# Patient Record
Sex: MALE | Race: WHITE
[De-identification: names, ages, dates, MRNs, and addresses within clinical notes are randomized per-mention and may not be internally consistent; named-entity substitution may affect disease eponyms.]

---

## 2018-04-17 ENCOUNTER — HOSPITAL ENCOUNTER (OUTPATIENT)
Age: 53
End: 2018-04-17
Payer: COMMERCIAL

## 2018-04-17 DIAGNOSIS — E55.9: ICD-10-CM

## 2018-04-17 DIAGNOSIS — I10: ICD-10-CM

## 2018-04-17 DIAGNOSIS — R73.02: ICD-10-CM

## 2018-04-17 DIAGNOSIS — E78.00: ICD-10-CM

## 2018-04-17 DIAGNOSIS — Z12.5: Primary | ICD-10-CM

## 2018-04-17 LAB
ALANINE AMINOTRANSFER ALT/SGPT: 31 U/L (ref 16–61)
ALBUMIN SERPL-MCNC: 3.9 G/DL (ref 3.2–5)
ALKALINE PHOSPHATASE: 51 U/L (ref 45–117)
ANION GAP: 8 (ref 5–15)
AST(SGOT): 18 U/L (ref 15–37)
BUN SERPL-MCNC: 9 MG/DL (ref 7–18)
BUN/CREAT RATIO: 10.5 RATIO (ref 10–20)
CALCIUM SERPL-MCNC: 8.7 MG/DL (ref 8.5–10.1)
CARBON DIOXIDE: 24 MMOL/L (ref 21–32)
CHLORIDE: 109 MMOL/L (ref 98–107)
CHOLEST SERPL-MCNC: 151 MG/DL
CREAT UR-MCNC: 177 MG/DL
DEPRECATED RDW RBC: 40 FL (ref 35.1–43.9)
DIFFERENTIAL INDICATED: (no result)
ERYTHROCYTE [DISTWIDTH] IN BLOOD: 12.1 % (ref 11.6–14.6)
EST GLOM FILT RATE - AFR AMER: 119 ML/MIN (ref 60–?)
GLOBULIN: 3.1 G/DL (ref 2.2–4.2)
GLUCOSE: 125 MG/DL (ref 74–106)
HCT VFR BLD AUTO: 47.2 % (ref 40–54)
HEMOGLOBIN: 16.8 G/DL (ref 13–16.5)
HGB BLD-MCNC: 16.8 G/DL (ref 13–16.5)
IMMATURE GRANULOCYTES COUNT: 0.02 X10^3/UL (ref 0–0)
MCV RBC: 91.7 FL (ref 80–94)
MEAN CORP HGB CONC: 35.6 G/GL (ref 32–36)
MEAN PLATELET VOL.: 10.1 FL (ref 6.2–12)
MICROALBUMIN UR-MCNC: 8.8 MG/L
MUCOUS THREADS URNS QL MICRO: (no result) /HPF
PLATELET # BLD: 205 K/MM3 (ref 150–450)
PLATELET COUNT: 205 K/MM3 (ref 150–450)
POSITIVE COUNT: NO
POSITIVE DIFFERENTIAL: NO
POSITIVE MORPHOLOGY: NO
POTASSIUM: 4.2 MMOL/L (ref 3.5–5.1)
PSA,TOTAL - ANNUAL SCREEN: 0.68 NG/ML (ref 0–4)
RBC # BLD AUTO: 5.15 M/MM3 (ref 4.6–6.2)
RBC DISTRIBUTION WIDTH CV: 12.1 % (ref 11.6–14.6)
RBC DISTRIBUTION WIDTH SD: 40 FL (ref 35.1–43.9)
RBC UR QL: (no result) /HPF (ref 0–5)
SP GR UR: 1.02 (ref 1–1.03)
SQUAMOUS URNS QL MICRO: (no result) /HPF (ref 0–5)
TRIGLYCERIDES: 142 MG/DL
URINE PRESERVATIVE: (no result)
VLDLC SERPL-MCNC: 28 MG/DL (ref 5–40)
WBC # BLD AUTO: 6.7 K/MM3 (ref 4.4–11)
WHITE BLOOD COUNT: 6.7 K/MM3 (ref 4.4–11)

## 2018-04-17 PROCEDURE — 80061 LIPID PANEL: CPT

## 2018-04-17 PROCEDURE — 81001 URINALYSIS AUTO W/SCOPE: CPT

## 2018-04-17 PROCEDURE — 85025 COMPLETE CBC W/AUTO DIFF WBC: CPT

## 2018-04-17 PROCEDURE — 82570 ASSAY OF URINE CREATININE: CPT

## 2018-04-17 PROCEDURE — 84443 ASSAY THYROID STIM HORMONE: CPT

## 2018-04-17 PROCEDURE — G0103 PSA SCREENING: HCPCS

## 2018-04-17 PROCEDURE — 84153 ASSAY OF PSA TOTAL: CPT

## 2018-04-17 PROCEDURE — 82043 UR ALBUMIN QUANTITATIVE: CPT

## 2018-04-17 PROCEDURE — 36415 COLL VENOUS BLD VENIPUNCTURE: CPT

## 2018-04-17 PROCEDURE — 80053 COMPREHEN METABOLIC PANEL: CPT

## 2018-04-17 PROCEDURE — 82306 VITAMIN D 25 HYDROXY: CPT

## 2018-04-18 LAB — VITAMIN D,25 HYDROXY: 79.9 NG/ML (ref 29.95–100.01)

## 2019-07-16 ENCOUNTER — HOSPITAL ENCOUNTER (EMERGENCY)
Age: 54
Discharge: HOME | End: 2019-07-16

## 2019-07-16 VITALS
WEIGHT: 133.38 LBS | HEART RATE: 82 BPM | OXYGEN SATURATION: 98 % | RESPIRATION RATE: 18 BRPM | TEMPERATURE: 98.6 F | SYSTOLIC BLOOD PRESSURE: 118 MMHG | DIASTOLIC BLOOD PRESSURE: 73 MMHG

## 2019-07-16 DIAGNOSIS — S61.011A LACERATION OF RIGHT THUMB WITHOUT FOREIGN BODY WITHOUT DAMAGE TO NAIL, INITIAL ENCOUNTER: Primary | ICD-10-CM

## 2019-07-16 PROCEDURE — 12042 INTMD RPR N-HF/GENIT2.6-7.5: CPT | Performed by: PHYSICIAN ASSISTANT

## 2019-07-16 PROCEDURE — 99282 EMERGENCY DEPT VISIT SF MDM: CPT | Performed by: PHYSICIAN ASSISTANT

## 2019-07-16 PROCEDURE — 10002525 HB LACERATION REPAIR-INTERMEDIATE

## 2019-07-16 PROCEDURE — 90471 IMMUNIZATION ADMIN: CPT | Performed by: PHYSICIAN ASSISTANT

## 2019-07-16 PROCEDURE — 99282 EMERGENCY DEPT VISIT SF MDM: CPT

## 2019-07-16 PROCEDURE — 10002800 HB RX 250 W HCPCS: Performed by: PHYSICIAN ASSISTANT

## 2019-07-16 PROCEDURE — 90715 TDAP VACCINE 7 YRS/> IM: CPT | Performed by: PHYSICIAN ASSISTANT

## 2019-07-16 RX ADMIN — TETANUS TOXOID, REDUCED DIPHTHERIA TOXOID AND ACELLULAR PERTUSSIS VACCINE, ADSORBED 0.5 ML: 5; 2.5; 8; 8; 2.5 SUSPENSION INTRAMUSCULAR at 18:59

## 2019-07-16 ASSESSMENT — ENCOUNTER SYMPTOMS
WEAKNESS: 0
WOUND: 1
NUMBNESS: 1

## 2019-07-16 ASSESSMENT — MOVEMENT AND STRENGTH ASSESSMENTS: FACE_JAW: NO FACIAL DROOP

## 2019-07-16 ASSESSMENT — PAIN SCALES - GENERAL: PAINLEVEL_OUTOF10: 4

## 2021-02-03 ENCOUNTER — HOSPITAL ENCOUNTER (OUTPATIENT)
Age: 56
End: 2021-02-03
Payer: COMMERCIAL

## 2021-02-03 DIAGNOSIS — I10: Primary | ICD-10-CM

## 2021-02-03 PROCEDURE — 93975 VASCULAR STUDY: CPT

## 2021-03-08 ENCOUNTER — HOSPITAL ENCOUNTER (OUTPATIENT)
Age: 56
End: 2021-03-08
Payer: COMMERCIAL

## 2021-03-08 DIAGNOSIS — R94.7: Primary | ICD-10-CM

## 2021-03-08 LAB — CREATININE FINGERSTICK: 0.9 MG/DL (ref 0.7–1.3)

## 2021-03-08 PROCEDURE — 74178 CT ABD&PLV WO CNTR FLWD CNTR: CPT

## 2021-09-22 ENCOUNTER — HOSPITAL ENCOUNTER (OUTPATIENT)
Age: 56
End: 2021-09-22
Payer: COMMERCIAL

## 2021-09-22 DIAGNOSIS — Z87.891: ICD-10-CM

## 2021-09-22 DIAGNOSIS — Z12.2: Primary | ICD-10-CM

## 2021-09-22 PROCEDURE — 71271 CT THORAX LUNG CANCER SCR C-: CPT

## 2022-11-02 ENCOUNTER — HOSPITAL ENCOUNTER (OUTPATIENT)
Dept: HOSPITAL 100 - CT | Age: 57
Discharge: HOME | End: 2022-11-02
Payer: COMMERCIAL

## 2022-11-02 DIAGNOSIS — Z12.2: Primary | ICD-10-CM

## 2022-11-02 DIAGNOSIS — Z87.891: ICD-10-CM

## 2022-11-02 PROCEDURE — 71271 CT THORAX LUNG CANCER SCR C-: CPT

## 2022-11-11 ENCOUNTER — HOSPITAL ENCOUNTER (OUTPATIENT)
Dept: HOSPITAL 100 - PSN | Age: 57
Discharge: HOME | End: 2022-11-11
Payer: COMMERCIAL

## 2022-11-11 DIAGNOSIS — R93.89: Primary | ICD-10-CM

## 2022-11-11 PROCEDURE — 94729 DIFFUSING CAPACITY: CPT

## 2022-11-11 PROCEDURE — 94060 EVALUATION OF WHEEZING: CPT

## 2022-11-11 PROCEDURE — 94726 PLETHYSMOGRAPHY LUNG VOLUMES: CPT

## 2023-05-10 ENCOUNTER — HOSPITAL ENCOUNTER (OUTPATIENT)
Dept: HOSPITAL 100 - CT | Age: 58
Discharge: HOME | End: 2023-05-10
Payer: COMMERCIAL

## 2023-05-10 DIAGNOSIS — R93.89: Primary | ICD-10-CM

## 2023-05-10 PROCEDURE — 71250 CT THORAX DX C-: CPT

## 2024-08-08 ENCOUNTER — HOSPITAL ENCOUNTER (OUTPATIENT)
Dept: HOSPITAL 100 - CT | Age: 59
Discharge: HOME | End: 2024-08-08
Payer: COMMERCIAL

## 2024-08-08 DIAGNOSIS — I25.10: ICD-10-CM

## 2024-08-08 DIAGNOSIS — F17.210: Primary | ICD-10-CM

## 2024-08-08 PROCEDURE — 71271 CT THORAX LUNG CANCER SCR C-: CPT

## 2024-08-08 NOTE — CT_ITS
REPORT-ID:[id number] 
 
EXAM:  CT CHEST WITHOUT INTRAVENOUS CONTRAST 
 
CLINICAL INDICATION:  smoker 
 
TECHNIQUE:  Helically acquired images were obtained of the chest without 
intravenous contrast.  This CT exam was performed using one or more of the 
following dose reduction techniques:  automated exposure control, 
adjustment of the mA and/or kV according to patient size, and/or use of 
iterative reconstruction technique. 
 
RADIATION DOSE:  CTDIvol = 6.02 mGy, DLP = 151.21 mGy-cm 
 
COMPARISON:  Chest CT 11/2/22 
 
FINDINGS: 
 
LUNGS AND PLEURAL SPACES:  Unremarkable.  No mass.  No consolidation or 
edema.  No pleural effusion or thickening.  No pneumothorax. 
 
HEART:  Coronary artery calcifications.  Heart size is normal.  No 
pericardial effusion. 
 
MEDIASTINUM:  Unremarkable.  No mediastinal or hilar adenopathy.  Esophagus 
is unremarkable.  No hiatal hernia. 
 
THYROID:  Unremarkable.  No thyroid lesions. 
 
BONES/JOINTS:  Unremarkable.  No suspicious lytic or blastic abnormality. 
 
VASCULATURE: No dilation of the thoracic aorta. 
 
ORDER #: [id number] CT/Low Dose CT Lung Screening  
IMPRESSION:  
 
  
No pulmonary nodules.  ACR Lung CT Screening Reporting And Data System  
(Lung-RADS) score: 1S - Additional clinically significant or potentially  
clinically significant findings are described.  Recommend continued annual  
screening with a low-dose CT (LDCT) in 12 months.  
 
  
Electronically Signed:  
aNthen Brennan MD  
2024/08/10 at 11:54 EDT  
Reading Location ID and State: [address] / [place]  
Tel [phone], Service support  [phone], Fax [phone]

## 2024-08-08 NOTE — XMS RPT_ITS
Comprehensive CCD (C-CDA v2.1)  
  
                           Created on: 2024  
  
  
Bethanie Galan  
External Reference #: CDR,PersonID:[id number]  
: [id number]  
Sex: Male  
  
Demographics  
  
  
                                        Address             [address] [place]  [address]  
   
                                        Home Phone          [phone]  
   
                                        Mobile Phone        [phone]  
   
                                        Preferred Language  en  
   
                                        Marital Status        
   
                                        Zoroastrian Affiliation Unknown  
   
                                        Race                White  
   
                                        Ethnic Group        Not  or Lati  
no  
  
  
Author  
  
  
                                        Organization        [facility]  
  
  
Care Team Providers  
  
  
                                Care Team Member Name Role            Phone  
   
                                Mere Chavez Unavailable     [phone]  
   
                                Nikko Hanson Unavailable     [phone]  
   
                                Verena Tuttle   Unavailable     Unavailable  
   
                                Unavailable     Unavailable     [phone]  
   
                                Mere Chavez Attending       Unavailable  
   
                                Mere Chavez Referring       Unavailable  
   
                                Mere Chavez Consulting      Unavailable  
   
                                Mere Chavez Unavailable     [phone]  
   
                                Nikko Hanson Unavailable     [phone]  
   
                                Verena Tuttle   Unavailable     Unavailable  
   
                                Unavailable     Unavailable     [phone]  
   
                                Slarb, Lashell   Unavailable     Unavailable  
   
                                Gravius, Gaby Unavailable     Unavailable  
   
                                Anjelica Gonzalez   Unavailable     Unavailable  
   
                                Neal, Catina Unavailable     Unavailable  
   
                                Marcos Spencer   Unavailable     Unavailable  
   
                                Tanphaichitr - Wilkins, Donald Unavailable     [phone]  
[phone]  
   
                                Cory COSTELLO Mere Unavailable     [phone]  
[phone]  
   
                                Nikko Hanson MD Unavailable     [phone]  
[phone]  
   
                                Tanphaichitr - Wilkins, Donald Unavailable     [phone]  
[phone]  
   
                                Anjelica Gonzalez LPN Unavailable     Unavailable  
   
                                Gravius CMA, Gaby Unavailable     Unavailable  
   
                                Unavailable     Unavailable     [phone]  
   
                                Sierra Low LPN Unavailable     Unavailable  
   
                                Cory COSTELLO Mere Unavailable     [phone]  
[phone]  
   
                                Nikko Hanson MD Unavailable     [phone]  
[phone]  
   
                                Tanphaichitr - Wilkins, Donald Unavailable     [phone]  
[phone]  
   
                                Marcos Spencer LPN Unavailable     Unavailable  
   
                                Manchak CAROLINE, Rowena Unavailable     Unavailable  
   
                                Yolie Orona MA Unavailable     Unavailable  
   
                                Gravius CMA, Gaby Unavailable     Unavailable  
   
                                Unavailable     Unavailable     [phone]  
   
                                aMrco Antonio VELAZQUEZ, Kayela Unavailable     Unavailable  
   
                                Cory DO, Mere Unavailable     [phone]  
[phone]  
   
                                Sarkis Rodriguez   Unavailable     [phone]  
   
                                Slarb LPN, Lashell Unavailable     Unavailable  
   
                                Chato LPN, Ned Unavailable     Unavailable  
  
  
  
Allergies  
  
  
                                                    Allergy   
Classification                          Reported   
Allergen(s)               Allergy Type              Date of   
Onset                     Reaction(s)               Facility  
   
                                                    Penicillins   
(antibiotic)  
(3 sources)                             Penicillins;   
Translations:   
[Penicillins]   Drug Allergy                                    [facility]   
[facility];   
[facility]  
Work Phone:   
[phone]  
   
                                                      
(20 sources)                            Penicillins;   
Translations:   
[Penicillins]                           allergy to   
substance                                                   [facility]  
Work Phone:   
[phone]  
  
  
  
Medications  
Current Medications  
  
  
  
                      Medication Drug Class(es) Dates      Sig (Normalized) Sig   
(Original)  
   
                                                    niacin 500 mg oral   
tablet  
(20 sources)              Nicotinic Acid            Start:   
10-                                            
  
  
  
                                Start: 10-                   
   
                                Start: 2021                   
   
                                        Start: 10-   take 1 tablet by kayla  
th once   
daily                                   Niacin 500 MG Oral Tablet 1 (one)   
Tablet qd for 90 days Quantity: 90   
{Tablet} Refills: 3 Ordered:   
2-Oct-2020 Anjelica Gonzalez LPN Start   
: 2-Oct-2020 Active  
   
                                        Start: 2019   take 1 tablet by kayla  
th once   
daily                                   Niacin 500 MG Oral Tablet 1 (one)   
Tablet qd for 90 days Quantity: 90   
{Tablet} Refills: 3 Ordered:   
2019 Mere Chavez DO, DO, Kathleen Start :   
2019 Active  
   
                                        Start: 05-   take 1 tablet by kayla  
th once   
daily                                   Niacin 500 MG Oral Tablet 1 (one)   
Tablet qd for 90 days Quantity: 90   
{Tablet} Refills: 3 Ordered:   
10-May-2019 Catina De Jesus LPN   
Start : 10-May-2019 Active  
   
                                        Start: 2018   take 1 tablet by kayla  
th once   
daily                                   Niacin 500 MG Oral Tablet 1 (one)   
Tablet qd for 90 days Quantity: 90   
{Tablet} Refills: 3 Ordered:   
13-Aug-2018 Mere Chavez DO, DO, Kathleen Start :   
13-Aug-2018 Active  
   
                                                    Start: 2011  
End: 10-                                       
  
  
  
Completed/Discontinued Medications  
  
  
  
                      Medication Drug Class(es) Dates      Sig (Normalized) Sig   
(Original)  
   
                                                    amLODIPine 5 mg   
oral tablet  
(20 sources)                            Dihydropyridine Calcium   
Channel Blocker                         Start:   
02-                                            
  
  
  
                                Start: 2022                   
   
                                Start: 2021                   
   
                                        Start: 2020   take 1 tablet by kayla  
th once   
daily                                   Norvasc 5 MG Oral Tablet 1 (one)   
Tablet qd for 90 days Quantity: 90   
{Tablet} Refills: 3 Ordered:   
16-Dec-2020 Cory DO, Mere Griffithon DO Mere Start :   
16-Dec-2020 Active  
   
                                        Start: 2020   take 1 tablet by kayla  
th twice   
daily                                   Norvasc 5 MG Oral Tablet 1 (one)   
Tablet bid for 90 days Quantity:   
180 {Tablet} Refills: 3 Ordered:   
2020 Cory DO, Mere Griffithon DO Mere Start :   
2020 Active  
   
                                        Start: 06-   take 1 tablet by kayla  
th twice   
daily                                   Norvasc 2.5 MG Oral Tablet 1 Tablet   
bid for 90 days Quantity: 180   
{Tablet} Refills: 3 Ordered:   
15-Aaron-2020 Cory DO, Mere Griffithon DO Mere Start :   
15-Aaron-2020 Active  
   
                                        Start: 05-   take 1 tablet by kayla  
th twice   
daily                                   Norvasc 2.5 MG Oral Tablet 1 Tablet   
bid for 90 days Quantity: 180   
{Tablet} Refills: 3 Ordered:   
10-May-2019 Catina De Jesus LPN   
Start : 10-May-2019 Active  
   
                                        Start: 2019   take 1 tablet by kayla  
th twice   
daily                                   Norvasc 2.5 MG Oral Tablet 1 Tablet   
bid for 90 days Quantity: 180   
{Tablet} Refills: 3 Ordered:   
2019 Cory DO, Mere Chavez DO Mere Start :   
2019 Active  
   
                                        Start: 2018   take 1 tablet by kayla  
th twice   
daily                                   Norvasc 2.5 MG Oral Tablet 1 Tablet   
bid for 90 days Quantity: 180   
{Tablet} Refills: 3 Ordered:   
2018 Cory DO, Mere Chavez DO Mere Start :   
2018 Active  
  
  
  
                                                    aspirin 325 mg oral tablet  
(20 sources)                            Platelet Aggregation Inhibitor,   
Nonsteroidal Anti-inflammatory   
Drug                Start: 2012                         
   
                                                    cetirizine hydrochloride 10   
mg   
disintegrating oral tablet  
(20 sources)              Histamine-1 Receptor Antagonist Start: 2011  
End: 10-                                       
   
                                                    cholecalciferol 0.05 mg oral  
   
capsule  
(20 sources)    Vitamin D       Start: 2017                   
  
  
  
                                        Start: 2017   take 3 capsules by carissa  
out once   
daily                                   Vitamin D3 2000 UNIT Oral Capsule 3   
(three) Capsule Capsule qd for 0   
days Quantity: 90 {Capsule}   
Refills: 8 Ordered: 2017   
Mere Chavez DO, DO, Kathleen Start : 2017 Active  
  
  
  
                                                    hydroCHLOROthiazide 25 mg or  
al tablet  
(20 sources)    Thiazide Diuretic Start: 2022                   
  
  
  
                                Start: 2021                   
   
                                        Start: 2021   take 1 tablet by kayla  
th once   
daily in the morning                    hydroCHLOROthiazide 25 MG Oral Tablet 1   
(one) Tablet qam for 90 days Quantity:   
90 {Tablet} Refills: 3 Ordered:   
2021 Anjelica Gonzalez LPN Start :   
2021 Active  
  
  
  
                                                    LORazepam 0.5 mg oral tablet  
(20 sources)    Benzodiazepine  Start: 2022                   
  
  
  
                                                    Start: 2014  
End: 2020                                       
  
  
  
                                        Comment on above:   twenty   
   
                                                    losartan potassium 100 mg or  
al   
tablet  
(20 sources)    Angiotensin 2 Receptor Blocker Start: 2023                  
   
  
  
  
                                Start: 2023                   
   
                                Start: 2023                   
   
                                Start: 2023                   
   
                                Start: 2023                   
   
                                Start: 2022                   
   
                                Start: 2022                   
   
                                Start: 2021                   
   
                                        Start: 2021   take 1 tablet by kayla  
th once   
daily                                   Losartan Potassium 100 MG Oral   
Tablet 1 (one) Tablet qd for 0 days   
Quantity: 30 {Tablet} Refills: 3   
Ordered: 2021 Mere Chavez DO, DO, Kathleen Start   
: 2021 Active  
   
                                        Start: 2020   take 1 tablet by kayla  
th once   
daily                                   Losartan Potassium 100 MG Oral   
Tablet 1 (one) Tablet qd for 0 days   
Quantity: 30 {Tablet} Refills: 3   
Ordered: 16-Dec-2020 Anjelica Gonzalez LPN Start : 16-Dec-2020 Active  
  
  
  
                                                    lovastatin 20 mg oral tablet  
(20 sources)    HMG-CoA Reductase Inhibitor Start: 06-                   
  
  
  
                                Start: 2022                   
   
                                Start: 2021                   
   
                                        Start: 2020   take 1 tablet by kayla  
th once   
daily                                   Lovastatin 20 MG Oral Tablet 1   
(one) Tablet qd for 90 days   
Quantity: 90 {Tablet} Refills: 3   
Ordered: 2020 Mere Chavez DO, DO, Kathleen Start   
: 2020 Active  
   
                                        Start: 05-   take 1 tablet by kayla  
th once   
daily                                   Lovastatin 20 MG Oral Tablet 1   
(one) Tablet qd for 90 days   
Quantity: 90 {Tablet} Refills: 3   
Ordered: 10-May-2019 Catina De Jesus LPMANPREET Start : 10-May-2019   
Active  
   
                                        Start: 2018   take 1 tablet by kayla  
th once   
daily                                   Lovastatin 20 MG Oral Tablet 1   
(one) Tablet qd for 90 days   
Quantity: 90 {Tablet} Refills: 3   
Ordered: 13-Aug-2018 Mere Chavez DO, DO, Kathleen Start   
: 13-Aug-2018 Active  
  
  
  
                                                    24 hr lovastatin 20 mg / angel  
albert   
500 mg extended release oral   
tablet  
(20 sources)                            HMG-CoA Reductase Inhibitor,   
Nicotinic Acid                          Start: 2015  
End: 2018                                       
  
  
  
                                                    Start: 2015  
End: 2018                         take 1 tablet by mouth once   
daily                                   ADVICOR, 500-20MG (Oral Tablet   
Extended Release 24 Hour) 1 Tablet   
ER 24HR qd for 90 days Quantity: 90   
{Tablet} Refills: 3 Ordered:   
16-Dec-2015 Carline Spencer Start :   
16-Dec-2015 End : 2018   
Discontinued Comments: This order   
discontinued per [facility].  
   
                                                    Start: 2015  
End: 2018                         take 500-20 mg by mouth every   
twenty-four hours                       ADVICOR, 500-20MG (Oral Tablet   
Extended Release 24 Hour) 1 Tablet   
ER 24HR qd for 90 days Quantity: 90   
{Tablet} Refills: 3 Ordered:   
16-Dec-2015 Carline Spencer Start :   
16-Dec-2015 End : 2018   
Discontinued Comments: This order   
discontinued per [facility].  
  
  
  
                                        Comment on above:   This order discontin  
ued per [facility].   
   
                                                    24 hr metFORMIN hydrochlorid  
e   
500 mg extended release oral   
tablet  
(20 sources)    Biguanide       Start: 2023                   
  
  
  
                                Start: 2022                   
   
                                Start: 07-                   
   
                                Start: 05-                 metFORMIN HCl   
 MG Oral Tablet Extended Release 24 Hour   
1   
Tablet ER 24HR bid for 0 days Quantity: 180 {Tablet} Refills: 3   
Ordered: 15-Jul-2020 Mere Chavez DO, DO Mere   
Start : 15-Jul-2020 Active  
   
                                Start: 2018                 MetFORMIN HCl   
 MG Oral Tablet Extended Release 24 Hour   
1   
Tablet ER 24HR bid for 0 days Quantity: 180 {Tablet} Refills: 3   
Ordered: 13-Aug-2018 Cory COSTELLO MereMere Vergara DO   
Start : 13-Aug-2018 Active  
  
  
  
                                                    traMADol hydrochloride 50 mg  
 oral tablet  
(20 sources)    Opioid Agonist  Start: 2023                   
  
  
  
                                Start: 2022                   
   
                                Start: 2022                   
   
                                                    Start: 2017  
End: 2020                         take 1 tablet by mouth every   
eight hours as needed                   Ultram 50 MG Oral Tablet 1 (one)   
Tablet q 8 hr prn for 0 days   
Quantity: 30 {Tablet} Refills: 0   
Ordered: 2020 Gaby Chavez CMA Start : 8-Dec-2017 End :   
2020 Inactive Comments:   
thirtycalled in  oarrs reviwed   
[phone] oars kf  
  
  
  
                                        Comment on above:   thirtycalled in [facility]  
arrs reviwed [phone]   
oars kf   
   
                                                    vitamin k 0.1 mg oral tablet  
(20 sources)                                                      
   
                                                    NEGATED: Highlighted row has  
 not   
occurred!drug or medication  
(20 sources)                                                      
  
  
  
                                                                No Known Histori  
nelida Medications  
  
  
  
                                                    NEGATED: Highlighted row has  
 not occurred!No   
Known Historical Medications  
(5 sources)                                                     No Known Histori  
nelida Medications  
  
  
  
Problems  
Active Problems  
  
  
                                                    Problem   
Classification  Problem         Date            Documented Date Episodic/Chronic  
   
                                                    Adjustment disorders  
(20 sources)                            Acute situational   
disturbance;   
Translations: [Grief   
finding]                                  
Resolved:   
  
3                         2018                Chronic  
   
                                                    Administrative/socia  
l admission  
(20 sources)                            Administrative reason   
for encounter;   
Translations: [Other   
general medical   
examination for   
administrative purposes]                     2018          Episodic  
   
                                                    Anxiety disorders  
(20 sources)                            Anxiety; Translations:   
[Situational anxiety]                     2019          Chronic  
   
                                                    Diabetes mellitus   
without complication  
(20 sources)                            Type 2 diabetes   
mellitus; Translations:   
[Type II diabetes   
mellitus, well   
controlled]                             2023          Chronic  
   
                                                    Diabetes mellitus   
without complication  
(20 sources)                            Abnormal glucose   
tolerance test;   
Translations:   
[Hyperglycemia]                         2018          Episodic  
   
                                                    Disorders of lipid   
metabolism  
(20 sources)                            Hypercholesterolemia;   
Translations:   
[Hypercholesteremia]                     2018          Chronic  
   
                                        Comment on above:   will work on diet mo  
re and add exercise   
   
                                                    Essential   
hypertension  
(20 sources)                            Benign essential   
hypertension;   
Translations:   
[Hypertension,   
essential, benign]                      2018          Chronic  
   
                                                    Immunizations and   
screening for   
infectious disease  
(20 sources)                            Need for prophylactic   
vaccination and   
inoculation against   
influenza; Translations:   
[Needs influenza   
immunization]                           2021          Episodic  
   
                                        Comment on above:   NOT GIVENNOT GIVEN   
   
                                                    Nonspecific chest   
pain  
(20 sources)                            Chest pain;   
Translations: [Chest   
pain]                                     
Resolved:   
  
1                         2016                Episodic  
   
                                                    Nutritional   
deficiencies  
(20 sources)                            Vitamin D deficiency;   
Translations: [Vitamin D   
deficiency]                             2018          Chronic  
   
                                                    Osteoarthritis  
(20 sources)                            Degenerative joint   
disease involving   
multiple joints;   
Translations:   
[Generalized   
osteoarthritis]                         2018          Chronic  
   
                                                    Other endocrine   
disorders  
(20 sources)                            Increased norepinephrine   
level; Translations:   
[Elevated norepinephrine   
level]                                  2021          Episodic  
   
                                                    Other hematologic   
conditions  
(20 sources)                            Erythrocytosis;   
Translations:   
[Polycythemia]                          2018          Episodic  
   
                                                    Other nutritional;   
endocrine; and   
metabolic disorders  
(20 sources)                            Body mass index 25-29 -   
overweight;   
Translations: [BMI   
28.0-28.9,adult]                          
Resolved:   
  
0                         2018                Chronic  
   
                                                    Other nutritional;   
endocrine; and   
metabolic disorders  
(20 sources)                            Hypercalcemia;   
Translations:   
[Hypercalcemia]                         2018          Chronic  
   
                                                    Other nutritional;   
endocrine; and   
metabolic disorders  
(20 sources)                            Body mass index 25-29 -   
overweight;   
Translations: [BMI   
25.0-25.9,adult]                          
Resolved:   
  
0                         2021                Episodic  
   
                                                    Other nutritional;   
endocrine; and   
metabolic disorders  
(20 sources)                            Overweight in adulthood   
with body mass index of   
25 or more but less than   
30; Translations: [BMI   
25.0-25.9,adult]                          
Resolved:   
  
0                         2022                Episodic  
   
                                                    Other screening for   
suspected conditions   
(not mental   
disorders or   
infectious disease)  
(20 sources)                            CT of chest abnormal;   
Translations: [Abnormal   
chest CT]                               2022          Chronic  
   
                                                    Other screening for   
suspected conditions   
(not mental   
disorders or   
infectious disease)  
(20 sources)                            Abnormal result of   
cardiovascular function   
study, unspecified;   
Translations: [Blood   
chemistry abnormal]                       
Resolved:   
  
6                         2018                Episodic  
   
                                        Comment on above:   i spoke to dr Niko chi nd he is going to see the cardiologist   
today   
at 3:30 -- he remembered seeing this echo-- i cxn his stress test   
and i want to see if cardio agrees that he needs a cath   
   
                                                            ordered cologardlast  
 scope    
   
                                                    Residual codes;   
unclassified  
(20 sources)                            Needs influenza   
immunization;   
Translations: [Need for   
prophylactic vaccination   
and inoculation against   
influenza (Renamed from   
Need for immunization   
against influenza)]                     2019          Episodic  
   
                                        Comment on above:   NOT GIVENNOT GIVEN   
   
                                                    Residual codes;   
unclassified  
(20 sources)                            Body mass index 20-24 -   
normal; Translations:   
[BMI 24.0-24.9, adult]                    
Resolved:   
  
0                         2021                Episodic  
   
                                                    Residual codes;   
unclassified  
(20 sources)                            Influenza vaccination   
declined; Translations:   
[Influenza vaccination   
declined (Renamed from   
Refused influenza   
vaccine)]                               2021          Episodic  
   
                                                    Spondylosis;   
intervertebral disc   
disorders; other   
back problems  
(20 sources)                            Degeneration of lumbar   
intervertebral disc;   
Translations: [DDD   
(degenerative disc   
disease), lumbar]                       2018          Chronic  
   
                                        Comment on above:   flares more during g  
olf season and takes one pain pill prn   
   
                                                    Spondylosis;   
intervertebral disc   
disorders; other   
back problems  
(20 sources)                            Low back pain;   
Translations: [Low back   
pain]                                   2020          Episodic  
   
                                                    Substance-related   
disorders  
(20 sources)                            Smoker; Translations:   
[Smoker]                                2018          Chronic  
   
                                        Comment on above:   weaning -- currently  
 5-7 cig daily   
   
                                                            weaning -- currently  
 5-10 cig daily---- back up to almost pack a   
day   
   
                                                    Unclassified  
(20 sources)                                                      
   
                                                    Unclassified  
(20 sources)                            Screening status;   
Translations: [Encounter   
for screening for   
malignant neoplasm of   
colon (Renamed from   
Special screening for   
malignant neoplasms,   
colon)]                                 2018            
   
                                        Comment on above:   ordered cologardlast  
 scope 2016   
   
                                                    Unclassified  
(20 sources)                            Hypercholesteremia   
(272.0)                                                       
   
                                                    Unclassified  
(20 sources)                            Influenza vaccination   
declined; Translations:   
[Influenza vaccination   
declined (Renamed from   
Refused influenza   
vaccine)]                               2018            
   
                                                    Unclassified  
(20 sources)                            SCREENING FOR CANCER OF   
THE PROSTATE (V76.44)                                           
   
                                                    Unclassified  
(20 sources)                            HYPERTENSION, BENIGN   
ESSENTIAL (401.1)                                             
   
                                                    Unclassified  
(20 sources)    BMI 28.0-28.9,adult                                   
   
                                                    Unclassified  
(20 sources)    Hypercholesteremia                                   
   
                                                    Unclassified  
(20 sources)                            Hypertension, essential,   
benign                                                        
   
                                                    Unclassified  
(20 sources)                            DDD (degenerative disc   
disease), lumbar                                              
   
                                                    Unclassified  
(20 sources)    Polycythemia                                      
   
                                                    Unclassified  
(20 sources)                            Screening for prostate   
cancer                                                        
   
                                                    Unclassified  
(20 sources)    BMI 29.0-29.9,adult                                   
   
                                                    Unclassified  
(20 sources)    BMI 27.0-27.9,adult                                   
   
                                                    Unclassified  
(20 sources)    Situational anxiety                                   
   
                                                    Unclassified  
(20 sources)    BMI 26.0-26.9,adult                                   
   
                                                    Unclassified  
(20 sources)                            Body mass index 20-24 -   
normal; Translations:   
[BMI 24.0-24.9, adult]                    
Resolved:   
  
0                         2020                  
   
                                                    Unclassified  
(20 sources)    BMI 25.0-25.9,adult                                   
   
                                                    Unclassified  
(4 sources)                             Abnormal glucose   
tolerance test (Renamed   
from Abnormal glucose   
tolerance test (GTT))                                           
   
                                                    Unclassified  
(2 sources)                             Encounter for hepatitis   
C virus screening test   
for high risk patient                                           
  
  
Past or Other Problems  
  
  
                                                    Problem   
Classification  Problem         Date            Documented Date Episodic/Chronic  
   
                                                    Residual codes;   
unclassified  
(8 sources)                             Increased body mass   
index; Translations:   
[BMI 29.0-29.9,adult]                     
Resolved:   
2018                Episodic  
   
                                                    Unclassified  
(20 sources)    Abnormal EKG(794.31)                                   
   
                                                    Unclassified  
(20 sources)                            Other general medical   
examination for   
administrative   
purposes (V70.3)                                              
   
                                                    Unclassified  
(20 sources)                            Abnormal Cardiac Test   
(794.30)                                                      
   
                                                    Unclassified  
(20 sources)    Unspecified Diagnosis                 2018        
   
                                                    Unclassified  
(15 sources)                            Elevated   
norepinephrine level                                           
   
                                                    Unclassified  
(2 sources)     Stress reaction                                   
   
                                                    Unclassified  
(2 sources)     Grieving                                          
   
                                                    Unclassified  
(2 sources)                             Screening PSA   
(prostate specific   
antigen)                                                      
  
  
  
Results  
  
  
                          Test Name    Value        Interpretation Reference   
Range                                   Facility  
   
                                                    CALCIFEDIOL (76535)Ordered B  
y:  on 2023   
   
                                                    25-hydroxyvitamin D   
[Mass/Vol]      66.7 ng/mL      Normal          30.0-100.0      [facility];   
[facility]  
Work Phone:   
[phone]  
   
                                                    CBC with auto diff (90217)Or  
dered By:  on 2023   
   
                                                    Basophils (Bld)   
[#/Vol]         0.1 10*3/uL     Normal          0.0-0.2         [facility];   
[facility]  
Work Phone:   
[phone]  
   
                                                    Basophils/100 WBC   
(Bld)           1 %             Normal                          [facility];   
[facility]  
Work Phone:   
[phone]  
   
                                                    Eosinophils (Bld)   
[#/Vol]         0.1 10*3/uL     Normal          0.0-0.4         [facility];   
[facility]  
Work Phone:   
[phone]  
   
                                                    Eosinophils/100 WBC   
(Bld)           2 %             Normal                          [facility];   
[facility]  
Work Phone:   
[phone]  
   
                                                    Erythrocyte   
distribution width   
(RBC) [Ratio]   11.9 %          Normal          11.6-15.4       [facility];   
[facility]  
Work Phone:   
[phone]  
   
                                                    Hematocrit (Bld)   
[Volume fraction] 48.0 %          Normal          37.5-51.0       [facility];   
[facility]  
Work Phone:   
[phone]  
   
                                                    Hemoglobin (Bld)   
[Mass/Vol]      16.3 g/dL       Normal          13.0-17.7       [facility];   
[facility]  
Work Phone:   
[phone]  
   
                                                    Immature granulocytes   
(Bld) [#/Vol]   0.0 10*3/uL     Normal          0.0-0.1         [facility];   
[facility]  
Work Phone:   
[phone]  
   
                                                    Immature   
granulocytes/100 WBC   
(Bld)           0 %             Normal                          [facility];   
[facility]  
Work Phone:   
[phone]  
   
                                                    Lymphocytes (Bld)   
[#/Vol]         2.4 10*3/uL     Normal          0.7-3.1         [facility];   
[facility]  
Work Phone:   
[phone]  
   
                                                    Lymphocytes/100 WBC   
(Bld)           34 %            Normal                          [facility];   
[facility]  
Work Phone:   
[phone]  
   
                                                    MCH (RBC) [Entitic   
mass]           31.6 pg         Normal          26.6-33.0       [facility];   
[facility]  
Work Phone:   
[phone]  
   
                      MCHC (RBC) [Mass/Vol] 34.0 g/dL  Normal     31.5-35.7  [facility];   
[facility]  
Work Phone:   
[phone]  
   
                                                    MCV (RBC) [Entitic   
vol]            93 fL           Normal          79-97           [facility];   
[facility]  
Work Phone:   
[phone]  
   
                                                    Monocytes (Bld)   
[#/Vol]         0.6 10*3/uL     Normal          0.1-0.9         [facility];   
[facility]  
Work Phone:   
[phone]  
   
                                                    Monocytes/100 WBC   
(Bld)           9 %             Normal                          [facility];   
[facility]  
Work Phone:   
[phone]  
   
                                                    Neutrophils (Bld)   
[#/Vol]         3.7 10*3/uL     Normal          1.4-7.0         [facility];   
[facility]  
Work Phone:   
[phone]  
   
                                                    Neutrophils/100 WBC   
(Bld)           54 %            Normal                          [facility];   
[facility]  
Work Phone:   
[phone]  
   
                                                    Platelets (Bld)   
[#/Vol]         263 10*3/uL     Normal          150-450         [facility];   
[facility]  
Work Phone:   
[phone]  
   
                      RBC (Bld) [#/Vol] 5.16 10*6/uL Normal     4.14-5.80  [facility]   
Internal   
Medicine;   
[facility]  
Work Phone:   
[phone]  
   
                      WBC (Bld) [#/Vol] 6.9 10*3/uL Normal     3.4-10.8   [facility];   
[facility]  
Work Phone:   
[phone]  
   
                                                    HGB A1C (53556)Ordered By: S  
ystem Manager on 2023   
   
                                                    HbA1c (Bld) [Mass   
fraction]       6.0 %           Abnormal        4.8-5.6         [facility];   
[facility]  
Work Phone:   
[phone]  
   
                                                    LIPID PANEL (81714)Ordered B  
y:  on 2023   
   
                                                    Cholesterol   
[Mass/Vol]      138 mg/dL       Normal          100-199         [facility];   
[facility]  
Work Phone:   
[phone]  
   
                                                    Cholesterol in HDL   
[Mass/Vol]      59 mg/dL        Normal                          [facility];   
[facility]  
Work Phone:   
[phone]  
   
                                                    Triglyceride   
[Mass/Vol]      90 mg/dL        Normal          0-149           [facility];   
[facility]  
Work Phone:   
[phone]  
   
                      LIPID PANEL (67129) 17 mg/dL   Normal     5-40       [facility]   
[facility]   
[facility];   
[facility]  
Work Phone:   
[phone]  
   
                      LIPID PANEL (44525) 62 mg/dL   Normal     0-99       [facility]   
Internal   
Medicine;   
[facility]  
Work Phone:   
[phone]  
   
                      LIPID PANEL (05138) 1.1 {ratio} Normal     0.0-3.6    [facility];   
[facility]  
Work Phone:   
[phone]  
   
                                                    METABOLIC PANEL, COMPREHENSI  
VE (62219)Ordered By:  on 2023   
   
                      Albumin [Mass/Vol] 4.6 g/dL   Normal     3.8-4.9    [facility]   
Internal   
Medicine;   
[facility]  
Work Phone:   
[phone]  
   
                                                    Albumin/Globulin   
[Mass ratio]    2.3 {ratio}     Abnormal        1.2-2.2         [facility];   
[facility]  
Work Phone:   
[phone]  
   
                                                    ALP [Catalytic   
activity/Vol]   49 U/L          Normal                    [facility];   
[facility]  
Work Phone:   
[phone]  
   
                                                    ALT [Catalytic   
activity/Vol]   35 U/L          Normal          0-44            [facility];   
[facility]  
Work Phone:   
[phone]  
   
                                                    AST [Catalytic   
activity/Vol]   26 U/L          Normal          0-40            [facility];   
[facility]  
Work Phone:   
[phone]  
   
                      Bilirubin [Mass/Vol] 0.5 mg/dL  Normal     0.0-1.2    [facility];   
[facility]  
Work Phone:   
[phone]  
   
                      Calcium [Mass/Vol] 9.9 mg/dL  Normal     8.7-10.2   [facility]   
Internal   
Medicine;   
[facility]  
Work Phone:   
[phone]  
   
                      Chloride [Moles/Vol] 101 mmol/L Normal          [facility];   
[facility]  
Work Phone:   
[phone]  
   
                      CO2 [Moles/Vol] 24 mmol/L  Normal     20-29      [facility]   
Internal   
Medicine;   
[facility]  
Work Phone:   
[phone]  
   
                      Creatinine [Mass/Vol] 0.98 mg/dL Normal     0.76-1.27  [facility];   
[facility]  
Work Phone:   
[phone]  
   
                                                    Globulin (S)   
[Mass/Vol]      2.0 g/dL        Normal          1.5-4.5         [facility];   
[facility]  
Work Phone:   
[phone]  
   
                      Glucose [Mass/Vol] 127 mg/dL  Abnormal   70-99      [facility]   
Internal   
Medicine;   
[facility]  
Work Phone:   
[phone]  
   
                      Potassium [Moles/Vol] 4.8 mmol/L Normal     3.5-5.2    [facility];   
[facility]  
Work Phone:   
[phone]  
   
                      Protein [Mass/Vol] 6.6 g/dL   Normal     6.0-8.5    [facility]   
Internal   
Medicine;   
[facility]  
Work Phone:   
[phone]  
   
                      Sodium [Moles/Vol] 140 mmol/L Normal     134-144    [facility]   
Internal   
Medicine;   
[facility]  
Work Phone:   
[phone]  
   
                                                    Urea nitrogen   
[Mass/Vol]      10 mg/dL        Normal          6-24            [facility];   
[facility]  
Work Phone:   
[phone]  
   
                                                    Urea   
nitrogen/Creatinine   
[Mass ratio]    10 mg/mg        Normal          9-20            [facility];   
[facility]  
Work Phone:   
[phone]  
   
                                                    METABOLIC PANEL,   
COMPREHENSIVE (40582) 89 mL/min/1.73  Normal                          [facility]  
[facility]   
Internal   
Medicine;   
[facility]  
Work Phone:   
[phone]  
   
                                                    MICROALBUMINOrdered By: Syst  
em Manager on 2023   
   
                                                    Albumin DL <= 20 mg/L   
(U) [Mass/Vol]  7.6 ug/mL       Normal                          [facility];   
[facility]  
Work Phone:   
[phone]  
   
                                                    Albumin/Creatinine   
(U) [Mass ratio] 6 {mg/g_creat}  Normal          0-29            [facility];   
[facility]  
Work Phone:   
[phone]  
   
                                                    Creatinine (U)   
[Mass/Vol]      129.2 mg/dL     Normal                          [facility];   
[facility]  
Work Phone:   
[phone]  
   
                                                    TSH (THYROID STIMULATING HOR  
ALICIA) (91446)Ordered By:  on   
2023   
   
                          TSH Qn       1.630 {uIU/mL} Normal       0.450-4.50  
0                                       [facility];   
[facility]  
Work Phone:   
[phone]  
   
                                                    HGB A1C (65217)Ordered By: S  
ystem Manager on 2023   
   
                                                    HbA1c (Bld) [Mass   
fraction]       6.0 %           Abnormal        4.8-5.6         [facility];   
[facility]  
Work Phone:   
[phone]  
   
                                                    PSA (PROSTATE SPECIFIC ANTIG  
EN) (V76.44)Ordered By:  on 2023  
  
   
                                                    Prostate specific Ag   
[Mass/Vol]      0.9 ng/mL       Normal          0.0-4.0         [facility];   
[facility]  
Work Phone:   
[phone]  
   
                                                    CALCIFIDIOL (50750) VIT D 25  
Ordered By:  on 2022   
   
                                                    25-hydroxyvitamin D   
[Mass/Vol]      110.0 ng/mL     Abnormal        30.0-100.0      [facility];   
[facility]  
Work Phone:   
[phone]  
   
                                                    CBC with auto diff (43554)Or  
dered By:  on 2022   
   
                                                    Basophils (Bld)   
[#/Vol]         0.1 10*3/uL     Normal          0.0-0.2         [facility];   
[facility]  
Work Phone:   
[phone]  
   
                                                    Basophils/100 WBC   
(Bld)           1 %             Normal                          [facility];   
[facility]  
Work Phone:   
[phone]  
   
                                                    Eosinophils (Bld)   
[#/Vol]         0.1 10*3/uL     Normal          0.0-0.4         [facility];   
[facility]  
Work Phone:   
[phone]  
   
                                                    Eosinophils/100 WBC   
(Bld)           1 %             Normal                          [facility];   
[facility]  
Work Phone:   
[phone]  
   
                                                    Erythrocyte   
distribution width   
(RBC) [Ratio]   12.0 %          Normal          11.6-15.4       [facility];   
[facility]  
Work Phone:   
[phone]  
   
                                                    Hematocrit (Bld)   
[Volume fraction] 47.9 %          Normal          37.5-51.0       [facility];   
[facility]  
Work Phone:   
[phone]  
   
                                                    Hemoglobin (Bld)   
[Mass/Vol]      16.5 g/dL       Normal          13.0-17.7       [facility]   
[facility];   
[facility]   
[facility]  
Work Phone:   
[phone]  
   
                                                    Immature granulocytes   
(Bld) [#/Vol]   0.0 10*3/uL     Normal          0.0-0.1         [facility];   
[facility]   
[facility]  
Work Phone:   
[phone]  
   
                                                    Immature   
granulocytes/100 WBC   
(Bld)           0 %             Normal                          [facility]   
[facility];   
[facility]   
[facility]  
Work Phone:   
[phone]  
   
                                                    Lymphocytes (Bld)   
[#/Vol]         2.3 10*3/uL     Normal          0.7-3.1         [facility];   
[facility]   
[facility]  
Work Phone:   
[phone]  
   
                                                    Lymphocytes/100 WBC   
(Bld)           29 %            Normal                          [facility]   
[facility];   
[facility]   
[facility]  
Work Phone:   
[phone]  
   
                                                    MCH (RBC) [Entitic   
mass]           33.8 pg         Abnormal        26.6-33.0       [facility];   
[facility]   
[facility]  
Work Phone:   
[phone]  
   
                      MCHC (RBC) [Mass/Vol] 34.4 g/dL  Normal     31.5-35.7  Com  
[facility]   
[facility]   
Medicine;   
[facility]   
[facility]  
Work Phone:   
[phone]  
   
                                                    MCV (RBC) [Entitic   
vol]            98 fL           Abnormal        79-97           [facility];   
[facility]   
[facility]  
Work Phone:   
[phone]  
   
                                                    Monocytes (Bld)   
[#/Vol]         0.8 10*3/uL     Normal          0.1-0.9         [facility];   
[facility]   
[facility]  
Work Phone:   
[phone]  
   
                                                    Monocytes/100 WBC   
(Bld)           10 %            Normal                          [facility]   
[facility];   
[facility]   
[facility]  
Work Phone:   
[phone]  
   
                                                    Neutrophils (Bld)   
[#/Vol]         4.7 10*3/uL     Normal          1.4-7.0         [facility];   
[facility]   
[facility]  
Work Phone:   
[phone]  
   
                                                    Neutrophils/100 WBC   
(Bld)           59 %            Normal                          [facility];   
[facility]   
[facility]  
Work Phone:   
[phone]  
   
                                                    Platelets (Bld)   
[#/Vol]         218 10*3/uL     Normal          150-450         [facility];   
[facility]   
[facility]  
Work Phone:   
[phone]  
   
                      RBC (Bld) [#/Vol] 4.88 10*6/uL Normal     4.14-5.80  [facility]  
ehensive   
Internal   
Medicine;   
[facility]   
Medicine  
Work Phone:   
[phone]  
   
                      WBC (Bld) [#/Vol] 8.0 10*3/uL Normal     3.4-10.8   [facility]   
[facility]   
[facility];   
[facility]   
[facility]  
Work Phone:   
[phone]  
   
                                                    HGB A1C (93179)Ordered By: S  
ystem Manager on 2022   
   
                                                    HbA1c (Bld) [Mass   
fraction]       6.0 %           Abnormal        4.8-5.6         [facility];   
[facility]   
Med[facility]  
Work Phone:   
[phone]  
   
                                                    LIPID PANEL (49769)Ordered B  
y:  on 2022   
   
                                                    Cholesterol   
[Mass/Vol]      159 mg/dL       Normal          100-199         [facility]   
Internal   
Medicine;   
[facility]   
[facility]   
Medicine  
Work Phone:   
[phone]  
   
                                                    Cholesterol in HDL   
[Mass/Vol]      48 mg/dL        Normal                          [facility]   
[facility];   
[facility]   
Medicine  
Work Phone:   
[phone]  
   
                                                    Triglyceride   
[Mass/Vol]      97 mg/dL        Normal          0-149           [facility]   
Internal   
Medicine;   
[facility]   
[facility]   
Medicine  
Work Phone:   
[phone]  
   
                      LIPID PANEL (50605) 18 mg/dL   Normal     5-40       [facility]   
Internal   
Medicine;   
[facility]   
[facility]   
Medicine  
Work Phone:   
[phone]  
   
                      LIPID PANEL (02784) 93 mg/dL   Normal     0-99       [facility]   
Internal   
Medicine;   
[facility]   
[facility]   
Medicine  
Work Phone:   
[phone]  
   
                      LIPID PANEL (89597) 1.9 {ratio} Normal     0.0-3.6    Comp  
rehensive   
Internal   
Medicine;   
[facility]   
[facility]   
Medicine  
Work Phone:   
[phone]  
   
                                                    METABOLIC PANEL, COMPREHENSI  
VE (56288)Ordered By:  on 2022   
   
                      Albumin [Mass/Vol] 4.7 g/dL   Normal     3.8-4.9    [facility]   
Internal   
Medicine;   
[facility]   
[facility]   
Medicine  
Work Phone:   
[phone]  
   
                                                    Albumin/Globulin   
[Mass ratio]    2.1 {ratio}     Normal          1.2-2.2         [facility]   
[facility];   
[facility]   
Medicine  
Work Phone:   
[phone]  
   
                                                    ALP [Catalytic   
activity/Vol]   51 U/L          Normal                    [facility]   
[facility];   
[facility]   
Medicine  
Work Phone:   
[phone]  
   
                                                    ALT [Catalytic   
activity/Vol]   24 U/L          Normal          0-44            [facility]   
[facility];   
[facility]   
Medicine  
Work Phone:   
[phone]  
   
                                                    AST [Catalytic   
activity/Vol]   22 U/L          Normal          0-40            [facility]   
[facility];   
[facility]   
Medicine  
Work Phone:   
[phone]  
   
                      Bilirubin [Mass/Vol] 0.4 mg/dL  Normal     0.0-1.2    Comp  
[facility]   
Internal   
Medicine;   
[facility]   
[facility]   
Medicine  
Work Phone:   
[phone]  
   
                      Calcium [Mass/Vol] 10.1 mg/dL Normal     8.7-10.2   [facility]   
Internal   
Medicine;   
[facility]   
[facility]   
Medicine  
Work Phone:   
[phone]  
   
                      Chloride [Moles/Vol] 100 mmol/L Normal          Comp  
rehensive   
Internal   
Medicine;   
[facility]   
[facility]   
Medicine  
Work Phone:   
[phone]  
   
                      CO2 [Moles/Vol] 24 mmol/L  Normal     20-29      [facility]   
[facility]   
[facility];   
[facility]   
[facility]   
Medicine  
Work Phone:   
[phone]  
   
                      Creatinine [Mass/Vol] 0.96 mg/dL Normal     0.76-1.27  Com  
prehensive   
Internal   
Medicine;   
[facility]   
[facility]   
Medicine  
Work Phone:   
[phone]  
   
                                                    Globulin (S)   
[Mass/Vol]      2.2 g/dL        Normal          1.5-4.5         [facility]   
[facility]   
Med[facility];   
[facility]   
[facility]   
Medicine  
Work Phone:   
[phone]  
   
                      Glucose [Mass/Vol] 121 mg/dL  Abnormal   70-99      [facility]   
Internal   
Medicine;   
[facility]   
[facility]   
Medicine  
Work Phone:   
[phone]  
   
                      Potassium [Moles/Vol] 4.4 mmol/L Normal     3.5-5.2    Com  
prehensive   
Internal   
Medicine;   
[facility]   
[facility]   
Medicine  
Work Phone:   
[phone]  
   
                      Protein [Mass/Vol] 6.9 g/dL   Normal     6.0-8.5    [facility]   
Internal   
Medicine;   
[facility]   
[facility]   
Medicine  
Work Phone:   
[phone]  
   
                      Sodium [Moles/Vol] 139 mmol/L Normal     134-144    [facility]   
Internal   
Medicine;   
[facility]   
[facility]   
Medicine  
Work Phone:   
[phone]  
   
                                                    Urea nitrogen   
[Mass/Vol]      11 mg/dL        Normal          6-24            [facility]   
[facility];   
[facility]   
[facility]   
Medicine  
Work Phone:   
[phone]  
   
                                                    Urea   
nitrogen/Creatinine   
[Mass ratio]    11 mg/mg        Normal          9-20            [facility]   
Med[facility];   
[facility]   
[facility]   
Medicine  
Work Phone:   
[phone]  
   
                                                    METABOLIC PANEL,   
COMPREHENSIVE (83391) 91 mL/min/1.73  Normal                          [facility]   
Internal   
Medicine;   
[facility]   
Internal   
Medicine  
Work Phone:   
[phone]  
   
                                                    MICROALBUMINOrdered By: Syst  
em Manager on 2022   
   
                                                    Albumin DL <= 20 mg/L   
(U) [Mass/Vol]  mg/dL           Normal                          [facility]   
[facility]   
Med[facility];   
[facility]   
[facility]   
Medicine  
Work Phone:   
[phone]  
   
                                                    Albumin/Creatinine   
(U) [Mass ratio] <11             Normal          0-29            [facility]   
[facility]   
Med[facility];   
[facility]   
[facility]   
Medicine  
Work Phone:   
[phone]  
   
                                                    Creatinine (U)   
[Mass/Vol]      26.5 mg/dL      Normal                          [facility]   
[facility]   
Med[facility];   
[facility]   
[facility]   
Medicine  
Work Phone:   
[phone]  
   
                                                    TSH (THYROID STIMULATING HOR  
ALICIA) (04845)Ordered By:  on   
2022   
   
                          TSH Qn       1.670 {uIU/mL} Normal       0.450-4.50  
0                                       [facility]   
[facility]   
Med[facility];   
[facility]   
Internal   
Medicine  
Work Phone:   
[phone]  
   
                                                    CBC, PLATELETS & MANUAL DIFF  
 (70631)Ordered By:  on 2022   
   
                                                    Basophils (Bld)   
[#/Vol]         0.1 10*3/uL     Normal          0.0-0.2         [facility]   
[facility]   
[facility];   
[facility]   
Internal   
Medicine  
Work Phone:   
[phone]  
   
                                                    Basophils/100 WBC   
(Bld)           1 %             Normal                          [facility]   
[facility]   
Medicine;   
[facility]   
Internal   
Medicine  
Work Phone:   
[phone]  
   
                                                    Eosinophils (Bld)   
[#/Vol]         0.2 10*3/uL     Normal          0.0-0.4         [facility]   
[facility]   
Med[facility];   
[facility]   
Internal   
Medicine  
Work Phone:   
[phone]  
   
                                                    Eosinophils/100 WBC   
(Bld)           2 %             Normal                          [facility]   
[facility]   
Medicine;   
[facility]   
[facility]   
Medicine  
Work Phone:   
[phone]  
   
                                                    Erythrocyte   
distribution width   
(RBC) [Ratio]   11.9 %          Normal          11.6-15.4       [facility]   
[facility];   
[facility]   
[facility]   
Medicine  
Work Phone:   
[phone]  
   
                                                    Hematocrit (Bld)   
[Volume fraction] 48.3 %          Normal          37.5-51.0       [facility]   
[facility]   
Med[facility];   
[facility]   
[facility]   
Medicine  
Work Phone:   
[phone]  
   
                                                    Hemoglobin (Bld)   
[Mass/Vol]      16.7 g/dL       Normal          13.0-17.7       [facility]   
[facility]   
Med[facility];   
[facility]   
[facility]   
Medicine  
Work Phone:   
[phone]  
   
                                                    Immature granulocytes   
(Bld) [#/Vol]   0.0 10*3/uL     Normal          0.0-0.1         [facility]   
[facility]   
Med[facility];   
[facility]   
[facility]   
Medicine  
Work Phone:   
[phone]  
   
                                                    Immature   
granulocytes/100 WBC   
(Bld)           0 %             Normal                          [facility]   
[facility]   
Medicine;   
[facility]   
[facility]   
Medicine  
Work Phone:   
[phone]  
   
                                                    Lymphocytes (Bld)   
[#/Vol]         3.4 10*3/uL     Abnormal        0.7-3.1         [facility]   
[facility]   
Med[facility];   
[facility]   
Internal   
Medicine  
Work Phone:   
[phone]  
   
                                                    Lymphocytes/100 WBC   
(Bld)           35 %            Normal                          [facility]   
[facility]   
Medicine;   
[facility]   
Internal   
Medicine  
Work Phone:   
[phone]  
   
                                                    MCH (RBC) [Entitic   
mass]           33.9 pg         Abnormal        26.6-33.0       [facility]   
[facility]   
Medicine;   
[facility]   
Internal   
Medicine  
Work Phone:   
[phone]  
   
                      MCHC (RBC) [Mass/Vol] 34.6 g/dL  Normal     31.5-35.7  Com  
prehensive   
Internal   
Medicine;   
[facility]   
Internal   
Medicine  
Work Phone:   
[phone]  
   
                                                    MCV (RBC) [Entitic   
vol]            98 fL           Abnormal        79-97           [facility]   
[facility]   
Med[facility];   
[facility]   
[facility]   
Medicine  
Work Phone:   
[phone]  
   
                                                    Monocytes (Bld)   
[#/Vol]         0.8 10*3/uL     Normal          0.1-0.9         [facility]   
[facility]   
Med[facility];   
[facility]   
[facility]   
Medicine  
Work Phone:   
[phone]  
   
                                                    Monocytes/100 WBC   
(Bld)           9 %             Normal                          [facility]   
[facility]   
Med[facility];   
[facility]   
Internal   
Medicine  
Work Phone:   
[phone]  
   
                                                    Neutrophils (Bld)   
[#/Vol]         5.2 10*3/uL     Normal          1.4-7.0         [facility]   
[facility]   
Medicine;   
[facility]   
Internal   
Medicine  
Work Phone:   
[phone]  
   
                                                    Neutrophils/100 WBC   
(Bld)           53 %            Normal                          [facility]   
Internal   
Medicine;   
[facility]   
Internal   
Medicine  
Work Phone:   
[phone]  
   
                                                    Platelets (Bld)   
[#/Vol]         216 10*3/uL     Normal          150-450         [facility]   
[facility]   
Medicine;   
[facility]   
Internal   
Medicine  
Work Phone:   
[phone]  
   
                      RBC (Bld) [#/Vol] 4.93 10*6/uL Normal     4.14-5.80  [facility]  
ehensive   
Internal   
Medicine;   
[facility]   
Internal   
Medicine  
Work Phone:   
[phone]  
   
                      WBC (Bld) [#/Vol] 9.6 10*3/uL Normal     3.4-10.8   [facility]   
[facility]   
[facility];   
[facility]   
Internal   
Medicine  
Work Phone:   
[phone]  
   
                                                    CALCIFEDIOL (82965)Ordered B  
y:  on 2022   
   
                                                    25-hydroxyvitamin D   
[Mass/Vol]      95.6 ng/mL      Normal          30.0-100.0      [facility]   
[facility]   
Medicine;   
[facility]   
Internal   
Medicine  
Work Phone:   
[phone]  
   
                                                    CBC with auto diff (14216)Or  
dered By:  on 2022   
   
                                                    Basophils (Bld)   
[#/Vol]         0.1 10*3/uL     Normal          0.0-0.2         [facility]   
[facility]   
Medicine;   
[facility]   
Internal   
Medicine  
Work Phone:   
[phone]  
   
                                                    Basophils/100 WBC   
(Bld)           0 %             Normal                          [facility]   
Internal   
Medicine;   
[facility]   
Internal   
Medicine  
Work Phone:   
[phone]  
   
                                                    Eosinophils (Bld)   
[#/Vol]         0.1 10*3/uL     Normal          0.0-0.4         [facility]   
[facility]   
Medicine;   
[facility]   
Internal   
Medicine  
Work Phone:   
[phone]  
   
                                                    Eosinophils/100 WBC   
(Bld)           0 %             Normal                          [facility]   
Internal   
Medicine;   
[facility]   
Internal   
Medicine  
Work Phone:   
[phone]  
   
                                                    Erythrocyte   
distribution width   
(RBC) [Ratio]   11.8 %          Normal          11.6-15.4       [facility]   
[facility]   
Medicine;   
[facility]   
Internal   
Medicine  
Work Phone:   
[phone]  
   
                                                    Hematocrit (Bld)   
[Volume fraction] 49.9 %          Normal          37.5-51.0       [facility]   
[facility]   
Medicine;   
[facility]   
Internal   
Medicine  
Work Phone:   
[phone]  
   
                                                    Hemoglobin (Bld)   
[Mass/Vol]      17.2 g/dL       Normal          13.0-17.7       [facility]   
[facility]   
Medicine;   
[facility]   
Internal   
Medicine  
Work Phone:   
[phone]  
   
                                                    Immature granulocytes   
(Bld) [#/Vol]   0.1 10*3/uL     Normal          0.0-0.1         [facility]   
[facility]   
Medicine;   
[facility]   
Internal   
Medicine  
Work Phone:   
[phone]  
   
                                                    Immature   
granulocytes/100 WBC   
(Bld)           1 %             Normal                          [facility]   
Internal   
Medicine;   
[facility]   
Internal   
Medicine  
Work Phone:   
[phone]  
   
                                                    Lymphocytes (Bld)   
[#/Vol]         3.8 10*3/uL     Abnormal        0.7-3.1         [facility]   
[facility]   
Medicine;   
[facility]   
Internal   
Medicine  
Work Phone:   
[phone]  
   
                                                    Lymphocytes/100 WBC   
(Bld)           31 %            Normal                          [facility]   
Internal   
Medicine;   
[facility]   
Internal   
Medicine  
Work Phone:   
[phone]  
   
                                                    MCH (RBC) [Entitic   
mass]           32.9 pg         Normal          26.6-33.0       [facility]   
[facility]   
Medicine;   
[facility]   
Internal   
Medicine  
Work Phone:   
[phone]  
   
                      MCHC (RBC) [Mass/Vol] 34.5 g/dL  Normal     31.5-35.7  Com  
prehensive   
Internal   
Medicine;   
[facility]   
Internal   
Medicine  
Work Phone:   
[phone]  
   
                                                    MCV (RBC) [Entitic   
vol]            95 fL           Normal          79-97           [facility]   
[facility]   
Medicine;   
[facility]   
Internal   
Medicine  
Work Phone:   
[phone]  
   
                                                    Monocytes (Bld)   
[#/Vol]         0.8 10*3/uL     Normal          0.1-0.9         [facility]   
[facility]   
Medicine;   
[facility]   
Internal   
Medicine  
Work Phone:   
[phone]  
   
                                                    Monocytes/100 WBC   
(Bld)           7 %             Normal                          [facility]   
Internal   
Medicine;   
[facility]   
Internal   
Medicine  
Work Phone:   
[phone]  
   
                                                    Neutrophils (Bld)   
[#/Vol]         7.2 10*3/uL     Abnormal        1.4-7.0         [facility]   
Internal   
Medicine;   
[facility]   
Internal   
Medicine  
Work Phone:   
[phone]  
   
                                                    Neutrophils/100 WBC   
(Bld)           61 %            Normal                          [facility]   
Internal   
Medicine;   
[facility]   
Internal   
Medicine  
Work Phone:   
[phone]  
   
                                                    Platelets (Bld)   
[#/Vol]         265 10*3/uL     Normal          150-450         [facility]   
Internal   
Medicine;   
[facility]   
Internal   
Medicine  
Work Phone:   
[phone]  
   
                      RBC (Bld) [#/Vol] 5.23 10*6/uL Normal     4.14-5.80  [facility]   
Internal   
Medicine;   
[facility]   
Internal   
Medicine  
Work Phone:   
[phone]  
   
                      WBC (Bld) [#/Vol] 12.0 10*3/uL Abnormal   3.4-10.8   [facility]   
Internal   
Medicine;   
[facility]   
Internal   
Medicine  
Work Phone:   
[phone]  
   
                                                    HGB A1C (58787)Ordered By: S  
ystem Manager on 2022   
   
                                                    HbA1c (Bld) [Mass   
fraction]       5.8 %           Abnormal        4.8-5.6         [facility]   
Internal   
Medicine;   
[facility]   
Internal   
Medicine  
Work Phone:   
[phone]  
   
                                                    LIPID PANEL (72738)Ordered B  
y:  on 2022   
   
                                                    Cholesterol   
[Mass/Vol]      158 mg/dL       Normal          100-199         [facility]   
Internal   
Medicine;   
[facility]   
Internal   
Medicine  
Work Phone:   
[phone]  
   
                                                    Cholesterol in HDL   
[Mass/Vol]      60 mg/dL        Normal                          [facility]   
Internal   
Medicine;   
[facility]   
Internal   
Medicine  
Work Phone:   
[phone]  
   
                                                    Triglyceride   
[Mass/Vol]      85 mg/dL        Normal          0-149           [facility]   
Internal   
Medicine;   
[facility]   
Internal   
Medicine  
Work Phone:   
[phone]  
   
                      LIPID PANEL (34718) 16 mg/dL   Normal     5-40       [facility]   
Internal   
Medicine;   
[facility]   
Internal   
Medicine  
Work Phone:   
[phone]  
   
                      LIPID PANEL (87516) 82 mg/dL   Normal     0-99       [facility]   
Internal   
Medicine;   
[facility]   
Internal   
Medicine  
Work Phone:   
[phone]  
   
                      LIPID PANEL (10343) 1.4 {ratio} Normal     0.0-3.6    Comp  
rehensive   
Internal   
Medicine;   
[facility]   
Internal   
Medicine  
Work Phone:   
[phone]  
   
                                                    METABOLIC PANEL, COMPREHENSI  
VE (54478)Ordered By:  on 2022   
   
                      Albumin [Mass/Vol] 4.4 g/dL   Normal     3.8-4.9    [facility]   
Internal   
Medicine;   
[facility]   
Internal   
Medicine  
Work Phone:   
[phone]  
   
                                                    Albumin/Globulin   
[Mass ratio]    1.7 {ratio}     Normal          1.2-2.2         [facility]   
Internal   
Medicine;   
[facility]   
Internal   
Medicine  
Work Phone:   
[phone]  
   
                                                    ALP [Catalytic   
activity/Vol]   51 U/L          Normal                    [facility]   
Internal   
Medicine;   
[facility]   
Internal   
Medicine  
Work Phone:   
[phone]  
   
                                                    ALT [Catalytic   
activity/Vol]   33 U/L          Normal          0-44            [facility]   
Internal   
Medicine;   
[facility]   
Internal   
Medicine  
Work Phone:   
[phone]  
   
                                                    AST [Catalytic   
activity/Vol]   24 U/L          Normal          0-40            [facility]   
Internal   
Medicine;   
[facility]   
Internal   
Medicine  
Work Phone:   
[phone]  
   
                      Bilirubin [Mass/Vol] 0.4 mg/dL  Normal     0.0-1.2    Comp  
rehensive   
Internal   
Medicine;   
[facility]   
Internal   
Medicine  
Work Phone:   
[phone]  
   
                      Calcium [Mass/Vol] 10.3 mg/dL Abnormal   8.7-10.2   [facility]   
Internal   
Medicine;   
[facility]   
Internal   
Medicine  
Work Phone:   
[phone]  
   
                      Chloride [Moles/Vol] 100 mmol/L Normal          Comp  
rehensive   
Internal   
Medicine;   
[facility]   
Internal   
Medicine  
Work Phone:   
[phone]  
   
                      CO2 [Moles/Vol] 24 mmol/L  Normal     20-29      [facility]   
Internal   
Medicine;   
[facility]   
Internal   
Medicine  
Work Phone:   
[phone]  
   
                      Creatinine [Mass/Vol] 0.93 mg/dL Normal     0.76-1.27  Com  
prehensive   
Internal   
Medicine;   
[facility]   
Internal   
Medicine  
Work Phone:   
[phone]  
   
                                                    Globulin (S)   
[Mass/Vol]      2.6 g/dL        Normal          1.5-4.5         [facility]   
Internal   
Medicine;   
[facility]   
Internal   
Medicine  
Work Phone:   
[phone]  
   
                      Glucose [Mass/Vol] 100 mg/dL  Abnormal   65-99      [facility]   
Internal   
Medicine;   
[facility]   
Internal   
Medicine  
Work Phone:   
[phone]  
   
                      Potassium [Moles/Vol] 4.1 mmol/L Normal     3.5-5.2    Com  
prehensive   
Internal   
Medicine;   
[facility]   
Internal   
Medicine  
Work Phone:   
[phone]  
   
                      Protein [Mass/Vol] 7.0 g/dL   Normal     6.0-8.5    [facility]   
Internal   
Medicine;   
[facility]   
Internal   
Medicine  
Work Phone:   
[phone]  
   
                      Sodium [Moles/Vol] 142 mmol/L Normal     134-144    [facility]   
Internal   
Medicine;   
[facility]   
Internal   
Medicine  
Work Phone:   
[phone]  
   
                                                    Urea nitrogen   
[Mass/Vol]      8 mg/dL         Normal          6-24            [facility]   
Internal   
Medicine;   
[facility]   
Internal   
Medicine  
Work Phone:   
[phone]  
   
                                                    Urea   
nitrogen/Creatinine   
[Mass ratio]    9 mg/mg         Normal          9-20            [facility]   
Internal   
Medicine;   
[facility]   
Internal   
Medicine  
Work Phone:   
[phone]  
   
                                                    METABOLIC PANEL,   
COMPREHENSIVE (44554) 95 mL/min/1.73  Normal                          Comprehens  
[facility]   
Internal   
Medicine;   
Comprehensive   
Internal   
Medicine  
Work Phone:   
[phone]  
   
                                                    MICROALBUMINOrdered By: Syst  
em Manager on 2022   
   
                                                    Albumin DL <= 20 mg/L   
(U) [Mass/Vol]  mg/dL           Normal                          [facility]   
Internal   
Medicine;   
[facility]   
Internal   
Medicine  
Work Phone:   
[phone]  
   
                                                    Albumin/Creatinine   
(U) [Mass ratio] <10             Normal          0-29            [facility]   
Internal   
Medicine;   
[facility]   
Internal   
Medicine  
Work Phone:   
[phone]  
   
                                                    Creatinine (U)   
[Mass/Vol]      31.4 mg/dL      Normal                          [facility]   
Internal   
Medicine;   
[facility]   
Internal   
Medicine  
Work Phone:   
[phone]  
   
                                                    TSH (THYROID STIMULATING HOR  
ALICIA) (13035)Ordered By:  on   
2022   
   
                          TSH Qn       1.480 {uIU/mL} Normal       0.450-4.50  
0                                       [facility]   
Internal   
Medicine;   
[facility]   
Internal   
Medicine  
Work Phone:   
[phone]  
   
                                                    HEPATITIS C ANTIBODY (68313)  
Ordered By:  on 2021   
   
                                                    HCV Ab Signal/Cutoff   
IA [Rel units/Vol] {ratio}         Normal          0.0-0.9         [facility]  
   
Internal   
Medicine;   
[facility]   
Internal   
Medicine  
Work Phone:   
[phone]  
   
                                        Comment on above:   Negative: < 0.8 Inde  
terminate: 0.8 - 0.9 Positive: > 0.9 . The  
  
CDC recommends that a positive HCV antibody result be followed   
up with a HCV Nucleic Acid Amplification test (735836).   
   
                                                            PATIENT NOT FASTINGP  
ERFORMED BY: [employer] [place] [id number]   
   
                                                    CALCIFEDIOL (60406)Ordered B  
y:  on 2021   
   
                                                    25-hydroxyvitamin D   
[Mass/Vol]      93.7 ng/mL      Normal          30.0-100.0      [facility]   
Internal   
Medicine;   
[facility]   
Internal   
Medicine  
Work Phone:   
[phone]  
   
                                        Comment on above:   Vitamin D deficiency  
 has been defined by the [place]   
ofMedicine and an Endocrine Society practice guideline as alevel   
of serum 25-OH vitamin D less than 20 ng/mL (1,2).The Endocrine   
Society went on to further define vitamin Dinsufficiency as a   
level between 21 and 29 ng/mL (2).1. IOM ([place] of   
Medicine). 2010. Dietary reference intakes for calcium and D.   
[place]: The National Academies Press.2. Kodi MF,   
Phillip NC, Archana SAMS, et al. Evaluation, treatment,   
and prevention of vitamin D deficiency: an Endocrine Society   
clinical practice guideline. JCEM. 2011; 96(7):1911-30.   
   
                                                            PATIENT WAS FASTINGP  
ERFORMED BY: [employer] [facility] PharmaceuticalsSaint [facility] [id number]   
   
                                                    CBC, PLATELETS & MANUAL DIFF  
 (57895)Ordered By:  on 2021   
   
                                                    Basophils (Bld)   
[#/Vol]         0.0 10*3/uL     Normal          0.0-0.2         [facility]   
Internal   
Medicine;   
[facility]   
Internal   
Medicine  
Work Phone:   
[phone]  
   
                                        Comment on above:   PATIENT WAS FASTINGP  
ERFORMED BY: [employer] [facility] [id number]   
   
                                                    Basophils/100 WBC   
(Bld)           0 %             Normal                          Com[facility]   
Internal   
Medicine;   
Comprehensive   
Internal   
Medicine  
Work Phone:   
[phone]  
   
                                        Comment on above:   PATIENT WAS FASTINGP  
ERFORMED BY:  [facility] [address]   
RoadDublin [place] [id number]   
   
                                                    Eosinophils (Bld)   
[#/Vol]         0.1 10*3/uL     Normal          0.0-0.4         [facility]   
Internal   
Medicine;   
Comprehensive   
Internal   
Medicine  
Work Phone:   
[phone]  
   
                                        Comment on above:   PATIENT WAS FASTINGP  
ERFORMED BY:  [facility] [address]   
RoadDublin [place] [id number]   
   
                                                    Eosinophils/100 WBC   
(Bld)           1 %             Normal                          Comprehensive   
Internal   
Medicine;   
Comprehensive   
Internal   
Medicine  
Work Phone:   
[phone]  
   
                                        Comment on above:   PATIENT WAS FASTINGP  
ERFORMED BY:  [facility] [address]   
RoadDublin [place] [id number]   
   
                                                    Erythrocyte   
distribution width   
(RBC) [Ratio]   12.1 %          Normal          11.6-15.4       [facility]   
Internal   
Medicine;   
[facility]   
Internal   
Medicine  
Work Phone:   
[phone]  
   
                                        Comment on above:   PATIENT WAS FASTINGP  
ERFORMED BY:  [facility] [address]   
RoadDuke Healthin [place] [id number]   
   
                                                    Hematocrit (Bld)   
[Volume fraction] 49.2 %          Normal          37.5-51.0       [facility]   
Internal   
Medicine;   
Comprehensive   
Internal   
Medicine  
Work Phone:   
[phone]  
   
                                        Comment on above:   PATIENT WAS FASTINGP  
ERFORMED BY:  [facility] [address]   
Roadblin [place] [id number]   
   
                                                    Hemoglobin (Bld)   
[Mass/Vol]      17.1 g/dL       Normal          13.0-17.7       [facility]   
Internal   
Medicine;   
Comprehensive   
Internal   
Medicine  
Work Phone:   
[phone]  
   
                                        Comment on above:   PATIENT WAS FASTINGP  
ERFORMED BY:  [facility] [address]   
RoadDublin [place] [id number]   
   
                                                    Immature granulocytes   
(Bld) [#/Vol]   0.0 10*3/uL     Normal          0.0-0.1         [facility]   
Internal   
Medicine;   
Comprehensive   
Internal   
Medicine  
Work Phone:   
[phone]  
   
                                        Comment on above:   PATIENT WAS FASTINGP  
ERFORMED BY:  [facility] [address]   
RoadDublin [place] [id number]   
   
                                                    Immature   
granulocytes/100 WBC   
(Bld)           0 %             Normal                          Comprehensive   
Internal   
Medicine;   
Comprehensive   
Internal   
Medicine  
Work Phone:   
[phone]  
   
                                        Comment on above:   PATIENT WAS FASTINGP  
ERFORMED BY:  [facility] [address]   
RoadDublin [place] [id number]   
   
                                                    Lymphocytes (Bld)   
[#/Vol]         3.4 10*3/uL     Abnormal        0.7-3.1         [facility]   
Internal   
Medicine;   
Comprehensive   
Internal   
Medicine  
Work Phone:   
[phone]  
   
                                        Comment on above:   PATIENT WAS FASTINGP  
ERFORMED BY:  [facility] [address]   
[facility] [place] [id number]   
   
                                                    Lymphocytes/100 WBC   
(Bld)           33 %            Normal                          Comprehensive   
Internal   
Medicine;   
Comprehensive   
Internal   
Medicine  
Work Phone:   
[phone]  
   
                                        Comment on above:   PATIENT WAS FASTINGP  
ERFORMED BY:  [facility] [address]   
[facility] [place] [id number]   
   
                                                    MCH (RBC) [Entitic   
mass]           33.7 pg         Abnormal        26.6-33.0       [facility]   
Internal   
Medicine;   
Comprehensive   
Internal   
Medicine  
Work Phone:   
[phone]  
   
                                        Comment on above:   PATIENT WAS FASTINGP  
ERFORMED BY:  [facility] [address]   
[facility] [place] [id number]   
   
                      MCHC (RBC) [Mass/Vol] 34.8 g/dL  Normal     31.5-35.7  Com  
prehensive   
Internal   
Medicine;   
Comprehensive   
Internal   
Medicine  
Work Phone:   
[phone]  
   
                                        Comment on above:   PATIENT WAS FASTINGP  
ERFORMED BY: [facility] [address]   
[facility] [place] [id number]   
   
                                                    MCV (RBC) [Entitic   
vol]            97 fL           Normal          79-97           [facility]   
Internal   
Medicine;   
[facility]   
Internal   
Medicine  
Work Phone:   
[phone]  
   
                                        Comment on above:   PATIENT WAS FASTINGP  
ERFORMED BY:  [facility] [address]   
RoadDublin [place] [id number]   
   
                                                    Monocytes (Bld)   
[#/Vol]         1.0 10*3/uL     Abnormal        0.1-0.9         [facility]   
Internal   
Medicine;   
Comprehensive   
Internal   
Medicine  
Work Phone:   
[phone]  
   
                                        Comment on above:   PATIENT WAS FASTINGP  
ERFORMED BY:  [facility] [address]   
RoadDublin [place] [id number]   
   
                                                    Monocytes/100 WBC   
(Bld)           9 %             Normal                          Comprehensive   
Internal   
Medicine;   
Comprehensive   
Internal   
Medicine  
Work Phone:   
[phone]  
   
                                        Comment on above:   PATIENT WAS FASTINGP  
ERFORMED BY: [facility] [address]   
RoadDublin [place] [id number]   
   
                                                    Neutrophils (Bld)   
[#/Vol]         5.9 10*3/uL     Normal          1.4-7.0         [facility]   
Internal   
Medicine;   
Comprehensive   
Internal   
Medicine  
Work Phone:   
[phone]  
   
                                        Comment on above:   PATIENT WAS FASTINGP  
ERFORMED BY: [facility] [address]   
RoadDublin [place] [id number]   
   
                                                    Neutrophils/100 WBC   
(Bld)           57 %            Normal                          Com[facility]   
Internal   
Medicine;   
Comprehensive   
Internal   
Medicine  
Work Phone:   
[phone]  
   
                                        Comment on above:   PATIENT WAS FASTINGP  
ERFORMED BY: [facility] [address]   
RoadD[place] [id number]   
   
                                                    Platelets (Bld)   
[#/Vol]         251 10*3/uL     Normal          150-450         [facility]   
Internal   
Medicine;   
Comprehensive   
Internal   
Medicine  
Work Phone:   
[phone]  
   
                                        Comment on above:   PATIENT WAS FASTINGP  
ERFORMED BY: [facility] [address]   
RoadD[place] [id number]   
   
                      RBC (Bld) [#/Vol] 5.08 10*6/uL Normal     4.14-5.80  Compr  
ensive   
Internal   
Medicine;   
Comprehensive   
Internal   
Medicine  
Work Phone:   
[phone]  
   
                                        Comment on above:   PATIENT WAS FASTINGP  
ERFORMED BY: [facility] [address]   
RoadD[place] [id number]   
   
                      WBC (Bld) [#/Vol] 10.4 10*3/uL Normal     3.4-10.8   Compr  
ehensive   
Internal   
Medicine;   
Comprehensive   
Internal   
Medicine  
Work Phone:   
[phone]  
   
                                        Comment on above:   PATIENT WAS FASTINGP  
ERFORMED BY: [facility] [address]   
RoadD[place] [id number]   
   
                                                    LIPID PANEL (67552)Ordered B  
y:  on 2021   
   
                                                    Cholesterol   
[Mass/Vol]      160 mg/dL       Normal          100-199         [facility]   
Internal   
Medicine;   
Comprehensive   
Internal   
Medicine  
Work Phone:   
[phone]  
   
                                        Comment on above:   PATIENT WAS FASTINGP  
ERFORMED BY: [facility] [address]   
RoadD[place] [id number]   
   
                                                    Cholesterol in HDL   
[Mass/Vol]      65 mg/dL        Normal                          [facility]   
Internal   
Medicine;   
Comprehensive   
Internal   
Medicine  
Work Phone:   
[phone]  
   
                                        Comment on above:   PATIENT WAS FASTINGP  
ERFORMED BY: AMOR [facility] [address]   
RoadD[place] [id number]   
   
                                                    Triglyceride   
[Mass/Vol]      95 mg/dL        Normal          0-149           [facility]   
Internal   
Medicine;   
Comprehensive   
Internal   
Medicine  
Work Phone:   
[phone]  
   
                                        Comment on above:   PATIENT WAS FASTINGP  
ERFORMED BY: [facility] [address]   
[facility] [place] [id number]   
   
                      LIPID PANEL (96536) 17 mg/dL   Normal     5-40       [facility]   
[facility]   
[facility];   
Comprehensive   
Internal   
Medicine  
Work Phone:   
[phone]  
   
                                        Comment on above:   PATIENT WAS FASTINGP  
ERFORMED BY: AMOR [facility] [address]   
[facility] [place] [id number]   
   
                      LIPID PANEL (82345) 78 mg/dL   Normal     0-99       [facility]   
Internal   
Medicine;   
Comprehensive   
Internal   
Medicine  
Work Phone:   
[phone]  
   
                                        Comment on above:   PATIENT WAS FASTINGP  
ERFORMED BY: [facility] [address]   
[facility] [place] [id number]   
   
                      LIPID PANEL (67235) 1.2 {ratio} Normal     0.0-3.6    Comp  
rehensive   
Internal   
Medicine;   
Comprehensive   
Internal   
Medicine  
Work Phone:   
[phone]  
   
                                        Comment on above:   LDL/HDL Ratio Men Wo  
men 1/2 Avg.Risk 1.0 1.5 Avg.Risk 3.6 3.2   
2X   
Avg.Risk 6.2 5.0 3X Avg.Risk 8.0 6.1   
   
                                                            PATIENT WAS FASTINGP  
ERFORMED BY: [facility] [address]   
[facility] HospitalD[facility] [place] [id number]   
   
                                                    METABOLIC PANEL, COMPREHENSI  
VE (46891)Ordered By:  on 2021   
   
                      Albumin [Mass/Vol] 4.6 g/dL   Normal     3.8-4.9    [facility]   
Internal   
Medicine;   
Comprehensive   
Internal   
Medicine  
Work Phone:   
[phone]  
   
                                        Comment on above:   PATIENT WAS FASTINGP  
ERFORMED BY: [facility] [address]   
RoadD[place] [id number]   
   
                                                    Albumin/Globulin   
[Mass ratio]    2.0 {ratio}     Normal          1.2-2.2         [facility]   
Internal   
Medicine;   
Comprehensive   
Internal   
Medicine  
Work Phone:   
[phone]  
   
                                        Comment on above:   PATIENT WAS FASTINGP  
ERFORMED BY:  [facility] [address]   
RoadD[address] [place] [id number]   
   
                                                    ALP [Catalytic   
activity/Vol]   54 U/L          Normal                    [facility]   
Internal   
Medicine;   
[facility]prehensive   
Internal   
Medicine  
Work Phone:   
[phone]  
   
                                        Comment on above:   PATIENT WAS FASTINGP  
ERFORMED BY:  [facility]   
RoadD[address] [place] [id number]   
   
                                                    ALT [Catalytic   
activity/Vol]   26 U/L          Normal          0-44            [facility]   
Internal   
Medicine;   
[facility]prehensive   
Internal   
Medicine  
Work Phone:   
[phone]  
   
                                        Comment on above:   PATIENT WAS FASTINGP  
ERFORMED BY: [facility]   
Road[address] [id number]   
   
                                                    AST [Catalytic   
activity/Vol]   19 U/L          Normal          0-40            [facility]   
Internal   
Medicine;   
[facility]prehensive   
Internal   
Medicine  
Work Phone:   
[phone]  
   
                                        Comment on above:   PATIENT WAS FASTINGP  
ERFORMED BY: [facility]   
Road[address] [place] [id number]   
   
                      Bilirubin [Mass/Vol] 0.7 mg/dL  Normal     0.0-1.2    Comp  
rehensive   
Internal   
Medicine;   
Comprehensive   
Internal   
Medicine  
Work Phone:   
[phone]  
   
                                        Comment on above:   PATIENT WAS FASTINGP  
ERFORMED BY:  [facility] HospitalKwqrxt[address]   
RoadD[place] [id number]   
   
                      Calcium [Mass/Vol] 10.3 mg/dL Abnormal   8.7-10.2   [facility]   
Internal   
Medicine;   
Comprehensive   
Internal   
Medicine  
Work Phone:   
[phone]  
   
                                        Comment on above:   PATIENT WAS FASTINGP  
ERFORMED BY:  [facility] [address]   
Road[address] [id number]   
   
                      Chloride [Moles/Vol] 99 mmol/L  Normal          Comp  
rehensive   
Internal   
Medicine;   
Comprehensive   
Internal   
Medicine  
Work Phone:   
[phone]  
   
                                        Comment on above:   PATIENT WAS FASTINGP  
ERFORMED BY:  [facility] [address]   
RoadD[place] [id number]   
   
                      CO2 [Moles/Vol] 24 mmol/L  Normal     20-29      [facility]  
[facility]   
[facility]   
[facility];   
Comprehensive   
Internal   
Medicine  
Work Phone:   
[phone]  
   
                                        Comment on above:   PATIENT WAS FASTINGP  
ERFORMED BY: [facility] [facility] [id number]   
   
                      Creatinine [Mass/Vol] 0.99 mg/dL Normal     0.76-1.27  Com  
prehensive   
Internal   
Medicine;   
Comprehensive   
Internal   
Medicine  
Work Phone:   
[phone]  
   
                                        Comment on above:   PATIENT WAS FASTINGP  
ERFORMED BY:  [facility] Gdvvkp[address]602 [facility] [id number]   
   
                                                    GFR/1.73 sq   
M.predicted among   
blacks CKD-EPI   
(S/P/Bld) [Vol   
rate/Area]      97 mL/min/1.73  Normal                          Comprehensive   
Internal   
Medicine;   
Comprehensive   
Internal   
Medicine  
Work Phone:   
[phone]  
   
                                        Comment on above:   **[facility] currently   
reports eGFR in compliance with the   
current** recommendations of the National Kidney Foundation.   
[facility] will update reporting as new guidelines are published   
from the NKF-ASN Task force.   
   
                                                            PATIENT WAS FASTINGP  
ERFORMED BY:  [facility]Har[facility] Beach [facility] [id number]   
   
                                                    GFR/1.73 sq   
M.predicted among   
non-blacks CKD-EPI   
(S/P/Bld) [Vol   
rate/Area]      84 mL/min/1.73  Normal                          [facility]   
Internal   
Medicine;   
Com[facility]   
Internal   
Medicine  
Work Phone:   
[phone]  
   
                                        Comment on above:   PATIENT WAS FASTINGP  
ERFORMED BY:  [facility] [id number]   
   
                                                    Globulin (S)   
[Mass/Vol]      2.3 g/dL        Normal          1.5-4.5         [facility]   
Internal   
Medicine;   
Comprehensive   
Internal   
Medicine  
Work Phone:   
[phone]  
   
                                        Comment on above:   PATIENT WAS FASTINGP  
ERFORMED BY:  [facility] Hvguuc[address] [facility] [id number]   
   
                      Glucose [Mass/Vol] 117 mg/dL  Abnormal   65-99      [facility]  
[facility]   
Internal   
Medicine;   
Comprehensive   
Internal   
Medicine  
Work Phone:   
[phone]  
   
                                        Comment on above:   PATIENT WAS FASTINGP  
ERFORMED BY:  [facility] [id number]   
RoadDublin [place] [id number]   
   
                      Potassium [Moles/Vol] 4.9 mmol/L Normal     3.5-5.2    Com  
prehensive   
Internal   
Medicine;   
Comprehensive   
Internal   
Medicine  
Work Phone:   
[phone]  
   
                                        Comment on above:   PATIENT WAS FASTINGP  
ERFORMED BY: [facility] [address]orm[address]   
RoadDublin [place] [id number]   
   
                      Protein [Mass/Vol] 6.9 g/dL   Normal     6.0-8.5    [facility]   
Internal   
Medicine;   
Comprehensive   
Internal   
Medicine  
Work Phone:   
[phone]  
   
                                        Comment on above:   PATIENT WAS FASTINGP  
ERFORMED BY: [facility] [address]   
RoadDublin [place] [id number]   
   
                      Sodium [Moles/Vol] 137 mmol/L Normal     134-144    [facility]   
Internal   
Medicine;   
Comprehensive   
Internal   
Medicine  
Work Phone:   
[phone]  
   
                                        Comment on above:   PATIENT WAS FASTINGP  
ERFORMED BY: [facility] [id number]   
RoadD[place] [id number]   
   
                                                    Urea nitrogen   
[Mass/Vol]      10 mg/dL        Normal          6-24            Comprehensive   
Internal   
Medicine;   
Comprehensive   
Internal   
Medicine  
Work Phone:   
[phone]  
   
                                        Comment on above:   PATIENT WAS FASTINGP  
ERFORMED BY: [facility] [id number]   
RoadDublin [place] [id number]   
   
                                                    Urea   
nitrogen/Creatinine   
[Mass ratio]    10 mg/mg        Normal          9-20            Comprehensive   
Internal   
Medicine;   
Comprehensive   
Internal   
Medicine  
Work Phone:   
[phone]  
   
                                        Comment on above:   PATIENT WAS FASTINGP  
ERFORMED BY: [facility] [address]ns[address]   
RoadD[place] [id number]   
   
                                                    MICROALBUMINOrdered By: Syst  
em Manager on 2021   
   
                                                    Albumin DL <= 20 mg/L   
(U) [Mass/Vol]  6.8 ug/mL       Normal                          Comprehensive   
Internal   
Medicine;   
Comprehensive   
Internal   
Medicine  
Work Phone:   
[phone]  
   
                                        Comment on above:   PATIENT WAS FASTINGP  
ERFORMED BY: [facility] [address]   
RoadD[place] [id number]   
   
                                                    Albumin/Creatinine   
(U) [Mass ratio] 6 {mg/g_creat}  Normal          0-29            Comprehensive   
Internal   
Medicine;   
Comprehensive   
Internal   
Medicine  
Work Phone:   
[phone]  
   
                                        Comment on above:   Normal: 0 - 29 Moder  
ately increased: 30 - 300 Severely   
increased: >300   
   
                                                            PATIENT WAS FASTINGP  
ERFORMED BY: [facility]   
[facility]aptNov[facility] [id number]   
   
                                                    Creatinine (U)   
[Mass/Vol]      114.5 mg/dL     Normal                          Comprehensive   
Internal   
Medicine;   
Comprehensive   
Internal   
Medicine  
Work Phone:   
[phone]  
   
                                        Comment on above:   PATIENT WAS FASTINGP  
ERFORMED BY: [employer]6[address] [facility] [id number]   
   
                                                    PSA (PROSTATE SPECIFIC ANTIG  
EN) (88421)Ordered By:  on 2021   
   
                                                    Prostate specific Ag   
[Mass/Vol]      0.9 ng/mL       Normal          0.0-4.0         Comprehensive   
Internal   
Medicine;   
Comprehensive   
Internal   
Medicine  
Work Phone:   
[phone]  
   
                                        Comment on above:   Roche ECLIA methodol  
ogy. .According to the American Urological  
   
Association, Serum PSA shoulddecrease and remain at undetectable   
levels after radicalprostatectomy. The AUA defines biochemical   
recurrence as an initialPSA value 0.2 ng/mL or greater followed   
by a subsequent confirmatoryPSA value 0.2 ng/mL or   
greater.Values obtained with different assay methods or kits   
cannot be usedinterchangeably. Results cannot be interpreted as   
absolute evidenceof the presence or absence of malignant   
disease.   
   
                                                            PATIENT WAS FASTINGP  
ERFORMED BY: [employer]6[address] [facility] [id number]   
   
                                                    TSH (THYROID STIMULATING HOR  
ALICIA) (35613)Ordered By:  on   
2021   
   
                          TSH Qn       2.150 {uIU/mL} Normal       0.450-4.50  
0                                       Comprehensive   
Internal   
Medicine;   
Comprehensive   
Internal   
Medicine  
Work Phone:   
[phone]  
   
                                        Comment on above:   PATIENT WAS FASTINGP  
ERFORMED BY: [employer]6[address] [facility]Nov[facility] [id number]   
   
                                                    URINALYSIS (34767)Ordered By  
:  on 2021   
   
                      Appearance (U) Clear      Normal                Comprehens  
bebe   
Internal   
Medicine;   
Comprehensive   
Internal   
Medicine  
Work Phone:   
[phone]  
   
                                        Comment on above:   PATIENT WAS FASTINGP  
ERFORMED BY: [facility]   
Road[address] [place] [id number]   
   
                      Bilirubin Ql (U) Negative   Normal                Comprehe  
nsive   
Internal   
Medicine;   
Comprehensive   
Internal   
Medicine  
Work Phone:   
[phone]  
   
                                        Comment on above:   PATIENT WAS FASTINGP  
ERFORMED BY:  [facility]   
Road[address] [id number]   
   
                      Color (U)  Yellow     Normal                Comprehensive   
Internal   
Medicine;   
Comprehensive   
Internal   
Medicine  
Work Phone:   
[phone]  
   
                                        Comment on above:   PATIENT WAS FASTINGP  
ERFORMED BY: [facility]   
Road[address] [id number]   
   
                      Glucose Ql (U) Negative   Normal                Comprehens  
bebe   
Internal   
Medicine;   
Comprehensive   
Internal   
Medicine  
Work Phone:   
[phone]  
   
                                        Comment on above:   PATIENT WAS FASTINGP  
ERFORMED BY: [facility]   
Road[address] [id number]   
   
                      Hemoglobin Ql (U) Negative   Normal                Compreh  
ensive   
Internal   
Medicine;   
Comprehensive   
Internal   
Medicine  
Work Phone:   
[phone]  
   
                                        Comment on above:   PATIENT WAS FASTINGP  
ERFORMED BY: [facility]   
Road[address] [id number]   
   
                      Ketones Ql (U) Trace      Abnormal              Comprehens  
bebe   
Internal   
Medicine;   
Comprehensive   
Internal   
Medicine  
Work Phone:   
[phone]  
   
                                        Comment on above:   PATIENT WAS FASTINGP  
ERFORMED BY: [facility]   
Road[address] [id number]   
   
                                                    Leukocyte esterase   
Test strip Ql (U) Negative        Normal                          Comprehensive   
Internal   
Medicine;   
Comprehensive   
Internal   
Medicine  
Work Phone:   
[phone]  
   
                                        Comment on above:   PATIENT WAS FASTINGP  
ERFORMED BY: [facility]   
Road[address] [id number]   
   
                                                    Microscopic   
observation LM Nom   
(Urine sed)     MICNIP          Normal                          Comprehensive   
Internal   
Medicine;   
Comprehensive   
Internal   
Medicine  
Work Phone:   
[phone]  
   
                                        Comment on above:   Microscopic not benny  
cated and not performed.   
   
                                                            PATIENT WAS FASTINGP  
ERFORMED BY:  [facility]   
Road[address] [id number]   
   
                      Nitrite Ql (U) Negative   Normal                Comprehens  
bebe   
Internal   
Medicine;   
Comprehensive   
Internal   
Medicine  
Work Phone:   
[phone]  
   
                                        Comment on above:   PATIENT WAS FASTINGP  
ERFORMED BY: [facility] [facility] [id number]   
   
                      pH (U)     7.0 [pH]   Normal     5.0-7.5    Comprehensive   
Internal   
Medicine;   
Comprehensive   
Internal   
Medicine  
Work Phone:   
[phone]  
   
                                        Comment on above:   PATIENT WAS FASTINGP  
ERFORMED BY: [facility] [id number]   
   
                      Protein Ql (U) Negative   Normal                Comprehens  
bebe   
Internal   
Medicine;   
Comprehensive   
Internal   
Medicine  
Work Phone:   
[phone]  
   
                                        Comment on above:   PATIENT WAS FASTINGP  
ERFORMED BY: [facility] [id number]   
   
                                                    Specific gravity (U)   
[Rel density]       1.018 1             Normal              1.005-1.03  
0                                       Comprehensive   
Internal   
Medicine;   
Comprehensive   
Internal   
Medicine  
Work Phone:   
[phone]  
   
                                        Comment on above:   PATIENT WAS FASTINGP  
ERFORMED BY: [facility] [facility] [id number]   
   
                                                    Urobilinogen (U)   
[Mass/Vol]      1.0 mg/dL       Normal          0.2-1.0         Comprehensive   
Internal   
Medicine;   
Comprehensive   
Internal   
Medicine  
Work Phone:   
[phone]  
   
                                        Comment on above:   PATIENT WAS FASTINGP  
ERFORMED BY: [facility] [id number]   
   
                                                    Catecholamines,24-Hour Urine  
 (13434)Ordered By:  on 2021   
   
                                                    Dopamine (24H U)   
[Mass/Time]     673 {ug/24_hr}  Abnormal        0-510           Comprehensive   
Internal   
Medicine;   
Comprehensive   
Internal   
Medicine  
Work Phone:   
[phone]  
   
                                        Comment on above:   Test(s) 298987-Fqwwh  
phrine, Urine; 201-Norepinephrine, Ur;   
-Dopamine, Urine; 311724-Trkdnsczdnkvxcl, Ur;   
022152-Tjwmwqxmciug, Urwas developed and its performance   
characteristics determinedby [facility]. It has not been cleared or   
approved by the Foodand Drug Administration.PATIENT NOT   
FASTINGPERFORMED BY: [address] [id number] Information:   
START 21@7AM   
   
                                                    Dopamine (U)   
[Mass/Vol]      177 ug/L        Normal                          Comprehensive   
Internal   
Medicine;   
Comprehensive   
Internal   
Medicine  
Work Phone:   
[phone]  
   
                                        Comment on above:   Test(s) 826416-Hsrqz  
phrine, Urine; 004201-Norepinephrine, Ur;   
004203-Dopamine, Urine; 566758-Pvrrhanrdxbrjkr, Ur;   
703531-Tslvhgwndjhz, Urwas developed and its performance   
characteristics determinedby [facility]. It has not been cleared or   
approved by the Foodand Drug Administration.PATIENT NOT   
FASTINGPERFORMED BY: [facility] [address] [id number] Information:   
START 21@7AM   
   
                                                    Epinephrine (24H U)   
[Mass/Time]     <4              Normal          0-20            Comprehensive   
Internal   
Medicine;   
Comprehensive   
Internal   
Medicine  
Work Phone:   
[phone]  
   
                                        Comment on above:   Test(s) 139741-Qzkwc  
phrine, Urine; 004201-Norepinephrine, Ur;   
004203-Dopamine, Urine; 093367-Hteewptffryvptv, Ur;   
879690-Gqzkejdmriti, Urwas developed and its performance   
characteristics determinedby [facility]. It has not been cleared or   
approved by the Foodand Drug Administration.PATIENT NOT   
FASTINGPERFORMED BY: [employer] [id number] Information:   
START 21@7AM   
   
                                                    Epinephrine (U)   
[Mass/Vol]      <1              Normal                          Comprehensive   
Internal   
Medicine;   
Comprehensive   
Internal   
Medicine  
Work Phone:   
[phone]  
   
                                        Comment on above:   Test(s) 044862-Yxdam  
phrine, Urine; 004201-Norepinephrine, Ur;   
004203-Dopamine, Urine; 133930-Zetgqvhsgfirdbm, Ur;   
410991-Gchddkrjqtni, Urwas developed and its performance   
characteristics determinedby [facility]. It has not been cleared or   
approved by the Foodand Drug Administration.PATIENT NOT   
FASTINGPERFORMED BY: [employer] [id number] Information:   
START 21@7AM   
   
                                                    Norepinephrine (24H   
U) [Mass/Time]  87 {ug/24_hr}   Normal          0-135           Comprehensive   
Internal   
Medicine;   
Comprehensive   
Internal   
Medicine  
Work Phone:   
[phone]  
   
                                        Comment on above:   Test(s) 233440-Nggtt  
phrine, Urine; 004201-Norepinephrine, Ur;   
004203-Dopamine, Urine; 059761-Inemikdvmejzlwl, Ur;   
821965-Iysevbdbjovt, Urwas developed and its performance   
characteristics determinedby [facility]. It has not been cleared or   
approved by the Foodand Drug Administration.PATIENT NOT   
FASTINGPERFORMED BY:  [employer] [id number] Information:   
START 21@7AM   
   
                                                    Norepinephrine (U)   
[Mass/Vol]      23 ug/L         Normal                          Comprehensive   
Internal   
Medicine;   
Comprehensive   
Internal   
Medicine  
Work Phone:   
[phone]  
   
                                        Comment on above:   Test(s) 016363-Aeoqf  
phrine, Urine; 004201-Norepinephrine, Ur;   
004203-Dopamine, Urine; 883362-Xuxdcyqmdlmavsy, Ur;   
924805-Mnytbkxtwvqi, Urwas developed and its performance   
characteristics determinedby [facility]. It has not been cleared or   
approved by the Foodand Drug Administration.PATIENT NOT   
FASTINGPERFORMED BY: [employer] [id number] Information:   
START 21@7AM   
   
                                                    METANEPHRINES (14259)Ordered  
 By:  on 2021   
   
                                                    Metanephrine (24H U)   
[Mass/Time]     205 {ug/24_hr}  Normal                    Comprehensive   
Internal   
Medicine;   
Comprehensive   
Internal   
Medicine  
Work Phone:   
[phone]  
   
                                        Comment on above:   Test(s) 573014-Oofcs  
phrine, Urine; 004201-Norepinephrine, Ur;   
004203-Dopamine, Urine; 175298-Uhcmpdtwjrombxa, Ur;   
585648-Vwuezqrvrrti, Urwas developed and its performance   
characteristics determinedby [facility]. It has not been cleared or   
approved by the Foodand Drug Administration.PATIENT NOT   
FASTINGPERFORMED BY: [employer] [id number]   
   
                                                    Metanephrines (24H U)   
[Mass/Vol]      54 ug/L         Normal                          Comprehensive   
Internal   
Medicine;   
Comprehensive   
Internal   
Medicine  
Work Phone:   
[phone]  
   
                                        Comment on above:   Test(s) 335233-Rjxzf  
phrine, Urine; 004201-Norepinephrine, Ur;   
004203-Dopamine, Urine; 904048-Hqymgdazbpidaki, Ur;   
150742-Jefohdkjsddj, Urwas developed and its performance   
characteristics determinedby [facility]. It has not been cleared or   
approved by the Foodand Drug Administration.PATIENT NOT   
FASTINGPERFORMED BY: [employer] [address] [id number]   
   
                                                    Normetanephrine (24H   
U) [Mass/Time]  1041 {ug/24_hr} Abnormal        156-729         Comprehensive   
Internal   
Medicine;   
Comprehensive   
Internal   
Medicine  
Work Phone:   
[phone]  
   
                                        Comment on above:   Test(s) 185752-Tyaqc  
phrine, Urine; 004201-Norepinephrine, Ur;   
004203-Dopamine, Urine; 220940-Oamxhtlocdfiziw, Ur;   
767339-Uaxnrzwwdhdj, Urwas developed and its performance   
characteristics determinedby [facility]. It has not been cleared or   
approved by the Foodand Drug Administration.PATIENT NOT   
FASTINGPERFORMED BY: [employer] [address] [id number]   
   
                                                    Normetanephrine (24H   
U) [Mass/Vol]   274 ug/L        Normal                          Comprehensive   
Internal   
Medicine;   
Comprehensive   
Internal   
Medicine  
Work Phone:   
[phone]  
   
                                        Comment on above:   Test(s) 466652-Hnazm  
phrine, Urine; 004201-Norepinephrine, Ur;   
004203-Dopamine, Urine; 253741-Mkdudgaedoxosea, Ur;   
471809-Lckfwkhmfprp, Urwas developed and its performance   
characteristics determinedby [facility]. It has not been cleared or   
approved by the Foodand Drug Administration.PATIENT NOT   
FASTINGPERFORMED BY: [employer] [facility] [id number]   
   
                                                    URINE VMA (58022)Ordered By:  
  on 2021   
   
                                                    Vanillylmandelate   
(24H U) [Mass/Time] 4.6 {mg/24_hr}  Normal          0.0-7.5         [facility]   
Internal   
Medicine;   
Comprehensive   
Internal   
Medicine  
Work Phone:   
[phone]  
   
                                        Comment on above:   Test(s) 012414-Unwkb  
phrine, Urine; 004201-Norepinephrine, Ur;   
004203-Dopamine, Urine; 728908-Zbhfhgjubwyjqnz, Ur;   
867602-Scmsryjcjlia, Urwas developed and its performance   
characteristics determinedby [employer]. It has not been cleared or   
approved by the Foodand Drug Administration.PATIENT NOT   
FASTINGPERFORMED BY:  [place] [address] [id number]   
   
                                                    Vanillylmandelate (U)   
[Mass/Vol]      1.2 mg/L        Normal                          Comprehensive   
Internal   
Medicine;   
Comprehensive   
Internal   
Medicine  
Work Phone:   
[phone]  
   
                                        Comment on above:   This test was develo  
ped and its performance   
characteristicsdetermined by [employer]. It has not been cleared or   
approvedby the Food and Drug Administration.   
   
                                                            Test(s) 766359-Rfhza  
phrine, Urine; -Norepinephrine, Ur;   
-Dopamine, Urine; 082474-Ujrxcxvlvxtsayt, Ur;   
852785-Agisekaqgnqm, Urwas developed and its performance   
characteristics determinedby [employer]. It has not been cleared or   
approved by the Foodand Drug Administration.PATIENT NOT   
FASTINGPERFORMED BY:  [place] [address] [id number]   
   
                                                    ALDOSTERONE (82449)Ordered B  
y:  on 2021   
   
                                                    Aldosterone   
[Mass/Vol]      8.8 ng/dL       Normal          0.0-30.0        Comprehensive   
Internal   
Medicine;   
Comprehensive   
Internal   
Medicine  
Work Phone:   
[phone]  
   
                                        Comment on above:   This test was develo  
ped and its performance   
characteristicsdetermined by [employer]. It has not been cleared or   
approvedby the Food and Drug Administration.   
   
                                                            PATIENT WAS FASTINGP  
ERFORMED BY:  [place] [address] [id number]   
   
                                                    CALCIFEDIOL (30288)Ordered B  
y:  on 2021   
   
                                                    25-Hydroxyvitamin   
D2+25-Hydroxyvitamin   
D3 [Mass/Vol]   85.3 ng/mL      Normal          30.0-100.0      Comprehensive   
Internal   
Medicine;   
Comprehensive   
Internal   
Medicine  
Work Phone:   
[phone]  
   
                                        Comment on above:   Vitamin D deficiency  
 has been defined by the [place]   
ofMedicine and an Endocrine Society practice guideline as alevel   
of serum 25-OH vitamin D less than 20 ng/mL (1,2).The Endocrine   
Society went on to further define vitamin Dinsufficiency as a   
level between 21 and 29 ng/mL (2).1. IOM ([place] of   
Medicine). 2010. Dietary reference intakes for calcium and D.   
[place]: The National Academies Press.2. Kodi MF,   
Phillip WALKER, Archana SAMS, et al. Evaluation, treatment,   
and prevention of vitamin D deficiency: an Endocrine Society   
clinical practice guideline. JCEM. 2011; 96(7):1911-30.   
   
                                                            PATIENT WAS FASTINGP  
ERFORMED BY:  [facility] [employer]   
[place] [id number]   
   
                                                    CBC W/AUTO DIFF WBC (36887)O  
rdered By:  on 2021   
   
                                                    Basophils (Bld)   
[#/Vol]         0.1 {x10E3/uL}  Normal          0.0-0.2         Comprehensive   
Internal   
Medicine;   
Comprehensive   
Internal   
Medicine  
Work Phone:   
[phone]  
   
                                        Comment on above:   PATIENT WAS FASTINGP  
ERFORMED BY:  [employer] [id number]   
RedaptDuke Healthin [place] [id number]   
   
                                                    Basophils (Bld)   
[#/Vol]         0.1 10*3/uL     Normal          0.0-0.2         Comprehensive   
Internal   
Medicine;   
[facility]   
Internal   
Medicine  
Work Phone:   
[phone]  
   
                                        Comment on above:   PATIENT WAS FASTINGP  
ERFORMED BY:  [employer] [address] [place] [id number]   
   
                                                    Basophils/100 WBC   
(Bld)           1 %             Normal                          Comprehensive   
Internal   
Medicine;   
Comprehensive   
Internal   
Medicine  
Work Phone:   
[phone]  
   
                                        Comment on above:   PATIENT WAS FASTINGP  
ERFORMED BY:  [employer] [address]   
[facility]in [place] [id number]   
   
                                                    Eosinophils (Bld)   
[#/Vol]         0.2 {x10E3/uL}  Normal          0.0-0.4         Com[facility]   
Internal   
Medicine;   
Com[facility]   
Internal   
Medicine  
Work Phone:   
[phone]  
   
                                        Comment on above:   PATIENT WAS FASTINGP  
ERFORMED BY:  [facility] [address]   
[place]aptDublin [place] [id number]   
   
                                                    Eosinophils (Bld)   
[#/Vol]         0.2 10*3/uL     Normal          0.0-0.4         Com[facility]   
Internal   
Medicine;   
Comprehensive   
Internal   
Medicine  
Work Phone:   
[phone]  
   
                                        Comment on above:   PATIENT WAS FASTINGP  
ERFORMED BY:  [facility] [address]   
RoadDublin [place] [id number]   
   
                                                    Eosinophils/100 WBC   
(Bld)           2 %             Normal                          Com[facility]   
Internal   
Medicine;   
Comprehensive   
Internal   
Medicine  
Work Phone:   
[phone]  
   
                                        Comment on above:   PATIENT WAS FASTINGP  
ERFORMED BY:  [facility] [address]   
RoadDublin [place] [id number]   
   
                                                    Erythrocyte   
distribution width   
(RBC) [Ratio]   12.1 %          Normal          11.6-15.4       Com[facility]   
Internal   
Medicine;   
Comprehensive   
Internal   
Medicine  
Work Phone:   
[phone]  
   
                                        Comment on above:   PATIENT WAS FASTINGP  
ERFORMED BY:  [facility] [address]   
RoadDublin [place] [id number]   
   
                                                    Hematocrit (Bld)   
[Volume fraction] 49.2 %          Normal          37.5-51.0       Com[facility]   
Internal   
Medicine;   
Comprehensive   
Internal   
Medicine  
Work Phone:   
[phone]  
   
                                        Comment on above:   PATIENT WAS FASTINGP  
ERFORMED BY:  [facility] Pbdeet[address]   
RoadDuke Healthin [place] [id number]   
   
                                                    Hemoglobin (Bld)   
[Mass/Vol]      17.0 g/dL       Normal          13.0-17.7       Com[facility]   
Internal   
Medicine;   
Comprehensive   
Internal   
Medicine  
Work Phone:   
[phone]  
   
                                        Comment on above:   PATIENT WAS FASTINGP  
ERFORMED BY:  [facility] [address]   
RoadDublin [place] [id number]   
   
                                                    Immature granulocytes   
(Bld) [#/Vol]   0.0 {x10E3/uL}  Normal          0.0-0.1         Com[facility]   
Internal   
Medicine;   
Comprehensive   
Internal   
Medicine  
Work Phone:   
[phone]  
   
                                        Comment on above:   PATIENT WAS FASTINGP  
ERFORMED BY:  [facility] [address]volio[address]   
RoadDublin [place] [id number]   
   
                                                    Immature granulocytes   
(Bld) [#/Vol]   0.0 10*3/uL     Normal          0.0-0.1         Com[facility]   
Internal   
Medicine;   
Comprehensive   
Internal   
Medicine  
Work Phone:   
[phone]  
   
                                        Comment on above:   PATIENT WAS FASTINGP  
ERFORMED BY:  [facility] [address]   
RoadDublin [place] [id number]   
   
                                                    Immature   
granulocytes/100 WBC   
(Bld)           1 %             Normal                          Comprehensive   
Internal   
Medicine;   
Comprehensive   
Internal   
Medicine  
Work Phone:   
[phone]  
   
                                        Comment on above:   PATIENT WAS FASTINGP  
ERFORMED BY:  [facility] [address]   
RoadDublin [place] [id number]   
   
                                                    Lymphocytes (Bld)   
[#/Vol]         2.6 {x10E3/uL}  Normal          0.7-3.1         Com[facility]   
Internal   
Medicine;   
Comprehensive   
Internal   
Medicine  
Work Phone:   
[phone]  
   
                                        Comment on above:   PATIENT WAS FASTINGP  
ERFORMED BY:  [facility] [address]   
[facility] [place] [id number]   
   
                                                    Lymphocytes (Bld)   
[#/Vol]         2.6 10*3/uL     Normal          0.7-3.1         Com[facility]   
Internal   
Medicine;   
Comprehensive   
Internal   
Medicine  
Work Phone:   
[phone]  
   
                                        Comment on above:   PATIENT WAS FASTINGP  
ERFORMED BY:  [facility]   
[facility] [place] [id number]   
   
                                                    Lymphocytes/100 WBC   
(Bld)           33 %            Normal                          Comprehensive   
Internal   
Medicine;   
Comprehensive   
Internal   
Medicine  
Work Phone:   
[phone]  
   
                                        Comment on above:   PATIENT WAS FASTINGP  
ERFORMED BY:  [facility] Ancahl[address]   
[facility] [place] [id number]   
   
                                                    MCH (RBC) [Entitic   
mass]           33.5 pg         Abnormal        26.6-33.0       Comprehensive   
Internal   
Medicine;   
Comprehensive   
Internal   
Medicine  
Work Phone:   
[phone]  
   
                                        Comment on above:   PATIENT WAS FASTINGP  
ERFORMED BY:  [facility] [address]   
[facility] [place] [id number]   
   
                      MCHC (RBC) [Mass/Vol] 34.6 g/dL  Normal     31.5-35.7  Com  
prehensive   
Internal   
Medicine;   
Comprehensive   
Internal   
Medicine  
Work Phone:   
[phone]  
   
                                        Comment on above:   PATIENT WAS FASTINGP  
ERFORMED BY:  [facility] [address]   
[facility] HospitalD[facility] [place] [id number]   
   
                                                    MCV (RBC) [Entitic   
vol]            97 fL           Normal          79-97           Comprehensive   
Internal   
Medicine;   
Comprehensive   
Internal   
Medicine  
Work Phone:   
[phone]  
   
                                        Comment on above:   PATIENT WAS FASTINGP  
ERFORMED BY:  [facility] [address]gfz[address]   
RoadDublin [place] [id number]   
   
                                                    Monocytes (Bld)   
[#/Vol]         0.6 {x10E3/uL}  Normal          0.1-0.9         Comprehensive   
Internal   
Medicine;   
Comprehensive   
Internal   
Medicine  
Work Phone:   
[phone]  
   
                                        Comment on above:   PATIENT WAS FASTINGP  
ERFORMED BY: AMOR LabAb [address]erNjsheh[address]   
RoadDublin [place] [id number]   
   
                                                    Monocytes (Bld)   
[#/Vol]         0.6 10*3/uL     Normal          0.1-0.9         Comprehensive   
Internal   
Medicine;   
Comprehensive   
Internal   
Medicine  
Work Phone:   
[phone]  
   
                                        Comment on above:   PATIENT WAS FASTINGP  
ERFORMED BY: AMOR Lab[facility] [address]erBpmexa[address]   
RoadDublin [place] [id number]   
   
                                                    Monocytes/100 WBC   
(Bld)           8 %             Normal                          Comprehensive   
Internal   
Medicine;   
Comprehensive   
Internal   
Medicine  
Work Phone:   
[phone]  
   
                                        Comment on above:   PATIENT WAS FASTINGP  
ERFORMED BY: AM[facility] Mullerlin[address]   
RoadD[place] [id number]   
   
                                                    Neutrophils (Bld)   
[#/Vol]         4.2 {x10E3/uL}  Normal          1.4-7.0         Comprehensive   
Internal   
Medicine;   
Comprehensive   
Internal   
Medicine  
Work Phone:   
[phone]  
   
                                        Comment on above:   PATIENT WAS FASTINGP  
ERFORMED BY: [facility] Jason[address]   
RoadD[place] [id number]   
   
                                                    Neutrophils (Bld)   
[#/Vol]         4.2 10*3/uL     Normal          1.4-7.0         Comprehensive   
Internal   
Medicine;   
Comprehensive   
Internal   
Medicine  
Work Phone:   
[phone]  
   
                                        Comment on above:   PATIENT WAS FASTINGP  
ERFORMED BY: AMOR Lab[facility] [address]Bcdbkw[address]   
RoadD[place] [id number]   
   
                                                    Neutrophils/100 WBC   
(Bld)           55 %            Normal                          Comprehensive   
Internal   
Medicine;   
Comprehensive   
Internal   
Medicine  
Work Phone:   
[phone]  
   
                                        Comment on above:   PATIENT WAS FASTINGP  
ERFORMED BY: AMOR LabCosaurabh MullerEohima[address]   
RoadD[place] [id number]   
   
                                                    Platelets (Bld)   
[#/Vol]         213 {x10E3/uL}  Normal          150-450         Comprehensive   
Internal   
Medicine;   
Comprehensive   
Internal   
Medicine  
Work Phone:   
[phone]  
   
                                        Comment on above:   PATIENT WAS FASTINGP  
ERFORMED BY: AMOR [facility] [address]ul[address]   
RoadD[place] [id number]   
   
                                                    Platelets (Bld)   
[#/Vol]         213 10*3/uL     Normal          150-450         Comprehensive   
Internal   
Medicine;   
Comprehensive   
Internal   
Medicine  
Work Phone:   
[phone]  
   
                                        Comment on above:   PATIENT WAS FASTINGP  
ERFORMED BY: AMOR [facility] [address]akp[address]   
RoadD[place] [id number]   
   
                      RBC (Bld) [#/Vol] 5.07 {x10E6/uL} Normal     4.14-5.80  [facility]   
Internal   
Med[facility];   
Comprehensive   
Internal   
Medicine  
Work Phone:   
[phone]  
   
                                        Comment on above:   PATIENT WAS FASTINGP  
ERFORMED BY: CB [facility] [address]ouhm[address]   
RoadD[place] [id number]   
   
                      RBC (Bld) [#/Vol] 5.07 10*6/uL Normal     4.14-5.80  [facility]   
Internal   
Medicine;   
Comprehensive   
Internal   
Medicine  
Work Phone:   
[phone]  
   
                                        Comment on above:   PATIENT WAS FASTINGP  
ERFORMED BY: CB [facility] [address]cthc[address]   
RoadD[place] [id number]   
   
                      WBC (Bld) [#/Vol] 7.7 {x10E3/uL} Normal     3.4-10.8   Com  
prehensive   
Internal   
Medicine;   
Comprehensive   
Internal   
Medicine  
Work Phone:   
[phone]  
   
                                        Comment on above:   PATIENT WAS FASTINGP  
ERFORMED BY: CB [facility] Cqpily[address]   
RoadD[place] [id number]   
   
                      WBC (Bld) [#/Vol] 7.7 10*3/uL Normal     3.4-10.8   [facility]   
Internal   
Medicine;   
Comprehensive   
Internal   
Medicine  
Work Phone:   
[phone]  
   
                                        Comment on above:   PATIENT WAS FASTINGP  
ERFORMED BY: CB [facility] [address]hq[address]   
RoadDuin [place] [id number]   
   
                                                    HGB A1C (25203)Ordered By: S  
ystem Manager on 2021   
   
                                                    HbA1c (Bld) [Mass   
fraction]       5.5 %           Normal          4.8-5.6         Comprehensive   
Internal   
Medicine;   
Comprehensive   
Internal   
Medicine  
Work Phone:   
[phone]  
   
                                        Comment on above:   . Prediabetes: 5.7 -  
 6.4 Diabetes: >6.4 Glycemic control for   
adults with diabetes: <7.0   
   
                                                            PATIENT WAS FASTINGP  
ERFORMED BY: AMOR Jason6370 [facility] [id number]   
   
                                                    LIPID PANEL (97231)Ordered B  
y:  on 2021   
   
                                                    Cholesterol   
[Mass/Vol]      170 mg/dL       Normal          100-199         Comprehensive   
Internal   
Medicine;   
Comprehensive   
Internal   
Medicine  
Work Phone:   
[phone]  
   
                                        Comment on above:   PATIENT WAS FASTINGP  
ERFORMED BY: AMOR Jason6370 [facility] [id number]   
   
                                                    Cholesterol in HDL   
[Mass/Vol]      57 mg/dL        Normal                          Comprehensive   
Internal   
Medicine;   
Comprehensive   
Internal   
Medicine  
Work Phone:   
[phone]  
   
                                        Comment on above:   PATIENT WAS FASTINGP  
ERFORMED BY: AMOR Ilene Jason6370 [facility] [id number]   
   
                                                    Cholesterol in   
LDL/Cholesterol in   
HDL [Mass ratio] 1.6 {ratio}     Normal          0.0-3.6         Comprehensive   
Internal   
Medicine;   
Comprehensive   
Internal   
Medicine  
Work Phone:   
[phone]  
   
                                        Comment on above:   LDL/HDL Ratio Men Wo  
men 1/2 Avg.Risk 1.0 1.5 Avg.Risk 3.6 3.2   
2X   
Avg.Risk 6.2 5.0 3X Avg.Risk 8.0 6.1   
   
                                                            PATIENT WAS FASTINGP  
ERFORMED BY: AMOR Mullerlin6370 [facility] [id number]   
   
                                                    Triglyceride   
[Mass/Vol]      122 mg/dL       Normal          0-149           Comprehensive   
Internal   
Medicine;   
Comprehensive   
Internal   
Medicine  
Work Phone:   
[phone]  
   
                                        Comment on above:   PATIENT WAS FASTINGP  
ERFORMED BY: AMOR [place] Ccclif4349 [facility] [id number]   
   
                      LIPID PANEL (92501) 22 mg/dL   Normal     5-40       Compr  
ehensive   
Internal   
Medicine;   
Comprehensive   
Internal   
Medicine  
Work Phone:   
[phone]  
   
                                        Comment on above:   PATIENT WAS FASTINGP  
ERFORMED BY: AMOR Mullerlin6370 [facility] [id number]   
   
                      LIPID PANEL (65741) 91 mg/dL   Normal     0-99       Compr  
ehensive   
Internal   
Medicine;   
Comprehensive   
Internal   
Medicine  
Work Phone:   
[phone]  
   
                                        Comment on above:   PATIENT WAS FASTINGP  
ERFORMED BY: CB [facility] [address]wgz[address]   
RoadD[place] [id number]   
   
                      LIPID PANEL (61761) 1.6 {ratio} Normal     0.0-3.6    Comp  
rehensive   
Internal   
Medicine;   
Comprehensive   
Internal   
Medicine  
Work Phone:   
[phone]  
   
                                        Comment on above:   LDL/HDL Ratio Men Wo  
men 1/2 Avg.Risk 1.0 1.5 Avg.Risk 3.6 3.2   
2X   
Avg.Risk 6.2 5.0 3X Avg.Risk 8.0 6.1   
   
                                                            PATIENT WAS FASTINGP  
ERFORMED BY: CB [facility] Ofafef[address]   
RoadD[place] [id number]   
   
                                                    METABOLIC PANEL, COMPREHENSI  
VE (53017)Ordered By:  on 2021   
   
                      Albumin [Mass/Vol] 4.6 g/dL   Normal     3.8-4.9    [facility]   
Internal   
Medicine;   
Comprehensive   
Internal   
Medicine  
Work Phone:   
[phone]  
   
                                        Comment on above:   PATIENT WAS FASTINGP  
ERFORMED BY: CB [facility] [address]fcjii[address]   
RoadD[place] [id number]   
   
                                                    Albumin/Globulin   
[Mass ratio]    1.9 {ratio}     Normal          1.2-2.2         Comprehensive   
Internal   
Medicine;   
Comprehensive   
Internal   
Medicine  
Work Phone:   
[phone]  
   
                                        Comment on above:   PATIENT WAS FASTINGP  
ERFORMED BY: CB [facility] Ygrgex[address]   
RoadD[place] [id number]   
   
                                                    ALP [Catalytic   
activity/Vol]   48 [iU]/L       Normal                    Comprehensive   
Internal   
Medicine;   
Comprehensive   
Internal   
Medicine  
Work Phone:   
[phone]  
   
                                        Comment on above:   PATIENT WAS FASTINGP  
ERFORMED BY: CB [facility] [address]ou[address]   
RoadD[place] [id number]   
   
                                                    ALP [Catalytic   
activity/Vol]   48 U/L          Normal                    Comprehensive   
Internal   
Medicine;   
Comprehensive   
Internal   
Medicine  
Work Phone:   
[phone]  
   
                                        Comment on above:   PATIENT WAS FASTINGP  
ERFORMED BY: CB [facility] Ikfabj[address]   
RoadD[place] [id number]   
   
                                                    ALT [Catalytic   
activity/Vol]   34 [iU]/L       Normal          0-44            Comprehensive   
Internal   
Medicine;   
Comprehensive   
Internal   
Medicine  
Work Phone:   
[phone]  
   
                                        Comment on above:   PATIENT WAS FASTINGP  
ERFORMED BY: CB [facility] [address]xllj[address]   
RoadD[place] [id number]   
   
                                                    ALT [Catalytic   
activity/Vol]   34 U/L          Normal          0-44            Comprehensive   
Internal   
Medicine;   
Comprehensive   
Internal   
Medicine  
Work Phone:   
[phone]  
   
                                        Comment on above:   PATIENT WAS FASTINGP  
ERFORMED BY: CB [facility] [address]wmcce[address]   
RoadD[place] [id number]   
   
                                                    AST [Catalytic   
activity/Vol]   25 [iU]/L       Normal          0-40            Comprehensive   
Internal   
Medicine;   
Comprehensive   
Internal   
Medicine  
Work Phone:   
[phone]  
   
                                        Comment on above:   PATIENT WAS FASTINGP  
ERFORMED BY: CB [facility] [address]   
RoadD[place] [id number]   
   
                                                    AST [Catalytic   
activity/Vol]   25 U/L          Normal          0-40            Comprehensive   
Internal   
Medicine;   
Comprehensive   
Internal   
Medicine  
Work Phone:   
[phone]  
   
                                        Comment on above:   PATIENT WAS FASTINGP  
ERFORMED BY:  [facility] [address]wt[address]   
RoadD[place] [id number]   
   
                      Bilirubin [Mass/Vol] 0.5 mg/dL  Normal     0.0-1.2    Comp  
rehensive   
Internal   
Medicine;   
Comprehensive   
Internal   
Medicine  
Work Phone:   
[phone]  
   
                                        Comment on above:   PATIENT WAS FASTINGP  
ERFORMED BY:  [facility] [address]   
RoadD[place] [id number]   
   
                      Calcium [Mass/Vol] 10.1 mg/dL Normal     8.7-10.2   [facility]  
[facility]   
Internal   
Medicine;   
Comprehensive   
Internal   
Medicine  
Work Phone:   
[phone]  
   
                                        Comment on above:   PATIENT WAS FASTINGP  
ERFORMED BY: CB [facility] Ernthr[address]   
RoadD[place] [id number]   
   
                      Chloride [Moles/Vol] 101 mmol/L Normal          Comp  
rehensive   
Internal   
Medicine;   
Comprehensive   
Internal   
Medicine  
Work Phone:   
[phone]  
   
                                        Comment on above:   PATIENT WAS FASTINGP  
ERFORMED BY: CB [facility] [address]lvgsi[address]   
RoadD[place] [id number]   
   
                      CO2 [Moles/Vol] 25 mmol/L  Normal     20-29      [facility]  
[facility]   
Internal   
Med[facility];   
Comprehensive   
Internal   
Medicine  
Work Phone:   
[phone]  
   
                                        Comment on above:   PATIENT WAS FASTINGP  
ERFORMED BY: CB [facility] Ychgnh[address]   
RoadD[place] [id number]   
   
                      Creatinine [Mass/Vol] 0.92 mg/dL Normal     0.76-1.27  Com  
prehensive   
Internal   
Medicine;   
Comprehensive   
Internal   
Medicine  
Work Phone:   
[phone]  
   
                                        Comment on above:   PATIENT WAS FASTINGP  
ERFORMED BY: CB [facility] [address]iby[address]   
RoadD[place] [id number]   
   
                                                    GFR/1.73 sq M   
predicted among   
blacks CKD-EPI   
(S/P/Bld) [Vol   
rate/Area]      106 mL/min/1.73 Normal                          Comprehensive   
Internal   
Medicine;   
Comprehensive   
Internal   
Medicine  
Work Phone:   
[phone]  
   
                                        Comment on above:   PATIENT WAS FASTINGP  
ERFORMED BY: CB [facility] Utgpbn[address]   
Road[place] [id number]   
   
                                                    GFR/1.73 sq M   
predicted among   
non-blacks CKD-EPI   
(S/P/Bld) [Vol   
rate/Area]      92 mL/min/1.73  Normal                          Comprehensive   
Internal   
Medicine;   
Comprehensive   
Internal   
Medicine  
Work Phone:   
[phone]  
   
                                        Comment on above:   PATIENT WAS FASTINGP  
ERFORMED BY: CB [facility] [address]pj[address]   
RoadD[place] [id number]   
   
                                                    Globulin (S)   
[Mass/Vol]      2.4 g/dL        Normal          1.5-4.5         Comprehensive   
Internal   
Medicine;   
Comprehensive   
Internal   
Medicine  
Work Phone:   
[phone]  
   
                                        Comment on above:   PATIENT WAS FASTINGP  
ERFORMED BY: CB [facility] [address]qss[address]   
RoadD[place] [id number]   
   
                      Glucose [Mass/Vol] 126 mg/dL  Abnormal   65-99      [facility]   
Internal   
Medicine;   
Comprehensive   
Internal   
Medicine  
Work Phone:   
[phone]  
   
                                        Comment on above:   PATIENT WAS FASTINGP  
ERFORMED BY: CB [facility] Etvvtk[address]   
RoadD[place] [id number]   
   
                      Potassium [Moles/Vol] 4.5 mmol/L Normal     3.5-5.2    Com  
prehensive   
Internal   
Medicine;   
Comprehensive   
Internal   
Medicine  
Work Phone:   
[phone]  
   
                                        Comment on above:   PATIENT WAS FASTINGP  
ERFORMED BY: CB [facility] [address]nwq[address]   
RoadD[place] [id number]   
   
                      Protein [Mass/Vol] 7.0 g/dL   Normal     6.0-8.5    [facility]   
Internal   
Medicine;   
Comprehensive   
Internal   
Medicine  
Work Phone:   
[phone]  
   
                                        Comment on above:   PATIENT WAS FASTINGP  
ERFORMED BY: AMOR [facility] [address]mp[address]   
RoadDublin [place] [id number]   
   
                      Sodium [Moles/Vol] 141 mmol/L Normal     134-144    [facility]   
Internal   
Medicine;   
Comprehensive   
Internal   
Medicine  
Work Phone:   
[phone]  
   
                                        Comment on above:   PATIENT WAS FASTINGP  
ERFORMED BY: CB [facility] [address]   
Road[address]ublin [place] [id number]   
   
                                                    Urea nitrogen   
[Mass/Vol]      14 mg/dL        Normal          6-24            Comprehensive   
Internal   
Medicine;   
Comprehensive   
Internal   
Medicine  
Work Phone:   
[phone]  
   
                                        Comment on above:   PATIENT WAS FASTINGP  
ERFORMED BY: AMOR [facility] [address]   
Road[address] [id number]   
   
                                                    Urea   
nitrogen/Creatinine   
[Mass ratio]    15 mg/mg        Normal          9-20            Comprehensive   
Internal   
Medicine;   
Comprehensive   
Internal   
Medicine  
Work Phone:   
[phone]  
   
                                        Comment on above:   PATIENT WAS FASTINGP  
ERFORMED BY: AMOR [facility] [address]   
Road[address] [id number]   
   
                                                    MICROALBUMINOrdered By: Syst  
em Manager on 2021   
   
                                                    Albumin DL <= 20 mg/L   
(U) [Mass/Vol]  mg/dL           Normal                          Comprehensive   
Internal   
Medicine;   
Comprehensive   
Internal   
Medicine  
Work Phone:   
[phone]  
   
                                        Comment on above:   **Verified by repeat  
 analysis**   
   
                                                            PATIENT WAS FASTINGP  
ERFORMED BY: CB [facility] [address]   
Road[place] [id number]   
   
                                                    Albumin DL <= 20 mg/L   
(U) [Mass/Vol]  mg/dL           Normal                          Comprehensive   
Internal   
Medicine;   
Comprehensive   
Internal   
Medicine  
Work Phone:   
[phone]  
   
                                        Comment on above:   **Verified by repeat  
 analysis**   
   
                                                            PATIENT WAS FASTINGP  
ERFORMED BY: CB [facility] [address]   
Road[address] [id number]   
   
                                                    Albumin/Creatinine   
(U) [Mass ratio] <4              Normal          0-29            Comprehensive   
Internal   
Medicine;   
Comprehensive   
Internal   
Medicine  
Work Phone:   
[phone]  
   
                                        Comment on above:   Normal: 0 - 29 Moder  
ately increased: 30 - 300 Severely   
increased: >300   
   
                                                            PATIENT WAS FASTINGP  
ERFORMED BY:  [facility] [id number]   
   
                                                    Creatinine (U)   
[Mass/Vol]      82.2 mg/dL      Normal                          Comprehensive   
Internal   
Medicine;   
Comprehensive   
Internal   
Medicine  
Work Phone:   
[phone]  
   
                                        Comment on above:   PATIENT WAS FASTINGP  
ERFORMED BY: [facility] [facility] [id number]   
   
                                                    RENIN (09760)Ordered By: s  
tem Manager on 2021   
   
                                                    Renin (P) [Catalytic   
activity/Vol]       0.723 {ng/mL/hr}    Normal              0.167-5.38  
0                                       Comprehensive   
Internal   
Medicine;   
Comprehensive   
Internal   
Medicine  
Work Phone:   
[phone]  
   
                                        Comment on above:   This test was develo  
ped and its performance   
characteristicsdetermined by [facility]. It has not been cleared or   
approvedby the Food and Drug Administration.   
   
                                                            PATIENT WAS FASTINGP  
ERFORMED BY:  [facility]â„¢ NetworkMarlton Rehabilitation HospitalZsdspbyfcm4996 [facility] [id number]   
   
                                                    TSH (23266)Ordered By: Warp 9e  
m Manager on 2021   
   
                          TSH Qn       1.760 {uIU/mL} Normal       0.450-4.50  
0                                       Comprehensive   
Internal   
Medicine;   
Comprehensive   
Internal   
Medicine  
Work Phone:   
[phone]  
   
                                        Comment on above:   PATIENT WAS FASTINGP  
ERFORMED BY:  [facility]â„¢ NetworkCarrier ClinicBboffl8[facility] [id number]   
   
                                                    URINALYSIS, W/ MICRO (35415)  
Ordered By:  on 2021   
   
                      Appearance (U) Clear      Normal                Comprehens  
bebe   
Internal   
Medicine;   
Comprehensive   
Internal   
Medicine  
Work Phone:   
[phone]  
   
                                        Comment on above:   PATIENT WAS FASTINGP  
ERFORMED BY:  [facility]â„¢ NetworkCarrier ClinicNyoluw8506 [facility] [id number]   
   
                      Bilirubin Ql (U) Negative   Normal                Comprehe  
nsive   
Internal   
Medicine;   
Comprehensive   
Internal   
Medicine  
Work Phone:   
[phone]  
   
                                        Comment on above:   PATIENT WAS FASTINGP  
ERFORMED BY:  [facility]â„¢ NetworkCarrier ClinicZgvimn8869 [facility] [id number]   
   
                      Bilirubin Ql (U) Negative   Normal                Comprehe  
nsive   
Internal   
Medicine;   
Comprehensive   
Internal   
Medicine  
Work Phone:   
[phone]  
   
                                        Comment on above:   PATIENT WAS FASTINGP  
ERFORMED BY: AMOR [address]   
[facility] [id number]   
   
                      Color (U)  Yellow     Normal                Comprehensive   
Internal   
Medicine;   
Comprehensive   
Internal   
Medicine  
Work Phone:   
[phone]  
   
                                        Comment on above:   PATIENT WAS FASTINGP  
ERFORMED BY: AM[facility] [address]   
[facility] [id number]   
   
                      Glucose Ql (U) Negative   Normal                Comprehens  
bebe   
Internal   
Medicine;   
Comprehensive   
Internal   
Medicine  
Work Phone:   
[phone]  
   
                                        Comment on above:   PATIENT WAS FASTINGP  
ERFORMED BY: AM[facility] Jason[address] [facility] [id number]   
   
                      Glucose Ql (U) Negative   Normal                Comprehens  
bebe   
Internal   
Medicine;   
Comprehensive   
Internal   
Medicine  
Work Phone:   
[phone]  
   
                                        Comment on above:   PATIENT WAS FASTINGP  
ERFORMED BY: AM[facility] Jason[address] [facility] [id number]   
   
                      Hemoglobin Ql (U) Negative   Normal                Compreh  
ensive   
Internal   
Medicine;   
Comprehensive   
Internal   
Medicine  
Work Phone:   
[phone]  
   
                                        Comment on above:   PATIENT WAS FASTINGP  
ERFORMED BY: AMOR Jason[address] [facility] [id number]   
   
                      Hemoglobin Ql (U) Negative   Normal                Compreh  
ensive   
Internal   
Medicine;   
Comprehensive   
Internal   
Medicine  
Work Phone:   
[phone]  
   
                                        Comment on above:   PATIENT WAS FASTINGP  
ERFORMED BY: AM[facility] Jason[address] [facility] [id number]   
   
                      Ketones Ql (U) Negative   Normal                Comprehens  
bebe   
Internal   
Medicine;   
Comprehensive   
Internal   
Medicine  
Work Phone:   
[phone]  
   
                                        Comment on above:   PATIENT WAS FASTINGP  
ERFORMED BY: AMOR [address]   
[facility] [id number]   
   
                      Ketones Ql (U) Negative   Normal                Comprehens  
bebe   
Internal   
Medicine;   
Comprehensive   
Internal   
Medicine  
Work Phone:   
[phone]  
   
                                        Comment on above:   PATIENT WAS FASTINGP  
ERFORMED BY: AMOR Mullerlin6[address] [facility] [id number]   
   
                                                    Leukocyte esterase   
Test strip Ql (U) Negative        Normal                          Comprehensive   
Internal   
Medicine;   
Comprehensive   
Internal   
Medicine  
Work Phone:   
[phone]  
   
                                        Comment on above:   PATIENT WAS FASTINGP  
ERFORMED BY: AMOR LabCosaurabh MullerMjrapy[address]   
RoadD[place] [id number]   
   
                                                    Leukocyte esterase   
Test strip Ql (U) Negative        Normal                          Comprehensive   
Internal   
Medicine;   
Comprehensive   
Internal   
Medicine  
Work Phone:   
[phone]  
   
                                        Comment on above:   PATIENT WAS FASTINGP  
ERFORMED BY: AMOR [facility] [address]   
Road[address] [id number]   
   
                                                    Microscopic   
observation LM Nom   
(Urine sed)     See below:      Normal                          Comprehensive   
Internal   
Medicine;   
Comprehensive   
Internal   
Medicine  
Work Phone:   
[phone]  
   
                                        Comment on above:   Microscopic was benny  
cated and was performed.   
   
                                                            PATIENT WAS FASTINGP  
ERFORMED BY: AMOR Lab[facility] [address]Rchxas[address]   
RoadD[place] [id number]   
   
                                                    Microscopic   
observation LM Nom   
(Urine sed)     MICRON          Normal                          Comprehensive   
Internal   
Medicine;   
Comprehensive   
Internal   
Medicine  
Work Phone:   
[phone]  
   
                                        Comment on above:   Microscopic follows   
if indicated.   
   
                                                            PATIENT WAS FASTINGP  
ERFORMED BY: AMOR LabCosaurabh MullerYlvexs[address]   
RoadD[place] [id number]   
   
                      Nitrite Ql (U) Negative   Normal                Comprehens  
bebe   
Internal   
Medicine;   
Comprehensive   
Internal   
Medicine  
Work Phone:   
[phone]  
   
                                        Comment on above:   PATIENT WAS FASTINGP  
ERFORMED BY: AMOR LabCosaurabh MullerPmevsr[address]   
RoadD[place] [id number]   
   
                      Nitrite Ql (U) Negative   Normal                Comprehens  
bebe   
Internal   
Medicine;   
Comprehensive   
Internal   
Medicine  
Work Phone:   
[phone]  
   
                                        Comment on above:   PATIENT WAS FASTINGP  
ERFORMED BY: AMOR [facility] [address]   
RoadD[place] [id number]   
   
                      pH (U)     7.0 [pH]   Normal     5.0-7.5    Comprehensive   
Internal   
Medicine;   
Comprehensive   
Internal   
Medicine  
Work Phone:   
[phone]  
   
                                        Comment on above:   PATIENT WAS FASTINGP  
ERFORMED BY: AMOR [facility] [address]   
RoadD[place] [id number]   
   
                      Protein Ql (U) Negative   Normal                Comprehens  
bebe   
Internal   
Medicine;   
Comprehensive   
Internal   
Medicine  
Work Phone:   
[phone]  
   
                                        Comment on above:   PATIENT WAS FASTINGP  
ERFORMED BY: AMOR [facility] [address]   
RoadD[place] [id number]   
   
                      Protein Ql (U) Negative   Normal                Comprehens  
bebe   
Internal   
Medicine;   
Comprehensive   
Internal   
Medicine  
Work Phone:   
[phone]  
   
                                        Comment on above:   PATIENT WAS FASTINGP  
ERFORMED BY: AMOR [facility] [address]   
RoadD[place] [id number]   
   
                                                    Specific gravity (U)   
[Rel density]       1.018 1             Normal              1.005-1.03  
0                                       Comprehensive   
Internal   
Medicine;   
Comprehensive   
Internal   
Medicine  
Work Phone:   
[phone]  
   
                                        Comment on above:   PATIENT WAS FASTINGP  
ERFORMED BY: CB [facility] [address]   
RoadD[place] [id number]   
   
                                                    Urobilinogen (U)   
[Mass/Vol]      1.0 mg/dL       Normal          0.2-1.0         Comprehensive   
Internal   
Medicine;   
Comprehensive   
Internal   
Medicine  
Work Phone:   
[phone]  
   
                                        Comment on above:   PATIENT WAS FASTINGP  
ERFORMED BY:  [facility] [address]   
RoadD[place] [id number]   
   
                                                    Urobilinogen Test   
strip (U) [Mass/Vol] 1.0 mg/dL       Normal          0.2-1.0         [facility]  
   
Internal   
Medicine;   
Comprehensive   
Internal   
Medicine  
Work Phone:   
[phone]  
   
                                        Comment on above:   PATIENT WAS FASTINGP  
ERFORMED BY: AMOR [facility] [address]   
RoadD[place] [id number]   
   
                                                    HGB A1C (93843)Ordered By: S  
ystem Manager on 2020   
   
                                                    HbA1c (Bld) [Mass   
fraction]       5.5 %           Normal          4.8-5.6         Comprehensive   
Internal   
Medicine  
Work Phone:   
[phone]  
   
                                        Comment on above:   . Prediabetes: 5.7 -  
 6.4 Diabetes: >6.4 Glycemic control for   
adults with diabetes: <7.0   
   
                                                            PATIENT NOT FASTINGP  
ERFORMED BY: CB [facility] [address]   
Road[address] [id number]; fu 11-30 kf   
   
                                                    HGB A1C (53506)Ordered By: S  
ystem Manager on 2020   
   
                                                    HbA1c (Bld) [Mass   
fraction]       5.2 %           Normal          4.8-5.6         Comprehensive   
Internal   
Medicine  
Work Phone:   
[phone]  
   
                                        Comment on above:   . Prediabetes: 5.7 -  
 6.4 Diabetes: >6.4 Glycemic control for   
adults with diabetes: <7.0   
   
                                                            Test(s) 172204-LEF-B  
; 002830-KLD-H; 303567-VJA-S;   
003128-Tgrpbsuuikrsm; 123477-Cholesterol, Total; 017965-MHH-E   
(Total);009100-Ffcmw LDL-P; 488154-SVN Size; 803928-FA-UB   
Scorewas developed and its performance characteristics   
determinedby [place]. It has not been cleared or approved by the   
Foodand Drug Administration.PATIENT WAS FASTINGPERFORMED BY: [employer]   
[id number] BY: MINDBODY70 [facility]priseNov[facility] [id number]   
   
                                                    NMR Profile (02648)Ordered B  
y:  on 2020   
   
                                                    Cholesterol   
[Mass/Vol]      163 mg/dL       Normal          100-199         Comprehensive   
Internal   
Medicine  
Work Phone:   
[phone]  
   
                                        Comment on above:   Test(s) 251968-TWE-J  
; 560029-PIF-T; 148613-JWR-Z;   
372315-Grcfroyobbeoh; 123477-Cholesterol, Total; 875553-KUA-S   
(Total);847921-Qfrwt LDL-P; 082233-RRL Size; 667193-AX-VB   
Scorewas developed and its performance characteristics   
determinedby [place]. It has not been cleared or approved by the   
Foodand Drug Administration.PATIENT WAS FASTINGPERFORMED BY: MEETiiN [address]   
[id number] BY: MINDBODY70 [facility]priseNov[facility] [id number]   
   
                                                    Lipoprotein.alpha   
[Moles/Vol]     39.5 umol/L     Normal                          Comprehensive   
Internal   
Medicine  
Work Phone:   
[phone]  
   
                                        Comment on above:   Test(s) 225007-CLL-H  
; 755884-IAT-X; 556605-MFL-F;   
185808-Wcdngxjmicqlu; 123477-Cholesterol, Total; 408056-NOX-L   
(Total);776286-Jjunp LDL-P; 343718-KMY Size; 696691-XM-VF   
Scorewas developed and its performance characteristics   
determinedby [facility]. It has not been cleared or approved by the   
Foodand Drug Administration.PATIENT WAS FASTINGPERFORMED BY: BN   
[facility] Jmxpwasexj1980 [facility]   
[id number] BY: CB [facility] Cspumu22[address] [facility] [id number]   
   
                                                    Lipoprotein.beta.subp  
article [Entitic   
length]         20.8 nm         Normal                          Comprehensive   
Internal   
Medicine  
Work Phone:   
[phone]  
   
                                        Comment on above:   --------------------  
-------------------------------------- **   
INTERPRETATIVE INFORMATION** PARTICLE CONCENTRATION AND SIZE   
<--Lower CVD Risk Higher CVD Risk--> LDL AND HDL PARTICLES   
Percentile in Reference Population HDL-P (total) High 75th 50th   
25th Low >34.9 34.9 30.5 26.7 <26.7 . Small LDL-P Low 25th 50th   
75th High <117 117 527 839 >839 . LDL Size <-Large (Pattern A)->   
&lt;-Small (Pattern B)-> 23.0 20.6 20.5 19.0   
----------------------------------------------------------Small   
LDL-P and LDL Size are associated with CVD risk, but not   
afterLDL-P is taken into account.   
   
                                                            Test(s) 907909-SEL-C  
; 438726-GED-N; 895054-BTX-D;   
994306-Xpctosjlxrimf; 123477-Cholesterol, Total; 767376-LRZ-I   
(Total);732118-Sfkyf LDL-P; 619994-GFC Size; 943473-CF-MC   
Scorewas developed and its performance characteristics   
determinedby [facility]. It has not been cleared or approved by the   
Foodand Drug Administration.PATIENT WAS FASTINGPERFORMED BY: MEETiiN [address]   
[id number] BY:  [facility]â„¢ NetworkMountain View Regional Medical CenterNjsuzi4197 [facility] [id number]   
   
                                                    Lipoprotein.beta.subp  
article [Moles/Vol] 975 nmol/L      Normal                          Comprehensiv  
e   
Internal   
Medicine  
Work Phone:   
[phone]  
   
                                        Comment on above:   Low < 1000 Moderate   
1000 - 1299 Borderline-High 1300 - 1599   
High   
1600 - 2000 Very High > 2000   
   
                                                            Test(s) 616698-YMI-X  
; 606830-DEW-E; 785264-KSW-Q;   
776125-Yvxbrzckdbapy; 123477-Cholesterol, Total; 069344-NZT-A   
(Total);068097-Lulvj LDL-P; 170371-PDN Size; 401114-GX-WP   
Scorewas developed and its performance characteristics   
determinedby [place]. It has not been cleared or approved by the   
Foodand Drug Administration.PATIENT WAS FASTINGPERFORMED BY: MEETiiN [address]   
[id number] BY: [facility] Wazbsv0591 [facility]razasChildren[facility] [id number]   
   
                                                    Lipoprotein.beta.subp  
article.small   
[Moles/Vol]     391 nmol/L      Normal                          Comprehensive   
Internal   
Medicine  
Work Phone:   
[phone]  
   
                                        Comment on above:   Test(s) 707689-COE-A  
; 213656-AUP-Y; 804704-XNN-D;   
673361-Rjcqoqvbcjdwi; 123477-Cholesterol, Total; 382191-ELC-T   
(Total);195310-Vjmry LDL-P; 733391-CTS Size; 517696-AZ-OF   
Scorewas developed and its performance characteristics   
determinedby [place]. It has not been cleared or approved by the   
Foodand Drug Administration.PATIENT WAS FASTINGPERFORMED BY: MEETiiN [address]   
[id number] BY: EnGeneICCarrier ClinicFuqeuw2022 [facility] [id number]   
   
                                                    Triglyceride   
[Mass/Vol]      82 mg/dL        Normal          0-149           Comprehensive   
Internal   
Medicine  
Work Phone:   
[phone]  
   
                                        Comment on above:   Test(s) 718473-RGS-Y  
; 291078-BDB-Q; 574572-BGU-P;   
840099-Luwxtboujdyxb; 123477-Cholesterol, Total; 076834-HOH-G   
(Total);126435-Ktbuu LDL-P; 056693-WHL Size; 496996-LN-ZO   
Scorewas developed and its performance characteristics   
determinedby [place]. It has not been cleared or approved by the   
Foodand Drug Administration.PATIENT WAS FASTINGPERFORMED BY: MEETiiN [address]   
[id number] BY: MINDBODY70 [facility] [id number]   
   
                      NMR Profile (13045) 62 mg/dL   Normal                [facility]   
Internal   
[facility]  
Work Phone:   
[phone]  
   
                                        Comment on above:   Test(s) 232881-YRH-M  
; 474917-UQK-Q; 556012-QNE-J;   
223574-Zyhhawsufsddp; 123477-Cholesterol, Total; 931325-NPI-N   
(Total);939736-Vssrd LDL-P; 647608-RBA Size; 284862-CA-DS   
Scorewas developed and its performance characteristics   
determinedby [place]. It has not been cleared or approved by the   
Foodand Drug Administration.PATIENT WAS FASTINGPERFORMED BY: MEETiiN [address]   
[id number] BY: MINDBODY70 [facility] [id number]   
   
                      NMR Profile (55677) 85 mg/dL   Normal     0-99       [facility]   
Internal   
Medicine  
Work Phone:   
[phone]  
   
                                        Comment on above:   . Optimal < 100 Abov  
e optimal 100 - 129 Borderline 130 - 159   
High 160 - 189 Very high > 189 .LDL-C is inaccurate if patient   
is non-fasting.   
   
                                                            Test(s) 664251-EMV-S  
; 692028-FOM-X; 118391-URG-H;   
101012-Gcxsvtulttnal; 123477-Cholesterol, Total; 656598-RHC-L   
(Total);532251-Usifs LDL-P; 249195-MQN Size; 687948-OC-YW   
Scorewas developed and its performance characteristics   
determinedby [place]. It has not been cleared or approved by the   
Foodand Drug Administration.PATIENT WAS FASTINGPERFORMED BY:    
[employer] Network[address]   
[id number] BY:  [facility]â„¢ NetworkCarrier ClinicLtejum3113 [facility] [id number]   
   
                      NMR Profile (57908) 82 mg/dL   Normal     0-149      Compr  
ehensive   
Internal   
Medicine;   
Comprehensive   
Internal   
Medicine  
Work Phone:   
[phone]  
   
                      NMR Profile (27888) 163 mg/dL  Normal     100-199    Compr  
ehensive   
Internal   
Medicine;   
Comprehensive   
Internal   
Medicine  
Work Phone:   
[phone]  
   
                                                    CALCIFEDIOL (30659)Ordered B  
y:  on 2019   
   
                                                    25-Hydroxyvitamin   
D2+25-Hydroxyvitamin   
D3 [Mass/Vol]   82.0 ng/mL      Normal          30.0-100.0      Comprehensive   
Internal   
Medicine  
Work Phone:   
[phone]  
   
                                        Comment on above:   Vitamin D deficiency  
 has been defined by the [place]   
ofMedicine and an Endocrine Society practice guideline as alevel   
of serum 25-OH vitamin D less than 20 ng/mL (1,2).The Endocrine   
Society went on to further define vitamin Dinsufficiency as a   
level between 21 and 29 ng/mL (2).1. IOM ([place] of   
Medicine). 2010. Dietary reference intakes for calcium and D.   
[place]: The National Academies Press.2. Kodi MF,   
Phillip NC, Archana SAMS, et al. Evaluation, treatment,   
and prevention of vitamin D deficiency: an Endocrine Society   
clinical practice guideline. JCEM. 2011; 96(7):1911-30.   
   
                                                            PATIENT WAS FASTINGP  
ERFORMED BY:  [facility]â„¢ NetworkMarlton Rehabilitation HospitalAhlujssgmt1724 [facility] [id number] BY:  [facility]â„¢ NetworkCarrier Clinic6370 [facility] [id number]   
   
                                                    CBC with auto diff (81150)Or  
dered By:  on 2019   
   
                                                    Basophils (Bld)   
[#/Vol]         0.0 {x10E3/uL}  Normal          0.0-0.2         Comprehensive   
Internal   
Medicine  
Work Phone:   
[phone]  
   
                                        Comment on above:   PATIENT WAS FASTINGP  
ERFORMED BY:  [address] [id number] BY:  [facility]   
Iopckc7924 [address] RoadDublin [place] [id number]   
   
                                                    Basophils (Bld)   
[#/Vol]         0.0 10*3/uL     Normal          0.0-0.2         Comprehensive   
Internal   
Medicine;   
Comprehensive   
Internal   
Medicine  
Work Phone:   
[phone]  
   
                                        Comment on above:   PATIENT WAS FASTINGP  
ERFORMED BY:  [address] [id number] BY:  [facility]   
Fddnln1795 [address] RoadDuin [place] [id number]   
   
                                                    Basophils/100 WBC   
(Bld)           1 %             Normal                          Comprehensive   
Internal   
Medicine  
Work Phone:   
[phone]  
   
                                        Comment on above:   PATIENT WAS FASTINGP  
ERFORMED BY: [address] [id number] BY:  [facility]   
Qlnwas[address] [facility] [id number]   
   
                                                    Eosinophils (Bld)   
[#/Vol]         0.2 {x10E3/uL}  Normal          0.0-0.4         Comprehensive   
Internal   
Medicine  
Work Phone:   
[phone]  
   
                                        Comment on above:   PATIENT WAS FASTINGP  
ERFORMED BY: [address] [id number] BY:  [facility]   
Ihrrdv[address] [facility] [id number]   
   
                                                    Eosinophils (Bld)   
[#/Vol]         0.2 10*3/uL     Normal          0.0-0.4         Comprehensive   
Internal   
Medicine;   
Comprehensive   
Internal   
Medicine  
Work Phone:   
[phone]  
   
                                        Comment on above:   PATIENT WAS FASTINGP  
ERFORMED BY: [address] [id number] BY:  [facility]   
Wnihba[address] [facility]in [place] [id number]   
   
                                                    Eosinophils/100 WBC   
(Bld)           2 %             Normal                          Comprehensive   
Internal   
Medicine  
Work Phone:   
[phone]  
   
                                        Comment on above:   PATIENT WAS FASTINGP  
ERFORMED BY: [address] [id number] BY: [facility] [facility] [id number]   
   
                                                    Erythrocyte   
distribution width   
(RBC) [Ratio]   13.4 %          Normal          12.3-15.4       Comprehensive   
Internal   
Medicine  
Work Phone:   
[phone]  
   
                                        Comment on above:   PATIENT WAS FASTINGP  
ERFORMED BY: [address] [id number] BY: [place] [address] [facility] [id number]   
   
                                                    Hematocrit (Bld)   
[Volume fraction] 50.4 %          Normal          37.5-51.0       Comprehensive   
Internal   
Medicine  
Work Phone:   
[phone]  
   
                                        Comment on above:   PATIENT WAS FASTINGP  
ERFORMED BY: [address] [id number] BY: [place] [address] [facility] [id number]   
   
                                                    Hemoglobin (Bld)   
[Mass/Vol]      17.3 g/dL       Normal          13.0-17.7       Comprehensive   
Internal   
Medicine  
Work Phone:   
[phone]  
   
                                        Comment on above:   PATIENT WAS FASTINGP  
ERFORMED BY: [address] [id number] BY: [place] [address] [facility] [id number]   
   
                                                    Immature granulocytes   
(Bld) [#/Vol]   0.0 {x10E3/uL}  Normal          0.0-0.1         Comprehensive   
Internal   
Medicine  
Work Phone:   
[phone]  
   
                                        Comment on above:   PATIENT WAS FASTINGP  
ERFORMED BY: [address] [id number] BY: [place] [address] [facility] [id number]   
   
                                                    Immature granulocytes   
(Bld) [#/Vol]   0.0 10*3/uL     Normal          0.0-0.1         Comprehensive   
Internal   
Medicine;   
Comprehensive   
Internal   
Medicine  
Work Phone:   
[phone]  
   
                                        Comment on above:   PATIENT WAS FASTINGP  
ERFORMED BY: [address] [id number] BY:  [facility]   
Bbmzun2224 [facility] Hospitalin [place] [id number]   
   
                                                    Immature   
granulocytes/100 WBC   
(Bld)           0 %             Normal                          Comprehensive   
Internal   
Medicine  
Work Phone:   
[phone]  
   
                                        Comment on above:   PATIENT WAS FASTINGP  
ERFORMED BY: [address] [id number] BY:  [facility]   
Gcijmj[address] [facility] [id number]   
   
                                                    Lymphocytes (Bld)   
[#/Vol]         3.2 {x10E3/uL}  Abnormal        0.7-3.1         Comprehensive   
Internal   
Medicine  
Work Phone:   
[phone]  
   
                                        Comment on above:   PATIENT WAS FASTINGP  
ERFORMED BY: [address] [id number] BY:  [facility] [id number]   
   
                                                    Lymphocytes (Bld)   
[#/Vol]         3.2 10*3/uL     Abnormal        0.7-3.1         Comprehensive   
Internal   
Medicine;   
Comprehensive   
Internal   
Medicine  
Work Phone:   
[phone]  
   
                                        Comment on above:   PATIENT WAS FASTINGP  
ERFORMED BY: [address] [id number] BY:  [facility] [id number]   
   
                                                    Lymphocytes/100 WBC   
(Bld)           40 %            Normal                          Comprehensive   
Internal   
Medicine  
Work Phone:   
[phone]  
   
                                        Comment on above:   PATIENT WAS FASTINGP  
ERFORMED BY: [address] [id number] BY:  [facility] [id number]   
   
                                                    MCH (RBC) [Entitic   
mass]           32.1 pg         Normal          26.6-33.0       Comprehensive   
Internal   
Medicine  
Work Phone:   
[phone]  
   
                                        Comment on above:   PATIENT WAS FASTINGP  
ERFORMED BY: [address] [id number] BY: AMOR [facility]   
Yqecux7351 [address] RoadDublin [place] [id number]   
   
                      MCHC (RBC) [Mass/Vol] 34.3 g/dL  Normal     31.5-35.7  Com  
prehensive   
Internal   
Medicine  
Work Phone:   
[phone]  
   
                                        Comment on above:   PATIENT WAS FASTINGP  
ERFORMED BY:  [address] [id number] BY:  [facility]   
Mzrjzy4864 [address] RoadDublin [place] [id number]   
   
                                                    MCV (RBC) [Entitic   
vol]            94 fL           Normal          79-97           Comprehensive   
Internal   
Medicine  
Work Phone:   
[phone]  
   
                                        Comment on above:   PATIENT WAS FASTINGP  
ERFORMED BY:  [address] [id number] BY: AMOR [facility]   
Kqgzne1108 [address] RoadDuUNC Health [id number]   
   
                                                    Monocytes (Bld)   
[#/Vol]         0.8 {x10E3/uL}  Normal          0.1-0.9         Comprehensive   
Internal   
Medicine  
Work Phone:   
[phone]  
   
                                        Comment on above:   PATIENT WAS FASTINGP  
ERFORMED BY:  [address] [id number] BY:  [facility]   
Rzpuyx[address] [facility] [id number]   
   
                                                    Monocytes (Bld)   
[#/Vol]         0.8 10*3/uL     Normal          0.1-0.9         Comprehensive   
Internal   
Medicine;   
Comprehensive   
Internal   
Medicine  
Work Phone:   
[phone]  
   
                                        Comment on above:   PATIENT WAS FASTINGP  
ERFORMED BY:  LabCo[address] [id number] BY:  [facility]   
Tcgsny9896 [address] RoadDublin [place] [id number]   
   
                                                    Monocytes/100 WBC   
(Bld)           10 %            Normal                          Comprehensive   
Internal   
Medicine  
Work Phone:   
[phone]  
   
                                        Comment on above:   PATIENT WAS FASTINGP  
ERFORMED BY:  [address] [id number] BY:  [facility]   
Yfjgdn8234 [address] RoadDuin [place] [id number]   
   
                                                    Neutrophils (Bld)   
[#/Vol]         4.0 {x10E3/uL}  Normal          1.4-7.0         Comprehensive   
Internal   
Medicine  
Work Phone:   
[phone]  
   
                                        Comment on above:   PATIENT WAS FASTINGP  
ERFORMED BY:  [facility] [id number] BY: AMOR [facility]   
Ngucfr8291 [address] RoadDublin [place] [id number]   
   
                                                    Neutrophils (Bld)   
[#/Vol]         4.0 10*3/uL     Normal          1.4-7.0         Comprehensive   
Internal   
Medicine;   
Comprehensive   
Internal   
Medicine  
Work Phone:   
[phone]  
   
                                        Comment on above:   PATIENT WAS FASTINGP  
ERFORMED BY:  [address] [id number] BY: AMOR [facility]   
Ujkmes0251 [address] RoadDublin [place] [id number]   
   
                                                    Neutrophils/100 WBC   
(Bld)           47 %            Normal                          Comprehensive   
Internal   
Medicine  
Work Phone:   
[phone]  
   
                                        Comment on above:   PATIENT WAS FASTINGP  
ERFORMED BY:  [address] [id number] BY: AMOR [facility]   
Kocjlg3421 [address] RoadDublin [place] [id number]   
   
                                                    Platelets (Bld)   
[#/Vol]         211 {x10E3/uL}  Normal          150-450         Comprehensive   
Internal   
Medicine  
Work Phone:   
[phone]  
   
                                        Comment on above:   PATIENT WAS FASTINGP  
ERFORMED BY:  [facility] [id number] BY: [facility]   
Uibcyg2953 [address] RoadDublin [place] [id number]   
   
                                                    Platelets (Bld)   
[#/Vol]         211 10*3/uL     Normal          150-450         Comprehensive   
Internal   
Medicine;   
Comprehensive   
Internal   
Medicine  
Work Phone:   
[phone]  
   
                                        Comment on above:   PATIENT WAS FASTINGP  
ERFORMED BY:  [facility] [id number] BY: AM[facility]   
Tfnutr6867 [address] RoadDublin [place] [id number]   
   
                      RBC (Bld) [#/Vol] 5.39 {x10E6/uL} Normal     4.14-5.80  [facility]   
Internal   
Medicine  
Work Phone:   
[phone]  
   
                                        Comment on above:   PATIENT WAS FASTINGP  
ERFORMED BY:  [facility] [address] [id number] BY:  [facility]   
Nbylgu[address] [facility] [id number]   
   
                      RBC (Bld) [#/Vol] 5.39 10*6/uL Normal     4.14-5.80  [facility]   
Internal   
Medicine;   
Comprehensive   
Internal   
Medicine  
Work Phone:   
[phone]  
   
                                        Comment on above:   PATIENT WAS FASTINGP  
ERFORMED BY:  [facility] [address] [id number] BY:  [facility]   
Hkbcth0964 [facility] [id number]   
   
                      WBC (Bld) [#/Vol] 8.2 {x10E3/uL} Normal     3.4-10.8   Com  
prehensive   
Internal   
Medicine  
Work Phone:   
[phone]  
   
                                        Comment on above:   PATIENT WAS FASTINGP  
ERFORMED BY:  [facility] [id number] BY:  [facility]CoCarrier Clinic6370 [facility] [id number]   
   
                      WBC (Bld) [#/Vol] 8.2 10*3/uL Normal     3.4-10.8   [facility]   
Internal   
Medicine;   
Comprehensive   
Internal   
Medicine  
Work Phone:   
[phone]  
   
                                        Comment on above:   PATIENT WAS FASTINGP  
ERFORMED BY:  [facility] [id number] BY:  [facility]CoCarrier Clinic6370 [facility] [id number]   
   
                                                    HGB A1C (90016)Ordered By: S  
ystem Manager on 2019   
   
                                                    HbA1c (Bld) [Mass   
fraction]       5.6 %           Normal          4.8-5.6         Comprehensive   
Internal   
Medicine  
Work Phone:   
[phone]  
   
                                        Comment on above:   . Prediabetes: 5.7 -  
 6.4 Diabetes: >6.4 Glycemic control for   
adults with diabetes: <7.0   
   
                                                            PATIENT WAS FASTINGP  
ERFORMED BY:  [address] [id number] BY: AMOR [facility]   
Etaleg3349 [address] RoadDublin [place] [id number]   
   
                                                    METABOLIC PANEL, ALEXAI  
CHRIS (07376)Ordered By:  on 2019   
   
                      Albumin [Mass/Vol] 4.6 g/dL   Normal     3.5-5.5    [facility]   
Internal   
Medicine  
Work Phone:   
[phone]  
   
                                        Comment on above:   PATIENT WAS FASTINGP  
ERFORMED BY:  [address] [id number] BY: AMOR LabCoMary Ville 6505570 [address] RoadDublin [place] [id number]   
   
                                                    Albumin/Globulin   
[Mass ratio]    2.1 {ratio}     Normal          1.2-2.2         Comprehensive   
Internal   
Medicine  
Work Phone:   
[phone]  
   
                                        Comment on above:   PATIENT WAS FASTINGP  
ERFORMED BY:  Wisr[address] [id number] BY: AMOR [facility]   
Fjshqq2550 [address] RoadDuke Healthin [place] [id number]   
   
                                                    ALP [Catalytic   
activity/Vol]   55 [iU]/L       Normal                    Comprehensive   
Internal   
Medicine  
Work Phone:   
[phone]  
   
                                        Comment on above:   PATIENT WAS FASTINGP  
ERFORMED BY:  [address] [id number] BY:  [facility]   
Dgmrlb8137 [address] RoadDublin [place] [id number]   
   
                                                    ALP [Catalytic   
activity/Vol]   55 U/L          Normal                    Comprehensive   
Internal   
Medicine;   
Comprehensive   
Internal   
Medicine  
Work Phone:   
[phone]  
   
                                        Comment on above:   PATIENT WAS FASTINGP  
ERFORMED BY:  [facility] [id number] BY:  [facility]   
Zrejrg4296 [address] RoadDublin [place] [id number]   
   
                                                    ALT [Catalytic   
activity/Vol]   18 [iU]/L       Normal          0-44            Comprehensive   
Internal   
Medicine  
Work Phone:   
[phone]  
   
                                        Comment on above:   PATIENT WAS FASTINGP  
ERFORMED BY:  [address] [id number] BY: [facility]   
Dnspyo1531 [address] RoadDublin [place] [id number]   
   
                                                    ALT [Catalytic   
activity/Vol]   18 U/L          Normal          0-44            Comprehensive   
Internal   
Medicine;   
Comprehensive   
Internal   
Medicine  
Work Phone:   
[phone]  
   
                                        Comment on above:   PATIENT WAS FASTINGP  
ERFORMED BY:  [facility] [address] [id number] BY: AMOR [facility]   
Ekcnqt0413 [address] RoadDublin [place] [id number]   
   
                                                    AST [Catalytic   
activity/Vol]   19 [iU]/L       Normal          0-40            Comprehensive   
Internal   
Medicine  
Work Phone:   
[phone]  
   
                                        Comment on above:   PATIENT WAS FASTINGP  
ERFORMED BY:  [address] [id number] BY: [facility]   
Qgewsy4941 [address] RoadDublin [place] [id number]   
   
                                                    AST [Catalytic   
activity/Vol]   19 U/L          Normal          0-40            Comprehensive   
Internal   
Medicine;   
Comprehensive   
Internal   
Medicine  
Work Phone:   
[phone]  
   
                                        Comment on above:   PATIENT WAS FASTINGP  
ERFORMED BY:  [address] [id number] BY: AMOR [facility]   
Plsqqi3890 [address] RoadDublin [place] [id number]   
   
                      Bilirubin [Mass/Vol] 0.7 mg/dL  Normal     0.0-1.2    Comp  
rehensive   
Internal   
Medicine  
Work Phone:   
[phone]  
   
                                        Comment on above:   PATIENT WAS FASTINGP  
ERFORMED BY: [address] [id number] BY:  [facility]   
Rtuewb4585 [address] RoadDublin [place] [id number]   
   
                      Calcium [Mass/Vol] 10.1 mg/dL Normal     8.7-10.2   [facility]   
Internal   
Medicine  
Work Phone:   
[phone]  
   
                                        Comment on above:   PATIENT WAS FASTINGP  
ERFORMED BY:  LabCoHannah Ville 148297   
[facility] [id number] BY: CB [facility]   
Mvvjne6710 [address] RoadDublin [place] [id number]   
   
                      Chloride [Moles/Vol] 102 mmol/L Normal          Comp  
rehensive   
Internal   
Medicine  
Work Phone:   
[phone]  
   
                                        Comment on above:   PATIENT WAS FASTINGP  
ERFORMED BY:  [facility] [address] [id number] BY: CB [facility]   
Dodehr6371 [address] RoadDublin [place] [id number]   
   
                      CO2 [Moles/Vol] 24 mmol/L  Normal     20-29      [facility]   
Internal   
Medicine  
Work Phone:   
[phone]  
   
                                        Comment on above:   PATIENT WAS FASTINGP  
ERFORMED BY:  [facility] [id number] BY:  [facility]   
Hiwboy9563 [address] RoadDuin [place] [id number]   
   
                      Creatinine [Mass/Vol] 0.97 mg/dL Normal     0.76-1.27  Com  
prehensive   
Internal   
Medicine  
Work Phone:   
[phone]  
   
                                        Comment on above:   PATIENT WAS FASTINGP  
ERFORMED BY:  [facility] [id number] BY:  [facility]   
Ubhpii0859 [address] RoadDuke Healthin [place] [id number]   
   
                                                    GFR/1.73 sq M   
predicted among   
blacks CKD-EPI   
(S/P/Bld) [Vol   
rate/Area]      100 mL/min/1.73 Normal                          Comprehensive   
Internal   
Medicine  
Work Phone:   
[phone]  
   
                                        Comment on above:   PATIENT WAS FASTINGP  
ERFORMED BY:  [facility] [id number] BY:  [facility]   
Mzpypg4096 [address] RoadDuin [place] [id number]   
   
                                                    GFR/1.73 sq M   
predicted among   
non-blacks CKD-EPI   
(S/P/Bld) [Vol   
rate/Area]      87 mL/min/1.73  Normal                          Comprehensive   
Internal   
Medicine  
Work Phone:   
[phone]  
   
                                        Comment on above:   PATIENT WAS FASTINGP  
ERFORMED BY:  [facility] [id number] BY: AMOR [facility]   
Zxcqjq5081 [address] RoadDublin [place] [id number]   
   
                                                    Globulin (S)   
[Mass/Vol]      2.2 g/dL        Normal          1.5-4.5         Comprehensive   
Internal   
Medicine  
Work Phone:   
[phone]  
   
                                        Comment on above:   PATIENT WAS FASTINGP  
ERFORMED BY:  [facility] [id number] BY: AMOR [facility]   
Mztqdq0101 [address] RoadDublin [place] [id number]   
   
                      Glucose [Mass/Vol] 111 mg/dL  Abnormal   65-99      [facility]   
Internal   
Medicine  
Work Phone:   
[phone]  
   
                                        Comment on above:   PATIENT WAS FASTINGP  
ERFORMED BY:  Lab[address] [id number] BY: AMOR LabCoCarrier Clinic6370 [address]Duin [place] [id number]   
   
                      Potassium [Moles/Vol] 4.8 mmol/L Normal     3.5-5.2    Com  
prehensive   
Internal   
Medicine  
Work Phone:   
[phone]  
   
                                        Comment on above:   PATIENT WAS FASTINGP  
ERFORMED BY:  Lab[address] [id number] BY: AMOR LabCoMountain View Regional Medical CenterYraipj2797 [address] RoadDublin [place] [id number]   
   
                      Protein [Mass/Vol] 6.8 g/dL   Normal     6.0-8.5    [facility]   
Internal   
Medicine  
Work Phone:   
[phone]  
   
                                        Comment on above:   PATIENT WAS FASTINGP  
ERFORMED BY:  Lab[address] [id number] BY:  [facility]   
Hrbfbn0281 [address] RoadDublin [place] [id number]   
   
                      Sodium [Moles/Vol] 142 mmol/L Normal     134-144    [facility]   
Internal   
Medicine  
Work Phone:   
[phone]  
   
                                        Comment on above:   PATIENT WAS FASTINGP  
ERFORMED BY:  Lab[address] [id number] BY: AMOR [facility]   
Tcgeqi8221 [address] RoadDuin [place] [id number]   
   
                                                    Urea nitrogen   
[Mass/Vol]      10 mg/dL        Normal          6-24            Comprehensive   
Internal   
Medicine  
Work Phone:   
[phone]  
   
                                        Comment on above:   PATIENT WAS FASTINGP  
ERFORMED BY:  [employer] Network[address] [id number] BY:  [facility]   
Edojee0074 [address] Roadblin [place] [id number]   
   
                                                    Urea   
nitrogen/Creatinine   
[Mass ratio]    10 mg/mg        Normal          9-20            Comprehensive   
Internal   
Medicine  
Work Phone:   
[phone]  
   
                                        Comment on above:   PATIENT WAS FASTINGP  
ERFORMED BY:  [employer] Network[address] [id number] BY: AMOR [facility]   
Exdyts[address] [facility] [id number]   
   
                                                    MICROALBUMINOrdered By: Syst  
em Manager on 2019   
   
                                                    Albumin DL <= 20 mg/L   
(U) [Mass/Vol]  3.6 ug/mL       Normal                          Comprehensive   
Internal   
Medicine  
Work Phone:   
[phone]  
   
                                        Comment on above:   PATIENT WAS FASTINGP  
ERFORMED BY:  [employer] [id number] BY:  [facility]â„¢ NetworkCarrier Clinic[address] [facility] [id number]   
   
                                                    Albumin/Creatinine   
(U) [Mass ratio] 3.0 {mg/g_creat} Normal          0.0-30.0        Comprehensive   
Internal   
Medicine  
Work Phone:   
[phone]  
   
                                        Comment on above:   Normal: 0.0 - 30.0 A  
lbuminuria: 31.0 - 300.0 Clinical   
albuminuria: >300.0   
   
                                                            PATIENT WAS FASTINGP  
ERFORMED BY:  [employer] [id number] BY:  [facility]„¢ NetworkCarrier Clinic6[facility] [id number]   
   
                                                    Creatinine (U)   
[Mass/Vol]      121.1 mg/dL     Normal                          Comprehensive   
Internal   
Medicine  
Work Phone:   
[phone]  
   
                                        Comment on above:   PATIENT WAS FASTINGP  
ERFORMED BY: American Giantton1447   
[facility] [id number] BY:  [facility]â„¢ NetworkCarrier Clinic6370 [facility] [id number]   
   
                                                    NMR Profile (25253)Ordered B  
y:  on 2019   
   
                                                    Cholesterol   
[Mass/Vol]      172 mg/dL       Normal          100-199         Comprehensive   
Internal   
Medicine  
Work Phone:   
[phone]  
   
                                        Comment on above:   PATIENT WAS FASTINGP  
ERFORMED BY: MEETiiN [address] [id number] BY:  [facility] [facility]â[place]¢ NetworkMary [place] [address]5570 [facility] [id number]   
Information: [id number], [id number]   
   
                                                    Lipoprotein.alpha   
[Moles/Vol]     32.3 umol/L     Normal                          Comprehensive   
Internal   
Medicine  
Work Phone:   
[phone]  
   
                                        Comment on above:   PATIENT WAS FASTINGP  
ERFORMED BY: [employer] Giant[address] [id number] BY:  [employer]   
Xdvhvc3600 [facility] [id number]   
Information: E85752, 731955   
   
                                                    Lipoprotein.beta.subp  
article [Entitic   
length]         20.3 nm         Abnormal                        Comprehensive   
Internal   
Medicine  
Work Phone:   
[phone]  
   
                                        Comment on above:   --------------------  
-------------------------------------- **   
INTERPRETATIVE INFORMATION** PARTICLE CONCENTRATION AND SIZE   
<--Lower CVD Risk Higher CVD Risk--> LDL AND HDL PARTICLES   
Percentile in Reference Population HDL-P (total) High 75th 50th   
25th Low >34.9 34.9 30.5 26.7 <26.7 . Small LDL-P Low 25th 50th   
75th High <[phone] >839 . LDL Size <-Large (Pattern A)->   
&lt;-Small (Pattern B)-> 23.0 20.6 20.5 19.0   
----------------------------------------------------------Small   
LDL-P and LDL Size are associated with CVD risk, but not   
afterLDL-P is taken into account. .These assays were developed   
and their performance characteristicsdetermined by [employer].   
These assays have not been cleared by [place] Food and Drug   
Administration. The clinical utility of theselaboratory values   
have not been fully established.   
   
                                                            PATIENT WAS FASTINGP  
ERFORMED BY:  [facility] [id number] BY:  [facility]   
Htuqxx7306 [facility] [id number]   
Information: [id number], 043194   
   
                                                    Lipoprotein.beta.subp  
article [Moles/Vol] 1215 nmol/L     Abnormal                        Comprehensiv  
e   
Internal   
Medicine  
Work Phone:   
[phone]  
   
                                        Comment on above:   Low < 1000 Moderate   
1000 - 1299 Borderline-High 1300 - 1599   
High   
1600 - 2000 Very High > 2000   
   
                                                            PATIENT WAS FASTINGP  
ERFORMED BY:  [employer] [id number] BY:  [facility]   
Sczmsl7451 [facility] [id number]   
Information: [id number], 079456   
   
                                                    Lipoprotein.beta.subp  
article.small   
[Moles/Vol]     671 nmol/L      Abnormal                        Comprehensive   
Internal   
Medicine  
Work Phone:   
[phone]  
   
                                        Comment on above:   PATIENT WAS FASTINGP  
ERFORMED BY:  [employer] [id number] BY:  [facility]   
Poajsa9262 [facility] [id number]   
Information: I26982, 575400   
   
                                                    Triglyceride   
[Mass/Vol]      192 mg/dL       Abnormal        0-149           Comprehensive   
Internal   
Medicine  
Work Phone:   
[phone]  
   
                                        Comment on above:   PATIENT WAS FASTINGP  
ERFORMED BY:  [employer] [id number] BY:  [facility]   
Qoeolw6741 [facility] [id number]   
Information: J68430, 088862   
   
                      NMR Profile (98237) 43 mg/dL   Normal                [facility]   
Internal   
Medicine  
Work Phone:   
[phone]  
   
                                        Comment on above:   PATIENT WAS FASTINGP  
ERFORMED BY:  [facility]â„¢ NetworkHannah [place] 148297   
[facility] [id number] BY: [facility] [id number]   
Information: E77352, 197422   
   
                      NMR Profile (10387) 91 mg/dL   Normal     0-99       Compr  
[facility]   
Internal   
Medicine  
Work Phone:   
[phone]  
   
                                        Comment on above:   . Optimal < 100 Abov  
e optimal 100 - 129 Borderline 130 - 159   
High 160 - 189 Very high > 189 .LDL-C is inaccurate if patient   
is non-fasting.   
   
                                                            PATIENT WAS FASTINGP  
ERFORMED BY:  [employer] [id number] BY: [facility]6370 [facility] [id number]   
Information: Y00118, 924044   
   
                      NMR Profile (90200) 192 mg/dL  Abnormal   0-149      Compr  
ensive   
Internal   
Medicine;   
Comprehensive   
Internal   
Medicine  
Work Phone:   
[phone]  
   
                      NMR Profile (86131) 172 mg/dL  Normal     100-199    [facility]   
Internal   
Medicine;   
Comprehensive   
Internal   
Medicine  
Work Phone:   
[phone]  
   
                                                    PSA (PROSTATE SPECIFIC ANTIG  
EN) (V76.44)Ordered By:  on 2019  
   
   
                                                    Prostate specific Ag   
[Mass/Vol]      0.9 ng/mL       Normal          0.0-4.0         Comprehensive   
Internal   
Medicine  
Work Phone:   
[phone]  
   
                                        Comment on above:   Roche ECLIA methodol  
ogy. .According to the American Urological  
   
Association, Serum PSA shoulddecrease and remain at undetectable   
levels after radicalprostatectomy. The AUA defines biochemical   
recurrence as an initialPSA value 0.2 ng/mL or greater followed   
by a subsequent confirmatoryPSA value 0.2 ng/mL or   
greater.Values obtained with different assay methods or kits   
cannot be usedinterchangeably. Results cannot be interpreted as   
absolute evidenceof the presence or absence of malignant   
disease.   
   
                                                            PATIENT WAS FASTINGP  
ERFORMED BY:  [facility]â„¢ NetworkHannah [place] 148297 [facility] [id number] BY: [employer]   
Nntzdf[address] [facility] [place] [id number]   
   
                                                    TSH (THYROID STIMULATING HOR  
ALICIA) (17997)Ordered By:  on   
2019   
   
                          TSH Qn       2.550 {uIU/mL} Normal       0.450-4.50  
0                                       Comprehensive   
Internal   
Medicine  
Work Phone:   
[phone]  
   
                                        Comment on above:   PATIENT WAS FASTINGP  
ERFORMED BY:  [employer]â„¢ NetworkH[place] 148297   
[facility] [id number] BY: MINDBODY[address] [facility] [id number]   
   
                                                    CALCIUM SERUM (74135)Ordered  
 By:  on 2019   
   
                      Calcium [Mass/Vol] 10.2 mg/dL Normal     8.7-10.2   [facility]   
Internal   
Medicine  
Work Phone:   
[phone]  
   
                                        Comment on above:   PATIENT NOT FASTINGP  
ERFORMED BY: EnGeneICCarrier ClinicKqskff3509 [facility] [id number]   
   
                                                    HGB A1C (17699)Ordered By: S  
ystem Manager on 2019   
   
                                                    HbA1c (Bld) [Mass   
fraction]       5.9 %           Abnormal        4.8-5.6         Comprehensive   
Internal   
Medicine  
Work Phone:   
[phone]  
   
                                        Comment on above:   . Prediabetes: 5.7 -  
 6.4 Diabetes: >6.4 Glycemic control for   
adults with diabetes: <7.0   
   
                                                            PATIENT NOT FASTINGP  
ERFORMED BY: [employer] [id number] [facility] [id number]   
   
                                                    CALCIFIDIOL (25087) VIT D 25  
Ordered By:  on 2018   
   
                                                    25-Hydroxyvitamin   
D2+25-Hydroxyvitamin   
D3 mass conc    91.5 ng/mL      Normal          30.0-100.0      Comprehensive   
Internal   
Medicine  
Work Phone:   
[phone]  
   
                                        Comment on above:   Vitamin D deficiency  
 has been defined by the [place]   
ofMedicine and an Endocrine Society practice guideline as alevel   
of serum 25-OH vitamin D less than 20 ng/mL (1,2).The Endocrine   
Society went on to further define vitamin Dinsufficiency as a   
level between 21 and 29 ng/mL (2).1. IOM ([place] of   
Medicine). 2010. Dietary reference intakes for calcium and D.   
[place]: The National Academies Press.2. Kodi MF,   
Phillip WALKER, Archana SAMS, et al. Evaluation, treatment,   
and prevention of vitamin D deficiency: an Endocrine Society   
clinical practice guideline. JCEM. 2011; 96(7):1911-30.   
   
                                                            PATIENT WAS FASTINGP  
ERFORMED BY: [employer] [id number] BY:  [facility]   
Fqlebt[address] BiiCodeUNC Health [id number]   
   
                                                    CBC with auto diff (74453)Or  
dered By:  on 2018   
   
                                                    Basophils (Bld)   
[#/Vol]         0.1 {x10E3/uL}  Normal          0.0-0.2         Comprehensive   
Internal   
Medicine  
Work Phone:   
[phone]  
   
                                        Comment on above:   PATIENT WAS FASTINGP  
ERFORMED BY: [employer] [id number] BY:  [employer] [facility]aptNov[facility] [id number]   
   
                                                    Basophils (Bld)   
[#/Vol]         0.1 10*3/uL     Normal          0.0-0.2         Comprehensive   
Internal   
Medicine;   
Comprehensive   
Internal   
Medicine  
Work Phone:   
[phone]  
   
                                        Comment on above:   PATIENT WAS FASTINGP  
ERFORMED BY: [employer] [id number] BY:  [employer]   
Jxvarr[address] BiiCodeUNC Health [id number]   
   
                                                    Basophils Auto #/vol   
(Bld)           0.1 {x10E3/uL}  Normal          0.0-0.2         Comprehensive   
Internal   
Medicine  
Work Phone:   
[phone]  
   
                                                    Basophils/100 WBC   
(Bld)           1 %             Normal                          Comprehensive   
Internal   
Medicine  
Work Phone:   
[phone]  
   
                                        Comment on above:   PATIENT WAS FASTINGP  
ERFORMED BY:  Onfan[address] [id number] BY:  [employer]â„¢ NetworkMary [place] 6505570 [facility] [id number]   
   
                                                    Basophils/100 WBC   
Auto (Bld)      1 %             Normal                          Comprehensive   
Internal   
Medicine  
Work Phone:   
[phone]  
   
                                                    Eosinophils (Bld)   
[#/Vol]         0.1 {x10E3/uL}  Normal          0.0-0.4         Comprehensive   
Internal   
Medicine  
Work Phone:   
[phone]  
   
                                        Comment on above:   PATIENT WAS FASTINGP  
ERFORMED BY: [address] [id number] BY: [place] [address]60270 [facility] [id number]   
   
                                                    Eosinophils (Bld)   
[#/Vol]         0.1 10*3/uL     Normal          0.0-0.4         Comprehensive   
Internal   
Medicine;   
Comprehensive   
Internal   
Medicine  
Work Phone:   
[phone]  
   
                                        Comment on above:   PATIENT WAS FASTINGP  
ERFORMED BY: [address] [id number] BY: [place] 7360270 [facility] [id number]   
   
                                                    Eosinophils Auto   
#/vol (Bld)     0.1 {x10E3/uL}  Normal          0.0-0.4         Comprehensive   
Internal   
Medicine  
Work Phone:   
[phone]  
   
                                                    Eosinophils/100 WBC   
(Bld)           1 %             Normal                          Comprehensive   
Internal   
Medicine  
Work Phone:   
[phone]  
   
                                        Comment on above:   PATIENT WAS FASTINGP  
ERFORMED BY: [address] [id number] BY: [place] [address]60270 [facility] [id number]   
   
                                                    Eosinophils/100 WBC   
Auto (Bld)      1 %             Normal                          Comprehensive   
Internal   
Medicine  
Work Phone:   
[phone]  
   
                                                    Erythrocyte   
distribution width   
(RBC) [Ratio]   12.7 %          Normal          12.3-15.4       Comprehensive   
Internal   
Medicine  
Work Phone:   
[phone]  
   
                                        Comment on above:   PATIENT WAS FASTINGP  
ERFORMED BY: [address] [id number] BY: [place] 7360270 [facility] [id number]   
   
                                                    Erythrocyte   
distribution width   
Auto Ratio (RBC) 12.7 %          Normal          12.3-15.4       Comprehensive   
Internal   
Medicine  
Work Phone:   
[phone]  
   
                                                    Hematocrit (Bld)   
[Volume fraction] 49.8 %          Normal          37.5-51.0       Comprehensive   
Internal   
Medicine  
Work Phone:   
[phone]  
   
                                        Comment on above:   PATIENT WAS FASTINGP  
ERFORMED BY:  [employer] [id number] BY:  [employer]â„¢ NetworkMary [place] 6505570 [facility] [id number]   
   
                                                    Hematocrit Auto   
Volume Fraction (Bld) 49.8 %          Normal          37.5-51.0       [facility]   
Internal   
Medicine  
Work Phone:   
[phone]  
   
                                                    Hemoglobin mass conc   
(Bld)           17.5 g/dL       Normal          13.0-17.7       Comprehensive   
Internal   
Medicine  
Work Phone:   
[phone]  
   
                                        Comment on above:   PATIENT WAS FASTINGP  
ERFORMED BY:  [employer] [id number] BY:  [employer]â„¢ NetworkMary [place] 6505570 [facility] [id number]   
   
                                                    Immature granulocytes   
#/vol (Bld)     0.0 {x10E3/uL}  Normal          0.0-0.1         Comprehensive   
Internal   
Medicine  
Work Phone:   
[phone]  
   
                                        Comment on above:   PATIENT WAS FASTINGP  
ERFORMED BY:  [employer] [id number] BY:  [facility]â„¢ NetworkCarrier Clinic6370 [facility] [id number]   
   
                                                    Immature granulocytes   
(Bld) [#/Vol]   0.0 10*3/uL     Normal          0.0-0.1         Comprehensive   
Internal   
Medicine;   
Comprehensive   
Internal   
Medicine  
Work Phone:   
[phone]  
   
                                        Comment on above:   PATIENT WAS FASTINGP  
ERFORMED BY:  [employer] [id number] BY:  [employer]â„¢ NetworkMary [place] 6505570 [facility] [id number]   
   
                                                    Immature   
granulocytes/100 WBC   
(Bld)           0 %             Normal                          Comprehensive   
Internal   
Medicine  
Work Phone:   
[phone]  
   
                                        Comment on above:   PATIENT WAS FASTINGP  
ERFORMED BY:  Lab[address] [id number] BY:  LabCoCarrier Clinic[address] [facility] [id number]   
   
                                                    Lymphocytes (Bld)   
[#/Vol]         2.5 {x10E3/uL}  Normal          0.7-3.1         Comprehensive   
Internal   
Medicine  
Work Phone:   
[phone]  
   
                                        Comment on above:   PATIENT WAS FASTINGP  
ERFORMED BY:  Lab[address] [id number] BY:  LabCoMary [place] 6505570 [address] [facility] [id number]   
   
                                                    Lymphocytes (Bld)   
[#/Vol]         2.5 10*3/uL     Normal          0.7-3.1         Comprehensive   
Internal   
Medicine;   
Comprehensive   
Internal   
Medicine  
Work Phone:   
[phone]  
   
                                        Comment on above:   PATIENT WAS FASTINGP  
ERFORMED BY: [address] [id number] BY: [place] 7360270 [facility] [id number]   
   
                                                    Lymphocytes Auto   
#/vol (Bld)     2.5 {x10E3/uL}  Normal          0.7-3.1         Comprehensive   
Internal   
Medicine  
Work Phone:   
[phone]  
   
                                                    Lymphocytes/100 WBC   
(Bld)           27 %            Normal                          Comprehensive   
Internal   
Medicine  
Work Phone:   
[phone]  
   
                                        Comment on above:   PATIENT WAS FASTINGP  
ERFORMED BY: [address] [id number] BY:  LabHar[facility]6370 [facility] [id number]   
   
                                                    Lymphocytes/100 WBC   
Auto (Bld)      27 %            Normal                          Comprehensive   
Internal   
Medicine  
Work Phone:   
[phone]  
   
                                                    MCH (RBC) [Entitic   
mass]           32.6 pg         Normal          26.6-33.0       Comprehensive   
Internal   
Medicine  
Work Phone:   
[phone]  
   
                                        Comment on above:   PATIENT WAS FASTINGP  
ERFORMED BY: [address] [id number] BY:  LabTroy V[place] 3220070 [facility] [id number]   
   
                                                    MCH Auto Entitic mass   
(RBC)           32.6 pg         Normal          26.6-33.0       Comprehensive   
Internal   
Medicine  
Work Phone:   
[phone]  
   
                      MCHC (RBC) [Mass/Vol] 35.1 g/dL  Normal     31.5-35.7  Com  
prehensive   
Internal   
Medicine  
Work Phone:   
[phone]  
   
                                        Comment on above:   PATIENT WAS FASTINGP  
ERFORMED BY:  [employer] [id number] BY:  [facility]â„¢ NetworkCarrier Clinic6370 [facility] [id number]   
   
                                                    MCHC Auto mass conc   
(RBC)           35.1 g/dL       Normal          31.5-35.7       Comprehensive   
Internal   
Medicine  
Work Phone:   
[phone]  
   
                                                    MCV (RBC) [Entitic   
vol]            93 fL           Normal          79-97           Comprehensive   
Internal   
Medicine  
Work Phone:   
[phone]  
   
                                        Comment on above:   PATIENT WAS FASTINGP  
ERFORMED BY: [employer] [id number] BY:  [facility]â„¢ NetworkCarrier Clinic6370 [facility] [id number]   
   
                                                    MCV Auto Entitic   
volume (RBC)    93 fL           Normal          79-97           Comprehensive   
Internal   
Medicine  
Work Phone:   
[phone]  
   
                                                    Monocytes (Bld)   
[#/Vol]         0.5 {x10E3/uL}  Normal          0.1-0.9         Comprehensive   
Internal   
Medicine  
Work Phone:   
[phone]  
   
                                        Comment on above:   PATIENT WAS FASTINGP  
ERFORMED BY:  [employer] [id number] BY:  [employer]„¢ NetworkCarrier Clinic6370 [facility] [id number]   
   
                                                    Monocytes (Bld)   
[#/Vol]         0.5 10*3/uL     Normal          0.1-0.9         Comprehensive   
Internal   
Medicine;   
Comprehensive   
Internal   
Medicine  
Work Phone:   
[phone]  
   
                                        Comment on above:   PATIENT WAS FASTINGP  
ERFORMED BY:  [employer] [id number] BY:  [employer]â„¢ NetworkMary [place] 6505570 [facility] [id number]   
   
                                                    Monocytes Auto #/vol   
(Bld)           0.5 {x10E3/uL}  Normal          0.1-0.9         Comprehensive   
Internal   
Medicine  
Work Phone:   
[phone]  
   
                                                    Monocytes/100 WBC   
(Bld)           5 %             Normal                          Comprehensive   
Internal   
Medicine  
Work Phone:   
[phone]  
   
                                        Comment on above:   PATIENT WAS FASTINGP  
ERFORMED BY:  [employer] [id number] BY: [facility] HospitalCoMary [place] 6505570 [address] [facility] [id number]   
   
                                                    Monocytes/100 WBC   
Auto (Bld)      5 %             Normal                          Comprehensive   
Internal   
Medicine  
Work Phone:   
[phone]  
   
                                                    Neutrophils (Bld)   
[#/Vol]         6.1 {x10E3/uL}  Normal          1.4-7.0         Comprehensive   
Internal   
Medicine  
Work Phone:   
[phone]  
   
                                        Comment on above:   PATIENT WAS FASTINGP  
ERFORMED BY:  [employer] Network[address] [id number] BY:  DIN For[employer]â„¢ NetworkMary Ville [id number] [facility] [id number]   
   
                                                    Neutrophils (Bld)   
[#/Vol]         6.1 10*3/uL     Normal          1.4-7.0         Comprehensive   
Internal   
Medicine;   
Comprehensive   
Internal   
Medicine  
Work Phone:   
[phone]  
   
                                        Comment on above:   PATIENT WAS FASTINGP  
ERFORMED BY:  [employer] [id number] BY:  DIN For[employer]â„¢ NetworkCarrier Clinic6370 [facility] [id number]   
   
                                                    Neutrophils Auto   
#/vol (Bld)     6.1 {x10E3/uL}  Normal          1.4-7.0         Comprehensive   
Internal   
Medicine  
Work Phone:   
[phone]  
   
                                                    Neutrophils/100 WBC   
(Bld)           66 %            Normal                          Comprehensive   
Internal   
Medicine  
Work Phone:   
[phone]  
   
                                        Comment on above:   PATIENT WAS FASTINGP  
ERFORMED BY:  [employer] Network[address] [id number] BY: [facility]   
Dbexmc[address] [facility] [id number]   
   
                                                    Neutrophils/100 WBC   
Auto (Bld)      66 %            Normal                          Comprehensive   
Internal   
Medicine  
Work Phone:   
[phone]  
   
                                                    Platelets (Bld)   
[#/Vol]         244 {x10E3/uL}  Normal          150-379         Comprehensive   
Internal   
Medicine  
Work Phone:   
[phone]  
   
                                        Comment on above:   PATIENT WAS FASTINGP  
ERFORMED BY:  [facility] [id number] BY: AMOR [facility]   
Xkvodj0072 [address] RoadDublin [place] [id number]   
   
                                                    Platelets (Bld)   
[#/Vol]         244 10*3/uL     Normal          150-379         Comprehensive   
Internal   
Medicine;   
Comprehensive   
Internal   
Medicine  
Work Phone:   
[phone]  
   
                                        Comment on above:   PATIENT WAS FASTINGP  
ERFORMED BY:  [facility] [id number] BY: AMOR [facility]   
Yfiocn7870 [address] RoadDuin [place] [id number]   
   
                                                    Platelets Auto #/vol   
(Bld)           244 {x10E3/uL}  Normal          150-379         Comprehensive   
Internal   
Medicine  
Work Phone:   
[phone]  
   
                      RBC (Bld) [#/Vol] 5.37 {x10E6/uL} Normal     4.14-5.80  [facility]   
Internal   
Medicine  
Work Phone:   
[phone]  
   
                                        Comment on above:   PATIENT WAS FASTINGP  
ERFORMED BY:  [facility] [id number] BY: AMOR [facility]   
Eymitz[address] [facility] [place] [id number]   
   
                      RBC (Bld) [#/Vol] 5.37 10*6/uL Normal     4.14-5.80  [facility]   
Internal   
Medicine;   
Comprehensive   
Internal   
Medicine  
Work Phone:   
[phone]  
   
                                        Comment on above:   PATIENT WAS FASTINGP  
ERFORMED BY:  Lab[address] [id number] BY: AMOR [facility]   
Lhbzgm8148 [address] RoadDublin [place] [id number]   
   
                      RBC Auto #/vol (Bld) 5.37 {x10E6/uL} Normal     4.14-5.80   
 Comprehensive   
Internal   
Medicine  
Work Phone:   
[phone]  
   
                      WBC (Bld) [#/Vol] 9.2 {x10E3/uL} Normal     3.4-10.8   Com  
prehensive   
Internal   
Medicine  
Work Phone:   
[phone]  
   
                                        Comment on above:   PATIENT WAS FASTINGP  
ERFORMED BY: [employer] [id number] BY:  [facility]   
Rycnzk5938 [facility] [id number]   
   
                      WBC (Bld) [#/Vol] 9.2 10*3/uL Normal     3.4-10.8   [facility]   
Internal   
Medicine;   
Comprehensive   
Internal   
Medicine  
Work Phone:   
[phone]  
   
                                        Comment on above:   PATIENT WAS FASTINGP  
ERFORMED BY: [employer] [id number] BY: [employer]   
Eomofz8308 [facility] [id number]   
   
                      WBC Auto #/vol (Bld) 9.2 {x10E3/uL} Normal     3.4-10.8     
Comprehensive   
Internal   
Medicine  
Work Phone:   
[phone]  
   
                                                    HGB A1C (68949)Ordered By: S  
ystem Manager on 2018   
   
                                                    Hemoglobin   
A1c/Hemoglobin.total   
mass fraction (Bld) 5.7 %           Abnormal        4.8-5.6         Comprehensiv  
e   
Internal   
Medicine  
Work Phone:   
[phone]  
   
                                        Comment on above:   . Prediabetes: 5.7 -  
 6.4 Diabetes: >6.4 Glycemic control for   
adults with diabetes: <7.0   
   
                                                            do in 4mo; PATIENT W  
AS FASTINGPERFORMED BY: [employer]   
[id number] BY: [employer] Njobvw2703 [facility] [id number]   
   
                                                    LIPOPROTEIN, BLD, BY NMR (83  
704)Ordered By:  on 2018   
   
                                                    Cholesterol in HDL   
mass conc       42 mg/dL        Normal                          Comprehensive   
Internal   
Medicine  
Work Phone:   
[phone]  
   
                                        Comment on above:   PATIENT WAS FASTINGP  
ERFORMED BY: [employer] [id number] BY: [employer]6370 [facility] [id number]   
   
                                                    Cholesterol in LDL   
mass conc       86 mg/dL        Normal          0-99            Comprehensive   
Internal   
Medicine  
Work Phone:   
[phone]  
   
                                        Comment on above:   . Optimal < 100 Abov  
e optimal 100 - 129 Borderline 130 - 159   
High 160 - 189 Very high > 189 .LDL-C is inaccurate if patient   
is non-fasting.   
   
                                                            PATIENT WAS FASTINGP  
ERFORMED BY: MEETiiN [address] [id number] BY: [employer]6370 [facility] [id number]   
   
                      Cholesterol mass conc 155 mg/dL  Normal     100-199    Com  
prehensive   
Internal   
Medicine  
Work Phone:   
[phone]  
   
                                        Comment on above:   PATIENT WAS FASTINGP  
ERFORMED BY: MEETiiN [address] [id number] BY: [id number] [facility] [id number]   
   
                                                    Lipoprotein.alpha   
molar conc      30.6 umol/L     Normal                          Comprehensive   
Internal   
Medicine  
Work Phone:   
[phone]  
   
                                        Comment on above:   PATIENT WAS FASTINGP  
ERFORMED BY: MEETiiN [address] [id number] BY: [employer]6370 [facility] [id number]   
   
                                                    Lipoprotein.beta.subp  
article Entitic   
length          20.5 nm         Abnormal                        Comprehensive   
Internal   
Medicine  
Work Phone:   
[phone]  
   
                                        Comment on above:   --------------------  
-------------------------------------- **   
INTERPRETATIVE INFORMATION** PARTICLE CONCENTRATION AND SIZE   
<--Lower CVD Risk Higher CVD Risk--> LDL AND HDL PARTICLES   
Percentile in Reference Population HDL-P (total) High 75th 50th   
25th Low >34.9 34.9 30.5 26.7 <26.7 . Small LDL-P Low 25th 50th   
75th High <[phone] >839 . LDL Size <-Large (Pattern A)->   
&lt;-Small (Pattern B)-> 23.0 20.6 20.5 19.0   
----------------------------------------------------------Small   
LDL-P and LDL Size are associated with CVD risk, but not   
afterLDL-P is taken into account. .These assays were developed   
and their performance characteristicsdetermined by [employer].   
These assays have not been cleared by [place] Food and Drug   
Administration. The clinical utility of theselaboratory values   
have not been fully established.   
   
                                                            PATIENT WAS FASTINGP  
ERFORMED BY: American Giantton1447 [facility] [id number] BY: Tyba   
Gowpsy2621 InMyShowblin [place] [id number]   
   
                                                    Lipoprotein.beta.subp  
article molar conc 1046 nmol/L     Abnormal                        Comprehensive  
   
Internal   
Medicine  
Work Phone:   
[phone]  
   
                                        Comment on above:   Low < 1000 Moderate   
1000 - 1299 Borderline-High 1300 - 1599   
High   
1600 - 2000 Very High > 2000   
   
                                                            PATIENT WAS FASTINGP  
ERFORMED BY: [employer] [id number] BY: [employer]6370 InMyShowblin [place] [id number]   
   
                                                    Lipoprotein.beta.subp  
article.small molar   
conc            414 nmol/L      Normal                          Comprehensive   
Internal   
Medicine  
Work Phone:   
[phone]  
   
                                        Comment on above:   PATIENT WAS FASTINGP  
ERFORMED BY: [employer] [id number] BY: Tyba   
Jommso[address] BiiCodeblin [place] [id number]   
   
                                                    Triglyceride mass   
conc            134 mg/dL       Normal          0-149           Comprehensive   
Internal   
Medicine  
Work Phone:   
[phone]  
   
                                        Comment on above:   PATIENT WAS FASTINGP  
ERFORMED BY: American Giantton1447   
[facility] [id number] BY: [employer]6[address] RoadDublin [place] [id number]   
   
                                                    METABOLIC PANEL, COMPREHENSI  
VE (94982)Ordered By:  on 2018   
   
                      Albumin mass conc 4.8 g/dL   Normal     3.5-5.5    Compreh  
ensive   
Internal   
Medicine  
Work Phone:   
[phone]  
   
                                        Comment on above:   PATIENT WAS FASTINGP  
ERFORMED BY:  [facility] [address] [id number] BY: CB [facility]   
Mgnycj5996 [address] RoadDublin [place] [id number]   
   
                                                    Albumin/Globulin mass   
ratio           2.4 {ratio}     Abnormal        1.2-2.2         Comprehensive   
Internal   
Medicine  
Work Phone:   
[phone]  
   
                                        Comment on above:   PATIENT WAS FASTINGP  
ERFORMED BY:  [facility] [id number] BY: CB [facility]   
Ssigxo2160 [address] RoadDublin [place] [id number]   
   
                                                    ALP [Catalytic   
activity/Vol]   57 U/L          Normal                    Comprehensive   
Internal   
Medicine;   
Comprehensive   
Internal   
Medicine  
Work Phone:   
[phone]  
   
                                        Comment on above:   PATIENT WAS FASTINGP  
ERFORMED BY:  Lab[address] [id number] BY:  [facility]   
Wkldpk2235 [address] RoadDublin [place] [id number]   
   
                      ALP enzyme act/vol 57 [iU]/L  Normal          Compre  
[facility]   
Internal   
Medicine  
Work Phone:   
[phone]  
   
                                        Comment on above:   PATIENT WAS FASTINGP  
ERFORMED BY:  [facility] [id number] BY:  [facility]   
Ttghij4777 [address] RoadDublin [place] [id number]   
   
                                                    ALT [Catalytic   
activity/Vol]   26 U/L          Normal          0-44            Comprehensive   
Internal   
Medicine;   
Comprehensive   
Internal   
Medicine  
Work Phone:   
[phone]  
   
                                        Comment on above:   PATIENT WAS FASTINGP  
ERFORMED BY:  [facility] [id number] BY: CB [facility]   
Boknpx5584 [address] RoadDublin [place] [id number]   
   
                      ALT enzyme act/vol 26 [iU]/L  Normal     0-44       [facility]   
Internal   
Medicine  
Work Phone:   
[phone]  
   
                                        Comment on above:   PATIENT WAS FASTINGP  
ERFORMED BY:  [facility] [id number] BY:  [facility]   
Qynidx9388 [address] RoadDublin [place] [id number]   
   
                                                    AST [Catalytic   
activity/Vol]   24 U/L          Normal          0-40            Comprehensive   
Internal   
Medicine;   
Comprehensive   
Internal   
Medicine  
Work Phone:   
[phone]  
   
                                        Comment on above:   PATIENT WAS FASTINGP  
ERFORMED BY:  [facility] [id number] BY:  [facility]   
Lcqkdf0034 [address] RoadDublin [place] [id number]   
   
                      AST enzyme act/vol 24 [iU]/L  Normal     0-40       [facility]   
Internal   
Medicine  
Work Phone:   
[phone]  
   
                                        Comment on above:   PATIENT WAS FASTINGP  
ERFORMED BY:  Lab[address] [id number] BY:  [facility]   
Neuudb1606 [address] RoadDublin [place] [id number]   
   
                      Bilirubin mass conc 0.6 mg/dL  Normal     0.0-1.2    [facility]   
Internal   
Medicine  
Work Phone:   
[phone]  
   
                                        Comment on above:   PATIENT WAS FASTINGP  
ERFORMED BY:  [employer] [id number] BY:  [facility]   
Kkieoj5400 [address] RoadDublin [place] [id number]   
   
                      Calcium mass conc 10.5 mg/dL Abnormal   8.7-10.2   [facility]   
Internal   
Medicine  
Work Phone:   
[phone]  
   
                                        Comment on above:   PATIENT WAS FASTINGP  
ERFORMED BY:  Wisr[address] [id number] BY:  [facility]   
Bhqafi8467 [address] RoadDublin [place] [id number]   
   
                      Chloride molar conc 102 mmol/L Normal          Compr  
[facility]   
Internal   
Medicine  
Work Phone:   
[phone]  
   
                                        Comment on above:   PATIENT WAS FASTINGP  
ERFORMED BY:  [facility] [address] [id number] BY: CB [facility]   
Qrreir9508 [address] RoadDublin [place] [id number]   
   
                      CO2 molar conc 21 mmol/L  Normal     20-29      Comprehens  
bebe   
Internal   
Medicine  
Work Phone:   
[phone]  
   
                                        Comment on above:   PATIENT WAS FASTINGP  
ERFORMED BY:  [facility] [address] [id number] BY: CB [facility]   
Jehbxv[address] [facility] [id number]   
   
                      Creatinine mass conc 1.08 mg/dL Normal     0.76-1.27  Comp  
rehensive   
Internal   
Medicine  
Work Phone:   
[phone]  
   
                                        Comment on above:   PATIENT WAS FASTINGP  
ERFORMED BY:  [facility] [address] [id number] BY: AMOR [facility]   
Nyhqco2270 [address] RoadDuke Healthin [place] [id number]   
   
                                                    GFR/1.73 sq M   
predicted among   
blacks CKD-EPI vol   
rate/area (S/P/Bld) 89 mL/min/1.73  Normal                          Comprehensiv  
e   
Internal   
Medicine  
Work Phone:   
[phone]  
   
                                        Comment on above:   PATIENT WAS FASTINGP  
ERFORMED BY:  [facility] [address] [id number] BY:  [facility]   
Wgymry1588 [address] RoadDuke Healthin [place] [id number]   
   
                                                    GFR/1.73 sq M   
predicted among   
non-blacks CKD-EPI   
vol rate/area   
(S/P/Bld)       77 mL/min/1.73  Normal                          Comprehensive   
Internal   
Medicine  
Work Phone:   
[phone]  
   
                                        Comment on above:   PATIENT WAS FASTINGP  
ERFORMED BY:  [facility] [address] [id number] BY: CB [facility]   
Okyhns[address] [facility]in [place] [id number]   
   
                                                    Globulin (S)   
[Mass/Vol]      2.0 g/dL        Normal          1.5-4.5         Comprehensive   
Internal   
Medicine  
Work Phone:   
[phone]  
   
                                        Comment on above:   PATIENT WAS FASTINGP  
ERFORMED BY: KATHE LabCoHannah Ville [id number]   
[facility] [id number] BY: AMOR [facility]   
Wdmfsh[address] [facility]in [place] [id number]   
   
                                                    Globulin Calculated   
mass conc (S)   2.0 g/dL        Normal          1.5-4.5         Comprehensive   
Internal   
Medicine  
Work Phone:   
[phone]  
   
                      Glucose mass conc 112 mg/dL  Abnormal   65-99      Compreh  
ensive   
Internal   
Medicine  
Work Phone:   
[phone]  
   
                                        Comment on above:   PATIENT WAS FASTINGP  
ERFORMED BY:  Lab[address] [id number] BY: AMOR [facility]   
Lcafmz6436 [facility] [id number]   
   
                      Potassium molar conc 4.8 mmol/L Normal     3.5-5.2    Comp  
rehensive   
Internal   
Medicine  
Work Phone:   
[phone]  
   
                                        Comment on above:   PATIENT WAS FASTINGP  
ERFORMED BY:  Lab[address] [id number] BY: AMOR LabCoCarrier Clinic6370 [facility] [id number]   
   
                      Protein mass conc 6.8 g/dL   Normal     6.0-8.5    Compreh  
ensive   
Internal   
Medicine  
Work Phone:   
[phone]  
   
                                        Comment on above:   PATIENT WAS FASTINGP  
ERFORMED BY:  [employer] [id number] BY: AMOR [facility]CoCarrier Clinic[address] [facility] [id number]   
   
                      Sodium molar conc 142 mmol/L Normal     134-144    Compreh  
ensive   
Internal   
Medicine  
Work Phone:   
[phone]  
   
                                        Comment on above:   PATIENT WAS FASTINGP  
ERFORMED BY:  [employer] [id number] BY: AMOR LabCoCarrier Clinic6370 [facility] [id number]   
   
                                                    Urea nitrogen mass   
conc            10 mg/dL        Normal          6-24            Comprehensive   
Internal   
Medicine  
Work Phone:   
[phone]  
   
                                        Comment on above:   PATIENT WAS FASTINGP  
ERFORMED BY:  [facility] [id number] BY: [facility] [id number]   
   
                                                    Urea   
nitrogen/Creatinine   
mass ratio      9 mg/mg         Normal          9-20            Comprehensive   
Internal   
Medicine  
Work Phone:   
[phone]  
   
                                        Comment on above:   PATIENT WAS FASTINGP  
ERFORMED BY:  Wisr[address] [id number] BY:  WisrTroy Ville 3220070 [facility] [id number]   
   
                                                    MICROALBUMINOrdered By: Syst  
em Manager on 2018   
   
                                                    Albumin DL <= 20 mg/L   
mass conc (U)   4.5 ug/mL       Normal                          Comprehensive   
Internal   
Medicine  
Work Phone:   
[phone]  
   
                                        Comment on above:   PATIENT WAS FASTINGP  
ERFORMED BY:  [employer] [id number] BY:  WisrTroy Ville 3220070 [facility] [id number]   
   
                                                    Albumin/Creatinine   
mass ratio (U)  5.8 {mg/g_creat} Normal          0.0-30.0        Comprehensive   
Internal   
Medicine  
Work Phone:   
[phone]  
   
                                        Comment on above:   Normal: 0.0 - 30.0 A  
lbuminuria: 31.0 - 300.0 Clinical   
albuminuria: >300.0   
   
                                                            PATIENT WAS FASTINGP  
ERFORMED BY:  [employer] [id number] BY: [facility] [id number]   
   
                                                    Creatinine mass conc   
(U)             78.0 mg/dL      Normal                          Comprehensive   
Internal   
Medicine  
Work Phone:   
[phone]  
   
                                        Comment on above:   PATIENT WAS FASTINGP  
ERFORMED BY:  [employer] [id number] BY: [facility]6370 [facility] [id number]   
   
                                                    TSH (THYROID STIMULATING HOR  
ALICIA) (02888)Ordered By:  on   
2018   
   
                          Thyrotropin Qn 2.530 {uIU/mL} Normal       0.450-4.50  
0                                       Comprehensive   
Internal   
Medicine  
Work Phone:   
[phone]  
   
                                        Comment on above:   PATIENT WAS FASTINGP  
ERFORMED BY: KATHE DIN For[employer]â„¢ NetworkMarlton Rehabilitation HospitalKjqsyktvmi6397   
[facility] [id number] BY: AMOR DIN [employer]â„¢ NetworkCarrier Clinic6370 [facility] [id number]   
   
                                                    HGB A1C (49938)Ordered By: S  
ystem Manager on 2018   
   
                                                    Hemoglobin   
A1c/Hemoglobin.total   
mass fraction (Bld) 5.7 %           Abnormal        4.8-5.6         Comprehensiv  
e   
Internal   
Medicine  
Work Phone:   
[phone]  
   
                                        Comment on above:   . Prediabetes: 5.7 -  
 6.4 Diabetes: >6.4 Glycemic control for   
adults with diabetes: <7.0   
   
                                                            standing order every  
 4months; PATIENT NOT FASTINGPERFORMED BY:   
AMOR [place] Cdrqbh0467 [facility]   
[id number] Information: [id number]   
   
                                                    CBC W/Diff, AutomatedOrdered  
 By:  on 2018   
   
                                                    Basophils/100 WBC   
Auto (Bld)      0.9 %           Normal          0-1             Comprehensive   
Internal   
Medicine  
Work Phone:   
[phone]  
   
                                                    Eosinophils/100 WBC   
Auto (Bld)      2.7 %           Normal          0-5             Comprehensive   
Internal   
Medicine  
Work Phone:   
[phone]  
   
                                                    Erythrocyte   
distribution width   
Auto Ratio (RBC) 12.1 %          Normal          11.6-14.6       [facility]   
[facility]   
Medicine  
Work Phone:   
[phone]  
   
                                                    Hematocrit Auto   
Volume Fraction (Bld) 47.2 %          Normal          40-54           Comprehens  
be[facility]   
Internal   
Medicine  
Work Phone:   
[phone]  
   
                                                    Hemoglobin mass conc   
(Bld)           16.8 g/dL       Abnormal        13.0-16.5       [facility]   
[facility]   
Medicine  
Work Phone:   
[phone]  
   
                                                    Lymphocytes/100 WBC   
Auto (Bld)      41.2 %          Abnormal        19-41           [facility]   
[facility]   
Medicine  
Work Phone:   
[phone]  
   
                                                    MCH Auto Entitic mass   
(RBC)           32.6 pg         Abnormal        27.0-32.0       [facility]   
[facility]   
Medicine  
Work Phone:   
[phone]  
   
                                                    MCHC Auto mass conc   
(RBC)           35.6 {g/gl}     Normal          32-36           [facility]   
[facility]   
Medicine  
Work Phone:   
[phone]  
   
                                                    MCV Auto Entitic   
volume (RBC)    91.7 fL         Normal          80-94           [facility]   
[facility]   
Medicine  
Work Phone:   
[phone]  
   
                                                    Monocytes/100 WBC   
Auto (Bld)      9.8 %           Normal          0-10            [facility]   
[facility]   
Medicine  
Work Phone:   
[phone]  
   
                                                    Neutrophils/100 WBC   
Auto (Bld)      45.1 %          Abnormal        47-70           [facility]   
[facility]   
Medicine  
Work Phone:   
[phone]  
   
                                                    Platelet mean volume   
Auto Entitic volume   
(Bld)           10.1 fL         Normal          6.2-12.0        [facility]   
[facility]   
Medicine  
Work Phone:   
[phone]  
   
                                                    Platelets Auto #/vol   
(Bld)           205 10*3/uL     Normal          150-450         [facility]   
[facility]   
Medicine  
Work Phone:   
[phone]  
   
                      RBC Auto #/vol (Bld) 5.15 {M/mm3} Normal     4.6-6.2    Co  
[facility]   
Internal   
Medicine  
Work Phone:   
[phone]  
   
                      WBC Auto #/vol (Bld) 6.7 10*3/uL Normal     4.4-11.0   Com  
prehensive   
Internal   
Medicine  
Work Phone:   
[phone]  
   
                      CBC W/Diff, Automated 40.0 fL    Normal     35.1-43.9  Com  
prehensive   
Internal   
Medicine  
Work Phone:   
[phone]  
   
                      CBC W/Diff, Automated 0.300 %    Normal     0.0-0.9    Com  
prehensive   
Internal   
Medicine  
Work Phone:   
[phone]  
   
                                        Comment on above:   IG% - Immature Granu  
locytes (promyelocytes, myelocytes   
andmetamyelocytes) > 1% indicates that a LEFT SHIFT is Present.   
   
                      CBC W/Diff, Automated 2.77 {X10_3/ul} Normal     0.83-4.51  
  [facility]   
[facility]   
Medicine  
Work Phone:   
[phone]  
   
                      CBC W/Diff, Automated 3.0 {X10_3/uL} Normal     2.0-7.7     
 Comprehensive   
Internal   
Medicine  
Work Phone:   
[phone]  
   
                                                    Comprehensive Metabolic Prof  
ilOrdered By:  on 2018   
   
                                                    Comprehensive   
metabolic 2000 panel 3.9 g/dL        Normal          3.2-5.0         Comprehensi  
   
Internal   
Medicine  
Work Phone:   
[phone]  
   
                                                    Comprehensive   
metabolic 2000 panel 3.1 g/dL        Normal          2.2-4.2         Comprehensi  
ve   
Internal   
Medicine  
Work Phone:   
[phone]  
   
                                                    Comprehensive   
metabolic 2000 panel 1.3 {RATIO}     Normal          0.9-2.4         Comprehensi  
ve   
Internal   
Medicine  
Work Phone:   
[phone]  
   
                                                    Comprehensive   
metabolic 2000 panel 8.7 mg/dL       Normal          8.5-10.1        Comprehensi  
ve   
Internal   
Medicine  
Work Phone:   
[phone]  
   
                                                    Comprehensive   
metabolic 2000 panel 18 U/L          Normal          15-37           Comprehensi  
ve   
Internal   
Medicine  
Work Phone:   
[phone]  
   
                                                    Comprehensive   
metabolic 2000 panel 51 U/L          Normal                    Comprehensi  
ve   
Internal   
Medicine  
Work Phone:   
[phone]  
   
                                                    Comprehensive   
metabolic 2000 panel 0.86 mg/dL      Normal          0.70-1.30       Comprehensi  
ve   
Internal   
Medicine  
Work Phone:   
[phone]  
   
                                        Comment on above:   The validity of the   
calculated GFR AND GFRAA in patients   
over70   
years has not been determined. Clinical correlation isessential.   
   
                                                    Comprehensive   
metabolic 2000 panel 125 mg/dL       Abnormal                  Comprehensi  
ve   
Internal   
Medicine  
Work Phone:   
[phone]  
   
                                        Comment on above:   Fasting Glucose resu  
lt from 100 to 125 mg/dLsuggests IMPAIRED   
HOMEOSTASIS per A.D.A. criteria.Please note revised GLUCOSE   
reference range [id number].   
   
                                                    Comprehensive   
metabolic 2000 panel 9 mg/dL         Normal          7-18            Comprehensi  
ve   
Internal   
Medicine  
Work Phone:   
[phone]  
   
                                                    Comprehensive   
metabolic 2000 panel 31 U/L          Normal          16-61           Comprehensi  
ve   
Internal   
Medicine  
Work Phone:   
[phone]  
   
                                                    Comprehensive   
metabolic 2000 panel 10.5 {RATIO}    Normal          10-20           Comprehensi  
ve   
Internal   
Medicine  
Work Phone:   
[phone]  
   
                                                    Comprehensive   
metabolic 2000 panel 109 mmol/L      Abnormal                  Comprehensi  
ve   
Internal   
Medicine  
Work Phone:   
[phone]  
   
                                                    Comprehensive   
metabolic 2000 panel 24.0 mmol/L     Normal          21.0-32.0       Comprehensi  
ve   
Internal   
Medicine  
Work Phone:   
[phone]  
   
                                                    Comprehensive   
metabolic 2000 panel 8 1             Normal          5-15            Comprehensi  
ve   
Internal   
Medicine  
Work Phone:   
[phone]  
   
                                                    Comprehensive   
metabolic 2000 panel 0.50 mg/dL      Normal          0.20-1.00       Comprehensi  
ve   
Internal   
Medicine  
Work Phone:   
[phone]  
   
                                                    Comprehensive   
metabolic 2000 panel 98 mL/min       Normal                          Comprehensi  
ve   
Internal   
Medicine  
Work Phone:   
[phone]  
   
                                        Comment on above:   Non-  
 GFR Calc   
   
                                                    Comprehensive   
metabolic  panel 119 mL/min      Normal                          Comprehensi  
ve   
Internal   
Medicine  
Work Phone:   
[phone]  
   
                                        Comment on above:    GFR  
 Calc   
   
                                                    Comprehensive   
metabolic  panel 7.0 g/dL        Normal          6.4-8.2         Comprehensi  
ve   
Internal   
Medicine  
Work Phone:   
[phone]  
   
                                                    Comprehensive   
metabolic  panel 141 mmol/L      Normal          136-145         Comprehensi  
ve   
Internal   
Medicine  
Work Phone:   
[phone]  
   
                                                    Comprehensive   
metabolic 2000 panel 4.2 mmol/L      Normal          3.5-5.1         Comprehensi  
ve   
Internal   
Medicine  
Work Phone:   
[phone]  
   
                                                    Lipid ProfileOrdered By: Shannon  
tem Manager on 2018   
   
                                                    Cholesterol in HDL   
mass conc       47 mg/dL        Normal                          Comprehensive   
Internal   
Medicine  
Work Phone:   
[phone]  
   
                                        Comment on above:   The drugs N-Acetylcy  
steine and Metamizole may falselydepress   
this assay. Reference Range HDL <40 mg/dL Low HDL Cholesterol   
HDL >or= 60 mg/dL High HDL Cholesterol   
   
                                                    Cholesterol in LDL   
mass conc       76 mg/dL        Normal          0-130           Com[facility]   
Internal   
Medicine  
Work Phone:   
[phone]  
   
                      Cholesterol mass conc 151 mg/dL  Normal                Com  
prehensive   
Internal   
Medicine  
Work Phone:   
[phone]  
   
                                        Comment on above:   <200 mg/dL Desirable  
 200-240 mg/dL Borderline >240 mg/dL High   
Risk   
   
                                                    Triglyceride mass   
conc            142 mg/dL       Normal                          Comprehensive   
Internal   
Medicine  
Work Phone:   
[phone]  
   
                                        Comment on above:   The drugs N-Acetylcy  
steine and Metamizole may falselydepress   
this assay.Serum Triglycerides Reference Interval Normal <150   
mg/dL Borderline high 150 - 199 mg/dL High 200 - 499 mg/dL Very   
High > or = 500 mg/dL   
   
                      Lipid Profile 28 mg/dL   Normal     5-40       Comprehensi  
ve   
Internal   
Medicine  
Work Phone:   
[phone]  
   
                                                    MicroalbOrdered By: System M  
anager on 2018   
   
                      Creatinine mass conc 5.0 {mg/g_CRE} Normal                  
Comprehensive   
Internal   
Medicine  
Work Phone:   
[phone]  
   
                      Microalb   177.00 mg/dL Normal                Comprehensiv  
e   
Internal   
Medicine  
Work Phone:   
[phone]  
   
                      Microalb   8.8 mg/L   Normal                Comprehensive   
Internal   
Medicine  
Work Phone:   
[phone]  
   
                                                    PSA,Total - Annual ScreenOrd  
ered By:  on 2018   
   
                                                    Prostate specific Ag   
mass conc       0.68 ng/mL      Normal          0.00-4.00       Comprehensive   
Internal   
Medicine  
Work Phone:   
[phone]  
   
                                        Comment on above:   This test was perfor  
med using the TPSA assay method for   
[employer] chemistry system. Values obtained with   
differentassay methods cannot be used interchangably.When   
changing PSA assays in the course of monitoring apatient,   
additional sequential testing should be carriedout to confirm   
baseline values.   
   
                                                    Thyroid Stim Hormone (TSH)Or  
dered By:  on 2018   
   
                      Thyrotropin Qn 2.71 {uIU/mL} Normal     0.358-3.74 Compreh  
ensive   
Internal   
Medicine  
Work Phone:   
[phone]  
   
                                                    Urinalysis, CompleteOrdered   
By:  on 2018   
   
                                                    RBC Test strip #/vol   
(U)             0-5 SEEN        Normal          0-5             Com[facility]   
Internal   
Medicine  
Work Phone:   
[phone]  
   
                                                    Urinalysis complete   
panel - Urine   Negative        Normal                          Comprehensive   
Internal   
Medicine  
Work Phone:   
[phone]  
   
                                                    Urinalysis complete   
panel - Urine       1.025 1             Normal              1.002-1.03  
0                                       Comprehensive   
Internal   
Medicine  
Work Phone:   
[phone]  
   
                                                    Urinalysis complete   
panel - Urine   6.0 1           Normal          5.0 - 8.0       Comprehensive   
Internal   
Medicine  
Work Phone:   
[phone]  
   
                                                    Urinalysis complete   
panel - Urine   Normal          Normal                          Comprehensive   
Internal   
Medicine  
Work Phone:   
[phone]  
   
                                                    Urinalysis complete   
panel - Urine   0 SEEN          Normal          0-5             Comprehensive   
Internal   
Medicine  
Work Phone:   
[phone]  
   
                                                    Urinalysis complete   
panel - Urine   Yellow          Normal                          Comprehensive   
Internal   
Medicine  
Work Phone:   
[phone]  
   
                                                    Urinalysis complete   
panel - Urine   0-5 SEEN        Normal          0-5             Comprehensive   
Internal   
Medicine  
Work Phone:   
[phone]  
   
                                                    Urinalysis complete   
panel - Urine   RARE            Normal                          Comprehensive   
Internal   
Medicine  
Work Phone:   
[phone]  
   
                                                    Urinalysis complete   
panel - Urine   Clear           Normal                          Comprehensive   
Internal   
Medicine  
Work Phone:   
[phone]  
   
                                                    Urinalysis complete   
panel - Urine   1+              Normal                          Comprehensive   
Internal   
Medicine  
Work Phone:   
[phone]  
   
                                                    Vitamin D,25 HydroxyOrdered   
By:  on 2018   
   
                          Vitamin D,25 Hydroxy 79.9 ng/mL   Normal       29.95-1  
00.  
01                                      Comprehensive   
Internal   
Medicine  
Work Phone:   
[phone]  
   
                                        Comment on above:   Vitamin D 25(OH) Sta  
tus Range Deficiency <20 ng/mL (50nmol/L)   
Insuffciency 20 - 30 ng/mL (50 - 75 nmol/L) Sufficiency 30 - 100   
ng/mL (75 - 250 nmol/L) Toxicity >100 ng/mL (>250 nmol/L)   
   
                                                    HGB A1C (96952)Ordered By: S  
ALTO CINCOtem Manager on 2017   
   
                                                    Hemoglobin   
A1c/Hemoglobin.total   
mass fraction (Bld) 5.7 %           Abnormal        4.8-5.6         Comprehensiv  
e   
Internal   
Medicine  
Work Phone:   
[phone]  
   
                                        Comment on above:   . Pre-diabetes: 5.7   
- 6.4 Diabetes: >6.4 Glycemic control for   
adults with diabetes: <7.0   
   
                                                            PATIENT NOT FASTINGP  
ERFORMED BY: [id number] [facility] [id number]   
   
                                                    HGB A1C (60994)Ordered By: S  
ALTO CINCOtem Manager on 2017   
   
                                                    Hemoglobin   
A1c/Hemoglobin.total   
mass fraction (Bld) 5.9 %           Abnormal        4.8-5.6         Comprehensiv  
e   
Internal   
Medicine  
Work Phone:   
[phone]  
   
                                        Comment on above:   . Pre-diabetes: 5.7   
- 6.4 Diabetes: >6.4 Glycemic control for   
adults with diabetes: <7.0   
   
                                                            PATIENT NOT FASTINGP  
ERFORMED BY: [id number] [facility] [id number]   
   
                                                    CBC W/Diff, AutomatedOrdered  
 By:  on 2017   
   
                                                    Basophils/100 WBC   
Auto (Bld)      0.4 %           Normal          0-1             Comprehensive   
Internal   
Medicine  
Work Phone:   
[phone]  
   
                                                    Eosinophils/100 WBC   
Auto (Bld)      2.3 %           Normal          0-5             Comprehensive   
Internal   
Medicine  
Work Phone:   
[phone]  
   
                                                    Erythrocyte   
distribution width   
Auto Ratio (RBC) 12.2 %          Normal          11.6-14.6       Comprehensive   
Internal   
Medicine  
Work Phone:   
[phone]  
   
                                                    Hematocrit Auto   
Volume Fraction (Bld) 47.3 %          Normal          40-54           Comprehens  
[facility]   
Internal   
Medicine  
Work Phone:   
[phone]  
   
                                                    Hemoglobin mass conc   
(Bld)           16.5 g/dL       Normal          13.0-16.5       Comprehensive   
Internal   
Medicine  
Work Phone:   
[phone]  
   
                                                    Lymphocytes/100 WBC   
Auto (Bld)      37.6 %          Normal          19-41           Comprehensive   
Internal   
Medicine  
Work Phone:   
[phone]  
   
                                                    MCH Auto Entitic mass   
(RBC)           32.5 pg         Abnormal        27.0-32.0       Comprehensive   
Internal   
Medicine  
Work Phone:   
[phone]  
   
                                                    MCHC Auto mass conc   
(RBC)           34.9 {g/gl}     Normal          32-36           Comprehensive   
Internal   
Medicine  
Work Phone:   
[phone]  
   
                                                    MCV Auto Entitic   
volume (RBC)    93.3 fL         Normal          80-94           Comprehensive   
Internal   
Medicine  
Work Phone:   
[phone]  
   
                                                    Monocytes/100 WBC   
Auto (Bld)      8.9 %           Normal          0-10            Comprehensive   
Internal   
Medicine  
Work Phone:   
[phone]  
   
                                                    Neutrophils/100 WBC   
Auto (Bld)      50.4 %          Normal          47-70           Comprehensive   
Internal   
Medicine  
Work Phone:   
[phone]  
   
                                                    Platelet mean volume   
Auto Entitic volume   
(Bld)           10.1 fL         Normal          6.2-12.0        Comprehensive   
Internal   
Medicine  
Work Phone:   
[phone]  
   
                                                    Platelets Auto #/vol   
(Bld)           172 10*3/uL     Normal          150-450         Comprehensive   
Internal   
Medicine  
Work Phone:   
[phone]  
   
                      RBC Auto #/vol (Bld) 5.07 {M/mm3} Normal     4.6-6.2    [facility]   
Internal   
[facility]icine  
Work Phone:   
[phone]  
   
                      WBC Auto #/vol (Bld) 7.9 10*3/uL Normal     4.4-11.0   Com  
prehensive   
Internal   
Medicine  
Work Phone:   
[phone]  
   
                      CBC W/Diff, Automated 40.8 fL    Normal     35.1-43.9  Com  
prehensive   
Internal   
Medicine  
Work Phone:   
[phone]  
   
                      CBC W/Diff, Automated 0.400 %    Normal     0.0-0.9    Com  
prehensive   
Internal   
Medicine  
Work Phone:   
[phone]  
   
                                        Comment on above:   IG% - Immature Granu  
locytes (promyelocytes, myelocytes   
andmetamyelocytes) > 1% indicates that a LEFT SHIFT is Present.   
   
                      CBC W/Diff, Automated 4.0 {X10_3/uL} Normal     2.0-7.7     
 Comprehensive   
Internal   
Medicine  
Work Phone:   
[phone]  
   
                      CBC W/Diff, Automated 2.96 {X10_3/ul} Normal     0.83-4.51  
  Comprehensive   
Internal   
Medicine  
Work Phone:   
[phone]  
   
                                                    Comprehensive Metabolic Prof  
ilOrdered By:  on 2017   
   
                                                    Comprehensive   
metabolic 2000 panel 42 U/L          Abnormal        15-37           Comprehensi  
ve   
Internal   
Medicine  
Work Phone:   
[phone]  
   
                                        Comment on above:   Slight Hemolysis, Re  
sult may be falsely increased.   
   
                                                    Comprehensive   
metabolic 2000 panel 3.4 g/dL        Normal          2.3-3.5         Comprehensi  
ve   
Internal   
Medicine  
Work Phone:   
[phone]  
   
                                                    Comprehensive   
metabolic 2000 panel 7.4 g/dL        Normal          6.4-8.2         Comprehensi  
ve   
Internal   
Medicine  
Work Phone:   
[phone]  
   
                                                    Comprehensive   
metabolic 2000 panel 109 mL/min      Normal                          Comprehensi  
ve   
Internal   
Medicine  
Work Phone:   
[phone]  
   
                                        Comment on above:    GFR  
 Calc   
   
                                                    Comprehensive   
metabolic 2000 panel 91 mL/min       Normal                          Comprehensi  
ve   
Internal   
Medicine  
Work Phone:   
[phone]  
   
                                        Comment on above:   Non-  
 GFR Calc   
   
                                                    Comprehensive   
metabolic 2000 panel 0.93 mg/dL      Normal          0.70-1.30       Comprehensi  
ve   
Internal   
Medicine  
Work Phone:   
[phone]  
   
                                        Comment on above:   The validity of the   
calculated GFR AND GFRAA in patients   
over70   
years has not been determined. Clinical correlation isessential.   
   
                                                    Comprehensive   
metabolic 2000 panel 1.2 {RATIO}     Normal          0.9-2.4         Comprehensi  
ve   
Internal   
Medicine  
Work Phone:   
[phone]  
   
                                                    Comprehensive   
metabolic 2000 panel 10 mg/dL        Normal          7-18            Comprehensi  
ve   
Internal   
Medicine  
Work Phone:   
[phone]  
   
                                                    Comprehensive   
metabolic 2000 panel 9.0 mg/dL       Normal          8.5-10.1        Comprehensi  
ve   
Internal   
Medicine  
Work Phone:   
[phone]  
   
                                                    Comprehensive   
metabolic 2000 panel 136 mg/dL       Abnormal                  Comprehensi  
ve   
Internal   
Medicine  
Work Phone:   
[phone]  
   
                                        Comment on above:   Fasting Glucose resu  
lt greater than or equal to 126   
mg/dLsuggests DIABETES MELLITUS per A.D.A. criteria.   
   
                                                    Comprehensive   
metabolic  panel 4.0 g/dL        Normal          3.4-5.0         Comprehensi  
ve   
Internal   
Medicine  
Work Phone:   
[phone]  
   
                                                    Comprehensive   
metabolic  panel 56 U/L          Normal                    Comprehensi  
ve   
Internal   
Medicine  
Work Phone:   
[phone]  
   
                                                    Comprehensive   
metabolic 2000 panel 65 U/L          Normal          12-78           Comprehensi  
ve   
Internal   
Medicine  
Work Phone:   
[phone]  
   
                                                    Comprehensive   
metabolic 2000 panel 0.30 mg/dL      Normal          0.20-1.00       Comprehensi  
ve   
Internal   
Medicine  
Work Phone:   
[phone]  
   
                                                    Comprehensive   
metabolic 2000 panel 140 mmol/L      Normal          136-145         Comprehensi  
ve   
Internal   
Medicine  
Work Phone:   
[phone]  
   
                                                    Comprehensive   
metabolic 2000 panel 4.2 mmol/L      Normal          3.5-5.1         Comprehensi  
ve   
Internal   
Medicine  
Work Phone:   
[phone]  
   
                                        Comment on above:   Slight Hemolysis, Re  
sult may be falsely increased.   
   
                                                    Comprehensive   
metabolic  panel 107 mmol/L      Normal                    Comprehensi  
ve   
Internal   
Medicine  
Work Phone:   
[phone]  
   
                                                    Comprehensive   
metabolic 2000 panel 8 1             Normal          5-15            Comprehensi  
ve   
Internal   
Medicine  
Work Phone:   
[phone]  
   
                                                    Comprehensive   
metabolic 2000 panel 10.8 {RATIO}    Normal          10-20           Comprehensi  
ve   
Internal   
Medicine  
Work Phone:   
[phone]  
   
                                                    Comprehensive   
metabolic 2000 panel 25.0 mmol/L     Normal          21.0-32.0       [facility]  
[facility]   
Internal   
Medicine  
Work Phone:   
[phone]  
   
                                                    Lipid ProfileOrdered By: Shannon  
tem Manager on 2017   
   
                                                    Cholesterol in HDL   
mass conc       45 mg/dL        Normal                          Comprehensive   
Internal   
Medicine  
Work Phone:   
[phone]  
   
                                        Comment on above:   The drugs N-Acetylcy  
steine and Metamizole may falsely   
deressthis   
assay. Reference Range HDL <40 mg/dL Low HDL Cholesterol HDL   
>or= 60 mg/dL High HDL Cholesterol   
   
                                                    Cholesterol in LDL   
mass conc       91 mg/dL        Normal          0-130           Comprehensive   
Internal   
Medicine  
Work Phone:   
[phone]  
   
                      Cholesterol mass conc 175 mg/dL  Normal                Com  
prehensive   
Internal   
Medicine  
Work Phone:   
[phone]  
   
                                        Comment on above:   <200 mg/dL Desirable  
 200-240 mg/dL Borderline >240 mg/dL High   
Risk   
   
                                                    Triglyceride mass   
conc            195 mg/dL       Normal                          Comprehensive   
Internal   
Medicine  
Work Phone:   
[phone]  
   
                                        Comment on above:   The drugs N-Acetylcy  
steine and Metamizole may falsely   
deressthis   
assay.Serum Triglycerides Reference Interval Normal <150 mg/dL   
Borderline high 150 - 199 mg/dL High 200 - 499 mg/dL Very High >   
or = 500 mg/dL   
   
                      Lipid Profile 39 mg/dL   Normal     5-40       Comprehensi  
ve   
Internal   
Medicine  
Work Phone:   
[phone]  
   
                                                    MicroalbOrdered By: System M  
anager on 2017   
   
                      Creatinine mass conc 4.0 {mg/g_CRE} Normal                  
Comprehensive   
Internal   
Medicine  
Work Phone:   
[phone]  
   
                      Microalb   6.8 mg/L   Normal                Comprehensive   
Internal   
Medicine  
Work Phone:   
[phone]  
   
                      Microalb   169.00 mg/dL Normal                Comprehensiv  
e   
Internal   
Medicine  
Work Phone:   
[phone]  
   
                                                    Thyroid Stim Hormone (TSH)Or  
dered By:  on 2017   
   
                      Thyrotropin Qn 1.97 {uIU/mL} Normal     0.358-3.74 Compreh  
ensive   
Internal   
Medicine  
Work Phone:   
[phone]  
   
                                                    Urinalysis, CompleteOrdered   
By:  on 2017   
   
                                                    RBC Test strip #/vol   
(U)             0 SEEN          Normal          0-5             Comprehensive   
Internal   
Medicine  
Work Phone:   
[phone]  
   
                                                    Urinalysis complete   
panel - Urine   Negative        Normal                          Comprehensive   
Internal   
Medicine  
Work Phone:   
[phone]  
   
                                                    Urinalysis complete   
panel - Urine   0 SEEN          Normal          0-5             Comprehensive   
Internal   
Medicine  
Work Phone:   
[phone]  
   
                                                    Urinalysis complete   
panel - Urine   Normal          Normal                          Comprehensive   
Internal   
Medicine  
Work Phone:   
[phone]  
   
                                                    Urinalysis complete   
panel - Urine   5.0 1           Normal          5.0 - 8.0       Comprehensive   
Internal   
Medicine  
Work Phone:   
[phone]  
   
                                                    Urinalysis complete   
panel - Urine   Yellow          Normal                          Comprehensive   
Internal   
Medicine  
Work Phone:   
[phone]  
   
                                                    Urinalysis complete   
panel - Urine   Clear           Normal                          Comprehensive   
Internal   
Medicine  
Work Phone:   
[phone]  
   
                                                    Urinalysis complete   
panel - Urine   5 mg/dL         Abnormal                        Comprehensive   
Internal   
Medicine  
Work Phone:   
[phone]  
   
                                                    Urinalysis complete   
panel - Urine       1.020 1             Normal              1.002-1.03  
0                                       Comprehensive   
Internal   
Medicine  
Work Phone:   
[phone]  
   
                                                    Vitamin D,25 HydroxyOrdered   
By:  on 2017   
   
                      Vitamin D,25 Hydroxy 39.1 ng/mL Normal                Comp  
rehensive   
Internal   
Medicine  
Work Phone:   
[phone]  
   
                                        Comment on above:   Vitamin D 25(OH) Sta  
tus Range Deficiency <20 ng/mL (50nmol/L)   
Insuffciency 20 - 30 ng/mL (50 - 75 nmol/L) Sufficiency 30 - 100   
ng/mL (75 - 250 nmol/L) Toxicity >100 ng/mL (>250 nmol/L)   
   
                                                    HGB A1C (57336)Ordered By: S  
ystem Manager on 2017   
   
                                                    Hemoglobin   
A1c/Hemoglobin.total   
mass fraction (Bld) 5.9 %           Abnormal        4.8-5.6         Comprehensiv  
e   
Internal   
Medicine  
Work Phone:   
[phone]  
   
                                        Comment on above:   . Pre-diabetes: 5.7   
- 6.4 Diabetes: >6.4 Glycemic control for   
adults with diabetes: <7.0   
   
                                                            PATIENT NOT FASTINGP  
ERFORMED BY: [id number] [id number]   
   
                                                    CBC W/Diff, AutomatedOrdered  
 By:  on 10-   
   
                                                    Basophils/100 WBC   
Auto (Bld)      0.8 %           Normal          0-1             Comprehensive   
Internal   
Medicine  
Work Phone:   
[phone]  
   
                                                    Eosinophils/100 WBC   
Auto (Bld)      1.2 %           Normal          0-5             Comprehensive   
Internal   
Medicine  
Work Phone:   
[phone]  
   
                                                    Erythrocyte   
distribution width   
Auto Ratio (RBC) 12.0 %          Normal          11.6-14.6       Comprehensive   
Internal   
Medicine  
Work Phone:   
[phone]  
   
                                                    Hematocrit Auto   
Volume Fraction (Bld) 47.0 %          Normal          40-54           Comprehens  
bebe   
Internal   
Medicine  
Work Phone:   
[phone]  
   
                                                    Hemoglobin mass conc   
(Bld)           16.7 g/dL       Abnormal        13.0-16.5       Comprehensive   
Internal   
Medicine  
Work Phone:   
[phone]  
   
                                                    Lymphocytes/100 WBC   
Auto (Bld)      40.4 %          Normal          19-41           Comprehensive   
Internal   
Medicine  
Work Phone:   
[phone]  
   
                                                    MCH Auto Entitic mass   
(RBC)           32.6 pg         Abnormal        27.0-32.0       Comprehensive   
Internal   
Medicine  
Work Phone:   
[phone]  
   
                                                    MCHC Auto mass conc   
(RBC)           35.5 {g/gl}     Normal          32-36           Comprehensive   
Internal   
Medicine  
Work Phone:   
[phone]  
   
                                                    MCV Auto Entitic   
volume (RBC)    91.8 fL         Normal          80-94           Comprehensive   
Internal   
Medicine  
Work Phone:   
[phone]  
   
                                                    Monocytes/100 WBC   
Auto (Bld)      12.4 %          Abnormal        0-10            Comprehensive   
Internal   
Medicine  
Work Phone:   
[phone]  
   
                                                    Neutrophils/100 WBC   
Auto (Bld)      44.9 %          Abnormal        47-70           Comprehensive   
Internal   
Medicine  
Work Phone:   
[phone]  
   
                                                    Platelet mean volume   
Auto Entitic volume   
(Bld)           10.1 fL         Normal          6.2-12.0        Comprehensive   
Internal   
Medicine  
Work Phone:   
[phone]  
   
                                                    Platelets Auto #/vol   
(Bld)           179 10*3/uL     Normal          150-450         Comprehensive   
Internal   
Medicine  
Work Phone:   
[phone]  
   
                      RBC Auto #/vol (Bld) 5.12 {M/mm3} Normal     4.6-6.2    [facility]   
Internal   
Medicine  
Work Phone:   
[phone]  
   
                      WBC Auto #/vol (Bld) 6.4 10*3/uL Normal     4.4-11.0   Com  
prehensive   
Internal   
Medicine  
Work Phone:   
[phone]  
   
                      CBC W/Diff, Automated 2.60 {X10_3/ul} Normal     0.83-4.51  
  Comprehensive   
Internal   
Medicine  
Work Phone:   
[phone]  
   
                      CBC W/Diff, Automated 0.300 %    Normal     0.0-0.9    Com  
prehensive   
Internal   
Medicine  
Work Phone:   
[phone]  
   
                                        Comment on above:   IG% - Immature Granu  
locytes (promyelocytes, myelocytes   
andmetamyelocytes) > 1% indicates that a LEFT SHIFT is Present.   
   
                      CBC W/Diff, Automated 39.8 fL    Normal     35.1-43.9  Com  
prehensive   
Internal   
Medicine  
Work Phone:   
[phone]  
   
                      CBC W/Diff, Automated 2.9 {X10_3/uL} Normal     2.0-7.7     
 Comprehensive   
Internal   
Medicine  
Work Phone:   
[phone]  
   
                                                    Comprehensive Metabolic Prof  
ilOrdered By:  on 10-   
   
                                                    Comprehensive   
metabolic  panel 42 U/L          Normal          12-78           Comprehensi  
ve   
Internal   
Medicine  
Work Phone:   
[phone]  
   
                                                    Comprehensive   
metabolic  panel 8 1             Normal          5-15            Comprehensi  
ve   
Internal   
Medicine  
Work Phone:   
[phone]  
   
                                                    Comprehensive   
metabolic  panel 26.0 mmol/L     Normal          21.0-32.0       Comprehensi  
ve   
Internal   
Medicine  
Work Phone:   
[phone]  
   
                                                    Comprehensive   
metabolic  panel 89 mg/dL        Normal                    Comprehensi  
ve   
Internal   
Medicine  
Work Phone:   
[phone]  
   
                                                    Comprehensive   
metabolic  panel 107 mmol/L      Normal                    Comprehensi  
ve   
Internal   
Medicine  
Work Phone:   
[phone]  
   
                                                    Comprehensive   
metabolic  panel 4.1 mmol/L      Normal          3.5-5.1         Comprehensi  
ve   
Internal   
Medicine  
Work Phone:   
[phone]  
   
                                                    Comprehensive   
metabolic  panel 141 mmol/L      Normal          136-145         Comprehensi  
ve   
Internal   
Medicine  
Work Phone:   
[phone]  
   
                                                    Comprehensive   
metabolic  panel 0.50 mg/dL      Normal          0.20-1.00       Comprehensi  
ve   
Internal   
Medicine  
Work Phone:   
[phone]  
   
                                                    Comprehensive   
metabolic  panel 7 mg/dL         Normal          7-18            Comprehensi  
ve   
Internal   
Medicine  
Work Phone:   
[phone]  
   
                                                    Comprehensive   
metabolic  panel 55 U/L          Normal                    Comprehensi  
ve   
Internal   
Medicine  
Work Phone:   
[phone]  
   
                                                    Comprehensive   
metabolic  panel 22 U/L          Normal          15-37           Comprehensi  
ve   
Internal   
Medicine  
Work Phone:   
[phone]  
   
                                                    Comprehensive   
metabolic  panel 8.9 mg/dL       Normal          8.5-10.1        Comprehensi  
ve   
Internal   
Medicine  
Work Phone:   
[phone]  
   
                                                    Comprehensive   
metabolic  panel 1.3 {RATIO}     Normal          0.9-2.4         Comprehensi  
ve   
Internal   
Medicine  
Work Phone:   
[phone]  
   
                                                    Comprehensive   
metabolic  panel 3.3 g/dL        Normal          2.3-3.5         Comprehensi  
ve   
Internal   
Medicine  
Work Phone:   
[phone]  
   
                                                    Comprehensive   
metabolic 2000 panel 4.2 g/dL        Normal          3.4-5.0         Comprehensi  
ve   
Internal   
Medicine  
Work Phone:   
[phone]  
   
                                                    Comprehensive   
metabolic  panel 7.5 g/dL        Normal          6.4-8.2         Comprehensi  
ve   
Internal   
Medicine  
Work Phone:   
[phone]  
   
                                                    Comprehensive   
metabolic 2000 panel 7.9 {RATIO}     Abnormal        10-20           Comprehensi  
ve   
Internal   
Medicine  
Work Phone:   
[phone]  
   
                                                    Comprehensive   
metabolic 2000 panel 115 mL/min      Normal                          Comprehensi  
ve   
Internal   
Medicine  
Work Phone:   
[phone]  
   
                                        Comment on above:    GFR  
 Calc   
   
                                                    Comprehensive   
metabolic 2000 panel 95 mL/min       Normal                          Comprehensi  
ve   
Internal   
Medicine  
Work Phone:   
[phone]  
   
                                        Comment on above:   Non-  
 GFR Calc   
   
                                                    Comprehensive   
metabolic 2000 panel 0.89 mg/dL      Normal          0.70-1.30       Comprehensi  
ve   
Internal   
Medicine  
Work Phone:   
[phone]  
   
                                        Comment on above:   The validity of the   
calculated GFR AND GFRAA in patients   
over70   
years has not been determined. Clinical correlation isessential.   
   
                                                    Hemoglobin C2oJfayelk By: FRUCT  
stem Manager on 10-   
   
                                                    Hemoglobin   
A1c/Hemoglobin.total   
mass fraction (Bld) 5.5 %           Normal          4.2-6.3         Comprehensiv  
e   
Internal   
Medicine  
Work Phone:   
[phone]  
   
                                                    Lipid ProfileOrdered By: Shannon  
tem Manager on 10-   
   
                                                    Cholesterol in HDL   
mass conc       47 mg/dL        Normal                          Comprehensive   
Internal   
Medicine  
Work Phone:   
[phone]  
   
                                        Comment on above:   The drugs N-Acetylcy  
steine and Metamizole may falsely   
deressthis   
assay. Reference Range HDL <40 mg/dL Low HDL Cholesterol HDL   
>or= 60 mg/dL High HDL Cholesterol   
   
                                                    Cholesterol in LDL   
mass conc       85 mg/dL        Normal          0-130           Comprehensive   
Internal   
Medicine  
Work Phone:   
[phone]  
   
                      Cholesterol mass conc 153 mg/dL  Normal                Com  
prehensive   
Internal   
Medicine  
Work Phone:   
[phone]  
   
                                        Comment on above:   <200 mg/dL Desirable  
 200-240 mg/dL Borderline >240 mg/dL High   
Risk   
   
                                                    Triglyceride mass   
conc            104 mg/dL       Normal                          Comprehensive   
Internal   
Medicine  
Work Phone:   
[phone]  
   
                                        Comment on above:   The drugs N-Acetylcy  
steine and Metamizole may falsely   
deressthis   
assay.Serum Triglycerides Reference Interval Normal <150 mg/dL   
Borderline high 150 - 199 mg/dL High 200 - 499 mg/dL Very High >   
or = 500 mg/dL   
   
                      Lipid Profile 21 mg/dL   Normal     5-40       Comprehensi  
ve   
Internal   
Medicine  
Work Phone:   
[phone]  
   
                                                    MicroalbOrdered By: System M  
anager on 10-   
   
                      Creatinine mass conc 9.3 {mg/g_CRE} Normal                  
Comprehensive   
Internal   
Medicine  
Work Phone:   
[phone]  
   
                      Microalb   6.6 mg/L   Normal                Comprehensive   
Internal   
Medicine  
Work Phone:   
[phone]  
   
                      Microalb   71.60 mg/dL Normal                Comprehensive  
   
Internal   
Medicine  
Work Phone:   
[phone]  
   
                                                    Thyroid Stim Hormone (TSH)Or  
dered By:  on 10-   
   
                      Thyrotropin Qn 1.62 {uIU/mL} Normal     0.358-3.74 Compreh  
ensive   
Internal   
Medicine  
Work Phone:   
[phone]  
   
                                                    Urinalysis, CompleteOrdered   
By:  on 10-   
   
                                                    RBC Test strip #/vol   
(U)             0-5 SEEN        Normal          0-5             Comprehensive   
Internal   
Medicine  
Work Phone:   
[phone]  
   
                                                    Urinalysis complete   
panel - Urine   0 SEEN          Normal          0-5             Comprehensive   
Internal   
Medicine  
Work Phone:   
[phone]  
   
                                                    Urinalysis complete   
panel - Urine       1.010 1             Normal              1.002-1.03  
0                                       Comprehensive   
Internal   
Medicine  
Work Phone:   
[phone]  
   
                                                    Urinalysis complete   
panel - Urine   Yellow          Normal                          Comprehensive   
Internal   
Medicine  
Work Phone:   
[phone]  
   
                                                    Urinalysis complete   
panel - Urine   Clear           Normal                          Comprehensive   
Internal   
Medicine  
Work Phone:   
[phone]  
   
                                                    Urinalysis complete   
panel - Urine   Normal          Normal                          Comprehensive   
Internal   
Medicine  
Work Phone:   
[phone]  
   
                                                    Urinalysis complete   
panel - Urine   Negative        Normal                          Comprehensive   
Internal   
Medicine  
Work Phone:   
[phone]  
   
                                                    Urinalysis complete   
panel - Urine   6.5 1           Normal          5.0 - 8.0       Comprehensive   
Internal   
Medicine  
Work Phone:   
[phone]  
   
                                                    Vitamin D,25 HydroxyOrdered   
By:  on 10-   
   
                      Vitamin D,25 Hydroxy 45.0 ng/mL Normal                Comp  
rehensive   
Internal   
Medicine  
Work Phone:   
[phone]  
   
                                        Comment on above:   Vitamin D 25(OH) Sta  
tus Range Deficiency <20 ng/mL (50nmol/L)   
Insuffciency 20 - 30 ng/mL (50 - 75 nmol/L) Sufficiency 30 - 100   
ng/mL (75 - 250 nmol/L) Toxicity >100 ng/mL (>250 nmol/L)   
   
                                                    CALCIFIDIOL (44592) VIT D 25  
Ordered By:  on 2016   
   
                                                    25-Hydroxyvitamin   
D2+25-Hydroxyvitamin   
D3 mass conc    48.9 ng/mL      Normal          30.0-100.0      Comprehensive   
Internal   
Medicine  
Work Phone:   
[phone]  
   
                                        Comment on above:   Vitamin D deficiency  
 has been defined by the [place]   
[facility] and an Endocrine Society practice guideline as alevel   
of serum 25-OH vitamin D less than 20 ng/mL (1,2).The Endocrine   
Society went on to further define vitamin Dinsufficiency as a   
level between 21 and 29 ng/mL (2).1. IOM ([place] [employer]   
Medicine). 2010. Dietary reference intakes for calcium and D.   
[place]: The National Academies Press.2. Kodi MF,   
Phillip WALKER, Archana SAMS, et al. Evaluation, treatment,   
and prevention of vitamin D deficiency: an Endocrine Society   
clinical practice guideline. JCEM. 2011; 96(7):1911-30.   
   
                                                            PATIENT WAS FASTINGP  
ERFORMED BY: [employer]   
[facility] [id number]   
   
                                                    CBC W/AUTO DIFF WBC (63794)O  
rdered By:  on 2016   
   
                                                    Basophils (Bld)   
[#/Vol]         0.0 {x10E3/uL}  Normal          0.0-0.2         Comprehensive   
Internal   
Medicine  
Work Phone:   
[phone]  
   
                                        Comment on above:   PATIENT WAS FASTINGP  
ERFORMED BY: [id number]   
[facility] [id number] Information:   
[id number]   
   
                                                    Basophils (Bld)   
[#/Vol]         0.0 10*3/uL     Normal          0.0-0.2         Comprehensive   
Internal   
Medicine;   
Comprehensive   
Internal   
Medicine  
Work Phone:   
[phone]  
   
                                        Comment on above:   PATIENT WAS FASTINGP  
ERFORMED BY: [id number] [employer] Being Watched   
[facility] [id number] Information:   
[id number]   
   
                                                    Basophils Auto #/vol   
(Bld)           0.0 {x10E3/uL}  Normal          0.0-0.2         Comprehensive   
Internal   
Medicine  
Work Phone:   
[phone]  
   
                                                    Basophils/100 WBC   
(Bld)           1 %             Normal                          Comprehensive   
Internal   
Medicine  
Work Phone:   
[phone]  
   
                                        Comment on above:   PATIENT WAS FASTINGP  
ERFORMED BY: [address] [id number] Information:   
[id number]   
   
                                                    Basophils/100 WBC   
Auto (Bld)      1 %             Normal                          Comprehensive   
Internal   
Medicine  
Work Phone:   
[phone]  
   
                                                    Eosinophils (Bld)   
[#/Vol]         0.1 {x10E3/uL}  Normal          0.0-0.4         [facility]   
Internal   
Medicine  
Work Phone:   
[phone]  
   
                                        Comment on above:   PATIENT WAS FASTINGP  
ERFORMED BY: [address] [id number] Information:   
[id number]   
   
                                                    Eosinophils (Bld)   
[#/Vol]         0.1 10*3/uL     Normal          0.0-0.4         Com[facility]   
Internal   
Medicine;   
Comprehensive   
Internal   
Medicine  
Work Phone:   
[phone]  
   
                                        Comment on above:   PATIENT WAS FASTINGP  
ERFORMED BY: [address] [id number] Information:   
[id number]   
   
                                                    Eosinophils Auto   
#/vol (Bld)     0.1 {x10E3/uL}  Normal          0.0-0.4         Comprehensive   
Internal   
Medicine  
Work Phone:   
[phone]  
   
                                                    Eosinophils/100 WBC   
(Bld)           1 %             Normal                          Com[facility]   
Internal   
Medicine  
Work Phone:   
[phone]  
   
                                        Comment on above:   PATIENT WAS FASTINGP  
ERFORMED BY: [address] [id number] Information:   
[id number]   
   
                                                    Eosinophils/100 WBC   
Auto (Bld)      1 %             Normal                          Comprehensive   
Internal   
Medicine  
Work Phone:   
[phone]  
   
                                                    Erythrocyte   
distribution width   
(RBC) [Ratio]   13.4 %          Normal          12.3-15.4       Comprehensive   
Internal   
Medicine  
Work Phone:   
[phone]  
   
                                        Comment on above:   PATIENT WAS FASTINGP  
ERFORMED BY: [address] [id number] Information:   
[id number]   
   
                                                    Erythrocyte   
distribution width   
Auto Ratio (RBC) 13.4 %          Normal          12.3-15.4       Comprehensive   
Internal   
Medicine  
Work Phone:   
[phone]  
   
                                                    Hematocrit (Bld)   
[Volume fraction] 49.5 %          Normal          37.5-51.0       Comprehensive   
Internal   
Medicine  
Work Phone:   
[phone]  
   
                                        Comment on above:   PATIENT WAS FASTINGP  
ERFORMED BY: AMOR [place] [id number] [facility] [id number] Information:   
[id number]   
   
                                                    Hematocrit Auto   
Volume Fraction (Bld) 49.5 %          Normal          37.5-51.0       [facility]   
Internal   
Medicine  
Work Phone:   
[phone]  
   
                                                    Hemoglobin mass conc   
(Bld)           16.8 g/dL       Normal          12.6-17.7       Comprehensive   
Internal   
Medicine  
Work Phone:   
[phone]  
   
                                        Comment on above:   PATIENT WAS FASTINGP  
ERFORMED BY: AMOR [place] [id number] [facility] [id number] Information:   
[id number]   
   
                                                    Immature granulocytes   
#/vol (Bld)     0.0 {x10E3/uL}  Normal          0.0-0.1         Comprehensive   
Internal   
Medicine  
Work Phone:   
[phone]  
   
                                        Comment on above:   PATIENT WAS FASTINGP  
ERFORMED BY: AMOR [place] [id number] [facility] [id number] Information:   
[id number]   
   
                                                    Immature granulocytes   
(Bld) [#/Vol]   0.0 10*3/uL     Normal          0.0-0.1         Comprehensive   
Internal   
Medicine;   
Comprehensive   
Internal   
Medicine  
Work Phone:   
[phone]  
   
                                        Comment on above:   PATIENT WAS FASTINGP  
ERFORMED BY:  [facility] ClinicEbbnnu[facility]00 [facility] [id number] Information:   
[id number]   
   
                                                    Immature   
granulocytes/100 WBC   
(Bld)           0 %             Normal                          Comprehensive   
Internal   
Medicine  
Work Phone:   
[phone]  
   
                                        Comment on above:   PATIENT WAS FASTINGP  
ERFORMED BY:  [facility] Rzpomt4987 [facility] [id number] Information:   
[id number]   
   
                                                    Lymphocytes (Bld)   
[#/Vol]         2.5 {x10E3/uL}  Normal          0.7-3.1         Comprehensive   
Internal   
Medicine  
Work Phone:   
[phone]  
   
                                        Comment on above:   PATIENT WAS FASTINGP  
ERFORMED BY: [address] [id number] Information:   
[id number]   
   
                                                    Lymphocytes (Bld)   
[#/Vol]         2.5 10*3/uL     Normal          0.7-3.1         Comprehensive   
Internal   
Medicine;   
Comprehensive   
Internal   
Medicine  
Work Phone:   
[phone]  
   
                                        Comment on above:   PATIENT WAS FASTINGP  
ERFORMED BY: [address] [id number] Information:   
[id number]   
   
                                                    Lymphocytes Auto   
#/vol (Bld)     2.5 {x10E3/uL}  Normal          0.7-3.1         Comprehensive   
Internal   
Medicine  
Work Phone:   
[phone]  
   
                                                    Lymphocytes/100 WBC   
(Bld)           30 %            Normal                          Comprehensive   
Internal   
Medicine  
Work Phone:   
[phone]  
   
                                        Comment on above:   PATIENT WAS FASTINGP  
ERFORMED BY: [address] [id number] Information:   
[id number]   
   
                                                    Lymphocytes/100 WBC   
Auto (Bld)      30 %            Normal                          Comprehensive   
Internal   
Medicine  
Work Phone:   
[phone]  
   
                                                    MCH (RBC) [Entitic   
mass]           31.9 pg         Normal          26.6-33.0       Comprehensive   
Internal   
Medicine  
Work Phone:   
[phone]  
   
                                        Comment on above:   PATIENT WAS FASTINGP  
ERFORMED BY: [address] [id number] Information:   
[id number]   
   
                                                    MCH Auto Entitic mass   
(RBC)           31.9 pg         Normal          26.6-33.0       Comprehensive   
Internal   
Medicine  
Work Phone:   
[phone]  
   
                      MCHC (RBC) [Mass/Vol] 33.9 g/dL  Normal     31.5-35.7  Com  
prehensive   
Internal   
Medicine  
Work Phone:   
[phone]  
   
                                        Comment on above:   PATIENT WAS FASTINGP  
ERFORMED BY: [place] [id number] [facility] [id number] Information:   
[id number]   
   
                                                    MCHC Auto mass conc   
(RBC)           33.9 g/dL       Normal          31.5-35.7       Comprehensive   
Internal   
Medicine  
Work Phone:   
[phone]  
   
                                                    MCV (RBC) [Entitic   
vol]            94 fL           Normal          79-97           Comprehensive   
Internal   
Medicine  
Work Phone:   
[phone]  
   
                                        Comment on above:   PATIENT WAS FASTINGP  
ERFORMED BY: [facility] [id number] Information:   
[id number]   
   
                                                    MCV Auto Entitic   
volume (RBC)    94 fL           Normal          79-97           Comprehensive   
Internal   
Medicine  
Work Phone:   
[phone]  
   
                                                    Monocytes (Bld)   
[#/Vol]         0.6 {x10E3/uL}  Normal          0.1-0.9         Comprehensive   
Internal   
Medicine  
Work Phone:   
[phone]  
   
                                        Comment on above:   PATIENT WAS FASTINGP  
ERFORMED BY: [address] [id number] Information:   
[id number]   
   
                                                    Monocytes (Bld)   
[#/Vol]         0.6 10*3/uL     Normal          0.1-0.9         Comprehensive   
Internal   
Medicine;   
Comprehensive   
Internal   
Medicine  
Work Phone:   
[phone]  
   
                                        Comment on above:   PATIENT WAS FASTINGP  
ERFORMED BY: [facility] [id number] Information:   
[id number]   
   
                                                    Monocytes Auto #/vol   
(Bld)           0.6 {x10E3/uL}  Normal          0.1-0.9         Comprehensive   
Internal   
Medicine  
Work Phone:   
[phone]  
   
                                                    Monocytes/100 WBC   
(Bld)           7 %             Normal                          Comprehensive   
Internal   
Medicine  
Work Phone:   
[phone]  
   
                                        Comment on above:   PATIENT WAS FASTINGP  
ERFORMED BY: [facility] [id number] Information:   
[id number]   
   
                                                    Monocytes/100 WBC   
Auto (Bld)      7 %             Normal                          Comprehensive   
Internal   
Medicine  
Work Phone:   
[phone]  
   
                                                    Neutrophils (Bld)   
[#/Vol]         5.0 {x10E3/uL}  Normal          1.4-7.0         Comprehensive   
Internal   
Medicine  
Work Phone:   
[phone]  
   
                                        Comment on above:   PATIENT WAS FASTINGP  
ERFORMED BY: AMOR [facility] [id number] Information:   
[id number]   
   
                                                    Neutrophils (Bld)   
[#/Vol]         5.0 10*3/uL     Normal          1.4-7.0         Comprehensive   
Internal   
Medicine;   
Comprehensive   
Internal   
Medicine  
Work Phone:   
[phone]  
   
                                        Comment on above:   PATIENT WAS FASTINGP  
ERFORMED BY: AMOR [facility] Ylftri6922 [facility] [id number] Information:   
[id number]   
   
                                                    Neutrophils Auto   
#/vol (Bld)     5.0 {x10E3/uL}  Normal          1.4-7.0         Comprehensive   
Internal   
Medicine  
Work Phone:   
[phone]  
   
                                                    Neutrophils/100 WBC   
(Bld)           61 %            Normal                          Comprehensive   
Internal   
Medicine  
Work Phone:   
[phone]  
   
                                        Comment on above:   PATIENT WAS FASTINGP  
ERFORMED BY: AMOR [place] [id number] [facility] [id number] Information:   
[id number]   
   
                                                    Neutrophils/100 WBC   
Auto (Bld)      61 %            Normal                          Comprehensive   
Internal   
Medicine  
Work Phone:   
[phone]  
   
                                                    Platelets (Bld)   
[#/Vol]         188 {x10E3/uL}  Normal          150-379         Comprehensive   
Internal   
Medicine  
Work Phone:   
[phone]  
   
                                        Comment on above:   PATIENT WAS FASTINGP  
ERFORMED BY: AMOR [place] [id number] [facility] [id number] Information:   
[id number]   
   
                                                    Platelets (Bld)   
[#/Vol]         188 10*3/uL     Normal          150-379         Comprehensive   
Internal   
Medicine;   
Comprehensive   
Internal   
Medicine  
Work Phone:   
[phone]  
   
                                        Comment on above:   PATIENT WAS FASTINGP  
ERFORMED BY: AMOR [facility] [id number] Information:   
[id number]   
   
                                                    Platelets Auto #/vol   
(Bld)           188 {x10E3/uL}  Normal          150-379         Comprehensive   
Internal   
Medicine  
Work Phone:   
[phone]  
   
                      RBC (Bld) [#/Vol] 5.26 {x10E6/uL} Normal     4.14-5.80  [facility]   
Internal   
Medicine  
Work Phone:   
[phone]  
   
                                        Comment on above:   PATIENT WAS FASTINGP  
ERFORMED BY: [facility] [id number] Information:   
[id number]   
   
                      RBC (Bld) [#/Vol] 5.26 10*6/uL Normal     4.14-5.80  [facility]   
Internal   
Medicine;   
Comprehensive   
Internal   
Medicine  
Work Phone:   
[phone]  
   
                                        Comment on above:   PATIENT WAS FASTINGP  
ERFORMED BY: [facility] [id number] Information:   
[id number]   
   
                      RBC Auto #/vol (Bld) 5.26 {x10E6/uL} Normal     4.14-5.80   
 Comprehensive   
Internal   
Medicine  
Work Phone:   
[phone]  
   
                      WBC (Bld) [#/Vol] 8.2 {x10E3/uL} Normal     3.4-10.8   Com  
prehensive   
Internal   
Medicine  
Work Phone:   
[phone]  
   
                                        Comment on above:   PATIENT WAS FASTINGP  
ERFORMED BY: [facility] [id number] Information:   
[id number]   
   
                      WBC (Bld) [#/Vol] 8.2 10*3/uL Normal     3.4-10.8   [facility]   
Internal   
Medicine;   
Comprehensive   
Internal   
Medicine  
Work Phone:   
[phone]  
   
                                        Comment on above:   PATIENT WAS FASTINGP  
ERFORMED BY: [facility] [id number] Information:   
[id number]   
   
                      WBC Auto #/vol (Bld) 8.2 {x10E3/uL} Normal     3.4-10.8     
Comprehensive   
Internal   
Medicine  
Work Phone:   
[phone]  
   
                                                    HGB A1C (83718)Ordered By: PATITO kelleytem Manager on 2016   
   
                                                    Hemoglobin   
A1c/Hemoglobin.total   
mass fraction (Bld) 5.8 %           Abnormal        4.8-5.6         Comprehensiv  
e   
Internal   
Medicine  
Work Phone:   
[phone]  
   
                                        Comment on above:   . Pre-diabetes: 5.7   
- 6.4 Diabetes: >6.4 Glycemic control for   
adults with diabetes: <7.0   
   
                                                            PATIENT WAS FASTINGP  
ERFORMED BY: AMOR LabAb MullerRncyxr[address]   
[facility] [id number]   
   
                                                    LIPID PANEL (74085)Ordered B  
y:  on 2016   
   
                                                    Cholesterol in HDL   
mass conc       44 mg/dL        Normal                          Comprehensive   
Internal   
Medicine  
Work Phone:   
[phone]  
   
                                        Comment on above:   According to ATP-III  
 Guidelines, HDL-C >59 mg/dL is considered  
   
anegative risk factor for CHD.   
   
                                                            PATIENT WAS FASTINGP  
ERFORMED BY: AMOR LabAb MullerYuxuqe[address]   
[facility] [id number]   
   
                                                    Cholesterol in LDL   
mass conc       98 mg/dL        Normal          0-99            Comprehensive   
Internal   
Medicine  
Work Phone:   
[phone]  
   
                                        Comment on above:   PATIENT WAS FASTINGP  
ERFORMED BY: AMOR Muller[address]   
[facility] [id number]   
   
                                                    Cholesterol in   
LDL/Cholesterol in   
HDL mass ratio  2.2 {ratio_units} Normal          0.0-3.6         Comprehensive   
Internal   
Medicine  
Work Phone:   
[phone]  
   
                                        Comment on above:   LDL/HDL Ratio Men Wo  
men 1/2 Avg.Risk 1.0 1.5 Avg.Risk 3.6 3.2   
2X   
Avg.Risk 6.2 5.0 3X Avg.Risk 8.0 6.1   
   
                                                            PATIENT WAS FASTINGP  
ERFORMED BY: AMOR Mullerlin[address]   
[facility] [id number]   
   
                                                    Cholesterol in VLDL   
mass conc       31 mg/dL        Normal          5-40            Comprehensive   
Internal   
Medicine  
Work Phone:   
[phone]  
   
                                        Comment on above:   PATIENT WAS FASTINGP  
ERFORMED BY: AMOR LabAb MullerSqyjcs[address]   
[facility] [id number]   
   
                      Cholesterol mass conc 173 mg/dL  Normal     100-199    Com  
prehensive   
Internal   
Medicine  
Work Phone:   
[phone]  
   
                                        Comment on above:   PATIENT WAS FASTINGP  
ERFORMED BY: AMOR LabAb MullerLnwhwq[address]   
[facility] [id number]   
   
                                                    Triglyceride mass   
conc            153 mg/dL       Abnormal        0-149           Comprehensive   
Internal   
Medicine  
Work Phone:   
[phone]  
   
                                        Comment on above:   PATIENT WAS FASTINGP  
ERFORMED BY: [facility] [address]   
RoadDublin [place] [id number]   
   
                                                    METABOLIC PANEL, COMPREHENSI  
VE (99094)Ordered By:  on 2016   
   
                      Albumin mass conc 4.5 g/dL   Normal     3.5-5.5    [facility]pre[facility]  
[facility]   
Internal   
Medicine  
Work Phone:   
[phone]  
   
                                        Comment on above:   PATIENT WAS FASTINGP  
ERFORMED BY: CB [facility] [address]ks[address]   
RoadDublin [place] [id number]   
   
                                                    Albumin/Globulin mass   
ratio           1.8 {ratio}     Normal          1.1-2.5         Comprehensive   
Internal   
Medicine  
Work Phone:   
[phone]  
   
                                        Comment on above:   PATIENT WAS FASTINGP  
ERFORMED BY: CB [facility] [address]   
Road[place] [id number]   
   
                                                    ALP [Catalytic   
activity/Vol]   53 U/L          Normal                    Comprehensive   
Internal   
Medicine;   
Comprehensive   
Internal   
Medicine  
Work Phone:   
[phone]  
   
                                        Comment on above:   PATIENT WAS FASTINGP  
ERFORMED BY: CB [facility] [address]   
Road[place] [id number]   
   
                      ALP enzyme act/vol 53 [iU]/L  Normal          [facility]   
Internal   
Medicine  
Work Phone:   
[phone]  
   
                                        Comment on above:   PATIENT WAS FASTINGP  
ERFORMED BY: CB [facility] [address]   
Road[place] [id number]   
   
                                                    ALT [Catalytic   
activity/Vol]   30 U/L          Normal          0-44            Comprehensive   
Internal   
Medicine;   
Comprehensive   
Internal   
Medicine  
Work Phone:   
[phone]  
   
                                        Comment on above:   PATIENT WAS FASTINGP  
ERFORMED BY: CB [facility] [address]   
Road[place] [id number]   
   
                      ALT enzyme act/vol 30 [iU]/L  Normal     0-44       [facility]   
Internal   
Medicine  
Work Phone:   
[phone]  
   
                                        Comment on above:   PATIENT WAS FASTINGP  
ERFORMED BY: CB [facility] [address]   
Road[place] [id number]   
   
                                                    AST [Catalytic   
activity/Vol]   25 U/L          Normal          0-40            Comprehensive   
Internal   
Medicine;   
Comprehensive   
Internal   
Medicine  
Work Phone:   
[phone]  
   
                                        Comment on above:   PATIENT WAS FASTINGP  
ERFORMED BY: AMOR LabAb [address]Iirais[address]   
Road[place] [id number]   
   
                      AST enzyme act/vol 25 [iU]/L  Normal     0-40       Compre  
hensive   
Internal   
Medicine  
Work Phone:   
[phone]  
   
                                        Comment on above:   PATIENT WAS FASTINGP  
ERFORMED BY: AMOR [address]   
Road[place] [id number]   
   
                      Bilirubin mass conc 0.5 mg/dL  Normal     0.0-1.2    Compr  
ensive   
Internal   
Medicine  
Work Phone:   
[phone]  
   
                                        Comment on above:   PATIENT WAS FASTINGP  
ERFORMED BY: AMOR [address]   
Road[place] [id number]   
   
                      Calcium mass conc 9.6 mg/dL  Normal     8.7-10.2   Compreh  
ensive   
Internal   
Medicine  
Work Phone:   
[phone]  
   
                                        Comment on above:   PATIENT WAS FASTINGP  
ERFORMED BY: AMOR [address]   
RoadDuke Healthin [place] [id number]   
   
                      Chloride molar conc 98 mmol/L  Normal          Compr  
ensive   
Internal   
Medicine  
Work Phone:   
[phone]  
   
                                        Comment on above:   PATIENT WAS FASTINGP  
ERFORMED BY: AMOR [address]   
[facility] [place] [id number]   
   
                      CO2 molar conc 23 mmol/L  Normal     18-29      Comprehens  
bebe   
Internal   
Medicine  
Work Phone:   
[phone]  
   
                                        Comment on above:   PATIENT WAS FASTINGP  
ERFORMED BY: AMOR [address]   
[facility] [id number]   
   
                      Creatinine mass conc 0.95 mg/dL Normal     0.76-1.27  Comp  
[facility]   
Internal   
Medicine  
Work Phone:   
[phone]  
   
                                        Comment on above:   PATIENT WAS FASTINGP  
ERFORMED BY: AMOR [address]   
[facility] [place] [id number]   
   
                                                    GFR/1.73 sq M   
predicted among   
blacks CKD-EPI vol   
rate/area (S/P/Bld) 106 mL/min/1.73 Normal                          Comprehensiv  
e   
Internal   
Medicine  
Work Phone:   
[phone]  
   
                                        Comment on above:   PATIENT WAS FASTINGP  
ERFORMED BY: AMOR [address]   
[address] [id number]   
   
                                                    GFR/1.73 sq M   
predicted among   
non-blacks CKD-EPI   
vol rate/area   
(S/P/Bld)       92 mL/min/1.73  Normal                          Comprehensive   
Internal   
Medicine  
Work Phone:   
[phone]  
   
                                        Comment on above:   PATIENT WAS FASTINGP  
ERFORMED BY: [facility] [address]   
[place] [id number]   
   
                                                    Globulin (S)   
[Mass/Vol]      2.5 g/dL        Normal          1.5-4.5         Comprehensive   
Internal   
Medicine  
Work Phone:   
[phone]  
   
                                        Comment on above:   PATIENT WAS FASTINGP  
ERFORMED BY: [facility] [address]   
[place] [id number]   
   
                                                    Globulin Calculated   
mass conc (S)   2.5 g/dL        Normal          1.5-4.5         Comprehensive   
Internal   
Medicine  
Work Phone:   
[phone]  
   
                      Glucose mass conc 122 mg/dL  Abnormal   65-99      Compreh  
ensive   
Internal   
Medicine  
Work Phone:   
[phone]  
   
                                        Comment on above:   PATIENT WAS FASTINGP  
ERFORMED BY: [facility] [id number]   
[facility] [place] [id number]   
   
                      Potassium molar conc 4.6 mmol/L Normal     3.5-5.2    Comp  
rehensive   
Internal   
Medicine  
Work Phone:   
[phone]  
   
                                        Comment on above:   PATIENT WAS FASTINGP  
ERFORMED BY: [facility] [address]   
[facility] [place] [id number]   
   
                      Protein mass conc 7.0 g/dL   Normal     6.0-8.5    Compreh  
ensive   
Internal   
Medicine  
Work Phone:   
[phone]  
   
                                        Comment on above:   PATIENT WAS FASTINGP  
ERFORMED BY: [facility] [address]   
[facility] [place] [id number]   
   
                      Sodium molar conc 139 mmol/L Normal     134-144    Compreh  
ensive   
Internal   
Medicine  
Work Phone:   
[phone]  
   
                                        Comment on above:   PATIENT WAS FASTINGP  
ERFORMED BY: [facility] [address]   
[place] [id number]   
   
                                                    Urea nitrogen mass   
conc            12 mg/dL        Normal          6-24            Comprehensive   
Internal   
Medicine  
Work Phone:   
[phone]  
   
                                        Comment on above:   PATIENT WAS FASTINGP  
ERFORMED BY: [facility] [address]uc[address] [facility] [id number]   
   
                                                    Urea   
nitrogen/Creatinine   
mass ratio      13 mg/mg        Normal          9-20            Comprehensive   
Internal   
Medicine  
Work Phone:   
[phone]  
   
                                        Comment on above:   PATIENT WAS FASTINGP  
ERFORMED BY: [facility] [address] [facility] [id number]   
   
                                                    MICROALBUMINOrdered By: Syst  
em Manager on 2016   
   
                                                    Albumin DL <= 20 mg/L   
mass conc (U)   5.9 ug/mL       Normal                          Comprehensive   
Internal   
Medicine  
Work Phone:   
[phone]  
   
                                        Comment on above:   PATIENT WAS FASTINGP  
ERFORMED BY: [facility] [address] [facility] [id number]   
   
                                                    Albumin/Creatinine   
mass ratio (U)  3.5 {mg/g_creat} Normal          0.0-30.0        Comprehensive   
Internal   
Medicine  
Work Phone:   
[phone]  
   
                                        Comment on above:   PATIENT WAS FASTINGP  
ERFORMED BY: [facility] [id number] [facility] [id number]   
   
                                                    Creatinine mass conc   
(U)             170.7 mg/dL     Normal                          Comprehensive   
Internal   
Medicine  
Work Phone:   
[phone]  
   
                                        Comment on above:   PATIENT WAS FASTINGP  
ERFORMED BY: [facility] [address] [facility] [id number]   
   
                                                    Microscopic ExaminationOrder  
ed By:  on 2016   
   
                                                    Bacteria LM.HPF   
#/area (Urine sed) Few             Normal                          Comprehensive  
   
Internal   
Medicine  
Work Phone:   
[phone]  
   
                                                    Epithelial cells   
LM.HPF #/area (Urine   
sed)            None seen       Normal          0 - 10          Comprehensive   
Internal   
Medicine  
Work Phone:   
[phone]  
   
                                                    Mucus LM Ql (Urine   
sed)            Present         Normal                          Comprehensive   
Internal   
Medicine  
Work Phone:   
[phone]  
   
                                                    RBC LM.HPF #/area   
(Urine sed)     0-2             Normal          0 - 2           Comprehensive   
Internal   
Medicine  
Work Phone:   
[phone]  
   
                                                    WBC LM.HPF #/area   
(Urine sed)     0-5             Normal          0 - 5           Comprehensive   
Internal   
Medicine  
Work Phone:   
[phone]  
   
                                                    TSH (25533)Ordered By: Warp 9e  
m Manager on 2016   
   
                          Thyrotropin Qn 2.550 {uIU/mL} Normal       0.450-4.50  
0                                       Comprehensive   
Internal   
Medicine  
Work Phone:   
[phone]  
   
                                        Comment on above:   PATIENT WAS FASTINGP  
ERFORMED BY: [facility] [address]   
[place] [id number]   
   
                                                    URINALYSIS, W/ MICRO (95442)  
Ordered By:  on 2016   
   
                      Appearance Nom (U) Clear      Normal                Compre  
hensive   
Internal   
Medicine  
Work Phone:   
[phone]  
   
                                        Comment on above:   PATIENT WAS FASTINGP  
ERFORMED BY: [facility] [address]   
[place] [id number]   
   
                      Bilirubin Ql (U) Negative   Normal                Comprehe  
nsive   
Internal   
Medicine  
Work Phone:   
[phone]  
   
                                        Comment on above:   PATIENT WAS FASTINGP  
ERFORMED BY: [facility] [address]   
[place] [id number]   
   
                      Bilirubin Ql (U) Negative   Normal                Comprehe  
nsive   
Internal   
Medicine;   
Comprehensive   
Internal   
Medicine  
Work Phone:   
[phone]  
   
                                        Comment on above:   PATIENT WAS FASTINGP  
ERFORMED BY: [facility] [address]   
[place] [id number]   
   
                      Color Nom (U) Yellow     Normal                Comprehensi  
ve   
Internal   
Medicine  
Work Phone:   
[phone]  
   
                                        Comment on above:   PATIENT WAS FASTINGP  
ERFORMED BY: [facility] [address]   
[place] [id number]   
   
                      Glucose Ql (U) Negative   Normal                Comprehens  
bebe   
Internal   
Medicine  
Work Phone:   
[phone]  
   
                                        Comment on above:   PATIENT WAS FASTINGP  
ERFORMED BY: [facility] [address]   
[place] [id number]   
   
                      Glucose Ql (U) Negative   Normal                Comprehens  
bebe   
Internal   
Medicine;   
Comprehensive   
Internal   
Medicine  
Work Phone:   
[phone]  
   
                                        Comment on above:   PATIENT WAS FASTINGP  
ERFORMED BY: [facility] [address]   
[place] [id number]   
   
                      Hemoglobin Ql (U) Negative   Normal                Compreh  
ensive   
Internal   
Medicine  
Work Phone:   
[phone]  
   
                                        Comment on above:   PATIENT WAS FASTINGP  
ERFORMED BY: [facility] [address]   
[place] [id number]   
   
                      Hemoglobin Ql (U) Negative   Normal                Compreh  
ensive   
Internal   
Medicine;   
Comprehensive   
Internal   
Medicine  
Work Phone:   
[phone]  
   
                                        Comment on above:   PATIENT WAS FASTINGP  
ERFORMED BY: [facility] [address]   
[place] [id number]   
   
                                                    Hemoglobin Test strip   
Ql (U)          Negative        Normal                          Comprehensive   
Internal   
Medicine  
Work Phone:   
[phone]  
   
                      Ketones Ql (U) Trace      Abnormal              Comprehens  
bebe   
Internal   
Medicine  
Work Phone:   
[phone]  
   
                                        Comment on above:   PATIENT WAS FASTINGP  
ERFORMED BY: [facility] [address]   
[place] [id number]   
   
                                                    Leukocyte esterase   
Test strip Ql (U) Negative        Normal                          Comprehensive   
Internal   
Medicine  
Work Phone:   
[phone]  
   
                                        Comment on above:   PATIENT WAS FASTINGP  
ERFORMED BY: [facility] [address]   
[place] [id number]   
   
                                                    Leukocyte esterase   
Test strip Ql (U) Negative        Normal                          Comprehensive   
Internal   
Medicine;   
Comprehensive   
Internal   
Medicine  
Work Phone:   
[phone]  
   
                                        Comment on above:   PATIENT WAS FASTINGP  
ERFORMED BY: [facility] [address]   
[place] [id number]   
   
                                                    Microscopic   
observation LM Nom   
(Urine sed)     MICRON          Normal                          Comprehensive   
Internal   
Medicine  
Work Phone:   
[phone]  
   
                                        Comment on above:   Microscopic follows   
if indicated.   
   
                                                            PATIENT WAS FASTINGP  
ERFORMED BY: [facility] [address]   
[place] [id number]   
   
                                                    Microscopic   
observation LM Nom   
(Urine sed)     See below:      Normal                          Comprehensive   
Internal   
Medicine  
Work Phone:   
[phone]  
   
                                        Comment on above:   Microscopic was benny  
cated and was performed.   
   
                                                            PATIENT WAS FASTINGP  
ERFORMED BY: [facility] [address]   
[place] [id number]   
   
                      Nitrite Ql (U) Negative   Normal                Comprehens  
bebe   
Internal   
Medicine  
Work Phone:   
[phone]  
   
                                        Comment on above:   PATIENT WAS FASTINGP  
ERFORMED BY: [facility] [address]   
[place] [id number]   
   
                      Nitrite Ql (U) Negative   Normal                Comprehens  
bebe   
Internal   
Medicine;   
Comprehensive   
Internal   
Medicine  
Work Phone:   
[phone]  
   
                                        Comment on above:   PATIENT WAS FASTINGP  
ERFORMED BY: [facility] [address]   
[place] [id number]   
   
                                                    Nitrite Test strip Ql   
(U)             Negative        Normal                          Comprehensive   
Internal   
Medicine  
Work Phone:   
[phone]  
   
                      pH (U)     6.0 [pH]   Normal     5.0-7.5    Comprehensive   
Internal   
Medicine  
Work Phone:   
[phone]  
   
                                        Comment on above:   PATIENT WAS FASTINGP  
ERFORMED BY: [facility] [address]   
[place] [id number]   
   
                      pH Test strip (U) 6.0 [pH]   Normal     5.0-7.5    Compreh  
ensive   
Internal   
Medicine  
Work Phone:   
[phone]  
   
                      Protein Ql (U) Negative   Normal                Comprehens  
bebe   
Internal   
Medicine  
Work Phone:   
[phone]  
   
                                        Comment on above:   PATIENT WAS FASTINGP  
ERFORMED BY: [facility] Lab[facility]aur[facility] [address]   
[place] [id number]   
   
                      Protein Ql (U) Negative   Normal                Comprehens  
bebe   
Internal   
Medicine;   
Comprehensive   
Internal   
Medicine  
Work Phone:   
[phone]  
   
                                        Comment on above:   PATIENT WAS FASTINGP  
ERFORMED BY: [facility] [address]   
[place] [id number]   
   
                                                    Protein Test strip Ql   
(U)             Negative        Normal                          Comprehensive   
Internal   
Medicine  
Work Phone:   
[phone]  
   
                                                    Specific gravity   
Relative Density (U) 1.021 1             Normal              1.005-1.03  
0                                       Comprehensive   
Internal   
Medicine  
Work Phone:   
[phone]  
   
                                        Comment on above:   PATIENT WAS FASTINGP  
ERFORMED BY: [facility] [address]   
[place] [id number]   
   
                                                    Urobilinogen (U)   
[Mass/Vol]      1.0 mg/dL       Normal          0.2-1.0         Comprehensive   
Internal   
Medicine;   
Comprehensive   
Internal   
Medicine  
Work Phone:   
[phone]  
   
                                        Comment on above:   PATIENT WAS FASTINGP  
ERFORMED BY: [facility] [address]   
[place] [id number]   
   
                                                    Urobilinogen Test   
strip mass conc (U) 1.0 mg/dL       Normal          0.2-1.0         Comprehensiv  
e   
Internal   
Medicine  
Work Phone:   
[phone]  
   
                                        Comment on above:   PATIENT WAS FASTINGP  
ERFORMED BY: [facility] [facility] [id number]   
   
                                                    CBC W/AUTO DIFF WBC (28045)O  
rdered By:  on 2016   
   
                                                    Basophils (Bld)   
[#/Vol]         0.1 {x10E3/uL}  Normal          0.0-0.2         Comprehensive   
Internal   
Medicine  
Work Phone:   
[phone]  
   
                                        Comment on above:   PATIENT WAS FASTINGP  
ERFORMED BY: [address] [id number] Information:   
[id number]   
   
                                                    Basophils (Bld)   
[#/Vol]         0.1 10*3/uL     Normal          0.0-0.2         Comprehensive   
Internal   
Medicine;   
Comprehensive   
Internal   
Medicine  
Work Phone:   
[phone]  
   
                                        Comment on above:   PATIENT WAS FASTINGP  
ERFORMED BY: [address] [id number] Information:   
[id number]   
   
                                                    Basophils Auto #/vol   
(Bld)           0.1 {x10E3/uL}  Normal          0.0-0.2         Comprehensive   
Internal   
Medicine  
Work Phone:   
[phone]  
   
                                                    Basophils/100 WBC   
(Bld)           1 %             Normal                          Comprehensive   
Internal   
Medicine  
Work Phone:   
[phone]  
   
                                        Comment on above:   PATIENT WAS FASTINGP  
ERFORMED BY: [facility] [facility] [id number] Information:   
[id number]   
   
                                                    Basophils/100 WBC   
Auto (Bld)      1 %             Normal                          Comprehensive   
Internal   
Medicine  
Work Phone:   
[phone]  
   
                                                    Eosinophils (Bld)   
[#/Vol]         0.2 {x10E3/uL}  Normal          0.0-0.4         Comprehensive   
Internal   
Medicine  
Work Phone:   
[phone]  
   
                                        Comment on above:   PATIENT WAS FASTINGP  
ERFORMED BY: [place] [id number] [facility] [id number] Information:   
[id number]   
   
                                                    Eosinophils (Bld)   
[#/Vol]         0.2 10*3/uL     Normal          0.0-0.4         Comprehensive   
Internal   
Medicine;   
Comprehensive   
Internal   
Medicine  
Work Phone:   
[phone]  
   
                                        Comment on above:   PATIENT WAS FASTINGP  
ERFORMED BY: [facility] [id number] Information:   
[id number]   
   
                                                    Eosinophils Auto   
#/vol (Bld)     0.2 {x10E3/uL}  Normal          0.0-0.4         Comprehensive   
Internal   
Medicine  
Work Phone:   
[phone]  
   
                                                    Eosinophils/100 WBC   
(Bld)           2 %             Normal                          Comprehensive   
Internal   
Medicine  
Work Phone:   
[phone]  
   
                                        Comment on above:   PATIENT WAS FASTINGP  
ERFORMED BY: [place] [id number] [facility] [id number] Information:   
[id number]   
   
                                                    Eosinophils/100 WBC   
Auto (Bld)      2 %             Normal                          Comprehensive   
Internal   
Medicine  
Work Phone:   
[phone]  
   
                                                    Erythrocyte   
distribution width   
(RBC) [Ratio]   12.9 %          Normal          12.3-15.4       Comprehensive   
Internal   
Medicine  
Work Phone:   
[phone]  
   
                                        Comment on above:   PATIENT WAS FASTINGP  
ERFORMED BY: [place] [id number] [facility] [id number] Information:   
[id number]   
   
                                                    Erythrocyte   
distribution width   
Auto Ratio (RBC) 12.9 %          Normal          12.3-15.4       Comprehensive   
Internal   
Medicine  
Work Phone:   
[phone]  
   
                                                    Hematocrit (Bld)   
[Volume fraction] 47.6 %          Normal          37.5-51.0       Comprehensive   
Internal   
Medicine  
Work Phone:   
[phone]  
   
                                        Comment on above:   PATIENT WAS FASTINGP  
ERFORMED BY: [place] [address] [facility] [id number] Information:   
[id number]   
   
                                                    Hematocrit Auto   
Volume Fraction (Bld) 47.6 %          Normal          37.5-51.0       [facility]   
Internal   
Medicine  
Work Phone:   
[phone]  
   
                                                    Hemoglobin mass conc   
(Bld)           16.4 g/dL       Normal          12.6-17.7       Comprehensive   
Internal   
Medicine  
Work Phone:   
[phone]  
   
                                        Comment on above:   PATIENT WAS FASTINGP  
ERFORMED BY: [place] [address] [facility] [id number] Information:   
[id number]   
   
                                                    Immature granulocytes   
#/vol (Bld)     0.0 {x10E3/uL}  Normal          0.0-0.1         Comprehensive   
Internal   
Medicine  
Work Phone:   
[phone]  
   
                                        Comment on above:   PATIENT WAS FASTINGP  
ERFORMED BY: [address] [id number] Information:   
[id number]   
   
                                                    Immature granulocytes   
(Bld) [#/Vol]   0.0 10*3/uL     Normal          0.0-0.1         Comprehensive   
Internal   
Medicine;   
Comprehensive   
Internal   
Medicine  
Work Phone:   
[phone]  
   
                                        Comment on above:   PATIENT WAS FASTINGP  
ERFORMED BY: [address] [id number] Information:   
[id number]   
   
                                                    Immature   
granulocytes/100 WBC   
(Bld)           0 %             Normal                          Comprehensive   
Internal   
Medicine  
Work Phone:   
[phone]  
   
                                        Comment on above:   PATIENT WAS FASTINGP  
ERFORMED BY: [address] [id number] Information:   
[id number]   
   
                                                    Lymphocytes (Bld)   
[#/Vol]         3.3 {x10E3/uL}  Abnormal        0.7-3.1         Comprehensive   
Internal   
Medicine  
Work Phone:   
[phone]  
   
                                        Comment on above:   PATIENT WAS FASTINGP  
ERFORMED BY: [address] [id number] Information:   
[id number]   
   
                                                    Lymphocytes (Bld)   
[#/Vol]         3.3 10*3/uL     Abnormal        0.7-3.1         Comprehensive   
Internal   
Medicine;   
Comprehensive   
Internal   
Medicine  
Work Phone:   
[phone]  
   
                                        Comment on above:   PATIENT WAS FASTINGP  
ERFORMED BY: [address] [id number] Information:   
[id number]   
   
                                                    Lymphocytes Auto   
#/vol (Bld)     3.3 {x10E3/uL}  Abnormal        0.7-3.1         Comprehensive   
Internal   
Medicine  
Work Phone:   
[phone]  
   
                                                    Lymphocytes/100 WBC   
(Bld)           43 %            Normal                          Comprehensive   
Internal   
Medicine  
Work Phone:   
[phone]  
   
                                        Comment on above:   PATIENT WAS FASTINGP  
ERFORMED BY: [place] [address] [facility] [id number] Information:   
[id number]   
   
                                                    Lymphocytes/100 WBC   
Auto (Bld)      43 %            Normal                          Comprehensive   
Internal   
Medicine  
Work Phone:   
[phone]  
   
                                                    MCH (RBC) [Entitic   
mass]           32.0 pg         Normal          26.6-33.0       Comprehensive   
Internal   
Medicine  
Work Phone:   
[phone]  
   
                                        Comment on above:   PATIENT WAS FASTINGP  
ERFORMED BY: [place] [id number] [facility] [id number] Information:   
[id number]   
   
                                                    MCH Auto Entitic mass   
(RBC)           32.0 pg         Normal          26.6-33.0       Comprehensive   
Internal   
Medicine  
Work Phone:   
[phone]  
   
                      MCHC (RBC) [Mass/Vol] 34.5 g/dL  Normal     31.5-35.7  Com  
prehensive   
Internal   
Medicine  
Work Phone:   
[phone]  
   
                                        Comment on above:   PATIENT WAS FASTINGP  
ERFORMED BY: [address] [id number] Information:   
[id number]   
   
                                                    MCHC Auto mass conc   
(RBC)           34.5 g/dL       Normal          31.5-35.7       Comprehensive   
Internal   
Medicine  
Work Phone:   
[phone]  
   
                                                    MCV (RBC) [Entitic   
vol]            93 fL           Normal          79-97           Comprehensive   
Internal   
Medicine  
Work Phone:   
[phone]  
   
                                        Comment on above:   PATIENT WAS FASTINGP  
ERFORMED BY: [place] [id number] [facility] [id number] Information:   
[id number]   
   
                                                    MCV Auto Entitic   
volume (RBC)    93 fL           Normal          79-97           Comprehensive   
Internal   
Medicine  
Work Phone:   
[phone]  
   
                                                    Monocytes (Bld)   
[#/Vol]         0.7 {x10E3/uL}  Normal          0.1-0.9         Comprehensive   
Internal   
Medicine  
Work Phone:   
[phone]  
   
                                        Comment on above:   PATIENT WAS FASTINGP  
ERFORMED BY: [address] [id number] Information:   
[id number]   
   
                                                    Monocytes (Bld)   
[#/Vol]         0.7 10*3/uL     Normal          0.1-0.9         Comprehensive   
Internal   
Medicine;   
Comprehensive   
Internal   
Medicine  
Work Phone:   
[phone]  
   
                                        Comment on above:   PATIENT WAS FASTINGP  
ERFORMED BY: AMOR [facility] Hpptja9811 [facility] [id number] Information:   
[id number]   
   
                                                    Monocytes Auto #/vol   
(Bld)           0.7 {x10E3/uL}  Normal          0.1-0.9         Comprehensive   
Internal   
Medicine  
Work Phone:   
[phone]  
   
                                                    Monocytes/100 WBC   
(Bld)           9 %             Normal                          Comprehensive   
Internal   
Medicine  
Work Phone:   
[phone]  
   
                                        Comment on above:   PATIENT WAS FASTINGP  
ERFORMED BY: AMOR [address] [id number] Information:   
[id number]   
   
                                                    Monocytes/100 WBC   
Auto (Bld)      9 %             Normal                          Comprehensive   
Internal   
Medicine  
Work Phone:   
[phone]  
   
                                                    Neutrophils (Bld)   
[#/Vol]         3.4 {x10E3/uL}  Normal          1.4-7.0         Comprehensive   
Internal   
Medicine  
Work Phone:   
[phone]  
   
                                        Comment on above:   PATIENT WAS FASTINGP  
ERFORMED BY: AMOR Mullerlin6370 [facility] [id number] Information:   
[id number]   
   
                                                    Neutrophils (Bld)   
[#/Vol]         3.4 10*3/uL     Normal          1.4-7.0         Comprehensive   
Internal   
Medicine;   
Comprehensive   
Internal   
Medicine  
Work Phone:   
[phone]  
   
                                        Comment on above:   PATIENT WAS FASTINGP  
ERFORMED BY: [place] [id number] [facility] [id number] Information:   
[id number]   
   
                                                    Neutrophils Auto   
#/vol (Bld)     3.4 {x10E3/uL}  Normal          1.4-7.0         Comprehensive   
Internal   
Medicine  
Work Phone:   
[phone]  
   
                                                    Neutrophils/100 WBC   
(Bld)           45 %            Normal                          Comprehensive   
Internal   
Medicine  
Work Phone:   
[phone]  
   
                                        Comment on above:   PATIENT WAS FASTINGP  
ERFORMED BY: [facility]6[address] [facility] [id number] Information:   
[id number]   
   
                                                    Neutrophils/100 WBC   
Auto (Bld)      45 %            Normal                          Comprehensive   
Internal   
Medicine  
Work Phone:   
[phone]  
   
                                                    Platelets (Bld)   
[#/Vol]         203 {x10E3/uL}  Normal          150-379         Comprehensive   
Internal   
Medicine  
Work Phone:   
[phone]  
   
                                        Comment on above:   PATIENT WAS FASTINGP  
ERFORMED BY: [address] [id number] Information:   
[id number]   
   
                                                    Platelets (Bld)   
[#/Vol]         203 10*3/uL     Normal          150-379         Comprehensive   
Internal   
Medicine;   
Comprehensive   
Internal   
Medicine  
Work Phone:   
[phone]  
   
                                        Comment on above:   PATIENT WAS FASTINGP  
ERFORMED BY: [address] [id number] Information:   
[id number]   
   
                                                    Platelets Auto #/vol   
(Bld)           203 {x10E3/uL}  Normal          150-379         Comprehensive   
Internal   
Medicine  
Work Phone:   
[phone]  
   
                      RBC (Bld) [#/Vol] 5.12 {x10E6/uL} Normal     4.14-5.80  [facility]   
Internal   
Medicine  
Work Phone:   
[phone]  
   
                                        Comment on above:   PATIENT WAS FASTINGP  
ERFORMED BY: [place] [id number] [facility] [id number] Information:   
[id number]   
   
                      RBC (Bld) [#/Vol] 5.12 10*6/uL Normal     4.14-5.80  [facility]   
Internal   
Medicine;   
Comprehensive   
Internal   
Medicine  
Work Phone:   
[phone]  
   
                                        Comment on above:   PATIENT WAS FASTINGP  
ERFORMED BY: [address] [id number] Information:   
[id number]   
   
                      RBC Auto #/vol (Bld) 5.12 {x10E6/uL} Normal     4.14-5.80   
 Comprehensive   
Internal   
Medicine  
Work Phone:   
[phone]  
   
                      WBC (Bld) [#/Vol] 7.6 {x10E3/uL} Normal     3.4-10.8   Com  
prehensive   
Internal   
Medicine  
Work Phone:   
[phone]  
   
                                        Comment on above:   PATIENT WAS FASTINGP  
ERFORMED BY: AMOR LabAb MullerDylzvq3895 [facility] [id number] Information:   
[id number]   
   
                      WBC (Bld) [#/Vol] 7.6 10*3/uL Normal     3.4-10.8   [facility]  
[facility]   
Internal   
Medicine;   
Comprehensive   
Internal   
Medicine  
Work Phone:   
[phone]  
   
                                        Comment on above:   PATIENT WAS FASTINGP  
ERFORMED BY: [facility] Vfldqo9962 [facility] [id number] Information:   
[id number]   
   
                      WBC Auto #/vol (Bld) 7.6 {x10E3/uL} Normal     3.4-10.8     
Comprehensive   
Internal   
Medicine  
Work Phone:   
[phone]  
   
                                                    HGB A1C (50340)Ordered By: S  
ystem Manager on 2016   
   
                                                    Hemoglobin   
A1c/Hemoglobin.total   
mass fraction (Bld) 5.8 %           Abnormal        4.8-5.6         Comprehensiv  
e   
Internal   
Medicine  
Work Phone:   
[phone]  
   
                                        Comment on above:   . Pre-diabetes: 5.7   
- 6.4 Diabetes: >6.4 Glycemic control for   
adults with diabetes: <7.0   
   
                                                            PATIENT WAS FASTINGP  
ERFORMED BY: [facility] [id number]   
   
                                                    LIPID PANEL (87103)Ordered B  
y:  on 2016   
   
                                                    Cholesterol in HDL   
mass conc       55 mg/dL        Normal                          Comprehensive   
Internal   
Medicine  
Work Phone:   
[phone]  
   
                                        Comment on above:   According to ATP-III  
 Guidelines, HDL-C >59 mg/dL is considered  
   
anegative risk factor for CHD.   
   
                                                            PATIENT WAS FASTINGP  
ERFORMED BY: AMOR [facility] Gjegzr8275 [facility] [id number]   
   
                                                    Cholesterol in LDL   
mass conc       90 mg/dL        Normal          0-99            Comprehensive   
Internal   
Medicine  
Work Phone:   
[phone]  
   
                                        Comment on above:   PATIENT WAS FASTINGP  
ERFORMED BY: [facility]6370 [facility] [id number]   
   
                                                    Cholesterol in   
LDL/Cholesterol in   
HDL mass ratio  1.6 {ratio_units} Normal          0.0-3.6         Comprehensive   
Internal   
Medicine  
Work Phone:   
[phone]  
   
                                        Comment on above:   LDL/HDL Ratio Men Wo  
men 1/2 Avg.Risk 1.0 1.5 Avg.Risk 3.6 3.2   
2X   
Avg.Risk 6.2 5.0 3X Avg.Risk 8.0 6.1   
   
                                                            PATIENT WAS FASTINGP  
ERFORMED BY: AMOR Jason6370 [facility] [id number]   
   
                                                    Cholesterol in VLDL   
mass conc       18 mg/dL        Normal          5-40            Comprehensive   
Internal   
Medicine  
Work Phone:   
[phone]  
   
                                        Comment on above:   PATIENT WAS FASTINGP  
ERFORMED BY: AMOR Jason6370 [facility] [id number]   
   
                      Cholesterol mass conc 163 mg/dL  Normal     100-199    Com  
prehensive   
Internal   
Medicine  
Work Phone:   
[phone]  
   
                                        Comment on above:   PATIENT WAS FASTINGP  
ERFORMED BY: AMOR Jason6370 [facility] [id number]   
   
                                                    Triglyceride mass   
conc            92 mg/dL        Normal          0-149           Comprehensive   
Internal   
Medicine  
Work Phone:   
[phone]  
   
                                        Comment on above:   PATIENT WAS FASTINGP  
ERFORMED BY: AMOR Jason6370 [facility] [id number]   
   
                                                    METABOLIC PANEL, COMPREHENSI  
VE (78995)Ordered By:  on 2016   
   
                      Albumin mass conc 4.6 g/dL   Normal     3.5-5.5    Compreh  
ensive   
Internal   
Medicine  
Work Phone:   
[phone]  
   
                                        Comment on above:   PATIENT WAS FASTINGP  
ERFORMED BY: AMOR Jason6370 [facility] [id number]   
   
                                                    Albumin/Globulin mass   
ratio           2.1 {ratio}     Normal          1.1-2.5         Comprehensive   
Internal   
Medicine  
Work Phone:   
[phone]  
   
                                        Comment on above:   PATIENT WAS FASTINGP  
ERFORMED BY: AMOR Jason6370 [facility] [id number]   
   
                                                    ALP [Catalytic   
activity/Vol]   51 U/L          Normal                    Comprehensive   
Internal   
Medicine;   
Comprehensive   
Internal   
Medicine  
Work Phone:   
[phone]  
   
                                        Comment on above:   PATIENT WAS FASTINGP  
ERFORMED BY: AMOR [facility] [address]   
Road[address] [place] [id number]   
   
                      ALP enzyme act/vol 51 [iU]/L  Normal          [facility]   
Internal   
Medicine  
Work Phone:   
[phone]  
   
                                        Comment on above:   PATIENT WAS FASTINGP  
ERFORMED BY: AMOR [facility] [address]zgvc[address]   
RoadD[place] [id number]   
   
                                                    ALT [Catalytic   
activity/Vol]   20 U/L          Normal          0-44            Comprehensive   
Internal   
Medicine;   
Comprehensive   
Internal   
Medicine  
Work Phone:   
[phone]  
   
                                        Comment on above:   PATIENT WAS FASTINGP  
ERFORMED BY: AMOR [facility] Abqinf[address]   
Road[address] [id number]   
   
                      ALT enzyme act/vol 20 [iU]/L  Normal     0-44       [facility]   
Internal   
Medicine  
Work Phone:   
[phone]  
   
                                        Comment on above:   PATIENT WAS FASTINGP  
ERFORMED BY: AMOR LabCosaurabh MullerTjtiqa[address]   
Road[address] [id number]   
   
                                                    AST [Catalytic   
activity/Vol]   22 U/L          Normal          0-40            Comprehensive   
Internal   
Medicine;   
Comprehensive   
Internal   
Medicine  
Work Phone:   
[phone]  
   
                                        Comment on above:   PATIENT WAS FASTINGP  
ERFORMED BY: AMOR LabCosaurabh MullerOqdbff[address]   
Road[address] [id number]   
   
                      AST enzyme act/vol 22 [iU]/L  Normal     0-40       [facility]   
Internal   
Medicine  
Work Phone:   
[phone]  
   
                                        Comment on above:   PATIENT WAS FASTINGP  
ERFORMED BY: AMOR [facility] T[address]ldb[address]   
Road[address] [id number]   
   
                      Bilirubin mass conc 0.5 mg/dL  Normal     0.0-1.2    [facility]   
Internal   
Medicine  
Work Phone:   
[phone]  
   
                                        Comment on above:   PATIENT WAS FASTINGP  
ERFORMED BY: AMOR [facility] Cvidhk[address]   
Road[address] [id number]   
   
                      Calcium mass conc 9.5 mg/dL  Normal     8.7-10.2   [facility]   
Internal   
Medicine  
Work Phone:   
[phone]  
   
                                        Comment on above:   PATIENT WAS FASTINGP  
ERFORMED BY: AMOR [facility] Mqyydf4001 [facility] [id number]   
   
                      Chloride molar conc 100 mmol/L Normal          Compr  
ehensive   
Internal   
Medicine  
Work Phone:   
[phone]  
   
                                        Comment on above:   PATIENT WAS FASTINGP  
ERFORMED BY: AMOR Ilene Mullerlin6370 [facility] [id number]   
   
                      CO2 molar conc 23 mmol/L  Normal     18-29      Comprehens  
bebe   
Internal   
Medicine  
Work Phone:   
[phone]  
   
                                        Comment on above:   PATIENT WAS FASTINGP  
ERFORMED BY: AMOR [facility] Rxbley0136 [facility] [id number]   
   
                      Creatinine mass conc 0.87 mg/dL Normal     0.76-1.27  Comp  
[facility]   
Internal   
Medicine  
Work Phone:   
[phone]  
   
                                        Comment on above:   PATIENT WAS FASTINGP  
ERFORMED BY: AMOR [facility] Omnlmo0304 [facility] [id number]   
   
                                                    GFR/1.73 sq M   
predicted among   
blacks CKD-EPI vol   
rate/area (S/P/Bld) 115 mL/min/1.73 Normal                          Comprehensiv  
e   
Internal   
Medicine  
Work Phone:   
[phone]  
   
                                        Comment on above:   PATIENT WAS FASTINGP  
ERFORMED BY: AMOR [facility] Qzjsas2088 [facility] [id number]   
   
                                                    GFR/1.73 sq M   
predicted among   
non-blacks CKD-EPI   
vol rate/area   
(S/P/Bld)       99 mL/min/1.73  Normal                          Comprehensive   
Internal   
Medicine  
Work Phone:   
[phone]  
   
                                        Comment on above:   PATIENT WAS FASTINGP  
ERFORMED BY: AMOR [facility] Kgxoch2339 [facility] [id number]   
   
                                                    Globulin (S)   
[Mass/Vol]      2.2 g/dL        Normal          1.5-4.5         Comprehensive   
Internal   
Medicine  
Work Phone:   
[phone]  
   
                                        Comment on above:   PATIENT WAS FASTINGP  
ERFORMED BY: AMOR [facility] Nldrfw9989 [facility] [id number]   
   
                                                    Globulin Calculated   
mass conc (S)   2.2 g/dL        Normal          1.5-4.5         Comprehensive   
Internal   
Medicine  
Work Phone:   
[phone]  
   
                      Glucose mass conc 102 mg/dL  Abnormal   65-99      Compreh  
ensive   
Internal   
Medicine  
Work Phone:   
[phone]  
   
                                        Comment on above:   PATIENT WAS FASTINGP  
ERFORMED BY: AMOR LabAb MullerYlvmge[address]   
RedaptDuke Healthin [place] [id number]   
   
                      Potassium molar conc 4.9 mmol/L Normal     3.5-5.2    Comp  
rehensive   
Internal   
Medicine  
Work Phone:   
[phone]  
   
                                        Comment on above:   PATIENT WAS FASTINGP  
ERFORMED BY: AMOR [address]erlin[address]   
[facility] [id number]   
   
                      Protein mass conc 6.8 g/dL   Normal     6.0-8.5    Compreh  
ensive   
Internal   
Medicine  
Work Phone:   
[phone]  
   
                                        Comment on above:   PATIENT WAS FASTINGP  
ERFORMED BY: AMOR Mullerlin6370 [facility] [id number]   
   
                      Sodium molar conc 141 mmol/L Normal     134-144    Compreh  
ensive   
Internal   
Medicine  
Work Phone:   
[phone]  
   
                                        Comment on above:   PATIENT WAS FASTINGP  
ERFORMED BY: AMOR Mullerlin6370 [facility] [id number]   
   
                                                    Urea nitrogen mass   
conc            10 mg/dL        Normal          6-24            Comprehensive   
Internal   
Medicine  
Work Phone:   
[phone]  
   
                                        Comment on above:   PATIENT WAS FASTINGP  
ERFORMED BY: AMOR Mullerlin6370 [facility] [id number]   
   
                                                    Urea   
nitrogen/Creatinine   
mass ratio      11 mg/mg        Normal          9-20            Comprehensive   
Internal   
Medicine  
Work Phone:   
[phone]  
   
                                        Comment on above:   PATIENT WAS FASTINGP  
ERFORMED BY: AMOR Mullerlin6370 [facility] [id number]   
   
                                                    MICROALBUMINOrdered By: Syst  
em Manager on 2016   
   
                                                    Albumin DL <= 20 mg/L   
(U) [Mass/Vol]  mg/dL           Normal          0.0-17.0        Comprehensive   
Internal   
Medicine;   
Comprehensive   
Internal   
Medicine  
Work Phone:   
[phone]  
   
                                        Comment on above:   **Effective May 9, 2  
016 the reference interval for**   
Microalbumin, Urine will be changing to: Not Estab.   
   
                                                            PATIENT WAS FASTINGP  
ERFORMED BY: AMOR LabAb MullerNbhxxa[address]   
[facility] [id number]   
   
                                                    Albumin DL <= 20 mg/L   
mass conc (U)   mg/dL           Normal          0.0-17.0        Comprehensive   
Internal   
Medicine  
Work Phone:   
[phone]  
   
                                        Comment on above:   **Effective May 9, 2  
016 the reference interval for**   
Microalbumin, Urine will be changing to: Not Estab.   
   
                                                            PATIENT WAS FASTINGP  
ERFORMED BY: [employer]6370 [facility] [id number]   
   
                                                    Albumin/Creatinine   
mass ratio (U)  <12.5           Normal          0.0-30.0        Comprehensive   
Internal   
Medicine  
Work Phone:   
[phone]  
   
                                        Comment on above:   PATIENT WAS FASTINGP  
ERFORMED BY: [employer]6370 [facility] [id number]   
   
                                                    Creatinine mass conc   
(U)             24.0 mg/dL      Normal          22.0-328.0      Comprehensive   
Internal   
Medicine  
Work Phone:   
[phone]  
   
                                        Comment on above:   **Effective May 9, 2  
016 the reference interval for**   
Creatinine,   
Urine will be changing to: Not Estab.   
   
                                                            PATIENT WAS FASTINGP  
ERFORMED BY: [employer]6370 [facility] [id number]   
   
                                                    Microscopic ExaminationOrder  
ed By:  on 2016   
   
                                                    Bacteria LM.HPF   
#/area (Urine sed) Few             Normal                          Comprehensive  
   
Internal   
Medicine  
Work Phone:   
[phone]  
   
                                                    Epithelial cells   
LM.HPF #/area (Urine   
sed)            None seen       Normal          0 - 10          Comprehensive   
Internal   
Medicine  
Work Phone:   
[phone]  
   
                                                    Mucus LM Ql (Urine   
sed)            Present         Normal                          Comprehensive   
Internal   
Medicine  
Work Phone:   
[phone]  
   
                                                    RBC LM.HPF #/area   
(Urine sed)     None seen       Normal          0 - 2           Comprehensive   
Internal   
Medicine  
Work Phone:   
[phone]  
   
                                                    WBC LM.HPF #/area   
(Urine sed)     None seen       Normal          0 - 5           Comprehensive   
Internal   
Medicine  
Work Phone:   
[phone]  
   
                                                    TSH (39902)Ordered By: Chacorta  
m Manager on 2016   
   
                          Thyrotropin Qn 2.540 {uIU/mL} Normal       0.450-4.50  
0                                       Comprehensive   
Internal   
Medicine  
Work Phone:   
[phone]  
   
                                        Comment on above:   PATIENT WAS FASTINGP  
ERFORMED BY: [id number] In[facility]Showblin [place] [id number]   
   
                                                    URINALYSIS, W/ MICRO (55869)  
Ordered By:  on 2016   
   
                      Appearance Nom (U) Clear      Normal                Compre  
hensive   
Internal   
Medicine  
Work Phone:   
[phone]  
   
                                        Comment on above:   PATIENT WAS FASTINGP  
ERFORMED BY: AMOR LabAb MullerLieqhn[address]   
Road[address]ublin [place] [id number]   
   
                      Bilirubin Ql (U) Negative   Normal                Comprehe  
nsive   
Internal   
Medicine  
Work Phone:   
[phone]  
   
                                        Comment on above:   PATIENT WAS FASTINGP  
ERFORMED BY: AMOR LabAb MullerUluxqr[address]   
RoadDublin [place] [id number]   
   
                      Bilirubin Ql (U) Negative   Normal                Comprehe  
nsive   
Internal   
Medicine;   
Comprehensive   
Internal   
Medicine  
Work Phone:   
[phone]  
   
                                        Comment on above:   PATIENT WAS FASTINGP  
ERFORMED BY: AMOR Mullerlin[address]   
Road[address] [place] [id number]   
   
                      Color Nom (U) Yellow     Normal                Comprehensi  
ve   
Internal   
Medicine  
Work Phone:   
[phone]  
   
                                        Comment on above:   PATIENT WAS FASTINGP  
ERFORMED BY: AMOR Mullerlin[address]   
Road[address] [place] [id number]   
   
                      Glucose Ql (U) Negative   Normal                Comprehens  
bebe   
Internal   
Medicine  
Work Phone:   
[phone]  
   
                                        Comment on above:   PATIENT WAS FASTINGP  
ERFORMED BY: AMOR Mullerlin[address]   
Road[place] [id number]   
   
                      Glucose Ql (U) Negative   Normal                Comprehens  
bebe   
Internal   
Medicine;   
Comprehensive   
Internal   
Medicine  
Work Phone:   
[phone]  
   
                                        Comment on above:   PATIENT WAS FASTINGP  
ERFORMED BY: AMOR Mullerlin[address]   
Road[address] [id number]   
   
                      Hemoglobin Ql (U) Negative   Normal                Compreh  
ensive   
Internal   
Medicine  
Work Phone:   
[phone]  
   
                                        Comment on above:   PATIENT WAS FASTINGP  
ERFORMED BY: AMOR LabCosaurabh MullerPppdyk[address]   
Road[place] [id number]   
   
                      Hemoglobin Ql (U) Negative   Normal                Compreh  
ensive   
Internal   
Medicine;   
Comprehensive   
Internal   
Medicine  
Work Phone:   
[phone]  
   
                                        Comment on above:   PATIENT WAS FASTINGP  
ERFORMED BY: AMOR LabCosaurabh MullerYsxldh[address]   
Road[place] [id number]   
   
                                                    Hemoglobin Test strip   
Ql (U)          Negative        Normal                          Comprehensive   
Internal   
Medicine  
Work Phone:   
[phone]  
   
                      Ketones Ql (U) Negative   Normal                Comprehens  
bebe   
Internal   
Medicine  
Work Phone:   
[phone]  
   
                                        Comment on above:   PATIENT WAS FASTINGP  
ERFORMED BY: AMOR [facility] [id number]   
Road[place] [id number]   
   
                      Ketones Ql (U) Negative   Normal                Comprehens  
bebe   
Internal   
Medicine;   
Comprehensive   
Internal   
Medicine  
Work Phone:   
[phone]  
   
                                        Comment on above:   PATIENT WAS FASTINGP  
ERFORMED BY: AMOR [facility] [address]   
Road[place] [id number]   
   
                                                    Leukocyte esterase   
Test strip Ql (U) Negative        Normal                          Comprehensive   
Internal   
Medicine  
Work Phone:   
[phone]  
   
                                        Comment on above:   PATIENT WAS FASTINGP  
ERFORMED BY: AMOR [facility] [id number]   
Road[address] [id number]   
   
                                                    Leukocyte esterase   
Test strip Ql (U) Negative        Normal                          Comprehensive   
Internal   
Medicine;   
Comprehensive   
Internal   
Medicine  
Work Phone:   
[phone]  
   
                                        Comment on above:   PATIENT WAS FASTINGP  
ERFORMED BY: AMOR [facility] [id number]   
Road[place] [id number]   
   
                                                    Microscopic   
observation LM Nom   
(Urine sed)     MICRON          Normal                          Comprehensive   
Internal   
Medicine  
Work Phone:   
[phone]  
   
                                        Comment on above:   Microscopic follows   
if indicated.   
   
                                                            PATIENT WAS FASTINGP  
ERFORMED BY: AMOR [facility] [id number]   
Road[place] [id number]   
   
                                                    Microscopic   
observation LM Nom   
(Urine sed)     See below:      Normal                          Comprehensive   
Internal   
Medicine  
Work Phone:   
[phone]  
   
                                        Comment on above:   Microscopic was benny  
cated and was performed.   
   
                                                            PATIENT WAS FASTINGP  
ERFORMED BY: AMOR [facility] [id number]   
Road[place] [id number]   
   
                      Nitrite Ql (U) Negative   Normal                Comprehens  
bebe   
Internal   
Medicine  
Work Phone:   
[phone]  
   
                                        Comment on above:   PATIENT WAS FASTINGP  
ERFORMED BY: AMOR [facility] [id number]   
Road[place] [id number]   
   
                      Nitrite Ql (U) Negative   Normal                Comprehens  
bebe   
Internal   
Medicine;   
Comprehensive   
Internal   
Medicine  
Work Phone:   
[phone]  
   
                                        Comment on above:   PATIENT WAS FASTINGP  
ERFORMED BY: AMOR LabCosaurabh [address]brcv[address]   
Road[place] [id number]   
   
                                                    Nitrite Test strip Ql   
(U)             Negative        Normal                          Comprehensive   
Internal   
Medicine  
Work Phone:   
[phone]  
   
                      pH (U)     7.5 [pH]   Normal     5.0-7.5    Comprehensive   
Internal   
Medicine  
Work Phone:   
[phone]  
   
                                        Comment on above:   PATIENT WAS FASTINGP  
ERFORMED BY: AMOR [facility] [address]   
Road[place] [id number]   
   
                      pH Test strip (U) 7.5 [pH]   Normal     5.0-7.5    Compreh  
ensive   
Internal   
Medicine  
Work Phone:   
[phone]  
   
                      Protein Ql (U) Negative   Normal                Comprehens  
bebe   
Internal   
Medicine  
Work Phone:   
[phone]  
   
                                        Comment on above:   PATIENT WAS FASTINGP  
ERFORMED BY: AMOR LabCosaurabh Mull[address]Tqzrhk[address]   
Road[place] [id number]   
   
                      Protein Ql (U) Negative   Normal                Comprehens  
bebe   
Internal   
Medicine;   
Comprehensive   
Internal   
Medicine  
Work Phone:   
[phone]  
   
                                        Comment on above:   PATIENT WAS FASTINGP  
ERFORMED BY: AMOR LabCosaurabh [address]Rsqmre[address]   
Road[place] [id number]   
   
                                                    Protein Test strip Ql   
(U)             Negative        Normal                          Comprehensive   
Internal   
Medicine  
Work Phone:   
[phone]  
   
                                                    Specific gravity   
Relative Density (U) 1.009 1             Normal              1.005-1.03  
0                                       Comprehensive   
Internal   
Medicine  
Work Phone:   
[phone]  
   
                                        Comment on above:   PATIENT WAS FASTINGP  
ERFORMED BY: AMOR [facility] [address]snp[address]   
Road[place] [id number]   
   
                                                    Urobilinogen (U)   
[Mass/Vol]      0.2 mg/dL       Normal          0.2-1.0         Comprehensive   
Internal   
Medicine;   
Comprehensive   
Internal   
Medicine  
Work Phone:   
[phone]  
   
                                        Comment on above:   PATIENT WAS FASTINGP  
ERFORMED BY: AMOR [facility] [id number]   
Road[place] [id number]   
   
                                                    Urobilinogen Test   
strip mass conc (U) 0.2 mg/dL       Normal          0.2-1.0         Comprehensiv  
e   
Internal   
Medicine  
Work Phone:   
[phone]  
   
                                        Comment on above:   PATIENT WAS FASTINGP  
ERFORMED BY: [employer] [facility] [id number]   
   
                                                    HEMOGLOBIN GLYCLATED (HGB A1  
C) (05184)Ordered By:  on 2015   
   
                                                    Hemoglobin   
A1c/Hemoglobin.total   
mass fraction (Bld) 5.6 %           Normal          4.8-5.6         Comprehensiv  
e   
Internal   
Medicine  
Work Phone:   
[phone]  
   
                                        Comment on above:   . Pre-diabetes: 5.7   
- 6.4 Diabetes: >6.4 Glycemic control for   
adults with diabetes: <7.0   
   
                                                            PATIENT NOT FASTINGP  
ERFORMED BY: [employer] [facility] [id number] Information:   
[id number]   
   
                                                    CBC W/Diff, AutomatedOrdered  
 By:  on 2015   
   
                                                    Basophils/100 WBC   
Auto (Bld)      0.5 %           Normal          0-1             Comprehensive   
Internal   
Medicine  
Work Phone:   
[phone]  
   
                                                    Eosinophils/100 WBC   
Auto (Bld)      2.7 %           Normal          0-5             Comprehensive   
Internal   
Medicine  
Work Phone:   
[phone]  
   
                                                    Erythrocyte   
distribution width   
Auto Ratio (RBC) 12.2 %          Normal          11.6-14.6       Comprehensive   
Internal   
Medicine  
Work Phone:   
[phone]  
   
                                                    Hematocrit Auto   
Volume Fraction (Bld) 46.5 %          Normal          40-54           Comprehens  
bebe   
Internal   
Medicine  
Work Phone:   
[phone]  
   
                                                    Hemoglobin mass conc   
(Bld)           16.6 g/dL       Abnormal        13.0-16.5       Comprehensive   
Internal   
Medicine  
Work Phone:   
[phone]  
   
                                                    Lymphocytes/100 WBC   
Auto (Bld)      36.0 %          Normal          19-41           Comprehensive   
Internal   
Medicine  
Work Phone:   
[phone]  
   
                                                    MCH Auto Entitic mass   
(RBC)           33.0 pg         Abnormal        27.0-32.0       Comprehensive   
Internal   
Medicine  
Work Phone:   
[phone]  
   
                                                    MCHC Auto mass conc   
(RBC)           35.7 {g/gl}     Normal          32-36           Comprehensive   
Internal   
Medicine  
Work Phone:   
[phone]  
   
                                                    MCV Auto Entitic   
volume (RBC)    92.4 fL         Normal          80-94           Comprehensive   
Internal   
Medicine  
Work Phone:   
[phone]  
   
                                                    Monocytes/100 WBC   
Auto (Bld)      9.4 %           Normal          0-10            Comprehensive   
Internal   
Medicine  
Work Phone:   
[phone]  
   
                                                    Neutrophils/100 WBC   
Auto (Bld)      51.1 %          Normal          47-70           Comprehensive   
Internal   
Medicine  
Work Phone:   
[phone]  
   
                                                    Platelet mean volume   
Auto Entitic volume   
(Bld)           10.0 fL         Normal          6.2-12.0        Comprehensive   
Internal   
Medicine  
Work Phone:   
[phone]  
   
                                                    Platelets Auto #/vol   
(Bld)           212 10*3/uL     Normal          150-450         Comprehensive   
Internal   
Medicine  
Work Phone:   
[phone]  
   
                      RBC Auto #/vol (Bld) 5.03 {M/mm3} Normal     4.6-6.2    [facility]  
[facility]   
[facility]   
Medicine  
Work Phone:   
[phone]  
   
                      WBC Auto #/vol (Bld) 7.7 10*3/uL Normal     4.4-11.0   Com  
prehensive   
Internal   
Medicine  
Work Phone:   
[phone]  
   
                      CBC W/Diff, Automated 2.77 {X10_3/ul} Normal     0.83-4.51  
  Comprehensive   
Internal   
Medicine  
Work Phone:   
[phone]  
   
                      CBC W/Diff, Automated 3.9 {X10_3/uL} Normal     2.0-7.7     
 Comprehensive   
Internal   
Medicine  
Work Phone:   
[phone]  
   
                      CBC W/Diff, Automated 0.300 %    Normal     0.0-0.9    Com  
prehensive   
Internal   
Medicine  
Work Phone:   
[phone]  
   
                                        Comment on above:   IG% - Immature Granu  
locytes (promyelocytes, myelocytes   
andmetamyelocytes) > 1% indicates that a LEFT SHIFT is Present.   
   
                      CBC W/Diff, Automated 40.5 fL    Normal     35.1-43.9  Com  
prehensive   
Internal   
Medicine  
Work Phone:   
[phone]  
   
                                                    Comprehensive Metabolic Prof  
ilOrdered By:  on 2015   
   
                                                    Comprehensive   
metabolic 2000 panel 9.1 {RATIO}     Abnormal        10-20           Comprehensi  
ve   
Internal   
Medicine  
Work Phone:   
[phone]  
   
                                                    Comprehensive   
metabolic 2000 panel 3.1 g/dL        Normal          2.3-3.5         Comprehensi  
ve   
Internal   
Medicine  
Work Phone:   
[phone]  
   
                                                    Comprehensive   
metabolic 2000 panel 19 U/L          Normal          15-37           Comprehensi  
ve   
Internal   
Medicine  
Work Phone:   
[phone]  
   
                                                    Comprehensive   
metabolic 2000 panel 5 1             Normal          5-15            Comprehensi  
ve   
Internal   
Medicine  
Work Phone:   
[phone]  
   
                                                    Comprehensive   
metabolic  panel 8.6 mg/dL       Normal          8.5-10.1        Comprehensi  
ve   
Internal   
Medicine  
Work Phone:   
[phone]  
   
                                                    Comprehensive   
metabolic  panel 42 U/L          Normal          12-78           Comprehensi  
ve   
Internal   
Medicine  
Work Phone:   
[phone]  
   
                                                    Comprehensive   
metabolic  panel 28.0 mmol/L     Normal          21.0-32.0       Comprehensi  
ve   
Internal   
Medicine  
Work Phone:   
[phone]  
   
                                                    Comprehensive   
metabolic  panel 1.3 {RATIO}     Normal          0.9-2.4         Comprehensi  
ve   
Internal   
Medicine  
Work Phone:   
[phone]  
   
                                                    Comprehensive   
metabolic  panel 7.1 g/dL        Normal          6.4-8.2         Comprehensi  
ve   
Internal   
Medicine  
Work Phone:   
[phone]  
   
                                                    Comprehensive   
metabolic  panel 107 mmol/L      Normal                    Comprehensi  
ve   
Internal   
Medicine  
Work Phone:   
[phone]  
   
                                                    Comprehensive   
metabolic  panel 4.0 g/dL        Normal          3.4-5.0         Comprehensi  
ve   
Internal   
Medicine  
Work Phone:   
[phone]  
   
                                                    Comprehensive   
metabolic  panel 4.1 mmol/L      Normal          3.5-5.1         Comprehensi  
ve   
Internal   
Medicine  
Work Phone:   
[phone]  
   
                                                    Comprehensive   
metabolic  panel 140 mmol/L      Normal          136-145         Comprehensi  
ve   
Internal   
Medicine  
Work Phone:   
[phone]  
   
                                                    Comprehensive   
metabolic  panel 0.70 mg/dL      Normal          0.20-1.00       Comprehensi  
ve   
Internal   
Medicine  
Work Phone:   
[phone]  
   
                                                    Comprehensive   
metabolic  panel 0.98 mg/dL      Normal          0.70-1.30       Comprehensi  
ve   
Internal   
Medicine  
Work Phone:   
[phone]  
   
                                        Comment on above:   The validity of the   
calculated GFR AND GFRAA in patients   
over70   
years has not been determined. Clinical correlation isessential.   
   
                                                    Comprehensive   
metabolic  panel 103 mL/min      Normal                          Comprehensi  
ve   
Internal   
Medicine  
Work Phone:   
[phone]  
   
                                        Comment on above:    GFR  
 Calc   
   
                                                    Comprehensive   
metabolic  panel 85 mL/min       Normal                          Comprehensi  
ve   
Internal   
Medicine  
Work Phone:   
[phone]  
   
                                        Comment on above:   Non-  
 GFR Calc   
   
                                                    Comprehensive   
metabolic  panel 60 U/L          Normal                    Comprehensi  
ve   
Internal   
Medicine  
Work Phone:   
[phone]  
   
                                                    Comprehensive   
metabolic 2000 panel 130 mg/dL       Abnormal                  Comprehensi  
ve   
Internal   
Medicine  
Work Phone:   
[phone]  
   
                                        Comment on above:   Fasting Glucose resu  
lt greater than or equal to 126   
mg/dLsuggests DIABETES MELLITUS per A.D.A. criteria.   
   
                                                    Comprehensive   
metabolic 2000 panel 9 mg/dL         Normal          7-18            Comprehensi  
ve   
Internal   
Medicine  
Work Phone:   
[phone]  
   
                                                    Lipid ProfileOrdered By: Shannon  
tem Manager on 2015   
   
                                                    Cholesterol in HDL   
mass conc       47 mg/dL        Normal                          Comprehensive   
Internal   
Medicine  
Work Phone:   
[phone]  
   
                                        Comment on above:   Reference Range HDL   
<40 mg/dL Low HDL Cholesterol HDL >or= 60   
mg/dL High HDL Cholesterol   
   
                                                    Cholesterol in LDL   
mass conc       81 mg/dL        Normal          0-130           Comprehensive   
Internal   
Medicine  
Work Phone:   
[phone]  
   
                      Cholesterol mass conc 151 mg/dL  Normal                Com  
prehensive   
Internal   
Medicine  
Work Phone:   
[phone]  
   
                                        Comment on above:   <200 mg/dL Desirable  
 200-240 mg/dL Borderline >240 mg/dL High   
Risk   
   
                                                    Triglyceride mass   
conc            115 mg/dL       Normal                          Comprehensive   
Internal   
Medicine  
Work Phone:   
[phone]  
   
                                        Comment on above:   Serum Triglycerides   
Reference Interval Normal <150 mg/dL   
Borderline high 150 - 199 mg/dL High 200 - 499 mg/dL Very High >   
or = 500 mg/dL   
   
                      Lipid Profile 23 mg/dL   Normal     5-40       Comprehensi  
ve   
Internal   
Medicine  
Work Phone:   
[phone]  
   
                                                    MicroalbOrdered By: System M  
anager on 2015   
   
                      Creatinine mass conc 4.1 {mg/g_CRE} Normal                  
Comprehensive   
Internal   
Medicine  
Work Phone:   
[phone]  
   
                      Microalb   163.00 mg/dL Normal                Comprehensiv  
e   
Internal   
Medicine  
Work Phone:   
[phone]  
   
                      Microalb   6.8 mg/L   Normal                Comprehensive   
Internal   
Medicine  
Work Phone:   
[phone]  
   
                                                    PSA,Total - Annual ScreenOrd  
ered By:  on 2015   
   
                                                    Prostate specific Ag   
mass conc       0.63 ng/mL      Normal          0.00-4.00       Comprehensive   
Internal   
Medicine  
Work Phone:   
[phone]  
   
                                        Comment on above:   This test was perfor  
med using the TPSA assay method for   
the[facility] system. Values obtained with   
differentassay methods cannot be used interchangably.When   
changing PSA assays in the course of monitoring apatient,   
additional sequential testing should be carriedout to confirm   
baseline values.   
   
                                                    Thyroid Stim Hormone (TSH)Or  
dered By:  on 2015   
   
                      Thyrotropin Qn 2.14 {uIU/mL} Normal     0.358-3.74 Compreh  
ensive   
Internal   
Medicine  
Work Phone:   
[phone]  
   
                                                    Urinalysis, CompleteOrdered   
By:  on 2015   
   
                                                    RBC Test strip #/vol   
(U)             0 SEEN          Normal          0-5             Comprehensive   
Internal   
Medicine  
Work Phone:   
[phone]  
   
                                                    Urinalysis complete   
panel - Urine   Negative        Normal                          Comprehensive   
Internal   
Medicine  
Work Phone:   
[phone]  
   
                                                    Urinalysis complete   
panel - Urine       1.020 1             Normal              1.002-1.03  
0                                       Comprehensive   
Internal   
Medicine  
Work Phone:   
[phone]  
   
                                                    Urinalysis complete   
panel - Urine   5.0 1           Normal          5.0 - 8.0       Comprehensive   
Internal   
Medicine  
Work Phone:   
[phone]  
   
                                                    Urinalysis complete   
panel - Urine   Normal          Normal                          Comprehensive   
Internal   
Medicine  
Work Phone:   
[phone]  
   
                                                    Urinalysis complete   
panel - Urine   Clear           Normal                          Comprehensive   
Internal   
Medicine  
Work Phone:   
[phone]  
   
                                                    Urinalysis complete   
panel - Urine   0 SEEN          Normal                          Comprehensive   
Internal   
Medicine  
Work Phone:   
[phone]  
   
                                                    Urinalysis complete   
panel - Urine   Yellow          Normal                          Comprehensive   
Internal   
Medicine  
Work Phone:   
[phone]  
   
                                                    Vitamin D,25 HydroxyOrdered   
By:  on 2015   
   
                      Vitamin D,25 Hydroxy 54.7 ng/mL Normal                Comp  
rehensive   
Internal   
Medicine  
Work Phone:   
[phone]  
   
                                        Comment on above:   Vitamin D 25(OH) Sta  
tus Range Deficiency <20 ng/mL (50nmol/L)   
Insuffciency 20 - 30 ng/mL (50 - 75 nmol/L) Sufficiency 30 - 100   
ng/mL (75 - 250 nmol/L) Toxicity >100 ng/mL (>250 nmol/L)   
   
                                                    Blood Glucose , Office (6561  
2)Ordered By: Verena Tuttle on 2015   
   
                                                    Glucose Glucometer   
molar conc (BldC) 127 1           Normal                          Comprehensive   
Internal   
Medicine  
Work Phone:   
[phone]  
   
                                                    HgA1C , Office (17076)Ordere  
d By: Verena Tuttle on 2015   
   
                                                    Hemoglobin   
A1c/Hemoglobin.total   
mass fraction (Bld) 6.1 %           Normal          4.6 - 7.1       Comprehensiv  
e   
Internal   
Medicine  
Work Phone:   
[phone]  
   
                                                    CBC With Differential/Platel  
etOrdered By:  on 2015   
   
                                                    Basophils Auto #/vol   
(Bld)           0.0 {x10E3/uL}  Normal          0.0-0.2         Comprehensive   
Internal   
Medicine  
Work Phone:   
[phone]  
   
                                                    Basophils/100 WBC   
Auto (Bld)      0 %             Normal                          Comprehensive   
Internal   
Medicine  
Work Phone:   
[phone]  
   
                                                    Eosinophils Auto   
#/vol (Bld)     0.1 {x10E3/uL}  Normal          0.0-0.4         [facility]   
[facility]   
Medicine  
Work Phone:   
[phone]  
   
                                                    Eosinophils/100 WBC   
Auto (Bld)      1 %             Normal                          [facility]   
[facility]   
[facility]  
Work Phone:   
[phone]  
   
                                                    Erythrocyte   
distribution width   
Auto Ratio (RBC) 13.6 %          Normal          12.3-15.4       [facility]   
[facility]   
[facility]icine  
Work Phone:   
[phone]  
   
                                                    Hematocrit Auto   
Volume Fraction (Bld) 48.8 %          Normal          37.5-51.0       Comprehens  
bebe   
Internal   
Medicine  
Work Phone:   
[phone]  
   
                                                    Hemoglobin mass conc   
(Bld)           16.9 g/dL       Normal          12.6-17.7       [facility]   
[facility]   
[facility]  
Work Phone:   
[phone]  
   
                                                    Immature granulocytes   
#/vol (Bld)     0.0 {x10E3/uL}  Normal          0.0-0.1         [facility]   
[facility]   
[facility]  
Work Phone:   
[phone]  
   
                                                    Immature   
granulocytes/100 WBC   
(Bld)           0 %             Normal                          [facility]   
[facility]   
Med[facility]  
Work Phone:   
[phone]  
   
                                                    Lymphocytes Auto   
#/vol (Bld)     1.4 {x10E3/uL}  Normal          0.7-3.1         [facility]   
[facility]   
[facility]  
Work Phone:   
[phone]  
   
                                                    Lymphocytes/100 WBC   
Auto (Bld)      17 %            Normal                          [facility]   
[facility]   
Medicine  
Work Phone:   
[phone]  
   
                                                    MCH Auto Entitic mass   
(RBC)           32.3 pg         Normal          26.6-33.0       [facility]   
[facility]   
[facility]  
Work Phone:   
[phone]  
   
                                                    MCHC Auto mass conc   
(RBC)           34.6 g/dL       Normal          31.5-35.7       [facility]   
[facility]   
[facility]  
Work Phone:   
[phone]  
   
                                                    MCV Auto Entitic   
volume (RBC)    93 fL           Normal          79-97           [facility]   
[facility]  
Work Phone:   
[phone]  
   
                                                    Monocytes Auto #/vol   
(Bld)           1.2 {x10E3/uL}  Abnormal        0.1-0.9         [facility]   
[facility]   
[facility]  
Work Phone:   
[phone]  
   
                                                    Monocytes/100 WBC   
Auto (Bld)      14 %            Normal                          [facility]   
[facility]   
[facility]  
Work Phone:   
[phone]  
   
                                                    Neutrophils Auto   
#/vol (Bld)     5.9 {x10E3/uL}  Normal          1.4-7.0         [facility]   
[facility]  
Work Phone:   
[phone]  
   
                                                    Neutrophils/100 WBC   
Auto (Bld)      68 %            Normal                          [facility]   
[facility]   
Medicine  
Work Phone:   
[phone]  
   
                                                    Platelets Auto #/vol   
(Bld)           203 {x10E3/uL}  Normal          150-379         [facility]   
[facility]  
Work Phone:   
[phone]  
   
                      RBC Auto #/vol (Bld) 5.24 {x10E6/uL} Normal     4.14-5.80   
 [facility]   
[facility]   
Medicine  
Work Phone:   
[phone]  
   
                      WBC Auto #/vol (Bld) 8.6 {x10E3/uL} Normal     3.4-10.8     
[facility]   
[facility]   
Medicine  
Work Phone:   
[phone]  
   
                                                    Comp. Metabolic Panel (14)Or  
dered By:  on 2015   
   
                      Albumin mass conc 4.7 g/dL   Normal     3.5-5.5    [facility]pre[facility]  
[facility]  
Work Phone:   
[phone]  
   
                                                    Albumin/Globulin mass   
ratio           2.0 {ratio}     Normal          1.1-2.5         [facility]   
[facility]   
Medicine  
Work Phone:   
[phone]  
   
                      ALP enzyme act/vol 58 [iU]/L  Normal          Compre  
[facility]   
Internal   
Medicine  
Work Phone:   
[phone]  
   
                      ALT enzyme act/vol 29 [iU]/L  Normal     0-44       Compre  
[facility]   
Internal   
Medicine  
Work Phone:   
[phone]  
   
                      AST enzyme act/vol 24 [iU]/L  Normal     0-40       Compre  
[facility]   
Internal   
Medicine  
Work Phone:   
[phone]  
   
                      Bilirubin mass conc 0.7 mg/dL  Normal     0.0-1.2    Compr  
ehensive   
Internal   
Medicine  
Work Phone:   
[phone]  
   
                      Calcium mass conc 9.7 mg/dL  Normal     8.7-10.2   Compreh  
ensive   
Internal   
Medicine  
Work Phone:   
[phone]  
   
                      Chloride molar conc 98 mmol/L  Normal          Compr  
ehensive   
Internal   
Medicine  
Work Phone:   
[phone]  
   
                      CO2 molar conc 24 mmol/L  Normal     18-29      Comprehens  
bebe   
Internal   
Medicine  
Work Phone:   
[phone]  
   
                      Creatinine mass conc 0.89 mg/dL Normal     0.76-1.27  Comp  
rehensive   
Internal   
Medicine  
Work Phone:   
[phone]  
   
                                                    GFR/1.73 sq M   
predicted among   
blacks CKD-EPI vol   
rate/area (S/P/Bld) 114 mL/min/1.73 Normal                          Comprehensiv  
e   
Internal   
Medicine  
Work Phone:   
[phone]  
   
                                                    GFR/1.73 sq M   
predicted among   
non-blacks CKD-EPI   
vol rate/area   
(S/P/Bld)       99 mL/min/1.73  Normal                          [facility]   
Internal   
Medicine  
Work Phone:   
[phone]  
   
                                                    Globulin Calculated   
mass conc (S)   2.4 g/dL        Normal          1.5-4.5         [facility]   
Internal   
Medicine  
Work Phone:   
[phone]  
   
                      Glucose mass conc 116 mg/dL  Abnormal   65-99      Compreh  
ensive   
Internal   
Medicine  
Work Phone:   
[phone]  
   
                      Potassium molar conc 4.7 mmol/L Normal     3.5-5.2    Comp  
rehensive   
Internal   
Medicine  
Work Phone:   
[phone]  
   
                      Protein mass conc 7.1 g/dL   Normal     6.0-8.5    Compreh  
ensive   
Internal   
Medicine  
Work Phone:   
[phone]  
   
                      Sodium molar conc 139 mmol/L Normal     134-144    Compreh  
ensive   
Internal   
Medicine  
Work Phone:   
[phone]  
   
                                                    Urea nitrogen mass   
conc            9 mg/dL         Normal          6-24            Comprehensive   
Internal   
Medicine  
Work Phone:   
[phone]  
   
                                                    Urea   
nitrogen/Creatinine   
mass ratio      10 mg/mg        Normal          9-20            [facility]   
Internal   
Medicine  
Work Phone:   
[phone]  
   
                                                    Hemoglobin A2eDbtujcl By: Sy  
stem Manager on 2015   
   
                                                    Hemoglobin   
A1c/Hemoglobin.total   
mass fraction (Bld) 5.7 %           Abnormal        4.8-5.6         Comprehensiv  
e   
Internal   
Medicine  
Work Phone:   
[phone]  
   
                                        Comment on above:   . Increased risk for  
 diabetes: 5.7 - 6.4 Diabetes: >6.4   
Glycemic   
control for adults with diabetes: <7.0   
   
                                                    Lipid Panel With LDL/HDL Rat  
ioOrdered By:  on 2015   
   
                                                    Cholesterol in HDL   
mass conc       51 mg/dL        Normal                          Com[facility]   
Internal   
Medicine  
Work Phone:   
[phone]  
   
                                        Comment on above:   According to ATP-III  
 Guidelines, HDL-C >59 mg/dL is considered  
   
anegative risk factor for CHD.   
   
                                                    Cholesterol in LDL   
mass conc       119 mg/dL       Abnormal        0-99            Com[facility]   
Internal   
Medicine  
Work Phone:   
[phone]  
   
                                                    Cholesterol in   
LDL/Cholesterol in   
HDL mass ratio  2.3 {ratio_units} Normal          0.0-3.6         [facility]   
Internal   
Medicine  
Work Phone:   
[phone]  
   
                                        Comment on above:   LDL/HDL Ratio Men Wo  
men 1/2 Avg.Risk 1.0 1.5 Avg.Risk 3.6 3.2   
2X   
Avg.Risk 6.2 5.0 3X Avg.Risk 8.0 6.1   
   
                                                    Cholesterol in VLDL   
mass conc       22 mg/dL        Normal          5-40            [facility]   
Internal   
Medicine  
Work Phone:   
[phone]  
   
                      Cholesterol mass conc 192 mg/dL  Normal     100-199    Com  
prehensive   
Internal   
Medicine  
Work Phone:   
[phone]  
   
                                                    Triglyceride mass   
conc            111 mg/dL       Normal          0-149           Comprehensive   
Internal   
Medicine  
Work Phone:   
[phone]  
   
                                                    Microalb/Creat Ratio, Randm   
UrOrdered By:  on 2015   
   
                                                    Albumin DL <= 20 mg/L   
mass conc (U)   4.0 ug/mL       Normal          0.0-17.0        Com[facility]   
Internal   
Medicine  
Work Phone:   
[phone]  
   
                                                    Albumin/Creatinine   
mass ratio (U)  4.2 {mg/g_creat} Normal          0.0-30.0        Com[facility]   
Internal   
Medicine  
Work Phone:   
[phone]  
   
                                                    Creatinine mass conc   
(U)             95.4 mg/dL      Normal          22.0-328.0      [facility]   
Internal   
Medicine  
Work Phone:   
[phone]  
   
                                                    Microscopic ExaminationOrder  
ed By:  on 2015   
   
                                                    Bacteria LM.HPF   
#/area (Urine sed) None seen       Normal                          Comprehensive  
   
Internal   
Medicine  
Work Phone:   
[phone]  
   
                                                    Epithelial cells   
LM.HPF #/area (Urine   
sed)            None seen       Normal          0 - 10          Com[facility]   
Internal   
Medicine  
Work Phone:   
[phone]  
   
                                                    Mucus LM Ql (Urine   
sed)            Present         Normal                          Com[facility]   
Internal   
Medicine  
Work Phone:   
[phone]  
   
                                                    RBC LM.HPF #/area   
(Urine sed)     0-2             Normal          0 - 2           Comprehensive   
Internal   
Medicine  
Work Phone:   
[phone]  
   
                                                    WBC LM.HPF #/area   
(Urine sed)     0-5             Normal          0 - 5           Comprehensive   
Internal   
Medicine  
Work Phone:   
[phone]  
   
                                                    TSHOrdered By: System Manage  
r on 2015   
   
                          Thyrotropin Qn 2.080 {uIU/mL} Normal       0.450-4.50  
0                                       Comprehensive   
Internal   
Medicine  
Work Phone:   
[phone]  
   
                                                    Urinalysis, CompleteOrdered   
By:  on 2015   
   
                      Appearance Nom (U) Clear      Normal                Compre  
hensive   
Internal   
Medicine  
Work Phone:   
[phone]  
   
                      Bilirubin Ql (U) Negative   Normal                Comprehe  
nsive   
Internal   
Medicine  
Work Phone:   
[phone]  
   
                      Color Nom (U) Yellow     Normal                Comprehensi  
ve   
Internal   
Medicine  
Work Phone:   
[phone]  
   
                      Glucose Ql (U) Negative   Normal                Comprehens  
bebe   
Internal   
Medicine  
Work Phone:   
[phone]  
   
                                                    Hemoglobin Test strip   
Ql (U)          Negative        Normal                          Comprehensive   
Internal   
Medicine  
Work Phone:   
[phone]  
   
                      Ketones Ql (U) Trace      Abnormal              Comprehens  
bebe   
Internal   
Medicine  
Work Phone:   
[phone]  
   
                                                    Leukocyte esterase   
Test strip Ql (U) Negative        Normal                          Comprehensive   
Internal   
Medicine  
Work Phone:   
[phone]  
   
                                                    Microscopic   
observation LM Nom   
(Urine sed)     MICRON          Normal                          Comprehensive   
Internal   
Medicine  
Work Phone:   
[phone]  
   
                                        Comment on above:   Microscopic follows   
if indicated.   
   
                                                    Microscopic   
observation LM Nom   
(Urine sed)     See below:      Normal                          Comprehensive   
Internal   
Medicine  
Work Phone:   
[phone]  
   
                                        Comment on above:   Microscopic was benny  
cated and was performed.   
   
                                                    Nitrite Test strip Ql   
(U)             Negative        Normal                          Comprehensive   
Internal   
Medicine  
Work Phone:   
[phone]  
   
                      pH Test strip (U) 7.5 [pH]   Normal     5.0-7.5    Compreh  
ensive   
Internal   
Medicine  
Work Phone:   
[phone]  
   
                                                    Protein Test strip Ql   
(U)             Negative        Normal                          Comprehensive   
Internal   
Medicine  
Work Phone:   
[phone]  
   
                                                    Specific gravity   
Relative Density (U) 1.015 1             Normal              1.005-1.03  
0                                       Comprehensive   
Internal   
Medicine  
Work Phone:   
[phone]  
   
                                                    Urobilinogen Test   
strip mass conc (U) 1.0 mg/dL       Normal          0.0-1.9         Comprehensiv  
e   
Internal   
Medicine  
Work Phone:   
[phone]  
   
                                                    Vitamin D, 25-HydroxyOrdered  
 By:  on 2015   
   
                                                    25-Hydroxyvitamin   
D2+25-Hydroxyvitamin   
D3 mass conc    14.4 ng/mL      Abnormal        30.0-100.0      Comprehensive   
Internal   
Medicine  
Work Phone:   
[phone]  
   
                                        Comment on above:   Vitamin D deficiency  
 has been defined by the [place]   
ofMedicine and an Endocrine Society practice guideline as alevel   
of serum 25-OH vitamin D less than 20 ng/mL (1,2).The Endocrine   
Society went on to further define vitamin Dinsufficiency as a   
level between 21 and 29 ng/mL (2).1. IOM ([place] of   
Medicine). 2010. Dietary reference intakes for calcium and D.   
[place]: The National Academies Press.2. Kodi MF,   
Phillip WALKER, Archana SAMS, et al. Evaluation, treatment,   
and prevention of vitamin D deficiency: an Endocrine Society   
clinical practice guideline. JCEM. 2011; 96(7):1911-30.   
   
                                                    Blood Glucose , Office (1996  
2)Ordered By: Catina De Jesus on 2014   
   
                                                    Glucose Glucometer   
molar conc (BldC) 125 1           Normal                          Comprehensive   
Internal   
Medicine  
Work Phone:   
[phone]  
   
                                                    HgA1C , Office (79674)Ordere  
d By: Lashell Valdez on 2014   
   
                                                    Hemoglobin   
A1c/Hemoglobin.total   
mass fraction (Bld) 5.6 %           Normal          4.6 - 7.1       Comprehensiv  
e   
Internal   
Medicine  
Work Phone:   
[phone]  
   
                                                    CBC WITH MANUAL DIFF (14521)  
Ordered By:  on 2014   
   
                                                    Basophils (Bld)   
[#/Vol]         0.0 {x10E3/uL}  Normal          0.0-0.2         Comprehensive   
Internal   
Medicine  
Work Phone:   
[phone]  
   
                                        Comment on above:   PATIENT WAS FASTINGP  
ERFORMED BY:  [facility] [id number] Information:   
[id number]   
   
                                                    Basophils (Bld)   
[#/Vol]         0.0 10*3/uL     Normal          0.0-0.2         Comprehensive   
Internal   
Medicine;   
Comprehensive   
Internal   
Medicine  
Work Phone:   
[phone]  
   
                                        Comment on above:   PATIENT WAS FASTINGP  
ERFORMED BY: [address] [id number] Information:   
[id number]   
   
                                                    Basophils Auto #/vol   
(Bld)           0.0 {x10E3/uL}  Normal          0.0-0.2         Comprehensive   
Internal   
Medicine  
Work Phone:   
[phone]  
   
                                                    Basophils/100 WBC   
(Bld)           0 %             Normal                          Comprehensive   
Internal   
Medicine  
Work Phone:   
[phone]  
   
                                        Comment on above:   PATIENT WAS FASTINGP  
ERFORMED BY: [address] [id number] Information:   
[id number]   
   
                                                    Basophils/100 WBC   
Auto (Bld)      0 %             Normal                          Comprehensive   
Internal   
Medicine  
Work Phone:   
[phone]  
   
                                                    Eosinophils (Bld)   
[#/Vol]         0.1 {x10E3/uL}  Normal          0.0-0.4         Comprehensive   
Internal   
Medicine  
Work Phone:   
[phone]  
   
                                        Comment on above:   PATIENT WAS FASTINGP  
ERFORMED BY: AMOR [address] [id number] Information:   
[id number]   
   
                                                    Eosinophils (Bld)   
[#/Vol]         0.1 10*3/uL     Normal          0.0-0.4         Comprehensive   
Internal   
Medicine;   
Comprehensive   
Internal   
Medicine  
Work Phone:   
[phone]  
   
                                        Comment on above:   PATIENT WAS FASTINGP  
ERFORMED BY: [address] [id number] Information:   
[id number]   
   
                                                    Eosinophils Auto   
#/vol (Bld)     0.1 {x10E3/uL}  Normal          0.0-0.4         Comprehensive   
Internal   
Medicine  
Work Phone:   
[phone]  
   
                                                    Eosinophils/100 WBC   
(Bld)           1 %             Normal                          Comprehensive   
Internal   
Medicine  
Work Phone:   
[phone]  
   
                                        Comment on above:   PATIENT WAS FASTINGP  
ERFORMED BY: [facility] [id number] Information:   
[id number]   
   
                                                    Eosinophils/100 WBC   
Auto (Bld)      1 %             Normal                          Comprehensive   
Internal   
Medicine  
Work Phone:   
[phone]  
   
                                                    Erythrocyte   
distribution width   
(RBC) [Ratio]   13.0 %          Normal          12.3-15.4       Comprehensive   
Internal   
Medicine  
Work Phone:   
[phone]  
   
                                        Comment on above:   PATIENT WAS FASTINGP  
ERFORMED BY: [address] [id number] Information:   
[id number]   
   
                                                    Erythrocyte   
distribution width   
Auto Ratio (RBC) 13.0 %          Normal          12.3-15.4       Comprehensive   
Internal   
Medicine  
Work Phone:   
[phone]  
   
                                                    Hematocrit (Bld)   
[Volume fraction] 48.6 %          Normal          37.5-51.0       Comprehensive   
Internal   
Medicine  
Work Phone:   
[phone]  
   
                                        Comment on above:   PATIENT WAS FASTINGP  
ERFORMED BY: [address] [id number] Information:   
[id number]   
   
                                                    Hematocrit Auto   
Volume Fraction (Bld) 48.6 %          Normal          37.5-51.0       [facility]   
Internal   
Medicine  
Work Phone:   
[phone]  
   
                                                    Hemoglobin mass conc   
(Bld)           16.7 g/dL       Normal          12.6-17.7       Comprehensive   
Internal   
Medicine  
Work Phone:   
[phone]  
   
                                        Comment on above:   PATIENT WAS FASTINGP  
ERFORMED BY: [place] [id number] [facility] [id number] Information:   
[id number]   
   
                                                    Immature granulocytes   
#/vol (Bld)     0.0 {x10E3/uL}  Normal          0.0-0.1         Comprehensive   
Internal   
Medicine  
Work Phone:   
[phone]  
   
                                        Comment on above:   PATIENT WAS FASTINGP  
ERFORMED BY: [facility] [id number] Information:   
[id number]   
   
                                                    Immature granulocytes   
(Bld) [#/Vol]   0.0 10*3/uL     Normal          0.0-0.1         Comprehensive   
Internal   
Medicine;   
Comprehensive   
Internal   
Medicine  
Work Phone:   
[phone]  
   
                                        Comment on above:   PATIENT WAS FASTINGP  
ERFORMED BY: AMOR [place] [id number] [facility] [id number] Information:   
[id number]   
   
                                                    Immature   
granulocytes/100 WBC   
(Bld)           0 %             Normal                          Comprehensive   
Internal   
Medicine  
Work Phone:   
[phone]  
   
                                        Comment on above:   PATIENT WAS FASTINGP  
ERFORMED BY: [address] [id number] Information:   
[id number]   
   
                                                    Lymphocytes (Bld)   
[#/Vol]         3.6 {x10E3/uL}  Abnormal        0.7-3.1         Comprehensive   
Internal   
Medicine  
Work Phone:   
[phone]  
   
                                        Comment on above:   PATIENT WAS FASTINGP  
ERFORMED BY: AMOR [address] [id number] Information:   
[id number]   
   
                                                    Lymphocytes (Bld)   
[#/Vol]         3.6 10*3/uL     Abnormal        0.7-3.1         Comprehensive   
Internal   
Medicine;   
Comprehensive   
Internal   
Medicine  
Work Phone:   
[phone]  
   
                                        Comment on above:   PATIENT WAS FASTINGP  
ERFORMED BY: AMOR Ryan [place] [id number] [facility] [id number] Information:   
[id number]   
   
                                                    Lymphocytes Auto   
#/vol (Bld)     3.6 {x10E3/uL}  Abnormal        0.7-3.1         [facility]   
Internal   
Medicine  
Work Phone:   
[phone]  
   
                                                    Lymphocytes/100 WBC   
(Bld)           41 %            Normal                          [facility]   
Internal   
Medicine  
Work Phone:   
[phone]  
   
                                        Comment on above:   PATIENT WAS FASTINGP  
ERFORMED BY: Katie [place] [id number] [facility] [id number] Information:   
[id number]   
   
                                                    Lymphocytes/100 WBC   
Auto (Bld)      41 %            Normal                          [facility]   
Internal   
Medicine  
Work Phone:   
[phone]  
   
                                                    MCH (RBC) [Entitic   
mass]           32.1 pg         Normal          26.6-33.0       [facility]   
Internal   
Medicine  
Work Phone:   
[phone]  
   
                                        Comment on above:   PATIENT WAS FASTINGP  
ERFORMED BY: CB Erica [place] [address] [facility] [id number] Information:   
[id number]   
   
                                                    MCH Auto Entitic mass   
(RBC)           32.1 pg         Normal          26.6-33.0       [facility]   
Internal   
Medicine  
Work Phone:   
[phone]  
   
                      MCHC (RBC) [Mass/Vol] 34.4 g/dL  Normal     31.5-35.7  Com  
prehensive   
Internal   
Medicine  
Work Phone:   
[phone]  
   
                                        Comment on above:   PATIENT WAS FASTINGP  
ERFORMED BY: [address] [id number] Information:   
[id number]   
   
                                                    MCHC Auto mass conc   
(RBC)           34.4 g/dL       Normal          31.5-35.7       [facility]   
Internal   
Medicine  
Work Phone:   
[phone]  
   
                                                    MCV (RBC) [Entitic   
vol]            94 fL           Normal          79-97           [facility]   
Internal   
Medicine  
Work Phone:   
[phone]  
   
                                        Comment on above:   PATIENT WAS FASTINGP  
ERFORMED BY: [address] [id number] Information:   
[id number]   
   
                                                    MCV Auto Entitic   
volume (RBC)    94 fL           Normal          79-97           [facility]   
Internal   
Medicine  
Work Phone:   
[phone]  
   
                                                    Monocytes (Bld)   
[#/Vol]         0.7 {x10E3/uL}  Normal          0.1-0.9         [facility]   
Internal   
Medicine  
Work Phone:   
[phone]  
   
                                        Comment on above:   PATIENT WAS FASTINGP  
ERFORMED BY: [place] [id number] [facility] [id number] Information:   
[id number]   
   
                                                    Monocytes (Bld)   
[#/Vol]         0.7 10*3/uL     Normal          0.1-0.9         [facility]   
Internal   
Medicine;   
Com[facility]   
Internal   
Medicine  
Work Phone:   
[phone]  
   
                                        Comment on above:   PATIENT WAS FASTINGP  
ERFORMED BY: [place] [id number] [facility] [id number] Information:   
[id number]   
   
                                                    Monocytes Auto #/vol   
(Bld)           0.7 {x10E3/uL}  Normal          0.1-0.9         [facility]   
Internal   
Medicine  
Work Phone:   
[phone]  
   
                                                    Monocytes/100 WBC   
(Bld)           8 %             Normal                          [facility]   
Internal   
Medicine  
Work Phone:   
[phone]  
   
                                        Comment on above:   PATIENT WAS FASTINGP  
ERFORMED BY: AMOR LabCosaurabh MullerKqymhs9544 [facility] [id number] Information:   
[id number]   
   
                                                    Monocytes/100 WBC   
Auto (Bld)      8 %             Normal                          [facility]   
Internal   
Medicine  
Work Phone:   
[phone]  
   
                                                    Neutrophils (Bld)   
[#/Vol]         4.3 {x10E3/uL}  Normal          1.4-7.0         [facility]   
Internal   
Medicine  
Work Phone:   
[phone]  
   
                                        Comment on above:   PATIENT WAS FASTINGP  
ERFORMED BY: AMOR [address] [id number] Information:   
[id number]   
   
                                                    Neutrophils (Bld)   
[#/Vol]         4.3 10*3/uL     Normal          1.4-7.0         [facility]   
Internal   
Medicine;   
Com[facility]   
Internal   
Medicine  
Work Phone:   
[phone]  
   
                                        Comment on above:   PATIENT WAS FASTINGP  
ERFORMED BY: AMOR [place] [id number] [facility] [id number] Information:   
[id number]   
   
                                                    Neutrophils Auto   
#/vol (Bld)     4.3 {x10E3/uL}  Normal          1.4-7.0         [facility]   
Internal   
Medicine  
Work Phone:   
[phone]  
   
                                                    Neutrophils/100 WBC   
(Bld)           50 %            Normal                          [facility]   
Internal   
Medicine  
Work Phone:   
[phone]  
   
                                        Comment on above:   PATIENT WAS FASTINGP  
ERFORMED BY: [place] [id number] [facility] [id number] Information:   
[id number]   
   
                                                    Neutrophils/100 WBC   
Auto (Bld)      50 %            Normal                          [facility]   
Internal   
Medicine  
Work Phone:   
[phone]  
   
                                                    Platelets (Bld)   
[#/Vol]         209 {x10E3/uL}  Normal          150-379         [facility]   
Internal   
Medicine  
Work Phone:   
[phone]  
   
                                        Comment on above:   PATIENT WAS FASTINGP  
ERFORMED BY: [address] [id number] Information:   
[id number]   
   
                                                    Platelets (Bld)   
[#/Vol]         209 10*3/uL     Normal          150-379         Com[facility]   
Internal   
Medicine;   
Comprehensive   
Internal   
Medicine  
Work Phone:   
[phone]  
   
                                        Comment on above:   PATIENT WAS FASTINGP  
ERFORMED BY: AMOR [facility] Acuokt4[address] [facility] [id number] Information:   
[id number]   
   
                                                    Platelets Auto #/vol   
(Bld)           209 {x10E3/uL}  Normal          150-379         [facility]   
Internal   
Medicine  
Work Phone:   
[phone]  
   
                      RBC (Bld) [#/Vol] 5.20 {x10E6/uL} Normal     4.14-5.80  [facility]   
Internal   
Medicine  
Work Phone:   
[phone]  
   
                                        Comment on above:   PATIENT WAS FASTINGP  
ERFORMED BY: AMOR [address] [id number] Information:   
[id number]   
   
                      RBC (Bld) [#/Vol] 5.20 10*6/uL Normal     4.14-5.80  [facility]   
Internal   
Medicine;   
Comprehensive   
Internal   
Medicine  
Work Phone:   
[phone]  
   
                                        Comment on above:   PATIENT WAS FASTINGP  
ERFORMED BY: [address] [id number] Information:   
[id number]   
   
                      RBC Auto #/vol (Bld) 5.20 {x10E6/uL} Normal     4.14-5.80   
 Comprehensive   
Internal   
Medicine  
Work Phone:   
[phone]  
   
                      WBC (Bld) [#/Vol] 8.8 {x10E3/uL} Normal     3.4-10.8   Com  
prehensive   
Internal   
Medicine  
Work Phone:   
[phone]  
   
                                        Comment on above:   PATIENT WAS FASTINGP  
ERFORMED BY: [place] [id number] [facility] [id number] Information:   
[id number]   
   
                      WBC (Bld) [#/Vol] 8.8 10*3/uL Normal     3.4-10.8   [facility]   
Internal   
Medicine;   
Comprehensive   
Internal   
Medicine  
Work Phone:   
[phone]  
   
                                        Comment on above:   PATIENT WAS FASTINGP  
ERFORMED BY: AMOR LabAb Isoyea4965 [facility] [id number] Information:   
[id number]   
   
                      WBC Auto #/vol (Bld) 8.8 {x10E3/uL} Normal     3.4-10.8     
Comprehensive   
Internal   
Medicine  
Work Phone:   
[phone]  
   
                                                    LIPID PANEL (69404)Ordered B  
y:  on 2014   
   
                                                    Cholesterol in HDL   
mass conc       50 mg/dL        Normal                          Comprehensive   
Internal   
Medicine  
Work Phone:   
[phone]  
   
                                        Comment on above:   According to ATP-III  
 Guidelines, HDL-C >59 mg/dL is considered  
   
anegative risk factor for CHD.   
   
                                                            PATIENT WAS FASTINGP  
ERFORMED BY: AMOR Mullerlin6370 [facility] [id number]   
   
                                                    Cholesterol in LDL   
mass conc       90 mg/dL        Normal          0-99            Comprehensive   
Internal   
Medicine  
Work Phone:   
[phone]  
   
                                        Comment on above:   PATIENT WAS FASTINGP  
ERFORMED BY: AMOR Mullerlin6370 [facility] [id number]   
   
                                                    Cholesterol in   
LDL/Cholesterol in   
HDL mass ratio  1.8 {ratio_units} Normal          0.0-3.6         Comprehensive   
Internal   
Medicine  
Work Phone:   
[phone]  
   
                                        Comment on above:   LDL/HDL Ratio Men Wo  
men 1/2 Avg.Risk 1.0 1.5 Avg.Risk 3.6 3.2   
2X   
Avg.Risk 6.2 5.0 3X Avg.Risk 8.0 6.1   
   
                                                            PATIENT WAS FASTINGP  
ERFORMED BY: AMOR Jason6370 [facility] [id number]   
   
                                                    Cholesterol in VLDL   
mass conc       35 mg/dL        Normal          5-40            Comprehensive   
Internal   
Medicine  
Work Phone:   
[phone]  
   
                                        Comment on above:   PATIENT WAS FASTINGP  
ERFORMED BY: AMOR Mullerlin6370 [facility] [id number]   
   
                      Cholesterol mass conc 175 mg/dL  Normal     100-199    Com  
prehensive   
Internal   
Medicine  
Work Phone:   
[phone]  
   
                                        Comment on above:   PATIENT WAS FASTINGP  
ERFORMED BY: AMOR Mullerlin6370 [facility] [id number]   
   
                                                    Triglyceride mass   
conc            176 mg/dL       Abnormal        0-149           Comprehensive   
Internal   
Medicine  
Work Phone:   
[phone]  
   
                                        Comment on above:   PATIENT WAS FASTINGP  
ERFORMED BY: AMOR [facility] [id number]   
[address] [place] [id number]   
   
                                                    METABOLIC PANEL, COMPREHENSI  
VE (26064)Ordered By:  on 2014   
   
                      Albumin mass conc 4.9 g/dL   Normal     3.5-5.5    Compreh  
[facility]   
Internal   
Medicine  
Work Phone:   
[phone]  
   
                                        Comment on above:   PATIENT WAS FASTINGP  
ERFORMED BY: CB [facility] [id number]   
Road[place] [id number]   
   
                                                    Albumin/Globulin mass   
ratio           2.1 {ratio}     Normal          1.1-2.5         Comprehensive   
Internal   
Medicine  
Work Phone:   
[phone]  
   
                                        Comment on above:   PATIENT WAS FASTINGP  
ERFORMED BY: AMOR [facility] [id number]   
Road[place] [id number]   
   
                                                    ALP [Catalytic   
activity/Vol]   57 U/L          Normal                    Comprehensive   
Internal   
Medicine;   
Comprehensive   
Internal   
Medicine  
Work Phone:   
[phone]  
   
                                        Comment on above:   PATIENT WAS FASTINGP  
ERFORMED BY: CB [facility] [address]   
Road[place] [id number]   
   
                      ALP enzyme act/vol 57 [iU]/L  Normal          [facility]   
Internal   
Medicine  
Work Phone:   
[phone]  
   
                                        Comment on above:   PATIENT WAS FASTINGP  
ERFORMED BY: CB [facility] [address]   
Road[place] [id number]   
   
                                                    ALT [Catalytic   
activity/Vol]   35 U/L          Normal          0-44            Comprehensive   
Internal   
Medicine;   
Comprehensive   
Internal   
Medicine  
Work Phone:   
[phone]  
   
                                        Comment on above:   PATIENT WAS FASTINGP  
ERFORMED BY: CB [facility] [address]   
Road[place] [id number]   
   
                      ALT enzyme act/vol 35 [iU]/L  Normal     0-44       [facility]   
Internal   
Medicine  
Work Phone:   
[phone]  
   
                                        Comment on above:   PATIENT WAS FASTINGP  
ERFORMED BY: CB [facility] [address]   
Road[place] [id number]   
   
                                                    AST [Catalytic   
activity/Vol]   35 U/L          Normal          0-40            Comprehensive   
Internal   
Medicine;   
Comprehensive   
Internal   
Medicine  
Work Phone:   
[phone]  
   
                                        Comment on above:   PATIENT WAS FASTINGP  
ERFORMED BY: AMOR Ilene Jason6370 [facility] [id number]   
   
                      AST enzyme act/vol 35 [iU]/L  Normal     0-40       Compre  
hensive   
Internal   
Medicine  
Work Phone:   
[phone]  
   
                                        Comment on above:   PATIENT WAS FASTINGP  
ERFORMED BY: AMOR Ilene Jason6370 [facility] [id number]   
   
                      Bilirubin mass conc 0.6 mg/dL  Normal     0.0-1.2    Compr  
ehensive   
Internal   
Medicine  
Work Phone:   
[phone]  
   
                                        Comment on above:   PATIENT WAS FASTINGP  
ERFORMED BY: AMOR Ilene Jason6370 [facility] [id number]   
   
                      Calcium mass conc 10.0 mg/dL Normal     8.7-10.2   Compreh  
ensive   
Internal   
Medicine  
Work Phone:   
[phone]  
   
                                        Comment on above:   PATIENT WAS FASTINGP  
ERFORMED BY: AMOR Ilene Jason6370 [facility] [id number]   
   
                      Chloride molar conc 97 mmol/L  Normal          Compr  
ensive   
Internal   
Medicine  
Work Phone:   
[phone]  
   
                                        Comment on above:   PATIENT WAS FASTINGP  
ERFORMED BY: AMOR Ilene Mullerlin6370 [facility] [id number]   
   
                      CO2 molar conc 23 mmol/L  Normal     18-29      Comprehens  
bebe   
Internal   
Medicine  
Work Phone:   
[phone]  
   
                                        Comment on above:   PATIENT WAS FASTINGP  
ERFORMED BY: AMOR Ilene Mullerlin6370 [facility] [id number]   
   
                      Creatinine mass conc 0.96 mg/dL Normal     0.76-1.27  Comp  
[facility]   
Internal   
Medicine  
Work Phone:   
[phone]  
   
                                        Comment on above:   PATIENT WAS FASTINGP  
ERFORMED BY: AMOR Ilene Mullerlin6370 [facility] [id number]   
   
                                                    GFR/1.73 sq M   
predicted among   
blacks CKD-EPI vol   
rate/area (S/P/Bld) 105 mL/min/1.73 Normal                          Comprehensiv  
e   
Internal   
Medicine  
Work Phone:   
[phone]  
   
                                        Comment on above:   PATIENT WAS FASTINGP  
ERFORMED BY: AMOR [facility] [address]   
[facility] [place] [id number]   
   
                                                    GFR/1.73 sq M   
predicted among   
non-blacks CKD-EPI   
vol rate/area   
(S/P/Bld)       91 mL/min/1.73  Normal                          Comprehensive   
Internal   
Medicine  
Work Phone:   
[phone]  
   
                                        Comment on above:   PATIENT WAS FASTINGP  
ERFORMED BY: AMOR [facility] [address]   
[facility] [id number]   
   
                                                    Globulin (S)   
[Mass/Vol]      2.3 g/dL        Normal          1.5-4.5         Comprehensive   
Internal   
Medicine  
Work Phone:   
[phone]  
   
                                        Comment on above:   PATIENT WAS FASTINGP  
ERFORMED BY: AMOR [facility] [id number]   
[facility] [id number]   
   
                                                    Globulin Calculated   
mass conc (S)   2.3 g/dL        Normal          1.5-4.5         Comprehensive   
Internal   
Medicine  
Work Phone:   
[phone]  
   
                      Glucose mass conc 100 mg/dL  Abnormal   65-99      Compreh  
ensive   
Internal   
Medicine  
Work Phone:   
[phone]  
   
                                        Comment on above:   PATIENT WAS FASTINGP  
ERFORMED BY: AMOR [facility] [id number]   
[facility] [place] [id number]   
   
                      Potassium molar conc 4.5 mmol/L Normal     3.5-5.2    Comp  
rehensive   
Internal   
Medicine  
Work Phone:   
[phone]  
   
                                        Comment on above:   PATIENT WAS FASTINGP  
ERFORMED BY: AMOR [facility] [address]   
[facility] [id number]   
   
                      Protein mass conc 7.2 g/dL   Normal     6.0-8.5    Compreh  
ensive   
Internal   
Medicine  
Work Phone:   
[phone]  
   
                                        Comment on above:   PATIENT WAS FASTINGP  
ERFORMED BY: AMOR [facility] [address]   
[facility] [place] [id number]   
   
                      Sodium molar conc 137 mmol/L Normal     134-144    Compreh  
ensive   
Internal   
Medicine  
Work Phone:   
[phone]  
   
                                        Comment on above:   PATIENT WAS FASTINGP  
ERFORMED BY: AMOR [facility] [address]   
[facility] [place] [id number]   
   
                                                    Urea nitrogen mass   
conc            10 mg/dL        Normal          6-24            Comprehensive   
Internal   
Medicine  
Work Phone:   
[phone]  
   
                                        Comment on above:   PATIENT WAS FASTINGP  
ERFORMED BY:  [employer] [address]   
[facility] [id number]   
   
                                                    Urea   
nitrogen/Creatinine   
mass ratio      10 mg/mg        Normal          9-20            Comprehensive   
Internal   
Medicine  
Work Phone:   
[phone]  
   
                                        Comment on above:   PATIENT WAS FASTINGP  
ERFORMED BY:  [facility] [address]   
[facility] [id number]   
   
                                                    MICROALBUMINOrdered By: Syst  
em Manager on 2014   
   
                                                    Albumin DL <= 20 mg/L   
mass conc (U)   3.6 ug/mL       Normal          0.0-17.0        Comprehensive   
Internal   
Medicine  
Work Phone:   
[phone]  
   
                                        Comment on above:   PATIENT WAS FASTINGP  
ERFORMED BY:  [employer] [address]   
[facility] [id number]   
   
                                                    Albumin/Creatinine   
mass ratio (U)  10.5 {mg/g_creat} Normal          0.0-30.0        Comprehensive   
Internal   
Medicine  
Work Phone:   
[phone]  
   
                                        Comment on above:   PATIENT WAS FASTINGP  
ERFORMED BY:  [employer] [address]   
[facility] [id number]   
   
                                                    Creatinine mass conc   
(U)             34.4 mg/dL      Normal          22.0-328.0      Comprehensive   
Internal   
Medicine  
Work Phone:   
[phone]  
   
                                        Comment on above:   PATIENT WAS FASTINGP  
ERFORMED BY:  [employer] [address] [facility] [id number]   
   
                                                    Microscopic ExaminationOrder  
ed By:  on 2014   
   
                                                    Bacteria LM.HPF   
#/area (Urine sed) Few             Normal                          Comprehensive  
   
Internal   
Medicine  
Work Phone:   
[phone]  
   
                                                    Crystals LM Nom   
(Urine sed)     Amorphous Sediment Normal                          Comprehensive  
   
Internal   
Medicine  
Work Phone:   
[phone]  
   
                                                    Epithelial cells   
LM.HPF #/area (Urine   
sed)            0-10            Normal          0 - 10          Comprehensive   
Internal   
Medicine  
Work Phone:   
[phone]  
   
                                                    Mucus LM Ql (Urine   
sed)            Present         Normal                          Comprehensive   
Internal   
Medicine  
Work Phone:   
[phone]  
   
                                                    RBC LM.HPF #/area   
(Urine sed)     0-2             Normal          0 - 2           Comprehensive   
Internal   
Medicine  
Work Phone:   
[phone]  
   
                                                    Unidentified crystals   
LM Ql (Urine sed) Present         Abnormal                        Comprehensive   
Internal   
Medicine  
Work Phone:   
[phone]  
   
                                                    WBC LM.HPF #/area   
(Urine sed)     0-5             Normal          0 - 5           Comprehensive   
Internal   
Medicine  
Work Phone:   
[phone]  
   
                                                    TSH (33496)Ordered By: Syste  
m Manager on 2014   
   
                          Thyrotropin Qn 2.510 {uIU/mL} Normal       0.450-4.50  
0                                       Comprehensive   
Internal   
Medicine  
Work Phone:   
[phone]  
   
                                        Comment on above:   PATIENT WAS FASTINGP  
ERFORMED BY: CB [facility] [address]   
Road[place] [id number]   
   
                                                    URINALYSIS, W/ MICRO (67776)  
Ordered By:  on 2014   
   
                      Appearance Nom (U) Clear      Normal                Compre  
hensive   
Internal   
Medicine  
Work Phone:   
[phone]  
   
                                        Comment on above:   PATIENT WAS FASTINGP  
ERFORMED BY: AMOR [facility] [address]   
Road[place] [id number]   
   
                      Bilirubin Ql (U) Negative   Normal                Comprehe  
nsive   
Internal   
Medicine  
Work Phone:   
[phone]  
   
                                        Comment on above:   PATIENT WAS FASTINGP  
ERFORMED BY: CB [facility] [id number]   
Road[place] [id number]   
   
                      Bilirubin Ql (U) Negative   Normal                Comprehe  
nsive   
Internal   
Medicine;   
Comprehensive   
Internal   
Medicine  
Work Phone:   
[phone]  
   
                                        Comment on above:   PATIENT WAS FASTINGP  
ERFORMED BY: CB [facility] [id number]   
Road[place] [id number]   
   
                      Color Nom (U) Yellow     Normal                Comprehensi  
ve   
Internal   
Medicine  
Work Phone:   
[phone]  
   
                                        Comment on above:   PATIENT WAS FASTINGP  
ERFORMED BY: CB [facility] [id number]   
Road[place] [id number]   
   
                      Glucose Ql (U) Negative   Normal                Comprehens  
bebe   
Internal   
Medicine  
Work Phone:   
[phone]  
   
                                        Comment on above:   PATIENT WAS FASTINGP  
ERFORMED BY: CB [facility] [id number]   
Road[place] [id number]   
   
                      Glucose Ql (U) Negative   Normal                Comprehens  
bebe   
Internal   
Medicine;   
Comprehensive   
Internal   
Medicine  
Work Phone:   
[phone]  
   
                                        Comment on above:   PATIENT WAS FASTINGP  
ERFORMED BY: AMOR [facility] [id number]   
Road[place] [id number]   
   
                      Hemoglobin Ql (U) Negative   Normal                Compreh  
ensive   
Internal   
Medicine  
Work Phone:   
[phone]  
   
                                        Comment on above:   PATIENT WAS FASTINGP  
ERFORMED BY: AMOR [facility] [address]   
Road[place] [id number]   
   
                      Hemoglobin Ql (U) Negative   Normal                Compreh  
ensive   
Internal   
Medicine;   
Comprehensive   
Internal   
Medicine  
Work Phone:   
[phone]  
   
                                        Comment on above:   PATIENT WAS FASTINGP  
ERFORMED BY: AMOR [facility] [id number]   
Road[place] [id number]   
   
                                                    Hemoglobin Test strip   
Ql (U)          Negative        Normal                          Comprehensive   
Internal   
Medicine  
Work Phone:   
[phone]  
   
                      Ketones Ql (U) 1+         Abnormal              Comprehens  
bebe   
Internal   
Medicine  
Work Phone:   
[phone]  
   
                                        Comment on above:   PATIENT WAS FASTINGP  
ERFORMED BY: AMOR [facility] [id number]   
Road[place] [id number]   
   
                                                    Leukocyte esterase   
Test strip Ql (U) Negative        Normal                          Comprehensive   
Internal   
Medicine  
Work Phone:   
[phone]  
   
                                        Comment on above:   PATIENT WAS FASTINGP  
ERFORMED BY: AMOR [facility] [id number]   
Road[place] [id number]   
   
                                                    Leukocyte esterase   
Test strip Ql (U) Negative        Normal                          Comprehensive   
Internal   
Medicine;   
Comprehensive   
Internal   
Medicine  
Work Phone:   
[phone]  
   
                                        Comment on above:   PATIENT WAS FASTINGP  
ERFORMED BY: AMOR [facility] [id number]   
Road[place] [id number]   
   
                                                    Microscopic   
observation LM Nom   
(Urine sed)     See below:      Normal                          Comprehensive   
Internal   
Medicine  
Work Phone:   
[phone]  
   
                                        Comment on above:   Microscopic was benny  
cated and was performed.   
   
                                                            PATIENT WAS FASTINGP  
ERFORMED BY: AMOR [facility] [id number]   
Road[place] [id number]   
   
                                                    Microscopic   
observation LM Nom   
(Urine sed)     MICRON          Normal                          Comprehensive   
Internal   
Medicine  
Work Phone:   
[phone]  
   
                                        Comment on above:   Microscopic follows   
if indicated.   
   
                                                            PATIENT WAS FASTINGP  
ERFORMED BY: AMOR [facility] [id number]   
Road[place] [id number]   
   
                      Nitrite Ql (U) Negative   Normal                Comprehens  
bebe   
Internal   
Medicine  
Work Phone:   
[phone]  
   
                                        Comment on above:   PATIENT WAS FASTINGP  
ERFORMED BY: AMOR [address]   
Road[place] [id number]   
   
                      Nitrite Ql (U) Negative   Normal                Comprehens  
bebe   
Internal   
Medicine;   
Comprehensive   
Internal   
Medicine  
Work Phone:   
[phone]  
   
                                        Comment on above:   PATIENT WAS FASTINGP  
ERFORMED BY: AMOR [address]   
Road[place] [id number]   
   
                                                    Nitrite Test strip Ql   
(U)             Negative        Normal                          Comprehensive   
Internal   
Medicine  
Work Phone:   
[phone]  
   
                      pH (U)     6.0 [pH]   Normal     5.0-7.5    Comprehensive   
Internal   
Medicine  
Work Phone:   
[phone]  
   
                                        Comment on above:   PATIENT WAS FASTINGP  
ERFORMED BY: AMOR [address]   
Road[place] [id number]   
   
                      pH Test strip (U) 6.0 [pH]   Normal     5.0-7.5    Compreh  
ensive   
Internal   
Medicine  
Work Phone:   
[phone]  
   
                      Protein Ql (U) Negative   Normal                Comprehens  
bebe   
Internal   
Medicine  
Work Phone:   
[phone]  
   
                                        Comment on above:   PATIENT WAS FASTINGP  
ERFORMED BY: AMOR [address]   
Road[place] [id number]   
   
                      Protein Ql (U) Negative   Normal                Comprehens  
bebe   
Internal   
Medicine;   
Comprehensive   
Internal   
Medicine  
Work Phone:   
[phone]  
   
                                        Comment on above:   PATIENT WAS FASTINGP  
ERFORMED BY: AMOR [address]   
Road[place] [id number]   
   
                                                    Protein Test strip Ql   
(U)             Negative        Normal                          Comprehensive   
Internal   
Medicine  
Work Phone:   
[phone]  
   
                                                    Specific gravity   
Relative Density (U) 1.007 1             Normal              1.005-1.03  
0                                       Comprehensive   
Internal   
Medicine  
Work Phone:   
[phone]  
   
                                        Comment on above:   PATIENT WAS FASTINGP  
ERFORMED BY: AMOR [address]   
Road[place] [id number]   
   
                                                    Urobilinogen (U)   
[Mass/Vol]      0.2 mg/dL       Normal          0.0-1.9         Comprehensive   
Internal   
Medicine;   
Comprehensive   
Internal   
Medicine  
Work Phone:   
[phone]  
   
                                        Comment on above:   PATIENT WAS FASTINGP  
ERFORMED BY: AMOR [facility] [id number] [facility] [id number]   
   
                                                    Urobilinogen Test   
strip mass conc (U) 0.2 mg/dL       Normal          0.0-1.9         Comprehensiv  
e   
Internal   
Medicine  
Work Phone:   
[phone]  
   
                                        Comment on above:   PATIENT WAS FASTINGP  
ERFORMED BY: AMOR [facility] [address] [facility] [id number]   
   
                                                    Blood Glucose , Office (9296  
2)Ordered By: Catina De Jesus on 2014   
   
                                                    Glucose Glucometer   
molar conc (BldC) 108 1           Normal                          Comprehensive   
Internal   
Medicine  
Work Phone:   
[phone]  
   
                                                    HgA1C , Office (63660)Ordere  
d By: Catina De Jesus on 2014   
   
                                                    Hemoglobin   
A1c/Hemoglobin.total   
mass fraction (Bld) 5.6 %           Normal          4.6 - 7.1       Comprehensiv  
e   
Internal   
Medicine  
Work Phone:   
[phone]  
   
                                                    CBCDOrdered By: Tam Salazar  
er on 2014   
   
                                                    Erythrocyte   
distribution width   
Auto Ratio (RBC) 12.0 %          Normal          11.6-14.6       Comprehensive   
Internal   
Medicine  
Work Phone:   
[phone]  
   
                                                    Hematocrit Auto   
Volume Fraction (Bld) 44.3 %          Normal          40-54           Comprehens  
bebe   
Internal   
Medicine  
Work Phone:   
[phone]  
   
                                                    Hemoglobin mass conc   
(Bld)           15.8 g/dL       Normal          13.0-16.5       Comprehensive   
Internal   
Medicine  
Work Phone:   
[phone]  
   
                                                    MCH Auto Entitic mass   
(RBC)           32.0 pg         Normal          27.0-32.0       Comprehensive   
Internal   
Medicine  
Work Phone:   
[phone]  
   
                                                    MCHC Auto mass conc   
(RBC)           35.7 {g/gl}     Normal          32-36           Comprehensive   
Internal   
Medicine  
Work Phone:   
[phone]  
   
                                                    MCV Auto Entitic   
volume (RBC)    89.9 fL         Normal          80-94           Comprehensive   
Internal   
Medicine  
Work Phone:   
[phone]  
   
                                                    Platelet mean volume   
Auto Entitic volume   
(Bld)           9.9 fL          Normal          6.2-12.0        Comprehensive   
Internal   
Medicine  
Work Phone:   
[phone]  
   
                                                    Platelets Auto #/vol   
(Bld)           210 10*3/uL     Normal          150-450         Comprehensive   
Internal   
Medicine  
Work Phone:   
[phone]  
   
                      RBC Auto #/vol (Bld) 4.93 {M/mm3} Normal     4.6-6.2    Co  
mprehensive   
Internal   
Medicine  
Work Phone:   
[phone]  
   
                      WBC Auto #/vol (Bld) 8.7 10*3/uL Normal     4.4-11.0   Com  
prehensive   
Internal   
Medicine  
Work Phone:   
[phone]  
   
                      CBCD       0.3 %      Normal     0-1        Comprehensive   
Internal   
Medicine  
Work Phone:   
[phone]  
   
                      CBCD       0.200 %    Normal     0.0-0.9    Comprehensive   
Internal   
Medicine  
Work Phone:   
[phone]  
   
                                        Comment on above:   IG% - Immature Granu  
locytes (promyelocytes, myelocytes   
andmetamyelocytes) > 1% indicates that a LEFT SHIFT is Present.   
   
                      CBCD       4.2 {X10_3/uL} Normal     2.0-7.7    Comprehens  
bebe   
Internal   
Medicine  
Work Phone:   
[phone]  
   
                      CBCD       48.4 %     Normal     47-70      Comprehensive   
Internal   
Medicine  
Work Phone:   
[phone]  
   
                      CBCD       39.5 %     Normal     19-41      Comprehensive   
Internal   
Medicine  
Work Phone:   
[phone]  
   
                      CBCD       9.5 %      Normal     0-10       Comprehensive   
Internal   
Medicine  
Work Phone:   
[phone]  
   
                      CBCD       2.1 %      Normal     0-5        Comprehensive   
Internal   
Medicine  
Work Phone:   
[phone]  
   
                      CBCD       38.9 fL    Normal     35.1-43.9  Comprehensive   
Internal   
Medicine  
Work Phone:   
[phone]  
   
                                                    CMPOrdered By: System Manage  
r on 2014   
   
                      Albumin mass conc 4.1 g/dL   Normal     3.4-5.0    Compreh  
ensive   
Internal   
Medicine  
Work Phone:   
[phone]  
   
                                                    Albumin/Globulin mass   
ratio           1.3 {RATIO}     Normal          0.9-2.4         Comprehensive   
Internal   
Medicine  
Work Phone:   
[phone]  
   
                      ALP enzyme act/vol 55 U/L     Normal          Compre  
[facility]   
[facility]   
[facility]  
Work Phone:   
[phone]  
   
                      ALT enzyme act/vol 41 U/L     Normal     12-78      Compre  
[facility]   
[facility]   
[facility]  
Work Phone:   
[phone]  
   
                      AST enzyme act/vol 19 U/L     Normal     15-37      Compre  
[facility]   
[facility]   
[facility]  
Work Phone:   
[phone]  
   
                      Bilirubin mass conc 0.90 mg/dL Normal     0.00-1.00  Compr  
ehensive   
Internal   
Medicine  
Work Phone:   
[phone]  
   
                      Calcium mass conc 8.8 mg/dL  Normal     8.5-10.1   Compreh  
ensive   
Internal   
Medicine  
Work Phone:   
[phone]  
   
                      Chloride molar conc 106 mmol/L Normal          Compr  
ensive   
Internal   
Medicine  
Work Phone:   
[phone]  
   
                      CO2 molar conc 26.0 mmol/L Normal     21.0-32.0  Comprehen  
[facility]   
[facility]   
[facility]  
Work Phone:   
[phone]  
   
                      Creatinine mass conc 0.8 mg/dL  Normal     0.8-1.3    Comp  
rehensive   
Internal   
Medicine  
Work Phone:   
[phone]  
   
                                                    GFR/1.73 sq M   
predicted among   
non-blacks MDRD vol   
rate/area (S/P/Bld) 109 mL/min/{1.73_m2} Normal                          Compreh  
ensive   
Internal   
Medicine  
Work Phone:   
[phone]  
   
                                                    Globulin Calculated   
mass conc (S)   3.2 g/dL        Normal          2.7-4.2         Comprehensive   
Internal   
Medicine  
Work Phone:   
[phone]  
   
                      Glucose mass conc 116 mg/dL  Abnormal        Compreh  
ensive   
Internal   
Medicine  
Work Phone:   
[phone]  
   
                                        Comment on above:   Fasting Glucose resu  
lt from 110 to <126 mg/dLsuggests IMPAIRED  
   
HOMEOSTASIS per A.D.A. criteria.   
   
                      Potassium molar conc 4.1 mmol/L Normal     3.5-5.1    Comp  
rehensive   
Internal   
Medicine  
Work Phone:   
[phone]  
   
                      Protein mass conc 7.3 g/dL   Normal     6.4-8.2    Compreh  
ensive   
Internal   
Medicine  
Work Phone:   
[phone]  
   
                      Sodium molar conc 137 mmol/L Normal     136-145    Compreh  
ensive   
Internal   
Medicine  
Work Phone:   
[phone]  
   
                                                    Urea nitrogen mass   
conc            13 mg/dL        Normal          7-18            Comprehensive   
Internal   
Medicine  
Work Phone:   
[phone]  
   
                                                    Urea   
nitrogen/Creatinine   
mass ratio      16.3 {RATIO}    Normal          10-20           Comprehensive   
Internal   
Medicine  
Work Phone:   
[phone]  
   
                      CMP        5 1        Normal     5-15       Comprehensive   
Internal   
Medicine  
Work Phone:   
[phone]  
   
                      CMP        132 mL/min Normal                Comprehensive   
Internal   
Medicine  
Work Phone:   
[phone]  
   
                                                    LIPIDOrdered By: System Amrita  
viktor on 2014   
   
                                                    Cholesterol in HDL   
mass conc       39 mg/dL        Abnormal                        Comprehensive   
Internal   
Medicine  
Work Phone:   
[phone]  
   
                                        Comment on above:   Reference RangeHDL <  
40 mg/dL Low HDL CholesterolHDL >or= 60   
mg/dL High HDL Cholesterol   
   
                                                    Cholesterol in LDL   
mass conc       103 mg/dL       Normal          0-130           Comprehensive   
Internal   
Medicine  
Work Phone:   
[phone]  
   
                      Cholesterol mass conc 179 mg/dL  Normal                Com  
prehensive   
Internal   
Medicine  
Work Phone:   
[phone]  
   
                                        Comment on above:   <200 mg/dL Desirable  
200-240 mg/dL Borderline>240 mg/dL High   
Risk   
   
                                                    Triglyceride mass   
conc            187 mg/dL       Normal          0-199           Comprehensive   
Internal   
Medicine  
Work Phone:   
[phone]  
   
                                        Comment on above:   Serum Triglycerides   
Reference IntervalNormal <150   
mg/dLBorderline high 150 - 199 mg/dLHigh 200 - 499 mg/dLVery   
High > or = 500 mg/dL   
   
                      LIPID      37 mg/dL   Normal     5-40       Comprehensive   
Internal   
Medicine  
Work Phone:   
[phone]  
   
                                                    MIACREOrdered By: System Man  
ager on 2014   
   
                      Creatinine mass conc 5.3 {mg/g_CRE} Normal                  
Comprehensive   
Internal   
Medicine  
Work Phone:   
[phone]  
   
                      MIACRE     5.8 mg/L   Normal                Comprehensive   
Internal   
Medicine  
Work Phone:   
[phone]  
   
                      MIACRE     109.1 mg/dL Normal                Comprehensive  
   
Internal   
Medicine  
Work Phone:   
[phone]  
   
                                                    TSHOrdered By: System Manage  
r on 2014   
   
                      Thyrotropin Qn 1.98 {uIU/mL} Normal     0.358-3.74 Compreh  
ensive   
Internal   
Medicine  
Work Phone:   
[phone]  
   
                                                    UACOrdered By: System Manage  
r on 2014   
   
                      UAC        0 SEEN     Normal     0-5        Comprehensive   
Internal   
Medicine  
Work Phone:   
[phone]  
   
                      UAC        Negative   Normal                Comprehensive   
Internal   
Medicine  
Work Phone:   
[phone]  
   
                      UAC        Normal     Normal                Comprehensive   
Internal   
Medicine  
Work Phone:   
[phone]  
   
                      UAC        6.0 1      Normal     5.0 - 8.0  Comprehensive   
Internal   
Medicine  
Work Phone:   
[phone]  
   
                          UAC          1.015 1      Normal       1.002-1.03  
0                                       Comprehensive   
Internal   
Medicine  
Work Phone:   
[phone]  
   
                      [facility]        Clear      Normal                Comprehensive   
Internal   
Medicine  
Work Phone:   
[phone]  
   
                      [facility]        Yellow     Normal                Comprehensive   
Internal   
Medicine  
Work Phone:   
[phone]  
   
                                                    Blood Glucose , Office (5816  
2)Ordered By: Catina De Jesus on 2014   
   
                                                    Glucose Glucometer   
molar conc (BldC) 107 1           Normal                          Comprehensive   
Internal   
Medicine  
Work Phone:   
[phone]  
   
                                                    HgA1C , Office (44625)Ordere  
d By: Catina De Jesus on 2014   
   
                                                    Hemoglobin   
A1c/Hemoglobin.total   
mass fraction (Bld) 5.7 %           Normal          4.6 - 7.1       Comprehensiv  
e   
Internal   
Medicine  
Work Phone:   
[phone]  
   
                                                    Blood Glucose , Office (1097  
2)Ordered By: Catina De Jesus on 2013   
   
                                                    Glucose Glucometer   
molar conc (BldC) 106 1           Normal                          Comprehensive   
Internal   
Medicine  
Work Phone:   
[phone]  
   
                                                    CBC WITH MANUAL DIFF (07390)  
Ordered By:  on 2013   
   
                                                    Basophils (Bld)   
[#/Vol]         0.0 {x10E3/uL}  Normal          0.0-0.2         Comprehensive   
Internal   
Medicine  
Work Phone:   
[phone]  
   
                                        Comment on above:   PATIENT WAS FASTINGP  
ERFORMED BY: AMOR [employer]   
[facility] [id number] Information:   
[id number]   
   
                                                    Basophils (Bld)   
[#/Vol]         0.0 10*3/uL     Normal          0.0-0.2         Comprehensive   
Internal   
Medicine;   
Comprehensive   
Internal   
Medicine  
Work Phone:   
[phone]  
   
                                        Comment on above:   PATIENT WAS FASTINGP  
ERFORMED BY: AMOR [id number] [facility] [id number] Information:   
[id number]   
   
                                                    Basophils Auto #/vol   
(Bld)           0.0 {x10E3/uL}  Normal          0.0-0.2         Comprehensive   
Internal   
Medicine  
Work Phone:   
[phone]  
   
                                                    Basophils/100 WBC   
(Bld)           1 %             Normal          0-3             Comprehensive   
Internal   
Medicine  
Work Phone:   
[phone]  
   
                                        Comment on above:   PATIENT WAS FASTINGP  
ERFORMED BY: [id number]   
[facility] [id number] Information:   
[id number]   
   
                                                    Basophils/100 WBC   
Auto (Bld)      1 %             Normal          0-3             Comprehensive   
Internal   
Medicine  
Work Phone:   
[phone]  
   
                                                    Eosinophils (Bld)   
[#/Vol]         0.2 {x10E3/uL}  Normal          0.0-0.4         Comprehensive   
Internal   
Medicine  
Work Phone:   
[phone]  
   
                                        Comment on above:   **Please note refere  
nce interval change**   
   
                                                            PATIENT WAS FASTINGP  
ERFORMED BY: [place] [id number] [facility] [id number] Information:   
[id number]   
   
                                                    Eosinophils (Bld)   
[#/Vol]         0.2 10*3/uL     Normal          0.0-0.4         Comprehensive   
Internal   
Medicine;   
Comprehensive   
Internal   
Medicine  
Work Phone:   
[phone]  
   
                                        Comment on above:   **Please note refere  
nce interval change**   
   
                                                            PATIENT WAS FASTINGP  
ERFORMED BY: [place] [id number] [facility] [id number] Information:   
[id number]   
   
                                                    Eosinophils Auto   
#/vol (Bld)     0.2 {x10E3/uL}  Normal          0.0-0.4         Comprehensive   
Internal   
Medicine  
Work Phone:   
[phone]  
   
                                        Comment on above:   **Please note refere  
nce interval change**   
   
                                                    Eosinophils/100 WBC   
(Bld)           2 %             Normal          0-5             [facility]   
Internal   
Medicine  
Work Phone:   
[phone]  
   
                                        Comment on above:   **Please note refere  
nce interval change**   
   
                                                            PATIENT WAS FASTINGP  
ERFORMED BY: [facility] [id number] Information:   
[id number]   
   
                                                    Eosinophils/100 WBC   
Auto (Bld)      2 %             Normal          0-5             Comprehensive   
Internal   
Medicine  
Work Phone:   
[phone]  
   
                                        Comment on above:   **Please note refere  
nce interval change**   
   
                                                    Erythrocyte   
distribution width   
(RBC) [Ratio]   12.9 %          Normal          12.3-15.4       Com[facility]   
Internal   
Medicine  
Work Phone:   
[phone]  
   
                                        Comment on above:   PATIENT WAS FASTINGP  
ERFORMED BY: [address] [id number] Information:   
[id number]   
   
                                                    Erythrocyte   
distribution width   
Auto Ratio (RBC) 12.9 %          Normal          12.3-15.4       Com[facility]   
Internal   
Medicine  
Work Phone:   
[phone]  
   
                                                    Hematocrit (Bld)   
[Volume fraction] 46.6 %          Normal          37.5-51.0       Comprehensive   
Internal   
Medicine  
Work Phone:   
[phone]  
   
                                        Comment on above:   PATIENT WAS FASTINGP  
ERFORMED BY: [address] [id number] Information:   
[id number]   
   
                                                    Hematocrit Auto   
Volume Fraction (Bld) 46.6 %          Normal          37.5-51.0       [facility]   
Internal   
Medicine  
Work Phone:   
[phone]  
   
                                                    Hemoglobin mass conc   
(Bld)           15.7 g/dL       Normal          12.6-17.7       Comprehensive   
Internal   
Medicine  
Work Phone:   
[phone]  
   
                                        Comment on above:   PATIENT WAS FASTINGP  
ERFORMED BY: AMOR [address] [id number] Information:   
[id number]   
   
                                                    Immature granulocytes   
#/vol (Bld)     0.0 {x10E3/uL}  Normal          0.0-0.1         Comprehensive   
Internal   
Medicine  
Work Phone:   
[phone]  
   
                                        Comment on above:   PATIENT WAS FASTINGP  
ERFORMED BY: AMOR [place] [id number] [facility] [id number] Information:   
[id number]   
   
                                                    Immature granulocytes   
(Bld) [#/Vol]   0.0 10*3/uL     Normal          0.0-0.1         Comprehensive   
Internal   
Medicine;   
Comprehensive   
Internal   
Medicine  
Work Phone:   
[phone]  
   
                                        Comment on above:   PATIENT WAS FASTINGP  
ERFORMED BY: [place] [id number] [facility] [id number] Information:   
[id number]   
   
                                                    Immature   
granulocytes/100 WBC   
(Bld)           0 %             Normal          0-2             Comprehensive   
Internal   
Medicine  
Work Phone:   
[phone]  
   
                                        Comment on above:   PATIENT WAS FASTINGP  
ERFORMED BY: [address] [id number] Information:   
[id number]   
   
                                                    Lymphocytes (Bld)   
[#/Vol]         2.9 {x10E3/uL}  Normal          0.7-3.1         Comprehensive   
Internal   
Medicine  
Work Phone:   
[phone]  
   
                                        Comment on above:   **Please note refere  
nce interval change**   
   
                                                            PATIENT WAS FASTINGP  
ERFORMED BY: [place] [id number] [facility] [id number] Information:   
[id number]   
   
                                                    Lymphocytes (Bld)   
[#/Vol]         2.9 10*3/uL     Normal          0.7-3.1         Comprehensive   
Internal   
Medicine;   
Comprehensive   
Internal   
Medicine  
Work Phone:   
[phone]  
   
                                        Comment on above:   **Please note refere  
nce interval change**   
   
                                                            PATIENT WAS FASTINGP  
ERFORMED BY: [address] [id number] Information:   
[id number]   
   
                                                    Lymphocytes Auto   
#/vol (Bld)     2.9 {x10E3/uL}  Normal          0.7-3.1         Comprehensive   
Internal   
Medicine  
Work Phone:   
[phone]  
   
                                        Comment on above:   **Please note refere  
nce interval change**   
   
                                                    Lymphocytes/100 WBC   
(Bld)           42 %            Normal          14-46           Comprehensive   
Internal   
Medicine  
Work Phone:   
[phone]  
   
                                        Comment on above:   **Please note refere  
nce interval change**   
   
                                                            PATIENT WAS FASTINGP  
ERFORMED BY: [place] [id number] [facility] [id number] Information:   
[id number]   
   
                                                    Lymphocytes/100 WBC   
Auto (Bld)      42 %            Normal          14-46           Comprehensive   
Internal   
Medicine  
Work Phone:   
[phone]  
   
                                        Comment on above:   **Please note refere  
nce interval change**   
   
                                                    MCH (RBC) [Entitic   
mass]           31.3 pg         Normal          26.6-33.0       Comprehensive   
Internal   
Medicine  
Work Phone:   
[phone]  
   
                                        Comment on above:   PATIENT WAS FASTINGP  
ERFORMED BY: [facility] [id number] Information:   
[id number]   
   
                                                    MCH Auto Entitic mass   
(RBC)           31.3 pg         Normal          26.6-33.0       Comprehensive   
Internal   
Medicine  
Work Phone:   
[phone]  
   
                      MCHC (RBC) [Mass/Vol] 33.7 g/dL  Normal     31.5-35.7  Com  
prehensive   
Internal   
Medicine  
Work Phone:   
[phone]  
   
                                        Comment on above:   PATIENT WAS FASTINGP  
ERFORMED BY: [address] [id number] Information:   
[id number]   
   
                                                    MCHC Auto mass conc   
(RBC)           33.7 g/dL       Normal          31.5-35.7       Comprehensive   
Internal   
Medicine  
Work Phone:   
[phone]  
   
                                                    MCV (RBC) [Entitic   
vol]            93 fL           Normal          79-97           Comprehensive   
Internal   
Medicine  
Work Phone:   
[phone]  
   
                                        Comment on above:   PATIENT WAS FASTINGP  
ERFORMED BY: [address] [id number] Information:   
[id number]   
   
                                                    MCV Auto Entitic   
volume (RBC)    93 fL           Normal          79-97           Comprehensive   
Internal   
Medicine  
Work Phone:   
[phone]  
   
                                                    Monocytes (Bld)   
[#/Vol]         0.8 {x10E3/uL}  Normal          0.1-0.9         Comprehensive   
Internal   
Medicine  
Work Phone:   
[phone]  
   
                                        Comment on above:   **Please note refere  
nce interval change**   
   
                                                            PATIENT WAS FASTINGP  
ERFORMED BY: [address] [id number] Information:   
[id number]   
   
                                                    Monocytes (Bld)   
[#/Vol]         0.8 10*3/uL     Normal          0.1-0.9         Comprehensive   
Internal   
Medicine;   
Comprehensive   
Internal   
Medicine  
Work Phone:   
[phone]  
   
                                        Comment on above:   **Please note refere  
nce interval change**   
   
                                                            PATIENT WAS FASTINGP  
ERFORMED BY: [place] [id number] [facility] [id number] Information:   
[id number]   
   
                                                    Monocytes Auto #/vol   
(Bld)           0.8 {x10E3/uL}  Normal          0.1-0.9         Comprehensive   
Internal   
Medicine  
Work Phone:   
[phone]  
   
                                        Comment on above:   **Please note refere  
nce interval change**   
   
                                                    Monocytes/100 WBC   
(Bld)           11 %            Normal          4-12            Comprehensive   
Internal   
Medicine  
Work Phone:   
[phone]  
   
                                        Comment on above:   **Please note refere  
nce interval change**   
   
                                                            PATIENT WAS FASTINGP  
ERFORMED BY: [facility]OR [facility] Nxlvoz[address] [facility] [id number] Information:   
[id number]   
   
                                                    Monocytes/100 WBC   
Auto (Bld)      11 %            Normal          4-12            Comprehensive   
Internal   
Medicine  
Work Phone:   
[phone]  
   
                                        Comment on above:   **Please note refere  
nce interval change**   
   
                                                    Neutrophils (Bld)   
[#/Vol]         3.0 {x10E3/uL}  Normal          1.4-7.0         Comprehensive   
Internal   
Medicine  
Work Phone:   
[phone]  
   
                                        Comment on above:   **Please note refere  
nce interval change**   
   
                                                            PATIENT WAS FASTINGP  
ERFORMED BY: [place] [address] [facility] [id number] Information:   
[id number]   
   
                                                    Neutrophils (Bld)   
[#/Vol]         3.0 10*3/uL     Normal          1.4-7.0         Comprehensive   
Internal   
Medicine;   
Comprehensive   
Internal   
Medicine  
Work Phone:   
[phone]  
   
                                        Comment on above:   **Please note refere  
nce interval change**   
   
                                                            PATIENT WAS FASTINGP  
ERFORMED BY: [facility] [id number] Information:   
[id number]   
   
                                                    Neutrophils Auto   
#/vol (Bld)     3.0 {x10E3/uL}  Normal          1.4-7.0         Comprehensive   
Internal   
Medicine  
Work Phone:   
[phone]  
   
                                        Comment on above:   **Please note refere  
nce interval change**   
   
                                                    Neutrophils/100 WBC   
(Bld)           44 %            Normal          40-74           Comprehensive   
Internal   
Medicine  
Work Phone:   
[phone]  
   
                                        Comment on above:   **Please note refere  
nce interval change**   
   
                                                            PATIENT WAS FASTINGP  
ERFORMED BY: [place] [address] [facility] [id number] Information:   
[id number]   
   
                                                    Neutrophils/100 WBC   
Auto (Bld)      44 %            Normal          40-74           Comprehensive   
Internal   
Medicine  
Work Phone:   
[phone]  
   
                                        Comment on above:   **Please note refere  
nce interval change**   
   
                                                    Platelets (Bld)   
[#/Vol]         205 {x10E3/uL}  Normal          155-379         Comprehensive   
Internal   
Medicine  
Work Phone:   
[phone]  
   
                                        Comment on above:   **Please note refere  
nce interval change**   
   
                                                            PATIENT WAS FASTINGP  
ERFORMED BY: AMOR [facility] [id number] [facility] [id number] Information:   
[id number]   
   
                                                    Platelets (Bld)   
[#/Vol]         205 10*3/uL     Normal          155-379         [facility]   
Internal   
Medicine;   
Comprehensive   
Internal   
Medicine  
Work Phone:   
[phone]  
   
                                        Comment on above:   **Please note refere  
nce interval change**   
   
                                                            PATIENT WAS FASTINGP  
ERFORMED BY: AMOR LabCosaurabh MullerCuplfi3734 [facility] [id number] Information:   
[id number]   
   
                                                    Platelets Auto #/vol   
(Bld)           205 {x10E3/uL}  Normal          155-379         Comprehensive   
Internal   
Medicine  
Work Phone:   
[phone]  
   
                                        Comment on above:   **Please note refere  
nce interval change**   
   
                      RBC (Bld) [#/Vol] 5.02 {x10E6/uL} Normal     4.14-5.80  [facility]   
Internal   
Medicine  
Work Phone:   
[phone]  
   
                                        Comment on above:   PATIENT WAS FASTINGP  
ERFORMED BY: [facility] Bmanqk[facility] [id number] Information:   
[id number]   
   
                      RBC (Bld) [#/Vol] 5.02 10*6/uL Normal     4.14-5.80  [facility]   
Internal   
Medicine;   
Comprehensive   
Internal   
Medicine  
Work Phone:   
[phone]  
   
                                        Comment on above:   PATIENT WAS FASTINGP  
ERFORMED BY: AMOR [facility] Ovvpfa6890 [facility] [id number] Information:   
[id number]   
   
                      RBC Auto #/vol (Bld) 5.02 {x10E6/uL} Normal     4.14-5.80   
 Comprehensive   
Internal   
Medicine  
Work Phone:   
[phone]  
   
                      WBC (Bld) [#/Vol] 6.9 {x10E3/uL} Normal     3.4-10.8   Com  
prehensive   
Internal   
Medicine  
Work Phone:   
[phone]  
   
                                        Comment on above:   **Please note refere  
nce interval change**   
   
                                                            PATIENT WAS FASTINGP  
ERFORMED BY: AMOR LabCosaurabh MullerQvcsdc6203 [facility] [id number] Information:   
[id number]   
   
                      WBC (Bld) [#/Vol] 6.9 10*3/uL Normal     3.4-10.8   [facility]   
Internal   
Medicine;   
Comprehensive   
Internal   
Medicine  
Work Phone:   
[phone]  
   
                                        Comment on above:   **Please note refere  
nce interval change**   
   
                                                            PATIENT WAS FASTINGP  
ERFORMED BY: AMOR LabCosaurabh Puqztj4357 [facility] [id number] Information:   
[id number]   
   
                      WBC Auto #/vol (Bld) 6.9 {x10E3/uL} Normal     3.4-10.8     
Comprehensive   
Internal   
Medicine  
Work Phone:   
[phone]  
   
                                        Comment on above:   **Please note refere  
nce interval change**   
   
                                                    HgA1C , Office (50925)Ordere  
d By: Catina De Jesus on 2013   
   
                                                    Hemoglobin   
A1c/Hemoglobin.total   
mass fraction (Bld) 5.4 %           Normal          4.6 - 7.1       Comprehensiv  
e   
Internal   
Medicine  
Work Phone:   
[phone]  
   
                                                    LIPID PANEL (77551)Ordered B  
y:  on 2013   
   
                                                    Cholesterol in HDL   
mass conc       41 mg/dL        Normal                          Comprehensive   
Internal   
Medicine  
Work Phone:   
[phone]  
   
                                        Comment on above:   According to ATP-III  
 Guidelines, HDL-C >59 mg/dL is considered  
   
anegative risk factor for CHD.   
   
                                                            PATIENT WAS FASTINGP  
ERFORMED BY: AMOR [facility] [address]   
[facility] [id number]   
   
                                                    Cholesterol in LDL   
mass conc       94 mg/dL        Normal          0-99            Comprehensive   
Internal   
Medicine  
Work Phone:   
[phone]  
   
                                        Comment on above:   PATIENT WAS FASTINGP  
ERFORMED BY: AMOR [facility] Ozqcmz9046 [facility] [id number]   
   
                                                    Cholesterol in   
LDL/Cholesterol in   
HDL mass ratio  2.3 {ratio_units} Normal          0.0-3.6         Comprehensive   
Internal   
Medicine  
Work Phone:   
[phone]  
   
                                        Comment on above:   PATIENT WAS FASTINGP  
ERFORMED BY: AMOR [facility] Jason6370 [facility] [id number]   
   
                                                    Cholesterol in VLDL   
mass conc       27 mg/dL        Normal          5-40            Comprehensive   
Internal   
Medicine  
Work Phone:   
[phone]  
   
                                        Comment on above:   PATIENT WAS FASTINGP  
ERFORMED BY: AMOR Jason6[address] [facility] [id number]   
   
                      Cholesterol mass conc 162 mg/dL  Normal     100-199    Com  
prehensive   
Internal   
Medicine  
Work Phone:   
[phone]  
   
                                        Comment on above:   PATIENT WAS FASTINGP  
ERFORMED BY: AMOR Mullerlin6370 [facility] [id number]   
   
                                                    Triglyceride mass   
conc            137 mg/dL       Normal          0-149           Comprehensive   
Internal   
Medicine  
Work Phone:   
[phone]  
   
                                        Comment on above:   PATIENT WAS FASTINGP  
ERFORMED BY: AMOR Mullerlin6370 [facility] [id number]   
   
                                                    METABOLIC PANEL, COMPREHENSI  
VE (54836)Ordered By:  on 2013   
   
                      Albumin mass conc 4.6 g/dL   Normal     3.5-5.5    [facility]   
Internal   
Medicine  
Work Phone:   
[phone]  
   
                                        Comment on above:   PATIENT WAS FASTINGP  
ERFORMED BY: AMOR Odom Mxkelm5808 [facility] [id number]   
   
                                                    Albumin/Globulin mass   
ratio           1.9 {ratio}     Normal          1.1-2.5         Comprehensive   
Internal   
Medicine  
Work Phone:   
[phone]  
   
                                        Comment on above:   PATIENT WAS FASTINGP  
ERFORMED BY: AMOR [facility] MullerUquler3222 [facility] [id number]   
   
                                                    ALP [Catalytic   
activity/Vol]   51 U/L          Normal                    Comprehensive   
Internal   
Medicine;   
Comprehensive   
Internal   
Medicine  
Work Phone:   
[phone]  
   
                                        Comment on above:   PATIENT WAS FASTINGP  
ERFORMED BY: AMOR [facility] MullerTijzan5780 [facility] [id number]   
   
                      ALP enzyme act/vol 51 [iU]/L  Normal          Compre  
[facility]   
Internal   
Medicine  
Work Phone:   
[phone]  
   
                                        Comment on above:   PATIENT WAS FASTINGP  
ERFORMED BY: AMOR LabCosaurabh MullerBeacvm[address]   
[facility] [place] [id number]   
   
                                                    ALT [Catalytic   
activity/Vol]   22 U/L          Normal          0-44            Comprehensive   
Internal   
Medicine;   
Comprehensive   
Internal   
Medicine  
Work Phone:   
[phone]  
   
                                        Comment on above:   PATIENT WAS FASTINGP  
ERFORMED BY: AMOR LabCosaurabh MullerFjxtjk[address]   
[facility] [place] [id number]   
   
                      ALT enzyme act/vol 22 [iU]/L  Normal     0-44       [facility]   
Internal   
Medicine  
Work Phone:   
[phone]  
   
                                        Comment on above:   PATIENT WAS FASTINGP  
ERFORMED BY: AMOR LabCosaurabh MullerTqndyd[address]   
[facility] [id number]   
   
                                                    AST [Catalytic   
activity/Vol]   20 U/L          Normal          0-40            Comprehensive   
Internal   
Medicine;   
Comprehensive   
Internal   
Medicine  
Work Phone:   
[phone]  
   
                                        Comment on above:   PATIENT WAS FASTINGP  
ERFORMED BY: AMOR Mull[address]   
[facility] [id number]   
   
                      AST enzyme act/vol 20 [iU]/L  Normal     0-40       [facility]   
Internal   
Medicine  
Work Phone:   
[phone]  
   
                                        Comment on above:   PATIENT WAS FASTINGP  
ERFORMED BY: AMOR Mull[address]   
[facility] [id number]   
   
                      Bilirubin mass conc 1.0 mg/dL  Normal     0.0-1.2    [facility]   
Internal   
Medicine  
Work Phone:   
[phone]  
   
                                        Comment on above:   PATIENT WAS FASTINGP  
ERFORMED BY: AMOR LabCosaurabh MullerXhafdr[address]   
[facility] [id number]   
   
                      Calcium mass conc 9.6 mg/dL  Normal     8.7-10.2   [facility]   
Internal   
Medicine  
Work Phone:   
[phone]  
   
                                        Comment on above:   PATIENT WAS FASTINGP  
ERFORMED BY: AMOR LabCosaurabh Cycwsz[address]   
[facility] [id number]   
   
                      Chloride molar conc 102 mmol/L Normal          [facility]   
Internal   
Medicine  
Work Phone:   
[phone]  
   
                                        Comment on above:   PATIENT WAS FASTINGP  
ERFORMED BY: AMOR [facility] [facility] [id number]   
   
                      CO2 molar conc 20 mmol/L  Normal     19-28      Comprehens  
bebe   
Internal   
Medicine  
Work Phone:   
[phone]  
   
                                        Comment on above:   PATIENT WAS FASTINGP  
ERFORMED BY: [facility] Prbvrm5586 [facility] [id number]   
   
                      Creatinine mass conc 0.86 mg/dL Normal     0.76-1.27  Comp  
rehensive   
Internal   
Medicine  
Work Phone:   
[phone]  
   
                                        Comment on above:   PATIENT WAS FASTINGP  
ERFORMED BY: [facility] [id number]   
   
                                                    GFR/1.73 sq M   
predicted among   
blacks CKD-EPI vol   
rate/area (S/P/Bld) 117 mL/min/1.73 Normal                          Comprehensiv  
e   
Internal   
Medicine  
Work Phone:   
[phone]  
   
                                        Comment on above:   PATIENT WAS FASTINGP  
ERFORMED BY: [facility] [id number]   
   
                                                    GFR/1.73 sq M   
predicted among   
non-blacks CKD-EPI   
vol rate/area   
(S/P/Bld)       101 mL/min/1.73 Normal                          Comprehensive   
Internal   
Medicine  
Work Phone:   
[phone]  
   
                                        Comment on above:   PATIENT WAS FASTINGP  
ERFORMED BY: [facility] [id number]   
   
                                                    Globulin (S)   
[Mass/Vol]      2.4 g/dL        Normal          1.5-4.5         Comprehensive   
Internal   
Medicine  
Work Phone:   
[phone]  
   
                                        Comment on above:   PATIENT WAS FASTINGP  
ERFORMED BY: [facility] [id number]   
   
                                                    Globulin Calculated   
mass conc (S)   2.4 g/dL        Normal          1.5-4.5         Comprehensive   
Internal   
Medicine  
Work Phone:   
[phone]  
   
                      Glucose mass conc 116 mg/dL  Abnormal   65-99      Compreh  
ensive   
Internal   
Medicine  
Work Phone:   
[phone]  
   
                                        Comment on above:   PATIENT WAS FASTINGP  
ERFORMED BY: [facility] [id number]   
   
                      Potassium molar conc 4.0 mmol/L Normal     3.5-5.2    Comp  
rehensive   
Internal   
Medicine  
Work Phone:   
[phone]  
   
                                        Comment on above:   PATIENT WAS FASTINGP  
ERFORMED BY: AMOR Ilene Jason[address]   
[facility] [place] [id number]   
   
                      Protein mass conc 7.0 g/dL   Normal     6.0-8.5    Compreh  
ensive   
Internal   
Medicine  
Work Phone:   
[phone]  
   
                                        Comment on above:   PATIENT WAS FASTINGP  
ERFORMED BY: AMOR Ilene [address]   
[facility] [place] [id number]   
   
                      Sodium molar conc 137 mmol/L Normal     134-144    Compreh  
ensive   
Internal   
Medicine  
Work Phone:   
[phone]  
   
                                        Comment on above:   PATIENT WAS FASTINGP  
ERFORMED BY: AMOR Sangeetasaurabh MullerYpzfjf[address] [facility] [id number]   
   
                                                    Urea nitrogen mass   
conc            13 mg/dL        Normal          6-24            Comprehensive   
Internal   
Medicine  
Work Phone:   
[phone]  
   
                                        Comment on above:   PATIENT WAS FASTINGP  
ERFORMED BY: AMOR Sangeetasaurabh MullerYlaonn[address] [facility] [id number]   
   
                                                    Urea   
nitrogen/Creatinine   
mass ratio      15 mg/mg        Normal          9-20            Comprehensive   
Internal   
Medicine  
Work Phone:   
[phone]  
   
                                        Comment on above:   PATIENT WAS FASTINGP  
ERFORMED BY: AMOR Sangeetasaurabh MullerLjbkho[address] [facility] [id number]   
   
                                                    MICROALBUMINOrdered By: Syst  
em Manager on 2013   
   
                                                    Albumin DL <= 20 mg/L   
mass conc (U)   1.4 ug/mL       Normal          0.0-17.0        Comprehensive   
Internal   
Medicine  
Work Phone:   
[phone]  
   
                                        Comment on above:   PATIENT WAS FASTINGP  
ERFORMED BY: AMOR Sangeeta [id number]   
[facility] [place] [id number]   
   
                                                    Albumin/Creatinine   
mass ratio (U)  3.9 {mg/g_creat} Normal          0.0-30.0        Comprehensive   
Internal   
Medicine  
Work Phone:   
[phone]  
   
                                        Comment on above:   PATIENT WAS FASTINGP  
ERFORMED BY: AMOR LabAb [address]Kljrzs[address]   
[facility] [place] [id number]   
   
                                                    Creatinine mass conc   
(U)             35.5 mg/dL      Normal          22.0-328.0      Comprehensive   
Internal   
Medicine  
Work Phone:   
[phone]  
   
                                        Comment on above:   PATIENT WAS FASTINGP  
ERFORMED BY: AMOR LabCosaurabh [address]   
Road[place] [id number]   
   
                                                    Microscopic ExaminationOrder  
ed By:  on 2013   
   
                                                    Bacteria LM.HPF   
#/area (Urine sed) None seen       Normal                          Comprehensive  
   
Internal   
Medicine  
Work Phone:   
[phone]  
   
                                                    Epithelial cells   
LM.HPF #/area (Urine   
sed)            None seen       Normal          0 - 10          Comprehensive   
Internal   
Medicine  
Work Phone:   
[phone]  
   
                                                    Mucus LM Ql (Urine   
sed)            Present         Normal                          Comprehensive   
Internal   
Medicine  
Work Phone:   
[phone]  
   
                                                    RBC LM.HPF #/area   
(Urine sed)     None seen       Normal          0 - 3           Comprehensive   
Internal   
Medicine  
Work Phone:   
[phone]  
   
                                                    WBC LM.HPF #/area   
(Urine sed)     0-5             Normal          0 - 5           Comprehensive   
Internal   
Medicine  
Work Phone:   
[phone]  
   
                                                    TSH (45528)Ordered By: Chacorta  
m Manager on 2013   
   
                          Thyrotropin Qn 1.840 {uIU/mL} Normal       0.450-4.50  
0                                       Comprehensive   
Internal   
Medicine  
Work Phone:   
[phone]  
   
                                        Comment on above:   PATIENT WAS FASTINGP  
ERFORMED BY: AMOR LabCosaurabh [address]Hctsbb[address]   
Road[place] [id number]   
   
                                                    URINALYSIS, W/ MICRO (44261)  
Ordered By:  on 2013   
   
                      Appearance Nom (U) Clear      Normal                Compre  
hensive   
Internal   
Medicine  
Work Phone:   
[phone]  
   
                                        Comment on above:   PATIENT WAS FASTINGP  
ERFORMED BY: [facility] Srrevi[address]   
Road[place] [id number]   
   
                      Bilirubin Ql (U) Negative   Normal                Comprehe  
nsive   
Internal   
Medicine  
Work Phone:   
[phone]  
   
                                        Comment on above:   PATIENT WAS FASTINGP  
ERFORMED BY: AMOR [facility] [id number]   
Road[place] [id number]   
   
                      Bilirubin Ql (U) Negative   Normal                Comprehe  
nsive   
Internal   
Medicine;   
Comprehensive   
Internal   
Medicine  
Work Phone:   
[phone]  
   
                                        Comment on above:   PATIENT WAS FASTINGP  
ERFORMED BY: [facility] [id number]   
Road[place] [id number]   
   
                      Color Nom (U) Yellow     Normal                Comprehensi  
ve   
Internal   
Medicine  
Work Phone:   
[phone]  
   
                                        Comment on above:   PATIENT WAS FASTINGP  
ERFORMED BY: AMOR Wheeler[address]   
Road[place] [id number]   
   
                      Glucose Ql (U) Negative   Normal                Comprehens  
bebe   
Internal   
Medicine  
Work Phone:   
[phone]  
   
                                        Comment on above:   PATIENT WAS FASTINGP  
ERFORMED BY: AMOR Mons[address]   
Road[place] [id number]   
   
                      Glucose Ql (U) Negative   Normal                Comprehens  
bebe   
Internal   
Medicine;   
Comprehensive   
Internal   
Medicine  
Work Phone:   
[phone]  
   
                                        Comment on above:   PATIENT WAS FASTINGP  
ERFORMED BY: AMOR [address]   
Road[place] [id number]   
   
                      Hemoglobin Ql (U) Negative   Normal                Compreh  
ensive   
Internal   
Medicine  
Work Phone:   
[phone]  
   
                                        Comment on above:   PATIENT WAS FASTINGP  
ERFORMED BY: AMOR [address]   
Road[place] [id number]   
   
                      Hemoglobin Ql (U) Negative   Normal                Compreh  
ensive   
Internal   
Medicine;   
Comprehensive   
Internal   
Medicine  
Work Phone:   
[phone]  
   
                                        Comment on above:   PATIENT WAS FASTINGP  
ERFORMED BY: AMOR [address]   
Road[place] [id number]   
   
                                                    Hemoglobin Test strip   
Ql (U)          Negative        Normal                          Comprehensive   
Internal   
Medicine  
Work Phone:   
[phone]  
   
                      Ketones Ql (U) Negative   Normal                Comprehens  
bebe   
Internal   
Medicine  
Work Phone:   
[phone]  
   
                                        Comment on above:   PATIENT WAS FASTINGP  
ERFORMED BY: AMOR Jason[address]   
Road[place] [id number]   
   
                      Ketones Ql (U) Negative   Normal                Comprehens  
bebe   
Internal   
Medicine;   
Comprehensive   
Internal   
Medicine  
Work Phone:   
[phone]  
   
                                        Comment on above:   PATIENT WAS FASTINGP  
ERFORMED BY: AMOR Jason[address]   
Road[place] [id number]   
   
                                                    Leukocyte esterase   
Test strip Ql (U) Negative        Normal                          Comprehensive   
Internal   
Medicine  
Work Phone:   
[phone]  
   
                                        Comment on above:   PATIENT WAS FASTINGP  
ERFORMED BY: AMOR Mullerlin[address]   
Road[place] [id number]   
   
                                                    Leukocyte esterase   
Test strip Ql (U) Negative        Normal                          Comprehensive   
Internal   
Medicine;   
Comprehensive   
Internal   
Medicine  
Work Phone:   
[phone]  
   
                                        Comment on above:   PATIENT WAS FASTINGP  
ERFORMED BY: AMOR Jason[address]   
Road[place] [id number]   
   
                                                    Microscopic   
observation LM Nom   
(Urine sed)     MICRON          Normal                          Comprehensive   
Internal   
Medicine  
Work Phone:   
[phone]  
   
                                        Comment on above:   Microscopic follows   
if indicated.   
   
                                                            PATIENT WAS FASTINGP  
ERFORMED BY: AMOR Jason[address]   
Road[place] [id number]   
   
                                                    Microscopic   
observation LM Nom   
(Urine sed)     See below:      Normal                          Comprehensive   
Internal   
Medicine  
Work Phone:   
[phone]  
   
                                        Comment on above:   PATIENT WAS FASTINGP  
ERFORMED BY: AMOR Jason[address]   
Road[place] [id number]   
   
                      Nitrite Ql (U) Negative   Normal                Comprehens  
bebe   
Internal   
Medicine  
Work Phone:   
[phone]  
   
                                        Comment on above:   PATIENT WAS FASTINGP  
ERFORMED BY: AMOR Jason[address]   
Road[place] [id number]   
   
                      Nitrite Ql (U) Negative   Normal                Comprehens  
bebe   
Internal   
Medicine;   
Comprehensive   
Internal   
Medicine  
Work Phone:   
[phone]  
   
                                        Comment on above:   PATIENT WAS FASTINGP  
ERFORMED BY: AMOR [address]   
Roadbl[place] [id number]   
   
                                                    Nitrite Test strip Ql   
(U)             Negative        Normal                          Comprehensive   
Internal   
Medicine  
Work Phone:   
[phone]  
   
                      pH (U)     7.0 [pH]   Normal     5.0-7.5    Comprehensive   
Internal   
Medicine  
Work Phone:   
[phone]  
   
                                        Comment on above:   PATIENT WAS FASTINGP  
ERFORMED BY: AMOR Jason[address]   
Road[place] [id number]   
   
                      pH Test strip (U) 7.0 [pH]   Normal     5.0-7.5    Compreh  
ensive   
Internal   
Medicine  
Work Phone:   
[phone]  
   
                      Protein Ql (U) Negative   Normal                Comprehens  
bebe   
Internal   
Medicine  
Work Phone:   
[phone]  
   
                                        Comment on above:   PATIENT WAS FASTINGP  
ERFORMED BY: AMOR Mullerlin[address]   
Road[place] [id number]   
   
                      Protein Ql (U) Negative   Normal                Comprehens  
bebe   
Internal   
Medicine;   
Comprehensive   
Internal   
Medicine  
Work Phone:   
[phone]  
   
                                        Comment on above:   PATIENT WAS FASTINGP  
ERFORMED BY: AMOR LabCosaurabh MullerYnmmus[address]   
RoadRevistronicblin [place] [id number]   
   
                                                    Protein Test strip Ql   
(U)             Negative        Normal                          Comprehensive   
Internal   
Medicine  
Work Phone:   
[phone]  
   
                                                    Specific gravity   
Relative Density (U) 1.006 1             Normal              1.005-1.03  
0                                       Comprehensive   
Internal   
Medicine  
Work Phone:   
[phone]  
   
                                        Comment on above:   PATIENT WAS FASTINGP  
ERFORMED BY: AMOR [facility] [address]uv[address]   
Road[place] [id number]   
   
                                                    Urobilinogen (U)   
[Mass/Vol]      1.0 mg/dL       Normal          0.0-1.9         Comprehensive   
Internal   
Medicine;   
Comprehensive   
Internal   
Medicine  
Work Phone:   
[phone]  
   
                                        Comment on above:   PATIENT WAS FASTINGP  
ERFORMED BY: AMOR [facility] [address]   
RoadRevistronicblin [place] [id number]   
   
                                                    Urobilinogen Test   
strip mass conc (U) 1.0 mg/dL       Normal          0.0-1.9         Comprehensiv  
e   
Internal   
Medicine  
Work Phone:   
[phone]  
   
                                        Comment on above:   PATIENT WAS FASTINGP  
ERFORMED BY: AMOR Mullerlin[address]   
[facility] [place] [id number]   
   
                                                    Blood Glucose , Office (8296  
2)Ordered By: Catina De Jesus on 03-   
   
                                                    Glucose Glucometer   
molar conc (BldC) 113 1           Normal                          Comprehensive   
Internal   
Medicine  
Work Phone:   
[phone]  
   
                                                    HgA1C , Office (16354)Ordere  
d By: Catina De Jesus on 03-   
   
                                                    Hemoglobin   
A1c/Hemoglobin.total   
mass fraction (Bld) 5.6 %           Normal          4.6 - 7.1       Comprehensiv  
e   
Internal   
Medicine  
Work Phone:   
[phone]  
   
                                                    CBC WITH MANUAL DIFF (05706)  
Ordered By:  on 2013   
   
                                                    Basophils (Bld)   
[#/Vol]         0.0 {x10E3/uL}  Normal          0.0-0.2         Comprehensive   
Internal   
Medicine  
Work Phone:   
[phone]  
   
                                        Comment on above:   PATIENT WAS FASTINGP  
ERFORMED BY: [facility] [id number] Information:   
[id number]   
   
                                                    Basophils (Bld)   
[#/Vol]         0.0 10*3/uL     Normal          0.0-0.2         Comprehensive   
Internal   
Medicine;   
Comprehensive   
Internal   
Medicine  
Work Phone:   
[phone]  
   
                                        Comment on above:   PATIENT WAS FASTINGP  
ERFORMED BY: [address] [id number] Information:   
[id number]   
   
                                                    Basophils Auto #/vol   
(Bld)           0.0 {x10E3/uL}  Normal          0.0-0.2         Comprehensive   
Internal   
Medicine  
Work Phone:   
[phone]  
   
                                                    Basophils/100 WBC   
(Bld)           1 %             Normal          0-3             Comprehensive   
Internal   
Medicine  
Work Phone:   
[phone]  
   
                                        Comment on above:   PATIENT WAS FASTINGP  
ERFORMED BY: [address] [id number] Information:   
[id number]   
   
                                                    Basophils/100 WBC   
Auto (Bld)      1 %             Normal          0-3             Comprehensive   
Internal   
Medicine  
Work Phone:   
[phone]  
   
                                                    Eosinophils (Bld)   
[#/Vol]         0.1 {x10E3/uL}  Normal          0.0-0.4         Comprehensive   
Internal   
Medicine  
Work Phone:   
[phone]  
   
                                        Comment on above:   PATIENT WAS FASTINGP  
ERFORMED BY: [place] [id number] [facility] [id number] Information:   
[id number]   
   
                                                    Eosinophils (Bld)   
[#/Vol]         0.1 10*3/uL     Normal          0.0-0.4         Comprehensive   
Internal   
Medicine;   
Comprehensive   
Internal   
Medicine  
Work Phone:   
[phone]  
   
                                        Comment on above:   PATIENT WAS FASTINGP  
ERFORMED BY: [place] [id number] [facility] [id number] Information:   
[id number]   
   
                                                    Eosinophils Auto   
#/vol (Bld)     0.1 {x10E3/uL}  Normal          0.0-0.4         Comprehensive   
Internal   
Medicine  
Work Phone:   
[phone]  
   
                                                    Eosinophils/100 WBC   
(Bld)           1 %             Normal          0-7             Comprehensive   
Internal   
Medicine  
Work Phone:   
[phone]  
   
                                        Comment on above:   PATIENT WAS FASTINGP  
ERFORMED BY: [place] [id number] [facility] [id number] Information:   
[id number]   
   
                                                    Eosinophils/100 WBC   
Auto (Bld)      1 %             Normal          0-7             Comprehensive   
Internal   
Medicine  
Work Phone:   
[phone]  
   
                                                    Erythrocyte   
distribution width   
(RBC) [Ratio]   12.9 %          Normal          12.3-15.4       Comprehensive   
Internal   
Medicine  
Work Phone:   
[phone]  
   
                                        Comment on above:   PATIENT WAS FASTINGP  
ERFORMED BY: [place] [id number] [facility] [id number] Information:   
[id number]   
   
                                                    Erythrocyte   
distribution width   
Auto Ratio (RBC) 12.9 %          Normal          12.3-15.4       Comprehensive   
Internal   
Medicine  
Work Phone:   
[phone]  
   
                                                    Hematocrit (Bld)   
[Volume fraction] 45.7 %          Normal          37.5-51.0       Comprehensive   
Internal   
Medicine  
Work Phone:   
[phone]  
   
                                        Comment on above:   PATIENT WAS FASTINGP  
ERFORMED BY: [place] [address] [facility] [id number] Information:   
[id number]   
   
                                                    Hematocrit Auto   
Volume Fraction (Bld) 45.7 %          Normal          37.5-51.0       [facility]   
Internal   
Medicine  
Work Phone:   
[phone]  
   
                                                    Hemoglobin mass conc   
(Bld)           16.0 g/dL       Normal          12.6-17.7       Comprehensive   
Internal   
Medicine  
Work Phone:   
[phone]  
   
                                        Comment on above:   PATIENT WAS FASTINGP  
ERFORMED BY: [facility] [id number] Information:   
[id number]   
   
                                                    Immature granulocytes   
#/vol (Bld)     0.0 {x10E3/uL}  Normal          0.0-0.1         Comprehensive   
Internal   
Medicine  
Work Phone:   
[phone]  
   
                                        Comment on above:   PATIENT WAS FASTINGP  
ERFORMED BY: [place] [address] [facility] [id number] Information:   
[id number]   
   
                                                    Immature granulocytes   
(Bld) [#/Vol]   0.0 10*3/uL     Normal          0.0-0.1         Comprehensive   
Internal   
Medicine;   
Comprehensive   
Internal   
Medicine  
Work Phone:   
[phone]  
   
                                        Comment on above:   PATIENT WAS FASTINGP  
ERFORMED BY: [place] [address] [facility] [id number] Information:   
[id number]   
   
                                                    Immature   
granulocytes/100 WBC   
(Bld)           0 %             Normal          0-2             Comprehensive   
Internal   
Medicine  
Work Phone:   
[phone]  
   
                                        Comment on above:   PATIENT WAS FASTINGP  
ERFORMED BY: [address] [id number] Information:   
[id number]   
   
                                                    Lymphocytes (Bld)   
[#/Vol]         3.5 {x10E3/uL}  Normal          0.7-4.5         Comprehensive   
Internal   
Medicine  
Work Phone:   
[phone]  
   
                                        Comment on above:   PATIENT WAS FASTINGP  
ERFORMED BY: [address] [id number] Information:   
[id number]   
   
                                                    Lymphocytes (Bld)   
[#/Vol]         3.5 10*3/uL     Normal          0.7-4.5         Comprehensive   
Internal   
Medicine;   
Comprehensive   
Internal   
Medicine  
Work Phone:   
[phone]  
   
                                        Comment on above:   PATIENT WAS FASTINGP  
ERFORMED BY: [address] [id number] Information:   
[id number]   
   
                                                    Lymphocytes Auto   
#/vol (Bld)     3.5 {x10E3/uL}  Normal          0.7-4.5         Comprehensive   
Internal   
Medicine  
Work Phone:   
[phone]  
   
                                                    Lymphocytes/100 WBC   
(Bld)           46 %            Normal          14-46           Comprehensive   
Internal   
Medicine  
Work Phone:   
[phone]  
   
                                        Comment on above:   PATIENT WAS FASTINGP  
ERFORMED BY: [place] [address] [facility] [id number] Information:   
[id number]   
   
                                                    Lymphocytes/100 WBC   
Auto (Bld)      46 %            Normal          14-46           Comprehensive   
Internal   
Medicine  
Work Phone:   
[phone]  
   
                                                    MCH (RBC) [Entitic   
mass]           31.9 pg         Normal          26.6-33.0       Comprehensive   
Internal   
Medicine  
Work Phone:   
[phone]  
   
                                        Comment on above:   PATIENT WAS FASTINGP  
ERFORMED BY: AMOR [facility] Crogvb2237 [facility] [id number] Information:   
[id number]   
   
                                                    MCH Auto Entitic mass   
(RBC)           31.9 pg         Normal          26.6-33.0       Comprehensive   
Internal   
Medicine  
Work Phone:   
[phone]  
   
                      MCHC (RBC) [Mass/Vol] 35.0 g/dL  Normal     31.5-35.7  Com  
prehensive   
Internal   
Medicine  
Work Phone:   
[phone]  
   
                                        Comment on above:   PATIENT WAS FASTINGP  
ERFORMED BY: AMOR [place] [id number] [facility] [id number] Information:   
[id number]   
   
                                                    MCHC Auto mass conc   
(RBC)           35.0 g/dL       Normal          31.5-35.7       Comprehensive   
Internal   
Medicine  
Work Phone:   
[phone]  
   
                                                    MCV (RBC) [Entitic   
vol]            91 fL           Normal          79-97           Comprehensive   
Internal   
Medicine  
Work Phone:   
[phone]  
   
                                        Comment on above:   PATIENT WAS FASTINGP  
ERFORMED BY: AMOR [place] [id number] [facility] [id number] Information:   
[id number]   
   
                                                    MCV Auto Entitic   
volume (RBC)    91 fL           Normal          79-97           Comprehensive   
Internal   
Medicine  
Work Phone:   
[phone]  
   
                                                    Monocytes (Bld)   
[#/Vol]         0.7 {x10E3/uL}  Normal          0.1-1.0         Comprehensive   
Internal   
Medicine  
Work Phone:   
[phone]  
   
                                        Comment on above:   PATIENT WAS FASTINGP  
ERFORMED BY: AMOR [place] [id number] [facility] [id number] Information:   
[id number]   
   
                                                    Monocytes (Bld)   
[#/Vol]         0.7 10*3/uL     Normal          0.1-1.0         Comprehensive   
Internal   
Medicine;   
Comprehensive   
Internal   
Medicine  
Work Phone:   
[phone]  
   
                                        Comment on above:   PATIENT WAS FASTINGP  
ERFORMED BY: AMOR [place] [id number] [facility] [id number] Information:   
[id number]   
   
                                                    Monocytes Auto #/vol   
(Bld)           0.7 {x10E3/uL}  Normal          0.1-1.0         Comprehensive   
Internal   
Medicine  
Work Phone:   
[phone]  
   
                                                    Monocytes/100 WBC   
(Bld)           9 %             Normal          4-13            Comprehensive   
Internal   
Medicine  
Work Phone:   
[phone]  
   
                                        Comment on above:   PATIENT WAS FASTINGP  
ERFORMED BY: AMOR [place] [id number] [facility] [id number] Information:   
[id number]   
   
                                                    Monocytes/100 WBC   
Auto (Bld)      9 %             Normal          -            Comprehensive   
Internal   
Medicine  
Work Phone:   
[phone]  
   
                                                    Neutrophils (Bld)   
[#/Vol]         3.2 {x10E3/uL}  Normal          1.8-7.8         Comprehensive   
Internal   
Medicine  
Work Phone:   
[phone]  
   
                                        Comment on above:   PATIENT WAS FASTINGP  
ERFORMED BY: AMOR [place] [id number] [facility] [id number] Information:   
[id number]   
   
                                                    Neutrophils (Bld)   
[#/Vol]         3.2 10*3/uL     Normal          1.8-7.8         Comprehensive   
Internal   
Medicine;   
Comprehensive   
Internal   
Medicine  
Work Phone:   
[phone]  
   
                                        Comment on above:   PATIENT WAS FASTINGP  
ERFORMED BY: AMOR [facility] [id number] Information:   
[id number]   
   
                                                    Neutrophils Auto   
#/vol (Bld)     3.2 {x10E3/uL}  Normal          1.8-7.8         Comprehensive   
Internal   
Medicine  
Work Phone:   
[phone]  
   
                                                    Neutrophils/100 WBC   
(Bld)           43 %            Normal          40-74           Comprehensive   
Internal   
Medicine  
Work Phone:   
[phone]  
   
                                        Comment on above:   PATIENT WAS FASTINGP  
ERFORMED BY: [place] [id number] [facility] [id number] Information:   
[id number]   
   
                                                    Neutrophils/100 WBC   
Auto (Bld)      43 %            Normal          40-74           Comprehensive   
Internal   
Medicine  
Work Phone:   
[phone]  
   
                                                    Platelets (Bld)   
[#/Vol]         194 {x10E3/uL}  Normal          140-415         Comprehensive   
Internal   
Medicine  
Work Phone:   
[phone]  
   
                                        Comment on above:   PATIENT WAS FASTINGP  
ERFORMED BY: [facility] Prnutf3437 [facility] [id number] Information:   
[id number]   
   
                                                    Platelets (Bld)   
[#/Vol]         194 10*3/uL     Normal          140-415         Comprehensive   
Internal   
Medicine;   
Comprehensive   
Internal   
Medicine  
Work Phone:   
[phone]  
   
                                        Comment on above:   PATIENT WAS FASTINGP  
ERFORMED BY: AMOR [facility] Qsohmt8883 [facility] [id number] Information:   
[id number]   
   
                                                    Platelets Auto #/vol   
(Bld)           194 {x10E3/uL}  Normal          140-415         Comprehensive   
Internal   
Medicine  
Work Phone:   
[phone]  
   
                      RBC (Bld) [#/Vol] 5.02 {x10E6/uL} Normal     4.14-5.80  [facility]   
Internal   
Medicine  
Work Phone:   
[phone]  
   
                                        Comment on above:   PATIENT WAS FASTINGP  
ERFORMED BY: AMOR [place] [id number] [facility] [id number] Information:   
[id number]   
   
                      RBC (Bld) [#/Vol] 5.02 10*6/uL Normal     4.14-5.80  [facility]   
Internal   
Medicine;   
Comprehensive   
Internal   
Medicine  
Work Phone:   
[phone]  
   
                                        Comment on above:   PATIENT WAS FASTINGP  
ERFORMED BY: AMOR [facility]6370 [facility] [id number] Information:   
[id number]   
   
                      RBC Auto #/vol (Bld) 5.02 {x10E6/uL} Normal     4.14-5.80   
 Comprehensive   
Internal   
Medicine  
Work Phone:   
[phone]  
   
                      WBC (Bld) [#/Vol] 7.5 {x10E3/uL} Normal     4.0-10.5   Com  
prehensive   
Internal   
Medicine  
Work Phone:   
[phone]  
   
                                        Comment on above:   PATIENT WAS FASTINGP  
ERFORMED BY: AMOR [facility] [id number] Information:   
[id number]   
   
                      WBC (Bld) [#/Vol] 7.5 10*3/uL Normal     4.0-10.5   [facility]  
[facility]   
Internal   
Medicine;   
Comprehensive   
Internal   
Medicine  
Work Phone:   
[phone]  
   
                                        Comment on above:   PATIENT WAS FASTINGP  
ERFORMED BY: AMOR [facility] Jason6370 [facility] [id number] Information:   
[id number]   
   
                      WBC Auto #/vol (Bld) 7.5 {x10E3/uL} Normal     4.0-10.5     
Comprehensive   
Internal   
Medicine  
Work Phone:   
[phone]  
   
                                                    Lipid Panel With LDL/HDL Rat  
ioOrdered By:  on 2013   
   
                                                    Cholesterol in HDL   
mass conc       55 mg/dL        Normal                          Comprehensive   
Internal   
Medicine  
Work Phone:   
[phone]  
   
                                        Comment on above:   According to ATP-III  
 Guidelines, HDL-C >59 mg/dL is considered  
   
anegative risk factor for CHD.   
   
                                                    Cholesterol in LDL   
mass conc       102 mg/dL       Abnormal        0-99            Comprehensive   
Internal   
Medicine  
Work Phone:   
[phone]  
   
                                                    Cholesterol in   
LDL/Cholesterol in   
HDL mass ratio  1.9 {ratio_units} Normal          0.0-3.6         Comprehensive   
Internal   
Medicine  
Work Phone:   
[phone]  
   
                                                    Cholesterol in VLDL   
mass conc       19 mg/dL        Normal          5-40            Comprehensive   
Internal   
Medicine  
Work Phone:   
[phone]  
   
                      Cholesterol mass conc 176 mg/dL  Normal     100-199    Com  
prehensive   
Internal   
Medicine  
Work Phone:   
[phone]  
   
                                                    Triglyceride mass   
conc            93 mg/dL        Normal          0-149           Comprehensive   
Internal   
Medicine  
Work Phone:   
[phone]  
   
                                                    METABOLIC PANEL, COMPREHENSI  
VE ([id number])Ordered By:  on 2013   
   
                      Albumin mass conc 4.6 g/dL   Normal     3.5-5.5    Compreh  
ensive   
Internal   
Medicine  
Work Phone:   
[phone]  
   
                                        Comment on above:   PATIENT WAS FASTINGP  
ERFORMED BY: AMOR [facility] MullerRppkfz4513 [facility] [id number]   
   
                                                    Albumin/Globulin mass   
ratio           2.0 {ratio}     Normal          1.1-2.5         Comprehensive   
Internal   
Medicine  
Work Phone:   
[phone]  
   
                                        Comment on above:   PATIENT WAS FASTINGP  
ERFORMED BY: AMOR [facility] [address]   
Road[address] [place] [id number]   
   
                                                    ALP [Catalytic   
activity/Vol]   51 U/L          Normal                    Comprehensive   
Internal   
Medicine;   
Comprehensive   
Internal   
Medicine  
Work Phone:   
[phone]  
   
                                        Comment on above:   PATIENT WAS FASTINGP  
ERFORMED BY:  [facility] [address]   
Road[address] [id number]   
   
                      ALP enzyme act/vol 51 [iU]/L  Normal          [facility]   
Internal   
Medicine  
Work Phone:   
[phone]  
   
                                        Comment on above:   PATIENT WAS FASTINGP  
ERFORMED BY:  [facility] [address]   
Road[address] [id number]   
   
                                                    ALT [Catalytic   
activity/Vol]   31 U/L          Normal          0-44            Comprehensive   
Internal   
Medicine;   
Comprehensive   
Internal   
Medicine  
Work Phone:   
[phone]  
   
                                        Comment on above:   PATIENT WAS FASTINGP  
ERFORMED BY:  [facility] [address]   
Road[address] [id number]   
   
                      ALT enzyme act/vol 31 [iU]/L  Normal     0-44       [facility]   
Internal   
Medicine  
Work Phone:   
[phone]  
   
                                        Comment on above:   PATIENT WAS FASTINGP  
ERFORMED BY:  [facility] [address]   
Road[address] [id number]   
   
                                                    AST [Catalytic   
activity/Vol]   28 U/L          Normal          0-40            Comprehensive   
Internal   
Medicine;   
Comprehensive   
Internal   
Medicine  
Work Phone:   
[phone]  
   
                                        Comment on above:   PATIENT WAS FASTINGP  
ERFORMED BY:  [facility] [address]   
Road[address] [id number]   
   
                      AST enzyme act/vol 28 [iU]/L  Normal     0-40       [facility]   
Internal   
Medicine  
Work Phone:   
[phone]  
   
                                        Comment on above:   PATIENT WAS FASTINGP  
ERFORMED BY:  [facility] [address]   
Road[place] [id number]   
   
                      Bilirubin mass conc 0.5 mg/dL  Normal     0.0-1.2    [facility]   
Internal   
Medicine  
Work Phone:   
[phone]  
   
                                        Comment on above:   PATIENT WAS FASTINGP  
ERFORMED BY:  [facility] [address]qe[address]   
Road[place] [id number]   
   
                      Calcium mass conc 9.7 mg/dL  Normal     8.7-10.2   Compreh  
ensive   
Internal   
Medicine  
Work Phone:   
[phone]  
   
                                        Comment on above:   PATIENT WAS FASTINGP  
ERFORMED BY: AMOR [facility] [address]   
[facility] [place] [id number]   
   
                      Chloride molar conc 102 mmol/L Normal          Compr  
ehensive   
Internal   
Medicine  
Work Phone:   
[phone]  
   
                                        Comment on above:   PATIENT WAS FASTINGP  
ERFORMED BY: AMOR [facility] [address]   
RoadD[facility] Healthin [place] [id number]   
   
                      CO2 molar conc 24 mmol/L  Normal     20-32      Comprehens  
ebbe   
Internal   
Medicine  
Work Phone:   
[phone]  
   
                                        Comment on above:   PATIENT WAS FASTINGP  
ERFORMED BY: AMOR [facility] [address]   
[facility] [id number]   
   
                      Creatinine mass conc 0.83 mg/dL Normal     0.76-1.27  Comp  
[facility]   
Internal   
Medicine  
Work Phone:   
[phone]  
   
                                        Comment on above:   PATIENT WAS FASTINGP  
ERFORMED BY: AMOR [facility] [address]   
[facility] [id number]   
   
                                                    GFR/1.73 sq M   
predicted among   
blacks CKD-EPI vol   
rate/area (S/P/Bld) 119 mL/min/1.73 Normal                          Comprehensiv  
e   
Internal   
Medicine  
Work Phone:   
[phone]  
   
                                        Comment on above:   PATIENT WAS FASTINGP  
ERFORMED BY: AMOR [facility] [address]   
[facility] [id number]   
   
                                                    GFR/1.73 sq M   
predicted among   
non-blacks CKD-EPI   
vol rate/area   
(S/P/Bld)       103 mL/min/1.73 Normal                          Comprehensive   
Internal   
Medicine  
Work Phone:   
[phone]  
   
                                        Comment on above:   PATIENT WAS FASTINGP  
ERFORMED BY: CB [facility] [address]   
[facility] [place] [id number]   
   
                                                    Globulin (S)   
[Mass/Vol]      2.3 g/dL        Normal          1.5-4.5         Comprehensive   
Internal   
Medicine  
Work Phone:   
[phone]  
   
                                        Comment on above:   PATIENT WAS FASTINGP  
ERFORMED BY: AMOR [facility] [address]   
[facility] [place] [id number]   
   
                                                    Globulin Calculated   
mass conc (S)   2.3 g/dL        Normal          1.5-4.5         Comprehensive   
Internal   
Medicine  
Work Phone:   
[phone]  
   
                      Glucose mass conc 78 mg/dL   Normal     65-99      Compreh  
ensive   
Internal   
Medicine  
Work Phone:   
[phone]  
   
                                        Comment on above:   PATIENT WAS FASTINGP  
ERFORMED BY: AMOR [facility] [address]   
Road[address] [id number]   
   
                      Potassium molar conc 3.8 mmol/L Normal     3.5-5.2    Comp  
rehensive   
Internal   
Medicine  
Work Phone:   
[phone]  
   
                                        Comment on above:   PATIENT WAS FASTINGP  
ERFORMED BY: AMOR [facility] [address]   
Road[place] [id number]   
   
                      Protein mass conc 6.9 g/dL   Normal     6.0-8.5    Compreh  
ensive   
Internal   
Medicine  
Work Phone:   
[phone]  
   
                                        Comment on above:   PATIENT WAS FASTINGP  
ERFORMED BY: AMOR [facility] [address]   
Road[place] [id number]   
   
                      Sodium molar conc 140 mmol/L Normal     134-144    Compreh  
ensive   
Internal   
Medicine  
Work Phone:   
[phone]  
   
                                        Comment on above:   PATIENT WAS FASTINGP  
ERFORMED BY: AMOR [facility] [address]   
Road[place] [id number]   
   
                                                    Urea nitrogen mass   
conc            9 mg/dL         Normal          6-24            Comprehensive   
Internal   
Medicine  
Work Phone:   
[phone]  
   
                                        Comment on above:   PATIENT WAS FASTINGP  
ERFORMED BY: AMOR [facility] [address]   
Road[place] [id number]   
   
                                                    Urea   
nitrogen/Creatinine   
mass ratio      11 mg/mg        Normal          9-20            Comprehensive   
Internal   
Medicine  
Work Phone:   
[phone]  
   
                                        Comment on above:   PATIENT WAS FASTINGP  
ERFORMED BY: AMOR [facility] [address]   
Road[place] [id number]   
   
                                                    MICROALBUMINOrdered By: Syst  
em Manager on 2013   
   
                                                    Albumin DL <= 20 mg/L   
mass conc (U)   1.4 ug/mL       Normal          0.0-17.0        Comprehensive   
Internal   
Medicine  
Work Phone:   
[phone]  
   
                                        Comment on above:   PATIENT WAS FASTINGP  
ERFORMED BY: AMOR [facility] [address]   
Road[place] [id number]   
   
                                                    Albumin/Creatinine   
mass ratio (U)  1.8 {mg/g_creat} Normal          0.0-30.0        Comprehensive   
Internal   
Medicine  
Work Phone:   
[phone]  
   
                                        Comment on above:   PATIENT WAS FASTINGP  
ERFORMED BY:  [facility] [id number]   
   
                                                    Creatinine mass conc   
(U)             79.9 mg/dL      Normal          22.0-328.0      Comprehensive   
Internal   
Medicine  
Work Phone:   
[phone]  
   
                                        Comment on above:   PATIENT WAS FASTINGP  
ERFORMED BY: [facility] [id number]   
   
                                                    Microscopic ExaminationOrder  
ed By:  on 2013   
   
                                                    Bacteria LM.HPF   
#/area (Urine sed) Few             Normal                          Comprehensive  
   
Internal   
Medicine  
Work Phone:   
[phone]  
   
                                                    Epithelial cells   
LM.HPF #/area (Urine   
sed)            None seen       Normal          0 - 10          Comprehensive   
Internal   
Medicine  
Work Phone:   
[phone]  
   
                                                    Mucus LM Ql (Urine   
sed)            Present         Normal                          Comprehensive   
Internal   
Medicine  
Work Phone:   
[phone]  
   
                                                    RBC LM.HPF #/area   
(Urine sed)     0-3             Normal          0 - 3           Comprehensive   
Internal   
Medicine  
Work Phone:   
[phone]  
   
                                                    WBC LM.HPF #/area   
(Urine sed)     0-5             Normal          0 - 5           Comprehensive   
Internal   
Medicine  
Work Phone:   
[phone]  
   
                                                    TSH (53883)Ordered By: Syste  
m Manager on 2013   
   
                          Thyrotropin Qn 2.570 {uIU/mL} Normal       0.450-4.50  
0                                       Comprehensive   
Internal   
Medicine  
Work Phone:   
[phone]  
   
                                        Comment on above:   PATIENT WAS FASTINGP  
ERFORMED BY:  [facility] [id number]   
   
                                                    URINALYSIS, W/ MICRO (03524)  
Ordered By:  on 2013   
   
                      Appearance Nom (U) Clear      Normal                Compre  
hensive   
Internal   
Medicine  
Work Phone:   
[phone]  
   
                                        Comment on above:   PATIENT WAS FASTINGP  
ERFORMED BY:  [facility] [id number]   
   
                      Bilirubin Ql (U) Negative   Normal                Comprehe  
nsive   
Internal   
Medicine  
Work Phone:   
[phone]  
   
                                        Comment on above:   PATIENT WAS FASTINGP  
ERFORMED BY: AMOR [facility] [address]   
Road[address] [id number]   
   
                      Bilirubin Ql (U) Negative   Normal                Comprehe  
nsive   
Internal   
Medicine;   
Comprehensive   
Internal   
Medicine  
Work Phone:   
[phone]  
   
                                        Comment on above:   PATIENT WAS FASTINGP  
ERFORMED BY: AMOR [facility] [address]   
Road[place] [id number]   
   
                      Color Nom (U) Yellow     Normal                Comprehensi  
ve   
Internal   
Medicine  
Work Phone:   
[phone]  
   
                                        Comment on above:   PATIENT WAS FASTINGP  
ERFORMED BY: AMOR [facility] [address]   
Road[address] [id number]   
   
                      Glucose Ql (U) Negative   Normal                Comprehens  
bebe   
Internal   
Medicine  
Work Phone:   
[phone]  
   
                                        Comment on above:   PATIENT WAS FASTINGP  
ERFORMED BY: AMOR [facility] [address]   
Road[address] [id number]   
   
                      Glucose Ql (U) Negative   Normal                Comprehens  
bebe   
Internal   
Medicine;   
Comprehensive   
Internal   
Medicine  
Work Phone:   
[phone]  
   
                                        Comment on above:   PATIENT WAS FASTINGP  
ERFORMED BY: AMOR [facility] [address]   
Road[place] [id number]   
   
                      Hemoglobin Ql (U) Negative   Normal                Compreh  
ensive   
Internal   
Medicine  
Work Phone:   
[phone]  
   
                                        Comment on above:   PATIENT WAS FASTINGP  
ERFORMED BY: AMOR [facility] [address]   
Road[address] [id number]   
   
                      Hemoglobin Ql (U) Negative   Normal                Compreh  
ensive   
Internal   
Medicine;   
Comprehensive   
Internal   
Medicine  
Work Phone:   
[phone]  
   
                                        Comment on above:   PATIENT WAS FASTINGP  
ERFORMED BY: AMOR [facility] [address]   
Road[address] [id number]   
   
                                                    Hemoglobin Test strip   
Ql (U)          Negative        Normal                          Comprehensive   
Internal   
Medicine  
Work Phone:   
[phone]  
   
                      Ketones Ql (U) Negative   Normal                Comprehens  
bebe   
Internal   
Medicine  
Work Phone:   
[phone]  
   
                                        Comment on above:   PATIENT WAS FASTINGP  
ERFORMED BY: AMOR [facility] [id number]   
Road[place] [id number]   
   
                      Ketones Ql (U) Negative   Normal                Comprehens  
bebe   
Internal   
Medicine;   
Comprehensive   
Internal   
Medicine  
Work Phone:   
[phone]  
   
                                        Comment on above:   PATIENT WAS FASTINGP  
ERFORMED BY: AMOR [facility] [id number]   
Road[place] [id number]   
   
                                                    Leukocyte esterase   
Test strip Ql (U) Negative        Normal                          Comprehensive   
Internal   
Medicine  
Work Phone:   
[phone]  
   
                                        Comment on above:   PATIENT WAS FASTINGP  
ERFORMED BY: AMOR [facility] [id number]   
Road[place] [id number]   
   
                                                    Leukocyte esterase   
Test strip Ql (U) Negative        Normal                          Comprehensive   
Internal   
Medicine;   
Comprehensive   
Internal   
Medicine  
Work Phone:   
[phone]  
   
                                        Comment on above:   PATIENT WAS FASTINGP  
ERFORMED BY: AMOR [facility] [id number]   
Road[place] [id number]   
   
                                                    Microscopic   
observation LM Nom   
(Urine sed)     See below:      Normal                          Comprehensive   
Internal   
Medicine  
Work Phone:   
[phone]  
   
                                        Comment on above:   PATIENT WAS FASTINGP  
ERFORMED BY: AMOR [facility] [id number]   
Road[place] [id number]   
   
                                                    Microscopic   
observation LM Nom   
(Urine sed)     MICRON          Normal                          Comprehensive   
Internal   
Medicine  
Work Phone:   
[phone]  
   
                                        Comment on above:   Microscopic follows   
if indicated.   
   
                                                            PATIENT WAS FASTINGP  
ERFORMED BY: AMOR [facility] [id number]   
Road[place] [id number]   
   
                      Nitrite Ql (U) Negative   Normal                Comprehens  
bebe   
Internal   
Medicine  
Work Phone:   
[phone]  
   
                                        Comment on above:   PATIENT WAS FASTINGP  
ERFORMED BY: AMOR [facility] [id number]   
Road[place] [id number]   
   
                      Nitrite Ql (U) Negative   Normal                Comprehens  
bebe   
Internal   
Medicine;   
Comprehensive   
Internal   
Medicine  
Work Phone:   
[phone]  
   
                                        Comment on above:   PATIENT WAS FASTINGP  
ERFORMED BY: AMOR [facility] [id number]   
Road[place] [id number]   
   
                                                    Nitrite Test strip Ql   
(U)             Negative        Normal                          Comprehensive   
Internal   
Medicine  
Work Phone:   
[phone]  
   
                      pH (U)     6.5 [pH]   Normal     5.0-7.5    Comprehensive   
Internal   
Medicine  
Work Phone:   
[phone]  
   
                                        Comment on above:   PATIENT WAS FASTINGP  
ERFORMED BY: AMOR [facility] [id number]   
Road[place] [id number]   
   
                      pH Test strip (U) 6.5 [pH]   Normal     5.0-7.5    Compreh  
ensive   
Internal   
Medicine  
Work Phone:   
[phone]  
   
                      Protein Ql (U) Negative   Normal                Comprehens  
bebe   
Internal   
Medicine  
Work Phone:   
[phone]  
   
                                        Comment on above:   PATIENT WAS FASTINGP  
ERFORMED BY: CB [facility] [id number]   
Road[place] [id number]   
   
                      Protein Ql (U) Negative   Normal                Comprehens  
bebe   
Internal   
Medicine;   
Comprehensive   
Internal   
Medicine  
Work Phone:   
[phone]  
   
                                        Comment on above:   PATIENT WAS FASTINGP  
ERFORMED BY: CB [facility] [id number]   
Road[place] [id number]   
   
                                                    Protein Test strip Ql   
(U)             Negative        Normal                          Comprehensive   
Internal   
Medicine  
Work Phone:   
[phone]  
   
                                                    Specific gravity   
Relative Density (U) 1.007 1             Normal              1.005-1.03  
0                                       Comprehensive   
Internal   
Medicine  
Work Phone:   
[phone]  
   
                                        Comment on above:   PATIENT WAS FASTINGP  
ERFORMED BY: CB [facility] [id number]   
Road[place] [id number]   
   
                                                    Urobilinogen (U)   
[Mass/Vol]      0.2 mg/dL       Normal          0.0-1.9         Comprehensive   
Internal   
Medicine;   
Comprehensive   
Internal   
Medicine  
Work Phone:   
[phone]  
   
                                        Comment on above:   PATIENT WAS FASTINGP  
ERFORMED BY: CB [facility] [id number]   
Road[place] [id number]   
   
                                                    Urobilinogen Test   
strip mass conc (U) 0.2 mg/dL       Normal          0.0-1.9         Comprehensiv  
e   
Internal   
Medicine  
Work Phone:   
[phone]  
   
                                        Comment on above:   PATIENT WAS FASTINGP  
ERFORMED BY: CB [facility] [id number]   
Road[place] [id number]   
   
                                                    Written AuthorizationOrdered  
 By:  on 2013   
   
                      Written Authorization WAR        Normal                Com  
prehensive   
Internal   
Medicine  
Work Phone:   
[phone]  
   
                                        Comment on above:   Written Authorizatio  
n Received.Authorization received from   
MERE CHAVEZ [id number] by [place]   
   
                                                    Blood Glucose , Office (9296  
2)Ordered By: Catina De Jesus on 2012   
   
                                                    Glucose Glucometer   
molar conc (BldC) 98 1            Normal                          Comprehensive   
Internal   
Medicine  
Work Phone:   
[phone]  
   
                                                    HgA1C , Office (09142)Ordere  
d By: Catina De Jesus on 2012   
   
                                                    Hemoglobin   
A1c/Hemoglobin.total   
mass fraction (Bld) 5.3 %           Normal          4.6 - 7.1       Comprehensiv  
e   
Internal   
Medicine  
Work Phone:   
[phone]  
   
                                                    Blood Glucose , Office (8296  
2)Ordered By: Verena Tuttle on 05-   
   
                                                    Glucose Glucometer   
molar conc (BldC) 131 1           Normal                          Comprehensive   
Internal   
Medicine  
Work Phone:   
[phone]  
   
                                        Comment on above:   is fasting   
   
                                                    HgA1C , Office (07126)Ordere  
d By: Alba Mustafa on 05-   
   
                                                    Hemoglobin   
A1c/Hemoglobin.total   
mass fraction (Bld) 5.3 %           Normal          4.6 - 7.1       Comprehensiv  
e   
Internal   
Medicine  
Work Phone:   
[phone]  
   
                                                    C4NHxubkrj By: System Manage  
r on 2012   
   
                                                    Hemoglobin   
A1c/Hemoglobin.total   
mass fraction (Bld) 5.7 %           Normal          4.2-6.3         Comprehensiv  
e   
Internal   
Medicine  
Work Phone:   
[phone]  
   
                                                    CBCMDOrdered By: System Amritaarti hoang on 2012   
   
                                                    Erythrocyte   
distribution width   
Auto Ratio (RBC) 12.1 %          Normal          11.6-14.6       Comprehensive   
Internal   
Medicine  
Work Phone:   
[phone]  
   
                                                    Hematocrit Auto   
Volume Fraction (Bld) 44.9 %          Normal          40-54           Comprehens  
bebe   
Internal   
Medicine  
Work Phone:   
[phone]  
   
                                                    Hemoglobin mass conc   
(Bld)           15.7 g/dL       Normal          14.0-18.0       Comprehensive   
Internal   
Medicine  
Work Phone:   
[phone]  
   
                                                    Lymphocytes/100 WBC   
Auto (Bld)      46 %            Abnormal        19-41           Comprehensive   
Internal   
Medicine  
Work Phone:   
[phone]  
   
                                                    MCH Auto Entitic mass   
(RBC)           32.7 pg         Abnormal        27.0-32.0       Comprehensive   
Internal   
Medicine  
Work Phone:   
[phone]  
   
                                                    MCHC Auto mass conc   
(RBC)           35.0 g/dL       Normal          32-36           Comprehensive   
Internal   
Medicine  
Work Phone:   
[phone]  
   
                                                    MCV Auto Entitic   
volume (RBC)    93.5 fL         Normal          80-94           Comprehensive   
Internal   
Medicine  
Work Phone:   
[phone]  
   
                                                    Neutrophils Auto   
#/vol (Bld)     3.5 3/uL        Normal          2.0-7.7         Comprehensive   
Internal   
Medicine  
Work Phone:   
[phone]  
   
                                                    Platelets Auto #/vol   
(Bld)           194 10*3/uL     Normal          150-450         Comprehensive   
Internal   
Medicine  
Work Phone:   
[phone]  
   
                      RBC Auto #/vol (Bld) 4.81 {M/mm3} Normal     4.6-6.2    Co  
mprehensive   
Internal   
Medicine  
Work Phone:   
[phone]  
   
                      RBC Auto #/vol (Bld) NORM C+C   Normal                Comp  
rehensive   
Internal   
Medicine  
Work Phone:   
[phone]  
   
                      WBC Auto #/vol (Bld) 7.4 10*3/uL Normal     4.4-11.0   Com  
prehensive   
Internal   
Medicine  
Work Phone:   
[phone]  
   
                      CBCMD      100 1      Normal                Comprehensive   
Internal   
Medicine  
Work Phone:   
[phone]  
   
                      CBCMD      ADEQUATE   Normal                Comprehensive   
Internal   
Medicine  
Work Phone:   
[phone]  
   
                      CBCMD      44 %       Abnormal   47-70      Comprehensive   
Internal   
Medicine  
Work Phone:   
[phone]  
   
                      CBCMD      10 %       Normal     0-10       Comprehensive   
Internal   
Medicine  
Work Phone:   
[phone]  
   
                                                    CMPOrdered By: System Manage  
r on 2012   
   
                      Albumin mass conc 4.7 g/dL   Normal     3.4-5.0    Compreh  
[facility]   
Internal   
Medicine  
Work Phone:   
[phone]  
   
                                                    Albumin/Globulin mass   
ratio           1.7 {RATIO}     Normal          0.9-2.4         Comprehensive   
Internal   
Medicine  
Work Phone:   
[phone]  
   
                      ALP enzyme act/vol 49 U/L     Abnormal        Compre  
[facility]   
Internal   
Medicine  
Work Phone:   
[phone]  
   
                      ALT enzyme act/vol 35 U/L     Normal     12-78      Compre  
[facility]   
Internal   
Medicine  
Work Phone:   
[phone]  
   
                                                    Anion gap 3 molar   
conc            12 mmol/L       Normal          5-15            Comprehensive   
Internal   
Medicine  
Work Phone:   
[phone]  
   
                      AST enzyme act/vol 21 U/L     Normal     15-37      Compre  
[facility]   
Internal   
Medicine  
Work Phone:   
[phone]  
   
                      Bilirubin mass conc 0.70 mg/dL Normal     0.00-1.00  Compr  
ensive   
Internal   
Medicine  
Work Phone:   
[phone]  
   
                      Calcium mass conc 9.2 mg/dL  Normal     8.5-10.1   Compreh  
ensive   
Internal   
Medicine  
Work Phone:   
[phone]  
   
                      Chloride molar conc 102 mmol/L Normal          Compr  
ehensive   
Internal   
Medicine  
Work Phone:   
[phone]  
   
                      CO2 molar conc 27.0 mmol/L Normal     21.0-32.0  Comprehen  
sive   
Internal   
Medicine  
Work Phone:   
[phone]  
   
                      Creatinine mass conc 0.9 mg/dL  Normal     0.8-1.3    Comp  
rehensive   
Internal   
Medicine  
Work Phone:   
[phone]  
   
                                                    GFR/1.73 sq M   
predicted among   
blacks MDRD vol   
rate/area (S/P/Bld) 116 mL/min/{1.73_m2} Normal                          Compreh  
ensive   
Internal   
Medicine  
Work Phone:   
[phone]  
   
                                                    GFR/1.73 sq   
M.predicted MDRD vol   
rate/area       96 mL/min/{1.73_m2} Normal                          Comprehensiv  
e   
Internal   
Medicine  
Work Phone:   
[phone]  
   
                                                    Globulin Calculated   
mass conc (S)   2.7 g/dL        Normal          2.7-4.2         Comprehensive   
Internal   
Medicine  
Work Phone:   
[phone]  
   
                      Glucose mass conc 84 mg/dL   Normal          Compreh  
ensive   
Internal   
Medicine  
Work Phone:   
[phone]  
   
                      Potassium molar conc 4.3 mmol/L Normal     3.5-5.1    Comp  
rehensive   
Internal   
Medicine  
Work Phone:   
[phone]  
   
                      Protein mass conc 7.4 g/dL   Normal     6.4-8.2    Compreh  
ensive   
Internal   
Medicine  
Work Phone:   
[phone]  
   
                      Sodium molar conc 141 mmol/L Normal     136-145    Compreh  
ensive   
Internal   
Medicine  
Work Phone:   
[phone]  
   
                                                    Urea nitrogen mass   
conc            10 mg/dL        Normal          7-18            Comprehensive   
Internal   
Medicine  
Work Phone:   
[phone]  
   
                                                    Urea   
nitrogen/Creatinine   
mass ratio      11.1 {RATIO}    Normal          10-20           Comprehensive   
Internal   
Medicine  
Work Phone:   
[phone]  
   
                                                    LIPIDOrdered By: Tam hoang on 2012   
   
                                                    Cholesterol in HDL   
mass conc       43 mg/dL        Normal                          Comprehensive   
Internal   
Medicine  
Work Phone:   
[phone]  
   
                                        Comment on above:   Reference Range HDL   
<40 mg/dL Low HDL Cholesterol HDL >or= 60   
mg/dL High HDL Cholesterol   
   
                                                    Cholesterol in LDL   
mass conc       100 mg/dL       Normal          0-130           Comprehensive   
Internal   
Medicine  
Work Phone:   
[phone]  
   
                                                    Cholesterol in VLDL   
mass conc       24 mg/dL        Normal          5-40            Comprehensive   
Internal   
Medicine  
Work Phone:   
[phone]  
   
                      Cholesterol mass conc 167 mg/dL  Normal                Com  
prehensive   
Internal   
Medicine  
Work Phone:   
[phone]  
   
                                        Comment on above:   <200 mg/dL Desirable  
 200-240 mg/dL Borderline >240 mg/dL High   
Risk   
   
                                                    Triglyceride mass   
conc            121 mg/dL       Normal                          Comprehensive   
Internal   
Medicine  
Work Phone:   
[phone]  
   
                                        Comment on above:   Serum Triglycerides   
Reference Interval Normal <150 mg/dL   
Borderline high 150 - 199 mg/dL High 200 - 499 mg/dL Very High >   
or = 500 mg/dL   
   
                                                    MIACREOrdered By: System Man  
ager on 2012   
   
                      Creatinine mass conc            Normal                Comp  
rehensive   
Internal   
Medicine  
Work Phone:   
[phone]  
   
                      MIACRE     18.8 mg/dL Normal                Comprehensive   
Internal   
Medicine  
Work Phone:   
[phone]  
   
                      MIACRE     < 5.0      Normal                Comprehensive   
Internal   
Medicine  
Work Phone:   
[phone]  
   
                                                    PSAOrdered By: System Manage  
r on 2012   
   
                                                    Prostate specific Ag   
mass conc       0.5 ng/mL       Normal          0.0-4.0         Comprehensive   
Internal   
Medicine  
Work Phone:   
[phone]  
   
                                                    TSHOrdered By: System Manage  
r on 2012   
   
                      Thyrotropin Qn 1.57 {uIU/mL} Normal     0.358-3.74 Compreh  
ensive   
Internal   
Medicine  
Work Phone:   
[phone]  
   
                                                    UACOrdered By: System Manage  
r on 2012   
   
                      UAC        Negative   Normal                Comprehensive   
Internal   
Medicine  
Work Phone:   
[phone]  
   
                          UAC          <=1.005      Normal       1.002-1.03  
0                                       Comprehensive   
Internal   
Medicine  
Work Phone:   
[phone]  
   
                      UAC        5.5 1      Normal     5.0-8.0    Comprehensive   
Internal   
Medicine  
Work Phone:   
[phone]  
   
                      UAC        0.2 EU/dl  Normal     0.2 - 1.0  Comprehensive   
Internal   
Medicine  
Work Phone:   
[phone]  
   
                      UAC        0 SEEN     Normal                Comprehensive   
Internal   
Medicine  
Work Phone:   
[phone]  
   
                      UAC        STRAW      Normal                Comprehensive   
Internal   
Medicine  
Work Phone:   
[phone]  
   
                      UAC        CLEAR      Normal                Comprehensive   
Internal   
Medicine  
Work Phone:   
[phone]  
   
                                                    HGB [id number] By: Tam ivory on 2012   
   
                                                    Hemoglobin   
A1c/Hemoglobin.total   
mass fraction (Bld) 5.4 %           Normal          4.2-6.3         Comprehensiv  
e   
Internal   
Medicine  
Work Phone:   
[phone]  
   
                                                    Blood Glucose , Office (8296  
2)Ordered By: Catina De Jesus on 2011   
   
                                                    Glucose Glucometer   
molar conc (BldC) 98 1            Normal                          Comprehensive   
Internal   
Medicine  
Work Phone:   
[phone]  
   
                                                    HgA1C , Office (62880)Ordere  
d By: Catina De Jesus on 2011   
   
                                                    Hemoglobin   
A1c/Hemoglobin.total   
mass fraction (Bld) 5.6 %           Normal          4.6 - 7.1       Comprehensiv  
e   
Internal   
Medicine  
Work Phone:   
[phone]  
   
                                                    Blood Glucose , Office (8296  
2)Ordered By: Catina De Jesus on 2011   
   
                                                    Glucose Glucometer   
molar conc (BldC) 106 1           Normal                          Comprehensive   
Internal   
Medicine  
Work Phone:   
[phone]  
   
                                                    CBCD,SMEAR DIFFOrdered By: S  
ystem Manager on 2011   
   
                                                    Erythrocyte   
distribution width   
Auto Ratio (RBC) 12.3 %          Normal          11.6-14.6       Comprehensive   
Internal   
Medicine  
Work Phone:   
[phone]  
   
                                                    Hematocrit Auto   
Volume Fraction (Bld) 47.9 %          Normal          40-54           Comprehens  
bebe   
Internal   
Medicine  
Work Phone:   
[phone]  
   
                                                    Hemoglobin mass conc   
(Bld)           16.5 g/dL       Normal          14.0-18.0       Comprehensive   
Internal   
Medicine  
Work Phone:   
[phone]  
   
                                                    Lymphocytes/100 WBC   
Auto (Bld)      37 %            Normal          19-41           Comprehensive   
Internal   
Medicine  
Work Phone:   
[phone]  
   
                                                    MCH Auto Entitic mass   
(RBC)           32.7 pg         Abnormal        27.0-32.0       Comprehensive   
Internal   
Medicine  
Work Phone:   
[phone]  
   
                                                    MCHC Auto mass conc   
(RBC)           34.4 g/dL       Normal          32-36           Comprehensive   
Internal   
Medicine  
Work Phone:   
[phone]  
   
                                                    MCV Auto Entitic   
volume (RBC)    94.9 fL         Abnormal        80-94           Comprehensive   
Internal   
Medicine  
Work Phone:   
[phone]  
   
                                                    Monocytes/100 WBC   
Auto (Bld)      4 %             Normal          0-10            Comprehensive   
Internal   
Medicine  
Work Phone:   
[phone]  
   
                                                    Neutrophils Auto   
#/vol (Bld)     5.0 3/uL        Normal          2.0-7.7         Comprehensive   
Internal   
Medicine  
Work Phone:   
[phone]  
   
                                                    Platelets Auto #/vol   
(Bld)           201 10*3/uL     Normal          150-450         Comprehensive   
Internal   
Medicine  
Work Phone:   
[phone]  
   
                      RBC Auto #/vol (Bld) 5.05 {M/mm3} Normal     4.6-6.2    Co  
[facility]   
Internal   
Medicine  
Work Phone:   
[phone]  
   
                      WBC Auto #/vol (Bld) 9.0 10*3/uL Normal     4.4-11.0   Com  
prehensive   
Internal   
Medicine  
Work Phone:   
[phone]  
   
                      CBCD,SMEAR DIFF 59 %       Normal     47-70      [facility]   
[facility]   
[facility]  
Work Phone:   
[phone]  
   
                      CBCD,SMEAR DIFF 100 1      Normal                [facility]   
Internal   
Medicine  
Work Phone:   
[phone]  
   
                      CBCD,SMEAR DIFF SeeNote    Normal                [facility]   
Internal   
Medicine  
Work Phone:   
[phone]  
   
                                        Comment on above:   Result: NORM C+C   
   
                                                            Result: ADEQUATE   
   
                      CBCD,SMEAR DIFF RARE       Normal                [facility]   
Internal   
Medicine  
Work Phone:   
[phone]  
   
                                                    COMP METABOLICOrdered By: Carlos  
stem Manager on 2011   
   
                      Albumin mass conc 4.3 g/dL   Normal     3.4-5.0    [facility]   
Internal   
Medicine  
Work Phone:   
[phone]  
   
                                                    Albumin/Globulin mass   
ratio           1.5 {RATIO}     Normal          0.9-2.4         [facility]   
[facility]  
Work Phone:   
[phone]  
   
                      ALP enzyme act/vol 64 U/L     Normal          [facility]   
Internal   
Medicine  
Work Phone:   
[phone]  
   
                      ALT enzyme act/vol 50 U/L     Normal     12-78      [facility]   
Internal   
Medicine  
Work Phone:   
[phone]  
   
                                                    Anion gap 3 molar   
conc            12 mmol/L       Normal          5-15            [facility]   
Med[facility]  
Work Phone:   
[phone]  
   
                      AST enzyme act/vol 26 U/L     Normal     15-37      [facility]   
Internal   
Medicine  
Work Phone:   
[phone]  
   
                      Bilirubin mass conc 0.90 mg/dL Normal     0.00-1.00  Compr  
[facility]   
Internal   
Medicine  
Work Phone:   
[phone]  
   
                      Calcium mass conc 9.8 mg/dL  Normal     8.5-10.1   Compreh  
ensive   
Internal   
Medicine  
Work Phone:   
[phone]  
   
                      Chloride molar conc 101 mmol/L Normal          Compr  
ehensive   
Internal   
Medicine  
Work Phone:   
[phone]  
   
                      CO2 molar conc 27.0 mmol/L Normal     21.0-32.0  Comprehen  
sive   
Internal   
Medicine  
Work Phone:   
[phone]  
   
                      Creatinine mass conc 1.0 mg/dL  Normal     0.8-1.3    Comp  
rehensive   
Internal   
Medicine  
Work Phone:   
[phone]  
   
                                                    GFR/1.73 sq M   
predicted among   
blacks MDRD vol   
rate/area (S/P/Bld) 103 mL/min/{1.73_m2} Normal                          Compreh  
ensive   
Internal   
Medicine  
Work Phone:   
[phone]  
   
                                                    GFR/1.73 sq   
M.predicted MDRD vol   
rate/area       85 mL/min/{1.73_m2} Normal                          Comprehensiv  
e   
Internal   
Medicine  
Work Phone:   
[phone]  
   
                                                    Globulin Calculated   
mass conc (S)   2.9 g/dL        Normal          2.7-4.2         [facility]   
[facility]   
Medicine  
Work Phone:   
[phone]  
   
                      Glucose mass conc 101 mg/dL  Normal          Compreh  
ensive   
Internal   
Medicine  
Work Phone:   
[phone]  
   
                      Potassium molar conc 4.4 mmol/L Normal     3.5-5.1    Comp  
rehensive   
Internal   
Medicine  
Work Phone:   
[phone]  
   
                      Protein mass conc 7.2 g/dL   Normal     6.4-8.2    Compreh  
ensive   
Internal   
Medicine  
Work Phone:   
[phone]  
   
                      Sodium molar conc 140 mmol/L Normal     136-145    Compreh  
ensive   
Internal   
Medicine  
Work Phone:   
[phone]  
   
                                                    Urea nitrogen mass   
conc            12 mg/dL        Normal          7-18            [facility]   
[facility]   
Medicine  
Work Phone:   
[phone]  
   
                                                    Urea   
nitrogen/Creatinine   
mass ratio      12.0 {RATIO}    Normal          10-20           [facility]   
[facility]   
Medicine  
Work Phone:   
[phone]  
   
                                                    HgA1C , Office ([phone])[place]  
d By: Catina De Jesus on 2011   
   
                                                    Hemoglobin   
A1c/Hemoglobin.total   
mass fraction (Bld) 5.6 %           Normal          4.6 - 7.1       Comprehensiv  
e   
Internal   
Medicine  
Work Phone:   
[phone]  
   
                                                    MICROALBOrdered By: System Cell Therapeuticsger on 2011   
   
                      Creatinine mass conc 4.3 {mg/g_CRE} Normal                  
[facility]   
[facility]   
Medicine  
Work Phone:   
[phone]  
   
                      MICROALB   141.1 mg/dL Normal                [facility]  
   
Internal   
Medicine  
Work Phone:   
[phone]  
   
                      MICROALB   6.2 mg/L   Normal                [facility]   
[facility]   
Medicine  
Work Phone:   
[phone]  
   
                                                    TSHOrdered By: System Manage  
r on 2011   
   
                      Thyrotropin Qn 1.65 {uIU/mL} Normal     0.358-3.74 Compreh  
ensive   
Internal   
Medicine  
Work Phone:   
[phone]  
   
                                                    BMPOrdered By: System Manage  
r on 2011   
   
                                                    Anion gap 3 molar   
conc            6 mmol/L        Normal          5-15            [facility]   
[facility]   
Medicine  
Work Phone:   
[phone]  
   
                      Calcium mass conc 9.3 mg/dL  Normal     8.5-10.1   Compreh  
ensive   
Internal   
Medicine  
Work Phone:   
[phone]  
   
                      Chloride molar conc 103 mmol/L Normal          Compr  
ehensive   
Internal   
Medicine  
Work Phone:   
[phone]  
   
                      CO2 molar conc 28.0 mmol/L Normal     21.0-32.0  Comprehen  
sive   
Internal   
Medicine  
Work Phone:   
[phone]  
   
                      Creatinine mass conc 0.9 mg/dL  Normal     0.8-1.3    Comp  
rehensive   
Internal   
Medicine  
Work Phone:   
[phone]  
   
                                                    GFR/1.73 sq M   
predicted among   
blacks MDRD vol   
rate/area (S/P/Bld) 116 mL/min/{1.73_m2} Normal                          Compreh  
ensive   
Internal   
Medicine  
Work Phone:   
[phone]  
   
                                                    GFR/1.73 sq   
M.predicted MDRD vol   
rate/area       96 mL/min/{1.73_m2} Normal                          Comprehensiv  
e   
Internal   
Medicine  
Work Phone:   
[phone]  
   
                      Glucose mass conc 179 mg/dL  Abnormal        Compreh  
ensive   
Internal   
Medicine  
Work Phone:   
[phone]  
   
                                        Comment on above:   Fasting Glucose resu  
lt greater than or equal to 126 mg/dL   
suggests DIABETES MELLITUS per A.D.A. criteria.   
   
                      Potassium molar conc 4.2 mmol/L Normal     3.5-5.1    Comp  
rehensive   
Internal   
Medicine  
Work Phone:   
[phone]  
   
                      Sodium molar conc 137 mmol/L Normal     136-145    Compreh  
ensive   
Internal   
Medicine  
Work Phone:   
[phone]  
   
                                                    Urea nitrogen mass   
conc            13 mg/dL        Normal          7-18            [facility]   
[facility]   
Medicine  
Work Phone:   
[phone]  
   
                                                    Urea   
nitrogen/Creatinine   
mass ratio      14.4 {RATIO}    Normal          10-20           [facility]   
[facility]   
Medicine  
Work Phone:   
[phone]  
   
                                                    CKMBOrdered By: System Manag  
er on 2011   
   
                      CK.MB mass conc 1.9 ng/mL  Normal     0.0-5.0    Comprehen  
sive   
Internal   
Medicine  
Work Phone:   
[phone]  
   
                                        Comment on above:   CK-MB and RI Interpr  
etation MB Relative Index Non-AMI 5 5 > 4   
   
                      CKMB       83 U/L     Normal          [facility]   
[facility]   
Medicine  
Work Phone:   
[phone]  
   
                                        Comment on above:   Please note: Revised  
 Creatinine Kinase (CK) Reference ramge   
effective 12/30/10.   
   
                                                    GLUPOrdered By: System Manag  
er on 2011   
   
                      GLUP       168 mg/dL  Abnormal              [facility]   
[facility]   
Medicine  
Work Phone:   
[phone]  
   
                                        Comment on above:   Glucose result from   
140 to <200 mg/dL suggestsIMPAIRED GLUCOSE  
   
HOMEOSTASIS per A.D.A. criteria.   
   
                                                    MGOrdered By:   
 on 2011   
   
                      Magnesium mass conc 2.1 mg/dL  Normal     1.5-2.2    Compr  
ehensive   
Internal   
Medicine  
Work Phone:   
[phone]  
   
                                                    PSA, SCREENOrdered By: Syste  
m Manager on 2011   
   
                                                    Prostate specific Ag   
mass conc       0.5 ng/mL       Normal          0.0-4.0         [facility]   
[facility]   
Medicine  
Work Phone:   
[phone]  
   
                                                    TROPONIN-IOrdered By: System  
 Manager on 2011   
   
                                                    Troponin I.cardiac   
mass conc       ng/mL           Normal                          [facility]   
[facility]   
Medicine  
Work Phone:   
[phone]  
   
                                        Comment on above:   TROPONIN-I EXPECTED   
VALUES <0.05 NEGATIVE 0.06 - 0.59 AT RISK   
OF   
MI > OR = 0.60 SUGGEST MI   
  
  
  
Vital Signs  
  
  
                          Date Time    Vital Sign   Value        Performing   
Clinician                               Facility  
   
                                                    2023   
08:      Body height     195.58 cm       Ned Reis LPN [facility];   
[facility]  
Work Phone:   
[phone]  
   
                                                    2023   
08:                              Body mass index   
(BMI) [Ratio]       28.47 kg/m2         [facility]    [facility]   
Internal Medicine;   
[facility]   
[facility]  
Work Phone:   
[phone]   
08:                              Body surface area   
Derived from   
formula             2.42 m2             [facility]    Com[facility]   
Internal Medicine;   
[facility]   
[facility] Medicine  
Work Phone:   
[phone]   
08:      Body temperature 97.5 [degF]     [facility]   
Internal Medicine;   
[facility]   
[facility] Medicine  
Work Phone:   
[phone]   
08:      Body weight     108.92 kg       [facility]   
[facility] Medicine;   
[facility]   
[facility] Medicine  
Work Phone:   
[phone]   
08:                              Diastolic blood   
pressure            72 mm[Hg]           [facility]    Comprehensive   
Internal Medicine;   
[facility]   
[facility] Medicine  
Work Phone:   
[phone]   
08:      Heart rate      83 /min         [facility]   
[facility] Medicine;   
[facility]   
[facility] Medicine  
Work Phone:   
[phone]   
08:      Respiratory rate 20 /min         [facility]   
[facility] Medicine;   
[facility]   
[facility] Medicine  
Work Phone:   
[phone]   
08:                              SaO2% (BldA) [Mass   
fraction]           96 %                [facility]    [facility]   
[facility] Medicine;   
[facility]   
[facility] Medicine  
Work Phone:   
[phone]   
08:                              Systolic blood   
pressure            128 mm[Hg]          [facility]    Com[facility]   
Internal Medicine;   
[facility]   
[facility] Medicine  
Work Phone:   
[phone]   
12:      Body height     195.58 cm       [facility]   
[facility] Medicine;   
[facility]   
[facility] Medicine  
Work Phone:   
[phone]   
12:                              Body mass index   
(BMI) [Ratio]       18.62 kg/m2         [facility]    Com[facility]   
Internal Medicine;   
[facility]   
[facility] Medicine  
Work Phone:   
[phone]   
12:                              Body surface area   
Derived from   
formula             2.02 m2             [facility]    [facility]   
[facility] Medicine;   
[facility]   
[facility] Medicine  
Work Phone:   
[phone]  
   
                                                    2023   
12:      Body temperature 96 [degF]       [facility]   
[facility] Medicine;   
[facility]   
[facility] Medicine  
Work Phone:   
[phone]   
12:      Body weight     71.22 kg        Ned Chato LPN [facility];   
[facility]  
Work Phone:   
[phone]   
12:                              Diastolic blood   
pressure            60 mm[Hg]           [facility]    [facility]   
[facility] Medicine;   
[facility]   
[facility] Medicine  
Work Phone:   
[phone]   
12:      Heart rate      77 /min         [facility]   
[facility] Medicine;   
[facility]   
[facility] Medicine  
Work Phone:   
[phone]  
   
                                                    2023   
12:      Respiratory rate 18 /min         [facility]   
[facility] Medicine;   
[facility]   
[facility] Medicine  
Work Phone:   
[phone]  
   
                                                    2023   
12:                              SaO2% (BldA) [Mass   
fraction]           97 %                [facility]    [facility]   
[facility] Medicine;   
[facility]   
[facility] Medicine  
Work Phone:   
[phone]  
   
                                                    2023   
12:                              Systolic blood   
pressure            112 mm[Hg]          [facility]    [facility]   
[facility] Medicine;   
[facility]   
[facility] Medicine  
Work Phone:   
[phone]  
   
                                                    2023   
08:      Body height     195.58 cm       [facility]  
   
Internal Medicine;   
[facility]   
[facility] Medicine  
Work Phone:   
[phone]  
   
                                                    2023   
08:                              Body mass index   
(BMI) [Ratio]       27.66 kg/m2         [facility]  Com[facility]   
Internal Medicine;   
[facility]   
Internal Medicine  
Work Phone:   
[phone]  
   
                                                    2023   
08:                              Body surface area   
Derived from   
formula             2.39 m2             [facility]  Com[facility]   
Internal Medicine;   
Com[facility]   
[facility] Medicine  
Work Phone:   
[phone]   
08:      Body temperature 97.3 [degF]     [facility] Comprehensiv  
e   
Internal Medicine;   
Com[facility]   
Internal Medicine  
Work Phone:   
[phone]  
   
                                                    2023   
08:      Body weight     105.8 kg        Kayela Marco Antonio CMA [facility]  
   
Internal Medicine;   
[facility]   
[facility] Medicine  
Work Phone:   
[phone]   
08:                              Diastolic blood   
pressure            76 mm[Hg]           [facility]  Com[facility]   
Internal Medicine;   
Com[facility]   
Internal Medicine  
Work Phone:   
[phone]   
08:      Heart rate      81 /min         [facility] Comprehensive  
   
Internal Medicine;   
[facility]   
Internal Medicine  
Work Phone:   
[phone]  
   
                                                    2023   
08:      Respiratory rate 16 /min         [facility] Comprehensiv  
e   
Internal Medicine;   
Com[facility]   
Internal Medicine  
Work Phone:   
[phone]   
08:                              SaO2% (BldA) [Mass   
fraction]           96 %                [facility]  Com[facility]   
Internal Medicine;   
Com[facility]   
Internal Medicine  
Work Phone:   
[phone]  
   
                                                    2023   
08:                              Systolic blood   
pressure            130 mm[Hg]          [facility]  Com[facility]   
[facility] Medicine;   
Com[facility]   
Internal Medicine  
Work Phone:   
[phone]  
   
                                                    2022   
08:      Body height     195.58 cm       [facility] Comprehensive  
   
Internal Medicine;   
Com[facility]   
Internal Medicine  
Work Phone:   
[phone]   
08:                              Body mass index   
(BMI) [Ratio]       27.51 kg/m2         [facility]  Com[facility]   
Internal Medicine;   
Com[facility]   
Internal Medicine  
Work Phone:   
[phone]   
08:                              Body surface area   
Derived from   
formula             2.38 m2             [facility]  Com[facility]   
Internal Medicine;   
Com[facility]   
Internal Medicine  
Work Phone:   
[phone]   
08:      Body temperature 96.9 [degF]     [facility] Comprehensiv  
e   
Internal Medicine;   
Com[facility]   
Internal Medicine  
Work Phone:   
[phone]   
08:      Body weight     105.24 kg       [facility] Comprehensive  
   
Internal Medicine;   
Com[facility]   
Internal Medicine  
Work Phone:   
[phone]   
08:                              Diastolic blood   
pressure            74 mm[Hg]           [facility]  [facility]   
[facility] Medicine;   
Com[facility]   
Internal Medicine  
Work Phone:   
[phone]   
08:      Heart rate      79 /min         [facility] Comprehensive  
   
Internal Medicine;   
Com[facility]   
Internal Medicine  
Work Phone:   
[phone]   
08:      Respiratory rate 16 /min         [facility] Comprehensiv  
e   
Internal Medicine;   
Comprehensive   
Internal Medicine  
Work Phone:   
[phone]   
08:                              SaO2% (BldA) [Mass   
fraction]           98 %                [facility]  Com[facility]   
Internal Medicine;   
Com[facility]   
Internal Medicine  
Work Phone:   
[phone]   
08:                              Systolic blood   
pressure            120 mm[Hg]          [facility]  Comprehensive   
Internal Medicine;   
Com[facility]   
Internal Medicine  
Work Phone:   
[phone]   
09:          Body height         195.58 cm           [facility]                                     Comprehensive   
Internal Medicine;   
Com[facility]   
Internal Medicine  
Work Phone:   
[phone]   
09:                              Body mass index   
(BMI) [Ratio]             27.63 kg/m2               [facility]                                     Comprehensive   
Internal Medicine;   
Com[facility]   
Internal Medicine  
Work Phone:   
[phone]   
09:                              Body surface area   
Derived from   
formula                   2.39 m2                   [facility]                                     Comprehensive   
Internal Medicine;   
[facility]   
Internal Medicine  
Work Phone:   
[phone]   
09:          Body temperature    97.1 [degF]         [facility]                                     Comprehensive   
Internal Medicine;   
Com[facility]   
Internal Medicine  
Work Phone:   
[phone]   
09:          Body weight         105.69 kg           [facility]                                     Comprehensive   
Internal Medicine;   
[facility]   
Internal Medicine  
Work Phone:   
[phone]   
09:                              Diastolic blood   
pressure                  78 mm[Hg]                 Rowena Preciado   
[facility]                                     Comprehensive   
Internal Medicine;   
Com[facility]   
Internal Medicine  
Work Phone:   
[phone]  
   
                                                    2022   
09:          Heart rate          86 /min             Rowena Preciado   
[facility]                                     Comprehensive   
Internal Medicine;   
Com[facility]   
Internal Medicine  
Work Phone:   
[phone]  
   
                                                    2022   
09:          Respiratory rate    16 /min             Rowena Preciado   
[facility]                                     Comprehensive   
Internal Medicine;   
Com[facility]   
Internal Medicine  
Work Phone:   
[phone]  
   
                                                    2022   
09:                              SaO2% (BldA) [Mass   
fraction]                 98 %                      Rowena Preciado   
[facility]                                     Comprehensive   
Internal Medicine;   
Com[facility]   
Internal Medicine  
Work Phone:   
[phone]  
   
                                                    2022   
09:                              Systolic blood   
pressure                  138 mm[Hg]                Rowena Preciado   
[facility]                                     Comprehensive   
Internal Medicine;   
Com[facility]   
Internal Medicine  
Work Phone:   
[phone]  
   
                                                    2021   
07:      Body height     195.58 cm       Gaby Chavez [facility] Comprehensive  
   
Internal Medicine;   
Com[facility]   
Internal Medicine  
Work Phone:   
[phone]  
   
                                                    2021   
07:                              Body mass index   
(BMI) [Ratio]       26.33 kg/m2         Gaby Chavez [facility]  Comprehensive   
Internal Medicine;   
Comprehensive   
Internal Medicine  
Work Phone:   
[phone]  
   
                                                    2021   
07:                              Body surface area   
Derived from   
formula             2.34 m2             Gaby Chavez [facility]  Comprehensive   
Internal Medicine;   
Com[facility]   
Internal Medicine  
Work Phone:   
[phone]  
   
                                                    2021   
07:      Body temperature 97.3 [degF]     Gaby Chavez [facility] Comprehensiv  
e   
Internal Medicine;   
Comprehensive   
Internal Medicine  
Work Phone:   
[phone]  
   
                                                    Comment on   
above:                                  Method: Infrared   
   
                                                    2021   
07:      Body weight     100.7 kg        Gaby Chavez [facility] Comprehensive  
   
Internal Medicine;   
Com[facility]   
Internal Medicine  
Work Phone:   
[phone]  
   
                                                    2021   
07:                              Diastolic blood   
pressure            84 mm[Hg]           Gaby Chavez [facility]  Comprehensive   
Internal Medicine;   
Com[facility]   
Internal Medicine  
Work Phone:   
[phone]  
   
                                                    Comment on   
above:                                  Patient Position: Sitting; Cuff Location  
: Left Arm; Cuff Size:   
Standard   
   
                                                    2021   
07:      Heart rate      81 /min         Gaby Chavez CMA [facility]  
   
Internal Medicine;   
[facility]   
[facility] Medicine  
Work Phone:   
[phone]  
   
                                                    Comment on   
above:                                  Pattern: Regular   
   
                                                    2021   
07:      Respiratory rate 18 /min         Gaby Chavez CMA Comprehens[facility]  
e   
Internal Medicine;   
Com[facility]   
Internal Medicine  
Work Phone:   
[phone]  
   
                                                    Comment on   
above:                                  Pattern: Unlabored   
   
                                                    2021   
07:                              SaO2% (BldA) [Mass   
fraction]           97 %                Gaby Chavez [facility]  Comprehensive   
Internal Medicine;   
Comprehensive   
Internal Medicine  
Work Phone:   
[phone]  
   
                                                    Comment on   
above:                                  Room air   
   
                                                    2021   
07:                              Systolic blood   
pressure            118 mm[Hg]          Gaby Chavez [facility]  Comprehensive   
Internal Medicine;   
Comprehensive   
Internal Medicine  
Work Phone:   
[phone]  
   
                                                    Comment on   
above:                                  Patient Position: Sitting; Cuff Location  
: Left Arm; Cuff Size:   
Standard   
   
                                                    2021   
11:      Body height     195.58 cm       Gaby Chavez [facility]  
   
Internal Medicine;   
Comprehensive   
Internal Medicine  
Work Phone:   
[phone]  
   
                                                    2021   
11:                              Body mass index   
(BMI) [Ratio]       26.09 kg/m2         Gaby Chavez [facility]  Comprehensive   
Internal Medicine;   
Comprehensive   
Internal Medicine  
Work Phone:   
[phone]  
   
                                                    2021   
11:                              Body surface area   
Derived from   
formula             2.33 m2             Gaby Chavez [facility]  Comprehensive   
Internal Medicine;   
Comprehensive   
Internal Medicine  
Work Phone:   
[phone]  
   
                                                    2021   
11:      Body temperature 97.3 [degF]     Gaby Chavez [facility] Comprehensiv  
e   
Internal Medicine;   
Comprehensive   
Internal Medicine  
Work Phone:   
[phone]  
   
                                                    Comment on   
above:                                  Method: Infrared   
   
                                                    2021   
11:      Body weight     99.79 kg        Gaby Chavez [facility]  
   
Internal Medicine;   
Comprehensive   
Internal Medicine  
Work Phone:   
[phone]  
   
                                                    2021   
11:                              Diastolic blood   
pressure            78 mm[Hg]           Gaby Chavez [facility]  Comprehensive   
Internal Medicine;   
Comprehensive   
Internal Medicine  
Work Phone:   
[phone]  
   
                                                    Comment on   
above:                                  Patient Position: Sitting; Cuff Location  
: Left Arm; Cuff Size:   
Standard   
   
                                                    2021   
11:      Heart rate      88 /min         Gaby Chavez CMA Comprehensive  
   
Internal Medicine;   
Com[facility]   
Internal Medicine  
Work Phone:   
[phone]  
   
                                                    Comment on   
above:                                  Pattern: Regular   
   
                                                    2021   
11:      Respiratory rate 16 /min         Gaby Cahvez [facility] Comprehensiv  
e   
Internal Medicine;   
Comprehensive   
Internal Medicine  
Work Phone:   
[phone]  
   
                                                    Comment on   
above:                                  Pattern: Unlabored   
   
                                                    2021   
11:                              SaO2% (BldA) [Mass   
fraction]           96 %                Gaby Chavez [facility]  Comprehensive   
Internal Medicine;   
Comprehensive   
Internal Medicine  
Work Phone:   
[phone]  
   
                                                    Comment on   
above:                                  Room air   
   
                                                    2021   
11:                              Systolic blood   
pressure            130 mm[Hg]          Gaby Chavez CMA  Comprehensive   
Internal Medicine;   
Comprehensive   
Internal Medicine  
Work Phone:   
[phone]  
   
                                                    Comment on   
above:                                  Patient Position: Sitting; Cuff Location  
: Left Arm; Cuff Size:   
Standard   
   
                                                    2021   
10:                              BMI (Body Mass   
Index)                    25.5 kg/m2                Mere Griffithon DO  
Work Phone:   
[phone]                          Comprehensive   
Internal Medicine;   
Com[facility]   
Internal Medicine  
Work Phone:   
[phone]  
   
                                                    Comment on   
above:                                  virtual visit   
   
                                                    2021   
10:          Body weight         97.52 kg            Mere Chavez DO  
Work Phone:   
[phone]                          Comprehensive   
Internal Medicine;   
Com[facility]   
Internal Medicine  
Work Phone:   
[phone]  
   
                                                    Comment on   
above:                                  virtual visit   
   
                                                    2021   
10:          BP Diastolic        80 mm[Hg]           Mere Cory DO  
Work Phone:   
[phone]                          Com[facility]   
Internal Medicine;   
Com[facility]   
Internal Medicine  
Work Phone:   
[phone]  
   
                                                    Comment on   
above:                                  Patient Position: Sitting   
   
                                                            virtual visit   
   
                                                    2021   
10:          BP Systolic         140 mm[Hg]          Mere Cory DO  
Work Phone:   
[phone]                          Com[facility]   
Internal Medicine;   
[facility]   
Internal Medicine  
Work Phone:   
[phone]  
   
                                                    Comment on   
above:                                  Patient Position: Sitting   
   
                                                            virtual visit   
   
                                                    2021   
10:                              BSA (Body Surface   
Area)                     2.31 m2                   Mree Cory DO  
Work Phone:   
[phone]                          Com[facility]   
Internal Medicine;   
[facility]   
Internal Medicine  
Work Phone:   
[phone]  
   
                                                    Comment on   
above:                                  virtual visit   
   
                                                    2021   
10:          Height              195.58 cm           eMre Chavez DO  
Work Phone:   
[phone]                          Com[facility]   
Internal Medicine;   
Com[facility]   
Internal Medicine  
Work Phone:   
[phone]  
   
                                                    Comment on   
above:                                  virtual visit   
   
                                                    2021   
08:                              BMI (Body Mass   
Index)              25.5 kg/m2          [facility]    Comprehensive   
Internal Medicine;   
Comprehensive   
Internal Medicine  
Work Phone:   
[phone]  
   
                                                    Comment on   
above:                                  PT reported bp from home   
   
                                                    2021   
08:      Body weight     97.52 kg        [facility]   
Internal Medicine;   
Comprehensive   
Internal Medicine  
Work Phone:   
[phone]  
   
                                                    Comment on   
above:                                  PT reported bp from home   
   
                                                    2021   
08:      BP Diastolic    83 mm[Hg]       [facility]   
Internal Medicine;   
Comprehensive   
Internal Medicine  
Work Phone:   
[phone]  
   
                                                    Comment on   
above:                                  Patient Position: Sitting; Cuff Location  
: Left Arm; Cuff Size:   
Standard   
   
                                                            PT reported bp from   
home   
   
                                                    2021   
08:      BP Systolic     141 mm[Hg]      [facility]   
Internal Medicine;   
Comprehensive   
Internal Medicine  
Work Phone:   
[phone]  
   
                                                    Comment on   
above:                                  Patient Position: Sitting; Cuff Location  
: Left Arm; Cuff Size:   
Standard   
   
                                                            PT reported bp from   
home   
   
                                                    2021   
08:                              BSA (Body Surface   
Area)               2.31 m2             Anjelica Cross LPN    Comprehensive   
Internal Medicine;   
Comprehensive   
Internal Medicine  
Work Phone:   
[phone]  
   
                                                    Comment on   
above:                                  PT reported bp from home   
   
                                                    2021   
08:      Height          195.58 cm       [facility]   
Internal Medicine;   
Comprehensive   
Internal Medicine  
Work Phone:   
[phone]  
   
                                                    Comment on   
above:                                  PT reported bp from home   
   
                                                    2021   
08:                              BMI (Body Mass   
Index)                    25.5 kg/m2                Mere Griffithon DO  
Work Phone:   
[phone]                          Com[facility]   
Internal Medicine;   
[facility]   
Internal Medicine  
Work Phone:   
[phone]  
   
                                                    2021   
08:          Body weight         97.52 kg            Mere Cory DO  
Work Phone:   
[phone]                          Com[facility]   
Internal Medicine;   
[facility]   
Internal Medicine  
Work Phone:   
[phone]  
   
                                                    2021   
08:          BP Diastolic        80 mm[Hg]           Mere Cory DO  
Work Phone:   
[phone]                          [facility]   
Internal Medicine;   
[facility]   
Internal Medicine  
Work Phone:   
[phone]  
   
                                                    Comment on   
above:                                  Patient Position: Sitting   
   
                                                    2021   
08:          BP Systolic         140 mm[Hg]          Mere Cory DO  
Work Phone:   
[phone]                          [facility]   
Internal Medicine;   
[facility]   
Internal Medicine  
Work Phone:   
[phone]  
   
                                                    Comment on   
above:                                  Patient Position: Sitting   
   
                                                    2021   
08:                              BSA (Body Surface   
Area)                     2.31 m2                   Mere Cory DO  
Work Phone:   
[phone]                          [facility]   
Internal Medicine;   
[facility]   
Internal Medicine  
Work Phone:   
[phone]  
   
                                                    2021   
08:          Height              195.58 cm           Mere Ocry DO  
Work Phone:   
[phone]                          Com[facility]   
Internal Medicine;   
[facility]   
Internal Medicine  
Work Phone:   
[phone]  
   
                                                    2020   
08:                              BMI (Body Mass   
Index)              25.5 kg/m2          [facility]    Comprehensive   
Internal Medicine;   
Comprehensive   
Internal Medicine  
Work Phone:   
[phone]  
   
                                                    2020   
08:      Body weight     97.52 kg        [facility]   
Internal Medicine;   
Comprehensive   
Internal Medicine  
Work Phone:   
[phone]  
   
                                                    2020   
08:      BP Diastolic    84 mm[Hg]       [facility]   
Internal Medicine;   
Comprehensive   
Internal Medicine  
Work Phone:   
[phone]  
   
                                                    Comment on   
above:                                  Patient Position: Sitting; Cuff Location  
: Left Arm; Cuff Size:   
Standard   
   
                                                    2020   
08:      BP Systolic     142 mm[Hg]      [facility]   
Internal Medicine;   
Comprehensive   
Internal Medicine  
Work Phone:   
[phone]  
   
                                                    Comment on   
above:                                  Patient Position: Sitting; Cuff Location  
: Left Arm; Cuff Size:   
Standard   
   
                                                    2020   
08:                              BSA (Body Surface   
Area)               2.31 m2             Anjelica Cross LPN    Comprehensive   
Internal Medicine;   
Comprehensive   
Internal Medicine  
Work Phone:   
[phone]  
   
                                                    2020   
08:      Height          195.58 cm       [facility]   
Internal Medicine;   
Comprehensive   
Internal Medicine  
Work Phone:   
[phone]  
   
                                                    2020   
08:      Pulse (Heart Rate) 75 /min         Anjelica Cross LPN Comprehensiv  
e   
Internal Medicine;   
Comprehensive   
Internal Medicine  
Work Phone:   
[phone]  
   
                                                    Comment on   
above:                                  Pattern: Regular   
   
                                                    2020   
08:                              BMI (Body Mass   
Index)                    25.5 kg/m2                Mere Cory DO  
Work Phone:   
[phone]                          Com[facility]   
Internal Medicine  
Work Phone:   
[phone]  
   
                                                    Comment on   
above:                                  no other vs taken as this is phone encou  
nter due to covid   
   
                                                    2020   
08:          Body weight         97.52 kg            Mere Cory DO  
Work Phone:   
[phone]                          [facility]   
Internal Medicine  
Work Phone:   
[phone]  
   
                                                    Comment on   
above:                                  no other vs taken as this is phone encou  
nter due to covid   
   
                                                    2020   
08:          BP Diastolic        75 mm[Hg]           Mere Cory DO  
Work Phone:   
[phone]                          [facility]   
Internal Medicine  
Work Phone:   
[phone]  
   
                                                    Comment on   
above:                                  Patient Position: Sitting   
   
                                                            no other vs taken as  
 this is phone encounter due to covid   
   
                                                    2020   
08:          BP Systolic         135 mm[Hg]          Mere Cory DO  
Work Phone:   
[phone]                          Com[facility]   
Internal Medicine  
Work Phone:   
[phone]  
   
                                                    Comment on   
above:                                  Patient Position: Sitting   
   
                                                            no other vs taken as  
 this is phone encounter due to covid   
   
                                                    2020   
08:                              BSA (Body Surface   
Area)                     2.31 m2                   Mere Cory DO  
Work Phone:   
[phone]                          [facility]   
Internal Medicine  
Work Phone:   
[phone]  
   
                                                    Comment on   
above:                                  no other vs taken as this is phone encou  
nter due to covid   
   
                                                    2020   
08:          Height              195.58 cm           Mere Cory DO  
Work Phone:   
[phone]                          Com[facility]   
Internal Medicine  
Work Phone:   
[phone]  
   
                                                    Comment on   
above:                                  no other vs taken as this is phone encou  
nter due to covid   
   
                                                    2020   
08:                              BMI (Body Mass   
Index)                    25.5 kg/m2                Mere Chavez DO  
Work Phone:   
[phone]                          [facility]   
Internal Medicine  
Work Phone:   
[phone]  
   
                                                    Comment on   
above:                                  virtual visit not all- vital taken - one  
s reported pt gave me   
   
                                                    2020   
08:          Body weight         97.52 kg            Mere Chavez DO  
Work Phone:   
[phone]                          [facility]   
Internal Medicine  
Work Phone:   
[phone]  
   
                                                    Comment on   
above:                                  virtual visit not all- vital taken - one  
s reported pt gave me   
   
                                                    2020   
08:          BP Diastolic        72 mm[Hg]           Mere Chavez DO  
Work Phone:   
[phone]                          [facility]   
Internal Medicine  
Work Phone:   
[phone]  
   
                                                    Comment on   
above:                                  Patient Position: Sitting   
   
                                                            virtual visit not al  
l- vital taken - ones reported pt gave me   
   
                                                    2020   
08:          BP Systolic         138 mm[Hg]          Mere Chavez DO  
Work Phone:   
[phone]                          [facility]   
Internal Medicine  
Work Phone:   
[phone]  
   
                                                    Comment on   
above:                                  Patient Position: Sitting   
   
                                                            virtual visit not al  
l- vital taken - ones reported pt gave me   
   
                                                    2020   
08:                              BSA (Body Surface   
Area)                     2.31 m2                   Mere Chavez DO  
Work Phone:   
[phone]                          [facility]   
Internal Medicine  
Work Phone:   
[phone]  
   
                                                    Comment on   
above:                                  virtual visit not all- vital taken - one  
s reported pt gave me   
   
                                                    2020   
08:          Height              195.58 cm           Mere Chavez DO  
Work Phone:   
[phone]                          [facility]   
Internal Medicine  
Work Phone:   
[phone]  
   
                                                    Comment on   
above:                                  virtual visit not all- vital taken - one  
s reported pt gave me   
   
                                                    2020   
08:          Pulse (Heart Rate)  81 /min             Mere Chavez DO  
Work Phone:   
[phone]                          [facility]   
Internal Medicine  
Work Phone:   
[phone]  
   
                                                    Comment on   
above:                                  Pattern: Regular   
   
                                                            virtual visit not al  
l- vital taken - ones reported pt gave me   
   
                                                    2020   
07:                              BMI (Body Mass   
Index)              25.5 kg/m2          Gaby Chavez [facility]  Comprehensive   
Internal Medicine  
Work Phone:   
[phone]  
   
                                                    2020   
07:      Body Temperature 96.8 [degF]     Gaby Chavez [facility] Comprehensiv  
e   
Internal Medicine  
Work Phone:   
[phone]  
   
                                                    Comment on   
above:                                  Method: Temporal   
   
                                                    2020   
07:      Body weight     97.52 kg        Gaby Chavez [facility]  
   
Internal Medicine  
Work Phone:   
[phone]  
   
                                                    2020   
07:      BP Diastolic    100 mm[Hg]      Gaby Chavez [facility]  
   
Internal Medicine  
Work Phone:   
[phone]  
   
                                                    Comment on   
above:                                  Patient Position: Sitting; Cuff Location  
: Left Arm; Cuff Size:   
Standard   
   
                                                    2020   
07:      BP Systolic     148 mm[Hg]      Gaby Chavez [facility]  
   
Internal Medicine  
Work Phone:   
[phone]  
   
                                                    Comment on   
above:                                  Patient Position: Sitting; Cuff Location  
: Left Arm; Cuff Size:   
Standard   
   
                                                    2020   
07:                              BSA (Body Surface   
Area)               2.31 m2             Gaby Chavez [facility]  Comprehensive   
Internal Medicine  
Work Phone:   
[phone]  
   
                                                    2020   
07:      Height          195.58 cm       Gaby Chavez [facility]  
   
Internal Medicine  
Work Phone:   
[phone]  
   
                                                    2020   
07:      Pulse (Heart Rate) 88 /min         Gaby Chavez [facility] Comprehens  
bebe   
Internal Medicine  
Work Phone:   
[phone]  
   
                                                    Comment on   
above:                                  Pattern: Regular   
   
                                                    2020   
07:      Pulse Oximetry  97 %            Mere Chavez [facility]   
Internal Medicine  
Work Phone:   
[phone]  
   
                                                    Comment on   
above:                                  Room air   
   
                                                    2020   
07:      Respiratory Rate 16 /min         Gaby Chavez [facility] Comprehensiv  
e   
Internal Medicine  
Work Phone:   
[phone]  
   
                                                    Comment on   
above:                                  Pattern: Unlabored   
   
                                                    2020   
07:                              SaO2% (BldA) [Mass   
fraction]           97 %                Gaby Chavez [facility]  Comprehensive   
Internal Medicine;   
Comprehensive   
Internal Medicine  
Work Phone:   
[phone]  
   
                                                    Comment on   
above:                                  Room air   
   
                                                    2020   
08:                              BMI (Body Mass   
Index)              24.31 kg/m2         Lashell Valdez LPN    Comprehensive   
Internal Medicine  
Work Phone:   
[phone]  
   
                                                    Comment on   
above:                                  pt reported own weight.   
   
                                                    2020   
08:      Body weight     92.99 kg        Lashell Valdez LPN Comprehensive   
Internal Medicine  
Work Phone:   
[phone]  
   
                                                    Comment on   
above:                                  pt reported own weight.   
   
                                                    2020   
08:110400                              BSA (Body Surface   
Area)               2.26 m2             Lashell Valdez LPN    Comprehensive   
Internal Medicine  
Work Phone:   
[phone]  
   
                                                    Comment on   
above:                                  pt reported own weight.   
   
                                                    2020   
08:      Height          195.58 cm       Lashell Valdez LPN [facility]   
Internal Medicine  
Work Phone:   
[phone]  
   
                                                    Comment on   
above:                                  pt reported own weight.   
   
                                                    2020   
07:                              BMI (Body Mass   
Index)              24.66 kg/m2         Gaby Chavez [facility]  Comprehensive   
Internal Medicine  
Work Phone:   
[phone]  
   
                                                    2020   
07:      Body Temperature 96.4 [degF]     Gaby Chavez [facility] Comprehens  
e   
Internal Medicine  
Work Phone:   
[phone]  
   
                                                    Comment on   
above:                                  Method: Temporal   
   
                                                    2020   
07:      Body weight     94.35 kg        Gaby Chavez [facility] Comprehensive  
   
Internal Medicine  
Work Phone:   
[phone]  
   
                                                    2020   
07:      BP Diastolic    76 mm[Hg]       Gaby Scottius [facility] Comprehensive  
   
Internal Medicine  
Work Phone:   
[phone]  
   
                                                    Comment on   
above:                                  Patient Position: Sitting; Cuff Location  
: Left Arm; Cuff Size:   
Standard   
   
                                                    2020   
07:      BP Systolic     121 mm[Hg]      Gaby Scottius [facility]  
   
Internal Medicine  
Work Phone:   
[phone]  
   
                                                    Comment on   
above:                                  Patient Position: Sitting; Cuff Location  
: Left Arm; Cuff Size:   
Standard   
   
                                                    2020   
07:                              BSA (Body Surface   
Area)               2.27 m2             Gaby Scottdebbie [facility]  Comprehensive   
Internal Medicine  
Work Phone:   
[phone]  
   
                                                    2020   
07:      Height          195.58 cm       Gaby Chavez CMA Comprehensive  
   
Internal Medicine  
Work Phone:   
[phone]  
   
                                                    2020   
07:      Pulse (Heart Rate) 71 /min         Gaby Chavez CMA Comprehens  
bebe   
Internal Medicine  
Work Phone:   
[phone]  
   
                                                    Comment on   
above:                                  Pattern: Regular   
   
                                                    2020   
07:      Pulse Oximetry  97 %            Mere Chavez [facility]   
Internal Medicine  
Work Phone:   
[phone]  
   
                                                    Comment on   
above:                                  Room air   
   
                                                    2020   
07:      Respiratory Rate 16 /min         Gaby Chavez CMA Comprehensiv  
e   
Internal Medicine  
Work Phone:   
[phone]  
   
                                                    Comment on   
above:                                  Pattern: Unlabored   
   
                                                    2020   
07:                              SaO2% (BldA) [Mass   
fraction]           97 %                Gaby Chavez [facility]  Comprehensive   
Internal Medicine;   
Comprehensive   
Internal Medicine  
Work Phone:   
[phone]  
   
                                                    Comment on   
above:                                  Room air   
   
                                                    2019   
07:                              BMI (Body Mass   
Index)              26.21 kg/m2         Verena Tuttle RN     Comprehensive   
Internal Medicine  
Work Phone:   
[phone]  
   
                                                    2019   
07:      Body Temperature 98.2 [degF]     Verena Tuttle RN Comprehensive   
Internal Medicine  
Work Phone:   
[phone]  
   
                                                    Comment on   
above:                                  Method: Temporal   
   
                                                    2019   
07:      Body weight     100.25 kg       Verena Tuttle RN Comprehensive   
Internal Medicine  
Work Phone:   
[phone]  
   
                                                    2019   
07:      BP Diastolic    78 mm[Hg]       Verena Tuttle RN Comprehensive   
Internal Medicine  
Work Phone:   
[phone]  
   
                                                    Comment on   
above:                                  Patient Position: Sitting; Cuff Location  
: Left Arm; Cuff Size:   
Standard   
   
                                                    2019   
07:      BP Systolic     138 mm[Hg]      Verena Tuttle RN Comprehensive   
Internal Medicine  
Work Phone:   
[phone]  
   
                                                    Comment on   
above:                                  Patient Position: Sitting; Cuff Location  
: Left Arm; Cuff Size:   
Standard   
   
                                                    2019   
07:                              BSA (Body Surface   
Area)               2.33 m2             Verena L Long RN     Comprehensive   
Internal Medicine  
Work Phone:   
[phone]  
   
                                                    2019   
07:      Height          195.58 cm       Verena Tuttle RN Comprehensive   
Internal Medicine  
Work Phone:   
[phone]  
   
                                                    2019   
07:      Pulse (Heart Rate) 80 /min         Verena Tuttle RN Comprehensive  
   
Internal Medicine  
Work Phone:   
[phone]  
   
                                                    Comment on   
above:                                  Pattern: Regular   
   
                                                    2019   
07:      Pulse Oximetry  95 %            Mere Chavez [facility]   
Internal Medicine  
Work Phone:   
[phone]  
   
                                                    Comment on   
above:                                  Room air   
   
                                                    2019   
07:                              SaO2% (BldA) [Mass   
fraction]           95 %                Verena Tuttle RN     Comprehensive   
Internal Medicine;   
Comprehensive   
Internal Medicine  
Work Phone:   
[phone]  
   
                                                    Comment on   
above:                                  Room air   
   
                                                    05-   
08:                              BMI (Body Mass   
Index)              26.34 kg/m2         Catina De Jesus RN  Comprehensive   
Internal Medicine  
Work Phone:   
[phone]  
   
                                                    05-   
08:      Body weight     100.76 kg       Catina De Jesus RN Comprehensive  
   
Internal Medicine  
Work Phone:   
[phone]  
   
                                                    05-   
08:      BP Diastolic    82 mm[Hg]       Catina De Jesus RN Comprehensive  
   
Internal Medicine  
Work Phone:   
[phone]  
   
                                                    Comment on   
above:                                  Patient Position: Sitting; Cuff Location  
: Left Arm; Cuff Size: Large   
   
                                                    05-   
08:      BP Systolic     118 mm[Hg]      Catina De Jesus RN Comprehensive  
   
Internal Medicine  
Work Phone:   
[phone]  
   
                                                    Comment on   
above:                                  Patient Position: Sitting; Cuff Location  
: Left Arm; Cuff Size: Large   
   
                                                    05-   
08:                              BSA (Body Surface   
Area)               2.34 m2             Catina De Jesus RN  Comprehensive   
Internal Medicine  
Work Phone:   
[phone]  
   
                                                    05-   
08:      Height          195.58 cm       Catina De Jesus RN Comprehensive  
   
Internal Medicine  
Work Phone:   
[phone]  
   
                                                    05-   
08:      Pulse (Heart Rate) 75 /min         Catina De Jesus RN Comprehens  
bebe   
Internal Medicine  
Work Phone:   
[phone]  
   
                                                    Comment on   
above:                                  Pattern: Regular   
   
                                                    05-   
08:      Pulse Oximetry  97 %            Mere Chavez [facility]   
Internal Medicine  
Work Phone:   
[phone]  
   
                                                    Comment on   
above:                                  Room air   
   
                                                    05-   
08:      Respiratory Rate 18 /min         Catina De Jesus RN Comprehensiv  
e   
Internal Medicine  
Work Phone:   
[phone]  
   
                                                    Comment on   
above:                                  Pattern: Unlabored   
   
                                                    05-   
08:                              SaO2% (BldA) [Mass   
fraction]           97 %                Catina De Jesus RN  Comprehensive   
Internal Medicine;   
Comprehensive   
Internal Medicine  
Work Phone:   
[phone]  
   
                                                    Comment on   
above:                                  Room air   
   
                                                    2018   
09:                              BMI (Body Mass   
Index)              27.39 kg/m2         Verena Tuttle RN     Comprehensive   
Internal Medicine  
Work Phone:   
[phone]  
   
                                                    2018   
09:      Body Temperature 98.2 [degF]     Verena Tuttle RN Comprehensive   
Internal Medicine  
Work Phone:   
[phone]  
   
                                                    Comment on   
above:                                  Method: Temporal   
   
                                                    2018   
09:      Body weight     104.78 kg       Verena Tuttle RN [facility]   
Internal Medicine  
Work Phone:   
[phone]  
   
                                                    2018   
09:      BP Diastolic    76 mm[Hg]       Verena Tuttle RN [facility]   
Internal Medicine  
Work Phone:   
[phone]  
   
                                                    Comment on   
above:                                  Patient Position: Sitting; Cuff Location  
: Left Arm; Cuff Size:   
Standard   
   
                                                    2018   
09:      BP Systolic     130 mm[Hg]      Verena Tuttle RN [facility]   
Internal Medicine  
Work Phone:   
[phone]  
   
                                                    Comment on   
above:                                  Patient Position: Sitting; Cuff Location  
: Left Arm; Cuff Size:   
Standard   
   
                                                    2018   
09:                              BSA (Body Surface   
Area)               2.38 m2             Verena Tuttle RN     Comprehensive   
Internal Medicine  
Work Phone:   
[phone]  
   
                                                    2018   
09:      Height          195.58 cm       Verena Tuttle RN [facility]   
Internal Medicine  
Work Phone:   
[phone]  
   
                                                    2018   
09:      Pulse (Heart Rate) 88 /min         Verena Tuttle RN Comprehensive  
   
Internal Medicine  
Work Phone:   
[phone]  
   
                                                    Comment on   
above:                                  Pattern: Regular   
   
                                                    2018   
09:      Pulse Oximetry  95 %            Mere Chavez [facility]   
Internal Medicine  
Work Phone:   
[phone]  
   
                                                    Comment on   
above:                                  Room air   
   
                                                    2018   
09:      Respiratory Rate 16 /min         Verena Tuttle RN [facility]   
Internal Medicine  
Work Phone:   
[phone]  
   
                                                    Comment on   
above:                                  Pattern: Unlabored   
   
                                                    2018   
09:                              SaO2% (BldA) [Mass   
fraction]           95 %                Verena Tuttle RN     Comprehensive   
Internal Medicine;   
Comprehensive   
Internal Medicine  
Work Phone:   
[phone]  
   
                                                    Comment on   
above:                                  Room air   
   
                                                    2018   
09:      Weight          104.78 kg       Mere Chavez [facility]   
Internal Medicine  
Work Phone:   
[phone]  
   
                                                    2018   
08:                              BMI (Body Mass   
Index)              28.1 kg/m2          Catina De Jesus RN  Comprehensive   
Internal Medicine  
Work Phone:   
[phone]  
   
                                                    2018   
08:      Body weight     107.5 kg        Catina De Jesus RN Comprehensive  
   
Internal Medicine  
Work Phone:   
[phone]  
   
                                                    2018   
08:      BP Diastolic    82 mm[Hg]       Catina De Jesus RN Comprehensive  
   
Internal Medicine  
Work Phone:   
[phone]  
   
                                                    Comment on   
above:                                  Patient Position: Sitting; Cuff Location  
: Left Arm; Cuff Size: Large   
   
                                                    2018   
08:      BP Systolic     128 mm[Hg]      Catina De Jesus RN [facility]  
   
Internal Medicine  
Work Phone:   
[phone]  
   
                                                    Comment on   
above:                                  Patient Position: Sitting; Cuff Location  
: Left Arm; Cuff Size: Large   
   
                                                    2018   
08:                              BSA (Body Surface   
Area)               2.4 m2              Catina De Jesus RN  Comprehensive   
Internal Medicine  
Work Phone:   
[phone]  
   
                                                    2018   
08:      Height          195.58 cm       Catina De Jesus RN Comprehensive  
   
Internal Medicine  
Work Phone:   
[phone]  
   
                                                    2018   
08:      Pulse (Heart Rate) 80 /min         Catina De Jesus RN [facility]   
Internal Medicine  
Work Phone:   
[phone]  
   
                                                    Comment on   
above:                                  Pattern: Regular   
   
                                                    2018   
08:      Pulse Oximetry  96 %            Mere Chavez [facility]   
Internal Medicine  
Work Phone:   
[phone]  
   
                                                    Comment on   
above:                                  Room air   
   
                                                    2018   
08:      Respiratory Rate 18 /min         Catina De Jesus RN Comprehensiv  
e   
Internal Medicine  
Work Phone:   
[phone]  
   
                                                    Comment on   
above:                                  Pattern: Unlabored   
   
                                                    2018   
08:                              SaO2% (BldA) [Mass   
fraction]           96 %                Catina De Jesus RN  Comprehensive   
Internal Medicine;   
Comprehensive   
Internal Medicine  
Work Phone:   
[phone]  
   
                                                    Comment on   
above:                                  Room air   
   
                                                    2018   
08:      Weight          107.5 kg        Mere Chavez [facility]   
Internal Medicine  
Work Phone:   
[phone]  
   
                                                    2018   
15:                              BMI (Body Mass   
Index)              28.82 kg/m2         Carline Spencer         [facility]   
Internal Medicine  
Work Phone:   
[phone]  
   
                                                    2018   
15:      Body weight     110.22 kg       Carline Spencer     [facility]   
Internal Medicine  
Work Phone:   
[phone]  
   
                                                    2018   
15:      BP Diastolic    82 mm[Hg]       Carline Spencer     [facility]   
Internal Medicine  
Work Phone:   
[phone]  
   
                                                    Comment on   
above:                                  Patient Position: Sitting; Cuff Location  
: Left Arm; Cuff Size:   
Standard   
   
                                                    2018   
15:      BP Systolic     134 mm[Hg]      Carline Spencer     [facility]   
Internal Medicine  
Work Phone:   
[phone]  
   
                                                    Comment on   
above:                                  Patient Position: Sitting; Cuff Location  
: Left Arm; Cuff Size:   
Standard   
   
                                                    2018   
15:                              BSA (Body Surface   
Area)               2.43 m2             Carline Spencer         [facility]   
Internal Medicine  
Work Phone:   
[phone]  
   
                                                    2018   
15:290400      Height          195.58 cm       Carline [facility]   
Internal Medicine  
Work Phone:   
[phone]  
   
                                                    2018   
15:      Pulse (Heart Rate) 73 /min         Carline Spencer     [facility]  
   
Internal Medicine  
Work Phone:   
[phone]  
   
                                                    Comment on   
above:                                  Pattern: Regular   
   
                                                    2018   
15:      Pulse Oximetry  94 %            Mere Chavez [facility]   
Internal Medicine  
Work Phone:   
[phone]  
   
                                                    Comment on   
above:                                  Room air   
   
                                                    2018   
15:      Respiratory Rate 18 /min         Carline Spencer     [facility]   
Internal Medicine  
Work Phone:   
[phone]  
   
                                                    Comment on   
above:                                  Pattern: Unlabored   
   
                                                    2018   
15:                              SaO2% (BldA) [Mass   
fraction]           94 %                Carline Spencer         [facility]   
Internal Medicine;   
Comprehensive   
Internal Medicine  
Work Phone:   
[phone]  
   
                                                    Comment on   
above:                                  Room air   
   
                                                    2018   
15:290400      Weight          110.22 kg       Mere Chavez [facility]   
Internal Medicine  
Work Phone:   
[phone]  
   
                                                    2017   
07:                              BMI (Body Mass   
Index)              28.86 kg/m2         Catina De Jesus RN  [facility]   
Internal Medicine  
Work Phone:   
[phone]  
   
                                                    2017   
07:      Body weight     110.39 kg       Catina De Jesus RN Comprehensive  
   
Internal Medicine  
Work Phone:   
[phone]  
   
                                                    2017   
07:      BP Diastolic    82 mm[Hg]       Catina De Jesus RN Comprehensive  
   
Internal Medicine  
Work Phone:   
[phone]  
   
                                                    Comment on   
above:                                  Patient Position: Sitting; Cuff Location  
: Left Arm; Cuff Size: Large   
   
                                                    2017   
07:      BP Systolic     138 mm[Hg]      Catina De Jesus RN [facility]  
   
Internal Medicine  
Work Phone:   
[phone]  
   
                                                    Comment on   
above:                                  Patient Position: Sitting; Cuff Location  
: Left Arm; Cuff Size: Large   
   
                                                    2017   
07:                              BSA (Body Surface   
Area)               2.43 m2             Catina De Jesus RN  [facility]   
Internal Medicine  
Work Phone:   
[phone]  
   
                                                    2017   
07:      Height          195.58 cm       Catina De Jesus RN Comprehensive  
   
Internal Medicine  
Work Phone:   
[phone]  
   
                                                    2017   
07:      Pulse (Heart Rate) 87 /min         Catina De Jesus RN Comprehens  
bebe   
Internal Medicine  
Work Phone:   
[phone]  
   
                                                    Comment on   
above:                                  Pattern: Regular   
   
                                                    2017   
07:      Pulse Oximetry  97 %            [facility]  
Work Phone:   
[phone]  
   
                                                    Comment on   
above:                                  Room air   
   
                                                    2017   
07:      Respiratory Rate 18 /min         Catina De Jesus RN [facility]  
e   
Internal Medicine  
Work Phone:   
[phone]  
   
                                                    Comment on   
above:                                  Pattern: Unlabored   
   
                                                    2017   
07:                              SaO2% (BldA) [Mass   
fraction]           97 %                Catina De Jesus RN  [facility];   
[facility]  
Work Phone:   
[phone]  
   
                                                    Comment on   
above:                                  Room air   
   
                                                    2017   
07:130500      Weight          110.39 kg       [facility]  
Work Phone:   
[phone]  
   
                                                    2017   
07:                              BMI (Body Mass   
Index)              29.17 kg/m2         Catina De Jesus RN  [facility]  
Work Phone:   
[phone]  
   
                                                    2017   
07:      Body weight     111.59 kg       Catina De Jesus [facility]  
   
[facility]  
Work Phone:   
[phone]  
   
                                                    2017   
07:      BP Diastolic    82 mm[Hg]       Catina De Jesus [facility]  
   
[facility]  
Work Phone:   
[phone]  
   
                                                    Comment on   
above:                                  Patient Position: Sitting; Cuff Location  
: Left Arm; Cuff Size: Large   
   
                                                    2017   
07:      BP Systolic     142 mm[Hg]      Catina De Jesus [facility]  
   
[facility]  
Work Phone:   
[phone]  
   
                                                    Comment on   
above:                                  Patient Position: Sitting; Cuff Location  
: Left Arm; Cuff Size: Large   
   
                                                    2017   
07:                              BSA (Body Surface   
Area)               2.44 m2             Catina De Jesus RN  [facility]  
Work Phone:   
[phone]  
   
                                                    2017   
07:      Height          195.58 cm       Catina De Jesus [facility]  
   
[facility]  
Work Phone:   
[phone]  
   
                                                    2017   
07:      Pulse (Heart Rate) 91 /min         Catina De Jesus RN [facility]  
[facility]   
[facility] Medicine  
Work Phone:   
[phone]  
   
                                                    Comment on   
above:                                  Pattern: Regular   
   
                                                    2017   
07:      Pulse Oximetry  98 %            [facility]  
Work Phone:   
[phone]  
   
                                                    Comment on   
above:                                  Room air   
   
                                                    2017   
07:      Respiratory Rate 18 /min         Catina De Jesus RN [facility]  
e   
Internal Medicine  
Work Phone:   
[phone]  
   
                                                    Comment on   
above:                                  Pattern: Unlabored   
   
                                                    2017   
07:                              SaO2% (BldA) [Mass   
fraction]           98 %                Catina De Jesus RN  [facility];   
[facility]  
Work Phone:   
[phone]  
   
                                                    Comment on   
above:                                  Room air   
   
                                                    2017   
07:      Weight          111.59 kg       [facility]  
Work Phone:   
[phone]  
   
                                                    2017   
06:                              BMI (Body Mass   
Index)              28.46 kg/m2         Catina De Jesus RN  [facility]  
Work Phone:   
[phone]  
   
                                                    2017   
06:      Body weight     108.86 kg       Catina De Jesus [facility]  
   
[facility]  
Work Phone:   
[phone]  
   
                                                    2017   
06:      BP Diastolic    80 mm[Hg]       Catina De Jesus [facility]  
   
[facility]  
Work Phone:   
[phone]  
   
                                                    Comment on   
above:                                  Patient Position: Sitting; Cuff Location  
: Left Arm; Cuff Size: Large   
   
                                                    2017   
06:      BP Systolic     122 mm[Hg]      Catina De Jesus [facility]  
   
[facility]  
Work Phone:   
[phone]  
   
                                                    Comment on   
above:                                  Patient Position: Sitting; Cuff Location  
: Left Arm; Cuff Size: Large   
   
                                                    2017   
06:                              BSA (Body Surface   
Area)               2.42 m2             Catina De Jesus RN  [facility]  
Work Phone:   
[phone]  
   
                                                    2017   
06:      Height          195.58 cm       Catina De Jesus [facility]  
   
[facility]  
Work Phone:   
[phone]  
   
                                                    2017   
06:      Pulse (Heart Rate) 89 /min         Catina De Jesus RN [facility]  
[facility]   
[facility]  
Work Phone:   
[phone]  
   
                                                    Comment on   
above:                                  Pattern: Regular   
   
                                                    2017   
06:      Pulse Oximetry  96 %            [facility]  
Work Phone:   
[phone]  
   
                                                    Comment on   
above:                                  Room air   
   
                                                    2017   
06:      Respiratory Rate 18 /min         Catina De Jesus RN [facility]prehens[facility]  
e   
Internal Medicine  
Work Phone:   
[phone]  
   
                                                    Comment on   
above:                                  Pattern: Unlabored   
   
                                                    2017   
06:                              SaO2% (BldA) [Mass   
fraction]           96 %                Catina De Jesus RN  [facility];   
[facility]   
[facility]  
Work Phone:   
[phone]  
   
                                                    Comment on   
above:                                  Room air   
   
                                                    2017   
06:      Weight          108.86 kg       [facility]  
Work Phone:   
[phone]  
   
                                                    10-   
07:                              BMI (Body Mass   
Index)              28.76 kg/m2         Catina De Jesus RN  [facility]  
Work Phone:   
[phone]  
   
                                                    10-   
07:      Body weight     110 kg          Catina De Jesus [facility]  
   
[facility] Medicine  
Work Phone:   
[phone]  
   
                                                    10-   
07:      BP Diastolic    82 mm[Hg]       Catina De Jesus RN [facility]  
   
[facility]  
Work Phone:   
[phone]  
   
                                                    Comment on   
above:                                  Patient Position: Sitting; Cuff Location  
: Left Arm; Cuff Size: Large   
   
                                                    10-   
07:      BP Systolic     142 mm[Hg]      Catina De Jesus RN [facility]  
   
[facility]  
Work Phone:   
[phone]  
   
                                                    Comment on   
above:                                  Patient Position: Sitting; Cuff Location  
: Left Arm; Cuff Size: Large   
   
                                                    10-   
07:                              BSA (Body Surface   
Area)               2.43 m2             Catina De Jesus RN  [facility]  
Work Phone:   
[phone]  
   
                                                    10-   
07:      Height          195.58 cm       Catina De Jesus [facility]  
   
[facility]  
Work Phone:   
[phone]  
   
                                                    10-   
07:      Pulse (Heart Rate) 78 /min         Catina De Jesus RN Comprehens  
[facility]   
[facility] Medicine  
Work Phone:   
[phone]  
   
                                                    Comment on   
above:                                  Pattern: Regular   
   
                                                    10-   
07:      Pulse Oximetry  95 %            [facility]  
Work Phone:   
[phone]  
   
                                                    Comment on   
above:                                  Room air   
   
                                                    10-   
07:      Respiratory Rate 18 /min         Catina De Jesus RN [facility]  
e   
Internal Medicine  
Work Phone:   
[phone]  
   
                                                    Comment on   
above:                                  Pattern: Unlabored   
   
                                                    10-   
07:                              SaO2% (BldA) [Mass   
fraction]           95 %                Catina De Jesus RN  [facility];   
[facility]   
[facility]  
Work Phone:   
[phone]  
   
                                                    Comment on   
above:                                  Room air   
   
                                                    10-   
07:      Weight          110 kg          [facility]  
Work Phone:   
[phone]  
   
                                                    2016   
06:                              BMI (Body Mass   
Index)              27.88 kg/m2         Catina De Jesus RN  [facility]  
Work Phone:   
[phone]  
   
                                                    2016   
06:      Body weight     106.65 kg       Catina De Jesus [facility]  
   
[facility]  
Work Phone:   
[phone]  
   
                                                    2016   
06:      BP Diastolic    78 mm[Hg]       Catina De Jesus RN [facility]  
   
[facility] Medicine  
Work Phone:   
[phone]  
   
                                                    Comment on   
above:                                  Patient Position: Sitting; Cuff Location  
: Left Arm; Cuff Size: Large   
   
                                                    2016   
06:      BP Systolic     120 mm[Hg]      Catina De Jesus RN [facility]  
   
[facility] Medicine  
Work Phone:   
[phone]  
   
                                                    Comment on   
above:                                  Patient Position: Sitting; Cuff Location  
: Left Arm; Cuff Size: Large   
   
                                                    2016   
06:                              BSA (Body Surface   
Area)               2.4 m2              Catina De Jesus RN  [facility]  
Work Phone:   
[phone]  
   
                                                    2016   
06:      Height          195.58 cm       Catina De Jesus [facility]  
   
[facility] Medicine  
Work Phone:   
[phone]  
   
                                                    2016   
06:      Pulse (Heart Rate) 86 /min         Catina De Jesus RN Comprehens  
[facility]   
[facility] Medicine  
Work Phone:   
[phone]  
   
                                                    Comment on   
above:                                  Pattern: Regular   
   
                                                    2016   
06:      Pulse Oximetry  95 %            [facility]  
Work Phone:   
[phone]  
   
                                                    Comment on   
above:                                  Room air   
   
                                                    2016   
06:      Respiratory Rate 18 /min         Catnia De Jesus RN [facility]  
e   
Internal Medicine  
Work Phone:   
[phone]  
   
                                                    Comment on   
above:                                  Pattern: Unlabored   
   
                                                    2016   
06:                              SaO2% (BldA) [Mass   
fraction]           95 %                Catina De Jesus RN  [facility];   
[facility]   
[facility]  
Work Phone:   
[phone]  
   
                                                    Comment on   
above:                                  Room air   
   
                                                    2016   
06:      Weight          106.65 kg       [facility]  
Work Phone:   
[phone]  
   
                                                    2016   
08:                              BMI (Body Mass   
Index)                    28.53 kg/m2               Rowena Preciado CMA                                     [facility]   
[facility]  
Work Phone:   
[phone]  
   
                                                    2016   
08:          Body weight         109.14 kg           Rowena Preciado   
[facility]                                     [facility]   
[facility] Medicine  
Work Phone:   
[phone]  
   
                                                    2016   
08:          BP Diastolic        80 mm[Hg]           Rowena Preciado   
[facility]   
Internal Medicine  
Work Phone:   
[phone]  
   
                                                    Comment on   
above:                                  Patient Position: Sitting; Cuff Location  
: Left Arm; Cuff Size:   
Standard   
   
                                                    2016   
08:          BP Systolic         122 mm[Hg]          Rowena Preciado   
[facility]                                     [facility]   
[facility] Medicine  
Work Phone:   
[phone]  
   
                                                    Comment on   
above:                                  Patient Position: Sitting; Cuff Location  
: Left Arm; Cuff Size:   
Standard   
   
                                                    2016   
08:                              BSA (Body Surface   
Area)                     2.42 m2                   Rowena Preciado   
[facility]                                     [facility]   
[facility] Medicine  
Work Phone:   
[phone]  
   
                                                    2016   
08:          Height              195.58 cm           Rowena Preciado   
[facility]   
Internal Medicine  
Work Phone:   
[phone]  
   
                                                    2016   
08:          Pulse (Heart Rate)  72 /min             Rowena Preciado   
[facility]   
Internal Medicine  
Work Phone:   
[phone]  
   
                                                    Comment on   
above:                                  Pattern: Regular   
   
                                                    2016   
08:      Pulse Oximetry  98 %            [facility] Ch[facility]   
Internal Medicine  
Work Phone:   
[phone]  
   
                                                    Comment on   
above:                                  Room air   
   
                                                    2016   
08:          Respiratory Rate    16 /min             Rowena Preciado   
[facility]   
Internal Medicine  
Work Phone:   
[phone]  
   
                                                    Comment on   
above:                                  Pattern: Unlabored   
   
                                                    2016   
08:                              SaO2% (BldA) [Mass   
fraction]                 98 %                      Rowena Preciado   
[facility]   
Internal Medicine;   
[facility]   
[facility] Medicine  
Work Phone:   
[phone]  
   
                                                    Comment on   
above:                                  Room air   
   
                                                    2016   
08:      Weight          109.14 kg       Mere Chavez [facility]   
Internal Medicine  
Work Phone:   
[phone]  
   
                                                    2015   
08:                              BMI (Body Mass   
Index)              29.01 kg/m2         Verena Tuttle RN     [facility]   
[facility] Medicine  
Work Phone:   
[phone]  
   
                                                    2015   
08:      Body Temperature 97.8 [degF]     Verena Tuttle RN [facility]   
[facility] Medicine  
Work Phone:   
[phone]  
   
                                                    Comment on   
above:                                  Method: Temporal   
   
                                                    2015   
08:      Body weight     110.95 kg       Verena Tuttle RN [facility]   
[facility] Medicine  
Work Phone:   
[phone]  
   
                                                    2015   
08:      BP Diastolic    78 mm[Hg]       Verena Tuttle RN [facility]   
Internal Medicine  
Work Phone:   
[phone]  
   
                                                    Comment on   
above:                                  Patient Position: Sitting; Cuff Location  
: Left Arm; Cuff Size:   
Standard   
   
                                                    2015   
08:      BP Systolic     126 mm[Hg]      Verena Tuttle RN [facility]   
[facility] Medicine  
Work Phone:   
[phone]  
   
                                                    Comment on   
above:                                  Patient Position: Sitting; Cuff Location  
: Left Arm; Cuff Size:   
Standard   
   
                                                    2015   
08:                              BSA (Body Surface   
Area)               2.44 m2             Verena Tuttle RN     [facility]   
[facility] Medicine  
Work Phone:   
[phone]  
   
                                                    2015   
08:      Height          195.58 cm       Verena Tuttle RN [facility]   
Internal Medicine  
Work Phone:   
[phone]  
   
                                                    2015   
08:      Pulse (Heart Rate) 81 /min         Verena Tuttle RN [facility]  
   
Internal Medicine  
Work Phone:   
[phone]  
   
                                                    Comment on   
above:                                  Pattern: Regular   
   
                                                    2015   
08:      Pulse Oximetry  96 %            [facility]re Cory [facility]   
[facility]  
Work Phone:   
[phone]  
   
                                                    Comment on   
above:                                  Room air   
   
                                                    2015   
08:      Respiratory Rate 17 /min         Verena Tuttle RN [facility]   
Internal Medicine  
Work Phone:   
[phone]  
   
                                                    Comment on   
above:                                  Pattern: Unlabored   
   
                                                    2015   
08:                              SaO2% (BldA) [Mass   
fraction]           96 %                Verena Tuttle RN     [facility]   
[facility];   
[facility]   
[facility] Medicine  
Work Phone:   
[phone]  
   
                                                    Comment on   
above:                                  Room air   
   
                                                    2015   
08:      Weight          110.95 kg       [facility]   
[facility]  
Work Phone:   
[phone]  
   
                                                    2015   
07:                              BMI (Body Mass   
Index)              28.7 kg/m2          Verena Tuttle RN     [facility]   
[facility] Medicine  
Work Phone:   
[phone]  
   
                                                    2015   
07:      Body Temperature 97.3 [degF]     Verena Tuttle RN [facility]   
Internal Medicine  
Work Phone:   
[phone]  
   
                                                    Comment on   
above:                                  Method: Temporal   
   
                                                    2015   
07:      Body weight     109.77 kg       Verena Tuttle RN [facility]   
Internal Medicine  
Work Phone:   
[phone]  
   
                                                    2015   
07:      BP Diastolic    74 mm[Hg]       Verena Tuttle RN [facility]   
Internal Medicine  
Work Phone:   
[phone]  
   
                                                    Comment on   
above:                                  Patient Position: Sitting; Cuff Location  
: Left Arm; Cuff Size:   
Standard   
   
                                                    2015   
07:      BP Systolic     124 mm[Hg]      Verena Tuttle RN [facility]   
Internal Medicine  
Work Phone:   
[phone]  
   
                                                    Comment on   
above:                                  Patient Position: Sitting; Cuff Location  
: Left Arm; Cuff Size:   
Standard   
   
                                                    2015   
07:                              BSA (Body Surface   
Area)               2.43 m2             Verena Tuttle RN     [facility]   
[facility] Medicine  
Work Phone:   
[phone]  
   
                                                    2015   
07:      Height          195.58 cm       Verena Tuttle [facility]   
Internal Medicine  
Work Phone:   
[phone]  
   
                                                    2015   
07:      Pulse (Heart Rate) 81 /min         Verena Tuttle RN [facility]  
   
Internal Medicine  
Work Phone:   
[phone]  
   
                                                    Comment on   
above:                                  Pattern: Regular   
   
                                                    2015   
07:      Pulse Oximetry  97 %            [facility]   
[facility]  
Work Phone:   
[phone]  
   
                                                    Comment on   
above:                                  Room air   
   
                                                    2015   
07:      Respiratory Rate 17 /min         Verena Tuttle RN [facility]  
Work Phone:   
[phone]  
   
                                                    Comment on   
above:                                  Pattern: Unlabored   
   
                                                    2015   
07:                              SaO2% (BldA) [Mass   
fraction]           97 %                Verena Tuttle RN     [facility];   
[facility]  
Work Phone:   
[phone]  
   
                                                    Comment on   
above:                                  Room air   
   
                                                    2015   
07:      Weight          109.77 kg       Mere Cory [facility]  
Work Phone:   
[phone]  
   
                                                    2015   
08:                              BMI (Body Mass   
Index)              28.4 kg/m2          Michelle Ware       [facility]  
Work Phone:   
[phone]  
   
                                                    2015   
08:      Body Temperature 98.8 [degF]     Michelle Ware   [facility]  
Work Phone:   
[phone]  
   
                                                    Comment on   
above:                                  Method: Tympanic   
   
                                                    2015   
08:      Body weight     108.64 kg       Michelle Ware   [facility]  
Work Phone:   
[phone]  
   
                                                    2015   
08:      BP Diastolic    78 mm[Hg]       Michelle Ware   [facility]  
Work Phone:   
[phone]  
   
                                                    Comment on   
above:                                  Patient Position: Sitting; Cuff Location  
: Left Arm; Cuff Size:   
Standard   
   
                                                    2015   
08:      BP Systolic     138 mm[Hg]      Michelle Ware   [facility]  
Work Phone:   
[phone]  
   
                                                    Comment on   
above:                                  Patient Position: Sitting; Cuff Location  
: Left Arm; Cuff Size:   
Standard   
   
                                                    2015   
08:                              BSA (Body Surface   
Area)               2.41 m2             Michelle Ware       [facility]  
Work Phone:   
[phone]  
   
                                                    2015   
08:      Height          195.58 cm       Michelle Ware   [facility]  
Work Phone:   
[phone]  
   
                                                    2015   
08:      Pulse (Heart Rate) 87 /min         Michelle Ware   [facility]  
Work Phone:   
[phone]  
   
                                                    Comment on   
above:                                  Pattern: Regular   
   
                                                    2015   
08:      Pulse Oximetry  98 %            Mere Chavez [facility]  
Work Phone:   
[phone]  
   
                                                    Comment on   
above:                                  Room air   
   
                                                    2015   
08:      Respiratory Rate 18 /min         Michelle Ware   [facility]  
Work Phone:   
[phone]  
   
                                                    Comment on   
above:                                  Pattern: Unlabored   
   
                                                    2015   
08:                              SaO2% (BldA) [Mass   
fraction]           98 %                Michelle Ware       [facility];   
[facility]  
Work Phone:   
[phone]  
   
                                                    Comment on   
above:                                  Room air   
   
                                                    2015   
08:      Weight          108.64 kg       Mere Chavez [facility]  
Work Phone:   
[phone]  
   
                                                    2014   
14:                              BMI (Body Mass   
Index)              27.93 kg/m2         Catina De Jesus RN  [facility]  
Work Phone:   
[phone]  
   
                                                    2014   
14:      Body weight     106.85 kg       Catina De Jesus RN [facility]  
   
[facility]  
Work Phone:   
[phone]  
   
                                                    2014   
14:      BP Diastolic    82 mm[Hg]       Catina De Jesus RN [facility]  
   
[facility]  
Work Phone:   
[phone]  
   
                                                    Comment on   
above:                                  Patient Position: Sitting; Cuff Location  
: Left Arm; Cuff Size: Large   
   
                                                    2014   
14:      BP Systolic     118 mm[Hg]      Catina De Jesus RN [facility]  
Work Phone:   
[phone]  
   
                                                    Comment on   
above:                                  Patient Position: Sitting; Cuff Location  
: Left Arm; Cuff Size: Large   
   
                                                    2014   
14:                              BSA (Body Surface   
Area)               2.4 m2              Catina De Jesus RN  [facility]  
Work Phone:   
[phone]  
   
                                                    2014   
14:      Height          195.58 cm       Catina De Jesus RN [facility]  
   
[facility]  
Work Phone:   
[phone]  
   
                                                    2014   
14:      Pulse (Heart Rate) 60 /min         Catina De Jesus RN [facility]  
[facility]   
[facility]  
Work Phone:   
[phone]  
   
                                                    Comment on   
above:                                  Pattern: Regular   
   
                                                    2014   
14:      Pulse Oximetry  98 %            Mere Chavez [facility]  
Work Phone:   
[phone]  
   
                                                    Comment on   
above:                                  Room air   
   
                                                    2014   
14:      Respiratory Rate 18 /min         Catina De Jesus RN [facility]  
[facility]   
[facility]  
Work Phone:   
[phone]  
   
                                                    Comment on   
above:                                  Pattern: Unlabored   
   
                                                    2014   
14:                              SaO2% (BldA) [Mass   
fraction]           98 %                Catina De Jesus RN  [facility];   
[facility]  
Work Phone:   
[phone]  
   
                                                    Comment on   
above:                                  Room air   
   
                                                    2014   
14:      Weight          106.85 kg       Mere Chavez [facility]  
Work Phone:   
[phone]  
   
                                                    2014   
07:                              BMI (Body Mass   
Index)              28.04 kg/m2         Catina De Jesus RN  [facility]  
Work Phone:   
[phone]  
   
                                                    2014   
07:      Body Temperature 98.2 [degF]     Catina De Jesus RN [facility]  
[facility]   
[facility]  
Work Phone:   
[phone]  
   
                                                    Comment on   
above:                                  Method: Oral   
   
                                                    2014   
07:      Body weight     107.28 kg       Catina De Jesus RN [facility]  
   
[facility]  
Work Phone:   
[phone]  
   
                                                    2014   
07:      BP Diastolic    80 mm[Hg]       Catina De Jesus RN [facility]  
   
[facility]  
Work Phone:   
[phone]  
   
                                                    Comment on   
above:                                  Patient Position: Sitting; Cuff Location  
: Left Arm; Cuff Size: Large   
   
                                                    2014   
07:      BP Systolic     130 mm[Hg]      Catina De Jesus RN [facility]  
Work Phone:   
[phone]  
   
                                                    Comment on   
above:                                  Patient Position: Sitting; Cuff Location  
: Left Arm; Cuff Size: Large   
   
                                                    2014   
07:                              BSA (Body Surface   
Area)               2.4 m2              Catina De Jesus RN  [facility]  
Work Phone:   
[phone]  
   
                                                    2014   
07:      Height          195.58 cm       Catina De Jesus RN [facility]  
   
[facility]  
Work Phone:   
[phone]  
   
                                                    2014   
07:      Pulse (Heart Rate) 88 /min         Catina De Jesus RN [facility]  
[facility]  
Work Phone:   
[phone]  
   
                                                    Comment on   
above:                                  Pattern: Regular   
   
                                                    2014   
07:      Pulse Oximetry  98 %            Mere Chavez [facility]  
Work Phone:   
[phone]  
   
                                                    Comment on   
above:                                  Room air   
   
                                                    2014   
07:      Respiratory Rate 20 /min         Catina De Jesus RN [facility]  
[facility]   
[facility]  
Work Phone:   
[phone]  
   
                                                    Comment on   
above:                                  Pattern: Unlabored   
   
                                                    2014   
07:                              SaO2% (BldA) [Mass   
fraction]           98 %                Catina De Jesus RN  [facility];   
[facility]  
Work Phone:   
[phone]  
   
                                                    Comment on   
above:                                  Room air   
   
                                                    2014   
07:      Weight          107.28 kg       Mere Chavez [facility]  
Work Phone:   
[phone]  
   
                                                    2014   
07:                              BMI (Body Mass   
Index)              27.56 kg/m2         Catina De Jesus RN  [facility]  
Work Phone:   
[phone]  
   
                                                    2014   
07:      Body weight     105.41 kg       Catina De Jesus RN [facility]  
Work Phone:   
[phone]  
   
                                                    2014   
07:      BP Diastolic    62 mm[Hg]       Catina De Jesus RN [facility]  
   
[facility]  
Work Phone:   
[phone]  
   
                                                    Comment on   
above:                                  Patient Position: Sitting; Cuff Location  
: Left Arm; Cuff Size: Large   
   
                                                    2014   
07:      BP Systolic     124 mm[Hg]      Catina De Jesus RN [facility]  
Work Phone:   
[phone]  
   
                                                    Comment on   
above:                                  Patient Position: Sitting; Cuff Location  
: Left Arm; Cuff Size: Large   
   
                                                    2014   
07:                              BSA (Body Surface   
Area)               2.38 m2             Catina De Jesus RN  [facility]  
Work Phone:   
[phone]  
   
                                                    2014   
07:      Height          195.58 cm       Catina De Jesus RN [facility]  
Work Phone:   
[phone]  
   
                                                    2014   
07:      Pulse (Heart Rate) 67 /min         Catina De Jesus RN [facility]  
[facility]  
Work Phone:   
[phone]  
   
                                                    Comment on   
above:                                  Pattern: Regular   
   
                                                    2014   
07:      Pulse Oximetry  97 %            Mere Chavez [facility]  
Work Phone:   
[phone]  
   
                                                    Comment on   
above:                                  Room air   
   
                                                    2014   
07:      Respiratory Rate 20 /min         Catina De Jesus RN [facility]  
[facility]   
[facility]  
Work Phone:   
[phone]  
   
                                                    Comment on   
above:                                  Pattern: Unlabored   
   
                                                    2014   
07:                              SaO2% (BldA) [Mass   
fraction]           97 %                Catina De Jesus RN  [facility];   
[facility]  
Work Phone:   
[phone]  
   
                                                    Comment on   
above:                                  Room air   
   
                                                    2014   
07:      Weight          105.41 kg       Mere Chavez [facility]  
Work Phone:   
[phone]  
   
                                                    2013   
07:                              BMI (Body Mass   
Index)              27.75 kg/m2         Catina De Jesus RN  [facility]  
Work Phone:   
[phone]  
   
                                                    2013   
07:      Body Temperature 98.3 [degF]     Catina De Jesus RN [facility]  
[facility]   
[facility]  
Work Phone:   
[phone]  
   
                                                    Comment on   
above:                                  Method: Oral   
   
                                                    2013   
07:      Body weight     106.14 kg       Catina De Jesus RN [facility]  
Work Phone:   
[phone]  
   
                                                    2013   
07:      BP Diastolic    82 mm[Hg]       Catina De Jesus RN [facility]  
   
[facility]  
Work Phone:   
[phone]  
   
                                                    Comment on   
above:                                  Patient Position: Sitting; Cuff Location  
: Left Arm; Cuff Size: Large   
   
                                                    2013   
07:      BP Systolic     128 mm[Hg]      Catina De Jesus RN [facility]  
Work Phone:   
[phone]  
   
                                                    Comment on   
above:                                  Patient Position: Sitting; Cuff Location  
: Left Arm; Cuff Size: Large   
   
                                                    2013   
07:                              BSA (Body Surface   
Area)               2.39 m2             Catina De Jesus RN  [facility]  
Work Phone:   
[phone]  
   
                                                    2013   
07:      Height          195.58 cm       Catina De Jesus RN [facility]  
Work Phone:   
[phone]  
   
                                                    2013   
07:      Pulse (Heart Rate) 69 /min         Catina De Jesus RN [facility]  
[facility]  
Work Phone:   
[phone]  
   
                                                    Comment on   
above:                                  Pattern: Regular   
   
                                                    2013   
07:      Pulse Oximetry  98 %            Mere Chavez [facility]  
Work Phone:   
[phone]  
   
                                                    Comment on   
above:                                  Room air   
   
                                                    2013   
07:      Respiratory Rate 18 /min         Catina De Jesus RN [facility]  
[facility]   
[facility]  
Work Phone:   
[phone]  
   
                                                    Comment on   
above:                                  Pattern: Unlabored   
   
                                                    2013   
07:                              SaO2% (BldA) [Mass   
fraction]           98 %                Catina De Jesus RN  [facility];   
[facility]  
Work Phone:   
[phone]  
   
                                                    Comment on   
above:                                  Room air   
   
                                                    2013   
07:      Weight          106.14 kg       Mere Chavez [facility]  
Work Phone:   
[phone]  
   
                                                    03-   
07:                              BMI (Body Mass   
Index)              27.31 kg/m2         Catina De Jesus RN  [facility]  
Work Phone:   
[phone]  
   
                                                    03-   
07:      Body Temperature 98 [degF]       Catina De Jesus RN [facility]  
[facility]  
Work Phone:   
[phone]  
   
                                                    Comment on   
above:                                  Method: Oral   
   
                                                    03-   
07:      Body weight     104.47 kg       Catina De Jesus RN [facility]  
   
[facility]  
Work Phone:   
[phone]  
   
                                                    03-   
07:      BP Diastolic    78 mm[Hg]       Catina De Jesus RN [facility]  
   
[facility]  
Work Phone:   
[phone]  
   
                                                    Comment on   
above:                                  Patient Position: Sitting; Cuff Location  
: Left Arm; Cuff Size: Large   
   
                                                    03-   
07:      BP Systolic     122 mm[Hg]      Catina De Jesus RN [facility]  
   
[facility]  
Work Phone:   
[phone]  
   
                                                    Comment on   
above:                                  Patient Position: Sitting; Cuff Location  
: Left Arm; Cuff Size: Large   
   
                                                    03-   
07:                              BSA (Body Surface   
Area)               2.38 m2             Catina De Jesus RN  [facility]  
Work Phone:   
[phone]  
   
                                                    03-   
07:      Height          195.58 cm       Catina De Jesus RN [facility]  
   
[facility]  
Work Phone:   
[phone]  
   
                                                    03-   
07:      Pulse (Heart Rate) 64 /min         Catina De Jesus RN [facility]  
[facility]  
Work Phone:   
[phone]  
   
                                                    Comment on   
above:                                  Pattern: Regular   
   
                                                    03-   
07:      Respiratory Rate 18 /min         Catina De Jesus RN [facility]  
[facility]   
[facility]  
Work Phone:   
[phone]  
   
                                                    Comment on   
above:                                  Pattern: Unlabored   
   
                                                    03-   
07:      Weight          104.47 kg       [facility]re Chavez [facility]  
Work Phone:   
[phone]  
   
                                                    2012   
07:                              BMI (Body Mass   
Index)              26.85 kg/m2         Catina De Jesus RN  [facility]  
Work Phone:   
[phone]  
   
                                                    2012   
07:      Body Temperature 98.5 [degF]     Catina De Jesus RN [facility]  
[facility]   
[facility]  
Work Phone:   
[phone]  
   
                                                    Comment on   
above:                                  Method: Oral   
   
                                                    2012   
07:      Body weight     102.71 kg       Catina De Jesus RN [facility]  
   
[facility]  
Work Phone:   
[phone]  
   
                                                    2012   
07:      BP Diastolic    80 mm[Hg]       Catina De Jesus RN [facility]  
   
[facility]  
Work Phone:   
[phone]  
   
                                                    Comment on   
above:                                  Patient Position: Sitting; Cuff Location  
: Left Arm; Cuff Size: Large   
   
                                                    2012   
07:      BP Systolic     128 mm[Hg]      Catina De Jesus RN [facility]  
   
[facility]  
Work Phone:   
[phone]  
   
                                                    Comment on   
above:                                  Patient Position: Sitting; Cuff Location  
: Left Arm; Cuff Size: Large   
   
                                                    2012   
07:                              BSA (Body Surface   
Area)               2.36 m2             Catina De Jesus RN  [facility]  
Work Phone:   
[phone]  
   
                                                    2012   
07:      Height          195.58 cm       Catina De Jesus RN [facility]  
   
[facility]  
Work Phone:   
[phone]  
   
                                                    2012   
07:      Pulse (Heart Rate) 76 /min         Catina De Jesus RN [facility]  
[facility]  
Work Phone:   
[phone]  
   
                                                    Comment on   
above:                                  Pattern: Regular   
   
                                                    2012   
07:      Respiratory Rate 20 /min         Catina De Jesus RN [facility]  
[facility]   
[facility]  
Work Phone:   
[phone]  
   
                                                    Comment on   
above:                                  Pattern: Unlabored   
   
                                                    2012   
07:      Weight          102.71 kg       Mere Chavez [facility]  
Work Phone:   
[phone]  
   
                                                    05-   
06:                              BMI (Body Mass   
Index)              26.57 kg/m2         Verena Tuttle RN     [facility]  
Work Phone:   
[phone]  
   
                                                    05-   
06:      Body Temperature 99 [degF]       Verena Tuttle RN [facility]  
Work Phone:   
[phone]  
   
                                                    Comment on   
above:                                  Method: Oral   
   
                                                    05-   
06:      Body weight     101.64 kg       Verena Tuttle [facility]  
Work Phone:   
[phone]  
   
                                                    05-   
06:      BP Diastolic    70 mm[Hg]       Verena Tuttle [facility]  
Work Phone:   
[phone]  
   
                                                    Comment on   
above:                                  Patient Position: Sitting; Cuff Location  
: Left Arm; Cuff Size:   
Standard   
   
                                                    05-   
06:      BP Systolic     114 mm[Hg]      Verena Ttutle RN [facility]  
Work Phone:   
[phone]  
   
                                                    Comment on   
above:                                  Patient Position: Sitting; Cuff Location  
: Left Arm; Cuff Size:   
Standard   
   
                                                    05-   
06:                              BSA (Body Surface   
Area)               2.35 m2             Verena Tuttle RN     [facility]  
Work Phone:   
[phone]  
   
                                                    05-   
06:      Height          195.58 cm       Verena Tuttle [facility]  
Work Phone:   
[phone]  
   
                                                    05-   
06:      Pulse (Heart Rate) 76 /min         Verena Tuttle [facility]  
   
[facility]  
Work Phone:   
[phone]  
   
                                                    Comment on   
above:                                  Pattern: Regular   
   
                                                    05-   
06:      Respiratory Rate 16 /min         Verena Tuttle [facility]  
Work Phone:   
[phone]  
   
                                                    Comment on   
above:                                  Pattern: Unlabored   
   
                                                    05-   
06:      Weight          101.64 kg       Mere Chavez [facility]  
Work Phone:   
[phone]  
   
                                                    2012   
07:                              BMI (Body Mass   
Index)              25.78 kg/m2         Catina De Jesus RN  [facility]  
Work Phone:   
[phone]  
   
                                                    2012   
07:      Body Temperature 98.5 [degF]     Catina De Jesus RN [facility]  
[facility]   
[facility]  
Work Phone:   
[phone]  
   
                                                    Comment on   
above:                                  Method: Oral   
   
                                                    2012   
07:      Body weight     98.6 kg         Catina De Jesus RN [facility]  
   
[facility]  
Work Phone:   
[phone]  
   
                                                    2012   
07:      BP Diastolic    82 mm[Hg]       Catina De Jesus RN [facility]  
   
[facility]  
Work Phone:   
[phone]  
   
                                                    Comment on   
above:                                  Patient Position: Sitting; Cuff Location  
: Left Arm; Cuff Size: Large   
   
                                                    2012   
07:      BP Systolic     124 mm[Hg]      Catina De Jesus RN [facility]  
   
[facility]  
Work Phone:   
[phone]  
   
                                                    Comment on   
above:                                  Patient Position: Sitting; Cuff Location  
: Left Arm; Cuff Size: Large   
   
                                                    2012   
07:                              BSA (Body Surface   
Area)               2.32 m2             Catina De Jesus RN  [facility]  
Work Phone:   
[phone]  
   
                                                    2012   
07:      Height          195.58 cm       Catina De Jesus [facility]  
   
[facility]  
Work Phone:   
[phone]  
   
                                                    2012   
07:      Pulse (Heart Rate) 72 /min         Catina De Jesus RN [facility]  
[facility]   
[facility]  
Work Phone:   
[phone]  
   
                                                    Comment on   
above:                                  Pattern: Regular   
   
                                                    2012   
07:      Respiratory Rate 20 /min         Catina De Jesus RN [facility]  
[facility]   
[facility]  
Work Phone:   
[phone]  
   
                                                    Comment on   
above:                                  Pattern: Unlabored   
   
                                                    2012   
07:      Weight          98.6 kg         Mere Chavez [facility]  
Work Phone:   
[phone]  
   
                                                    2011   
07:                              BMI (Body Mass   
Index)              26 kg/m2            Catina De Jesus RN  [facility]  
Work Phone:   
[phone]  
   
                                                    2011   
07:      Body weight     99.45 kg        Catina De Jesus [facility]  
   
[facility]  
Work Phone:   
[phone]  
   
                                                    2011   
07:      BP Diastolic    80 mm[Hg]       Catina De Jesus [facility]  
   
[facility]  
Work Phone:   
[phone]  
   
                                                    Comment on   
above:                                  Patient Position: Sitting; Cuff Location  
: Left Arm; Cuff Size: Large   
   
                                                    2011   
07:      BP Systolic     120 mm[Hg]      Catina De Jesus RN [facility]  
   
[facility]  
Work Phone:   
[phone]  
   
                                                    Comment on   
above:                                  Patient Position: Sitting; Cuff Location  
: Left Arm; Cuff Size: Large   
   
                                                    2011   
07:                              BSA (Body Surface   
Area)               2.33 m2             Catina De Jesus RN  [facility]  
Work Phone:   
[phone]  
   
                                                    2011   
07:      Height          195.58 cm       Catina De Jesus [facility]  
   
[facility]  
Work Phone:   
[phone]  
   
                                                    2011   
07:      Pulse (Heart Rate) 68 /min         Catina De Jesus RN [facility]  
[facility]   
[facility]  
Work Phone:   
[phone]  
   
                                                    Comment on   
above:                                  Pattern: Regular   
   
                                                    2011   
07:      Respiratory Rate 20 /min         Catina De Jesus RN [facility]  
[facility]   
[facility]  
Work Phone:   
[phone]  
   
                                                    Comment on   
above:                                  Pattern: Unlabored   
   
                                                    2011   
07:      Weight          99.45 kg        Mere Chavez [facility]  
Work Phone:   
[phone]  
   
                                                    2011   
09:                              BMI (Body Mass   
Index)              29.56 kg/m2         Catina De Jesus RN  [facility]  
Work Phone:   
[phone]  
   
                                                    2011   
09:      Body weight     113.06 kg       Catina De Jesus RN [facility]  
   
[facility]  
Work Phone:   
[phone]  
   
                                                    2011   
09:      BP Diastolic    82 mm[Hg]       Catina De Jesus RN [facility]  
Work Phone:   
[phone]  
   
                                                    Comment on   
above:                                  Patient Position: Sitting; Cuff Location  
: Left Arm; Cuff Size: Large   
   
                                                    2011   
09:      BP Systolic     128 mm[Hg]      Catina De Jesus RN [facility]  
Work Phone:   
[phone]  
   
                                                    Comment on   
above:                                  Patient Position: Sitting; Cuff Location  
: Left Arm; Cuff Size: Large   
   
                                                    2011   
09:                              BSA (Body Surface   
Area)               2.46 m2             Catina De Jesus RN  [facility]  
Work Phone:   
[phone]  
   
                                                    2011   
09:      Height          195.58 cm       Catina De Jesus [facility]  
Work Phone:   
[phone]  
   
                                                    2011   
09:      Pulse (Heart Rate) 60 /min         Catina De Jesus RN [facility]  
[facility]   
[facility]  
Work Phone:   
[phone]  
   
                                                    Comment on   
above:                                  Pattern: Regular   
   
                                                    2011   
09:      Respiratory Rate 20 /min         Catina De Jesus RN [facility]  
[facility]   
[facility]  
Work Phone:   
[phone]  
   
                                                    Comment on   
above:                                  Pattern: Unlabored   
   
                                                    2011   
09:      Weight          113.06 kg       Mere Chavez [facility]  
Work Phone:   
[phone]  
   
                                                    2011   
08:                              BMI (Body Mass   
Index)              29.56 kg/m2         Catina De Jesus RN  [facility]  
Work Phone:   
[phone]  
   
                                                    2011   
08:      Body weight     113.06 kg       Catina De Jesus RN [facility]  
Work Phone:   
[phone]  
   
                                                    2011   
08:      BP Diastolic    90 mm[Hg]       Catina De Jesus RN [facility]  
   
[facility]  
Work Phone:   
[phone]  
   
                                                    Comment on   
above:                                  Patient Position: Sitting; Cuff Location  
: Left Arm; Cuff Size: Large   
   
                                                    2011   
08:      BP Systolic     138 mm[Hg]      Catina De Jesus RN [facility]  
Work Phone:   
[phone]  
   
                                                    Comment on   
above:                                  Patient Position: Sitting; Cuff Location  
: Left Arm; Cuff Size: Large   
   
                                                    2011   
08:                              BSA (Body Surface   
Area)               2.46 m2             Catina De Jesus RN  [facility]  
Work Phone:   
[phone]  
   
                                                    2011   
08:      Height          195.58 cm       Catina De Jesus [facility]  
   
[facility]  
Work Phone:   
[phone]  
   
                                                    2011   
08:      Pulse (Heart Rate) 68 /min         Catina De Jesus RN [facility]   
[facility]  
Work Phone:   
[phone]  
   
                                                    Comment on   
above:                                  Pattern: Regular   
   
                                                    2011   
08:      Respiratory Rate 20 /min         Catina De Jesus RN [facility]  
[facility]   
[facility]  
Work Phone:   
[phone]  
   
                                                    Comment on   
above:                                  Pattern: Unlabored   
   
                                                    2011   
08:      Weight          113.06 kg       Mere Chavez [facility]  
Work Phone:   
[phone]  
  
  
  
Encounters  
  
  
                          Encounter Date Encounter Type Care Provider Facility  
   
                                                    Start: 2023  
End: 2023                         Office outpatient   
visit 15 minutes                        Mere Cory DO  
Work Phone:   
[phone]                          [facility]  
   
                                                    Start: 2023  
End: 2023                         Office outpatient   
visit 25 minutes                        Mere Cory DO  
Work Phone:   
[phone]                          [facility]  
   
                                                    Start: 2023  
End: 2023                         Office outpatient   
visit 25 minutes                        Mere Cory DO  
Work Phone:   
[phone]                          [facility]  
   
                                Start: 2023                 Mere Griffitho  
n DO  
Work Phone:   
[phone]                          [facility]  
   
                                                    Start: 2022  
End: 2022                                     Mere Cory DO  
Work Phone:   
[phone]                          [facility]  
   
                                                    Start: 10-  
End: 2022                                     Mere Cory DO  
Work Phone:   
[phone]                          [facility]  
   
                                Start: 10-                 Mere Fearo  
n DO  
Work Phone:   
[phone]                          [facility]  
   
                                                    Start: 2022  
End: 2022                         Office outpatient   
visit 40 minutes                        Mere Cory DO  
Work Phone:   
[phone]                          [facility]  
   
                                                    Start: 2022  
End: 2022                                     Mere Cory DO  
Work Phone:   
[phone]                          [facility]  
   
                                                    Start: 2022  
End: 2022                         Office outpatient   
visit 40 minutes                        Mere Cory DO  
Work Phone:   
[phone]                          [facility]  
   
                                                    Start: 2021  
End: 2021           Annotation/Addendum       Mere Cory DO  
Work Phone:   
[phone]                          [facility]  
   
                                                    Start: 2021  
End: 2021                                     Mere Cory DO  
Work Phone:   
[phone]                          [facility]  
   
                                                    Start: 2021  
End: 2021                         Office outpatient   
visit 25 minutes                        Mere Cory DO  
Work Phone:   
[phone]                          [facility]  
   
                                                    Start: 2021  
End: 2021                         Office outpatient   
visit 25 minutes                        Mere Cory DO  
Work Phone:   
[phone]                          [facility]  
   
                                                    Start: 2021  
End: 2021           Phone Encounter           Mere Cory DO  
Work Phone:   
[phone]                          [facility]  
   
                                                    Start: 2021  
End: 2021                                     Mere Cory DO  
Work Phone:   
[phone]                          [facility]  
   
                                                    Start: 2021  
End: 2021     Phone Encounter     Mere Cory     [facility]  
   
                                                    Start: 2021  
End: 2021                                     Mere Cory DO  
Work Phone:   
[phone]                          [facility]  
   
                          Start: 2021 Review       [facility]  
   
                                                    Start: 2021  
End: 2021                         Office outpatient   
visit 15 minutes          Mere Chavez           [facility]  
   
                                                    Start: 02-  
End: 02-     Lab Order           [facility]  
[facility]   
[facility]  
   
                                                    Start: 02-  
End: 02-                                     Mereleonardo Chavez DO  
Work Phone:   
[phone]                          [facility]  
   
                                                    Start: 2021  
End: 2021                         Office outpatient   
visit 15 minutes          Mere Chavez           [facility]  
   
                                                    Start: 2021  
End: 2021                         Office outpatient   
visit 15 minutes          Mere Chavez           [facility]  
   
                                                    Start: 2020  
End: 2020                         Office outpatient   
visit 10 minutes          Mere Chavez           [facility]   
[facility]  
   
                                                    Start: 2020  
End: 2020                         Office outpatient   
visit 25 minutes          Mere Chavez           [facility]  
   
                          Start: 2020 Review       Mere Chavez [facility]  
[facility]  
   
                                                    Start: 2020  
End: 2020                         Office outpatient   
visit 5 minutes           Mere Chavez           [facility]  
   
                                                    Start: 2020  
End: 2020                         Office outpatient   
visit 15 minutes          Mere hCavez           [facility]   
[facility]  
   
                          Start: 2020 Review       Mere Chavez [facility]  
[facility]  
   
                                                    Start: 2020  
End: 2020                         Office outpatient   
visit 25 minutes          Mere Chavez           [facility]   
[facility]  
   
                                                    Start: 2020  
End: 2020                         Office outpatient   
visit 10 minutes          Mere Chavez           [facility]   
[facility]  
   
                                                    Start: 2020  
End: 2020                         Office outpatient   
visit 25 minutes          Mere Chavez           [facility]  
   
                                                    Start: 2019  
End: 2019                         Office outpatient   
visit 25 minutes          Mere Chavez           [facility]  
   
                          Start: 2019 Review       [facility] Chavez [facility]  
   
                                                    Start: 05-  
End: 05-                         Office outpatient   
visit 15 minutes          Mere Chavez           [facility]  
   
                                        Start: 2019   Patient encounter   
procedure                 [facility]  
   
                                                    Start: 2018  
End: 2018                         Office outpatient   
visit 25 minutes          Mere Chavez           [facility]   
[facility]  
   
                                                    Start: 2018  
End: 2018                         Office outpatient   
visit 25 minutes          Mere Chavez           [facility]   
[facility]  
   
                                                    Start: 2018  
End: 2018                         Office outpatient   
visit 5 minutes           Mere Chavez           [facility]   
[facility]  
   
                                                    Start: 2018  
End: 2018                         Office outpatient   
visit 15 minutes          Mere Chavez           [facility]   
Medicine  
   
                                                    Start: 2017  
End: 2017                         Office outpatient   
visit 15 minutes          Mere Chavez           [facility]   
Medicine  
   
                                                    Start: 2017  
End: 2017                         Office outpatient   
visit 25 minutes          Mere Chavez           [facility]   
[facility]icine  
   
                                                    Start: 2017  
End: 2017                         Office outpatient   
visit 25 minutes          Mere Chavez           [facility]   
Medicine  
   
                                                    Start: 10-  
End: 10-                         Office outpatient   
visit 15 minutes          Mere Chavez           [facility]   
Medicine  
   
                                                    Start: 2016  
End: 2016                         Office outpatient   
visit 25 minutes          Mere Chavez           [facility]   
Medicine  
   
                                                    Start: 2016  
End: 2016                         Office outpatient   
visit 15 minutes          Mere Chavez           [facility]   
Medicine  
   
                                                    Start: 2016  
End: 2016     Phone Encounter     [facility]  
al   
Medicine  
   
                                                    Start: 2016  
End: 2016                                     Mere Chavez DO  
Work Phone:   
[phone]                          [facility]  
   
                                                    Start: 2015  
End: 2015                         Office outpatient   
visit 25 minutes          Mere Chavez           [facility]   
Medicine  
   
                                                    Start: 2015  
End: 2015                         Office outpatient   
visit 25 minutes          Mere Chavez           [facility]   
Medicine  
   
                                                    Start: 2015  
End: 2015     Phone Encounter     Mere Chavez     [facility]  
al   
Medicine  
   
                                                    Start: 2015  
End: 2015                                     Mere Chavez DO  
Work Phone:   
[phone]                          [facility]  
   
                                                    Start: 2015  
End: 2015                         Office outpatient   
visit 25 minutes          Mere Chavez           [facility]   
Medicine  
   
                                                    Start: 2014  
End: 2014                         Office outpatient   
visit 25 minutes          Mere Chavez           [facility]   
Medicine  
   
                                                    Start: 2014  
End: 2014                         Patient encounter   
procedure                 Mere Chavez           [facility]  
   
                                                    Start: 2014  
End: 2014                                     Mere Chavez DO  
Work Phone:   
[phone]                          [facility]   
[facility]  
   
                                                    Start: 2014  
End: 2014                         Patient encounter   
procedure                 Mere Chavez           [facility]  
   
                                                    Start: 2014  
End: 2014                                     Mere Chavez DO  
Work Phone:   
[phone]                          [facility]   
[facility]  
   
                                                    Start: 2013  
End: 2013                         Patient encounter   
procedure                 Mere Chavez           [facility]   
Medicine  
   
                                                    Start: 2013  
End: 2013                                     Mere Chavez DO  
Work Phone:   
[phone]                          [facility]   
[facility]  
   
                                                    Start: 03-  
End: 03-                         Patient encounter   
procedure                 Mere Chavez           [facility]   
Medicine  
   
                                                    Start: 03-  
End: 03-                                     Mere Chavez DO  
Work Phone:   
[phone]                          [facility]   
[facility]  
   
                                                    Start: 2012  
End: 2012                         Patient encounter   
procedure                 Mere Chavez           [facility]   
Medicine  
   
                                                    Start: 2012  
End: 2012                                     Mere Chavez DO  
Work Phone:   
[phone]                          [facility]   
[facility]  
   
                                                    Start: 05-  
End: 05-                         Patient encounter   
procedure                 Mere Chavez           [facility]   
Medicine  
   
                                                    Start: 05-  
End: 05-                                     Mere Chavez DO  
Work Phone:   
[phone]                          [facility]   
[facility]  
   
                                                    Start: 2012  
End: 2012                         Patient encounter   
procedure                 Mere Chavez           [facility]   
Medicine  
   
                                                    Start: 2012  
End: 2012                                     Mere Chavez DO  
Work Phone:   
[phone]                          [facility]   
[facility]  
   
                                                    Start: 2011  
End: 2011                         Patient encounter   
procedure                 Mere Chavez           [facility]   
Medicine  
   
                                                    Start: 2011  
End: 2011                                     Mere Chavez DO  
Work Phone:   
[phone]                          [facility]   
[facility]  
   
                                                    Start: 2011  
End: 2011                         Patient encounter   
procedure                 Mere Chavez           [facility]   
Medicine  
   
                                                    Start: 2011  
End: 2011                                     Mere Chavez DO  
Work Phone:   
[phone]                          [facility]  
   
                                                    Start: 2011  
End: 2011                         Patient encounter   
procedure                 Mere Chavez           [facility]  
   
                                                    Start: 2011  
End: 2011                                     Mere Chavez DO  
Work Phone:   
[phone]                          [facility]   
[facility]  
  
  
  
Procedures  
  
  
                          Date         Procedure    Procedure Detail Performing   
Clinician  
   
                                                    Start: 10-  
End: 10-                                     Procedure Note: See Note;  
NOTES:  
[facility]   
[facility] [place] [address] [place] [address] [phone] OFFICE   
VISIT Date of Service:   
10/04/23 MR#: [id number]   
Acct: [id number] Name:   
BETHANIE GALAN Rep #:   
[id number] : 1965   
Provider: Dr. Sarkis Rodriguez MD Age/Sex: 58/M Location:   
[facility] [place] Status: Signed   
Assessment and Plan   
Assessment and Plan (1)   
Abnormal chest CT: Status:   
Acute Plan: Patient's   
pulmonary function test was   
not able to be repeated, but   
high-resolution CT scan does   
not show any fibrotic changes   
or bronchiectasis. Clinical   
suspicion is restriction   
could have been secondary to   
acute inflammatory response   
versus a spurious lung   
volumes on PFT. This could   
only be verified by repeating   
pulmonary function test, but   
after review the risks,   
benefits alternatives,   
patient believes that given   
that he is asymptomatic that   
this does not need to be   
completed. I agree with his   
assessment, but did stress   
that this may be necessary in   
the future if someone tries   
to diagnose him with   
restrictive lung disease. It   
is possible that patient   
could have an element of   
muscular weakness leading to   
restriction on lung volumes,   
but this would be unlikely   
given patient's spirometric   
values were within normal   
limits. Follow clinically.   
Repeat PFTs with concerns.   
Plan Details Follow Up: As   
needed HPI 3 M FU Details:   
Patient is a 58-year-old   
 male, currently in   
care of Dr. Chavez, who   
presents for evaluation   
secondary to test results.   
Since last visit, patient   
denies any ER visits,   
hospitalizations or   
prednisone burst. Patient   
overall feels subjectively   
unchanged compared to   
previous. Patient states that   
since his last visit he did   
travel out to [place] and had   
walked 10 miles  around the   
[place] . Patient described this   
course as  hilly  and states   
that he felt like he did   
appropriate. Patient did not   
have to take more breaks than   
people he was with at that   
time. Patient has not   
reported any chest pain,   
abdominal pain, nausea or   
vomiting. Patient has not had   
any lower extremity edema.   
Patient is not reporting any   
noxious exposures. Patient   
states he did not complete   
his PFT as he was told by his   
insurance that it was  too   
soon  and he was afraid that   
he would have to pay for it   
out-of-pocket. Patient states   
that he feels normal and does   
not believe he has an issue.   
Review of systems otherwise   
negative from a   
constitutional, HEENT,   
respiratory, cardiovascular,   
GI, genitourinary,   
musculoskeletal, skin,   
neurologic, psychiatric and   
hematologic system unless   
stated above. Testing   
personally reviewed with the   
patient CT chest (5/10/2023):   
No bronchiectasis or   
interstitial thickening   
noted. Patient does not have   
any significant mediastinal   
lymphadenopathy. Intake Vital   
Signs 23 12:42 10/04/23   
06:04 Height 6 ft 5 in 6 ft 5   
in Weight: 108.862 kg BMI   
28.4 /90 H Blood   
Pressure Location Rt brachial   
Position Sitting Respiration   
18 Pulse 81 Pulse Source   
Monitor Temp 36.7 C   
Temperature Source Temporal   
Artery Pulse Oximetry (%) 96   
Oxygen Delivery Method room   
air Intake Visit Reasons: 3 M   
FU  Required: No   
DME Vendor: N/A Accompanied   
by: Self Is patient in pain?:   
No Allergies Penicillins   
Allergy (Unknown, Verified   
10/04/23 07:32) unknown   
Medications amlodipine 5 mg   
tablet (Norvasc) 5 mg PO   
DAILY 23 [History   
Confirmed 10/04/23] aspirin   
325 mg tablet 325 mg PO DAILY   
23 [History Confirmed   
10/04/23] cholecalciferol   
(vitamin D3) 50 mcg (2,000   
unit) capsule 150 mcg PO   
DAILY 23 [History   
Confirmed 10/04/23]   
hydrochlorothiazide 25 mg   
tablet 25 mg PO QAM 23   
[History Confirmed 10/04/23]   
lorazepam 0.5 mg tablet 0.5   
mg PO DAILY PRN 23   
[History Confirmed 10/04/23]   
losartan 100 mg tablet 100 mg   
PO DAILY 23 [History   
Confirmed 10/04/23]   
lovastatin 20 mg tablet 20 mg   
PO DAILY 23 [History   
Confirmed 10/04/23] metformin   
500 mg tablet,extended   
release 24 hr 500 mg PO BID   
23 [History Confirmed   
10/04/23] niacin 500 mg   
tablet 500 mg PO DAILY   
23 [History Confirmed   
10/04/23] phytonadione   
(vitamin K1) 100 mcg tablet   
100 mcg PO DAILY 23   
[History Confirmed 10/04/23]   
tramadol 50 mg tablet 50 mg   
PO Q8H PRN 23 [History   
Confirmed 10/04/23] ascorbate   
calcium (vitamin C) 500 mg   
tablet 500 mg PO BID 10/04/23   
[History Confirmed 10/04/23]   
PFSH Medical History   
(Reviewed 10/04/23 @ 07:33 by   
Megha Kitchen) Abnormal chest   
CT Abnormal glucose tolerance   
test DDD (degenerative disc   
disease) Elevated   
norepinephrine level Grieving   
Hypercalcemia   
Hypercholesteremia   
Hypertension Low back pain   
Osteoarthritis Polycythemia   
Smoker Stress reaction   
Vitamin D deficiency Family   
History (Reviewed 10/04/23 @   
07:33 by Megha Kitchen) Father   
Diabetes Brother Cancer   
Social History (Reviewed   
10/04/23 @ 07:33 by Megha Kitchen) Smoking Status:   
Current every day smoker Exam   
Const Constitutional:   
Positive conversant,   
cooperative, in no acute   
respiratory distress, healthy   
appearing, well developed,   
well nourished and good   
hygiene Head Head: Yes   
normocephalic, Yes atraumatic   
and No cyanosis of   
lips/distal nose Eyes Eye:   
Positive clear conjunctiva;   
Negative nystagmus, scleral   
abnormality or cataract   
present Ears Ear: Positive   
hearing normal and external   
ears normal; Negative hard of   
hearing Nose Nose: Yes   
external nose normal, No   
nasal polyp and Yes septum   
normal Mouth Mouth: Positive   
oral mucosae normal, no   
lesions and good dentition;   
Negative oral thrush present   
or post nasal drip Mallampati   
Score: III: Mallampati Score   
Slight retrognathia Neck   
Neck: Positive normal visual   
inspection, full ROM and   
trachea midline; Negative   
lymphadenopathy or JVD Chest   
Wall Chest: Positive normal   
inspection of the chest and   
symmetric chest movement;   
Negative crepitus or   
tenderness Resp lung sounds:   
Positive clear to   
auscultation, good air   
exchange and normal   
expiratory time; Negative   
wheezes, wheeze present on   
forced exhalation, rhonchi,   
rales, dullness or use of   
accessory muscles Cardio   
Cardiac: Positive regular   
rate, regular rhythm, S1   
normal and S2 normal;   
Negative murmur, rub or   
gallop GI GI: Positive normal   
to inspection and normal   
bowel sounds; Negative   
distended, ascites or   
epigastric tenderness    
Genitourinary: Positive   
deferred Musc   
Musculoskeletal: Positive   
steady gait; Negative using   
an assistive device for   
ambulation, kyphosis or   
scoliosis Skin Pulmonary Skin   
Exam: Positive intact;   
Negative lesion, rash, ulcers   
or erythema Pulses Pulse: Yes   
radial pulses present   
Extremities Extremities: Yes   
capillary refill normal, No   
clubbing, No cyanosis and No   
edema Neuro Neurologic: Yes   
no focal neuro deficits, Yes   
conversant, Yes cooperative,   
Yes normal cognition, Yes   
normal coordination, Yes   
normal concentration and Yes   
understands questions Lymph   
Lymphatic: No lymphadenopathy   
Psych Appearance: Positive   
grossly normal Mental Status:   
Positive mental status   
grossly normal Mood: Positive   
congruent mood Affect:   
Positive normal affect Coding   
Level of Care Code Off   
vis,est,level 3 Diagnoses   
Abnormal chest CT R93.89   
10/04/23 0808 <Electronically   
signed by Sarkis Rodriguez MD>   
Date ___________   
_____________________________  
______ Sarkis Rodriguez MD   
Cosigner Signature: Date   
___________   
_____________________________  
______ (if applicable) CC:   
DO Mere Christian DO  
Work Phone:   
[phone]  
   
                                                    Start: 05-  
End: 05-                                     Procedure Note: See Note;  
NOTES:  
[facility]   
Imaging Services [address] [place] [address] Chest   
without Contrast MR#:   
[id number] Acct: [id number]   
Name: BETHANIE GALAN   
Rep #: [id number] :   
1965 M 57 From: Cedric Leonardo DO PCP: Dr. Mere Chavez DO Status: REG CLI   
Study: Chest without Contrast   
Date of Exam: 05/10/23 Exam#   
[id number] Ordering Dr:   
Sarkis Rodriguez MD INDICATION:   
Concern for ILD -- Please   
include HRCT images   
EXAMINATION: CT CHEST WITHOUT   
CONTRAST - CT Chest W/O   
Contrast Injection TECHNIQUE:   
Helically acquired images   
were obtained of the chest. A   
radiation dose optimization   
technique was used for this   
scan. IV Contrast dosage and   
agent: None. RADIATION DOSAGE   
(If Supplied By Facility):   
CTDIvol = ( 19.25 ) mGy, DLP   
= ( 871.14 ) mGycm   
COMPARISON: FINDINGS: LUNGS,   
PLEURA AND LARGE AIRWAYS: No   
masses, consolidation, or   
edema. No pleural effusion or   
thickening. No pneumothorax.   
THYROID: No thyroid lesions.   
HEART AND PERICARDIUM: Heart   
size is normal. No   
pericardial effusion.   
CORONARY ARTERIES: Coronary   
artery calcifications are   
noted VESSELS: Thoracic aorta   
is not dilated. MEDIASTINUM   
AND ADILIA: No mediastinal or   
hilar adenopathy. Esophagus   
is unremarkable. No hiatal   
hernia. UPPER ABDOMEN: No   
acute pathology. BONES: No   
suspicious lytic or blastic   
abnormality. ORDER #:   
[id number] CT/Chest without   
Contrast IMPRESSION: Negative   
CT chest without contrast.   
Electronically Signed: Cedric Leonardo DO 2023/05/10 at 18:06   
EDT Reading Location ID and   
State: [facility] [place] Tel   
[phone], Service support   
[phone], Fax   
[phone] CC: Dr. Sarkis Rodriguez MD; Dr. Mere Chavez DO :   
Signed                                  Sarkis Rodriguez  
Work Phone:   
[phone]  
   
                                                    Start: 2023  
End: 2023                                     Procedure Note: See Note;  
NOTES:  
[facility]   
[facility] of [address] [place] [address] [phone] OFFICE   
VISIT Date of Service:   
23 MR#: [id number]   
Acct: [id number] Name:   
BETHANIE GALAN Rep #:   
[id number] : 1965   
Provider: Dr. Sarkis Rodriguez MD Age/Sex: 57/M Location:   
[facility] [place] Status: Signed   
Assessment and Plan   
Assessment and Plan (1)   
Abnormal chest CT: Status:   
Acute Plan: Unclear etiology   
at this time. Differential   
diagnosis would include CHF,   
connective tissue associated   
interstitial lung disease,   
postviral findings and   
pulmonary fibrosis. Patient   
states that he had COVID   
approximately 3 weeks prior   
to testing. Patient is not   
showing signs of obstructive   
lung disease on pulmonary   
function testing, so no   
inhalers will be prescribed   
at this time. We will repeat   
CT scan with high-resolution   
images prior to the next   
visit along with a pulmonary   
function test. If patient   
remains restricted, may need   
to have an autoimmune   
work-up. Some concern for   
reported polycythemia, but   
recent labs are not available   
for review. Did discuss with   
the patient about possible   
obstructive sleep apnea, but   
patient was very resistant to   
any testing. Patient will   
focus on weight loss as an   
alternative approach.   
Discussed with the patient   
about symptoms and signs that   
would require more rapid   
evaluation. No new   
medications. Obtain CT with   
high-res images and PFT prior   
to next visit. Orders: Orders   
Smoking Cessation 23   
R93.89 - Abnormal findings on   
diagnostic imaging of other   
specified body structures   
Chest without Contrast 3   
Months R93.89 - Abnormal   
findings on diagnostic   
imaging of other specified   
body structures Pulmonary   
Function Test (Comp) 3 Months   
R93.89 - Abnormal findings on   
diagnostic imaging of other   
specified body structures   
Plan Details Follow Up: 3   
Months (BWA) HPI Abnormal PFT   
Details: Patient is a   
57-year-old  male,   
currently under the care of   
Dr. Chavez, who presents for   
evaluation secondary to   
abnormal PFT. Patient   
reportedly had had concerns   
about ongoing smoking and   
concern for cancer. Patient   
was given a screening CT scan   
showing chronic interstitial   
changes. Because of this,   
patient had a subsequent PFT   
showing moderate restriction.   
Given these findings, a   
pulmonary consult was   
obtained for further   
recommendations. In   
discussion with the patient,   
patient did have COVID-19   
approximately 3 weeks prior   
to initial imaging. Patient   
states he is has a long   
smoking history of 40+ pack   
years. Patient states that he   
does not have a lot of cough   
or shortness of breath.   
Patient did allude that he   
tends to have a  smoker's   
cough, but nothing scary.    
Patient states when he was in   
[place] he was able to walk   
approximately 3 miles with   
his fianc???e without any   
chest pain. Patient is not   
reporting any palpitations or   
lower extremity edema.   
Patient does not have any   
rashes or joint pains   
reported. Patient does report   
that he has had some weight   
gain over the winter. Patient   
states he is gained   
approximately 15 pounds.   
Patient does occasionally   
snore, but his fianc???e says   
there was no snoring   
approximately 25 pounds ago.   
Patient has never had a sleep   
apnea work-up. Patient feels   
tired during the day, but   
attributes this to stress as   
he is a  of a local   
company. Patient states he   
has had progressively more   
tobacco intake recently.   
Patient states that he lost   
his son during COVID and this   
has led to an increase in his   
tobacco intake. Patient   
states he does not smoke when   
he is at work, but does tend   
to smoke more on the   
weekends. Patient states this   
typically ranges between a   
half a pack and a pack per   
day. Review of systems   
otherwise negative from a   
constitutional, HEENT,   
respiratory, cardiovascular,   
GI, genitourinary,   
musculoskeletal, skin,   
neurologic, psychiatric and   
hematologic system unless   
stated above. Documentation   
reviewed prior to the office   
visit 7 pages of   
documentation were reviewed   
prior to the office visit.   
Patient reportedly has been   
diagnosed with diabetes, but   
does not check his blood   
pressure and blood sugars at   
home. Patient has a history   
of smoking, but is not   
currently on any inhalers.   
Patient reportedly does have   
polycythemia and an abnormal   
PFT Testing personally   
reviewed with the patient   
Complete PFT (2022):   
Moderate restrictive   
ventilatory defect with   
preserved spirometry and DLCO   
(FVC 83%, FEV1 89%, TLC 67%,   
DLCO 127%) Low-dose CT scan   
(2022): Chronic   
interstitial changes noted in   
the inferior lung fields with   
no superimposed pulmonary   
process, lymphadenopathy or   
nodules. Intake Vital Signs   
23 12:42 Height 6 ft 5   
in Weight: 105.687 kg BMI   
27.6 /88 H Blood   
Pressure Location Lt brachial   
Position Sitting Respiration   
18 Pulse 78 Pulse Source   
Monitor Temp 36.7 C   
Temperature Source Temporal   
Artery Pulse Oximetry (%) 95   
Intake Visit Reasons:   
Abnormal PFT Accompanied by:   
Self Is patient in pain?: No   
Allergies Penicillins Allergy   
(Unknown, Verified 23   
12:43) unknown Medications   
amlodipine 5 mg tablet   
(Norvasc) 5 mg PO DAILY   
23 [History Confirmed   
23] aspirin 325 mg   
tablet 325 mg PO DAILY   
23 [History Confirmed   
23] cholecalciferol   
(vitamin D3) 50 mcg (2,000   
unit) capsule 150 mcg PO   
DAILY 23 [History   
Confirmed 23]   
hydrochlorothiazide 25 mg   
tablet 25 mg PO QAM 23   
[History Confirmed 23]   
lorazepam 0.5 mg tablet 0.5   
mg PO DAILY PRN 23   
[History Confirmed 23]   
losartan 100 mg tablet 100 mg   
PO DAILY 23 [History   
Confirmed 23]   
lovastatin 20 mg tablet 20 mg   
PO DAILY 23 [History   
Confirmed 23] metformin   
500 mg tablet,extended   
release 24 hr 500 mg PO BID   
23 [History Confirmed   
23] niacin 500 mg   
tablet 500 mg PO DAILY   
23 [History Confirmed   
23] phytonadione   
(vitamin K1) 100 mcg tablet   
100 mcg PO DAILY 23   
[History Confirmed 23]   
tramadol 50 mg tablet 50 mg   
PO Q8H PRN 23 [History   
Confirmed 23] [facility]   
Medical History (Reviewed   
23 @ 05:32 by Dr. Sarkis Rodriguez MD) Abnormal chest CT   
Abnormal glucose tolerance   
test DDD (degenerative disc   
disease) Elevated   
norepinephrine level Grieving   
Hypercalcemia   
Hypercholesteremia   
Hypertension Low back pain   
Osteoarthritis Polycythemia   
Smoker Stress reaction   
Vitamin D deficiency Family   
History (Reviewed 23 @   
05:32 by Dr. Sarkis Rodriguez MD) Father Diabetes Brother   
Cancer Social History   
(Reviewed 23 @ 05:32 by   
Dr. Sarkis Rodriguez MD) Smoking   
Status: Current every day   
smoker Exam Const   
Constitutional: Positive   
conversant, cooperative, in   
no acute respiratory   
distress, healthy appearing,   
well developed, well   
nourished and good hygiene   
Head Head: Yes normocephalic,   
Yes atraumatic and No   
cyanosis of lips/distal nose   
Eyes Eye: Positive clear   
conjunctiva; Negative   
nystagmus, scleral   
abnormality or cataract   
present Ears Ear: Positive   
hearing normal and external   
ears normal; Negative hard of   
hearing Nose Nose: Yes   
external nose normal, No   
nasal polyp and Yes septum   
normal Mouth Mouth: Positive   
oral mucosae normal, no   
lesions and good dentition;   
Negative oral thrush present   
or post nasal drip Mallampati   
Score: III: Mallampati Score   
Slight retrognathia Neck   
Neck: Positive normal visual   
inspection, full ROM and   
trachea midline; Negative   
lymphadenopathy or JVD Chest   
Wall Chest: Positive normal   
inspection of the chest and   
symmetric chest movement;   
Negative crepitus or   
tenderness Resp lung sounds:   
Positive clear to   
auscultation, good air   
exchange and normal   
expiratory time; Negative   
wheezes, wheeze present on   
forced exhalation, rhonchi,   
rales, dullness or use of   
accessory muscles Cardio   
Cardiac: Positive regular   
rate, regular rhythm, S1   
normal and S2 normal;   
Negative murmur, rub or   
gallop GI GI: Positive normal   
to inspection and normal   
bowel sounds; Negative   
distended, ascites or   
epigastric tenderness    
Genitourinary: Positive   
deferred Musc   
Musculoskeletal: Positive   
steady gait; Negative using   
an assistive device for   
ambulation, kyphosis or   
scoliosis Skin Pulmonary Skin   
Exam: Positive intact;   
Negative lesion, rash, ulcers   
or erythema Pulses Pulse: Yes   
radial pulses present   
Extremities Extremities: Yes   
capillary refill normal, No   
clubbing, No cyanosis and No   
edema Neuro Neurologic: Yes   
no focal neuro deficits, Yes   
conversant, Yes cooperative,   
Yes normal cognition, Yes   
normal coordination, Yes   
normal concentration and Yes   
understands questions Lymph   
Lymphatic: No lymphadenopathy   
Psych Appearance: Positive   
grossly normal Mental Status:   
Positive mental status   
grossly normal Mood: Positive   
congruent mood Affect:   
Positive normal affect Office   
Procedures Smoking Cessation   
Smoking Cessation Jesus   
discussion with the patient   
for 12 minutes on the   
importance of smoking   
cessation. Patient's   
pulmonary function tests show   
little airway obstruction,   
but there is significant   
concern for other effects of   
tobacco including   
cardiovascular and malignancy   
risks. Did discuss about   
possible behavioral   
substitution technique.   
Clinical suspicion is there   
is a significant mental   
component as patient is able   
to go 8 to 10 hours at work   
without smoking. Patient did   
appear to be contemplative at   
the end of the discussion,   
but readily admits that he   
enjoys smoking. Time Spent   
greater than 10 minutes: Yes   
Coding Level of Care Code Off   
vis,new,level 4 Diagnoses   
Abnormal chest CT R93.89 CPT   
Codes Time Spent - greater   
than 10 minutes: Yes (65645)   
23 0540 <Electronically   
signed by Sarkis Rodriguez MD>   
Date ___________   
_____________________________  
______ Sarkis Rodriguez MD   
Cosigner Signature: Date   
___________   
_____________________________  
______ (if applicable) CC:   
Dr. Mere Chavez, DO Mere Chavez DO  
Work Phone:   
[phone]  
   
                                                    Start: 2022  
End: 2022                                     Procedure Note: See Note;  
NOTES:  
[facility]   
Services/Neurology [address] [place] [address] MR#:   
[id number] Acct: [id number]   
Name: BETHANIE GALAN   
Rep #: [id number] :   
1965 57 From: Sarkis Rodriguez MD Referring Dr:   
Mere Chavez DO Status:   
REG CL I Location: hospitals Date:   
22 Sex: M C COMPLETE   
PULMONARY FUNCTION TEST   
INTERPRETATION Brief HPI:   
Patient is a 57-year-old   
 male, currently   
under the care of Dr. Chavez,   
who presents to [facility] for   
complete pulmonary function   
tests secondary to diagnosis   
of abnormal CT. Respiratory   
therapist reports good effort   
and reproducible results.   
Interpretation: Forced   
expiration spirometry shows   
no large airways obstructive   
ventilatory defect with an   
FEV1 of 89% predicted. There   
is no significant   
bronchodilator response by   
strict ATS criteria.   
Spirograms are of good   
quality and plateau normally.   
The respiratory flow volume   
loop shows a normal pattern.   
Lung volumes by body   
plethysmography show a   
decreased total lung capacity   
at 5.56 L, 67% predicted. All   
other lung volumes are   
reduced symmetrically.   
Diffusion capacity by carbon   
monoxide is elevated at 127%   
predicted. The airway   
resistance is normal. No   
previous pulmonary function   
tests were available for   
review. Impression: Moderate   
restrictive ventilatory   
defect with preserved   
spirometry and DLCO 22   
1258 <Electronically signed   
by Sakris Rodriguez MD> Date   
___________   
_____________________________  
______ Sarkis Rodriguez MD CC:   
Dr. Sarkis Rodriguez MD; Dr. Mere Chavez DO Date   
Dictated: 22 1256 Date   
Transcribed: 22   
: YAIR Signed             Mere Chavez DO  
Work Phone:   
[phone]  
   
                                                    Start: 2022  
End: 2022                                     Procedure Note: See Note;  
NOTES:  
[facility]   
Imaging Services [address] [place] [address] Low   
Dose CT Lung Screening MR#:   
[id number] Acct: [id number]   
Name: BETHANIE GALAN   
Rep #: [id number] :   
1965 M 57 From: Armando Clark MD PCP: Dr. Mere Chavez DO Status: REG CLI   
Study: Low Dose CT Lung   
Screening Date of Exam:  Exam# [id number] Ordering   
Dr: Mere Chavez DO STUDY:   
LOW DOSE CT LUNG CANCER   
SCREENING REASON FOR EXAM:   
Male, 57 years old. One half   
to three-quarter pack per day   
smoker x39 years,   
hypertension RADIATION DOSAGE   
(If Supplied By Facility):   
CTDIvol = ( 3.02 ) mGy, DLP =   
( 109.85 ) mGycm TECHNIQUE:   
No contrast was administered.   
Low dose technique was   
utilized (average mAS-38 and   
kVp 120). 1.25 mm axial   
source images with a slice   
interval of 1.25-mm were   
reconstructed in lung   
windows. 2.5 mm axial source   
images with a slice interval   
of 2.5-mm were reconstructed   
in lung windows. 5.0 mm axial   
source images with a slice   
interval of 5.0-mm were   
reconstructed in soft tissue   
windows. COMPARISON:   
2021   
_____________________________  
______ FINDINGS: Lung windows   
show the lungs to be normally   
expanded. Chronic   
interstitial changes again   
noted in both lung fields   
without a superimposed   
pulmonary process, no   
organized infiltrate, or   
suspicious noncalcified mass   
or nodule. No significant   
interval change since the   
previous study. Soft tissue   
windows show normal-appearing   
thyroid gland. No suspicious   
axillary, mediastinal, or   
perihilar adenopathy. No   
pleural or pericardial   
effusions. There are   
calcified coronary vessels.   
Limited cuts through the   
upper abdomen do not show a   
suspicious abnormality. Bony   
structures show degenerative   
change ORDER #: [id number]   
CT/Low Dose CT Lung Screening   
IMPRESSION: Lung-RADS   
category 2 - Continue annual   
screening with LDCT in 12   
months. IMPORTANT NOTES FOR   
USE: ACR Lung-RADS Version   
1.1 Assessment Categories   
Release Date:  Category:   
Coded 0-4 bases on nodule(s)   
with highest degree of   
suspicion. Negative screen is   
defined as categories 1 and   
2; a positive screen is   
defined as categories 3 and   
4. Category 3 and 4A nodules   
that are unchanged on   
interval CT should be coded   
as category 2, and   
individuals returned to   
screening in 12 months.   
Category 4X: Category 3 or 4   
nodules with additional   
imaging findings that   
increase the suspicion of   
lung cancer, such as   
spiculation, GGN that doubles   
in size in 1 year, enlarged   
lymph notes, etc. Category   
Modifiers: S (significant   
finding unrelated to lung   
cancer) Electronically   
Signed: Narciso Clark MD   
 at 8:38 EDT   
Reading Location ID and   
State: 1566 / [place] Tel   
[phone], Service support   
[phone], Fax   
[phone] CC: Dr. Mere Chavez DO :   
Signed                                  Mere Chavez DO  
Work Phone:   
[phone]  
   
                                                    Start: 2021  
End: 2021                         Low Dose CT Lung   
Screening                               Comments: See Note;  
NOTES:  
[facility]   
Imaging Services [address] [place] [address] Low   
Dose CT Lung Screening MR#:   
[id number] Acct: [id number]   
Name: BETHANIE GALAN   
Rep #: [id number] :   
1965 M 56 From:   
Girish torres MD PCP:   
Dr. Mere Chavez DO   
Status: REG CLI Study: Low   
Dose CT Lung Screening Date   
of Exam:  Exam#   
[id number] Ordering Dr:   
Mere Chavez DO STUDY: LOW   
DOSE CT LUNG CANCER SCREENING   
REASON FOR EXAM: Male, 56   
years old. SMOKER. Patient   
smokes 1 pack per day for 35   
years. RADIATION DOSAGE (If   
Supplied By Facility):   
CTDIvol = ( 4.02 ) mGy, DLP =   
( 138.43 ) mGycm TECHNIQUE:   
No contrast was administered.   
Low dose technique was   
utilized (average mAS-38 and   
kVp 120). 1.25 mm axial   
source images with a slice   
interval of 1.25-mm were   
reconstructed in lung   
windows. 2.5 mm axial source   
images with a slice interval   
of 2.5-mm were reconstructed   
in lung windows. 5.0 mm axial   
source images with a slice   
interval of 5.0-mm were   
reconstructed in soft tissue   
windows. Nodule measured   
using lung windows on PACS   
and/or independent   
workstation with automated   
measurement of minimum and   
maximum diameter. Nodule   
measurement reported as   
average diameter rounded to   
the nearest whole number.   
Growth is defined as an   
increase ins size of greater   
than 1.5 mm. COMPARISON:   
None.   
_____________________________  
______ NODULES: No suspicious   
nodules are seen. Emphysema:   
Minimal degree of   
emphysematous changes.   
Endobronchial lesion: None   
Aorta: Minimal   
atherosclerotic plaques of   
the aortic arch. Coronary   
arteries: Coronary artery   
calcification. Heart:   
Unremarkable Pulmonary   
artery: Unremarkable   
Mediastinal nodes:   
Unremarkable Other chest and   
abdominal findings: ORDER #:   
[id number] CT/Low Dose CT Lung   
Screening IMPRESSION:   
Lung-RADS category 2 -   
Continue annual screening   
with LDCT in 12 months.   
IMPORTANT NOTES FOR USE: ACR   
Lung-RADS Version 1.1   
Assessment Categories Release   
Date:  Category: Coded   
0-4 bases on nodule(s) with   
highest degree of suspicion.   
Negative screen is defined as   
categories 1 and 2; a   
positive screen is defined as   
categories 3 and 4. Category   
3 and 4A nodules that are   
unchanged on interval CT   
should be coded as category   
2, and individuals returned   
to screening in 12 months.   
Category 4X: Category 3 or 4   
nodules with additional   
imaging findings that   
increase the suspicion of   
lung cancer, such as   
spiculation, GGN that doubles   
in size in 1 year, enlarged   
lymph notes, etc. Category   
Modifiers: S (significant   
finding unrelated to lung   
cancer) Electronically   
Signed: Girish Almonte MD  at 12:44 EDT   
Tel [phone], Service   
support [phone], Fax   
[phone] CC: Dr. Mere Chavez DO :   
Signed                                  Mere Chavez DO  
Work Phone:   
[phone]  
   
                                                    Start: 2021  
End: 2021                         CT Abd/Pelvis W/WO   
Contrast                                Comments: See Note; NOTES:   
[facility]   
Imaging Services [address] [place] [address] [address]   
Abd/Pelvis W/WO Contrast MR#:   
[id number] Acct: [id number]   
Name: BETHANIE GALAN   
Rep #: [id number] :   
1965 M 55 From: Huber MIRANDA PCP: Dr. Mere Chavez DO Status: REG CLI   
Study: CT Abd/Pelvis W/WO   
Contrast Date of Exam:  Exam# [id number]   
Ordering Dr: Cory,Mere   
DO STUDY: CT ABDOMEN AND   
PELVIS WITH AND WITHOUT   
CONTRAST REASON FOR EXAM:   
Male, 55 years old. ELEVATED   
NOREPINEPHRINE LEVEL   
RADIATION DOSAGE (If Supplied   
By Facility): CTDIvol = (   
21.35 ) mGy, DLP = ( 2632.40   
) mGycm TECHNIQUE: Transaxial   
images were obtained from the   
dome of the diaphragm to the   
symphysis pubis without oral   
contrast. IV 100mL Isovue-300   
was administered. Sagittal   
and coronal images were   
reconstructed. Individualized   
dose optimization techniques   
were used for this CT.   
COMPARISON: None.   
_____________________________  
______ FINDINGS: The   
visualized lung bases are   
unremarkable. The visualized   
portions of the heart are   
within normal limits. Normal   
liver. Normal gallbladder and   
extrahepatic biliary system.   
Normal spleen. Normal   
pancreas. Normal bilateral   
adrenal glands. Normal right   
kidney. Normal left kidney.   
Normal visualized stomach.   
Normal small intestine. There   
are multiple colonic   
diverticula consistent with   
diverticulosis. Postsurgical   
changes are noted in the   
sigmoid colon. There are   
surgical clips in the region   
of the appendix consistent   
with a prior appendectomy.   
Normal abdominal aorta.   
Normal inferior vena cava.   
Normal retroperitoneum.   
Normal urinary bladder.   
Normal abdominal wall. Normal   
osseous structures.   
_____________________________  
______ ORDER #: [id number]   
CT/CT Abd/Pelvis W/WO   
Contrast IMPRESSION:   
Descending colon   
diverticulosis.   
Electronically Signed: Huber Rivera MD  at   
11:20 EST Tel [phone],   
Service support   
[phone], Fax   
[phone] CC: Dr. Mere Chavez,  :   
Signed                                  Mere Chavez  
Work Phone:   
[phone]  
   
                                                    Start: 2021  
End: 2021                         Renal Artery Duplex   
Ultrasound                              Comments: See Note; NOTES:   
[facility]   
Services [address] [address] Renal   
Artery Duplex Ultrasound   
21 0809 MR#: [id number]   
Acct: [id number] Name:   
BETHANIE GALAN Rep #:   
[id number] : 1965 55   
From: Nikko Hanson MD   
Attending Dr: Dr. Mere Chavez, DO Status: R EG CLI   
Ordering Dr: Mere Chavez DO Date: 21 Location:   
[facility] Sex: M C Admitted: Reason   
For Study: HTN Right Renal   
Artery Left Renal Artery   
Right renal artery ostium   
Left renal artery ostium   
133.7/37.8 138.9/40.4   
RSV/EDV. PSV/EDV. Right renal   
artery proximal 157/56 Left   
renal artery proximal PSV/EDV   
PSV/EDV. 125.6/44.1 . Right   
renal artery mid 167.4/56   
Left renal artery mid   
134.1/44.1 PSV/EDV. PSV/EDV .   
Right renal artery distal   
Left renal artery distal   
164.1/46.3 188.1/50.8   
PSV/EDV. PSV/EDV. Right Renal   
Parenchyma Left Renal   
Parenchyma Upper Pole Medula   
26.2/7.7 PSV/EDV. Left upper   
pole medulla 30/12.4 Right   
upper pole medulla EDR 0.30 .   
PSV/EDV . Right upper pole   
medulla R.I. Left upper pole   
medulla EDR 0.41 . 0.70 .   
Left upper pole medulla R.I.   
0.59 . Upper Patric Cortx   
19.4/7.7 PSV/EDV. UP Cortex   
16.3/5.8 PSV/EDV. Right upper   
pole cortex EDR 0.40 . Left   
upper pole cortex EDR 0.36 .   
Right upper pole cortex R.I.   
0.60 . Left upper pole cortex   
R.I. 0.64 . Right lower Pole   
medulla 24.3/7.7 Left lower   
Pole medulla 30.6/10.2   
PSV/EDV . PSV/EDV . Right   
lower pole medulla EDR 0.32 .   
Left lower pole medulla EDR   
0.33 . Right lower pole   
medulla R.I. Left lower pole   
medulla R.I. 0.67 . 0.68 .   
Lower Pole Cortx 20.7/6.9   
PSV/EDV. Lower Pole Cortex   
15.1/4.7 PSV/EDV. Left lower   
pole cortex EDR 0.34 . Right   
lower pole cortex EDR 0.31 .   
Left lower pole cortex R.I.   
0.66 . Right lower pole   
cortex R.I. 0.69 . Left Renal   
Hilar Right Renal Hilar LT   
Hilar avg 61.7/18.8 PSV/EDV .   
Right Hilar avg 59.9/19.3   
PSV/EDV. Left hilar   
acceleration time 50 Right   
hilar acceleration time 50   
m/sec. m/sec. Left Renal   
Dimensions Right Renal   
Dimensions Left kidney size   
12.15 cm . Right kidney size   
11.61 cm . Left cortical   
dimension 2.12 cm . Right   
cortical dimension 1.86 cm .   
Aorta Proximal abdominal   
aorta 1.84 x 1.84 cm .   
Proximal abdominal aorta peak   
systolic velocity is 107   
cm/sec . Distal abdominal   
aorta 1.39 x 1.39 cm . Distal   
abdominal aorta peak systolic   
velocity is 108.8 cm/sec .   
Interpretation Summary Less   
than 60% stenosis bilateral   
renal arteries Normal   
proximal aortic diameter 1.84   
x 1.84 cm. Aortic velocities   
minimally elevated above 100   
cm/s flow making renal artery   
to aortic ratios not reliable   
Right renal length maintained   
at 11.61 cm Left renal length   
maintained at 12.15 cm   
_____________________________  
_____________ _ Ordering   
Physician: Mere Chavez   
Referring Physician: Mere Chavez Performed By:   
Ana Mccrary RVT   
Electronically signed by:   
Nikko Hanson MD on   
2021 04:13 PM 21   
1613 Date ___________   
_____________________________  
______ Nikko Hanson MD CC:   
Dr. Mere Chavez DO Date   
Dictated: 21 0809 Date   
Transcribed: 21   
: Signed                Mere Chavez  
Work Phone:   
[phone]  
   
                                                    Start: 2016  
End: 2016                         Ecg routine ecg   
w/least 12 lds w/i&r                    [MEASUREMENTS ANALYSIS] Date   
of Test: 2016 07:34:56;   
Heart Rate: 75; ND Interval:   
200; QRS: 178; QT Interval:   
444; Corrected QT Interval   
(QTc): 470; P Wave Axis: 50;   
QRS Wave Axis: -15; T Wave   
Axis: 110; Blood Pressure:   
120/78 [ECG DIAGNOSTIC   
STATEMENTS] Date of Test:   
2016 07:34:56; Summary:   
Sinus Rhythm -Left bundle   
branch block. ABNORMAL                  Mere Chavez  
Work Phone:   
[phone]  
   
                                        Comment on above:   LBBB --nsr not new   
  
  
  
Plan of Treatment  
  
  
                          Date         Care Activity Detail       Author  
   
                                                    Start:   
2023          Procedure Education                     [facility];   
[facility]  
Work Phone:   
[phone]  
   
                                                    Start:   
2023                              Provider Instructions for   
Treatment                                           [facility];   
[facility]  
Work Phone:   
[phone]  
   
                                                    Start:   
2023                              Assay of thyroid   
stimulating hormone tsh                             [facility];   
[facility]  
Work Phone:   
[phone]  
   
                                                    Start:   
2023                              25 hydroxy includes   
fractions if performed                              [facility];   
[facility]  
Work Phone:   
[phone]  
   
                                                    Start:   
2023                              Urine albumin   
quantitative                                        [facility];   
[facility]  
Work Phone:   
[phone]  
   
                                                    Start:   
2023                              Comprehensive metabolic   
panel                                               [facility];   
[facility]  
Work Phone:   
[phone]  
   
                                                    Start:   
2023          Lipid panel                             [facility];   
[facility]  
Work Phone:   
[phone]  
   
                                                    Start:   
2023                              Blood count complete   
auto&auto difrntl wbc                               [facility];   
[facility]  
Work Phone:   
[phone]  
   
                                                    Start:   
2023                              Hemoglobin glycosylated   
a1c                                                 [facility];   
[facility]  
Work Phone:   
[phone]  
   
                                                    Start:   
2023          Procedure Education                     [facility];   
[facility]  
Work Phone:   
[phone]  
   
                                                    Start:   
2023                              Provider Instructions for   
Treatment                                           [facility];   
[facility]  
Work Phone:   
[phone]  
   
                                                    Start:   
2023          Procedure Education                     [facility];   
[facility]  
Work Phone:   
[phone]  
   
                                                    Start:   
2023                              Provider Instructions for   
Treatment                                           [facility];   
[facility]  
Work Phone:   
[phone]  
   
                                                    Start:   
2023                              Assay of prostate   
specific antigen total                              [facility];   
[facility]  
Work Phone:   
[phone]  
   
                                                    Start:   
2023                              Assay of thyroid   
stimulating hormone tsh                             [facility];   
[facility]  
Work Phone:   
[phone]  
   
                                                    Start:   
2023                              25 hydroxy includes   
fractions if performed                              [facility];   
[facility]  
Work Phone:   
[phone]  
   
                                                    Start:   
2023                              Urine albumin   
quantitative                                        [facility];   
[facility]  
Work Phone:   
[phone]  
   
                                                    Start:   
2023                              Comprehensive metabolic   
panel                                               [facility];   
[facility]  
Work Phone:   
[phone]  
   
                                                    Start:   
2023          Lipid panel                             [facility];   
[facility]  
Work Phone:   
[phone]  
   
                                                    Start:   
2023                              Blood count complete   
auto&auto difrntl wbc                               [facility];   
[facility]  
Work Phone:   
[phone]  
   
                                                    Start:   
2023                              Hemoglobin glycosylated   
a1c                                                 [facility];   
[facility]  
Work Phone:   
[phone]  
   
                                                    Start:   
2022          Procedure Education                     [facility];   
[facility]  
Work Phone:   
[phone]  
   
                                                    Start:   
2022                              Provider Instructions for   
Treatment                                           [facility];   
[facility]  
Work Phone:   
[phone]  
   
                                                    Start:   
2022          Procedure Education                     [facility];   
[facility]  
Work Phone:   
[phone]  
   
                                                    Start:   
2022                              Provider Instructions for   
Treatment                                           [facility];   
[facility]  
Work Phone:   
[phone]  
   
                                                    Start:   
2021          Procedure Education                     [facility];   
[facility]  
Work Phone:   
[phone]  
   
                                                    Start:   
2021                              Provider Instructions for   
Treatment                                           [facility];   
[facility]  
Work Phone:   
[phone]  
   
                                                    Start:   
2021          Procedure Education                     [facility];   
[facility]  
Work Phone:   
[phone]  
   
                                                    Start:   
2021                              Provider Instructions for   
Treatment                                           [facility];   
[facility]  
Work Phone:   
[phone]  
   
                                                    Start:   
2021                              Hemoglobin glycosylated   
a1c                                                 [facility];   
[facility]  
Work Phone:   
[phone]  
   
                                                    Start:   
2021          Procedure Education                     [facility];   
[facility]  
Work Phone:   
[phone]  
   
                                                    Start:   
2021                              Provider Instructions for   
Treatment                                           [facility];   
[facility]  
Work Phone:   
[phone]  
   
                                                    Start:   
2021          Procedure Education                     [facility];   
[facility]  
Work Phone:   
[phone]  
   
                                                    Start:   
2021                              Provider Instructions for   
Treatment                                           [facility];   
[facility]  
Work Phone:   
[phone]  
   
                                                    Start:   
2021          Metanephrines                           [facility];   
[facility]  
Work Phone:   
[phone]  
   
                                                    Start:   
2021                              Catecholamines total   
urine                                               [facility];   
[facility]  
Work Phone:   
[phone]  
   
                                                    Start:   
2021                              Assay of vanillylmandelic   
acid urine                                          [facility];   
[facility]  
Work Phone:   
[phone]  
   
                                                    Start:   
2021          Procedure Education                     [facility];   
[facility]  
Work Phone:   
[phone]  
   
                                                    Start:   
2021                              Provider Instructions for   
Treatment                                           [facility];   
[facility]  
Work Phone:   
[phone]  
   
                                                    Start:   
2020          Procedure Education                     [facility];   
[facility]  
Work Phone:   
[phone]  
   
                                                    Start:   
2020                              Provider Instructions for   
Treatment                                           [facility];   
[facility]  
Work Phone:   
[phone]  
   
                                                    Start:   
2020          Procedure Education                     [facility]  
Work Phone:   
[phone]  
   
                                                    Start:   
2020                              Provider Instructions for   
Treatment                                           [facility]  
Work Phone:   
[phone]  
   
                                                    Start:   
2020                              HbA1c (Bld) [Mass   
fraction]                 HGB A1C (65422)           [facility]  
Work Phone:   
[phone]  
   
                                                    Start:   
2020                              25 hydroxy includes   
fractions if performed    CALCIFEDIOL (73474)       [facility]  
Work Phone:   
[phone]  
   
                                                    Start:   
2020          TSH Qn              TSH (70488)         [facility]  
Work Phone:   
[phone]  
   
                                                    Start:   
2020                              Urnls dip stick/tablet   
reagent auto microscopy                 URINALYSIS, W/ MICRO   
(67850)                                 [facility]  
Work Phone:   
[phone]  
   
                                                    Start:   
2020                              Urine albumin   
quantitative                            MICROALBUMIN:   
CREATININE RATIO   
(56396) AND (59049)                     [facility]  
Work Phone:   
[phone]  
   
                                                    Start:   
2020                              Comprehensive metabolic   
panel                                   METABOLIC PANEL,   
COMPREHENSIVE (71864)                   [facility]  
Work Phone:   
[phone]  
   
                                                    Start:   
2020                              Blood count complete   
auto&auto difrntl wbc                   CBC W/AUTO DIFF WBC   
(96856)                                 [facility]  
Work Phone:   
[phone]  
   
                                                    Start:   
2020          Lipid panel         LIPID PANEL (52191) [facility]  
Work Phone:   
[phone]  
   
                                                    Start:   
2020                              HbA1c (Bld) [Mass   
fraction]                 HGB A1C (16038)           [facility]  
Work Phone:   
[phone]  
   
                                                    Start:   
2020                              Hemoglobin glycosylated   
a1c                                                 [facility];   
[facility]  
Work Phone:   
[phone]  
   
                                                    Start:   
2020          Procedure Education                     [facility]  
Work Phone:   
[phone]  
   
                                                    Start:   
2020                              Provider Instructions for   
Treatment                                           [facility]  
Work Phone:   
[phone]  
   
                                                    Start:   
2020          Procedure Education                     [facility]  
Work Phone:   
[phone]  
   
                                                    Start:   
2020                              Provider Instructions for   
Treatment                                           [facility]  
Work Phone:   
[phone]  
   
                                                    Start:   
2020                              HbA1c (Bld) [Mass   
fraction]                 HGB A1C (89089)           [facility]  
Work Phone:   
[phone]  
   
                                                    Start:   
05-                              HbA1c (Bld) [Mass   
fraction]                 HGB A1C (58229)           [facility]  
Work Phone:   
[phone]  
   
                                                    Start:   
05-                              Hemoglobin glycosylated   
a1c                                                 [facility];   
[facility]  
Work Phone:   
[phone]  
   
                                                    Start:   
2020          Procedure Education                     [facility]  
Work Phone:   
[phone]  
   
                                                    Start:   
2020          Procedure Education                     [facility]  
Work Phone:   
[phone]  
   
                                                    Start:   
2020                              Provider Instructions for   
Treatment                                           [facility]  
Work Phone:   
[phone]  
   
                                                    Start:   
2020                              25 hydroxy includes   
fractions if performed                              [facility]  
Work Phone:   
[phone]  
   
                                                    Start:   
2020                              Assay of thyroid   
stimulating hormone tsh                             [facility];   
[facility]  
Work Phone:   
[phone]  
   
                                                    Start:   
2020          TSH Qn              TSH (42358)         [facility]  
Work Phone:   
[phone]  
   
                                                    Start:   
2020                              Urnls dip stick/tablet   
reagent auto microscopy                             [facility]  
Work Phone:   
[phone]  
   
                                                    Start:   
2020                              Urine albumin   
quantitative                                        [facility]  
Work Phone:   
[phone]  
   
                                                    Start:   
2020                              Comprehensive metabolic   
panel                                               [facility]  
Work Phone:   
[phone]  
   
                                                    Start:   
2020          Lipid panel                             [facility]  
Work Phone:   
[phone]  
   
                                                    Start:   
2020                              Blood count complete   
auto&auto difrntl wbc                               [facility]  
Work Phone:   
[phone]  
   
                                                    Start:   
2020                              HbA1c (Bld) [Mass   
fraction]                 HGB A1C (47718)           [facility]  
Work Phone:   
[phone]  
   
                                                    Start:   
2020                              Hemoglobin glycosylated   
a1c                                                 [facility];   
[facility]  
Work Phone:   
[phone]  
   
                                                    Start:   
2019          Procedure Education                     [facility]  
Work Phone:   
[phone]  
   
                                                    Start:   
2019                              Provider Instructions for   
Treatment                                           [facility]  
Work Phone:   
[phone]  
   
                                                    Start:   
2019                              Lipoprotein blood alexia   
numbers & subclasses      NMR Profile (47858)       [facility]  
Work Phone:   
[phone]  
   
                                                    Start:   
2019                              HbA1c (Bld) [Mass   
fraction]                 HGB A1C (43282)           [facility]  
Work Phone:   
[phone]  
   
                                                    Start:   
2019                              Hemoglobin glycosylated   
a1c                                                 [facility];   
[facility]  
Work Phone:   
[phone]  
   
                                                    Start:   
05-                              Provider Instructions for   
Treatment                                           [facility]  
Work Phone:   
[phone]  
   
                                                    Start:   
05-                              Assay of prostate   
specific antigen total                  PSA (PROSTATE SPECIFIC   
ANTIGEN) (V76.44)                       [facility]  
Work Phone:   
[phone]  
   
                                                    Start:   
05-                TSH Qn                    TSH (THYROID   
STIMULATING HORMONE)   
(85964)                                 [facility]  
Work Phone:   
[phone]  
   
                                                    Start:   
05-                              Urine albumin   
quantitative                            MICROALBUMIN:   
CREATININE RATIO   
(78051) AND (68707)                     [facility]  
Work Phone:   
[phone]  
   
                                                    Start:   
05-                              Comprehensive metabolic   
panel                                   METABOLIC PANEL,   
COMPREHENSIVE (53817)                   [facility]  
Work Phone:   
[phone]  
   
                                                    Start:   
05-                              Lipoprotein blood alexia   
numbers & subclasses      NMR Profile (10116)       [facility]  
Work Phone:   
[phone]  
   
                                                    Start:   
05-                              Blood count complete   
auto&auto difrntl wbc                   CBC with auto diff   
(55537)                                 [facility]  
Work Phone:   
[phone]  
   
                                                    Start:   
05-                              HbA1c (Bld) [Mass   
fraction]                 HGB A1C (06854)           [facility]  
Work Phone:   
[phone]  
   
                                                    Start:   
05-                              25 hydroxy includes   
fractions if performed    CALCIFEDIOL (29647)       [facility]  
Work Phone:   
[phone]  
   
                                                    Start:   
2018          Procedure Education                     [facility]  
Work Phone:   
[phone]  
   
                                                    Start:   
2018                              Provider Instructions for   
Treatment                                           [facility]  
Work Phone:   
[phone]  
   
                                                    Start:   
2018          Calcium mass conc   CALCIUM SERUM (05370) [facility]  
Work Phone:   
[phone]  
   
                                                    Start:   
2018                              Hemoglobin   
A1c/Hemoglobin.total mass   
fraction (Bld)            HGB A1C (68850)           [facility]  
Work Phone:   
[phone]  
   
                                                    Start:   
2018                              Provider Instructions for   
Treatment                                           [facility]  
Work Phone:   
[phone]  
   
                                                    Start:   
2018          Procedure Education                     [facility]  
Work Phone:   
[phone]  
   
                                                    Start:   
2018                              Provider Instructions for   
Treatment                                           [facility]  
Work Phone:   
[phone]  
   
                                                    Start:   
2017                              Provider Instructions for   
Treatment                                           [facility]  
Work Phone:   
[phone]  
   
                                                    Start:   
2017                              Assay of prostate   
specific antigen total                              [facility]  
Work Phone:   
[phone]  
   
                                                    Start:   
2017                Protein mass conc         PSA (PROSTATE SPECIFIC   
ANTIGEN) (V76.44)                       [facility]  
Work Phone:   
[phone]  
   
                                                    Start:   
2017                              25 hydroxy includes   
fractions if performed                              [facility]  
Work Phone:   
[phone]  
   
                                                    Start:   
2017                              Assay of thyroid   
stimulating hormone tsh                             [facility];   
[facility]  
Work Phone:   
[phone]  
   
                                                    Start:   
2017          Thyrotropin Qn      TSH (51607)         [facility]  
Work Phone:   
[phone]  
   
                                                    Start:   
2017                              Urnls dip stick/tablet   
reagent auto microscopy                             [facility]  
Work Phone:   
[phone]  
   
                                                    Start:   
2017                              Urine albumin   
quantitative                                        [facility]  
Work Phone:   
[phone]  
   
                                                    Start:   
2017                              Comprehensive metabolic   
panel                                               [facility]  
Work Phone:   
[phone]  
   
                                                    Start:   
2017                              Blood count complete   
auto&auto difrntl wbc                               [facility]  
Work Phone:   
[phone]  
   
                                                    Start:   
2017          Lipid panel                             [facility]  
Work Phone:   
[phone]  
   
                                                    Start:   
2017                              Hemoglobin   
A1c/Hemoglobin.total mass   
fraction (Bld)            HGB A1C (39300)           [facility]  
Work Phone:   
[phone]  
   
                                                    Start:   
2017                              Hemoglobin glycosylated   
a1c                                                 [facility];   
[facility]  
Work Phone:   
[phone]  
   
                                                    Start:   
2017          Procedure Education                     [facility]  
Work Phone:   
[phone]  
   
                                                    Start:   
2017                              Provider Instructions for   
Treatment                                           [facility]  
Work Phone:   
[phone]  
   
                                                    Start:   
2017          Lipid panel                             [facility]  
Work Phone:   
[phone]  
   
                                                    Start:   
2017                              25 hydroxy includes   
fractions if performed                              [facility]  
Work Phone:   
[phone]  
   
                                                    Start:   
2017                              Provider Instructions for   
Treatment                                           [facility]  
Work Phone:   
[phone]  
   
                                                    Start:   
2017                              25 hydroxy includes   
fractions if performed                              [facility]  
Work Phone:   
[phone]  
   
                                                    Start:   
2017                              Assay of thyroid   
stimulating hormone tsh                             [facility];   
[facility]  
Work Phone:   
[phone]  
   
                                                    Start:   
2017          Thyrotropin Qn      TSH (09214)         [facility]  
Work Phone:   
[phone]  
   
                                                    Start:   
2017                              Urnls dip stick/tablet   
reagent auto microscopy                             [facility]  
Work Phone:   
[phone]  
   
                                                    Start:   
2017                              Urine albumin   
quantitative                                        [facility]  
Work Phone:   
[phone]  
   
                                                    Start:   
2017                              Comprehensive metabolic   
panel                                               [facility]  
Work Phone:   
[phone]  
   
                                                    Start:   
2017          Lipid panel                             [facility]  
Work Phone:   
[phone]  
   
                                                    Start:   
2017                              Blood count complete   
auto&auto difrntl wbc                               [facility]  
Work Phone:   
[phone]  
   
                                                    Start:   
10-          Procedure Education                     [facility]  
Work Phone:   
[phone]  
   
                                                    Start:   
10-                              Provider Instructions for   
Treatment                                           [facility]  
Work Phone:   
[phone]  
   
                                                    Start:   
2016          Procedure Education                     [facility]  
Work Phone:   
[phone]  
   
                                                    Start:   
2016                              Provider Instructions for   
Treatment                                           [facility]  
Work Phone:   
[phone]  
   
                                                    Start:   
2016          Patient Education                       [facility]  
Work Phone:   
[phone]  
   
                                                    Start:   
2016          Procedure Education                     [facility]  
Work Phone:   
[phone]  
   
                                                    Start:   
2016                              Provider Instructions for   
Treatment                                           [facility]  
Work Phone:   
[phone]  
   
                                                    Start:   
2015          Procedure Education                     [facility]  
Work Phone:   
[phone]  
   
                                                    Start:   
2015                              Provider Instructions for   
Treatment                                           [facility]  
Work Phone:   
[phone]  
   
                                                    Start:   
2015          Procedure Education                     [facility]  
Work Phone:   
[phone]  
   
                                                    Start:   
2015                              Provider Instructions for   
Treatment                                           [facility]  
Work Phone:   
[phone]  
   
                                                    Start:   
2015                              Assay of prostate   
specific antigen total                              [facility]  
Work Phone:   
[phone]  
   
                                                    Start:   
2015                Protein mass conc         PSA (PROSTATE SPECIFIC   
ANTIGEN) (V76.44)                       [facility]  
Work Phone:   
[phone]  
   
                                                    Start:   
2015          Lipid panel                             [facility]  
Work Phone:   
[phone]  
   
                                                    Start:   
2015                              25 hydroxy includes   
fractions if performed                              [facility]  
Work Phone:   
[phone]  
   
                                                    Start:   
2015                              Assay of thyroid   
stimulating hormone tsh                             [facility];   
[facility]  
Work Phone:   
[phone]  
   
                                                    Start:   
2015          Thyrotropin Qn      TSH (86315)         [facility]  
Work Phone:   
[phone]  
   
                                                    Start:   
2015                              Urnls dip stick/tablet   
reagent auto microscopy                             [facility]  
Work Phone:   
[phone]  
   
                                                    Start:   
2015                              Urine albumin   
quantitative                                        [facility]  
Work Phone:   
[phone]  
   
                                                    Start:   
2015                              Comprehensive metabolic   
panel                                               [facility]  
Work Phone:   
[phone]  
   
                                                    Start:   
2015                              Blood count complete   
auto&auto difrntl wbc                               [facility]  
Work Phone:   
[phone]  
   
                                                    Start:   
2015                              Provider Instructions for   
Treatment                                           [facility]  
Work Phone:   
[phone]  
   
                                                    Start:   
2015                              Hemoglobin   
A1c/Hemoglobin.total mass   
fraction (Bld)                          Hemoglobin Glyclated   
(HGB A1C) (38620)                       [facility]  
Work Phone:   
[phone]  
   
                                                    Start:   
2015                              Hemoglobin glycosylated   
a1c                                                 [facility];   
[facility]  
Work Phone:   
[phone]  
   
                                                    Start:   
2015                              25 hydroxy includes   
fractions if performed                              [facility]  
Work Phone:   
[phone]  
   
                                                    Start:   
2015                              Assay of thyroid   
stimulating hormone tsh                             [facility];   
[facility]  
Work Phone:   
[phone]  
   
                                                    Start:   
2015          Thyrotropin Qn      TSH (24269)         [facility]  
Work Phone:   
[phone]  
   
                                                    Start:   
2015                              Urnls dip stick/tablet   
reagent auto microscopy                             [facility]  
Work Phone:   
[phone]  
   
                                                    Start:   
2015                              Urine albumin   
quantitative                                        [facility]  
Work Phone:   
[phone]  
   
                                                    Start:   
2015                              Comprehensive metabolic   
panel                                               [facility]  
Work Phone:   
[phone]  
   
                                                    Start:   
2015          Lipid panel                             [facility]  
Work Phone:   
[phone]  
   
                                                    Start:   
2015                              Blood count manual cell   
count each                                          [facility]  
Work Phone:   
[phone]  
   
                                                    Start:   
2014          Procedure Education                     [facility]  
Work Phone:   
[phone]  
   
                                                    Start:   
2014                              Provider Instructions for   
Treatment                                           [facility]  
Work Phone:   
[phone]  
   
                                                    Start:   
2014                              Provider Instructions for   
Treatment                                           [facility]  
Work Phone:   
[phone]  
   
                                                    Start:   
2014                              Comprehensive metabolic   
panel                                               [facility]  
Work Phone:   
[phone]  
   
                                                    Start:   
2014                              Assay of thyroid   
stimulating hormone tsh                             [facility];   
[facility]  
Work Phone:   
[phone]  
   
                                                    Start:   
2014          Thyrotropin Qn      TSH (30453)         [facility]  
Work Phone:   
[phone]  
   
                                                    Start:   
2014                              Urnls dip stick/tablet   
reagent auto microscopy                             [facility]  
Work Phone:   
[phone]  
   
                                                    Start:   
2014                              Urine albumin   
quantitative                                        [facility]  
Work Phone:   
[phone]  
   
                                                    Start:   
2014          Lipid panel                             [facility]  
Work Phone:   
[phone]  
   
                                                    Start:   
2014                              Blood count manual cell   
count each                                          [facility]  
Work Phone:   
[phone]  
   
                                                    Start:   
2014                              Provider Instructions for   
Treatment                                           [facility]  
Work Phone:   
[phone]  
   
                                                    Start:   
2013          Patient Education                       [facility]  
Work Phone:   
[phone]  
   
                                                    Start:   
2013                              Provider Instructions for   
Treatment                                           [facility]  
Work Phone:   
[phone]  
   
                                                    Start:   
2013                              Assay of prostate   
specific antigen total                              [facility]  
Work Phone:   
[phone]  
   
                                                    Comment on   
above:                                  screening   
   
                                                    Start:   
2013                Protein mass conc         PSA (PROSTATE SPECIFIC   
ANTIGEN) (V76.44)                       [facility]  
Work Phone:   
[phone]  
   
                                                    Comment on   
above:                                  screening   
   
                                                    Start:   
2013          Hepatic function panel                     [facility]  
Work Phone:   
[phone]  
   
                                                    Start:   
03-                              Provider Instructions for   
Treatment                                           [facility]  
Work Phone:   
[phone]  
   
                                                    Start:   
2012          Patient Education                       [facility]  
Work Phone:   
[phone]  
   
                                                    Start:   
2012                              Provider Instructions for   
Treatment                                           [facility]  
Work Phone:   
[phone]  
   
                                                    Start:   
05-                              Provider Instructions for   
Treatment                                           [facility]  
Work Phone:   
[phone]  
   
                                                    Start:   
2012                              Assay of prostate   
specific antigen total                              [facility]  
Work Phone:   
[phone]  
   
                                                    Start:   
2012                Protein mass conc         PSA (PROSTATE SPECIFIC   
ANTIGEN) (V76.44)                       [facility]  
Work Phone:   
[phone]  
   
                                                    Start:   
2012                              Hemoglobin   
A1c/Hemoglobin.total mass   
fraction (Bld)                          Hemoglobin Glyclated   
(HGB A1C) (91226)                       [facility]  
Work Phone:   
[phone]  
   
                                                    Start:   
2012                              Hemoglobin glycosylated   
a1c                                                 [facility];   
[facility]  
Work Phone:   
[phone]  
   
                                                    Start:   
2012                              Urnls dip stick/tablet   
reagent auto microscopy                             [facility]  
Work Phone:   
[phone]  
   
                                                    Start:   
2012                              Assay of thyroid   
stimulating hormone tsh                             [facility];   
[facility]  
Work Phone:   
[phone]  
   
                                                    Start:   
2012          Thyrotropin Qn      TSH (09970)         [facility]  
Work Phone:   
[phone]  
   
                                                    Start:   
2012                              Urine albumin   
quantitative                                        [facility]  
Work Phone:   
[phone]  
   
                                                    Start:   
2012                              Comprehensive metabolic   
panel                                               [facility]  
Work Phone:   
[phone]  
   
                                                    Start:   
2012          Lipid panel                             [facility]  
Work Phone:   
[phone]  
   
                                                    Start:   
2012                              Blood count manual cell   
count each                                          [facility]  
Work Phone:   
[phone]  
   
                                                    Start:   
2012                              Provider Instructions for   
Treatment                                           [facility]  
Work Phone:   
[phone]  
   
                                                    Start:   
2012                              Hemoglobin   
A1c/Hemoglobin.total mass   
fraction (Bld)                          Hemoglobin Glyclated   
(HGB A1C) (03005)                       [facility]  
Work Phone:   
[phone]  
   
                                                    Start:   
2012                              Hemoglobin glycosylated   
a1c                                                 [facility];   
[facility]  
Work Phone:   
[phone]  
   
                                                    Start:   
2011                              Provider Instructions for   
Treatment                                           [facility]  
Work Phone:   
[phone]  
   
                                                    Start:   
2011                              Provider Instructions for   
Treatment                                           [facility]  
Work Phone:   
[phone]  
   
                                                    Start:   
2011                              Assay of thyroid   
stimulating hormone tsh                             [facility];   
[facility]  
Work Phone:   
[phone]  
   
                                                    Start:   
2011          Thyrotropin Qn      TSH (27016)         [facility]  
Work Phone:   
[phone]  
   
                                                    Start:   
2011                              Comprehensive metabolic   
panel                                               [facility]  
Work Phone:   
[phone]  
   
                                                    Start:   
2011                              Blood count manual cell   
count each                                          [facility]  
Work Phone:   
[phone]  
   
                                                    Start:   
2011                              Urine albumin   
quantitative                                        [facility]  
Work Phone:   
[phone]  
   
                                                    Start:   
2011                              Provider Instructions for   
Treatment                                           [facility]  
Work Phone:   
[phone]  
   
                                                    Start:   
2011                              Assay of troponin   
quantitative                                        [facility];   
[facility]  
Work Phone:   
[phone]  
   
                                                    Start:   
2011                              Troponin I.cardiac mass   
conc                      Troponin I (87142)        [facility]  
Work Phone:   
[phone]  
   
                                                    Start:   
2011                              Creatine kinase mb   
fraction only                                       [facility]  
Work Phone:   
[phone]  
   
                                                    Start:   
2011          Creatine kinase total                     [facility]  
Work Phone:   
[phone]  
   
                                                    Start:   
2011          Assay of magnesium                      [facility];   
[facility]  
Work Phone:   
[phone]  
   
                                                    Start:   
2011          Magnesium mass conc Magnesium (23312)   [facility]  
Work Phone:   
[phone]  
   
                                                    Start:   
2011                              Basic metabolic panel   
calcium total                                       [facility]  
Work Phone:   
[phone]  
   
                                                    Start:   
2011                              Assay of prostate   
specific antigen total                              [facility]  
Work Phone:   
[phone]  
   
                                                    Start:   
2011                Protein mass conc         PSA (PROSTATE SPECIFIC   
ANTIGEN) (V76.44)                       [facility]  
Work Phone:   
[phone]  
   
                                                    Start:   
2011                Glucose [Mass/Vol]        Glucose, PP/2 Hour   
(94162)                                 [facility]  
Work Phone:   
[phone]  
   
                                                    Start:   
2011                              Glucose quantitative   
blood xcpt reagent strip                            [facility]  
Work Phone:   
[phone]  
   
                                                                 [facility]  
Work Phone:   
[phone]  
   
                                                                 [facility]  
Work Phone:   
[phone]  
   
                                                                 [facility]  
Work Phone:   
[phone]  
   
                                                                 [facility]  
Work Phone:   
[phone]  
   
                                                                 [facility]  
Work Phone:   
[phone]  
   
                                                                 [facility]  
Work Phone:   
[phone]  
   
                                                                 [facility]  
Work Phone:   
[phone]  
   
                                                                 [facility]  
Work Phone:   
[phone]  
   
                                                                 [facility]  
Work Phone:   
[phone]  
   
                                                                 [facility]  
Work Phone:   
[phone]  
   
                                                                 [facility]  
Work Phone:   
[phone]  
   
                                                                 [facility]  
Work Phone:   
[phone]  
   
                                                                 [facility]  
Work Phone:   
[phone]  
   
                                                                 [facility]  
Work Phone:   
[phone]  
   
                                                                 [facility]  
Work Phone:   
[phone]  
   
                                                                 [facility]  
Work Phone:   
[phone]  
   
                                                                 [facility];   
[facility]  
Work Phone:   
[phone]  
   
                                                                 [facility];   
[facility]  
Work Phone:   
[phone]  
   
                                                                 [facility];   
[facility]  
Work Phone:   
[phone]  
   
                                                                 [facility];   
[facility]  
Work Phone:   
[phone]  
   
                                                                 [facility];   
[facility]  
Work Phone:   
[phone]  
   
                                                                 [facility];   
[facility]  
Work Phone:   
[phone]  
   
                                                                 [facility];   
[facility]  
Work Phone:   
[phone]  
   
                                                                 [facility];   
[facility]  
Work Phone:   
[phone]  
  
  
  
Payers  
  
  
                          Date         Payer Category Payer        Policy ID  
   
                          2019   Unknown                   [id number]  
   
                          2011   Unknown                   [id number]  
   
                          1965   Unknown                   [id number]  
[id number]  
   
                                       Unknown                     
  
  
  
Social History  
  
  
                          Date         Type         Detail       Facility  
   
                                       Alcohol Use: Moderate alcohol use. Compre  
[facility] Internal   
Medicine  
Work Phone:   
[phone]  
   
                                       Caffeine Use              [facility]  
n[facility]   
Medicine  
Work Phone:   
[phone]  
   
                                        Comment on above:   qd   
   
                                       Exercise History: Does not exercise. [facility]  
[facility]   
[facility]  
Work Phone:   
[phone]  
   
                                       Living Situation: Lives with spouse. [facility]   
[facility]  
Work Phone:   
[phone]  
   
                                                            Number of Child (age  
   
0-17) Dependents:         2.                        [facility]   
[facility]  
Work Phone:   
[phone]  
   
                                       Pets/Animals: Fish.        [facility]   
Medicine  
Work Phone:   
[phone]  
   
                                                Tobacco Use:    Smokes < 1 pack   
of   
cigarettes per day.                     [facility]   
[facility]  
Work Phone:   
[phone]  
   
                                        Comment on above:   12same 5.10.12   
   
                                       Alcohol Use: Alcohol Use: [facility]  
[facility]   
Medicine; [facility]  
Work Phone:   
[phone]  
   
                                       Exercise History: Exercise History: Compr  
ensive Internal   
Medicine; [facility]  
Work Phone:   
[phone]  
   
                                       Living Situation: Living Situation: [facility]   
Medicine; [facility]   
[facility] Medicine  
Work Phone:   
[phone]  
   
                                                            Number of Child (age  
   
0-17) Dependents:                       Number of Child (age   
0-17) Dependents:                       [facility]; [facility]  
Work Phone:   
[phone]  
   
                                       Pets/Animals: Pets/Animals: [facility]  
 [facility]   
Medicine; [facility]  
Work Phone:   
[phone]  
   
                                       Tobacco Use: Tobacco Use: [facility]  
nternal   
Medicine; [facility]   
[facility] Medicine  
Work Phone:   
[phone]  
   
                                        Comment on above:   12same 5.10.12   
  
  
  
Functional Status  
  
  
                          Date         Assessment   Result       Facility  
   
                          2020   LP-IR Score  52           [facility]  
[facility]   
Medicine  
Work Phone: [phone]  
   
                                        Comment on above:   INSULIN RESISTANCE M  
ARKER <--Insulin Sensitive Insulin   
Resistant-->   
Percentile in Reference PopulationInsulin Resistance ScoreLP-IR   
Score Low 25th 50th 75th High <27 27 45 63 >63LP-IR Score is   
inaccurate if patient is non-fasting. .The LP-IR score is a   
laboratory developed index that has beenassociated with insulin   
resistance and diabetes risk and should beused as one component of   
a physician's clinical assessment.   
   
                                                            Test(s) 607249-KIW-K  
; 646111-RMM-G; 096344-XES-E;   
410427-Wpqhwudvlvghg; 123477-Cholesterol, Total; 353955-RTS-C   
(Total);478990-Szvxx LDL-P; 656609-OON Size; 990613-VN-AA Scorewas   
developed and its performance characteristics determinedby [place].   
It has not been cleared or approved by the Foodand Drug   
Administration.PATIENT WAS FASTINGPERFORMED BY: [employer] [place] [address] [id number]   
BY:  [employer] [id number]   
   
                          2019   LP-IR Score  70           [facility]  
[facility]   
Medicine  
Work Phone: [phone]  
   
                                        Comment on above:   INSULIN RESISTANCE M  
ARKER <--Insulin Sensitive Insulin   
Resistant-->   
Percentile in Reference PopulationInsulin Resistance ScoreLP-IR   
Score Low 25th 50th 75th High <27 27 45 63 >63LP-IR Score is   
inaccurate if patient is non-fasting. .The LP-IR score is a   
laboratory developed index that has beenassociated with insulin   
resistance and diabetes risk and should beused as one component of   
a physician's clinical assessment. TheLP-IR score listed above has   
not been cleared by the [place] Food andDrug Administration.   
   
                                                            PATIENT WAS FASTINGP  
ERFORMED BY: [employer] [id number] BY:  [employer] [facility] [id number]   
Information: [id number], [id number]   
   
                          2018   LP-IR Score  72           [facility]  
nternal   
Medicine  
Work Phone: [phone]  
   
                                        Comment on above:   INSULIN RESISTANCE CARISSA MUNGUIA <--Insulin Sensitive Insulin   
Resistant-->   
Percentile in Reference PopulationInsulin Resistance ScoreLP-IR   
Score Low 25th 50th 75th High <27 27 45 63 >63LP-IR Score is   
inaccurate if patient is non-fasting. .The LP-IR score is a   
laboratory developed index that has beenassociated with insulin   
resistance and diabetes risk and should beused as one component of   
a physician's clinical assessment. TheLP-IR score listed above has   
not been cleared by the [place] Food andDrug Administration.   
   
                                                            PATIENT WAS FASTINGP  
ERFORMED BY:  [employer] Network[facility] [address] [facility] [id number] BY:  [employer] NetworkCarrier Clinic[facility] [id number]   
  
  
  
Clinical Notes  
  
  
                                Note Date & Type Note            Facility  
  
  
  
                                                    Instructions   
  
  
  
                                                    Name  
   
                                                    How to Access Health   
Information Online using   
Patient Portal and 3rd Party   
Apps  
Indication:Smoker  
                          Start:1-Mar-2021          Instruction Type:Patient   
Education  
  
   
                                                    Patient Instructions  
Indication:Smoker  
                          Start:1-Mar-2021          Instruction Type:Provider   
Instructions for Treatment  
  
   
                                                    How to Access Health   
Information Online using   
Patient Portal and 3rd Party   
Apps  
Indication:Smoker  
                                        Start:  
1                                       Instruction Type:Patient   
Education  
  
   
                                                    Patient Instructions  
Indication:Smoker  
                                        Start:                                       Instruction Type:Provider   
Instructions for Treatment  
  
   
                                                    How to Access Health   
Information Online using   
Patient Portal and 3rd Party   
Apps  
Indication:Smoker  
                          Start:2021          Instruction Type:Patient   
Education  
  
   
                                                    Patient Instructions  
Indication:Smoker  
                          Start:2021          Instruction Type:Provider   
Instructions for Treatment  
  
   
                                                    How to Access Health   
Information Online using   
Patient Portal and 3rd Party   
Apps  
Indication:Smoker  
                                        Start:16-Dec-202  
0                                       Instruction Type:Patient   
Education  
  
   
                                                    Patient Instructions  
Indication:Smoker  
                                        Start:16-Dec-202  
0                                       Instruction Type:Provider   
Instructions for Treatment  
  
   
                                                    How to access health   
information online  
Indication:Smoker  
                                        Start:  
0                                       Instruction Type:Patient   
Education  
  
   
                                                    How to access health   
information online - Detail  
Indication:Smoker  
                                        Start:  
0                                       Instruction Type:Patient   
Education  
  
   
                                                    Patient Instructions  
Indication:Smoker  
                                        Start:  
0                                       Instruction Type:Provider   
Instructions for Treatment  
  
   
                                                    How to access health   
information online  
Indication:Smoker  
                                        Start:24-Aug-202  
0                                       Instruction Type:Patient   
Education  
  
   
                                                    How to access health   
information online - Detail  
Indication:Smoker  
                                        Start:24-Aug-202  
0                                       Instruction Type:Patient   
Education  
  
   
                                                    Patient Instructions  
Indication:Smoker  
                                        Start:24-Aug-202  
0                                       Instruction Type:Provider   
Instructions for Treatment  
  
   
                                                    How to access health   
information online - Detail  
Indication:BMI   
25.0-25.9,adult  
                                        Start:  
0                                       Instruction Type:Patient   
Education  
  
   
                                                    How to access health   
information online  
Indication:BMI   
25.0-25.9,adult  
                                        Start:  
0                                       Instruction Type:Patient   
Education  
  
   
                                                    Patient Instructions  
Indication:BMI   
25.0-25.9,adult  
                                        Start:  
0                                       Instruction Type:Provider   
Instructions for Treatment  
  
   
                                                    How to access health   
information online  
Indication:Smoker  
                          Start:2020          Instruction Type:Patient   
Education  
  
   
                                                    How to access health   
information online - Detail  
Indication:Smoker  
                          Start:2020          Instruction Type:Patient   
Education  
  
   
                                                    Patient Instructions  
Indication:Smoker  
                          Start:2020          Instruction Type:Provider   
Instructions for Treatment  
  
   
                                                    How to access health   
information online  
Indication:Smoker  
                                        Start:  
0                                       Instruction Type:Patient   
Education  
  
   
                                                    How to access health   
information online - Detail  
Indication:Smoker  
                                        Start:  
0                                       Instruction Type:Patient   
Education  
  
   
                                                    Patient Instructions  
Indication:Smoker  
                                        Start:  
0                                       Instruction Type:Provider   
Instructions for Treatment  
  
   
                                                    How to access health   
information online  
Indication:Smoker  
                                        Start:13-Sep-201  
9                                       Instruction Type:Patient   
Education  
  
   
                                                    How to access health   
information online - Detail  
Indication:Smoker  
                                        Start:13-Sep-201  
9                                       Instruction Type:Patient   
Education  
  
   
                                                    Patient Instructions  
Indication:Smoker  
                                        Start:13-Sep-201  
9                                       Instruction Type:Provider   
Instructions for Treatment  
  
   
                                                    How to access health   
information online  
Indication:BMI   
26.0-26.9,adult  
                                        Start:10-May-201  
9                                       Instruction Type:Patient   
Education  
  
   
                                                    How to access health   
information online - Detail  
Indication:BMI   
26.0-26.9,adult  
                                        Start:10-May-201  
9                                       Instruction Type:Patient   
Education  
  
   
                                                    Patient Instructions  
Indication:BMI   
26.0-26.9,adult  
                                        Start:10-May-201  
9                                       Instruction Type:Provider   
Instructions for Treatment  
  
   
                                                    How to access health   
information online  
Indication:Abnormal glucose   
tolerance test  
                                        Start:28-Dec-201  
8                                       Instruction Type:Patient   
Education  
  
   
                                                    How to access health   
information online - Detail  
Indication:Abnormal glucose   
tolerance test  
                                        Start:28-Dec-201  
8                                       Instruction Type:Patient   
Education  
  
   
                                                    Patient Instructions  
Indication:Abnormal glucose   
tolerance test  
                                        Start:28-Dec-201  
8                                       Instruction Type:Provider   
Instructions for Treatment  
  
   
                                                    How to access health   
information online  
Indication:Smoker  
                                        Start:29-Aug-201  
8                                       Instruction Type:Patient   
Education  
  
   
                                                    How to access health   
information online - Detail  
Indication:Smoker  
                                        Start:29-Aug-201  
8                                       Instruction Type:Patient   
Education  
  
   
                                                    Patient Instructions  
Indication:Smoker  
                                        Start:29-Aug-201  
8                                       Instruction Type:Provider   
Instructions for Treatment  
  
   
                                                    How to access health   
information online  
Indication:Abnormal glucose   
tolerance test  
                                        Start:  
8                                       Instruction Type:Patient   
Education  
  
   
                                                    How to access health   
information online - Detail  
Indication:Abnormal glucose   
tolerance test  
                                        Start:  
8                                       Instruction Type:Patient   
Education  
  
   
                                                    Patient Instructions  
Indication:Abnormal glucose   
tolerance test  
                                        Start:                                       Instruction Type:Provider   
Instructions for Treatment  
  
   
                                                    How to access health   
information online  
Indication:Smoker  
                          Start:8-Dec-2017          Instruction Type:Patient   
Education  
  
   
                                                    How to access health   
information online - Detail  
Indication:Smoker  
                          Start:8-Dec-2017          Instruction Type:Patient   
Education  
  
   
                                                    Patient Instructions  
Indication:Smoker  
                          Start:8-Dec-2017          Instruction Type:Provider   
Instructions for Treatment  
  
   
                                                    How to access health   
information online  
Indication:Smoker  
                          Start:4-Aug-2017          Instruction Type:Patient   
Education  
  
   
                                                    How to access health   
information online - Detail  
Indication:Smoker  
                          Start:4-Aug-2017          Instruction Type:Patient   
Education  
  
   
                                                    Patient Instructions  
Indication:Smoker  
                          Start:4-Aug-2017          Instruction Type:Provider   
Instructions for Treatment  
  
   
                                                    How to access health   
information online  
Indication:Smoker  
                          Start:3-Mar-2017          Instruction Type:Patient   
Education  
  
   
                                                    How to access health   
information online - Detail  
Indication:Smoker  
                          Start:3-Mar-2017          Instruction Type:Patient   
Education  
  
   
                                                    Patient Instructions  
Indication:Smoker  
                          Start:3-Mar-2017          Instruction Type:Provider   
Instructions for Treatment  
  
   
                                                    How to access health   
information online  
Indication:Hypertension,   
essential, benign  
                                        Start:28-Oct-201  
6                                       Instruction Type:Patient   
Education  
  
   
                                                    How to access health   
information online - Detail  
Indication:Hypertension,   
essential, benign  
                                        Start:28-Oct-201  
6                                       Instruction Type:Patient   
Education  
  
   
                                                    Patient Instructions  
Indication:Hypertension,   
essential, benign  
                                        Start:28-Oct-201  
6                                       Instruction Type:Provider   
Instructions for Treatment  
  
   
                                                    How to access health   
information online  
Indication:Hypertension,   
essential, benign  
                                        Start:                                       Instruction Type:Patient   
Education  
  
   
                                                    How to access health   
information online - Detail  
Indication:Hypertension,   
essential, benign  
                                        Start:                                       Instruction Type:Patient   
Education  
  
   
                                                    Patient Instructions  
Indication:Hypertension,   
essential, benign  
                                        Start:30-Aaron-201  
6                                       Instruction Type:Provider   
Instructions for Treatment  
  
   
                                                    How to access health   
information online  
Indication:BMI   
27.0-27.9,adult  
                                        Start:  
6                                       Instruction Type:Patient   
Education  
  
   
                                                    How to access health   
information online - Detail  
Indication:BMI   
27.0-27.9,adult  
                                        Start:  
6                                       Instruction Type:Patient   
Education  
  
   
                                                    Patient Instructions  
Indication:BMI   
27.0-27.9,adult  
                                        Start:  
6                                       Instruction Type:Provider   
Instructions for Treatment  
  
   
                                                    How to access health   
information online  
Indication:Abnormal glucose   
tolerance test  
                                        Start:  
6                                       Instruction Type:Patient   
Education  
  
   
                                                    How to access health   
information online - Detail  
Indication:Abnormal glucose   
tolerance test  
                                        Start:  
6                                       Instruction Type:Patient   
Education  
  
   
                                                    Patient Instructions  
Indication:Abnormal glucose   
tolerance test  
                                        Start:  
6                                       Instruction Type:Provider   
Instructions for Treatment  
  
   
                                                    How to access health   
information online  
Indication:Abnormal glucose   
tolerance test  
                                        Start:16-Dec-201  
5                                       Instruction Type:Patient   
Education  
  
   
                                                    How to access health   
information online - Detail  
Indication:Abnormal glucose   
tolerance test  
                                        Start:16-Dec-201  
5                                       Instruction Type:Patient   
Education  
  
   
                                                    Patient Instructions  
Indication:Abnormal glucose   
tolerance test  
                                        Start:16-Dec-201  
5                                       Instruction Type:Provider   
Instructions for Treatment  
  
   
                                                    How to access health   
information online  
Indication:Hypercholesteremi  
a  
                                        Start:16-Sep-201  
5                                       Instruction Type:Patient   
Education  
  
   
                                                    How to access health   
information online - Detail  
Indication:Hypercholesteremi  
a  
                                        Start:16-Sep-201  
5                                       Instruction Type:Patient   
Education  
  
   
                                                    Patient Instructions  
Indication:Hypercholesteremi  
a  
                                        Start:16-Sep-201  
5                                       Instruction Type:Provider   
Instructions for Treatment  
  
   
                                                    Patient Instructions  
Indication:Abnormal glucose   
tolerance test  
                          Start:9-May-2014          Instruction Type:Provider   
Instructions for Treatment  
  
   
                                                    Patient Instructions  
Indication:Abnormal glucose   
tolerance test  
                          Start:2014          Instruction Type:Provider   
Instructions for Treatment  
  
   
                                                    Patient Instructions  
Indication:Abnormal glucose   
tolerance test  
                                        Start:12-Sep-201  
3                                       Instruction Type:Provider   
Instructions for Treatment  
  
   
                                                    Patient Instructions  
Indication:Hypercholesteremi  
a  
                                        Start:15-Mar-201  
3                                       Instruction Type:Provider   
Instructions for Treatment  
  
   
                                                    Patient Instructions  
Indication:Hypertension,   
essential, benign  
                                        Start:14-Sep-201  
2                                       Instruction Type:Provider   
Instructions for Treatment  
  
  
  
[facility]  
Work Phone: [phone]*   
  
                                Name            Dates           Details  
   
                                                    Patient Instructions  
Indication:Smoker  
                          Start:2-Sep-2021          Instruction Type:Provider   
Instructions for Treatment  
  
   
                                                    How to Access Health Informa  
tion   
Online using Patient Portal and   
3rd Party Apps  
Indication:Smoker  
                          Start:2-Sep-2021          Instruction Type:Patient   
Education  
  
   
                                                    Patient Instructions  
Indication:BMI 26.0-26.9,adult  
                          Start:4-Aug-2021          Instruction Type:Provider   
Instructions for Treatment  
  
   
                                                    How to Access Health Informa  
tion   
Online using Patient Portal and   
3rd Party Apps  
Indication:BMI 26.0-26.9,adult  
                          Start:4-Aug-2021          Instruction Type:Patient   
Education  
  
   
                                                    How to Access Health Informa  
tion   
Online using Patient Portal and   
3rd Party Apps  
Indication:Smoker  
                          Start:1-Mar-2021          Instruction Type:Patient   
Education  
  
   
                                                    Patient Instructions  
Indication:Smoker  
                          Start:1-Mar-2021          Instruction Type:Provider   
Instructions for Treatment  
  
   
                                                    How to Access Health Informa  
tion   
Online using Patient Portal and   
[employer]rd Party Apps  
Indication:Smoker  
                          Start:2021         Instruction Type:Patient   
Education  
  
   
                                                    Patient Instructions  
Indication:Smoker  
                          Start:2021         Instruction Type:Provider   
Instructions for Treatment  
  
   
                                                    How to Access Health Informa  
tion   
Online using Patient Portal and   
[employer]rd Party Apps  
Indication:Smoker  
                          Start:2021          Instruction Type:Patient   
Education  
  
   
                                                    Patient Instructions  
Indication:Smoker  
                          Start:2021          Instruction Type:Provider   
Instructions for Treatment  
  
   
                                                    How to Access Health Informa  
tion   
Online using Patient Portal and   
[employer] Party Apps  
Indication:Smoker  
                          Start:16-Dec-2020         Instruction Type:Patient   
Education  
  
   
                                                    Patient Instructions  
Indication:Smoker  
                          Start:16-Dec-2020         Instruction Type:Provider   
Instructions for Treatment  
  
   
                                                    How to access health informa  
tion   
online  
Indication:Smoker  
                          Start:2020         Instruction Type:Patient   
Education  
  
   
                                                    How to access health informa  
tion   
online - Detail  
Indication:Smoker  
                          Start:2020         Instruction Type:Patient   
Education  
  
   
                                                    Patient Instructions  
Indication:Smoker  
                          Start:2020         Instruction Type:Provider   
Instructions for Treatment  
  
   
                                                    How to access health informa  
tion   
online  
Indication:Smoker  
                          Start:24-Aug-2020         Instruction Type:Patient   
Education  
  
   
                                                    How to access health informa  
tion   
online - Detail  
Indication:Smoker  
                          Start:24-Aug-2020         Instruction Type:Patient   
Education  
  
   
                                                    Patient Instructions  
Indication:Smoker  
                          Start:24-Aug-2020         Instruction Type:Provider   
Instructions for Treatment  
  
   
                                                    How to access health informa  
tion   
online - Detail  
Indication:BMI 25.0-25.9,adult  
                          Start:2020         Instruction Type:Patient   
Education  
  
   
                                                    How to access health informa  
tion   
online  
Indication:BMI 25.0-25.9,adult  
                          Start:2020         Instruction Type:Patient   
Education  
  
   
                                                    Patient Instructions  
Indication:BMI 25.0-25.9,adult  
                          Start:2020         Instruction Type:Provider   
Instructions for Treatment  
  
   
                                                    How to access health informa  
tion   
online  
Indication:Smoker  
                          Start:2020          Instruction Type:Patient   
Education  
  
   
                                                    How to access health informa  
tion   
online - Detail  
Indication:Smoker  
                          Start:2020          Instruction Type:Patient   
Education  
  
   
                                                    Patient Instructions  
Indication:Smoker  
                          Start:2020          Instruction Type:Provider   
Instructions for Treatment  
  
   
                                                    How to access health informa  
tion   
online  
Indication:Smoker  
                          Start:2020         Instruction Type:Patient   
Education  
  
   
                                                    How to access health informa  
tion   
online - Detail  
Indication:Smoker  
                          Start:2020         Instruction Type:Patient   
Education  
  
   
                                                    Patient Instructions  
Indication:Smoker  
                          Start:2020         Instruction Type:Provider   
Instructions for Treatment  
  
   
                                                    How to access health informa  
tion   
online  
Indication:Smoker  
                          Start:13-Sep-2019         Instruction Type:Patient   
Education  
  
   
                                                    How to access health informa  
tion   
online - Detail  
Indication:Smoker  
                          Start:13-Sep-2019         Instruction Type:Patient   
Education  
  
   
                                                    Patient Instructions  
Indication:Smoker  
                          Start:13-Sep-2019         Instruction Type:Provider   
Instructions for Treatment  
  
   
                                                    How to access health informa  
tion   
online  
Indication:BMI 26.0-26.9,adult  
                          Start:10-May-2019         Instruction Type:Patient   
Education  
  
   
                                                    How to access health informa  
tion   
online - Detail  
Indication:BMI 26.0-26.9,adult  
                          Start:10-May-2019         Instruction Type:Patient   
Education  
  
   
                                                    Patient Instructions  
Indication:BMI 26.0-26.9,adult  
                          Start:10-May-2019         Instruction Type:Provider   
Instructions for Treatment  
  
   
                                                    How to access health informa  
tion   
online  
Indication:Abnormal glucose   
tolerance test  
                          Start:28-Dec-2018         Instruction Type:Patient   
Education  
  
   
                                                    How to access health informa  
tion   
online - Detail  
Indication:Abnormal glucose   
tolerance test  
                          Start:28-Dec-2018         Instruction Type:Patient   
Education  
  
   
                                                    Patient Instructions  
Indication:Abnormal glucose   
tolerance test  
                          Start:28-Dec-2018         Instruction Type:Provider   
Instructions for Treatment  
  
   
                                                    How to access health informa  
tion   
online  
Indication:Smoker  
                          Start:29-Aug-2018         Instruction Type:Patient   
Education  
  
   
                                                    How to access health informa  
tion   
online - Detail  
Indication:Smoker  
                          Start:29-Aug-2018         Instruction Type:Patient   
Education  
  
   
                                                    Patient Instructions  
Indication:Smoker  
                          Start:29-Aug-2018         Instruction Type:Provider   
Instructions for Treatment  
  
   
                                                    How to access health informa  
tion   
online  
Indication:Abnormal glucose   
tolerance test  
                          Start:2018         Instruction Type:Patient   
Education  
  
   
                                                    How to access health informa  
tion   
online - Detail  
Indication:Abnormal glucose   
tolerance test  
                          Start:2018         Instruction Type:Patient   
Education  
  
   
                                                    Patient Instructions  
Indication:Abnormal glucose   
tolerance test  
                          Start:2018         Instruction Type:Provider   
Instructions for Treatment  
  
   
                                                    How to access health informa  
tion   
online  
Indication:Smoker  
                          Start:8-Dec-2017          Instruction Type:Patient   
Education  
  
   
                                                    How to access health informa  
tion   
online - Detail  
Indication:Smoker  
                          Start:8-Dec-2017          Instruction Type:Patient   
Education  
  
   
                                                    Patient Instructions  
Indication:Smoker  
                          Start:8-Dec-2017          Instruction Type:Provider   
Instructions for Treatment  
  
   
                                                    How to access health informa  
tion   
online  
Indication:Smoker  
                          Start:4-Aug-2017          Instruction Type:Patient   
Education  
  
   
                                                    How to access health informa  
tion   
online - Detail  
Indication:Smoker  
                          Start:4-Aug-2017          Instruction Type:Patient   
Education  
  
   
                                                    Patient Instructions  
Indication:Smoker  
                          Start:4-Aug-2017          Instruction Type:Provider   
Instructions for Treatment  
  
   
                                                    How to access health informa  
tion   
online  
Indication:Smoker  
                          Start:3-Mar-2017          Instruction Type:Patient   
Education  
  
   
                                                    How to access health informa  
tion   
online - Detail  
Indication:Smoker  
                          Start:3-Mar-2017          Instruction Type:Patient   
Education  
  
   
                                                    Patient Instructions  
Indication:Smoker  
                          Start:3-Mar-2017          Instruction Type:Provider   
Instructions for Treatment  
  
   
                                                    How to access health informa  
tion   
online  
Indication:Hypertension,   
essential, benign  
                          Start:28-Oct-2016         Instruction Type:Patient   
Education  
  
   
                                                    How to access health informa  
tion   
online - Detail  
Indication:Hypertension,   
essential, benign  
                          Start:28-Oct-2016         Instruction Type:Patient   
Education  
  
   
                                                    Patient Instructions  
Indication:Hypertension,   
essential, benign  
                          Start:28-Oct-2016         Instruction Type:Provider   
Instructions for Treatment  
  
   
                                                    How to access health informa  
tion   
online  
Indication:Hypertension,   
essential, benign  
                          Start:2016         Instruction Type:Patient   
Education  
  
   
                                                    How to access health informa  
tion   
online - Detail  
Indication:Hypertension,   
essential, benign  
                          Start:2016         Instruction Type:Patient   
Education  
  
   
                                                    Patient Instructions  
Indication:Hypertension,   
essential, benign  
                          Start:2016         Instruction Type:Provider   
Instructions for Treatment  
  
   
                                                    How to access health informa  
tion   
online  
Indication:BMI 27.0-27.9,adult  
                          Start:2016         Instruction Type:Patient   
Education  
  
   
                                                    How to access health informa  
tion   
online - Detail  
Indication:BMI 27.0-27.9,adult  
                          Start:2016         Instruction Type:Patient   
Education  
  
   
                                                    Patient Instructions  
Indication:BMI 27.0-27.9,adult  
                          Start:2016         Instruction Type:Provider   
Instructions for Treatment  
  
   
                                                    How to access health informa  
tion   
online  
Indication:Abnormal glucose   
tolerance test  
                          Start:2016         Instruction Type:Patient   
Education  
  
   
                                                    How to access health informa  
tion   
online - Detail  
Indication:Abnormal glucose   
tolerance test  
                          Start:2016         Instruction Type:Patient   
Education  
  
   
                                                    Patient Instructions  
Indication:Abnormal glucose   
tolerance test  
                          Start:2016         Instruction Type:Provider   
Instructions for Treatment  
  
   
                                                    How to access health informa  
tion   
online  
Indication:Abnormal glucose   
tolerance test  
                          Start:16-Dec-2015         Instruction Type:Patient   
Education  
  
   
                                                    How to access health informa  
tion   
online - Detail  
Indication:Abnormal glucose   
tolerance test  
                          Start:16-Dec-2015         Instruction Type:Patient   
Education  
  
   
                                                    Patient Instructions  
Indication:Abnormal glucose   
tolerance test  
                          Start:16-Dec-2015         Instruction Type:Provider   
Instructions for Treatment  
  
   
                                                    How to access health informa  
tion   
online  
Indication:Hypercholesteremia  
                          Start:16-Sep-2015         Instruction Type:Patient   
Education  
  
   
                                                    How to access health informa  
tion   
online - Detail  
Indication:Hypercholesteremia  
                          Start:16-Sep-2015         Instruction Type:Patient   
Education  
  
   
                                                    Patient Instructions  
Indication:Hypercholesteremia  
                          Start:16-Sep-2015         Instruction Type:Provider   
Instructions for Treatment  
  
   
                                                    Patient Instructions  
Indication:Abnormal glucose   
tolerance test  
                          Start:9-May-2014          Instruction Type:Provider   
Instructions for Treatment  
  
   
                                                    Patient Instructions  
Indication:Abnormal glucose   
tolerance test  
                          Start:2014          Instruction Type:Provider   
Instructions for Treatment  
  
   
                                                    Patient Instructions  
Indication:Abnormal glucose   
tolerance test  
                          Start:12-Sep-2013         Instruction Type:Provider   
Instructions for Treatment  
  
   
                                                    Patient Instructions  
Indication:Hypercholesteremia  
                          Start:15-Mar-2013         Instruction Type:Provider   
Instructions for Treatment  
  
   
                                                    Patient Instructions  
Indication:Hypertension,   
essential, benign  
                          Start:14-Sep-2012         Instruction Type:Provider   
Instructions for Treatment  
  
  
  
[facility]  
Work Phone: [phone]*   
  
                                Name            Dates           Details  
   
                                                    Patient Instructions  
Indication:Smoker  
                          Start:16-Mar-2022         Instruction Type:Provider   
Instructions for Treatment  
  
   
                                                    How to Access Health Informa  
tion   
Online using Patient Portal and   
[employer] Apps  
Indication:Smoker  
                          Start:16-Mar-2022         Instruction Type:Patient   
Education  
  
   
                                                    Patient Instructions  
Indication:Smoker  
                          Start:2-Sep-2021          Instruction Type:Provider   
Instructions for Treatment  
  
   
                                                    How to Access Health Informa  
tion   
Online using Patient Portal and   
[employer] Apps  
Indication:Smoker  
                          Start:2-Sep-2021          Instruction Type:Patient   
Education  
  
   
                                                    Patient Instructions  
Indication:BMI 26.0-26.9,adult  
                          Start:4-Aug-2021          Instruction Type:Provider   
Instructions for Treatment  
  
   
                                                    How to Access Health Informa  
tion   
Online using Patient Portal and   
[employer]rd Party Apps  
Indication:BMI 26.0-26.9,adult  
                          Start:4-Aug-2021          Instruction Type:Patient   
Education  
  
   
                                                    How to Access Health Informa  
tion   
Online using Patient Portal and   
[employer] Apps  
Indication:Smoker  
                          Start:1-Mar-2021          Instruction Type:Patient   
Education  
  
   
                                                    Patient Instructions  
Indication:Smoker  
                          Start:1-Mar-2021          Instruction Type:Provider   
Instructions for Treatment  
  
   
                                                    How to Access Health Informa  
tion   
Online using Patient Portal and   
[employer] Party Apps  
Indication:Smoker  
                          Start:2021         Instruction Type:Patient   
Education  
  
   
                                                    Patient Instructions  
Indication:Smoker  
                          Start:2021         Instruction Type:Provider   
Instructions for Treatment  
  
   
                                                    How to Access Health Informa  
tion   
Online using Patient Portal and   
[employer] Apps  
Indication:Smoker  
                          Start:2021          Instruction Type:Patient   
Education  
  
   
                                                    Patient Instructions  
Indication:Smoker  
                          Start:2021          Instruction Type:Provider   
Instructions for Treatment  
  
   
                                                    How to Access Health Informa  
tion   
Online using Patient Portal and   
[employer] Party Apps  
Indication:Smoker  
                          Start:16-Dec-2020         Instruction Type:Patient   
Education  
  
   
                                                    Patient Instructions  
Indication:Smoker  
                          Start:16-Dec-2020         Instruction Type:Provider   
Instructions for Treatment  
  
   
                                                    How to access health informa  
tion   
online  
Indication:Smoker  
                          Start:2020         Instruction Type:Patient   
Education  
  
   
                                                    How to access health informa  
tion   
online - Detail  
Indication:Smoker  
                          Start:2020         Instruction Type:Patient   
Education  
  
   
                                                    Patient Instructions  
Indication:Smoker  
                          Start:2020         Instruction Type:Provider   
Instructions for Treatment  
  
   
                                                    How to access health informa  
tion   
online  
Indication:Smoker  
                          Start:24-Aug-2020         Instruction Type:Patient   
Education  
  
   
                                                    How to access health informa  
tion   
online - Detail  
Indication:Smoker  
                          Start:24-Aug-2020         Instruction Type:Patient   
Education  
  
   
                                                    Patient Instructions  
Indication:Smoker  
                          Start:24-Aug-2020         Instruction Type:Provider   
Instructions for Treatment  
  
   
                                                    How to access health informa  
tion   
online - Detail  
Indication:BMI 25.0-25.9,adult  
                          Start:2020         Instruction Type:Patient   
Education  
  
   
                                                    How to access health informa  
tion   
online  
Indication:BMI 25.0-25.9,adult  
                          Start:2020         Instruction Type:Patient   
Education  
  
   
                                                    Patient Instructions  
Indication:BMI 25.0-25.9,adult  
                          Start:2020         Instruction Type:Provider   
Instructions for Treatment  
  
   
                                                    How to access health informa  
tion   
online  
Indication:Smoker  
                          Start:2020          Instruction Type:Patient   
Education  
  
   
                                                    How to access health informa  
tion   
online - Detail  
Indication:Smoker  
                          Start:2020          Instruction Type:Patient   
Education  
  
   
                                                    Patient Instructions  
Indication:Smoker  
                          Start:2020          Instruction Type:Provider   
Instructions for Treatment  
  
   
                                                    How to access health informa  
tion   
online  
Indication:Smoker  
                          Start:2020         Instruction Type:Patient   
Education  
  
   
                                                    How to access health informa  
tion   
online - Detail  
Indication:Smoker  
                          Start:2020         Instruction Type:Patient   
Education  
  
   
                                                    Patient Instructions  
Indication:Smoker  
                          Start:2020         Instruction Type:Provider   
Instructions for Treatment  
  
   
                                                    How to access health informa  
tion   
online  
Indication:Smoker  
                          Start:13-Sep-2019         Instruction Type:Patient   
Education  
  
   
                                                    How to access health informa  
tion   
online - Detail  
Indication:Smoker  
                          Start:13-Sep-2019         Instruction Type:Patient   
Education  
  
   
                                                    Patient Instructions  
Indication:Smoker  
                          Start:13-Sep-2019         Instruction Type:Provider   
Instructions for Treatment  
  
   
                                                    How to access health informa  
tion   
online  
Indication:BMI 26.0-26.9,adult  
                          Start:10-May-2019         Instruction Type:Patient   
Education  
  
   
                                                    How to access health informa  
tion   
online - Detail  
Indication:BMI 26.0-26.9,adult  
                          Start:10-May-2019         Instruction Type:Patient   
Education  
  
   
                                                    Patient Instructions  
Indication:BMI 26.0-26.9,adult  
                          Start:10-May-2019         Instruction Type:Provider   
Instructions for Treatment  
  
   
                                                    How to access health informa  
tion   
online  
Indication:Abnormal glucose   
tolerance test  
                          Start:28-Dec-2018         Instruction Type:Patient   
Education  
  
   
                                                    How to access health informa  
tion   
online - Detail  
Indication:Abnormal glucose   
tolerance test  
                          Start:28-Dec-2018         Instruction Type:Patient   
Education  
  
   
                                                    Patient Instructions  
Indication:Abnormal glucose   
tolerance test  
                          Start:28-Dec-2018         Instruction Type:Provider   
Instructions for Treatment  
  
   
                                                    How to access health informa  
tion   
online  
Indication:Smoker  
                          Start:29-Aug-2018         Instruction Type:Patient   
Education  
  
   
                                                    How to access health informa  
tion   
online - Detail  
Indication:Smoker  
                          Start:29-Aug-2018         Instruction Type:Patient   
Education  
  
   
                                                    Patient Instructions  
Indication:Smoker  
                          Start:29-Aug-2018         Instruction Type:Provider   
Instructions for Treatment  
  
   
                                                    How to access health informa  
tion   
online  
Indication:Abnormal glucose   
tolerance test  
                          Start:2018         Instruction Type:Patient   
Education  
  
   
                                                    How to access health informa  
tion   
online - Detail  
Indication:Abnormal glucose   
tolerance test  
                          Start:2018         Instruction Type:Patient   
Education  
  
   
                                                    Patient Instructions  
Indication:Abnormal glucose   
tolerance test  
                          Start:2018         Instruction Type:Provider   
Instructions for Treatment  
  
   
                                                    How to access health informa  
tion   
online  
Indication:Smoker  
                          Start:8-Dec-2017          Instruction Type:Patient   
Education  
  
   
                                                    How to access health informa  
tion   
online - Detail  
Indication:Smoker  
                          Start:8-Dec-2017          Instruction Type:Patient   
Education  
  
   
                                                    Patient Instructions  
Indication:Smoker  
                          Start:8-Dec-2017          Instruction Type:Provider   
Instructions for Treatment  
  
   
                                                    How to access health informa  
tion   
online  
Indication:Smoker  
                          Start:4-Aug-2017          Instruction Type:Patient   
Education  
  
   
                                                    How to access health informa  
tion   
online - Detail  
Indication:Smoker  
                          Start:4-Aug-2017          Instruction Type:Patient   
Education  
  
   
                                                    Patient Instructions  
Indication:Smoker  
                          Start:4-Aug-2017          Instruction Type:Provider   
Instructions for Treatment  
  
   
                                                    How to access health informa  
tion   
online  
Indication:Smoker  
                          Start:3-Mar-2017          Instruction Type:Patient   
Education  
  
   
                                                    How to access health informa  
tion   
online - Detail  
Indication:Smoker  
                          Start:3-Mar-2017          Instruction Type:Patient   
Education  
  
   
                                                    Patient Instructions  
Indication:Smoker  
                          Start:3-Mar-2017          Instruction Type:Provider   
Instructions for Treatment  
  
   
                                                    How to access health informa  
tion   
online  
Indication:Hypertension,   
essential, benign  
                          Start:28-Oct-2016         Instruction Type:Patient   
Education  
  
   
                                                    How to access health informa  
tion   
online - Detail  
Indication:Hypertension,   
essential, benign  
                          Start:28-Oct-2016         Instruction Type:Patient   
Education  
  
   
                                                    Patient Instructions  
Indication:Hypertension,   
essential, benign  
                          Start:28-Oct-2016         Instruction Type:Provider   
Instructions for Treatment  
  
   
                                                    How to access health informa  
tion   
online  
Indication:Hypertension,   
essential, benign  
                          Start:2016         Instruction Type:Patient   
Education  
  
   
                                                    How to access health informa  
tion   
online - Detail  
Indication:Hypertension,   
essential, benign  
                          Start:2016         Instruction Type:Patient   
Education  
  
   
                                                    Patient Instructions  
Indication:Hypertension,   
essential, benign  
                          Start:2016         Instruction Type:Provider   
Instructions for Treatment  
  
   
                                                    How to access health informa  
tion   
online  
Indication:BMI 27.0-27.9,adult  
                          Start:2016         Instruction Type:Patient   
Education  
  
   
                                                    How to access health informa  
tion   
online - Detail  
Indication:BMI 27.0-27.9,adult  
                          Start:2016         Instruction Type:Patient   
Education  
  
   
                                                    Patient Instructions  
Indication:BMI 27.0-27.9,adult  
                          Start:2016         Instruction Type:Provider   
Instructions for Treatment  
  
   
                                                    How to access health informa  
tion   
online  
Indication:Abnormal glucose   
tolerance test  
                          Start:2016         Instruction Type:Patient   
Education  
  
   
                                                    How to access health informa  
tion   
online - Detail  
Indication:Abnormal glucose   
tolerance test  
                          Start:2016         Instruction Type:Patient   
Education  
  
   
                                                    Patient Instructions  
Indication:Abnormal glucose   
tolerance test  
                          Start:2016         Instruction Type:Provider   
Instructions for Treatment  
  
   
                                                    How to access health informa  
tion   
online  
Indication:Abnormal glucose   
tolerance test  
                          Start:16-Dec-2015         Instruction Type:Patient   
Education  
  
   
                                                    How to access health informa  
tion   
online - Detail  
Indication:Abnormal glucose   
tolerance test  
                          Start:16-Dec-2015         Instruction Type:Patient   
Education  
  
   
                                                    Patient Instructions  
Indication:Abnormal glucose   
tolerance test  
                          Start:16-Dec-2015         Instruction Type:Provider   
Instructions for Treatment  
  
   
                                                    How to access health informa  
tion   
online  
Indication:Hypercholesteremia  
                          Start:16-Sep-2015         Instruction Type:Patient   
Education  
  
   
                                                    How to access health informa  
tion   
online - Detail  
Indication:Hypercholesteremia  
                          Start:16-Sep-2015         Instruction Type:Patient   
Education  
  
   
                                                    Patient Instructions  
Indication:Hypercholesteremia  
                          Start:16-Sep-2015         Instruction Type:Provider   
Instructions for Treatment  
  
   
                                                    Patient Instructions  
Indication:Abnormal glucose   
tolerance test  
                          Start:9-May-2014          Instruction Type:Provider   
Instructions for Treatment  
  
   
                                                    Patient Instructions  
Indication:Abnormal glucose   
tolerance test  
                          Start:2014          Instruction Type:Provider   
Instructions for Treatment  
  
   
                                                    Patient Instructions  
Indication:Abnormal glucose   
tolerance test  
                          Start:12-Sep-2013         Instruction Type:Provider   
Instructions for Treatment  
  
   
                                                    Patient Instructions  
Indication:Hypercholesteremia  
                          Start:15-Mar-2013         Instruction Type:Provider   
Instructions for Treatment  
  
   
                                                    Patient Instructions  
Indication:Hypertension,   
essential, benign  
                          Start:14-Sep-2012         Instruction Type:Provider   
Instructions for Treatment  
  
  
  
[facility]; [facility]  
Work Phone: [phone]*   
  
                                Name            Dates           Details  
   
                                                    Patient Instructions  
Indication:BMI 27.0-27.9,adult  
                          Start:28-Sep-2022         Instruction Type:Provider   
Instructions for Treatment  
  
   
                                                    How to Access Health Informa  
tion   
Online using Patient Portal and   
3rd Party Apps  
Indication:BMI 27.0-27.9,adult  
                          Start:28-Sep-2022         Instruction Type:Patient   
Education  
  
   
                                                    Patient Instructions  
Indication:Smoker  
                          Start:16-Mar-2022         Instruction Type:Provider   
Instructions for Treatment  
  
   
                                                    How to Access Health Informa  
tion   
Online using Patient Portal and   
3rd Party Apps  
Indication:Smoker  
                          Start:16-Mar-2022         Instruction Type:Patient   
Education  
  
   
                                                    Patient Instructions  
Indication:Smoker  
                          Start:2-Sep-2021          Instruction Type:Provider   
Instructions for Treatment  
  
   
                                                    How to Access Health Informa  
tion   
Online using Patient Portal and   
3rd Party Apps  
Indication:Smoker  
                          Start:2-Sep-2021          Instruction Type:Patient   
Education  
  
   
                                                    Patient Instructions  
Indication:BMI 26.0-26.9,adult  
                          Start:4-Aug-2021          Instruction Type:Provider   
Instructions for Treatment  
  
   
                                                    How to Access Health Informa  
tion   
Online using Patient Portal and   
3rd Party Apps  
Indication:BMI 26.0-26.9,adult  
                          Start:4-Aug-2021          Instruction Type:Patient   
Education  
  
   
                                                    How to Access Health Informa  
tion   
Online using Patient Portal and   
3rd Party Apps  
Indication:Smoker  
                          Start:1-Mar-2021          Instruction Type:Patient   
Education  
  
   
                                                    Patient Instructions  
Indication:Smoker  
                          Start:1-Mar-2021          Instruction Type:Provider   
Instructions for Treatment  
  
   
                                                    How to Access Health Informa  
tion   
Online using Patient Portal and   
3rd Party Apps  
Indication:Smoker  
                          Start:2021         Instruction Type:Patient   
Education  
  
   
                                                    Patient Instructions  
Indication:Smoker  
                          Start:2021         Instruction Type:Provider   
Instructions for Treatment  
  
   
                                                    How to Access Health Informa  
tion   
Online using Patient Portal and   
3rd Party Apps  
Indication:Smoker  
                          Start:2021          Instruction Type:Patient   
Education  
  
   
                                                    Patient Instructions  
Indication:Smoker  
                          Start:2021          Instruction Type:Provider   
Instructions for Treatment  
  
   
                                                    How to Access Health Informa  
tion   
Online using Patient Portal and   
3rd Party Apps  
Indication:Smoker  
                          Start:16-Dec-2020         Instruction Type:Patient   
Education  
  
   
                                                    Patient Instructions  
Indication:Smoker  
                          Start:16-Dec-2020         Instruction Type:Provider   
Instructions for Treatment  
  
   
                                                    How to access health informa  
tion   
online  
Indication:Smoker  
                          Start:2020         Instruction Type:Patient   
Education  
  
   
                                                    How to access health informa  
tion   
online - Detail  
Indication:Smoker  
                          Start:2020         Instruction Type:Patient   
Education  
  
   
                                                    Patient Instructions  
Indication:Smoker  
                          Start:2020         Instruction Type:Provider   
Instructions for Treatment  
  
   
                                                    How to access health informa  
tion   
online  
Indication:Smoker  
                          Start:24-Aug-2020         Instruction Type:Patient   
Education  
  
   
                                                    How to access health informa  
tion   
online - Detail  
Indication:Smoker  
                          Start:24-Aug-2020         Instruction Type:Patient   
Education  
  
   
                                                    Patient Instructions  
Indication:Smoker  
                          Start:24-Aug-2020         Instruction Type:Provider   
Instructions for Treatment  
  
   
                                                    How to access health informa  
tion   
online - Detail  
Indication:BMI 25.0-25.9,adult  
                          Start:2020         Instruction Type:Patient   
Education  
  
   
                                                    How to access health informa  
tion   
online  
Indication:BMI 25.0-25.9,adult  
                          Start:2020         Instruction Type:Patient   
Education  
  
   
                                                    Patient Instructions  
Indication:BMI 25.0-25.9,adult  
                          Start:2020         Instruction Type:Provider   
Instructions for Treatment  
  
   
                                                    How to access health informa  
tion   
online  
Indication:Smoker  
                          Start:2020          Instruction Type:Patient   
Education  
  
   
                                                    How to access health informa  
tion   
online - Detail  
Indication:Smoker  
                          Start:2020          Instruction Type:Patient   
Education  
  
   
                                                    Patient Instructions  
Indication:Smoker  
                          Start:2020          Instruction Type:Provider   
Instructions for Treatment  
  
   
                                                    How to access health informa  
tion   
online  
Indication:Smoker  
                          Start:2020         Instruction Type:Patient   
Education  
  
   
                                                    How to access health informa  
tion   
online - Detail  
Indication:Smoker  
                          Start:2020         Instruction Type:Patient   
Education  
  
   
                                                    Patient Instructions  
Indication:Smoker  
                          Start:2020         Instruction Type:Provider   
Instructions for Treatment  
  
   
                                                    How to access health informa  
tion   
online  
Indication:Smoker  
                          Start:13-Sep-2019         Instruction Type:Patient   
Education  
  
   
                                                    How to access health informa  
tion   
online - Detail  
Indication:Smoker  
                          Start:13-Sep-2019         Instruction Type:Patient   
Education  
  
   
                                                    Patient Instructions  
Indication:Smoker  
                          Start:13-Sep-2019         Instruction Type:Provider   
Instructions for Treatment  
  
   
                                                    How to access health informa  
tion   
online  
Indication:BMI 26.0-26.9,adult  
                          Start:10-May-2019         Instruction Type:Patient   
Education  
  
   
                                                    How to access health informa  
tion   
online - Detail  
Indication:BMI 26.0-26.9,adult  
                          Start:10-May-2019         Instruction Type:Patient   
Education  
  
   
                                                    Patient Instructions  
Indication:BMI 26.0-26.9,adult  
                          Start:10-May-2019         Instruction Type:Provider   
Instructions for Treatment  
  
   
                                                    How to access health informa  
tion   
online  
Indication:Abnormal glucose   
tolerance test  
                          Start:28-Dec-2018         Instruction Type:Patient   
Education  
  
   
                                                    How to access health informa  
tion   
online - Detail  
Indication:Abnormal glucose   
tolerance test  
                          Start:28-Dec-2018         Instruction Type:Patient   
Education  
  
   
                                                    Patient Instructions  
Indication:Abnormal glucose   
tolerance test  
                          Start:28-Dec-2018         Instruction Type:Provider   
Instructions for Treatment  
  
   
                                                    How to access health informa  
tion   
online  
Indication:Smoker  
                          Start:29-Aug-2018         Instruction Type:Patient   
Education  
  
   
                                                    How to access health informa  
tion   
online - Detail  
Indication:Smoker  
                          Start:29-Aug-2018         Instruction Type:Patient   
Education  
  
   
                                                    Patient Instructions  
Indication:Smoker  
                          Start:29-Aug-2018         Instruction Type:Provider   
Instructions for Treatment  
  
   
                                                    How to access health informa  
tion   
online  
Indication:Abnormal glucose   
tolerance test  
                          Start:2018         Instruction Type:Patient   
Education  
  
   
                                                    How to access health informa  
tion   
online - Detail  
Indication:Abnormal glucose   
tolerance test  
                          Start:2018         Instruction Type:Patient   
Education  
  
   
                                                    Patient Instructions  
Indication:Abnormal glucose   
tolerance test  
                          Start:2018         Instruction Type:Provider   
Instructions for Treatment  
  
   
                                                    How to access health informa  
tion   
online  
Indication:Smoker  
                          Start:8-Dec-2017          Instruction Type:Patient   
Education  
  
   
                                                    How to access health informa  
tion   
online - Detail  
Indication:Smoker  
                          Start:8-Dec-2017          Instruction Type:Patient   
Education  
  
   
                                                    Patient Instructions  
Indication:Smoker  
                          Start:8-Dec-2017          Instruction Type:Provider   
Instructions for Treatment  
  
   
                                                    How to access health informa  
tion   
online  
Indication:Smoker  
                          Start:4-Aug-2017          Instruction Type:Patient   
Education  
  
   
                                                    How to access health informa  
tion   
online - Detail  
Indication:Smoker  
                          Start:4-Aug-2017          Instruction Type:Patient   
Education  
  
   
                                                    Patient Instructions  
Indication:Smoker  
                          Start:4-Aug-2017          Instruction Type:Provider   
Instructions for Treatment  
  
   
                                                    How to access health informa  
tion   
online  
Indication:Smoker  
                          Start:3-Mar-2017          Instruction Type:Patient   
Education  
  
   
                                                    How to access health informa  
tion   
online - Detail  
Indication:Smoker  
                          Start:3-Mar-2017          Instruction Type:Patient   
Education  
  
   
                                                    Patient Instructions  
Indication:Smoker  
                          Start:3-Mar-2017          Instruction Type:Provider   
Instructions for Treatment  
  
   
                                                    How to access health informa  
tion   
online  
Indication:Hypertension,   
essential, benign  
                          Start:28-Oct-2016         Instruction Type:Patient   
Education  
  
   
                                                    How to access health informa  
tion   
online - Detail  
Indication:Hypertension,   
essential, benign  
                          Start:28-Oct-2016         Instruction Type:Patient   
Education  
  
   
                                                    Patient Instructions  
Indication:Hypertension,   
essential, benign  
                          Start:28-Oct-2016         Instruction Type:Provider   
Instructions for Treatment  
  
   
                                                    How to access health informa  
tion   
online  
Indication:Hypertension,   
essential, benign  
                          Start:2016         Instruction Type:Patient   
Education  
  
   
                                                    How to access health informa  
tion   
online - Detail  
Indication:Hypertension,   
essential, benign  
                          Start:2016         Instruction Type:Patient   
Education  
  
   
                                                    Patient Instructions  
Indication:Hypertension,   
essential, benign  
                          Start:2016         Instruction Type:Provider   
Instructions for Treatment  
  
   
                                                    How to access health informa  
tion   
online  
Indication:BMI 27.0-27.9,adult  
                          Start:2016         Instruction Type:Patient   
Education  
  
   
                                                    How to access health informa  
tion   
online - Detail  
Indication:BMI 27.0-27.9,adult  
                          Start:2016         Instruction Type:Patient   
Education  
  
   
                                                    Patient Instructions  
Indication:BMI 27.0-27.9,adult  
                          Start:2016         Instruction Type:Provider   
Instructions for Treatment  
  
   
                                                    How to access health informa  
tion   
online  
Indication:Abnormal glucose   
tolerance test  
                          Start:2016         Instruction Type:Patient   
Education  
  
   
                                                    How to access health informa  
tion   
online - Detail  
Indication:Abnormal glucose   
tolerance test  
                          Start:2016         Instruction Type:Patient   
Education  
  
   
                                                    Patient Instructions  
Indication:Abnormal glucose   
tolerance test  
                          Start:2016         Instruction Type:Provider   
Instructions for Treatment  
  
   
                                                    How to access health informa  
tion   
online  
Indication:Abnormal glucose   
tolerance test  
                          Start:16-Dec-2015         Instruction Type:Patient   
Education  
  
   
                                                    How to access health informa  
tion   
online - Detail  
Indication:Abnormal glucose   
tolerance test  
                          Start:16-Dec-2015         Instruction Type:Patient   
Education  
  
   
                                                    Patient Instructions  
Indication:Abnormal glucose   
tolerance test  
                          Start:16-Dec-2015         Instruction Type:Provider   
Instructions for Treatment  
  
   
                                                    How to access health informa  
tion   
online  
Indication:Hypercholesteremia  
                          Start:16-Sep-2015         Instruction Type:Patient   
Education  
  
   
                                                    How to access health informa  
tion   
online - Detail  
Indication:Hypercholesteremia  
                          Start:16-Sep-2015         Instruction Type:Patient   
Education  
  
   
                                                    Patient Instructions  
Indication:Hypercholesteremia  
                          Start:16-Sep-2015         Instruction Type:Provider   
Instructions for Treatment  
  
   
                                                    Patient Instructions  
Indication:Abnormal glucose   
tolerance test  
                          Start:9-May-2014          Instruction Type:Provider   
Instructions for Treatment  
  
   
                                                    Patient Instructions  
Indication:Abnormal glucose   
tolerance test  
                          Start:2014          Instruction Type:Provider   
Instructions for Treatment  
  
   
                                                    Patient Instructions  
Indication:Abnormal glucose   
tolerance test  
                          Start:12-Sep-2013         Instruction Type:Provider   
Instructions for Treatment  
  
   
                                                    Patient Instructions  
Indication:Hypercholesteremia  
                          Start:15-Mar-2013         Instruction Type:Provider   
Instructions for Treatment  
  
   
                                                    Patient Instructions  
Indication:Hypertension,   
essential, benign  
                          Start:14-Sep-2012         Instruction Type:Provider   
Instructions for Treatment  
  
  
  
[facility]  
Work Phone: [phone]*   
  
                                Name            Dates           Details  
   
                                                    Patient Instructions  
Indication:BMI 27.0-27.9,adult  
                          Start:28-Sep-2022         Instruction Type:Provider   
Instructions for Treatment  
  
   
                                                    How to Access Health Informa  
tion   
Online using Patient Portal and   
3rd Party Apps  
Indication:BMI 27.0-27.9,adult  
                          Start:28-Sep-2022         Instruction Type:Patient   
Education  
  
   
                                                    Patient Instructions  
Indication:Smoker  
                          Start:16-Mar-2022         Instruction Type:Provider   
Instructions for Treatment  
  
   
                                                    How to Access Health Informa  
tion   
Online using Patient Portal and   
3rd Party Apps  
Indication:Smoker  
                          Start:16-Mar-2022         Instruction Type:Patient   
Education  
  
   
                                                    Patient Instructions  
Indication:Smoker  
                          Start:2-Sep-2021          Instruction Type:Provider   
Instructions for Treatment  
  
   
                                                    How to Access Health Informa  
tion   
Online using Patient Portal and   
3rd Party Apps  
Indication:Smoker  
                          Start:2-Sep-2021          Instruction Type:Patient   
Education  
  
   
                                                    Patient Instructions  
Indication:BMI 26.0-26.9,adult  
                          Start:4-Aug-2021          Instruction Type:Provider   
Instructions for Treatment  
  
   
                                                    How to Access Health Informa  
tion   
Online using Patient Portal and   
3rd Party Apps  
Indication:BMI 26.0-26.9,adult  
                          Start:4-Aug-2021          Instruction Type:Patient   
Education  
  
   
                                                    How to Access Health Informa  
tion   
Online using Patient Portal and   
3rd Party Apps  
Indication:Smoker  
                          Start:1-Mar-2021          Instruction Type:Patient   
Education  
  
   
                                                    Patient Instructions  
Indication:Smoker  
                          Start:1-Mar-2021          Instruction Type:Provider   
Instructions for Treatment  
  
   
                                                    How to Access Health Informa  
tion   
Online using Patient Portal and   
3rd Party Apps  
Indication:Smoker  
                          Start:2021         Instruction Type:Patient   
Education  
  
   
                                                    Patient Instructions  
Indication:Smoker  
                          Start:2021         Instruction Type:Provider   
Instructions for Treatment  
  
   
                                                    How to Access Health Informa  
tion   
Online using Patient Portal and   
3rd Party Apps  
Indication:Smoker  
                          Start:2021          Instruction Type:Patient   
Education  
  
   
                                                    Patient Instructions  
Indication:Smoker  
                          Start:2021          Instruction Type:Provider   
Instructions for Treatment  
  
   
                                                    How to Access Health Informa  
tion   
Online using Patient Portal and   
3rd Party Apps  
Indication:Smoker  
                          Start:16-Dec-2020         Instruction Type:Patient   
Education  
  
   
                                                    Patient Instructions  
Indication:Smoker  
                          Start:16-Dec-2020         Instruction Type:Provider   
Instructions for Treatment  
  
   
                                                    How to access health informa  
tion   
online  
Indication:Smoker  
                          Start:2020         Instruction Type:Patient   
Education  
  
   
                                                    How to access health informa  
tion   
online - Detail  
Indication:Smoker  
                          Start:2020         Instruction Type:Patient   
Education  
  
   
                                                    Patient Instructions  
Indication:Smoker  
                          Start:2020         Instruction Type:Provider   
Instructions for Treatment  
  
   
                                                    How to access health informa  
tion   
online  
Indication:Smoker  
                          Start:24-Aug-2020         Instruction Type:Patient   
Education  
  
   
                                                    How to access health informa  
tion   
online - Detail  
Indication:Smoker  
                          Start:24-Aug-2020         Instruction Type:Patient   
Education  
  
   
                                                    Patient Instructions  
Indication:Smoker  
                          Start:24-Aug-2020         Instruction Type:Provider   
Instructions for Treatment  
  
   
                                                    How to access health informa  
tion   
online - Detail  
Indication:BMI 25.0-25.9,adult  
                          Start:2020         Instruction Type:Patient   
Education  
  
   
                                                    How to access health informa  
tion   
online  
Indication:BMI 25.0-25.9,adult  
                          Start:2020         Instruction Type:Patient   
Education  
  
   
                                                    Patient Instructions  
Indication:BMI 25.0-25.9,adult  
                          Start:2020         Instruction Type:Provider   
Instructions for Treatment  
  
   
                                                    How to access health informa  
tion   
online  
Indication:Smoker  
                          Start:2020          Instruction Type:Patient   
Education  
  
   
                                                    How to access health informa  
tion   
online - Detail  
Indication:Smoker  
                          Start:2020          Instruction Type:Patient   
Education  
  
   
                                                    Patient Instructions  
Indication:Smoker  
                          Start:2020          Instruction Type:Provider   
Instructions for Treatment  
  
   
                                                    How to access health informa  
tion   
online  
Indication:Smoker  
                          Start:2020         Instruction Type:Patient   
Education  
  
   
                                                    How to access health informa  
tion   
online - Detail  
Indication:Smoker  
                          Start:2020         Instruction Type:Patient   
Education  
  
   
                                                    Patient Instructions  
Indication:Smoker  
                          Start:2020         Instruction Type:Provider   
Instructions for Treatment  
  
   
                                                    How to access health informa  
tion   
online  
Indication:Smoker  
                          Start:13-Sep-2019         Instruction Type:Patient   
Education  
  
   
                                                    How to access health informa  
tion   
online - Detail  
Indication:Smoker  
                          Start:13-Sep-2019         Instruction Type:Patient   
Education  
  
   
                                                    Patient Instructions  
Indication:Smoker  
                          Start:13-Sep-2019         Instruction Type:Provider   
Instructions for Treatment  
  
   
                                                    How to access health informa  
tion   
online  
Indication:BMI 26.0-26.9,adult  
                          Start:10-May-2019         Instruction Type:Patient   
Education  
  
   
                                                    How to access health informa  
tion   
online - Detail  
Indication:BMI 26.0-26.9,adult  
                          Start:10-May-2019         Instruction Type:Patient   
Education  
  
   
                                                    Patient Instructions  
Indication:BMI 26.0-26.9,adult  
                          Start:10-May-2019         Instruction Type:Provider   
Instructions for Treatment  
  
   
                                                    How to access health informa  
tion   
online  
Indication:Abnormal glucose   
tolerance test  
                          Start:28-Dec-2018         Instruction Type:Patient   
Education  
  
   
                                                    How to access health informa  
tion   
online - Detail  
Indication:Abnormal glucose   
tolerance test  
                          Start:28-Dec-2018         Instruction Type:Patient   
Education  
  
   
                                                    Patient Instructions  
Indication:Abnormal glucose   
tolerance test  
                          Start:28-Dec-2018         Instruction Type:Provider   
Instructions for Treatment  
  
   
                                                    How to access health informa  
tion   
online  
Indication:Smoker  
                          Start:29-Aug-2018         Instruction Type:Patient   
Education  
  
   
                                                    How to access health informa  
tion   
online - Detail  
Indication:Smoker  
                          Start:29-Aug-2018         Instruction Type:Patient   
Education  
  
   
                                                    Patient Instructions  
Indication:Smoker  
                          Start:29-Aug-2018         Instruction Type:Provider   
Instructions for Treatment  
  
   
                                                    How to access health informa  
tion   
online  
Indication:Abnormal glucose   
tolerance test  
                          Start:2018         Instruction Type:Patient   
Education  
  
   
                                                    How to access health informa  
tion   
online - Detail  
Indication:Abnormal glucose   
tolerance test  
                          Start:2018         Instruction Type:Patient   
Education  
  
   
                                                    Patient Instructions  
Indication:Abnormal glucose   
tolerance test  
                          Start:2018         Instruction Type:Provider   
Instructions for Treatment  
  
   
                                                    How to access health informa  
tion   
online  
Indication:Smoker  
                          Start:8-Dec-2017          Instruction Type:Patient   
Education  
  
   
                                                    How to access health informa  
tion   
online - Detail  
Indication:Smoker  
                          Start:8-Dec-2017          Instruction Type:Patient   
Education  
  
   
                                                    Patient Instructions  
Indication:Smoker  
                          Start:8-Dec-2017          Instruction Type:Provider   
Instructions for Treatment  
  
   
                                                    How to access health informa  
tion   
online  
Indication:Smoker  
                          Start:4-Aug-2017          Instruction Type:Patient   
Education  
  
   
                                                    How to access health informa  
tion   
online - Detail  
Indication:Smoker  
                          Start:4-Aug-2017          Instruction Type:Patient   
Education  
  
   
                                                    Patient Instructions  
Indication:Smoker  
                          Start:4-Aug-2017          Instruction Type:Provider   
Instructions for Treatment  
  
   
                                                    How to access health informa  
tion   
online  
Indication:Smoker  
                          Start:3-Mar-2017          Instruction Type:Patient   
Education  
  
   
                                                    How to access health informa  
tion   
online - Detail  
Indication:Smoker  
                          Start:3-Mar-2017          Instruction Type:Patient   
Education  
  
   
                                                    Patient Instructions  
Indication:Smoker  
                          Start:3-Mar-2017          Instruction Type:Provider   
Instructions for Treatment  
  
   
                                                    How to access health informa  
tion   
online  
Indication:Hypertension,   
essential, benign  
                          Start:28-Oct-2016         Instruction Type:Patient   
Education  
  
   
                                                    How to access health informa  
tion   
online - Detail  
Indication:Hypertension,   
essential, benign  
                          Start:28-Oct-2016         Instruction Type:Patient   
Education  
  
   
                                                    Patient Instructions  
Indication:Hypertension,   
essential, benign  
                          Start:28-Oct-2016         Instruction Type:Provider   
Instructions for Treatment  
  
   
                                                    How to access health informa  
tion   
online  
Indication:Hypertension,   
essential, benign  
                          Start:2016         Instruction Type:Patient   
Education  
  
   
                                                    How to access health informa  
tion   
online - Detail  
Indication:Hypertension,   
essential, benign  
                          Start:2016         Instruction Type:Patient   
Education  
  
   
                                                    Patient Instructions  
Indication:Hypertension,   
essential, benign  
                          Start:2016         Instruction Type:Provider   
Instructions for Treatment  
  
   
                                                    How to access health informa  
tion   
online  
Indication:BMI 27.0-27.9,adult  
                          Start:2016         Instruction Type:Patient   
Education  
  
   
                                                    How to access health informa  
tion   
online - Detail  
Indication:BMI 27.0-27.9,adult  
                          Start:2016         Instruction Type:Patient   
Education  
  
   
                                                    Patient Instructions  
Indication:BMI 27.0-27.9,adult  
                          Start:2016         Instruction Type:Provider   
Instructions for Treatment  
  
   
                                                    How to access health informa  
tion   
online  
Indication:Abnormal glucose   
tolerance test  
                          Start:2016         Instruction Type:Patient   
Education  
  
   
                                                    How to access health informa  
tion   
online - Detail  
Indication:Abnormal glucose   
tolerance test  
                          Start:2016         Instruction Type:Patient   
Education  
  
   
                                                    Patient Instructions  
Indication:Abnormal glucose   
tolerance test  
                          Start:2016         Instruction Type:Provider   
Instructions for Treatment  
  
   
                                                    How to access health informa  
tion   
online  
Indication:Abnormal glucose   
tolerance test  
                          Start:16-Dec-2015         Instruction Type:Patient   
Education  
  
   
                                                    How to access health informa  
tion   
online - Detail  
Indication:Abnormal glucose   
tolerance test  
                          Start:16-Dec-2015         Instruction Type:Patient   
Education  
  
   
                                                    Patient Instructions  
Indication:Abnormal glucose   
tolerance test  
                          Start:16-Dec-2015         Instruction Type:Provider   
Instructions for Treatment  
  
   
                                                    How to access health informa  
tion   
online  
Indication:Hypercholesteremia  
                          Start:16-Sep-2015         Instruction Type:Patient   
Education  
  
   
                                                    How to access health informa  
tion   
online - Detail  
Indication:Hypercholesteremia  
                          Start:16-Sep-2015         Instruction Type:Patient   
Education  
  
   
                                                    Patient Instructions  
Indication:Hypercholesteremia  
                          Start:16-Sep-2015         Instruction Type:Provider   
Instructions for Treatment  
  
   
                                                    Patient Instructions  
Indication:Abnormal glucose   
tolerance test  
                          Start:9-May-2014          Instruction Type:Provider   
Instructions for Treatment  
  
   
                                                    Patient Instructions  
Indication:Abnormal glucose   
tolerance test  
                          Start:2014          Instruction Type:Provider   
Instructions for Treatment  
  
   
                                                    Patient Instructions  
Indication:Abnormal glucose   
tolerance test  
                          Start:12-Sep-2013         Instruction Type:Provider   
Instructions for Treatment  
  
   
                                                    Patient Instructions  
Indication:Hypercholesteremia  
                          Start:15-Mar-2013         Instruction Type:Provider   
Instructions for Treatment  
  
   
                                                    Patient Instructions  
Indication:Hypertension,   
essential, benign  
                          Start:14-Sep-2012         Instruction Type:Provider   
Instructions for Treatment  
  
  
  
[facility]  
Work Phone: [phone]*   
  
                                Name            Dates           Details  
   
                                                    Patient Instructions  
Indication:BMI 27.0-27.9,adult  
                          Start:28-Sep-2022         Instruction Type:Provider   
Instructions for Treatment  
  
   
                                                    How to Access Health Informa  
tion   
Online using Patient Portal and   
3rd Party Apps  
Indication:BMI 27.0-27.9,adult  
                          Start:28-Sep-2022         Instruction Type:Patient   
Education  
  
   
                                                    Patient Instructions  
Indication:Smoker  
                          Start:16-Mar-2022         Instruction Type:Provider   
Instructions for Treatment  
  
   
                                                    How to Access Health Informa  
tion   
Online using Patient Portal and   
3rd Party Apps  
Indication:Smoker  
                          Start:16-Mar-2022         Instruction Type:Patient   
Education  
  
   
                                                    Patient Instructions  
Indication:Smoker  
                          Start:2-Sep-2021          Instruction Type:Provider   
Instructions for Treatment  
  
   
                                                    How to Access Health Informa  
tion   
Online using Patient Portal and   
3rd Party Apps  
Indication:Smoker  
                          Start:2-Sep-2021          Instruction Type:Patient   
Education  
  
   
                                                    Patient Instructions  
Indication:BMI 26.0-26.9,adult  
                          Start:4-Aug-2021          Instruction Type:Provider   
Instructions for Treatment  
  
   
                                                    How to Access Health Informa  
tion   
Online using Patient Portal and   
3rd Party Apps  
Indication:BMI 26.0-26.9,adult  
                          Start:4-Aug-2021          Instruction Type:Patient   
Education  
  
   
                                                    How to Access Health Informa  
tion   
Online using Patient Portal and   
3rd Party Apps  
Indication:Smoker  
                          Start:1-Mar-2021          Instruction Type:Patient   
Education  
  
   
                                                    Patient Instructions  
Indication:Smoker  
                          Start:1-Mar-2021          Instruction Type:Provider   
Instructions for Treatment  
  
   
                                                    How to Access Health Informa  
tion   
Online using Patient Portal and   
3rd Party Apps  
Indication:Smoker  
                          Start:2021         Instruction Type:Patient   
Education  
  
   
                                                    Patient Instructions  
Indication:Smoker  
                          Start:2021         Instruction Type:Provider   
Instructions for Treatment  
  
   
                                                    How to Access Health Informa  
tion   
Online using Patient Portal and   
3rd Party Apps  
Indication:Smoker  
                          Start:2021          Instruction Type:Patient   
Education  
  
   
                                                    Patient Instructions  
Indication:Smoker  
                          Start:2021          Instruction Type:Provider   
Instructions for Treatment  
  
   
                                                    How to Access Health Informa  
tion   
Online using Patient Portal and   
3rd Party Apps  
Indication:Smoker  
                          Start:16-Dec-2020         Instruction Type:Patient   
Education  
  
   
                                                    Patient Instructions  
Indication:Smoker  
                          Start:16-Dec-2020         Instruction Type:Provider   
Instructions for Treatment  
  
   
                                                    How to access health informa  
tion   
online  
Indication:Smoker  
                          Start:2020         Instruction Type:Patient   
Education  
  
   
                                                    How to access health informa  
tion   
online - Detail  
Indication:Smoker  
                          Start:2020         Instruction Type:Patient   
Education  
  
   
                                                    Patient Instructions  
Indication:Smoker  
                          Start:2020         Instruction Type:Provider   
Instructions for Treatment  
  
   
                                                    How to access health informa  
tion   
online  
Indication:Smoker  
                          Start:24-Aug-2020         Instruction Type:Patient   
Education  
  
   
                                                    How to access health informa  
tion   
online - Detail  
Indication:Smoker  
                          Start:24-Aug-2020         Instruction Type:Patient   
Education  
  
   
                                                    Patient Instructions  
Indication:Smoker  
                          Start:24-Aug-2020         Instruction Type:Provider   
Instructions for Treatment  
  
   
                                                    How to access health informa  
tion   
online - Detail  
Indication:BMI 25.0-25.9,adult  
                          Start:2020         Instruction Type:Patient   
Education  
  
   
                                                    How to access health informa  
tion   
online  
Indication:BMI 25.0-25.9,adult  
                          Start:2020         Instruction Type:Patient   
Education  
  
   
                                                    Patient Instructions  
Indication:BMI 25.0-25.9,adult  
                          Start:2020         Instruction Type:Provider   
Instructions for Treatment  
  
   
                                                    How to access health informa  
tion   
online  
Indication:Smoker  
                          Start:2020          Instruction Type:Patient   
Education  
  
   
                                                    How to access health informa  
tion   
online - Detail  
Indication:Smoker  
                          Start:2020          Instruction Type:Patient   
Education  
  
   
                                                    Patient Instructions  
Indication:Smoker  
                          Start:2020          Instruction Type:Provider   
Instructions for Treatment  
  
   
                                                    How to access health informa  
tion   
online  
Indication:Smoker  
                          Start:2020         Instruction Type:Patient   
Education  
  
   
                                                    How to access health informa  
tion   
online - Detail  
Indication:Smoker  
                          Start:2020         Instruction Type:Patient   
Education  
  
   
                                                    Patient Instructions  
Indication:Smoker  
                          Start:2020         Instruction Type:Provider   
Instructions for Treatment  
  
   
                                                    How to access health informa  
tion   
online  
Indication:Smoker  
                          Start:13-Sep-2019         Instruction Type:Patient   
Education  
  
   
                                                    How to access health informa  
tion   
online - Detail  
Indication:Smoker  
                          Start:13-Sep-2019         Instruction Type:Patient   
Education  
  
   
                                                    Patient Instructions  
Indication:Smoker  
                          Start:13-Sep-2019         Instruction Type:Provider   
Instructions for Treatment  
  
   
                                                    How to access health informa  
tion   
online  
Indication:BMI 26.0-26.9,adult  
                          Start:10-May-2019         Instruction Type:Patient   
Education  
  
   
                                                    How to access health informa  
tion   
online - Detail  
Indication:BMI 26.0-26.9,adult  
                          Start:10-May-2019         Instruction Type:Patient   
Education  
  
   
                                                    Patient Instructions  
Indication:BMI 26.0-26.9,adult  
                          Start:10-May-2019         Instruction Type:Provider   
Instructions for Treatment  
  
   
                                                    How to access health informa  
tion   
online  
Indication:Abnormal glucose   
tolerance test  
                          Start:28-Dec-2018         Instruction Type:Patient   
Education  
  
   
                                                    How to access health informa  
tion   
online - Detail  
Indication:Abnormal glucose   
tolerance test  
                          Start:28-Dec-2018         Instruction Type:Patient   
Education  
  
   
                                                    Patient Instructions  
Indication:Abnormal glucose   
tolerance test  
                          Start:28-Dec-2018         Instruction Type:Provider   
Instructions for Treatment  
  
   
                                                    How to access health informa  
tion   
online  
Indication:Smoker  
                          Start:29-Aug-2018         Instruction Type:Patient   
Education  
  
   
                                                    How to access health informa  
tion   
online - Detail  
Indication:Smoker  
                          Start:29-Aug-2018         Instruction Type:Patient   
Education  
  
   
                                                    Patient Instructions  
Indication:Smoker  
                          Start:29-Aug-2018         Instruction Type:Provider   
Instructions for Treatment  
  
   
                                                    How to access health informa  
tion   
online  
Indication:Abnormal glucose   
tolerance test  
                          Start:2018         Instruction Type:Patient   
Education  
  
   
                                                    How to access health informa  
tion   
online - Detail  
Indication:Abnormal glucose   
tolerance test  
                          Start:2018         Instruction Type:Patient   
Education  
  
   
                                                    Patient Instructions  
Indication:Abnormal glucose   
tolerance test  
                          Start:2018         Instruction Type:Provider   
Instructions for Treatment  
  
   
                                                    How to access health informa  
tion   
online  
Indication:Smoker  
                          Start:8-Dec-2017          Instruction Type:Patient   
Education  
  
   
                                                    How to access health informa  
tion   
online - Detail  
Indication:Smoker  
                          Start:8-Dec-2017          Instruction Type:Patient   
Education  
  
   
                                                    Patient Instructions  
Indication:Smoker  
                          Start:8-Dec-2017          Instruction Type:Provider   
Instructions for Treatment  
  
   
                                                    How to access health informa  
tion   
online  
Indication:Smoker  
                          Start:4-Aug-2017          Instruction Type:Patient   
Education  
  
   
                                                    How to access health informa  
tion   
online - Detail  
Indication:Smoker  
                          Start:4-Aug-2017          Instruction Type:Patient   
Education  
  
   
                                                    Patient Instructions  
Indication:Smoker  
                          Start:4-Aug-2017          Instruction Type:Provider   
Instructions for Treatment  
  
   
                                                    How to access health informa  
tion   
online  
Indication:Smoker  
                          Start:3-Mar-2017          Instruction Type:Patient   
Education  
  
   
                                                    How to access health informa  
tion   
online - Detail  
Indication:Smoker  
                          Start:3-Mar-2017          Instruction Type:Patient   
Education  
  
   
                                                    Patient Instructions  
Indication:Smoker  
                          Start:3-Mar-2017          Instruction Type:Provider   
Instructions for Treatment  
  
   
                                                    How to access health informa  
tion   
online  
Indication:Hypertension,   
essential, benign  
                          Start:28-Oct-2016         Instruction Type:Patient   
Education  
  
   
                                                    How to access health informa  
tion   
online - Detail  
Indication:Hypertension,   
essential, benign  
                          Start:28-Oct-2016         Instruction Type:Patient   
Education  
  
   
                                                    Patient Instructions  
Indication:Hypertension,   
essential, benign  
                          Start:28-Oct-2016         Instruction Type:Provider   
Instructions for Treatment  
  
   
                                                    How to access health informa  
tion   
online  
Indication:Hypertension,   
essential, benign  
                          Start:2016         Instruction Type:Patient   
Education  
  
   
                                                    How to access health informa  
tion   
online - Detail  
Indication:Hypertension,   
essential, benign  
                          Start:2016         Instruction Type:Patient   
Education  
  
   
                                                    Patient Instructions  
Indication:Hypertension,   
essential, benign  
                          Start:2016         Instruction Type:Provider   
Instructions for Treatment  
  
   
                                                    How to access health informa  
tion   
online  
Indication:BMI 27.0-27.9,adult  
                          Start:2016         Instruction Type:Patient   
Education  
  
   
                                                    How to access health informa  
tion   
online - Detail  
Indication:BMI 27.0-27.9,adult  
                          Start:2016         Instruction Type:Patient   
Education  
  
   
                                                    Patient Instructions  
Indication:BMI 27.0-27.9,adult  
                          Start:2016         Instruction Type:Provider   
Instructions for Treatment  
  
   
                                                    How to access health informa  
tion   
online  
Indication:Abnormal glucose   
tolerance test  
                          Start:2016         Instruction Type:Patient   
Education  
  
   
                                                    How to access health informa  
tion   
online - Detail  
Indication:Abnormal glucose   
tolerance test  
                          Start:2016         Instruction Type:Patient   
Education  
  
   
                                                    Patient Instructions  
Indication:Abnormal glucose   
tolerance test  
                          Start:2016         Instruction Type:Provider   
Instructions for Treatment  
  
   
                                                    How to access health informa  
tion   
online  
Indication:Abnormal glucose   
tolerance test  
                          Start:16-Dec-2015         Instruction Type:Patient   
Education  
  
   
                                                    How to access health informa  
tion   
online - Detail  
Indication:Abnormal glucose   
tolerance test  
                          Start:16-Dec-2015         Instruction Type:Patient   
Education  
  
   
                                                    Patient Instructions  
Indication:Abnormal glucose   
tolerance test  
                          Start:16-Dec-2015         Instruction Type:Provider   
Instructions for Treatment  
  
   
                                                    How to access health informa  
tion   
online  
Indication:Hypercholesteremia  
                          Start:16-Sep-2015         Instruction Type:Patient   
Education  
  
   
                                                    How to access health informa  
tion   
online - Detail  
Indication:Hypercholesteremia  
                          Start:16-Sep-2015         Instruction Type:Patient   
Education  
  
   
                                                    Patient Instructions  
Indication:Hypercholesteremia  
                          Start:16-Sep-2015         Instruction Type:Provider   
Instructions for Treatment  
  
   
                                                    Patient Instructions  
Indication:Abnormal glucose   
tolerance test  
                          Start:9-May-2014          Instruction Type:Provider   
Instructions for Treatment  
  
   
                                                    Patient Instructions  
Indication:Abnormal glucose   
tolerance test  
                          Start:2014          Instruction Type:Provider   
Instructions for Treatment  
  
   
                                                    Patient Instructions  
Indication:Abnormal glucose   
tolerance test  
                          Start:12-Sep-2013         Instruction Type:Provider   
Instructions for Treatment  
  
   
                                                    Patient Instructions  
Indication:Hypercholesteremia  
                          Start:15-Mar-2013         Instruction Type:Provider   
Instructions for Treatment  
  
   
                                                    Patient Instructions  
Indication:Hypertension,   
essential, benign  
                          Start:14-Sep-2012         Instruction Type:Provider   
Instructions for Treatment  
  
  
  
[facility]  
Work Phone: [phone]*   
  
                                Name            Dates           Details  
   
                                                    Patient Instructions  
Indication:BMI 27.0-27.9,adult  
                          Start:28-Sep-2022         Instruction Type:Provider   
Instructions for Treatment  
  
   
                                                    How to Access Health Informa  
tion   
Online using Patient Portal and   
3rd Party Apps  
Indication:BMI 27.0-27.9,adult  
                          Start:28-Sep-2022         Instruction Type:Patient   
Education  
  
   
                                                    Patient Instructions  
                          Start:16-Mar-2022         Instruction Type:Provider   
Instructions for Treatment  
  
   
                                                    How to Access Health Informa  
tion   
Online using Patient Portal and   
3rd Party Apps  
                          Start:16-Mar-2022         Instruction Type:Patient   
Education  
  
   
                                                    Patient Instructions  
                          Start:2-Sep-2021          Instruction Type:Provider   
Instructions for Treatment  
  
   
                                                    How to Access Health Informa  
tion   
Online using Patient Portal and   
3rd Party Apps  
                          Start:2-Sep-2021          Instruction Type:Patient   
Education  
  
   
                                                    Patient Instructions  
Indication:BMI 26.0-26.9,adult  
                          Start:4-Aug-2021          Instruction Type:Provider   
Instructions for Treatment  
  
   
                                                    How to Access Health Informa  
tion   
Online using Patient Portal and   
3rd Party Apps  
Indication:BMI 26.0-26.9,adult  
                          Start:4-Aug-2021          Instruction Type:Patient   
Education  
  
   
                                                    How to Access Health Informa  
tion   
Online using Patient Portal and   
3rd Party Apps  
                          Start:1-Mar-2021          Instruction Type:Patient   
Education  
  
   
                                                    Patient Instructions  
                          Start:1-Mar-2021          Instruction Type:Provider   
Instructions for Treatment  
  
   
                                                    How to Access Health Informa  
tion   
Online using Patient Portal and   
3rd Party Apps  
                          Start:2021         Instruction Type:Patient   
Education  
  
   
                                                    Patient Instructions  
                          Start:2021         Instruction Type:Provider   
Instructions for Treatment  
  
   
                                                    How to Access Health Informa  
tion   
Online using Patient Portal and   
3rd Party Apps  
                          Start:2021          Instruction Type:Patient   
Education  
  
   
                                                    Patient Instructions  
                          Start:2021          Instruction Type:Provider   
Instructions for Treatment  
  
   
                                                    How to Access Health Informa  
tion   
Online using Patient Portal and   
3rd Party Apps  
                          Start:16-Dec-2020         Instruction Type:Patient   
Education  
  
   
                                                    Patient Instructions  
                          Start:16-Dec-2020         Instruction Type:Provider   
Instructions for Treatment  
  
   
                                                    How to access health informa  
tion   
online  
                          Start:2020         Instruction Type:Patient   
Education  
  
   
                                                    How to access health informa  
tion   
online - Detail  
                          Start:2020         Instruction Type:Patient   
Education  
  
   
                                                    Patient Instructions  
                          Start:2020         Instruction Type:Provider   
Instructions for Treatment  
  
   
                                                    How to access health informa  
tion   
online  
                          Start:24-Aug-2020         Instruction Type:Patient   
Education  
  
   
                                                    How to access health informa  
tion   
online - Detail  
                          Start:24-Aug-2020         Instruction Type:Patient   
Education  
  
   
                                                    Patient Instructions  
                          Start:24-Aug-2020         Instruction Type:Provider   
Instructions for Treatment  
  
   
                                                    How to access health informa  
tion   
online - Detail  
Indication:BMI 25.0-25.9,adult  
                          Start:2020         Instruction Type:Patient   
Education  
  
   
                                                    How to access health informa  
tion   
online  
Indication:BMI 25.0-25.9,adult  
                          Start:2020         Instruction Type:Patient   
Education  
  
   
                                                    Patient Instructions  
Indication:BMI 25.0-25.9,adult  
                          Start:2020         Instruction Type:Provider   
Instructions for Treatment  
  
   
                                                    How to access health informa  
tion   
online  
                          Start:2020          Instruction Type:Patient   
Education  
  
   
                                                    How to access health informa  
tion   
online - Detail  
                          Start:2020          Instruction Type:Patient   
Education  
  
   
                                                    Patient Instructions  
                          Start:2020          Instruction Type:Provider   
Instructions for Treatment  
  
   
                                                    How to access health informa  
tion   
online  
                          Start:2020         Instruction Type:Patient   
Education  
  
   
                                                    How to access health informa  
tion   
online - Detail  
                          Start:2020         Instruction Type:Patient   
Education  
  
   
                                                    Patient Instructions  
                          Start:2020         Instruction Type:Provider   
Instructions for Treatment  
  
   
                                                    How to access health informa  
tion   
online  
                          Start:13-Sep-2019         Instruction Type:Patient   
Education  
  
   
                                                    How to access health informa  
tion   
online - Detail  
                          Start:13-Sep-2019         Instruction Type:Patient   
Education  
  
   
                                                    Patient Instructions  
                          Start:13-Sep-2019         Instruction Type:Provider   
Instructions for Treatment  
  
   
                                                    How to access health informa  
tion   
online  
Indication:BMI 26.0-26.9,adult  
                          Start:10-May-2019         Instruction Type:Patient   
Education  
  
   
                                                    How to access health informa  
tion   
online - Detail  
Indication:BMI 26.0-26.9,adult  
                          Start:10-May-2019         Instruction Type:Patient   
Education  
  
   
                                                    Patient Instructions  
Indication:BMI 26.0-26.9,adult  
                          Start:10-May-2019         Instruction Type:Provider   
Instructions for Treatment  
  
   
                                                    How to access health informa  
tion   
online  
Indication:Abnormal glucose   
tolerance test  
                          Start:28-Dec-2018         Instruction Type:Patient   
Education  
  
   
                                                    How to access health informa  
tion   
online - Detail  
Indication:Abnormal glucose   
tolerance test  
                          Start:28-Dec-2018         Instruction Type:Patient   
Education  
  
   
                                                    Patient Instructions  
Indication:Abnormal glucose   
tolerance test  
                          Start:28-Dec-2018         Instruction Type:Provider   
Instructions for Treatment  
  
   
                                                    How to access health informa  
tion   
online  
                          Start:29-Aug-2018         Instruction Type:Patient   
Education  
  
   
                                                    How to access health informa  
tion   
online - Detail  
                          Start:29-Aug-2018         Instruction Type:Patient   
Education  
  
   
                                                    Patient Instructions  
                          Start:29-Aug-2018         Instruction Type:Provider   
Instructions for Treatment  
  
   
                                                    How to access health informa  
tion   
online  
Indication:Abnormal glucose   
tolerance test  
                          Start:2018         Instruction Type:Patient   
Education  
  
   
                                                    How to access health informa  
tion   
online - Detail  
Indication:Abnormal glucose   
tolerance test  
                          Start:2018         Instruction Type:Patient   
Education  
  
   
                                                    Patient Instructions  
Indication:Abnormal glucose   
tolerance test  
                          Start:2018         Instruction Type:Provider   
Instructions for Treatment  
  
   
                                                    How to access health informa  
tion   
online  
                          Start:8-Dec-2017          Instruction Type:Patient   
Education  
  
   
                                                    How to access health informa  
tion   
online - Detail  
                          Start:8-Dec-2017          Instruction Type:Patient   
Education  
  
   
                                                    Patient Instructions  
                          Start:8-Dec-2017          Instruction Type:Provider   
Instructions for Treatment  
  
   
                                                    How to access health informa  
tion   
online  
                          Start:4-Aug-2017          Instruction Type:Patient   
Education  
  
   
                                                    How to access health informa  
tion   
online - Detail  
                          Start:4-Aug-2017          Instruction Type:Patient   
Education  
  
   
                                                    Patient Instructions  
                          Start:4-Aug-2017          Instruction Type:Provider   
Instructions for Treatment  
  
   
                                                    How to access health informa  
tion   
online  
                          Start:3-Mar-2017          Instruction Type:Patient   
Education  
  
   
                                                    How to access health informa  
tion   
online - Detail  
                          Start:3-Mar-2017          Instruction Type:Patient   
Education  
  
   
                                                    Patient Instructions  
                          Start:3-Mar-2017          Instruction Type:Provider   
Instructions for Treatment  
  
   
                                                    How to access health informa  
tion   
online  
Indication:Hypertension,   
essential, benign  
                          Start:28-Oct-2016         Instruction Type:Patient   
Education  
  
   
                                                    How to access health informa  
tion   
online - Detail  
Indication:Hypertension,   
essential, benign  
                          Start:28-Oct-2016         Instruction Type:Patient   
Education  
  
   
                                                    Patient Instructions  
Indication:Hypertension,   
essential, benign  
                          Start:28-Oct-2016         Instruction Type:Provider   
Instructions for Treatment  
  
   
                                                    How to access health informa  
tion   
online  
Indication:Hypertension,   
essential, benign  
                          Start:2016         Instruction Type:Patient   
Education  
  
   
                                                    How to access health informa  
tion   
online - Detail  
Indication:Hypertension,   
essential, benign  
                          Start:2016         Instruction Type:Patient   
Education  
  
   
                                                    Patient Instructions  
Indication:Hypertension,   
essential, benign  
                          Start:2016         Instruction Type:Provider   
Instructions for Treatment  
  
   
                                                    How to access health informa  
tion   
online  
Indication:BMI 27.0-27.9,adult  
                          Start:2016         Instruction Type:Patient   
Education  
  
   
                                                    How to access health informa  
tion   
online - Detail  
Indication:BMI 27.0-27.9,adult  
                          Start:2016         Instruction Type:Patient   
Education  
  
   
                                                    Patient Instructions  
Indication:BMI 27.0-27.9,adult  
                          Start:2016         Instruction Type:Provider   
Instructions for Treatment  
  
   
                                                    How to access health informa  
tion   
online  
Indication:Abnormal glucose   
tolerance test  
                          Start:2016         Instruction Type:Patient   
Education  
  
   
                                                    How to access health informa  
tion   
online - Detail  
Indication:Abnormal glucose   
tolerance test  
                          Start:2016         Instruction Type:Patient   
Education  
  
   
                                                    Patient Instructions  
Indication:Abnormal glucose   
tolerance test  
                          Start:2016         Instruction Type:Provider   
Instructions for Treatment  
  
   
                                                    How to access health informa  
tion   
online  
Indication:Abnormal glucose   
tolerance test  
                          Start:16-Dec-2015         Instruction Type:Patient   
Education  
  
   
                                                    How to access health informa  
tion   
online - Detail  
Indication:Abnormal glucose   
tolerance test  
                          Start:16-Dec-2015         Instruction Type:Patient   
Education  
  
   
                                                    Patient Instructions  
Indication:Abnormal glucose   
tolerance test  
                          Start:16-Dec-2015         Instruction Type:Provider   
Instructions for Treatment  
  
   
                                                    How to access health informa  
tion   
online  
Indication:Hypercholesteremia  
                          Start:16-Sep-2015         Instruction Type:Patient   
Education  
  
   
                                                    How to access health informa  
tion   
online - Detail  
Indication:Hypercholesteremia  
                          Start:16-Sep-2015         Instruction Type:Patient   
Education  
  
   
                                                    Patient Instructions  
Indication:Hypercholesteremia  
                          Start:16-Sep-2015         Instruction Type:Provider   
Instructions for Treatment  
  
   
                                                    Patient Instructions  
Indication:Abnormal glucose   
tolerance test  
                          Start:9-May-2014          Instruction Type:Provider   
Instructions for Treatment  
  
   
                                                    Patient Instructions  
Indication:Abnormal glucose   
tolerance test  
                          Start:2014          Instruction Type:Provider   
Instructions for Treatment  
  
   
                                                    Patient Instructions  
Indication:Abnormal glucose   
tolerance test  
                          Start:12-Sep-2013         Instruction Type:Provider   
Instructions for Treatment  
  
   
                                                    Patient Instructions  
Indication:Hypercholesteremia  
                          Start:15-Mar-2013         Instruction Type:Provider   
Instructions for Treatment  
  
   
                                                    Patient Instructions  
Indication:Hypertension,   
essential, benign  
                          Start:14-Sep-2012         Instruction Type:Provider   
Instructions for Treatment  
  
  
  
[facility]  
Work Phone: [phone]*   
  
                                Name            Dates           Details  
   
                                                    Patient Instructions  
Indication:BMI 27.0-27.9,adult  
                          Start:2023         Instruction Type:Provider   
Instructions for Treatment  
  
   
                                                    How to Access Health Informa  
tion   
Online using Patient Portal and   
[employer]  
Indication:BMI 27.0-27.9,adult  
                          Start:2023         Instruction Type:Patient   
Education  
  
   
                                                    Patient Instructions  
Indication:BMI 27.0-27.9,adult  
                          Start:28-Sep-2022         Instruction Type:Provider   
Instructions for Treatment  
  
   
                                                    How to Access Health Informa  
tion   
Online using Patient Portal and   
[employer] Party Apps  
Indication:BMI 27.0-27.9,adult  
                          Start:28-Sep-2022         Instruction Type:Patient   
Education  
  
   
                                                    Patient Instructions  
Indication:Smoker  
                          Start:16-Mar-2022         Instruction Type:Provider   
Instructions for Treatment  
  
   
                                                    How to Access Health Informa  
tion   
Online using Patient Portal and   
[employer] Apps  
Indication:Smoker  
                          Start:16-Mar-2022         Instruction Type:Patient   
Education  
  
   
                                                    Patient Instructions  
Indication:Smoker  
                          Start:2-Sep-2021          Instruction Type:Provider   
Instructions for Treatment  
  
   
                                                    How to Access Health Informa  
tion   
Online using Patient Portal and   
[employer] Apps  
Indication:Smoker  
                          Start:2-Sep-2021          Instruction Type:Patient   
Education  
  
   
                                                    Patient Instructions  
Indication:BMI 26.0-26.9,adult  
                          Start:4-Aug-2021          Instruction Type:Provider   
Instructions for Treatment  
  
   
                                                    How to Access Health Informa  
tion   
Online using Patient Portal and   
[employer] Apps  
Indication:BMI 26.0-26.9,adult  
                          Start:4-Aug-2021          Instruction Type:Patient   
Education  
  
   
                                                    How to Access Health Informa  
tion   
Online using Patient Portal and   
[employer] Apps  
Indication:Smoker  
                          Start:1-Mar-2021          Instruction Type:Patient   
Education  
  
   
                                                    Patient Instructions  
Indication:Smoker  
                          Start:1-Mar-2021          Instruction Type:Provider   
Instructions for Treatment  
  
   
                                                    How to Access Health Informa  
tion   
Online using Patient Portal and   
[employer] Apps  
Indication:Smoker  
                          Start:2021         Instruction Type:Patient   
Education  
  
   
                                                    Patient Instructions  
Indication:Smoker  
                          Start:2021         Instruction Type:Provider   
Instructions for Treatment  
  
   
                                                    How to Access Health Informa  
tion   
Online using Patient Portal and   
[employer] Party Apps  
Indication:Smoker  
                          Start:2021          Instruction Type:Patient   
Education  
  
   
                                                    Patient Instructions  
Indication:Smoker  
                          Start:2021          Instruction Type:Provider   
Instructions for Treatment  
  
   
                                                    How to Access Health Informa  
tion   
Online using Patient Portal and   
[employer] Apps  
Indication:Smoker  
                          Start:16-Dec-2020         Instruction Type:Patient   
Education  
  
   
                                                    Patient Instructions  
Indication:Smoker  
                          Start:16-Dec-2020         Instruction Type:Provider   
Instructions for Treatment  
  
   
                                                    How to access health informa  
tion   
online  
Indication:Smoker  
                          Start:2020         Instruction Type:Patient   
Education  
  
   
                                                    How to access health informa  
tion   
online - Detail  
Indication:Smoker  
                          Start:2020         Instruction Type:Patient   
Education  
  
   
                                                    Patient Instructions  
Indication:Smoker  
                          Start:2020         Instruction Type:Provider   
Instructions for Treatment  
  
   
                                                    How to access health informa  
tion   
online  
Indication:Smoker  
                          Start:24-Aug-2020         Instruction Type:Patient   
Education  
  
   
                                                    How to access health informa  
tion   
online - Detail  
Indication:Smoker  
                          Start:24-Aug-2020         Instruction Type:Patient   
Education  
  
   
                                                    Patient Instructions  
Indication:Smoker  
                          Start:24-Aug-2020         Instruction Type:Provider   
Instructions for Treatment  
  
   
                                                    How to access health informa  
tion   
online - Detail  
Indication:BMI 25.0-25.9,adult  
                          Start:2020         Instruction Type:Patient   
Education  
  
   
                                                    How to access health informa  
tion   
online  
Indication:BMI 25.0-25.9,adult  
                          Start:2020         Instruction Type:Patient   
Education  
  
   
                                                    Patient Instructions  
Indication:BMI 25.0-25.9,adult  
                          Start:2020         Instruction Type:Provider   
Instructions for Treatment  
  
   
                                                    How to access health informa  
tion   
online  
Indication:Smoker  
                          Start:2020          Instruction Type:Patient   
Education  
  
   
                                                    How to access health informa  
tion   
online - Detail  
Indication:Smoker  
                          Start:2020          Instruction Type:Patient   
Education  
  
   
                                                    Patient Instructions  
Indication:Smoker  
                          Start:2020          Instruction Type:Provider   
Instructions for Treatment  
  
   
                                                    How to access health informa  
tion   
online  
Indication:Smoker  
                          Start:2020         Instruction Type:Patient   
Education  
  
   
                                                    How to access health informa  
tion   
online - Detail  
Indication:Smoker  
                          Start:2020         Instruction Type:Patient   
Education  
  
   
                                                    Patient Instructions  
Indication:Smoker  
                          Start:2020         Instruction Type:Provider   
Instructions for Treatment  
  
   
                                                    How to access health informa  
tion   
online  
Indication:Smoker  
                          Start:13-Sep-2019         Instruction Type:Patient   
Education  
  
   
                                                    How to access health informa  
tion   
online - Detail  
Indication:Smoker  
                          Start:13-Sep-2019         Instruction Type:Patient   
Education  
  
   
                                                    Patient Instructions  
Indication:Smoker  
                          Start:13-Sep-2019         Instruction Type:Provider   
Instructions for Treatment  
  
   
                                                    How to access health informa  
tion   
online  
Indication:BMI 26.0-26.9,adult  
                          Start:10-May-2019         Instruction Type:Patient   
Education  
  
   
                                                    How to access health informa  
tion   
online - Detail  
Indication:BMI 26.0-26.9,adult  
                          Start:10-May-2019         Instruction Type:Patient   
Education  
  
   
                                                    Patient Instructions  
Indication:BMI 26.0-26.9,adult  
                          Start:10-May-2019         Instruction Type:Provider   
Instructions for Treatment  
  
   
                                                    How to access health informa  
tion   
online  
Indication:Abnormal glucose   
tolerance test  
                          Start:28-Dec-2018         Instruction Type:Patient   
Education  
  
   
                                                    How to access health informa  
tion   
online - Detail  
Indication:Abnormal glucose   
tolerance test  
                          Start:28-Dec-2018         Instruction Type:Patient   
Education  
  
   
                                                    Patient Instructions  
Indication:Abnormal glucose   
tolerance test  
                          Start:28-Dec-2018         Instruction Type:Provider   
Instructions for Treatment  
  
   
                                                    How to access health informa  
tion   
online  
Indication:Smoker  
                          Start:29-Aug-2018         Instruction Type:Patient   
Education  
  
   
                                                    How to access health informa  
tion   
online - Detail  
Indication:Smoker  
                          Start:29-Aug-2018         Instruction Type:Patient   
Education  
  
   
                                                    Patient Instructions  
Indication:Smoker  
                          Start:29-Aug-2018         Instruction Type:Provider   
Instructions for Treatment  
  
   
                                                    How to access health informa  
tion   
online  
Indication:Abnormal glucose   
tolerance test  
                          Start:2018         Instruction Type:Patient   
Education  
  
   
                                                    How to access health informa  
tion   
online - Detail  
Indication:Abnormal glucose   
tolerance test  
                          Start:2018         Instruction Type:Patient   
Education  
  
   
                                                    Patient Instructions  
Indication:Abnormal glucose   
tolerance test  
                          Start:2018         Instruction Type:Provider   
Instructions for Treatment  
  
   
                                                    How to access health informa  
tion   
online  
Indication:Smoker  
                          Start:8-Dec-2017          Instruction Type:Patient   
Education  
  
   
                                                    How to access health informa  
tion   
online - Detail  
Indication:Smoker  
                          Start:8-Dec-2017          Instruction Type:Patient   
Education  
  
   
                                                    Patient Instructions  
Indication:Smoker  
                          Start:8-Dec-2017          Instruction Type:Provider   
Instructions for Treatment  
  
   
                                                    How to access health informa  
tion   
online  
Indication:Smoker  
                          Start:4-Aug-2017          Instruction Type:Patient   
Education  
  
   
                                                    How to access health informa  
tion   
online - Detail  
Indication:Smoker  
                          Start:4-Aug-2017          Instruction Type:Patient   
Education  
  
   
                                                    Patient Instructions  
Indication:Smoker  
                          Start:4-Aug-2017          Instruction Type:Provider   
Instructions for Treatment  
  
   
                                                    How to access health informa  
tion   
online  
Indication:Smoker  
                          Start:3-Mar-2017          Instruction Type:Patient   
Education  
  
   
                                                    How to access health informa  
tion   
online - Detail  
Indication:Smoker  
                          Start:3-Mar-2017          Instruction Type:Patient   
Education  
  
   
                                                    Patient Instructions  
Indication:Smoker  
                          Start:3-Mar-2017          Instruction Type:Provider   
Instructions for Treatment  
  
   
                                                    How to access health informa  
tion   
online  
Indication:Hypertension,   
essential, benign  
                          Start:28-Oct-2016         Instruction Type:Patient   
Education  
  
   
                                                    How to access health informa  
tion   
online - Detail  
Indication:Hypertension,   
essential, benign  
                          Start:28-Oct-2016         Instruction Type:Patient   
Education  
  
   
                                                    Patient Instructions  
Indication:Hypertension,   
essential, benign  
                          Start:28-Oct-2016         Instruction Type:Provider   
Instructions for Treatment  
  
   
                                                    How to access health informa  
tion   
online  
Indication:Hypertension,   
essential, benign  
                          Start:2016         Instruction Type:Patient   
Education  
  
   
                                                    How to access health informa  
tion   
online - Detail  
Indication:Hypertension,   
essential, benign  
                          Start:2016         Instruction Type:Patient   
Education  
  
   
                                                    Patient Instructions  
Indication:Hypertension,   
essential, benign  
                          Start:2016         Instruction Type:Provider   
Instructions for Treatment  
  
   
                                                    How to access health informa  
tion   
online  
Indication:BMI 27.0-27.9,adult  
                          Start:2016         Instruction Type:Patient   
Education  
  
   
                                                    How to access health informa  
tion   
online - Detail  
Indication:BMI 27.0-27.9,adult  
                          Start:2016         Instruction Type:Patient   
Education  
  
   
                                                    Patient Instructions  
Indication:BMI 27.0-27.9,adult  
                          Start:2016         Instruction Type:Provider   
Instructions for Treatment  
  
   
                                                    How to access health informa  
tion   
online  
Indication:Abnormal glucose   
tolerance test  
                          Start:2016         Instruction Type:Patient   
Education  
  
   
                                                    How to access health informa  
tion   
online - Detail  
Indication:Abnormal glucose   
tolerance test  
                          Start:2016         Instruction Type:Patient   
Education  
  
   
                                                    Patient Instructions  
Indication:Abnormal glucose   
tolerance test  
                          Start:2016         Instruction Type:Provider   
Instructions for Treatment  
  
   
                                                    How to access health informa  
tion   
online  
Indication:Abnormal glucose   
tolerance test  
                          Start:16-Dec-2015         Instruction Type:Patient   
Education  
  
   
                                                    How to access health informa  
tion   
online - Detail  
Indication:Abnormal glucose   
tolerance test  
                          Start:16-Dec-2015         Instruction Type:Patient   
Education  
  
   
                                                    Patient Instructions  
Indication:Abnormal glucose   
tolerance test  
                          Start:16-Dec-2015         Instruction Type:Provider   
Instructions for Treatment  
  
   
                                                    How to access health informa  
tion   
online  
Indication:Hypercholesteremia  
                          Start:16-Sep-2015         Instruction Type:Patient   
Education  
  
   
                                                    How to access health informa  
tion   
online - Detail  
Indication:Hypercholesteremia  
                          Start:16-Sep-2015         Instruction Type:Patient   
Education  
  
   
                                                    Patient Instructions  
Indication:Hypercholesteremia  
                          Start:16-Sep-2015         Instruction Type:Provider   
Instructions for Treatment  
  
   
                                                    Patient Instructions  
Indication:Abnormal glucose   
tolerance test  
                          Start:9-May-2014          Instruction Type:Provider   
Instructions for Treatment  
  
   
                                                    Patient Instructions  
Indication:Abnormal glucose   
tolerance test  
                          Start:2014          Instruction Type:Provider   
Instructions for Treatment  
  
   
                                                    Patient Instructions  
Indication:Abnormal glucose   
tolerance test  
                          Start:12-Sep-2013         Instruction Type:Provider   
Instructions for Treatment  
  
   
                                                    Patient Instructions  
Indication:Hypercholesteremia  
                          Start:15-Mar-2013         Instruction Type:Provider   
Instructions for Treatment  
  
   
                                                    Patient Instructions  
Indication:Hypertension,   
essential, benign  
                          Start:14-Sep-2012         Instruction Type:Provider   
Instructions for Treatment  
  
  
  
[facility]  
Work Phone: [phone]*   
  
                                Name            Dates           Details  
   
                                                    Patient Instructions  
Indication:BMI 27.0-27.9,adult  
                          Start:2023         Instruction Type:Provider   
Instructions for Treatment  
  
   
                                                    How to Access Health Informa  
tion   
Online using Patient Portal and   
3rd Party Apps  
Indication:BMI 27.0-27.9,adult  
                          Start:2023         Instruction Type:Patient   
Education  
  
   
                                                    Patient Instructions  
Indication:BMI 27.0-27.9,adult  
                          Start:28-Sep-2022         Instruction Type:Provider   
Instructions for Treatment  
  
   
                                                    How to Access Health Informa  
tion   
Online using Patient Portal and   
3rd Party Apps  
Indication:BMI 27.0-27.9,adult  
                          Start:28-Sep-2022         Instruction Type:Patient   
Education  
  
   
                                                    Patient Instructions  
Indication:Smoker  
                          Start:16-Mar-2022         Instruction Type:Provider   
Instructions for Treatment  
  
   
                                                    How to Access Health Informa  
tion   
Online using Patient Portal and   
3rd Party Apps  
Indication:Smoker  
                          Start:16-Mar-2022         Instruction Type:Patient   
Education  
  
   
                                                    Patient Instructions  
Indication:Smoker  
                          Start:2-Sep-2021          Instruction Type:Provider   
Instructions for Treatment  
  
   
                                                    How to Access Health Informa  
tion   
Online using Patient Portal and   
3rd Party Apps  
Indication:Smoker  
                          Start:2-Sep-2021          Instruction Type:Patient   
Education  
  
   
                                                    Patient Instructions  
Indication:BMI 26.0-26.9,adult  
                          Start:4-Aug-2021          Instruction Type:Provider   
Instructions for Treatment  
  
   
                                                    How to Access Health Informa  
tion   
Online using Patient Portal and   
3rd Party Apps  
Indication:BMI 26.0-26.9,adult  
                          Start:4-Aug-2021          Instruction Type:Patient   
Education  
  
   
                                                    How to Access Health Informa  
tion   
Online using Patient Portal and   
3rd Party Apps  
Indication:Smoker  
                          Start:1-Mar-2021          Instruction Type:Patient   
Education  
  
   
                                                    Patient Instructions  
Indication:Smoker  
                          Start:1-Mar-2021          Instruction Type:Provider   
Instructions for Treatment  
  
   
                                                    How to Access Health Informa  
tion   
Online using Patient Portal and   
3rd Party Apps  
Indication:Smoker  
                          Start:2021         Instruction Type:Patient   
Education  
  
   
                                                    Patient Instructions  
Indication:Smoker  
                          Start:2021         Instruction Type:Provider   
Instructions for Treatment  
  
   
                                                    How to Access Health Informa  
tion   
Online using Patient Portal and   
3rd Party Apps  
Indication:Smoker  
                          Start:2021          Instruction Type:Patient   
Education  
  
   
                                                    Patient Instructions  
Indication:Smoker  
                          Start:2021          Instruction Type:Provider   
Instructions for Treatment  
  
   
                                                    How to Access Health Informa  
tion   
Online using Patient Portal and   
3rd Party Apps  
Indication:Smoker  
                          Start:16-Dec-2020         Instruction Type:Patient   
Education  
  
   
                                                    Patient Instructions  
Indication:Smoker  
                          Start:16-Dec-2020         Instruction Type:Provider   
Instructions for Treatment  
  
   
                                                    How to access health informa  
tion   
online  
Indication:Smoker  
                          Start:2020         Instruction Type:Patient   
Education  
  
   
                                                    How to access health informa  
tion   
online - Detail  
Indication:Smoker  
                          Start:2020         Instruction Type:Patient   
Education  
  
   
                                                    Patient Instructions  
Indication:Smoker  
                          Start:2020         Instruction Type:Provider   
Instructions for Treatment  
  
   
                                                    How to access health informa  
tion   
online  
Indication:Smoker  
                          Start:24-Aug-2020         Instruction Type:Patient   
Education  
  
   
                                                    How to access health informa  
tion   
online - Detail  
Indication:Smoker  
                          Start:24-Aug-2020         Instruction Type:Patient   
Education  
  
   
                                                    Patient Instructions  
Indication:Smoker  
                          Start:24-Aug-2020         Instruction Type:Provider   
Instructions for Treatment  
  
   
                                                    How to access health informa  
tion   
online - Detail  
Indication:BMI 25.0-25.9,adult  
                          Start:2020         Instruction Type:Patient   
Education  
  
   
                                                    How to access health informa  
tion   
online  
Indication:BMI 25.0-25.9,adult  
                          Start:2020         Instruction Type:Patient   
Education  
  
   
                                                    Patient Instructions  
Indication:BMI 25.0-25.9,adult  
                          Start:2020         Instruction Type:Provider   
Instructions for Treatment  
  
   
                                                    How to access health informa  
tion   
online  
Indication:Smoker  
                          Start:2020          Instruction Type:Patient   
Education  
  
   
                                                    How to access health informa  
tion   
online - Detail  
Indication:Smoker  
                          Start:2020          Instruction Type:Patient   
Education  
  
   
                                                    Patient Instructions  
Indication:Smoker  
                          Start:2020          Instruction Type:Provider   
Instructions for Treatment  
  
   
                                                    How to access health informa  
tion   
online  
Indication:Smoker  
                          Start:2020         Instruction Type:Patient   
Education  
  
   
                                                    How to access health informa  
tion   
online - Detail  
Indication:Smoker  
                          Start:2020         Instruction Type:Patient   
Education  
  
   
                                                    Patient Instructions  
Indication:Smoker  
                          Start:2020         Instruction Type:Provider   
Instructions for Treatment  
  
   
                                                    How to access health informa  
tion   
online  
Indication:Smoker  
                          Start:13-Sep-2019         Instruction Type:Patient   
Education  
  
   
                                                    How to access health informa  
tion   
online - Detail  
Indication:Smoker  
                          Start:13-Sep-2019         Instruction Type:Patient   
Education  
  
   
                                                    Patient Instructions  
Indication:Smoker  
                          Start:13-Sep-2019         Instruction Type:Provider   
Instructions for Treatment  
  
   
                                                    How to access health informa  
tion   
online  
Indication:BMI 26.0-26.9,adult  
                          Start:10-May-2019         Instruction Type:Patient   
Education  
  
   
                                                    How to access health informa  
tion   
online - Detail  
Indication:BMI 26.0-26.9,adult  
                          Start:10-May-2019         Instruction Type:Patient   
Education  
  
   
                                                    Patient Instructions  
Indication:BMI 26.0-26.9,adult  
                          Start:10-May-2019         Instruction Type:Provider   
Instructions for Treatment  
  
   
                                                    How to access health informa  
tion   
online  
Indication:Abnormal glucose   
tolerance test  
                          Start:28-Dec-2018         Instruction Type:Patient   
Education  
  
   
                                                    How to access health informa  
tion   
online - Detail  
Indication:Abnormal glucose   
tolerance test  
                          Start:28-Dec-2018         Instruction Type:Patient   
Education  
  
   
                                                    Patient Instructions  
Indication:Abnormal glucose   
tolerance test  
                          Start:28-Dec-2018         Instruction Type:Provider   
Instructions for Treatment  
  
   
                                                    How to access health informa  
tion   
online  
Indication:Smoker  
                          Start:29-Aug-2018         Instruction Type:Patient   
Education  
  
   
                                                    How to access health informa  
tion   
online - Detail  
Indication:Smoker  
                          Start:29-Aug-2018         Instruction Type:Patient   
Education  
  
   
                                                    Patient Instructions  
Indication:Smoker  
                          Start:29-Aug-2018         Instruction Type:Provider   
Instructions for Treatment  
  
   
                                                    How to access health informa  
tion   
online  
Indication:Abnormal glucose   
tolerance test  
                          Start:2018         Instruction Type:Patient   
Education  
  
   
                                                    How to access health informa  
tion   
online - Detail  
Indication:Abnormal glucose   
tolerance test  
                          Start:2018         Instruction Type:Patient   
Education  
  
   
                                                    Patient Instructions  
Indication:Abnormal glucose   
tolerance test  
                          Start:2018         Instruction Type:Provider   
Instructions for Treatment  
  
   
                                                    How to access health informa  
tion   
online  
Indication:Smoker  
                          Start:8-Dec-2017          Instruction Type:Patient   
Education  
  
   
                                                    How to access health informa  
tion   
online - Detail  
Indication:Smoker  
                          Start:8-Dec-2017          Instruction Type:Patient   
Education  
  
   
                                                    Patient Instructions  
Indication:Smoker  
                          Start:8-Dec-2017          Instruction Type:Provider   
Instructions for Treatment  
  
   
                                                    How to access health informa  
tion   
online  
Indication:Smoker  
                          Start:4-Aug-2017          Instruction Type:Patient   
Education  
  
   
                                                    How to access health informa  
tion   
online - Detail  
Indication:Smoker  
                          Start:4-Aug-2017          Instruction Type:Patient   
Education  
  
   
                                                    Patient Instructions  
Indication:Smoker  
                          Start:4-Aug-2017          Instruction Type:Provider   
Instructions for Treatment  
  
   
                                                    How to access health informa  
tion   
online  
Indication:Smoker  
                          Start:3-Mar-2017          Instruction Type:Patient   
Education  
  
   
                                                    How to access health informa  
tion   
online - Detail  
Indication:Smoker  
                          Start:3-Mar-2017          Instruction Type:Patient   
Education  
  
   
                                                    Patient Instructions  
Indication:Smoker  
                          Start:3-Mar-2017          Instruction Type:Provider   
Instructions for Treatment  
  
   
                                                    How to access health informa  
tion   
online  
Indication:Hypertension,   
essential, benign  
                          Start:28-Oct-2016         Instruction Type:Patient   
Education  
  
   
                                                    How to access health informa  
tion   
online - Detail  
Indication:Hypertension,   
essential, benign  
                          Start:28-Oct-2016         Instruction Type:Patient   
Education  
  
   
                                                    Patient Instructions  
Indication:Hypertension,   
essential, benign  
                          Start:28-Oct-2016         Instruction Type:Provider   
Instructions for Treatment  
  
   
                                                    How to access health informa  
tion   
online  
Indication:Hypertension,   
essential, benign  
                          Start:2016         Instruction Type:Patient   
Education  
  
   
                                                    How to access health informa  
tion   
online - Detail  
Indication:Hypertension,   
essential, benign  
                          Start:2016         Instruction Type:Patient   
Education  
  
   
                                                    Patient Instructions  
Indication:Hypertension,   
essential, benign  
                          Start:2016         Instruction Type:Provider   
Instructions for Treatment  
  
   
                                                    How to access health informa  
tion   
online  
Indication:BMI 27.0-27.9,adult  
                          Start:2016         Instruction Type:Patient   
Education  
  
   
                                                    How to access health informa  
tion   
online - Detail  
Indication:BMI 27.0-27.9,adult  
                          Start:2016         Instruction Type:Patient   
Education  
  
   
                                                    Patient Instructions  
Indication:BMI 27.0-27.9,adult  
                          Start:2016         Instruction Type:Provider   
Instructions for Treatment  
  
   
                                                    How to access health informa  
tion   
online  
Indication:Abnormal glucose   
tolerance test  
                          Start:2016         Instruction Type:Patient   
Education  
  
   
                                                    How to access health informa  
tion   
online - Detail  
Indication:Abnormal glucose   
tolerance test  
                          Start:2016         Instruction Type:Patient   
Education  
  
   
                                                    Patient Instructions  
Indication:Abnormal glucose   
tolerance test  
                          Start:2016         Instruction Type:Provider   
Instructions for Treatment  
  
   
                                                    How to access health informa  
tion   
online  
Indication:Abnormal glucose   
tolerance test  
                          Start:16-Dec-2015         Instruction Type:Patient   
Education  
  
   
                                                    How to access health informa  
tion   
online - Detail  
Indication:Abnormal glucose   
tolerance test  
                          Start:16-Dec-2015         Instruction Type:Patient   
Education  
  
   
                                                    Patient Instructions  
Indication:Abnormal glucose   
tolerance test  
                          Start:16-Dec-2015         Instruction Type:Provider   
Instructions for Treatment  
  
   
                                                    How to access health informa  
tion   
online  
Indication:Hypercholesteremia  
                          Start:16-Sep-2015         Instruction Type:Patient   
Education  
  
   
                                                    How to access health informa  
tion   
online - Detail  
Indication:Hypercholesteremia  
                          Start:16-Sep-2015         Instruction Type:Patient   
Education  
  
   
                                                    Patient Instructions  
Indication:Hypercholesteremia  
                          Start:16-Sep-2015         Instruction Type:Provider   
Instructions for Treatment  
  
   
                                                    Patient Instructions  
Indication:Abnormal glucose   
tolerance test  
                          Start:9-May-2014          Instruction Type:Provider   
Instructions for Treatment  
  
   
                                                    Patient Instructions  
Indication:Abnormal glucose   
tolerance test  
                          Start:2014          Instruction Type:Provider   
Instructions for Treatment  
  
   
                                                    Patient Instructions  
Indication:Abnormal glucose   
tolerance test  
                          Start:12-Sep-2013         Instruction Type:Provider   
Instructions for Treatment  
  
   
                                                    Patient Instructions  
Indication:Hypercholesteremia  
                          Start:15-Mar-2013         Instruction Type:Provider   
Instructions for Treatment  
  
   
                                                    Patient Instructions  
Indication:Hypertension,   
essential, benign  
                          Start:14-Sep-2012         Instruction Type:Provider   
Instructions for Treatment  
  
  
  
[facility]  
Work Phone: [phone]*   
  
                                Name            Dates           Details  
   
                                                    Patient Instructions  
Indication:BMI 27.0-27.9,adult  
                          Start:2023         Instruction Type:Provider   
Instructions for Treatment  
  
   
                                                    How to Access Health Informa  
tion   
Online using Patient Portal and   
3rd Party Apps  
Indication:BMI 27.0-27.9,adult  
                          Start:2023         Instruction Type:Patient   
Education  
  
   
                                                    Patient Instructions  
Indication:BMI 27.0-27.9,adult  
                          Start:28-Sep-2022         Instruction Type:Provider   
Instructions for Treatment  
  
   
                                                    How to Access Health Informa  
tion   
Online using Patient Portal and   
3rd Party Apps  
Indication:BMI 27.0-27.9,adult  
                          Start:28-Sep-2022         Instruction Type:Patient   
Education  
  
   
                                                    Patient Instructions  
Indication:Smoker  
                          Start:16-Mar-2022         Instruction Type:Provider   
Instructions for Treatment  
  
   
                                                    How to Access Health Informa  
tion   
Online using Patient Portal and   
3rd Party Apps  
Indication:Smoker  
                          Start:16-Mar-2022         Instruction Type:Patient   
Education  
  
   
                                                    Patient Instructions  
Indication:Smoker  
                          Start:2-Sep-2021          Instruction Type:Provider   
Instructions for Treatment  
  
   
                                                    How to Access Health Informa  
tion   
Online using Patient Portal and   
3rd Party Apps  
Indication:Smoker  
                          Start:2-Sep-2021          Instruction Type:Patient   
Education  
  
   
                                                    Patient Instructions  
Indication:BMI 26.0-26.9,adult  
                          Start:4-Aug-2021          Instruction Type:Provider   
Instructions for Treatment  
  
   
                                                    How to Access Health Informa  
tion   
Online using Patient Portal and   
3rd Party Apps  
Indication:BMI 26.0-26.9,adult  
                          Start:4-Aug-2021          Instruction Type:Patient   
Education  
  
   
                                                    How to Access Health Informa  
tion   
Online using Patient Portal and   
3rd Party Apps  
Indication:Smoker  
                          Start:1-Mar-2021          Instruction Type:Patient   
Education  
  
   
                                                    Patient Instructions  
Indication:Smoker  
                          Start:1-Mar-2021          Instruction Type:Provider   
Instructions for Treatment  
  
   
                                                    How to Access Health Informa  
tion   
Online using Patient Portal and   
3rd Party Apps  
Indication:Smoker  
                          Start:2021         Instruction Type:Patient   
Education  
  
   
                                                    Patient Instructions  
Indication:Smoker  
                          Start:2021         Instruction Type:Provider   
Instructions for Treatment  
  
   
                                                    How to Access Health Informa  
tion   
Online using Patient Portal and   
3rd Party Apps  
Indication:Smoker  
                          Start:2021          Instruction Type:Patient   
Education  
  
   
                                                    Patient Instructions  
Indication:Smoker  
                          Start:2021          Instruction Type:Provider   
Instructions for Treatment  
  
   
                                                    How to Access Health Informa  
tion   
Online using Patient Portal and   
3rd Party Apps  
Indication:Smoker  
                          Start:16-Dec-2020         Instruction Type:Patient   
Education  
  
   
                                                    Patient Instructions  
Indication:Smoker  
                          Start:16-Dec-2020         Instruction Type:Provider   
Instructions for Treatment  
  
   
                                                    How to access health informa  
tion   
online  
Indication:Smoker  
                          Start:2020         Instruction Type:Patient   
Education  
  
   
                                                    How to access health informa  
tion   
online - Detail  
Indication:Smoker  
                          Start:2020         Instruction Type:Patient   
Education  
  
   
                                                    Patient Instructions  
Indication:Smoker  
                          Start:2020         Instruction Type:Provider   
Instructions for Treatment  
  
   
                                                    How to access health informa  
tion   
online  
Indication:Smoker  
                          Start:24-Aug-2020         Instruction Type:Patient   
Education  
  
   
                                                    How to access health informa  
tion   
online - Detail  
Indication:Smoker  
                          Start:24-Aug-2020         Instruction Type:Patient   
Education  
  
   
                                                    Patient Instructions  
Indication:Smoker  
                          Start:24-Aug-2020         Instruction Type:Provider   
Instructions for Treatment  
  
   
                                                    How to access health informa  
tion   
online - Detail  
Indication:BMI 25.0-25.9,adult  
                          Start:2020         Instruction Type:Patient   
Education  
  
   
                                                    How to access health informa  
tion   
online  
Indication:BMI 25.0-25.9,adult  
                          Start:2020         Instruction Type:Patient   
Education  
  
   
                                                    Patient Instructions  
Indication:BMI 25.0-25.9,adult  
                          Start:2020         Instruction Type:Provider   
Instructions for Treatment  
  
   
                                                    How to access health informa  
tion   
online  
Indication:Smoker  
                          Start:2020          Instruction Type:Patient   
Education  
  
   
                                                    How to access health informa  
tion   
online - Detail  
Indication:Smoker  
                          Start:2020          Instruction Type:Patient   
Education  
  
   
                                                    Patient Instructions  
Indication:Smoker  
                          Start:2020          Instruction Type:Provider   
Instructions for Treatment  
  
   
                                                    How to access health informa  
tion   
online  
Indication:Smoker  
                          Start:2020         Instruction Type:Patient   
Education  
  
   
                                                    How to access health informa  
tion   
online - Detail  
Indication:Smoker  
                          Start:2020         Instruction Type:Patient   
Education  
  
   
                                                    Patient Instructions  
Indication:Smoker  
                          Start:2020         Instruction Type:Provider   
Instructions for Treatment  
  
   
                                                    How to access health informa  
tion   
online  
Indication:Smoker  
                          Start:13-Sep-2019         Instruction Type:Patient   
Education  
  
   
                                                    How to access health informa  
tion   
online - Detail  
Indication:Smoker  
                          Start:13-Sep-2019         Instruction Type:Patient   
Education  
  
   
                                                    Patient Instructions  
Indication:Smoker  
                          Start:13-Sep-2019         Instruction Type:Provider   
Instructions for Treatment  
  
   
                                                    How to access health informa  
tion   
online  
Indication:BMI 26.0-26.9,adult  
                          Start:10-May-2019         Instruction Type:Patient   
Education  
  
   
                                                    How to access health informa  
tion   
online - Detail  
Indication:BMI 26.0-26.9,adult  
                          Start:10-May-2019         Instruction Type:Patient   
Education  
  
   
                                                    Patient Instructions  
Indication:BMI 26.0-26.9,adult  
                          Start:10-May-2019         Instruction Type:Provider   
Instructions for Treatment  
  
   
                                                    How to access health informa  
tion   
online  
Indication:Abnormal glucose   
tolerance test  
                          Start:28-Dec-2018         Instruction Type:Patient   
Education  
  
   
                                                    How to access health informa  
tion   
online - Detail  
Indication:Abnormal glucose   
tolerance test  
                          Start:28-Dec-2018         Instruction Type:Patient   
Education  
  
   
                                                    Patient Instructions  
Indication:Abnormal glucose   
tolerance test  
                          Start:28-Dec-2018         Instruction Type:Provider   
Instructions for Treatment  
  
   
                                                    How to access health informa  
tion   
online  
Indication:Smoker  
                          Start:29-Aug-2018         Instruction Type:Patient   
Education  
  
   
                                                    How to access health informa  
tion   
online - Detail  
Indication:Smoker  
                          Start:29-Aug-2018         Instruction Type:Patient   
Education  
  
   
                                                    Patient Instructions  
Indication:Smoker  
                          Start:29-Aug-2018         Instruction Type:Provider   
Instructions for Treatment  
  
   
                                                    How to access health informa  
tion   
online  
Indication:Abnormal glucose   
tolerance test  
                          Start:2018         Instruction Type:Patient   
Education  
  
   
                                                    How to access health informa  
tion   
online - Detail  
Indication:Abnormal glucose   
tolerance test  
                          Start:2018         Instruction Type:Patient   
Education  
  
   
                                                    Patient Instructions  
Indication:Abnormal glucose   
tolerance test  
                          Start:2018         Instruction Type:Provider   
Instructions for Treatment  
  
   
                                                    How to access health informa  
tion   
online  
Indication:Smoker  
                          Start:8-Dec-2017          Instruction Type:Patient   
Education  
  
   
                                                    How to access health informa  
tion   
online - Detail  
Indication:Smoker  
                          Start:8-Dec-2017          Instruction Type:Patient   
Education  
  
   
                                                    Patient Instructions  
Indication:Smoker  
                          Start:8-Dec-2017          Instruction Type:Provider   
Instructions for Treatment  
  
   
                                                    How to access health informa  
tion   
online  
Indication:Smoker  
                          Start:4-Aug-2017          Instruction Type:Patient   
Education  
  
   
                                                    How to access health informa  
tion   
online - Detail  
Indication:Smoker  
                          Start:4-Aug-2017          Instruction Type:Patient   
Education  
  
   
                                                    Patient Instructions  
Indication:Smoker  
                          Start:4-Aug-2017          Instruction Type:Provider   
Instructions for Treatment  
  
   
                                                    How to access health informa  
tion   
online  
Indication:Smoker  
                          Start:3-Mar-2017          Instruction Type:Patient   
Education  
  
   
                                                    How to access health informa  
tion   
online - Detail  
Indication:Smoker  
                          Start:3-Mar-2017          Instruction Type:Patient   
Education  
  
   
                                                    Patient Instructions  
Indication:Smoker  
                          Start:3-Mar-2017          Instruction Type:Provider   
Instructions for Treatment  
  
   
                                                    How to access health informa  
tion   
online  
Indication:Hypertension,   
essential, benign  
                          Start:28-Oct-2016         Instruction Type:Patient   
Education  
  
   
                                                    How to access health informa  
tion   
online - Detail  
Indication:Hypertension,   
essential, benign  
                          Start:28-Oct-2016         Instruction Type:Patient   
Education  
  
   
                                                    Patient Instructions  
Indication:Hypertension,   
essential, benign  
                          Start:28-Oct-2016         Instruction Type:Provider   
Instructions for Treatment  
  
   
                                                    How to access health informa  
tion   
online  
Indication:Hypertension,   
essential, benign  
                          Start:2016         Instruction Type:Patient   
Education  
  
   
                                                    How to access health informa  
tion   
online - Detail  
Indication:Hypertension,   
essential, benign  
                          Start:2016         Instruction Type:Patient   
Education  
  
   
                                                    Patient Instructions  
Indication:Hypertension,   
essential, benign  
                          Start:2016         Instruction Type:Provider   
Instructions for Treatment  
  
   
                                                    How to access health informa  
tion   
online  
Indication:BMI 27.0-27.9,adult  
                          Start:2016         Instruction Type:Patient   
Education  
  
   
                                                    How to access health informa  
tion   
online - Detail  
Indication:BMI 27.0-27.9,adult  
                          Start:2016         Instruction Type:Patient   
Education  
  
   
                                                    Patient Instructions  
Indication:BMI 27.0-27.9,adult  
                          Start:2016         Instruction Type:Provider   
Instructions for Treatment  
  
   
                                                    How to access health informa  
tion   
online  
Indication:Abnormal glucose   
tolerance test  
                          Start:2016         Instruction Type:Patient   
Education  
  
   
                                                    How to access health informa  
tion   
online - Detail  
Indication:Abnormal glucose   
tolerance test  
                          Start:2016         Instruction Type:Patient   
Education  
  
   
                                                    Patient Instructions  
Indication:Abnormal glucose   
tolerance test  
                          Start:2016         Instruction Type:Provider   
Instructions for Treatment  
  
   
                                                    How to access health informa  
tion   
online  
Indication:Abnormal glucose   
tolerance test  
                          Start:16-Dec-2015         Instruction Type:Patient   
Education  
  
   
                                                    How to access health informa  
tion   
online - Detail  
Indication:Abnormal glucose   
tolerance test  
                          Start:16-Dec-2015         Instruction Type:Patient   
Education  
  
   
                                                    Patient Instructions  
Indication:Abnormal glucose   
tolerance test  
                          Start:16-Dec-2015         Instruction Type:Provider   
Instructions for Treatment  
  
   
                                                    How to access health informa  
tion   
online  
Indication:Hypercholesteremia  
                          Start:16-Sep-2015         Instruction Type:Patient   
Education  
  
   
                                                    How to access health informa  
tion   
online - Detail  
Indication:Hypercholesteremia  
                          Start:16-Sep-2015         Instruction Type:Patient   
Education  
  
   
                                                    Patient Instructions  
Indication:Hypercholesteremia  
                          Start:16-Sep-2015         Instruction Type:Provider   
Instructions for Treatment  
  
   
                                                    Patient Instructions  
Indication:Abnormal glucose   
tolerance test  
                          Start:9-May-2014          Instruction Type:Provider   
Instructions for Treatment  
  
   
                                                    Patient Instructions  
Indication:Abnormal glucose   
tolerance test  
                          Start:2014          Instruction Type:Provider   
Instructions for Treatment  
  
   
                                                    Patient Instructions  
Indication:Abnormal glucose   
tolerance test  
                          Start:12-Sep-2013         Instruction Type:Provider   
Instructions for Treatment  
  
   
                                                    Patient Instructions  
Indication:Hypercholesteremia  
                          Start:15-Mar-2013         Instruction Type:Provider   
Instructions for Treatment  
  
   
                                                    Patient Instructions  
Indication:Hypertension,   
essential, benign  
                          Start:14-Sep-2012         Instruction Type:Provider   
Instructions for Treatment  
  
  
  
[facility]  
Work Phone: [phone]*   
  
                                Name            Dates           Details  
   
                                                    Patient Instructions  
Indication:BMI 27.0-27.9,adult  
                          Start:2023         Instruction Type:Provider   
Instructions for Treatment  
  
   
                                                    How to Access Health Informa  
tion   
Online using Patient Portal and   
3rd Party Apps  
Indication:BMI 27.0-27.9,adult  
                          Start:2023         Instruction Type:Patient   
Education  
  
   
                                                    Patient Instructions  
Indication:BMI 27.0-27.9,adult  
                          Start:28-Sep-2022         Instruction Type:Provider   
Instructions for Treatment  
  
   
                                                    How to Access Health Informa  
tion   
Online using Patient Portal and   
3rd Party Apps  
Indication:BMI 27.0-27.9,adult  
                          Start:28-Sep-2022         Instruction Type:Patient   
Education  
  
   
                                                    Patient Instructions  
Indication:Smoker  
                          Start:16-Mar-2022         Instruction Type:Provider   
Instructions for Treatment  
  
   
                                                    How to Access Health Informa  
tion   
Online using Patient Portal and   
3rd Party Apps  
Indication:Smoker  
                          Start:16-Mar-2022         Instruction Type:Patient   
Education  
  
   
                                                    Patient Instructions  
Indication:Smoker  
                          Start:2-Sep-2021          Instruction Type:Provider   
Instructions for Treatment  
  
   
                                                    How to Access Health Informa  
tion   
Online using Patient Portal and   
3rd Party Apps  
Indication:Smoker  
                          Start:2-Sep-2021          Instruction Type:Patient   
Education  
  
   
                                                    Patient Instructions  
Indication:BMI 26.0-26.9,adult  
                          Start:4-Aug-2021          Instruction Type:Provider   
Instructions for Treatment  
  
   
                                                    How to Access Health Informa  
tion   
Online using Patient Portal and   
3rd Party Apps  
Indication:BMI 26.0-26.9,adult  
                          Start:4-Aug-2021          Instruction Type:Patient   
Education  
  
   
                                                    How to Access Health Informa  
tion   
Online using Patient Portal and   
3rd Party Apps  
Indication:Smoker  
                          Start:1-Mar-2021          Instruction Type:Patient   
Education  
  
   
                                                    Patient Instructions  
Indication:Smoker  
                          Start:1-Mar-2021          Instruction Type:Provider   
Instructions for Treatment  
  
   
                                                    How to Access Health Informa  
tion   
Online using Patient Portal and   
3rd Party Apps  
Indication:Smoker  
                          Start:2021         Instruction Type:Patient   
Education  
  
   
                                                    Patient Instructions  
Indication:Smoker  
                          Start:2021         Instruction Type:Provider   
Instructions for Treatment  
  
   
                                                    How to Access Health Informa  
tion   
Online using Patient Portal and   
3rd Party Apps  
Indication:Smoker  
                          Start:2021          Instruction Type:Patient   
Education  
  
   
                                                    Patient Instructions  
Indication:Smoker  
                          Start:2021          Instruction Type:Provider   
Instructions for Treatment  
  
   
                                                    How to Access Health Informa  
tion   
Online using Patient Portal and   
3rd Party Apps  
Indication:Smoker  
                          Start:16-Dec-2020         Instruction Type:Patient   
Education  
  
   
                                                    Patient Instructions  
Indication:Smoker  
                          Start:16-Dec-2020         Instruction Type:Provider   
Instructions for Treatment  
  
   
                                                    How to access health informa  
tion   
online  
Indication:Smoker  
                          Start:2020         Instruction Type:Patient   
Education  
  
   
                                                    How to access health informa  
tion   
online - Detail  
Indication:Smoker  
                          Start:2020         Instruction Type:Patient   
Education  
  
   
                                                    Patient Instructions  
Indication:Smoker  
                          Start:2020         Instruction Type:Provider   
Instructions for Treatment  
  
   
                                                    How to access health informa  
tion   
online  
Indication:Smoker  
                          Start:24-Aug-2020         Instruction Type:Patient   
Education  
  
   
                                                    How to access health informa  
tion   
online - Detail  
Indication:Smoker  
                          Start:24-Aug-2020         Instruction Type:Patient   
Education  
  
   
                                                    Patient Instructions  
Indication:Smoker  
                          Start:24-Aug-2020         Instruction Type:Provider   
Instructions for Treatment  
  
   
                                                    How to access health informa  
tion   
online - Detail  
Indication:BMI 25.0-25.9,adult  
                          Start:2020         Instruction Type:Patient   
Education  
  
   
                                                    How to access health informa  
tion   
online  
Indication:BMI 25.0-25.9,adult  
                          Start:2020         Instruction Type:Patient   
Education  
  
   
                                                    Patient Instructions  
Indication:BMI 25.0-25.9,adult  
                          Start:2020         Instruction Type:Provider   
Instructions for Treatment  
  
   
                                                    How to access health informa  
tion   
online  
Indication:Smoker  
                          Start:2020          Instruction Type:Patient   
Education  
  
   
                                                    How to access health informa  
tion   
online - Detail  
Indication:Smoker  
                          Start:2020          Instruction Type:Patient   
Education  
  
   
                                                    Patient Instructions  
Indication:Smoker  
                          Start:2020          Instruction Type:Provider   
Instructions for Treatment  
  
   
                                                    How to access health informa  
tion   
online  
Indication:Smoker  
                          Start:2020         Instruction Type:Patient   
Education  
  
   
                                                    How to access health informa  
tion   
online - Detail  
Indication:Smoker  
                          Start:2020         Instruction Type:Patient   
Education  
  
   
                                                    Patient Instructions  
Indication:Smoker  
                          Start:2020         Instruction Type:Provider   
Instructions for Treatment  
  
   
                                                    How to access health informa  
tion   
online  
Indication:Smoker  
                          Start:13-Sep-2019         Instruction Type:Patient   
Education  
  
   
                                                    How to access health informa  
tion   
online - Detail  
Indication:Smoker  
                          Start:13-Sep-2019         Instruction Type:Patient   
Education  
  
   
                                                    Patient Instructions  
Indication:Smoker  
                          Start:13-Sep-2019         Instruction Type:Provider   
Instructions for Treatment  
  
   
                                                    How to access health informa  
tion   
online  
Indication:BMI 26.0-26.9,adult  
                          Start:10-May-2019         Instruction Type:Patient   
Education  
  
   
                                                    How to access health informa  
tion   
online - Detail  
Indication:BMI 26.0-26.9,adult  
                          Start:10-May-2019         Instruction Type:Patient   
Education  
  
   
                                                    Patient Instructions  
Indication:BMI 26.0-26.9,adult  
                          Start:10-May-2019         Instruction Type:Provider   
Instructions for Treatment  
  
   
                                                    How to access health informa  
tion   
online  
Indication:Abnormal glucose   
tolerance test  
                          Start:28-Dec-2018         Instruction Type:Patient   
Education  
  
   
                                                    How to access health informa  
tion   
online - Detail  
Indication:Abnormal glucose   
tolerance test  
                          Start:28-Dec-2018         Instruction Type:Patient   
Education  
  
   
                                                    Patient Instructions  
Indication:Abnormal glucose   
tolerance test  
                          Start:28-Dec-2018         Instruction Type:Provider   
Instructions for Treatment  
  
   
                                                    How to access health informa  
tion   
online  
Indication:Smoker  
                          Start:29-Aug-2018         Instruction Type:Patient   
Education  
  
   
                                                    How to access health informa  
tion   
online - Detail  
Indication:Smoker  
                          Start:29-Aug-2018         Instruction Type:Patient   
Education  
  
   
                                                    Patient Instructions  
Indication:Smoker  
                          Start:29-Aug-2018         Instruction Type:Provider   
Instructions for Treatment  
  
   
                                                    How to access health informa  
tion   
online  
Indication:Abnormal glucose   
tolerance test  
                          Start:2018         Instruction Type:Patient   
Education  
  
   
                                                    How to access health informa  
tion   
online - Detail  
Indication:Abnormal glucose   
tolerance test  
                          Start:2018         Instruction Type:Patient   
Education  
  
   
                                                    Patient Instructions  
Indication:Abnormal glucose   
tolerance test  
                          Start:2018         Instruction Type:Provider   
Instructions for Treatment  
  
   
                                                    How to access health informa  
tion   
online  
Indication:Smoker  
                          Start:8-Dec-2017          Instruction Type:Patient   
Education  
  
   
                                                    How to access health informa  
tion   
online - Detail  
Indication:Smoker  
                          Start:8-Dec-2017          Instruction Type:Patient   
Education  
  
   
                                                    Patient Instructions  
Indication:Smoker  
                          Start:8-Dec-2017          Instruction Type:Provider   
Instructions for Treatment  
  
   
                                                    How to access health informa  
tion   
online  
Indication:Smoker  
                          Start:4-Aug-2017          Instruction Type:Patient   
Education  
  
   
                                                    How to access health informa  
tion   
online - Detail  
Indication:Smoker  
                          Start:4-Aug-2017          Instruction Type:Patient   
Education  
  
   
                                                    Patient Instructions  
Indication:Smoker  
                          Start:4-Aug-2017          Instruction Type:Provider   
Instructions for Treatment  
  
   
                                                    How to access health informa  
tion   
online  
Indication:Smoker  
                          Start:3-Mar-2017          Instruction Type:Patient   
Education  
  
   
                                                    How to access health informa  
tion   
online - Detail  
Indication:Smoker  
                          Start:3-Mar-2017          Instruction Type:Patient   
Education  
  
   
                                                    Patient Instructions  
Indication:Smoker  
                          Start:3-Mar-2017          Instruction Type:Provider   
Instructions for Treatment  
  
   
                                                    How to access health informa  
tion   
online  
Indication:Hypertension,   
essential, benign  
                          Start:28-Oct-2016         Instruction Type:Patient   
Education  
  
   
                                                    How to access health informa  
tion   
online - Detail  
Indication:Hypertension,   
essential, benign  
                          Start:28-Oct-2016         Instruction Type:Patient   
Education  
  
   
                                                    Patient Instructions  
Indication:Hypertension,   
essential, benign  
                          Start:28-Oct-2016         Instruction Type:Provider   
Instructions for Treatment  
  
   
                                                    How to access health informa  
tion   
online  
Indication:Hypertension,   
essential, benign  
                          Start:2016         Instruction Type:Patient   
Education  
  
   
                                                    How to access health informa  
tion   
online - Detail  
Indication:Hypertension,   
essential, benign  
                          Start:2016         Instruction Type:Patient   
Education  
  
   
                                                    Patient Instructions  
Indication:Hypertension,   
essential, benign  
                          Start:2016         Instruction Type:Provider   
Instructions for Treatment  
  
   
                                                    How to access health informa  
tion   
online  
Indication:BMI 27.0-27.9,adult  
                          Start:2016         Instruction Type:Patient   
Education  
  
   
                                                    How to access health informa  
tion   
online - Detail  
Indication:BMI 27.0-27.9,adult  
                          Start:2016         Instruction Type:Patient   
Education  
  
   
                                                    Patient Instructions  
Indication:BMI 27.0-27.9,adult  
                          Start:2016         Instruction Type:Provider   
Instructions for Treatment  
  
   
                                                    How to access health informa  
tion   
online  
Indication:Abnormal glucose   
tolerance test  
                          Start:2016         Instruction Type:Patient   
Education  
  
   
                                                    How to access health informa  
tion   
online - Detail  
Indication:Abnormal glucose   
tolerance test  
                          Start:2016         Instruction Type:Patient   
Education  
  
   
                                                    Patient Instructions  
Indication:Abnormal glucose   
tolerance test  
                          Start:2016         Instruction Type:Provider   
Instructions for Treatment  
  
   
                                                    How to access health informa  
tion   
online  
Indication:Abnormal glucose   
tolerance test  
                          Start:16-Dec-2015         Instruction Type:Patient   
Education  
  
   
                                                    How to access health informa  
tion   
online - Detail  
Indication:Abnormal glucose   
tolerance test  
                          Start:16-Dec-2015         Instruction Type:Patient   
Education  
  
   
                                                    Patient Instructions  
Indication:Abnormal glucose   
tolerance test  
                          Start:16-Dec-2015         Instruction Type:Provider   
Instructions for Treatment  
  
   
                                                    How to access health informa  
tion   
online  
Indication:Hypercholesteremia  
                          Start:16-Sep-2015         Instruction Type:Patient   
Education  
  
   
                                                    How to access health informa  
tion   
online - Detail  
Indication:Hypercholesteremia  
                          Start:16-Sep-2015         Instruction Type:Patient   
Education  
  
   
                                                    Patient Instructions  
Indication:Hypercholesteremia  
                          Start:16-Sep-2015         Instruction Type:Provider   
Instructions for Treatment  
  
   
                                                    Patient Instructions  
Indication:Abnormal glucose   
tolerance test  
                          Start:9-May-2014          Instruction Type:Provider   
Instructions for Treatment  
  
   
                                                    Patient Instructions  
Indication:Abnormal glucose   
tolerance test  
                          Start:2014          Instruction Type:Provider   
Instructions for Treatment  
  
   
                                                    Patient Instructions  
Indication:Abnormal glucose   
tolerance test  
                          Start:12-Sep-2013         Instruction Type:Provider   
Instructions for Treatment  
  
   
                                                    Patient Instructions  
Indication:Hypercholesteremia  
                          Start:15-Mar-2013         Instruction Type:Provider   
Instructions for Treatment  
  
   
                                                    Patient Instructions  
Indication:Hypertension,   
essential, benign  
                          Start:14-Sep-2012         Instruction Type:Provider   
Instructions for Treatment  
  
  
  
[facility]  
Work Phone: [phone]*   
  
                                Name            Dates           Details  
   
                                                    Patient Instructions  
Indication:BMI 27.0-27.9,adult  
                          Start:2023         Instruction Type:Provider   
Instructions for Treatment  
  
   
                                                    How to Access Health Informa  
tion   
Online using Patient Portal and   
3rd Party Apps  
Indication:BMI 27.0-27.9,adult  
                          Start:2023         Instruction Type:Patient   
Education  
  
   
                                                    Patient Instructions  
Indication:BMI 27.0-27.9,adult  
                          Start:28-Sep-2022         Instruction Type:Provider   
Instructions for Treatment  
  
   
                                                    How to Access Health Informa  
tion   
Online using Patient Portal and   
3rd Party Apps  
Indication:BMI 27.0-27.9,adult  
                          Start:28-Sep-2022         Instruction Type:Patient   
Education  
  
   
                                                    Patient Instructions  
Indication:Smoker  
                          Start:16-Mar-2022         Instruction Type:Provider   
Instructions for Treatment  
  
   
                                                    How to Access Health Informa  
tion   
Online using Patient Portal and   
3rd Party Apps  
Indication:Smoker  
                          Start:16-Mar-2022         Instruction Type:Patient   
Education  
  
   
                                                    Patient Instructions  
Indication:Smoker  
                          Start:2-Sep-2021          Instruction Type:Provider   
Instructions for Treatment  
  
   
                                                    How to Access Health Informa  
tion   
Online using Patient Portal and   
3rd Party Apps  
Indication:Smoker  
                          Start:2-Sep-2021          Instruction Type:Patient   
Education  
  
   
                                                    Patient Instructions  
Indication:BMI 26.0-26.9,adult  
                          Start:4-Aug-2021          Instruction Type:Provider   
Instructions for Treatment  
  
   
                                                    How to Access Health Informa  
tion   
Online using Patient Portal and   
3rd Party Apps  
Indication:BMI 26.0-26.9,adult  
                          Start:4-Aug-2021          Instruction Type:Patient   
Education  
  
   
                                                    How to Access Health Informa  
tion   
Online using Patient Portal and   
3rd Party Apps  
Indication:Smoker  
                          Start:1-Mar-2021          Instruction Type:Patient   
Education  
  
   
                                                    Patient Instructions  
Indication:Smoker  
                          Start:1-Mar-2021          Instruction Type:Provider   
Instructions for Treatment  
  
   
                                                    How to Access Health Informa  
tion   
Online using Patient Portal and   
3rd Party Apps  
Indication:Smoker  
                          Start:2021         Instruction Type:Patient   
Education  
  
   
                                                    Patient Instructions  
Indication:Smoker  
                          Start:2021         Instruction Type:Provider   
Instructions for Treatment  
  
   
                                                    How to Access Health Informa  
tion   
Online using Patient Portal and   
3rd Party Apps  
Indication:Smoker  
                          Start:2021          Instruction Type:Patient   
Education  
  
   
                                                    Patient Instructions  
Indication:Smoker  
                          Start:2021          Instruction Type:Provider   
Instructions for Treatment  
  
   
                                                    How to Access Health Informa  
tion   
Online using Patient Portal and   
3rd Party Apps  
Indication:Smoker  
                          Start:16-Dec-2020         Instruction Type:Patient   
Education  
  
   
                                                    Patient Instructions  
Indication:Smoker  
                          Start:16-Dec-2020         Instruction Type:Provider   
Instructions for Treatment  
  
   
                                                    How to access health informa  
tion   
online  
Indication:Smoker  
                          Start:2020         Instruction Type:Patient   
Education  
  
   
                                                    How to access health informa  
tion   
online - Detail  
Indication:Smoker  
                          Start:2020         Instruction Type:Patient   
Education  
  
   
                                                    Patient Instructions  
Indication:Smoker  
                          Start:2020         Instruction Type:Provider   
Instructions for Treatment  
  
   
                                                    How to access health informa  
tion   
online  
Indication:Smoker  
                          Start:24-Aug-2020         Instruction Type:Patient   
Education  
  
   
                                                    How to access health informa  
tion   
online - Detail  
Indication:Smoker  
                          Start:24-Aug-2020         Instruction Type:Patient   
Education  
  
   
                                                    Patient Instructions  
Indication:Smoker  
                          Start:24-Aug-2020         Instruction Type:Provider   
Instructions for Treatment  
  
   
                                                    How to access health informa  
tion   
online - Detail  
Indication:BMI 25.0-25.9,adult  
                          Start:2020         Instruction Type:Patient   
Education  
  
   
                                                    How to access health informa  
tion   
online  
Indication:BMI 25.0-25.9,adult  
                          Start:2020         Instruction Type:Patient   
Education  
  
   
                                                    Patient Instructions  
Indication:BMI 25.0-25.9,adult  
                          Start:2020         Instruction Type:Provider   
Instructions for Treatment  
  
   
                                                    How to access health informa  
tion   
online  
Indication:Smoker  
                          Start:2020          Instruction Type:Patient   
Education  
  
   
                                                    How to access health informa  
tion   
online - Detail  
Indication:Smoker  
                          Start:2020          Instruction Type:Patient   
Education  
  
   
                                                    Patient Instructions  
Indication:Smoker  
                          Start:2020          Instruction Type:Provider   
Instructions for Treatment  
  
   
                                                    How to access health informa  
tion   
online  
Indication:Smoker  
                          Start:2020         Instruction Type:Patient   
Education  
  
   
                                                    How to access health informa  
tion   
online - Detail  
Indication:Smoker  
                          Start:2020         Instruction Type:Patient   
Education  
  
   
                                                    Patient Instructions  
Indication:Smoker  
                          Start:2020         Instruction Type:Provider   
Instructions for Treatment  
  
   
                                                    How to access health informa  
tion   
online  
Indication:Smoker  
                          Start:13-Sep-2019         Instruction Type:Patient   
Education  
  
   
                                                    How to access health informa  
tion   
online - Detail  
Indication:Smoker  
                          Start:13-Sep-2019         Instruction Type:Patient   
Education  
  
   
                                                    Patient Instructions  
Indication:Smoker  
                          Start:13-Sep-2019         Instruction Type:Provider   
Instructions for Treatment  
  
   
                                                    How to access health informa  
tion   
online  
Indication:BMI 26.0-26.9,adult  
                          Start:10-May-2019         Instruction Type:Patient   
Education  
  
   
                                                    How to access health informa  
tion   
online - Detail  
Indication:BMI 26.0-26.9,adult  
                          Start:10-May-2019         Instruction Type:Patient   
Education  
  
   
                                                    Patient Instructions  
Indication:BMI 26.0-26.9,adult  
                          Start:10-May-2019         Instruction Type:Provider   
Instructions for Treatment  
  
   
                                                    How to access health informa  
tion   
online  
Indication:Abnormal glucose   
tolerance test  
                          Start:28-Dec-2018         Instruction Type:Patient   
Education  
  
   
                                                    How to access health informa  
tion   
online - Detail  
Indication:Abnormal glucose   
tolerance test  
                          Start:28-Dec-2018         Instruction Type:Patient   
Education  
  
   
                                                    Patient Instructions  
Indication:Abnormal glucose   
tolerance test  
                          Start:28-Dec-2018         Instruction Type:Provider   
Instructions for Treatment  
  
   
                                                    How to access health informa  
tion   
online  
Indication:Smoker  
                          Start:29-Aug-2018         Instruction Type:Patient   
Education  
  
   
                                                    How to access health informa  
tion   
online - Detail  
Indication:Smoker  
                          Start:29-Aug-2018         Instruction Type:Patient   
Education  
  
   
                                                    Patient Instructions  
Indication:Smoker  
                          Start:29-Aug-2018         Instruction Type:Provider   
Instructions for Treatment  
  
   
                                                    How to access health informa  
tion   
online  
Indication:Abnormal glucose   
tolerance test  
                          Start:2018         Instruction Type:Patient   
Education  
  
   
                                                    How to access health informa  
tion   
online - Detail  
Indication:Abnormal glucose   
tolerance test  
                          Start:2018         Instruction Type:Patient   
Education  
  
   
                                                    Patient Instructions  
Indication:Abnormal glucose   
tolerance test  
                          Start:2018         Instruction Type:Provider   
Instructions for Treatment  
  
   
                                                    How to access health informa  
tion   
online  
Indication:Smoker  
                          Start:8-Dec-2017          Instruction Type:Patient   
Education  
  
   
                                                    How to access health informa  
tion   
online - Detail  
Indication:Smoker  
                          Start:8-Dec-2017          Instruction Type:Patient   
Education  
  
   
                                                    Patient Instructions  
Indication:Smoker  
                          Start:8-Dec-2017          Instruction Type:Provider   
Instructions for Treatment  
  
   
                                                    How to access health informa  
tion   
online  
Indication:Smoker  
                          Start:4-Aug-2017          Instruction Type:Patient   
Education  
  
   
                                                    How to access health informa  
tion   
online - Detail  
Indication:Smoker  
                          Start:4-Aug-2017          Instruction Type:Patient   
Education  
  
   
                                                    Patient Instructions  
Indication:Smoker  
                          Start:4-Aug-2017          Instruction Type:Provider   
Instructions for Treatment  
  
   
                                                    How to access health informa  
tion   
online  
Indication:Smoker  
                          Start:3-Mar-2017          Instruction Type:Patient   
Education  
  
   
                                                    How to access health informa  
tion   
online - Detail  
Indication:Smoker  
                          Start:3-Mar-2017          Instruction Type:Patient   
Education  
  
   
                                                    Patient Instructions  
Indication:Smoker  
                          Start:3-Mar-2017          Instruction Type:Provider   
Instructions for Treatment  
  
   
                                                    How to access health informa  
tion   
online  
Indication:Hypertension,   
essential, benign  
                          Start:28-Oct-2016         Instruction Type:Patient   
Education  
  
   
                                                    How to access health informa  
tion   
online - Detail  
Indication:Hypertension,   
essential, benign  
                          Start:28-Oct-2016         Instruction Type:Patient   
Education  
  
   
                                                    Patient Instructions  
Indication:Hypertension,   
essential, benign  
                          Start:28-Oct-2016         Instruction Type:Provider   
Instructions for Treatment  
  
   
                                                    How to access health informa  
tion   
online  
Indication:Hypertension,   
essential, benign  
                          Start:2016         Instruction Type:Patient   
Education  
  
   
                                                    How to access health informa  
tion   
online - Detail  
Indication:Hypertension,   
essential, benign  
                          Start:2016         Instruction Type:Patient   
Education  
  
   
                                                    Patient Instructions  
Indication:Hypertension,   
essential, benign  
                          Start:2016         Instruction Type:Provider   
Instructions for Treatment  
  
   
                                                    How to access health informa  
tion   
online  
Indication:BMI 27.0-27.9,adult  
                          Start:2016         Instruction Type:Patient   
Education  
  
   
                                                    How to access health informa  
tion   
online - Detail  
Indication:BMI 27.0-27.9,adult  
                          Start:2016         Instruction Type:Patient   
Education  
  
   
                                                    Patient Instructions  
Indication:BMI 27.0-27.9,adult  
                          Start:2016         Instruction Type:Provider   
Instructions for Treatment  
  
   
                                                    How to access health informa  
tion   
online  
Indication:Abnormal glucose   
tolerance test  
                          Start:2016         Instruction Type:Patient   
Education  
  
   
                                                    How to access health informa  
tion   
online - Detail  
Indication:Abnormal glucose   
tolerance test  
                          Start:2016         Instruction Type:Patient   
Education  
  
   
                                                    Patient Instructions  
Indication:Abnormal glucose   
tolerance test  
                          Start:2016         Instruction Type:Provider   
Instructions for Treatment  
  
   
                                                    How to access health informa  
tion   
online  
Indication:Abnormal glucose   
tolerance test  
                          Start:16-Dec-2015         Instruction Type:Patient   
Education  
  
   
                                                    How to access health informa  
tion   
online - Detail  
Indication:Abnormal glucose   
tolerance test  
                          Start:16-Dec-2015         Instruction Type:Patient   
Education  
  
   
                                                    Patient Instructions  
Indication:Abnormal glucose   
tolerance test  
                          Start:16-Dec-2015         Instruction Type:Provider   
Instructions for Treatment  
  
   
                                                    How to access health informa  
tion   
online  
Indication:Hypercholesteremia  
                          Start:16-Sep-2015         Instruction Type:Patient   
Education  
  
   
                                                    How to access health informa  
tion   
online - Detail  
Indication:Hypercholesteremia  
                          Start:16-Sep-2015         Instruction Type:Patient   
Education  
  
   
                                                    Patient Instructions  
Indication:Hypercholesteremia  
                          Start:16-Sep-2015         Instruction Type:Provider   
Instructions for Treatment  
  
   
                                                    Patient Instructions  
Indication:Abnormal glucose   
tolerance test  
                          Start:9-May-2014          Instruction Type:Provider   
Instructions for Treatment  
  
   
                                                    Patient Instructions  
Indication:Abnormal glucose   
tolerance test  
                          Start:2014          Instruction Type:Provider   
Instructions for Treatment  
  
   
                                                    Patient Instructions  
Indication:Abnormal glucose   
tolerance test  
                          Start:12-Sep-2013         Instruction Type:Provider   
Instructions for Treatment  
  
   
                                                    Patient Instructions  
Indication:Hypercholesteremia  
                          Start:15-Mar-2013         Instruction Type:Provider   
Instructions for Treatment  
  
   
                                                    Patient Instructions  
Indication:Hypertension,   
essential, benign  
                          Start:14-Sep-2012         Instruction Type:Provider   
Instructions for Treatment  
  
  
  
[facility]  
Work Phone: [phone]*   
  
                                Name            Dates           Details  
   
                                                    Patient Instructions  
Indication:Smoker  
                          Start:2023          Instruction Type:Provider   
Instructions for Treatment  
  
   
                                                    How to Access Health Informa  
tion   
Online using Patient Portal and   
3rd Party Apps  
Indication:Smoker  
                          Start:2023          Instruction Type:Patient   
Education  
  
   
                                                    Patient Instructions  
Indication:BMI 27.0-27.9,adult  
                          Start:2023         Instruction Type:Provider   
Instructions for Treatment  
  
   
                                                    How to Access Health Informa  
tion   
Online using Patient Portal and   
3rd Party Apps  
Indication:BMI 27.0-27.9,adult  
                          Start:2023         Instruction Type:Patient   
Education  
  
   
                                                    Patient Instructions  
Indication:BMI 27.0-27.9,adult  
                          Start:28-Sep-2022         Instruction Type:Provider   
Instructions for Treatment  
  
   
                                                    How to Access Health Informa  
tion   
Online using Patient Portal and   
3rd Party Apps  
Indication:BMI 27.0-27.9,adult  
                          Start:28-Sep-2022         Instruction Type:Patient   
Education  
  
   
                                                    Patient Instructions  
Indication:Smoker  
                          Start:16-Mar-2022         Instruction Type:Provider   
Instructions for Treatment  
  
   
                                                    How to Access Health Informa  
tion   
Online using Patient Portal and   
3rd Party Apps  
Indication:Smoker  
                          Start:16-Mar-2022         Instruction Type:Patient   
Education  
  
   
                                                    Patient Instructions  
Indication:Smoker  
                          Start:2-Sep-2021          Instruction Type:Provider   
Instructions for Treatment  
  
   
                                                    How to Access Health Informa  
tion   
Online using Patient Portal and   
3rd Party Apps  
Indication:Smoker  
                          Start:2-Sep-2021          Instruction Type:Patient   
Education  
  
   
                                                    Patient Instructions  
Indication:BMI 26.0-26.9,adult  
                          Start:4-Aug-2021          Instruction Type:Provider   
Instructions for Treatment  
  
   
                                                    How to Access Health Informa  
tion   
Online using Patient Portal and   
3rd Party Apps  
Indication:BMI 26.0-26.9,adult  
                          Start:4-Aug-2021          Instruction Type:Patient   
Education  
  
   
                                                    How to Access Health Informa  
tion   
Online using Patient Portal and   
3rd Party Apps  
Indication:Smoker  
                          Start:1-Mar-2021          Instruction Type:Patient   
Education  
  
   
                                                    Patient Instructions  
Indication:Smoker  
                          Start:1-Mar-2021          Instruction Type:Provider   
Instructions for Treatment  
  
   
                                                    How to Access Health Informa  
tion   
Online using Patient Portal and   
3rd Party Apps  
Indication:Smoker  
                          Start:2021         Instruction Type:Patient   
Education  
  
   
                                                    Patient Instructions  
Indication:Smoker  
                          Start:2021         Instruction Type:Provider   
Instructions for Treatment  
  
   
                                                    How to Access Health Informa  
tion   
Online using Patient Portal and   
3rd Party Apps  
Indication:Smoker  
                          Start:2021          Instruction Type:Patient   
Education  
  
   
                                                    Patient Instructions  
Indication:Smoker  
                          Start:2021          Instruction Type:Provider   
Instructions for Treatment  
  
   
                                                    How to Access Health Informa  
tion   
Online using Patient Portal and   
3rd Party Apps  
Indication:Smoker  
                          Start:16-Dec-2020         Instruction Type:Patient   
Education  
  
   
                                                    Patient Instructions  
Indication:Smoker  
                          Start:16-Dec-2020         Instruction Type:Provider   
Instructions for Treatment  
  
   
                                                    How to access health informa  
tion   
online  
Indication:Smoker  
                          Start:2020         Instruction Type:Patient   
Education  
  
   
                                                    How to access health informa  
tion   
online - Detail  
Indication:Smoker  
                          Start:2020         Instruction Type:Patient   
Education  
  
   
                                                    Patient Instructions  
Indication:Smoker  
                          Start:2020         Instruction Type:Provider   
Instructions for Treatment  
  
   
                                                    How to access health informa  
tion   
online  
Indication:Smoker  
                          Start:24-Aug-2020         Instruction Type:Patient   
Education  
  
   
                                                    How to access health informa  
tion   
online - Detail  
Indication:Smoker  
                          Start:24-Aug-2020         Instruction Type:Patient   
Education  
  
   
                                                    Patient Instructions  
Indication:Smoker  
                          Start:24-Aug-2020         Instruction Type:Provider   
Instructions for Treatment  
  
   
                                                    How to access health informa  
tion   
online - Detail  
Indication:BMI 25.0-25.9,adult  
                          Start:2020         Instruction Type:Patient   
Education  
  
   
                                                    How to access health informa  
tion   
online  
Indication:BMI 25.0-25.9,adult  
                          Start:2020         Instruction Type:Patient   
Education  
  
   
                                                    Patient Instructions  
Indication:BMI 25.0-25.9,adult  
                          Start:2020         Instruction Type:Provider   
Instructions for Treatment  
  
   
                                                    How to access health informa  
tion   
online  
Indication:Smoker  
                          Start:2020          Instruction Type:Patient   
Education  
  
   
                                                    How to access health informa  
tion   
online - Detail  
Indication:Smoker  
                          Start:2020          Instruction Type:Patient   
Education  
  
   
                                                    Patient Instructions  
Indication:Smoker  
                          Start:2020          Instruction Type:Provider   
Instructions for Treatment  
  
   
                                                    How to access health informa  
tion   
online  
Indication:Smoker  
                          Start:2020         Instruction Type:Patient   
Education  
  
   
                                                    How to access health informa  
tion   
online - Detail  
Indication:Smoker  
                          Start:2020         Instruction Type:Patient   
Education  
  
   
                                                    Patient Instructions  
Indication:Smoker  
                          Start:2020         Instruction Type:Provider   
Instructions for Treatment  
  
   
                                                    How to access health informa  
tion   
online  
Indication:Smoker  
                          Start:13-Sep-2019         Instruction Type:Patient   
Education  
  
   
                                                    How to access health informa  
tion   
online - Detail  
Indication:Smoker  
                          Start:13-Sep-2019         Instruction Type:Patient   
Education  
  
   
                                                    Patient Instructions  
Indication:Smoker  
                          Start:13-Sep-2019         Instruction Type:Provider   
Instructions for Treatment  
  
   
                                                    How to access health informa  
tion   
online  
Indication:BMI 26.0-26.9,adult  
                          Start:10-May-2019         Instruction Type:Patient   
Education  
  
   
                                                    How to access health informa  
tion   
online - Detail  
Indication:BMI 26.0-26.9,adult  
                          Start:10-May-2019         Instruction Type:Patient   
Education  
  
   
                                                    Patient Instructions  
Indication:BMI 26.0-26.9,adult  
                          Start:10-May-2019         Instruction Type:Provider   
Instructions for Treatment  
  
   
                                                    How to access health informa  
tion   
online  
Indication:Abnormal glucose   
tolerance test  
                          Start:28-Dec-2018         Instruction Type:Patient   
Education  
  
   
                                                    How to access health informa  
tion   
online - Detail  
Indication:Abnormal glucose   
tolerance test  
                          Start:28-Dec-2018         Instruction Type:Patient   
Education  
  
   
                                                    Patient Instructions  
Indication:Abnormal glucose   
tolerance test  
                          Start:28-Dec-2018         Instruction Type:Provider   
Instructions for Treatment  
  
   
                                                    How to access health informa  
tion   
online  
Indication:Smoker  
                          Start:29-Aug-2018         Instruction Type:Patient   
Education  
  
   
                                                    How to access health informa  
tion   
online - Detail  
Indication:Smoker  
                          Start:29-Aug-2018         Instruction Type:Patient   
Education  
  
   
                                                    Patient Instructions  
Indication:Smoker  
                          Start:29-Aug-2018         Instruction Type:Provider   
Instructions for Treatment  
  
   
                                                    How to access health informa  
tion   
online  
Indication:Abnormal glucose   
tolerance test  
                          Start:2018         Instruction Type:Patient   
Education  
  
   
                                                    How to access health informa  
tion   
online - Detail  
Indication:Abnormal glucose   
tolerance test  
                          Start:2018         Instruction Type:Patient   
Education  
  
   
                                                    Patient Instructions  
Indication:Abnormal glucose   
tolerance test  
                          Start:2018         Instruction Type:Provider   
Instructions for Treatment  
  
   
                                                    How to access health informa  
tion   
online  
Indication:Smoker  
                          Start:8-Dec-2017          Instruction Type:Patient   
Education  
  
   
                                                    How to access health informa  
tion   
online - Detail  
Indication:Smoker  
                          Start:8-Dec-2017          Instruction Type:Patient   
Education  
  
   
                                                    Patient Instructions  
Indication:Smoker  
                          Start:8-Dec-2017          Instruction Type:Provider   
Instructions for Treatment  
  
   
                                                    How to access health informa  
tion   
online  
Indication:Smoker  
                          Start:4-Aug-2017          Instruction Type:Patient   
Education  
  
   
                                                    How to access health informa  
tion   
online - Detail  
Indication:Smoker  
                          Start:4-Aug-2017          Instruction Type:Patient   
Education  
  
   
                                                    Patient Instructions  
Indication:Smoker  
                          Start:4-Aug-2017          Instruction Type:Provider   
Instructions for Treatment  
  
   
                                                    How to access health informa  
tion   
online  
Indication:Smoker  
                          Start:3-Mar-2017          Instruction Type:Patient   
Education  
  
   
                                                    How to access health informa  
tion   
online - Detail  
Indication:Smoker  
                          Start:3-Mar-2017          Instruction Type:Patient   
Education  
  
   
                                                    Patient Instructions  
Indication:Smoker  
                          Start:3-Mar-2017          Instruction Type:Provider   
Instructions for Treatment  
  
   
                                                    How to access health informa  
tion   
online  
Indication:Hypertension,   
essential, benign  
                          Start:28-Oct-2016         Instruction Type:Patient   
Education  
  
   
                                                    How to access health informa  
tion   
online - Detail  
Indication:Hypertension,   
essential, benign  
                          Start:28-Oct-2016         Instruction Type:Patient   
Education  
  
   
                                                    Patient Instructions  
Indication:Hypertension,   
essential, benign  
                          Start:28-Oct-2016         Instruction Type:Provider   
Instructions for Treatment  
  
   
                                                    How to access health informa  
tion   
online  
Indication:Hypertension,   
essential, benign  
                          Start:2016         Instruction Type:Patient   
Education  
  
   
                                                    How to access health informa  
tion   
online - Detail  
Indication:Hypertension,   
essential, benign  
                          Start:2016         Instruction Type:Patient   
Education  
  
   
                                                    Patient Instructions  
Indication:Hypertension,   
essential, benign  
                          Start:2016         Instruction Type:Provider   
Instructions for Treatment  
  
   
                                                    How to access health informa  
tion   
online  
Indication:BMI 27.0-27.9,adult  
                          Start:2016         Instruction Type:Patient   
Education  
  
   
                                                    How to access health informa  
tion   
online - Detail  
Indication:BMI 27.0-27.9,adult  
                          Start:2016         Instruction Type:Patient   
Education  
  
   
                                                    Patient Instructions  
Indication:BMI 27.0-27.9,adult  
                          Start:2016         Instruction Type:Provider   
Instructions for Treatment  
  
   
                                                    How to access health informa  
tion   
online  
Indication:Abnormal glucose   
tolerance test  
                          Start:2016         Instruction Type:Patient   
Education  
  
   
                                                    How to access health informa  
tion   
online - Detail  
Indication:Abnormal glucose   
tolerance test  
                          Start:2016         Instruction Type:Patient   
Education  
  
   
                                                    Patient Instructions  
Indication:Abnormal glucose   
tolerance test  
                          Start:2016         Instruction Type:Provider   
Instructions for Treatment  
  
   
                                                    How to access health informa  
tion   
online  
Indication:Abnormal glucose   
tolerance test  
                          Start:16-Dec-2015         Instruction Type:Patient   
Education  
  
   
                                                    How to access health informa  
tion   
online - Detail  
Indication:Abnormal glucose   
tolerance test  
                          Start:16-Dec-2015         Instruction Type:Patient   
Education  
  
   
                                                    Patient Instructions  
Indication:Abnormal glucose   
tolerance test  
                          Start:16-Dec-2015         Instruction Type:Provider   
Instructions for Treatment  
  
   
                                                    How to access health informa  
tion   
online  
Indication:Hypercholesteremia  
                          Start:16-Sep-2015         Instruction Type:Patient   
Education  
  
   
                                                    How to access health informa  
tion   
online - Detail  
Indication:Hypercholesteremia  
                          Start:16-Sep-2015         Instruction Type:Patient   
Education  
  
   
                                                    Patient Instructions  
Indication:Hypercholesteremia  
                          Start:16-Sep-2015         Instruction Type:Provider   
Instructions for Treatment  
  
   
                                                    Patient Instructions  
Indication:Abnormal glucose   
tolerance test  
                          Start:9-May-2014          Instruction Type:Provider   
Instructions for Treatment  
  
   
                                                    Patient Instructions  
Indication:Abnormal glucose   
tolerance test  
                          Start:2014          Instruction Type:Provider   
Instructions for Treatment  
  
   
                                                    Patient Instructions  
Indication:Abnormal glucose   
tolerance test  
                          Start:12-Sep-2013         Instruction Type:Provider   
Instructions for Treatment  
  
   
                                                    Patient Instructions  
Indication:Hypercholesteremia  
                          Start:15-Mar-2013         Instruction Type:Provider   
Instructions for Treatment  
  
   
                                                    Patient Instructions  
Indication:Hypertension,   
essential, benign  
                          Start:14-Sep-2012         Instruction Type:Provider   
Instructions for Treatment  
  
  
  
[facility]  
Work Phone: [phone]*   
  
                                Name            Dates           Details  
   
                                                    Patient Instructions  
Indication:Smoker  
                          Start:2023          Instruction Type:Provider   
Instructions for Treatment  
  
   
                                                    How to Access Health Informa  
tion   
Online using Patient Portal and   
3rd Party Apps  
Indication:Smoker  
                          Start:2023          Instruction Type:Patient   
Education  
  
   
                                                    Patient Instructions  
Indication:BMI 27.0-27.9,adult  
                          Start:2023         Instruction Type:Provider   
Instructions for Treatment  
  
   
                                                    How to Access Health Informa  
tion   
Online using Patient Portal and   
3rd Party Apps  
Indication:BMI 27.0-27.9,adult  
                          Start:2023         Instruction Type:Patient   
Education  
  
   
                                                    Patient Instructions  
Indication:BMI 27.0-27.9,adult  
                          Start:28-Sep-2022         Instruction Type:Provider   
Instructions for Treatment  
  
   
                                                    How to Access Health Informa  
tion   
Online using Patient Portal and   
3rd Party Apps  
Indication:BMI 27.0-27.9,adult  
                          Start:28-Sep-2022         Instruction Type:Patient   
Education  
  
   
                                                    Patient Instructions  
Indication:Smoker  
                          Start:16-Mar-2022         Instruction Type:Provider   
Instructions for Treatment  
  
   
                                                    How to Access Health Informa  
tion   
Online using Patient Portal and   
3rd Party Apps  
Indication:Smoker  
                          Start:16-Mar-2022         Instruction Type:Patient   
Education  
  
   
                                                    Patient Instructions  
Indication:Smoker  
                          Start:2-Sep-2021          Instruction Type:Provider   
Instructions for Treatment  
  
   
                                                    How to Access Health Informa  
tion   
Online using Patient Portal and   
3rd Party Apps  
Indication:Smoker  
                          Start:2-Sep-2021          Instruction Type:Patient   
Education  
  
   
                                                    Patient Instructions  
Indication:BMI 26.0-26.9,adult  
                          Start:4-Aug-2021          Instruction Type:Provider   
Instructions for Treatment  
  
   
                                                    How to Access Health Informa  
tion   
Online using Patient Portal and   
3rd Party Apps  
Indication:BMI 26.0-26.9,adult  
                          Start:4-Aug-2021          Instruction Type:Patient   
Education  
  
   
                                                    How to Access Health Informa  
tion   
Online using Patient Portal and   
3rd Party Apps  
Indication:Smoker  
                          Start:1-Mar-2021          Instruction Type:Patient   
Education  
  
   
                                                    Patient Instructions  
Indication:Smoker  
                          Start:1-Mar-2021          Instruction Type:Provider   
Instructions for Treatment  
  
   
                                                    How to Access Health Informa  
tion   
Online using Patient Portal and   
3rd Party Apps  
Indication:Smoker  
                          Start:2021         Instruction Type:Patient   
Education  
  
   
                                                    Patient Instructions  
Indication:Smoker  
                          Start:2021         Instruction Type:Provider   
Instructions for Treatment  
  
   
                                                    How to Access Health Informa  
tion   
Online using Patient Portal and   
3rd Party Apps  
Indication:Smoker  
                          Start:2021          Instruction Type:Patient   
Education  
  
   
                                                    Patient Instructions  
Indication:Smoker  
                          Start:2021          Instruction Type:Provider   
Instructions for Treatment  
  
   
                                                    How to Access Health Informa  
tion   
Online using Patient Portal and   
3rd Party Apps  
Indication:Smoker  
                          Start:16-Dec-2020         Instruction Type:Patient   
Education  
  
   
                                                    Patient Instructions  
Indication:Smoker  
                          Start:16-Dec-2020         Instruction Type:Provider   
Instructions for Treatment  
  
   
                                                    How to access health informa  
tion   
online  
Indication:Smoker  
                          Start:2020         Instruction Type:Patient   
Education  
  
   
                                                    How to access health informa  
tion   
online - Detail  
Indication:Smoker  
                          Start:2020         Instruction Type:Patient   
Education  
  
   
                                                    Patient Instructions  
Indication:Smoker  
                          Start:2020         Instruction Type:Provider   
Instructions for Treatment  
  
   
                                                    How to access health informa  
tion   
online  
Indication:Smoker  
                          Start:24-Aug-2020         Instruction Type:Patient   
Education  
  
   
                                                    How to access health informa  
tion   
online - Detail  
Indication:Smoker  
                          Start:24-Aug-2020         Instruction Type:Patient   
Education  
  
   
                                                    Patient Instructions  
Indication:Smoker  
                          Start:24-Aug-2020         Instruction Type:Provider   
Instructions for Treatment  
  
   
                                                    How to access health informa  
tion   
online - Detail  
Indication:BMI 25.0-25.9,adult  
                          Start:2020         Instruction Type:Patient   
Education  
  
   
                                                    How to access health informa  
tion   
online  
Indication:BMI 25.0-25.9,adult  
                          Start:2020         Instruction Type:Patient   
Education  
  
   
                                                    Patient Instructions  
Indication:BMI 25.0-25.9,adult  
                          Start:2020         Instruction Type:Provider   
Instructions for Treatment  
  
   
                                                    How to access health informa  
tion   
online  
Indication:Smoker  
                          Start:2020          Instruction Type:Patient   
Education  
  
   
                                                    How to access health informa  
tion   
online - Detail  
Indication:Smoker  
                          Start:2020          Instruction Type:Patient   
Education  
  
   
                                                    Patient Instructions  
Indication:Smoker  
                          Start:2020          Instruction Type:Provider   
Instructions for Treatment  
  
   
                                                    How to access health informa  
tion   
online  
Indication:Smoker  
                          Start:2020         Instruction Type:Patient   
Education  
  
   
                                                    How to access health informa  
tion   
online - Detail  
Indication:Smoker  
                          Start:2020         Instruction Type:Patient   
Education  
  
   
                                                    Patient Instructions  
Indication:Smoker  
                          Start:2020         Instruction Type:Provider   
Instructions for Treatment  
  
   
                                                    How to access health informa  
tion   
online  
Indication:Smoker  
                          Start:13-Sep-2019         Instruction Type:Patient   
Education  
  
   
                                                    How to access health informa  
tion   
online - Detail  
Indication:Smoker  
                          Start:13-Sep-2019         Instruction Type:Patient   
Education  
  
   
                                                    Patient Instructions  
Indication:Smoker  
                          Start:13-Sep-2019         Instruction Type:Provider   
Instructions for Treatment  
  
   
                                                    How to access health informa  
tion   
online  
Indication:BMI 26.0-26.9,adult  
                          Start:10-May-2019         Instruction Type:Patient   
Education  
  
   
                                                    How to access health informa  
tion   
online - Detail  
Indication:BMI 26.0-26.9,adult  
                          Start:10-May-2019         Instruction Type:Patient   
Education  
  
   
                                                    Patient Instructions  
Indication:BMI 26.0-26.9,adult  
                          Start:10-May-2019         Instruction Type:Provider   
Instructions for Treatment  
  
   
                                                    How to access health informa  
tion   
online  
Indication:Abnormal glucose   
tolerance test  
                          Start:28-Dec-2018         Instruction Type:Patient   
Education  
  
   
                                                    How to access health informa  
tion   
online - Detail  
Indication:Abnormal glucose   
tolerance test  
                          Start:28-Dec-2018         Instruction Type:Patient   
Education  
  
   
                                                    Patient Instructions  
Indication:Abnormal glucose   
tolerance test  
                          Start:28-Dec-2018         Instruction Type:Provider   
Instructions for Treatment  
  
   
                                                    How to access health informa  
tion   
online  
Indication:Smoker  
                          Start:29-Aug-2018         Instruction Type:Patient   
Education  
  
   
                                                    How to access health informa  
tion   
online - Detail  
Indication:Smoker  
                          Start:29-Aug-2018         Instruction Type:Patient   
Education  
  
   
                                                    Patient Instructions  
Indication:Smoker  
                          Start:29-Aug-2018         Instruction Type:Provider   
Instructions for Treatment  
  
   
                                                    How to access health informa  
tion   
online  
Indication:Abnormal glucose   
tolerance test  
                          Start:2018         Instruction Type:Patient   
Education  
  
   
                                                    How to access health informa  
tion   
online - Detail  
Indication:Abnormal glucose   
tolerance test  
                          Start:2018         Instruction Type:Patient   
Education  
  
   
                                                    Patient Instructions  
Indication:Abnormal glucose   
tolerance test  
                          Start:2018         Instruction Type:Provider   
Instructions for Treatment  
  
   
                                                    How to access health informa  
tion   
online  
Indication:Smoker  
                          Start:8-Dec-2017          Instruction Type:Patient   
Education  
  
   
                                                    How to access health informa  
tion   
online - Detail  
Indication:Smoker  
                          Start:8-Dec-2017          Instruction Type:Patient   
Education  
  
   
                                                    Patient Instructions  
Indication:Smoker  
                          Start:8-Dec-2017          Instruction Type:Provider   
Instructions for Treatment  
  
   
                                                    How to access health informa  
tion   
online  
Indication:Smoker  
                          Start:4-Aug-2017          Instruction Type:Patient   
Education  
  
   
                                                    How to access health informa  
tion   
online - Detail  
Indication:Smoker  
                          Start:4-Aug-2017          Instruction Type:Patient   
Education  
  
   
                                                    Patient Instructions  
Indication:Smoker  
                          Start:4-Aug-2017          Instruction Type:Provider   
Instructions for Treatment  
  
   
                                                    How to access health informa  
tion   
online  
Indication:Smoker  
                          Start:3-Mar-2017          Instruction Type:Patient   
Education  
  
   
                                                    How to access health informa  
tion   
online - Detail  
Indication:Smoker  
                          Start:3-Mar-2017          Instruction Type:Patient   
Education  
  
   
                                                    Patient Instructions  
Indication:Smoker  
                          Start:3-Mar-2017          Instruction Type:Provider   
Instructions for Treatment  
  
   
                                                    How to access health informa  
tion   
online  
Indication:Hypertension,   
essential, benign  
                          Start:28-Oct-2016         Instruction Type:Patient   
Education  
  
   
                                                    How to access health informa  
tion   
online - Detail  
Indication:Hypertension,   
essential, benign  
                          Start:28-Oct-2016         Instruction Type:Patient   
Education  
  
   
                                                    Patient Instructions  
Indication:Hypertension,   
essential, benign  
                          Start:28-Oct-2016         Instruction Type:Provider   
Instructions for Treatment  
  
   
                                                    How to access health informa  
tion   
online  
Indication:Hypertension,   
essential, benign  
                          Start:2016         Instruction Type:Patient   
Education  
  
   
                                                    How to access health informa  
tion   
online - Detail  
Indication:Hypertension,   
essential, benign  
                          Start:2016         Instruction Type:Patient   
Education  
  
   
                                                    Patient Instructions  
Indication:Hypertension,   
essential, benign  
                          Start:2016         Instruction Type:Provider   
Instructions for Treatment  
  
   
                                                    How to access health informa  
tion   
online  
Indication:BMI 27.0-27.9,adult  
                          Start:2016         Instruction Type:Patient   
Education  
  
   
                                                    How to access health informa  
tion   
online - Detail  
Indication:BMI 27.0-27.9,adult  
                          Start:2016         Instruction Type:Patient   
Education  
  
   
                                                    Patient Instructions  
Indication:BMI 27.0-27.9,adult  
                          Start:2016         Instruction Type:Provider   
Instructions for Treatment  
  
   
                                                    How to access health informa  
tion   
online  
Indication:Abnormal glucose   
tolerance test  
                          Start:2016         Instruction Type:Patient   
Education  
  
   
                                                    How to access health informa  
tion   
online - Detail  
Indication:Abnormal glucose   
tolerance test  
                          Start:2016         Instruction Type:Patient   
Education  
  
   
                                                    Patient Instructions  
Indication:Abnormal glucose   
tolerance test  
                          Start:2016         Instruction Type:Provider   
Instructions for Treatment  
  
   
                                                    How to access health informa  
tion   
online  
Indication:Abnormal glucose   
tolerance test  
                          Start:16-Dec-2015         Instruction Type:Patient   
Education  
  
   
                                                    How to access health informa  
tion   
online - Detail  
Indication:Abnormal glucose   
tolerance test  
                          Start:16-Dec-2015         Instruction Type:Patient   
Education  
  
   
                                                    Patient Instructions  
Indication:Abnormal glucose   
tolerance test  
                          Start:16-Dec-2015         Instruction Type:Provider   
Instructions for Treatment  
  
   
                                                    How to access health informa  
tion   
online  
Indication:Hypercholesteremia  
                          Start:16-Sep-2015         Instruction Type:Patient   
Education  
  
   
                                                    How to access health informa  
tion   
online - Detail  
Indication:Hypercholesteremia  
                          Start:16-Sep-2015         Instruction Type:Patient   
Education  
  
   
                                                    Patient Instructions  
Indication:Hypercholesteremia  
                          Start:16-Sep-2015         Instruction Type:Provider   
Instructions for Treatment  
  
   
                                                    Patient Instructions  
Indication:Abnormal glucose   
tolerance test  
                          Start:9-May-2014          Instruction Type:Provider   
Instructions for Treatment  
  
   
                                                    Patient Instructions  
Indication:Abnormal glucose   
tolerance test  
                          Start:2014          Instruction Type:Provider   
Instructions for Treatment  
  
   
                                                    Patient Instructions  
Indication:Abnormal glucose   
tolerance test  
                          Start:12-Sep-2013         Instruction Type:Provider   
Instructions for Treatment  
  
   
                                                    Patient Instructions  
Indication:Hypercholesteremia  
                          Start:15-Mar-2013         Instruction Type:Provider   
Instructions for Treatment  
  
   
                                                    Patient Instructions  
Indication:Hypertension,   
essential, benign  
                          Start:14-Sep-2012         Instruction Type:Provider   
Instructions for Treatment  
  
  
  
[facility]  
Work Phone: [phone]*   
  
                                Name            Dates           Details  
   
                                                    How to Access Health Informa  
tion   
Online using Patient Portal and   
3rd Party Apps  
Indication:Smoker  
                          Start:20-Sep-2023         Instruction Type:Patient   
Education  
  
   
                                                    Patient Instructions  
Indication:Smoker  
                          Start:20-Sep-2023         Instruction Type:Provider   
Instructions for Treatment  
  
   
                                                    Patient Instructions  
Indication:Smoker  
                          Start:2023          Instruction Type:Provider   
Instructions for Treatment  
  
   
                                                    How to Access Health Informa  
tion   
Online using Patient Portal and   
3rd Party Apps  
Indication:Smoker  
                          Start:2023          Instruction Type:Patient   
Education  
  
   
                                                    Patient Instructions  
Indication:BMI 27.0-27.9,adult  
                          Start:2023         Instruction Type:Provider   
Instructions for Treatment  
  
   
                                                    How to Access Health Informa  
tion   
Online using Patient Portal and   
3rd Party Apps  
Indication:BMI 27.0-27.9,adult  
                          Start:2023         Instruction Type:Patient   
Education  
  
   
                                                    Patient Instructions  
Indication:BMI 27.0-27.9,adult  
                          Start:28-Sep-2022         Instruction Type:Provider   
Instructions for Treatment  
  
   
                                                    How to Access Health Informa  
tion   
Online using Patient Portal and   
3rd Party Apps  
Indication:BMI 27.0-27.9,adult  
                          Start:28-Sep-2022         Instruction Type:Patient   
Education  
  
   
                                                    Patient Instructions  
Indication:Smoker  
                          Start:16-Mar-2022         Instruction Type:Provider   
Instructions for Treatment  
  
   
                                                    How to Access Health Informa  
tion   
Online using Patient Portal and   
3rd Party Apps  
Indication:Smoker  
                          Start:16-Mar-2022         Instruction Type:Patient   
Education  
  
   
                                                    Patient Instructions  
Indication:Smoker  
                          Start:2-Sep-2021          Instruction Type:Provider   
Instructions for Treatment  
  
   
                                                    How to Access Health Informa  
tion   
Online using Patient Portal and   
3rd Party Apps  
Indication:Smoker  
                          Start:2-Sep-2021          Instruction Type:Patient   
Education  
  
   
                                                    Patient Instructions  
Indication:BMI 26.0-26.9,adult  
                          Start:4-Aug-2021          Instruction Type:Provider   
Instructions for Treatment  
  
   
                                                    How to Access Health Informa  
tion   
Online using Patient Portal and   
3rd Party Apps  
Indication:BMI 26.0-26.9,adult  
                          Start:4-Aug-2021          Instruction Type:Patient   
Education  
  
   
                                                    How to Access Health Informa  
tion   
Online using Patient Portal and   
3rd Party Apps  
Indication:Smoker  
                          Start:1-Mar-2021          Instruction Type:Patient   
Education  
  
   
                                                    Patient Instructions  
Indication:Smoker  
                          Start:1-Mar-2021          Instruction Type:Provider   
Instructions for Treatment  
  
   
                                                    How to Access Health Informa  
tion   
Online using Patient Portal and   
3rd Party Apps  
Indication:Smoker  
                          Start:2021         Instruction Type:Patient   
Education  
  
   
                                                    Patient Instructions  
Indication:Smoker  
                          Start:2021         Instruction Type:Provider   
Instructions for Treatment  
  
   
                                                    How to Access Health Informa  
tion   
Online using Patient Portal and   
3rd Party Apps  
Indication:Smoker  
                          Start:2021          Instruction Type:Patient   
Education  
  
   
                                                    Patient Instructions  
Indication:Smoker  
                          Start:2021          Instruction Type:Provider   
Instructions for Treatment  
  
   
                                                    How to Access Health Informa  
tion   
Online using Patient Portal and   
3rd Party Apps  
Indication:Smoker  
                          Start:16-Dec-2020         Instruction Type:Patient   
Education  
  
   
                                                    Patient Instructions  
Indication:Smoker  
                          Start:16-Dec-2020         Instruction Type:Provider   
Instructions for Treatment  
  
   
                                                    How to access health informa  
tion   
online  
Indication:Smoker  
                          Start:2020         Instruction Type:Patient   
Education  
  
   
                                                    How to access health informa  
tion   
online - Detail  
Indication:Smoker  
                          Start:2020         Instruction Type:Patient   
Education  
  
   
                                                    Patient Instructions  
Indication:Smoker  
                          Start:2020         Instruction Type:Provider   
Instructions for Treatment  
  
   
                                                    How to access health informa  
tion   
online  
Indication:Smoker  
                          Start:24-Aug-2020         Instruction Type:Patient   
Education  
  
   
                                                    How to access health informa  
tion   
online - Detail  
Indication:Smoker  
                          Start:24-Aug-2020         Instruction Type:Patient   
Education  
  
   
                                                    Patient Instructions  
Indication:Smoker  
                          Start:24-Aug-2020         Instruction Type:Provider   
Instructions for Treatment  
  
   
                                                    How to access health informa  
tion   
online - Detail  
Indication:BMI 25.0-25.9,adult  
                          Start:2020         Instruction Type:Patient   
Education  
  
   
                                                    How to access health informa  
tion   
online  
Indication:BMI 25.0-25.9,adult  
                          Start:2020         Instruction Type:Patient   
Education  
  
   
                                                    Patient Instructions  
Indication:BMI 25.0-25.9,adult  
                          Start:2020         Instruction Type:Provider   
Instructions for Treatment  
  
   
                                                    How to access health informa  
tion   
online  
Indication:Smoker  
                          Start:2020          Instruction Type:Patient   
Education  
  
   
                                                    How to access health informa  
tion   
online - Detail  
Indication:Smoker  
                          Start:2020          Instruction Type:Patient   
Education  
  
   
                                                    Patient Instructions  
Indication:Smoker  
                          Start:2020          Instruction Type:Provider   
Instructions for Treatment  
  
   
                                                    How to access health informa  
tion   
online  
Indication:Smoker  
                          Start:2020         Instruction Type:Patient   
Education  
  
   
                                                    How to access health informa  
tion   
online - Detail  
Indication:Smoker  
                          Start:2020         Instruction Type:Patient   
Education  
  
   
                                                    Patient Instructions  
Indication:Smoker  
                          Start:2020         Instruction Type:Provider   
Instructions for Treatment  
  
   
                                                    How to access health informa  
tion   
online  
Indication:Smoker  
                          Start:13-Sep-2019         Instruction Type:Patient   
Education  
  
   
                                                    How to access health informa  
tion   
online - Detail  
Indication:Smoker  
                          Start:13-Sep-2019         Instruction Type:Patient   
Education  
  
   
                                                    Patient Instructions  
Indication:Smoker  
                          Start:13-Sep-2019         Instruction Type:Provider   
Instructions for Treatment  
  
   
                                                    How to access health informa  
tion   
online  
Indication:BMI 26.0-26.9,adult  
                          Start:10-May-2019         Instruction Type:Patient   
Education  
  
   
                                                    How to access health informa  
tion   
online - Detail  
Indication:BMI 26.0-26.9,adult  
                          Start:10-May-2019         Instruction Type:Patient   
Education  
  
   
                                                    Patient Instructions  
Indication:BMI 26.0-26.9,adult  
                          Start:10-May-2019         Instruction Type:Provider   
Instructions for Treatment  
  
   
                                                    How to access health informa  
tion   
online  
Indication:Abnormal glucose   
tolerance test  
                          Start:28-Dec-2018         Instruction Type:Patient   
Education  
  
   
                                                    How to access health informa  
tion   
online - Detail  
Indication:Abnormal glucose   
tolerance test  
                          Start:28-Dec-2018         Instruction Type:Patient   
Education  
  
   
                                                    Patient Instructions  
Indication:Abnormal glucose   
tolerance test  
                          Start:28-Dec-2018         Instruction Type:Provider   
Instructions for Treatment  
  
   
                                                    How to access health informa  
tion   
online  
Indication:Smoker  
                          Start:29-Aug-2018         Instruction Type:Patient   
Education  
  
   
                                                    How to access health informa  
tion   
online - Detail  
Indication:Smoker  
                          Start:29-Aug-2018         Instruction Type:Patient   
Education  
  
   
                                                    Patient Instructions  
Indication:Smoker  
                          Start:29-Aug-2018         Instruction Type:Provider   
Instructions for Treatment  
  
   
                                                    How to access health informa  
tion   
online  
Indication:Abnormal glucose   
tolerance test  
                          Start:2018         Instruction Type:Patient   
Education  
  
   
                                                    How to access health informa  
tion   
online - Detail  
Indication:Abnormal glucose   
tolerance test  
                          Start:2018         Instruction Type:Patient   
Education  
  
   
                                                    Patient Instructions  
Indication:Abnormal glucose   
tolerance test  
                          Start:2018         Instruction Type:Provider   
Instructions for Treatment  
  
   
                                                    How to access health informa  
tion   
online  
Indication:Smoker  
                          Start:8-Dec-2017          Instruction Type:Patient   
Education  
  
   
                                                    How to access health informa  
tion   
online - Detail  
Indication:Smoker  
                          Start:8-Dec-2017          Instruction Type:Patient   
Education  
  
   
                                                    Patient Instructions  
Indication:Smoker  
                          Start:8-Dec-2017          Instruction Type:Provider   
Instructions for Treatment  
  
   
                                                    How to access health informa  
tion   
online  
Indication:Smoker  
                          Start:4-Aug-2017          Instruction Type:Patient   
Education  
  
   
                                                    How to access health informa  
tion   
online - Detail  
Indication:Smoker  
                          Start:4-Aug-2017          Instruction Type:Patient   
Education  
  
   
                                                    Patient Instructions  
Indication:Smoker  
                          Start:4-Aug-2017          Instruction Type:Provider   
Instructions for Treatment  
  
   
                                                    How to access health informa  
tion   
online  
Indication:Smoker  
                          Start:3-Mar-2017          Instruction Type:Patient   
Education  
  
   
                                                    How to access health informa  
tion   
online - Detail  
Indication:Smoker  
                          Start:3-Mar-2017          Instruction Type:Patient   
Education  
  
   
                                                    Patient Instructions  
Indication:Smoker  
                          Start:3-Mar-2017          Instruction Type:Provider   
Instructions for Treatment  
  
   
                                                    How to access health informa  
tion   
online  
Indication:Hypertension,   
essential, benign  
                          Start:28-Oct-2016         Instruction Type:Patient   
Education  
  
   
                                                    How to access health informa  
tion   
online - Detail  
Indication:Hypertension,   
essential, benign  
                          Start:28-Oct-2016         Instruction Type:Patient   
Education  
  
   
                                                    Patient Instructions  
Indication:Hypertension,   
essential, benign  
                          Start:28-Oct-2016         Instruction Type:Provider   
Instructions for Treatment  
  
   
                                                    How to access health informa  
tion   
online  
Indication:Hypertension,   
essential, benign  
                          Start:2016         Instruction Type:Patient   
Education  
  
   
                                                    How to access health informa  
tion   
online - Detail  
Indication:Hypertension,   
essential, benign  
                          Start:2016         Instruction Type:Patient   
Education  
  
   
                                                    Patient Instructions  
Indication:Hypertension,   
essential, benign  
                          Start:2016         Instruction Type:Provider   
Instructions for Treatment  
  
   
                                                    How to access health informa  
tion   
online  
Indication:BMI 27.0-27.9,adult  
                          Start:2016         Instruction Type:Patient   
Education  
  
   
                                                    How to access health informa  
tion   
online - Detail  
Indication:BMI 27.0-27.9,adult  
                          Start:2016         Instruction Type:Patient   
Education  
  
   
                                                    Patient Instructions  
Indication:BMI 27.0-27.9,adult  
                          Start:2016         Instruction Type:Provider   
Instructions for Treatment  
  
   
                                                    How to access health informa  
tion   
online  
Indication:Abnormal glucose   
tolerance test  
                          Start:2016         Instruction Type:Patient   
Education  
  
   
                                                    How to access health informa  
tion   
online - Detail  
Indication:Abnormal glucose   
tolerance test  
                          Start:2016         Instruction Type:Patient   
Education  
  
   
                                                    Patient Instructions  
Indication:Abnormal glucose   
tolerance test  
                          Start:2016         Instruction Type:Provider   
Instructions for Treatment  
  
   
                                                    How to access health informa  
tion   
online  
Indication:Abnormal glucose   
tolerance test  
                          Start:16-Dec-2015         Instruction Type:Patient   
Education  
  
   
                                                    How to access health informa  
tion   
online - Detail  
Indication:Abnormal glucose   
tolerance test  
                          Start:16-Dec-2015         Instruction Type:Patient   
Education  
  
   
                                                    Patient Instructions  
Indication:Abnormal glucose   
tolerance test  
                          Start:16-Dec-2015         Instruction Type:Provider   
Instructions for Treatment  
  
   
                                                    How to access health informa  
tion   
online  
Indication:Hypercholesteremia  
                          Start:16-Sep-2015         Instruction Type:Patient   
Education  
  
   
                                                    How to access health informa  
tion   
online - Detail  
Indication:Hypercholesteremia  
                          Start:16-Sep-2015         Instruction Type:Patient   
Education  
  
   
                                                    Patient Instructions  
Indication:Hypercholesteremia  
                          Start:16-Sep-2015         Instruction Type:Provider   
Instructions for Treatment  
  
   
                                                    Patient Instructions  
Indication:Abnormal glucose   
tolerance test  
                          Start:9-May-2014          Instruction Type:Provider   
Instructions for Treatment  
  
   
                                                    Patient Instructions  
Indication:Abnormal glucose   
tolerance test  
                          Start:2014          Instruction Type:Provider   
Instructions for Treatment  
  
   
                                                    Patient Instructions  
Indication:Abnormal glucose   
tolerance test  
                          Start:12-Sep-2013         Instruction Type:Provider   
Instructions for Treatment  
  
   
                                                    Patient Instructions  
Indication:Hypercholesteremia  
                          Start:15-Mar-2013         Instruction Type:Provider   
Instructions for Treatment  
  
   
                                                    Patient Instructions  
Indication:Hypertension,   
essential, benign  
                          Start:14-Sep-2012         Instruction Type:Provider   
Instructions for Treatment  
  
  
  
[facility]; [facility]  
Work Phone: [phone]*   
  
                                Name            Dates           Details  
   
                                                    How to Access Health Informa  
tion   
Online using Patient Portal and   
3rd Party Apps  
Indication:Smoker  
                          Start:20-Sep-2023         Instruction Type:Patient   
Education  
  
   
                                                    Patient Instructions  
Indication:Smoker  
                          Start:20-Sep-2023         Instruction Type:Provider   
Instructions for Treatment  
  
   
                                                    Patient Instructions  
Indication:Smoker  
                          Start:2023          Instruction Type:Provider   
Instructions for Treatment  
  
   
                                                    How to Access Health Informa  
tion   
Online using Patient Portal and   
3rd Party Apps  
Indication:Smoker  
                          Start:2023          Instruction Type:Patient   
Education  
  
   
                                                    Patient Instructions  
Indication:BMI 27.0-27.9,adult  
                          Start:2023         Instruction Type:Provider   
Instructions for Treatment  
  
   
                                                    How to Access Health Informa  
tion   
Online using Patient Portal and   
3rd Party Apps  
Indication:BMI 27.0-27.9,adult  
                          Start:2023         Instruction Type:Patient   
Education  
  
   
                                                    Patient Instructions  
Indication:BMI 27.0-27.9,adult  
                          Start:28-Sep-2022         Instruction Type:Provider   
Instructions for Treatment  
  
   
                                                    How to Access Health Informa  
tion   
Online using Patient Portal and   
3rd Party Apps  
Indication:BMI 27.0-27.9,adult  
                          Start:28-Sep-2022         Instruction Type:Patient   
Education  
  
   
                                                    Patient Instructions  
Indication:Smoker  
                          Start:16-Mar-2022         Instruction Type:Provider   
Instructions for Treatment  
  
   
                                                    How to Access Health Informa  
tion   
Online using Patient Portal and   
3rd Party Apps  
Indication:Smoker  
                          Start:16-Mar-2022         Instruction Type:Patient   
Education  
  
   
                                                    Patient Instructions  
Indication:Smoker  
                          Start:2-Sep-2021          Instruction Type:Provider   
Instructions for Treatment  
  
   
                                                    How to Access Health Informa  
tion   
Online using Patient Portal and   
3rd Party Apps  
Indication:Smoker  
                          Start:2-Sep-2021          Instruction Type:Patient   
Education  
  
   
                                                    Patient Instructions  
Indication:BMI 26.0-26.9,adult  
                          Start:4-Aug-2021          Instruction Type:Provider   
Instructions for Treatment  
  
   
                                                    How to Access Health Informa  
tion   
Online using Patient Portal and   
3rd Party Apps  
Indication:BMI 26.0-26.9,adult  
                          Start:4-Aug-2021          Instruction Type:Patient   
Education  
  
   
                                                    How to Access Health Informa  
tion   
Online using Patient Portal and   
3rd Party Apps  
Indication:Smoker  
                          Start:1-Mar-2021          Instruction Type:Patient   
Education  
  
   
                                                    Patient Instructions  
Indication:Smoker  
                          Start:1-Mar-2021          Instruction Type:Provider   
Instructions for Treatment  
  
   
                                                    How to Access Health Informa  
tion   
Online using Patient Portal and   
3rd Party Apps  
Indication:Smoker  
                          Start:2021         Instruction Type:Patient   
Education  
  
   
                                                    Patient Instructions  
Indication:Smoker  
                          Start:2021         Instruction Type:Provider   
Instructions for Treatment  
  
   
                                                    How to Access Health Informa  
tion   
Online using Patient Portal and   
3rd Party Apps  
Indication:Smoker  
                          Start:2021          Instruction Type:Patient   
Education  
  
   
                                                    Patient Instructions  
Indication:Smoker  
                          Start:2021          Instruction Type:Provider   
Instructions for Treatment  
  
   
                                                    How to Access Health Informa  
tion   
Online using Patient Portal and   
3rd Party Apps  
Indication:Smoker  
                          Start:16-Dec-2020         Instruction Type:Patient   
Education  
  
   
                                                    Patient Instructions  
Indication:Smoker  
                          Start:16-Dec-2020         Instruction Type:Provider   
Instructions for Treatment  
  
   
                                                    How to access health informa  
tion   
online  
Indication:Smoker  
                          Start:2020         Instruction Type:Patient   
Education  
  
   
                                                    How to access health informa  
tion   
online - Detail  
Indication:Smoker  
                          Start:2020         Instruction Type:Patient   
Education  
  
   
                                                    Patient Instructions  
Indication:Smoker  
                          Start:2020         Instruction Type:Provider   
Instructions for Treatment  
  
   
                                                    How to access health informa  
tion   
online  
Indication:Smoker  
                          Start:24-Aug-2020         Instruction Type:Patient   
Education  
  
   
                                                    How to access health informa  
tion   
online - Detail  
Indication:Smoker  
                          Start:24-Aug-2020         Instruction Type:Patient   
Education  
  
   
                                                    Patient Instructions  
Indication:Smoker  
                          Start:24-Aug-2020         Instruction Type:Provider   
Instructions for Treatment  
  
   
                                                    How to access health informa  
tion   
online - Detail  
Indication:BMI 25.0-25.9,adult  
                          Start:2020         Instruction Type:Patient   
Education  
  
   
                                                    How to access health informa  
tion   
online  
Indication:BMI 25.0-25.9,adult  
                          Start:2020         Instruction Type:Patient   
Education  
  
   
                                                    Patient Instructions  
Indication:BMI 25.0-25.9,adult  
                          Start:2020         Instruction Type:Provider   
Instructions for Treatment  
  
   
                                                    How to access health informa  
tion   
online  
Indication:Smoker  
                          Start:2020          Instruction Type:Patient   
Education  
  
   
                                                    How to access health informa  
tion   
online - Detail  
Indication:Smoker  
                          Start:2020          Instruction Type:Patient   
Education  
  
   
                                                    Patient Instructions  
Indication:Smoker  
                          Start:2020          Instruction Type:Provider   
Instructions for Treatment  
  
   
                                                    How to access health informa  
tion   
online  
Indication:Smoker  
                          Start:2020         Instruction Type:Patient   
Education  
  
   
                                                    How to access health informa  
tion   
online - Detail  
Indication:Smoker  
                          Start:2020         Instruction Type:Patient   
Education  
  
   
                                                    Patient Instructions  
Indication:Smoker  
                          Start:2020         Instruction Type:Provider   
Instructions for Treatment  
  
   
                                                    How to access health informa  
tion   
online  
Indication:Smoker  
                          Start:13-Sep-2019         Instruction Type:Patient   
Education  
  
   
                                                    How to access health informa  
tion   
online - Detail  
Indication:Smoker  
                          Start:13-Sep-2019         Instruction Type:Patient   
Education  
  
   
                                                    Patient Instructions  
Indication:Smoker  
                          Start:13-Sep-2019         Instruction Type:Provider   
Instructions for Treatment  
  
   
                                                    How to access health informa  
tion   
online  
Indication:BMI 26.0-26.9,adult  
                          Start:10-May-2019         Instruction Type:Patient   
Education  
  
   
                                                    How to access health informa  
tion   
online - Detail  
Indication:BMI 26.0-26.9,adult  
                          Start:10-May-2019         Instruction Type:Patient   
Education  
  
   
                                                    Patient Instructions  
Indication:BMI 26.0-26.9,adult  
                          Start:10-May-2019         Instruction Type:Provider   
Instructions for Treatment  
  
   
                                                    How to access health informa  
tion   
online  
Indication:Abnormal glucose   
tolerance test  
                          Start:28-Dec-2018         Instruction Type:Patient   
Education  
  
   
                                                    How to access health informa  
tion   
online - Detail  
Indication:Abnormal glucose   
tolerance test  
                          Start:28-Dec-2018         Instruction Type:Patient   
Education  
  
   
                                                    Patient Instructions  
Indication:Abnormal glucose   
tolerance test  
                          Start:28-Dec-2018         Instruction Type:Provider   
Instructions for Treatment  
  
   
                                                    How to access health informa  
tion   
online  
Indication:Smoker  
                          Start:29-Aug-2018         Instruction Type:Patient   
Education  
  
   
                                                    How to access health informa  
tion   
online - Detail  
Indication:Smoker  
                          Start:29-Aug-2018         Instruction Type:Patient   
Education  
  
   
                                                    Patient Instructions  
Indication:Smoker  
                          Start:29-Aug-2018         Instruction Type:Provider   
Instructions for Treatment  
  
   
                                                    How to access health informa  
tion   
online  
Indication:Abnormal glucose   
tolerance test  
                          Start:2018         Instruction Type:Patient   
Education  
  
   
                                                    How to access health informa  
tion   
online - Detail  
Indication:Abnormal glucose   
tolerance test  
                          Start:2018         Instruction Type:Patient   
Education  
  
   
                                                    Patient Instructions  
Indication:Abnormal glucose   
tolerance test  
                          Start:2018         Instruction Type:Provider   
Instructions for Treatment  
  
   
                                                    How to access health informa  
tion   
online  
Indication:Smoker  
                          Start:8-Dec-2017          Instruction Type:Patient   
Education  
  
   
                                                    How to access health informa  
tion   
online - Detail  
Indication:Smoker  
                          Start:8-Dec-2017          Instruction Type:Patient   
Education  
  
   
                                                    Patient Instructions  
Indication:Smoker  
                          Start:8-Dec-2017          Instruction Type:Provider   
Instructions for Treatment  
  
   
                                                    How to access health informa  
tion   
online  
Indication:Smoker  
                          Start:4-Aug-2017          Instruction Type:Patient   
Education  
  
   
                                                    How to access health informa  
tion   
online - Detail  
Indication:Smoker  
                          Start:4-Aug-2017          Instruction Type:Patient   
Education  
  
   
                                                    Patient Instructions  
Indication:Smoker  
                          Start:4-Aug-2017          Instruction Type:Provider   
Instructions for Treatment  
  
   
                                                    How to access health informa  
tion   
online  
Indication:Smoker  
                          Start:3-Mar-2017          Instruction Type:Patient   
Education  
  
   
                                                    How to access health informa  
tion   
online - Detail  
Indication:Smoker  
                          Start:3-Mar-2017          Instruction Type:Patient   
Education  
  
   
                                                    Patient Instructions  
Indication:Smoker  
                          Start:3-Mar-2017          Instruction Type:Provider   
Instructions for Treatment  
  
   
                                                    How to access health informa  
tion   
online  
Indication:Hypertension,   
essential, benign  
                          Start:28-Oct-2016         Instruction Type:Patient   
Education  
  
   
                                                    How to access health informa  
tion   
online - Detail  
Indication:Hypertension,   
essential, benign  
                          Start:28-Oct-2016         Instruction Type:Patient   
Education  
  
   
                                                    Patient Instructions  
Indication:Hypertension,   
essential, benign  
                          Start:28-Oct-2016         Instruction Type:Provider   
Instructions for Treatment  
  
   
                                                    How to access health informa  
tion   
online  
Indication:Hypertension,   
essential, benign  
                          Start:2016         Instruction Type:Patient   
Education  
  
   
                                                    How to access health informa  
tion   
online - Detail  
Indication:Hypertension,   
essential, benign  
                          Start:2016         Instruction Type:Patient   
Education  
  
   
                                                    Patient Instructions  
Indication:Hypertension,   
essential, benign  
                          Start:2016         Instruction Type:Provider   
Instructions for Treatment  
  
   
                                                    How to access health informa  
tion   
online  
Indication:BMI 27.0-27.9,adult  
                          Start:2016         Instruction Type:Patient   
Education  
  
   
                                                    How to access health informa  
tion   
online - Detail  
Indication:BMI 27.0-27.9,adult  
                          Start:2016         Instruction Type:Patient   
Education  
  
   
                                                    Patient Instructions  
Indication:BMI 27.0-27.9,adult  
                          Start:2016         Instruction Type:Provider   
Instructions for Treatment  
  
   
                                                    How to access health informa  
tion   
online  
Indication:Abnormal glucose   
tolerance test  
                          Start:2016         Instruction Type:Patient   
Education  
  
   
                                                    How to access health informa  
tion   
online - Detail  
Indication:Abnormal glucose   
tolerance test  
                          Start:2016         Instruction Type:Patient   
Education  
  
   
                                                    Patient Instructions  
Indication:Abnormal glucose   
tolerance test  
                          Start:2016         Instruction Type:Provider   
Instructions for Treatment  
  
   
                                                    How to access health informa  
tion   
online  
Indication:Abnormal glucose   
tolerance test  
                          Start:16-Dec-2015         Instruction Type:Patient   
Education  
  
   
                                                    How to access health informa  
tion   
online - Detail  
Indication:Abnormal glucose   
tolerance test  
                          Start:16-Dec-2015         Instruction Type:Patient   
Education  
  
   
                                                    Patient Instructions  
Indication:Abnormal glucose   
tolerance test  
                          Start:16-Dec-2015         Instruction Type:Provider   
Instructions for Treatment  
  
   
                                                    How to access health informa  
tion   
online  
Indication:Hypercholesteremia  
                          Start:16-Sep-2015         Instruction Type:Patient   
Education  
  
   
                                                    How to access health informa  
tion   
online - Detail  
Indication:Hypercholesteremia  
                          Start:16-Sep-2015         Instruction Type:Patient   
Education  
  
   
                                                    Patient Instructions  
Indication:Hypercholesteremia  
                          Start:16-Sep-2015         Instruction Type:Provider   
Instructions for Treatment  
  
   
                                                    Patient Instructions  
Indication:Abnormal glucose   
tolerance test  
                          Start:9-May-2014          Instruction Type:Provider   
Instructions for Treatment  
  
   
                                                    Patient Instructions  
Indication:Abnormal glucose   
tolerance test  
                          Start:2014          Instruction Type:Provider   
Instructions for Treatment  
  
   
                                                    Patient Instructions  
Indication:Abnormal glucose   
tolerance test  
                          Start:12-Sep-2013         Instruction Type:Provider   
Instructions for Treatment  
  
   
                                                    Patient Instructions  
Indication:Hypercholesteremia  
                          Start:15-Mar-2013         Instruction Type:Provider   
Instructions for Treatment  
  
   
                                                    Patient Instructions  
Indication:Hypertension,   
essential, benign  
                          Start:14-Sep-2012         Instruction Type:Provider   
Instructions for Treatment  
  
  
  
[facility]  
Work Phone: [phone]  
  
Family History  
                              Unknown Family Member  
  
                                Name            Dates           Details  
   
                                                    Cancer  
                                                    Comments:Brother.  
Status:Active  
   
                                                    Diabetes Mellitus  
                                                    Comments:Father.  
Status:Active  
  
                              Unknown Family Member  
  
                                Name            Dates           Details  
   
                                                    Cancer  
                                                    Comments:Brother.  
Status:Active  
   
                                                    Diabetes Mellitus  
                                                    Comments:Father.  
Status:Active  
  
                              Unknown Family Member  
  
                                Name            Dates           Details  
   
                                                    Cancer  
                                                    Comments:Brother.  
Status:Active  
   
                                                    Diabetes Mellitus  
                                                    Comments:Father.  
Status:Active  
  
                              Unknown Family Member  
  
                                Name            Dates           Details  
   
                                                    Cancer  
                                                    Comments:Brother.  
Status:Active  
   
                                                    Diabetes Mellitus  
                                                    Comments:Father.  
Status:Active  
  
                              Unknown Family Member  
  
                                Name            Dates           Details  
   
                                                    Cancer  
                                                    Comments:Brother.  
Status:Active  
   
                                                    Diabetes Mellitus  
                                                    Comments:Father.  
Status:Active  
  
                              Unknown Family Member  
  
                                Name            Dates           Details  
   
                                                    Cancer  
                                                    Comments:Brother.  
Status:Active  
   
                                                    Diabetes Mellitus  
                                                    Comments:Father.  
Status:Active  
  
                              Unknown Family Member  
  
                                Name            Dates           Details  
   
                                                    Cancer  
                                                    Comments:Brother.  
Status:Active  
   
                                                    Diabetes Mellitus  
                                                    Comments:Father.  
Status:Active  
  
                              Unknown Family Member  
  
                                Name            Dates           Details  
   
                                                    Cancer  
                                                    Comments:Brother.  
Status:Active  
   
                                                    Diabetes Mellitus  
                                                    Comments:Father.  
Status:Active  
  
                              Unknown Family Member  
  
                                Name            Dates           Details  
   
                                                    Cancer  
                                                    Comments:Brother.  
Status:Active  
   
                                                    Diabetes Mellitus  
                                                    Comments:Father.  
Status:Active  
  
                              Unknown Family Member  
  
                                Name            Dates           Details  
   
                                                    Cancer  
                                                    Comments:Brother.  
Status:Active  
   
                                                    Diabetes Mellitus  
                                                    Comments:Father.  
Status:Active  
  
                              Unknown Family Member  
  
                                Name            Dates           Details  
   
                                                    Cancer  
                                                    Comments:Brother.  
Status:Active  
   
                                                    Diabetes Mellitus  
                                                    Comments:Father.  
Status:Active  
  
                              Unknown Family Member  
  
                                Name            Dates           Details  
   
                                                    Cancer  
                                                    Comments:Brother.  
Status:Active  
   
                                                    Diabetes Mellitus  
                                                    Comments:Father.  
Status:Active  
  
                              Unknown Family Member  
  
                                Name            Dates           Details  
   
                                                    Cancer  
                                                    Comments:Brother.  
Status:Active  
   
                                                    Diabetes Mellitus  
                                                    Comments:Father.  
Status:Active  
  
                              Unknown Family Member  
  
                                Name            Dates           Details  
   
                                                    Cancer  
                                                    Comments:Brother.  
Status:Active  
   
                                                    Diabetes Mellitus  
                                                    Comments:Father.  
Status:Active  
  
                              Unknown Family Member  
  
                                Name            Dates           Details  
   
                                                    Cancer  
                                                    Comments:Brother.  
Status:Active  
   
                                                    Diabetes Mellitus  
                                                    Comments:Father.  
Status:Active  
  
                              Unknown Family Member  
  
                                Name            Dates           Details  
   
                                                    Cancer  
                                                    Comments:Brother.  
Status:Active  
   
                                                    Diabetes Mellitus  
                                                    Comments:Father.  
Status:Active  
  
                              Unknown Family Member  
  
                                Name            Dates           Details  
   
                                                    Cancer  
                                                    Comments:Brother.  
Status:Active  
   
                                                    Diabetes Mellitus  
                                                    Comments:Father.  
Status:Active  
  
                              Unknown Family Member  
  
                                Name            Dates           Details  
   
                                                    Cancer  
                                                    Comments:Brother.  
Status:Active  
   
                                                    Diabetes Mellitus  
                                                    Comments:Father.  
Status:Active  
  
                              Unknown Family Member  
  
                                Name            Dates           Details  
   
                                                    Cancer  
                                                    Comments:Brother.  
Status:Active  
   
                                                    Diabetes Mellitus  
                                                    Comments:Father.  
Status:Active  
  
                              Unknown Family Member  
  
                                Name            Dates           Details  
   
                                                    Cancer  
                                                    Comments:Brother.  
Status:Active  
   
                                                    Diabetes Mellitus  
                                                    Comments:Father.  
Status:Active  
  
                              Unknown Family Member  
  
                                Name            Dates           Details  
   
                                                    Cancer  
                                                    Comments:Brother.  
Status:Active  
   
                                                    Diabetes Mellitus  
                                                    Comments:Father.  
Status:Active  
  
                              Unknown Family Member  
  
                                Name            Dates           Details  
   
                                                    Cancer  
                                                    Comments:Brother.  
Status:Active  
   
                                                    Diabetes Mellitus  
                                                    Comments:Father.  
Status:Active  
  
                              Unknown Family Member  
  
                                Name            Dates           Details  
   
                                                    Cancer  
                                                    Comments:Brother.  
Status:Active  
   
                                                    Diabetes Mellitus  
                                                    Comments:Father.  
Status:Active  
  
                              Unknown Family Member  
  
                                Name            Dates           Details  
   
                                                    Cancer  
                                                    Comments:Brother.  
Status:Active  
   
                                                    Diabetes Mellitus  
                                                    Comments:Father.  
Status:Active  
  
                              Unknown Family Member  
  
                                Name            Dates           Details  
   
                                                    Cancer  
                                                    Comments:Brother.  
Status:Active  
   
                                                    Diabetes Mellitus  
                                                    Comments:Father.  
Status:Active  
  
                              Unknown Family Member  
  
                                Name            Dates           Details  
   
                                                    Cancer  
                                                    Comments:Brother.  
Status:Active  
   
                                                    Diabetes Mellitus  
                                                    Comments:Father.  
Status:Active  
  
                              Unknown Family Member  
  
                                Name            Dates           Details  
   
                                                    Cancer  
                                                    Comments:Brother.  
Status:Active  
   
                                                    Diabetes Mellitus  
                                                    Comments:Father.  
Status:Active  
  
                              Unknown Family Member  
  
                                Name            Dates           Details  
   
                                                    Cancer  
                                                    Comments:Brother.  
Status:Active  
   
                                                    Diabetes Mellitus  
                                                    Comments:Father.  
Status:Active  
  
                              Unknown Family Member  
  
                                Name            Dates           Details  
   
                                                    Cancer  
                                                    Comments:Brother.  
Status:Active  
   
                                                    Diabetes Mellitus  
                                                    Comments:Father.  
Status:Active  
  
                              Unknown Family Member  
  
                                Name            Dates           Details  
   
                                                    Cancer  
                                                    Comments:Brother.  
Status:Active  
   
                                                    Diabetes Mellitus  
                                                    Comments:Father.  
Status:Active  
  
                              Unknown Family Member  
  
                                Name            Dates           Details  
   
                                                    Cancer  
                                                    Comments:Brother.  
Status:Active  
   
                                                    Diabetes Mellitus  
                                                    Comments:Father.  
Status:Active  
  
                              Unknown Family Member  
  
                                Name            Dates           Details  
   
                                                    Cancer  
                                                    Comments:Brother.  
Status:Active  
   
                                                    Diabetes Mellitus  
                                                    Comments:Father.  
Status:Active  
  
                              Unknown Family Member  
  
                                Name            Dates           Details  
   
                                                    Cancer  
                                                    Comments:Brother.  
Status:Active  
   
                                                    Diabetes Mellitus  
                                                    Comments:Father.  
Status:Active  
  
                              Unknown Family Member  
  
                                Name            Dates           Details  
   
                                                    Cancer  
                                                    Comments:Brother.  
Status:Active  
   
                                                    Diabetes Mellitus  
                                                    Comments:Father.  
Status:Active  
  
  
  
Instructions  
  
  
                                Name            Dates           Details  
   
                                                    Abnormal glucose tolerance t  
est : How to access health information   
online  
Indication:Abnormal glucose tolerance test  
                                                      
   
                                                    Abnormal glucose tolerance t  
est : How to access health information   
online - Detail  
Indication:Abnormal glucose tolerance test  
                                                      
   
                                                    Abnormal glucose tolerance t  
est : Patient Instructions  
Indication:Abnormal glucose tolerance test  
                                                      
   
                                                    Smoker : How to access healt  
h information online  
Indication:Smoker  
                                                      
   
                                                    Smoker : How to access healt  
h information online - Detail  
Indication:Smoker  
                                                      
   
                                                    Smoker : Patient Instruction  
s  
Indication:Smoker  
                                                      
   
                                                    Hypertension, essential, bear  
ign : How to access health information   
online  
Indication:Hypertension, essential, benign  
                                                      
   
                                                    Hypertension, essential, bear  
ign : How to access health information   
online - Detail  
Indication:Hypertension, essential, benign  
                                                      
   
                                                    Hypertension, essential, bear  
ign : Patient Instructions  
Indication:Hypertension, essential, benign  
                                                      
   
                                                    BMI 27.0-27.9,adult : How to  
 access health information online  
Indication:BMI 27.0-27.9,adult  
                                                      
   
                                                    BMI 27.0-27.9,adult : How to  
 access health information online - Detail  
Indication:BMI 27.0-27.9,adult  
                                                      
   
                                                    BMI 27.0-27.9,adult : Patien  
t Instructions  
Indication:BMI 27.0-27.9,adult  
                                                      
   
                                                    Hypercholesteremia : How to   
access health information online  
Indication:Hypercholesteremia  
                                                      
   
                                                    Hypercholesteremia : How to   
access health information online - Detail  
Indication:Hypercholesteremia  
                                                      
   
                                                    Hypercholesteremia : Patient  
 Instructions  
Indication:Hypercholesteremia  
                                                      
  
  
  
                                Name            Dates           Details  
   
                                                    How to access health informa  
tion   
online  
Indication:Smoker  
                          Start:13-Sep-2019         Instruction Type:Patient   
Education  
  
   
                                                    How to access health informa  
tion   
online - Detail  
Indication:Smoker  
                          Start:13-Sep-2019         Instruction Type:Patient   
Education  
  
   
                                                    Patient Instructions  
Indication:Smoker  
                          Start:13-Sep-2019         Instruction Type:Provider   
Instructions for Treatment  
  
   
                                                    How to access health informa  
tion   
online  
Indication:BMI 26.0-26.9,adult  
                          Start:10-May-2019         Instruction Type:Patient   
Education  
  
   
                                                    How to access health informa  
tion   
online - Detail  
Indication:BMI 26.0-26.9,adult  
                          Start:10-May-2019         Instruction Type:Patient   
Education  
  
   
                                                    Patient Instructions  
Indication:BMI 26.0-26.9,adult  
                          Start:10-May-2019         Instruction Type:Provider   
Instructions for Treatment  
  
   
                                                    How to access health informa  
tion   
online  
Indication:Abnormal glucose   
tolerance test  
                          Start:28-Dec-2018         Instruction Type:Patient   
Education  
  
   
                                                    How to access health informa  
tion   
online - Detail  
Indication:Abnormal glucose   
tolerance test  
                          Start:28-Dec-2018         Instruction Type:Patient   
Education  
  
   
                                                    Patient Instructions  
Indication:Abnormal glucose   
tolerance test  
                          Start:28-Dec-2018         Instruction Type:Provider   
Instructions for Treatment  
  
   
                                                    How to access health informa  
tion   
online  
Indication:Smoker  
                          Start:29-Aug-2018         Instruction Type:Patient   
Education  
  
   
                                                    How to access health informa  
tion   
online - Detail  
Indication:Smoker  
                          Start:29-Aug-2018         Instruction Type:Patient   
Education  
  
   
                                                    Patient Instructions  
Indication:Smoker  
                          Start:29-Aug-2018         Instruction Type:Provider   
Instructions for Treatment  
  
   
                                                    How to access health informa  
tion   
online  
Indication:Abnormal glucose   
tolerance test  
                          Start:2018         Instruction Type:Patient   
Education  
  
   
                                                    How to access health informa  
tion   
online - Detail  
Indication:Abnormal glucose   
tolerance test  
                          Start:2018         Instruction Type:Patient   
Education  
  
   
                                                    Patient Instructions  
Indication:Abnormal glucose   
tolerance test  
                          Start:2018         Instruction Type:Provider   
Instructions for Treatment  
  
   
                                                    How to access health informa  
tion   
online  
Indication:Smoker  
                          Start:8-Dec-2017          Instruction Type:Patient   
Education  
  
   
                                                    How to access health informa  
tion   
online - Detail  
Indication:Smoker  
                          Start:8-Dec-2017          Instruction Type:Patient   
Education  
  
   
                                                    Patient Instructions  
Indication:Smoker  
                          Start:8-Dec-2017          Instruction Type:Provider   
Instructions for Treatment  
  
   
                                                    How to access health informa  
tion   
online  
Indication:Smoker  
                          Start:4-Aug-2017          Instruction Type:Patient   
Education  
  
   
                                                    How to access health informa  
tion   
online - Detail  
Indication:Smoker  
                          Start:4-Aug-2017          Instruction Type:Patient   
Education  
  
   
                                                    Patient Instructions  
Indication:Smoker  
                          Start:4-Aug-2017          Instruction Type:Provider   
Instructions for Treatment  
  
   
                                                    How to access health informa  
tion   
online  
Indication:Smoker  
                          Start:3-Mar-2017          Instruction Type:Patient   
Education  
  
   
                                                    How to access health informa  
tion   
online - Detail  
Indication:Smoker  
                          Start:3-Mar-2017          Instruction Type:Patient   
Education  
  
   
                                                    Patient Instructions  
Indication:Smoker  
                          Start:3-Mar-2017          Instruction Type:Provider   
Instructions for Treatment  
  
   
                                                    How to access health informa  
tion   
online  
Indication:Hypertension,   
essential, benign  
                          Start:28-Oct-2016         Instruction Type:Patient   
Education  
  
   
                                                    How to access health informa  
tion   
online - Detail  
Indication:Hypertension,   
essential, benign  
                          Start:28-Oct-2016         Instruction Type:Patient   
Education  
  
   
                                                    Patient Instructions  
Indication:Hypertension,   
essential, benign  
                          Start:28-Oct-2016         Instruction Type:Provider   
Instructions for Treatment  
  
   
                                                    How to access health informa  
tion   
online  
Indication:Hypertension,   
essential, benign  
                          Start:2016         Instruction Type:Patient   
Education  
  
   
                                                    How to access health informa  
tion   
online - Detail  
Indication:Hypertension,   
essential, benign  
                          Start:2016         Instruction Type:Patient   
Education  
  
   
                                                    Patient Instructions  
Indication:Hypertension,   
essential, benign  
                          Start:2016         Instruction Type:Provider   
Instructions for Treatment  
  
   
                                                    How to access health informa  
tion   
online  
Indication:BMI 27.0-27.9,adult  
                          Start:2016         Instruction Type:Patient   
Education  
  
   
                                                    How to access health informa  
tion   
online - Detail  
Indication:BMI 27.0-27.9,adult  
                          Start:2016         Instruction Type:Patient   
Education  
  
   
                                                    Patient Instructions  
Indication:BMI 27.0-27.9,adult  
                          Start:2016         Instruction Type:Provider   
Instructions for Treatment  
  
   
                                                    How to access health informa  
tion   
online  
Indication:Abnormal glucose   
tolerance test  
                          Start:2016         Instruction Type:Patient   
Education  
  
   
                                                    How to access health informa  
tion   
online - Detail  
Indication:Abnormal glucose   
tolerance test  
                          Start:2016         Instruction Type:Patient   
Education  
  
   
                                                    Patient Instructions  
Indication:Abnormal glucose   
tolerance test  
                          Start:2016         Instruction Type:Provider   
Instructions for Treatment  
  
   
                                                    How to access health informa  
tion   
online  
Indication:Abnormal glucose   
tolerance test  
                          Start:16-Dec-2015         Instruction Type:Patient   
Education  
  
   
                                                    How to access health informa  
tion   
online - Detail  
Indication:Abnormal glucose   
tolerance test  
                          Start:16-Dec-2015         Instruction Type:Patient   
Education  
  
   
                                                    Patient Instructions  
Indication:Abnormal glucose   
tolerance test  
                          Start:16-Dec-2015         Instruction Type:Provider   
Instructions for Treatment  
  
   
                                                    How to access health informa  
tion   
online  
Indication:Hypercholesteremia  
                          Start:16-Sep-2015         Instruction Type:Patient   
Education  
  
   
                                                    How to access health informa  
tion   
online - Detail  
Indication:Hypercholesteremia  
                          Start:16-Sep-2015         Instruction Type:Patient   
Education  
  
   
                                                    Patient Instructions  
Indication:Hypercholesteremia  
                          Start:16-Sep-2015         Instruction Type:Provider   
Instructions for Treatment  
  
   
                                                    Patient Instructions  
Indication:Abnormal glucose   
tolerance test  
                          Start:9-May-2014          Instruction Type:Provider   
Instructions for Treatment  
  
   
                                                    Patient Instructions  
Indication:Abnormal glucose   
tolerance test  
                          Start:2014          Instruction Type:Provider   
Instructions for Treatment  
  
   
                                                    Patient Instructions  
Indication:Abnormal glucose   
tolerance test  
                          Start:12-Sep-2013         Instruction Type:Provider   
Instructions for Treatment  
  
   
                                                    Patient Instructions  
Indication:Hypercholesteremia  
                          Start:15-Mar-2013         Instruction Type:Provider   
Instructions for Treatment  
  
   
                                                    Patient Instructions  
Indication:Hypertension,   
essential, benign  
                          Start:14-Sep-2012         Instruction Type:Provider   
Instructions for Treatment  
  
  
  
  
                                Name            Dates           Details  
   
                                                    How to access health informa  
tion   
online  
Indication:Smoker  
                          Start:2020          Instruction Type:Patient   
Education  
  
   
                                                    How to access health informa  
tion   
online - Detail  
Indication:Smoker  
                          Start:2020          Instruction Type:Patient   
Education  
  
   
                                                    Patient Instructions  
Indication:Smoker  
                          Start:2020          Instruction Type:Provider   
Instructions for Treatment  
  
   
                                                    How to access health informa  
tion   
online  
Indication:Smoker  
                          Start:2020         Instruction Type:Patient   
Education  
  
   
                                                    How to access health informa  
tion   
online - Detail  
Indication:Smoker  
                          Start:2020         Instruction Type:Patient   
Education  
  
   
                                                    Patient Instructions  
Indication:Smoker  
                          Start:2020         Instruction Type:Provider   
Instructions for Treatment  
  
   
                                                    How to access health informa  
tion   
online  
Indication:Smoker  
                          Start:13-Sep-2019         Instruction Type:Patient   
Education  
  
   
                                                    How to access health informa  
tion   
online - Detail  
Indication:Smoker  
                          Start:13-Sep-2019         Instruction Type:Patient   
Education  
  
   
                                                    Patient Instructions  
Indication:Smoker  
                          Start:13-Sep-2019         Instruction Type:Provider   
Instructions for Treatment  
  
   
                                                    How to access health informa  
tion   
online  
Indication:BMI 26.0-26.9,adult  
                          Start:10-May-2019         Instruction Type:Patient   
Education  
  
   
                                                    How to access health informa  
tion   
online - Detail  
Indication:BMI 26.0-26.9,adult  
                          Start:10-May-2019         Instruction Type:Patient   
Education  
  
   
                                                    Patient Instructions  
Indication:BMI 26.0-26.9,adult  
                          Start:10-May-2019         Instruction Type:Provider   
Instructions for Treatment  
  
   
                                                    How to access health informa  
tion   
online  
Indication:Abnormal glucose   
tolerance test  
                          Start:28-Dec-2018         Instruction Type:Patient   
Education  
  
   
                                                    How to access health informa  
tion   
online - Detail  
Indication:Abnormal glucose   
tolerance test  
                          Start:28-Dec-2018         Instruction Type:Patient   
Education  
  
   
                                                    Patient Instructions  
Indication:Abnormal glucose   
tolerance test  
                          Start:28-Dec-2018         Instruction Type:Provider   
Instructions for Treatment  
  
   
                                                    How to access health informa  
tion   
online  
Indication:Smoker  
                          Start:29-Aug-2018         Instruction Type:Patient   
Education  
  
   
                                                    How to access health informa  
tion   
online - Detail  
Indication:Smoker  
                          Start:29-Aug-2018         Instruction Type:Patient   
Education  
  
   
                                                    Patient Instructions  
Indication:Smoker  
                          Start:29-Aug-2018         Instruction Type:Provider   
Instructions for Treatment  
  
   
                                                    How to access health informa  
tion   
online  
Indication:Abnormal glucose   
tolerance test  
                          Start:2018         Instruction Type:Patient   
Education  
  
   
                                                    How to access health informa  
tion   
online - Detail  
Indication:Abnormal glucose   
tolerance test  
                          Start:2018         Instruction Type:Patient   
Education  
  
   
                                                    Patient Instructions  
Indication:Abnormal glucose   
tolerance test  
                          Start:2018         Instruction Type:Provider   
Instructions for Treatment  
  
   
                                                    How to access health informa  
tion   
online  
Indication:Smoker  
                          Start:8-Dec-2017          Instruction Type:Patient   
Education  
  
   
                                                    How to access health informa  
tion   
online - Detail  
Indication:Smoker  
                          Start:8-Dec-2017          Instruction Type:Patient   
Education  
  
   
                                                    Patient Instructions  
Indication:Smoker  
                          Start:8-Dec-2017          Instruction Type:Provider   
Instructions for Treatment  
  
   
                                                    How to access health informa  
tion   
online  
Indication:Smoker  
                          Start:4-Aug-2017          Instruction Type:Patient   
Education  
  
   
                                                    How to access health informa  
tion   
online - Detail  
Indication:Smoker  
                          Start:4-Aug-2017          Instruction Type:Patient   
Education  
  
   
                                                    Patient Instructions  
Indication:Smoker  
                          Start:4-Aug-2017          Instruction Type:Provider   
Instructions for Treatment  
  
   
                                                    How to access health informa  
tion   
online  
Indication:Smoker  
                          Start:3-Mar-2017          Instruction Type:Patient   
Education  
  
   
                                                    How to access health informa  
tion   
online - Detail  
Indication:Smoker  
                          Start:3-Mar-2017          Instruction Type:Patient   
Education  
  
   
                                                    Patient Instructions  
Indication:Smoker  
                          Start:3-Mar-2017          Instruction Type:Provider   
Instructions for Treatment  
  
   
                                                    How to access health informa  
tion   
online  
Indication:Hypertension,   
essential, benign  
                          Start:28-Oct-2016         Instruction Type:Patient   
Education  
  
   
                                                    How to access health informa  
tion   
online - Detail  
Indication:Hypertension,   
essential, benign  
                          Start:28-Oct-2016         Instruction Type:Patient   
Education  
  
   
                                                    Patient Instructions  
Indication:Hypertension,   
essential, benign  
                          Start:28-Oct-2016         Instruction Type:Provider   
Instructions for Treatment  
  
   
                                                    How to access health informa  
tion   
online  
Indication:Hypertension,   
essential, benign  
                          Start:2016         Instruction Type:Patient   
Education  
  
   
                                                    How to access health informa  
tion   
online - Detail  
Indication:Hypertension,   
essential, benign  
                          Start:2016         Instruction Type:Patient   
Education  
  
   
                                                    Patient Instructions  
Indication:Hypertension,   
essential, benign  
                          Start:2016         Instruction Type:Provider   
Instructions for Treatment  
  
   
                                                    How to access health informa  
tion   
online  
Indication:BMI 27.0-27.9,adult  
                          Start:2016         Instruction Type:Patient   
Education  
  
   
                                                    How to access health informa  
tion   
online - Detail  
Indication:BMI 27.0-27.9,adult  
                          Start:2016         Instruction Type:Patient   
Education  
  
   
                                                    Patient Instructions  
Indication:BMI 27.0-27.9,adult  
                          Start:2016         Instruction Type:Provider   
Instructions for Treatment  
  
   
                                                    How to access health informa  
tion   
online  
Indication:Abnormal glucose   
tolerance test  
                          Start:2016         Instruction Type:Patient   
Education  
  
   
                                                    How to access health informa  
tion   
online - Detail  
Indication:Abnormal glucose   
tolerance test  
                          Start:2016         Instruction Type:Patient   
Education  
  
   
                                                    Patient Instructions  
Indication:Abnormal glucose   
tolerance test  
                          Start:2016         Instruction Type:Provider   
Instructions for Treatment  
  
   
                                                    How to access health informa  
tion   
online  
Indication:Abnormal glucose   
tolerance test  
                          Start:16-Dec-2015         Instruction Type:Patient   
Education  
  
   
                                                    How to access health informa  
tion   
online - Detail  
Indication:Abnormal glucose   
tolerance test  
                          Start:16-Dec-2015         Instruction Type:Patient   
Education  
  
   
                                                    Patient Instructions  
Indication:Abnormal glucose   
tolerance test  
                          Start:16-Dec-2015         Instruction Type:Provider   
Instructions for Treatment  
  
   
                                                    How to access health informa  
tion   
online  
Indication:Hypercholesteremia  
                          Start:16-Sep-2015         Instruction Type:Patient   
Education  
  
   
                                                    How to access health informa  
tion   
online - Detail  
Indication:Hypercholesteremia  
                          Start:16-Sep-2015         Instruction Type:Patient   
Education  
  
   
                                                    Patient Instructions  
Indication:Hypercholesteremia  
                          Start:16-Sep-2015         Instruction Type:Provider   
Instructions for Treatment  
  
   
                                                    Patient Instructions  
Indication:Abnormal glucose   
tolerance test  
                          Start:9-May-2014          Instruction Type:Provider   
Instructions for Treatment  
  
   
                                                    Patient Instructions  
Indication:Abnormal glucose   
tolerance test  
                          Start:2014          Instruction Type:Provider   
Instructions for Treatment  
  
   
                                                    Patient Instructions  
Indication:Abnormal glucose   
tolerance test  
                          Start:12-Sep-2013         Instruction Type:Provider   
Instructions for Treatment  
  
   
                                                    Patient Instructions  
Indication:Hypercholesteremia  
                          Start:15-Mar-2013         Instruction Type:Provider   
Instructions for Treatment  
  
   
                                                    Patient Instructions  
Indication:Hypertension,   
essential, benign  
                          Start:14-Sep-2012         Instruction Type:Provider   
Instructions for Treatment  
  
  
  
  
                                Name            Dates           Details  
   
                                                    How to access health informa  
tion   
online  
Indication:Smoker  
                          Start:2020          Instruction Type:Patient   
Education  
  
   
                                                    How to access health informa  
tion   
online - Detail  
Indication:Smoker  
                          Start:2020          Instruction Type:Patient   
Education  
  
   
                                                    Patient Instructions  
Indication:Smoker  
                          Start:2020          Instruction Type:Provider   
Instructions for Treatment  
  
   
                                                    How to access health informa  
tion   
online  
Indication:Smoker  
                          Start:2020         Instruction Type:Patient   
Education  
  
   
                                                    How to access health informa  
tion   
online - Detail  
Indication:Smoker  
                          Start:2020         Instruction Type:Patient   
Education  
  
   
                                                    Patient Instructions  
Indication:Smoker  
                          Start:2020         Instruction Type:Provider   
Instructions for Treatment  
  
   
                                                    How to access health informa  
tion   
online  
Indication:Smoker  
                          Start:13-Sep-2019         Instruction Type:Patient   
Education  
  
   
                                                    How to access health informa  
tion   
online - Detail  
Indication:Smoker  
                          Start:13-Sep-2019         Instruction Type:Patient   
Education  
  
   
                                                    Patient Instructions  
Indication:Smoker  
                          Start:13-Sep-2019         Instruction Type:Provider   
Instructions for Treatment  
  
   
                                                    How to access health informa  
tion   
online  
Indication:BMI 26.0-26.9,adult  
                          Start:10-May-2019         Instruction Type:Patient   
Education  
  
   
                                                    How to access health informa  
tion   
online - Detail  
Indication:BMI 26.0-26.9,adult  
                          Start:10-May-2019         Instruction Type:Patient   
Education  
  
   
                                                    Patient Instructions  
Indication:BMI 26.0-26.9,adult  
                          Start:10-May-2019         Instruction Type:Provider   
Instructions for Treatment  
  
   
                                                    How to access health informa  
tion   
online  
Indication:Abnormal glucose   
tolerance test  
                          Start:28-Dec-2018         Instruction Type:Patient   
Education  
  
   
                                                    How to access health informa  
tion   
online - Detail  
Indication:Abnormal glucose   
tolerance test  
                          Start:28-Dec-2018         Instruction Type:Patient   
Education  
  
   
                                                    Patient Instructions  
Indication:Abnormal glucose   
tolerance test  
                          Start:28-Dec-2018         Instruction Type:Provider   
Instructions for Treatment  
  
   
                                                    How to access health informa  
tion   
online  
Indication:Smoker  
                          Start:29-Aug-2018         Instruction Type:Patient   
Education  
  
   
                                                    How to access health informa  
tion   
online - Detail  
Indication:Smoker  
                          Start:29-Aug-2018         Instruction Type:Patient   
Education  
  
   
                                                    Patient Instructions  
Indication:Smoker  
                          Start:29-Aug-2018         Instruction Type:Provider   
Instructions for Treatment  
  
   
                                                    How to access health informa  
tion   
online  
Indication:Abnormal glucose   
tolerance test  
                          Start:2018         Instruction Type:Patient   
Education  
  
   
                                                    How to access health informa  
tion   
online - Detail  
Indication:Abnormal glucose   
tolerance test  
                          Start:2018         Instruction Type:Patient   
Education  
  
   
                                                    Patient Instructions  
Indication:Abnormal glucose   
tolerance test  
                          Start:2018         Instruction Type:Provider   
Instructions for Treatment  
  
   
                                                    How to access health informa  
tion   
online  
Indication:Smoker  
                          Start:8-Dec-2017          Instruction Type:Patient   
Education  
  
   
                                                    How to access health informa  
tion   
online - Detail  
Indication:Smoker  
                          Start:8-Dec-2017          Instruction Type:Patient   
Education  
  
   
                                                    Patient Instructions  
Indication:Smoker  
                          Start:8-Dec-2017          Instruction Type:Provider   
Instructions for Treatment  
  
   
                                                    How to access health informa  
tion   
online  
Indication:Smoker  
                          Start:4-Aug-2017          Instruction Type:Patient   
Education  
  
   
                                                    How to access health informa  
tion   
online - Detail  
Indication:Smoker  
                          Start:4-Aug-2017          Instruction Type:Patient   
Education  
  
   
                                                    Patient Instructions  
Indication:Smoker  
                          Start:4-Aug-2017          Instruction Type:Provider   
Instructions for Treatment  
  
   
                                                    How to access health informa  
tion   
online  
Indication:Smoker  
                          Start:3-Mar-2017          Instruction Type:Patient   
Education  
  
   
                                                    How to access health informa  
tion   
online - Detail  
Indication:Smoker  
                          Start:3-Mar-2017          Instruction Type:Patient   
Education  
  
   
                                                    Patient Instructions  
Indication:Smoker  
                          Start:3-Mar-2017          Instruction Type:Provider   
Instructions for Treatment  
  
   
                                                    How to access health informa  
tion   
online  
Indication:Hypertension,   
essential, benign  
                          Start:28-Oct-2016         Instruction Type:Patient   
Education  
  
   
                                                    How to access health informa  
tion   
online - Detail  
Indication:Hypertension,   
essential, benign  
                          Start:28-Oct-2016         Instruction Type:Patient   
Education  
  
   
                                                    Patient Instructions  
Indication:Hypertension,   
essential, benign  
                          Start:28-Oct-2016         Instruction Type:Provider   
Instructions for Treatment  
  
   
                                                    How to access health informa  
tion   
online  
Indication:Hypertension,   
essential, benign  
                          Start:2016         Instruction Type:Patient   
Education  
  
   
                                                    How to access health informa  
tion   
online - Detail  
Indication:Hypertension,   
essential, benign  
                          Start:2016         Instruction Type:Patient   
Education  
  
   
                                                    Patient Instructions  
Indication:Hypertension,   
essential, benign  
                          Start:2016         Instruction Type:Provider   
Instructions for Treatment  
  
   
                                                    How to access health informa  
tion   
online  
Indication:BMI 27.0-27.9,adult  
                          Start:2016         Instruction Type:Patient   
Education  
  
   
                                                    How to access health informa  
tion   
online - Detail  
Indication:BMI 27.0-27.9,adult  
                          Start:2016         Instruction Type:Patient   
Education  
  
   
                                                    Patient Instructions  
Indication:BMI 27.0-27.9,adult  
                          Start:2016         Instruction Type:Provider   
Instructions for Treatment  
  
   
                                                    How to access health informa  
tion   
online  
Indication:Abnormal glucose   
tolerance test  
                          Start:2016         Instruction Type:Patient   
Education  
  
   
                                                    How to access health informa  
tion   
online - Detail  
Indication:Abnormal glucose   
tolerance test  
                          Start:2016         Instruction Type:Patient   
Education  
  
   
                                                    Patient Instructions  
Indication:Abnormal glucose   
tolerance test  
                          Start:2016         Instruction Type:Provider   
Instructions for Treatment  
  
   
                                                    How to access health informa  
tion   
online  
Indication:Abnormal glucose   
tolerance test  
                          Start:16-Dec-2015         Instruction Type:Patient   
Education  
  
   
                                                    How to access health informa  
tion   
online - Detail  
Indication:Abnormal glucose   
tolerance test  
                          Start:16-Dec-2015         Instruction Type:Patient   
Education  
  
   
                                                    Patient Instructions  
Indication:Abnormal glucose   
tolerance test  
                          Start:16-Dec-2015         Instruction Type:Provider   
Instructions for Treatment  
  
   
                                                    How to access health informa  
tion   
online  
Indication:Hypercholesteremia  
                          Start:16-Sep-2015         Instruction Type:Patient   
Education  
  
   
                                                    How to access health informa  
tion   
online - Detail  
Indication:Hypercholesteremia  
                          Start:16-Sep-2015         Instruction Type:Patient   
Education  
  
   
                                                    Patient Instructions  
Indication:Hypercholesteremia  
                          Start:16-Sep-2015         Instruction Type:Provider   
Instructions for Treatment  
  
   
                                                    Patient Instructions  
Indication:Abnormal glucose   
tolerance test  
                          Start:9-May-2014          Instruction Type:Provider   
Instructions for Treatment  
  
   
                                                    Patient Instructions  
Indication:Abnormal glucose   
tolerance test  
                          Start:2014          Instruction Type:Provider   
Instructions for Treatment  
  
   
                                                    Patient Instructions  
Indication:Abnormal glucose   
tolerance test  
                          Start:12-Sep-2013         Instruction Type:Provider   
Instructions for Treatment  
  
   
                                                    Patient Instructions  
Indication:Hypercholesteremia  
                          Start:15-Mar-2013         Instruction Type:Provider   
Instructions for Treatment  
  
   
                                                    Patient Instructions  
Indication:Hypertension,   
essential, benign  
                          Start:14-Sep-2012         Instruction Type:Provider   
Instructions for Treatment  
  
  
  
  
                                Name            Dates           Details  
   
                                                    How to access health informa  
tion   
online - Detail  
Indication:BMI 25.0-25.9,adult  
                          Start:2020         Instruction Type:Patient   
Education  
  
   
                                                    How to access health informa  
tion   
online  
Indication:BMI 25.0-25.9,adult  
                          Start:2020         Instruction Type:Patient   
Education  
  
   
                                                    Patient Instructions  
Indication:BMI 25.0-25.9,adult  
                          Start:2020         Instruction Type:Provider   
Instructions for Treatment  
  
   
                                                    How to access health informa  
tion   
online  
Indication:Smoker  
                          Start:2020          Instruction Type:Patient   
Education  
  
   
                                                    How to access health informa  
tion   
online - Detail  
Indication:Smoker  
                          Start:2020          Instruction Type:Patient   
Education  
  
   
                                                    Patient Instructions  
Indication:Smoker  
                          Start:2020          Instruction Type:Provider   
Instructions for Treatment  
  
   
                                                    How to access health informa  
tion   
online  
Indication:Smoker  
                          Start:2020         Instruction Type:Patient   
Education  
  
   
                                                    How to access health informa  
tion   
online - Detail  
Indication:Smoker  
                          Start:2020         Instruction Type:Patient   
Education  
  
   
                                                    Patient Instructions  
Indication:Smoker  
                          Start:2020         Instruction Type:Provider   
Instructions for Treatment  
  
   
                                                    How to access health informa  
tion   
online  
Indication:Smoker  
                          Start:13-Sep-2019         Instruction Type:Patient   
Education  
  
   
                                                    How to access health informa  
tion   
online - Detail  
Indication:Smoker  
                          Start:13-Sep-2019         Instruction Type:Patient   
Education  
  
   
                                                    Patient Instructions  
Indication:Smoker  
                          Start:13-Sep-2019         Instruction Type:Provider   
Instructions for Treatment  
  
   
                                                    How to access health informa  
tion   
online  
Indication:BMI 26.0-26.9,adult  
                          Start:10-May-2019         Instruction Type:Patient   
Education  
  
   
                                                    How to access health informa  
tion   
online - Detail  
Indication:BMI 26.0-26.9,adult  
                          Start:10-May-2019         Instruction Type:Patient   
Education  
  
   
                                                    Patient Instructions  
Indication:BMI 26.0-26.9,adult  
                          Start:10-May-2019         Instruction Type:Provider   
Instructions for Treatment  
  
   
                                                    How to access health informa  
tion   
online  
Indication:Abnormal glucose   
tolerance test  
                          Start:28-Dec-2018         Instruction Type:Patient   
Education  
  
   
                                                    How to access health informa  
tion   
online - Detail  
Indication:Abnormal glucose   
tolerance test  
                          Start:28-Dec-2018         Instruction Type:Patient   
Education  
  
   
                                                    Patient Instructions  
Indication:Abnormal glucose   
tolerance test  
                          Start:28-Dec-2018         Instruction Type:Provider   
Instructions for Treatment  
  
   
                                                    How to access health informa  
tion   
online  
Indication:Smoker  
                          Start:29-Aug-2018         Instruction Type:Patient   
Education  
  
   
                                                    How to access health informa  
tion   
online - Detail  
Indication:Smoker  
                          Start:29-Aug-2018         Instruction Type:Patient   
Education  
  
   
                                                    Patient Instructions  
Indication:Smoker  
                          Start:29-Aug-2018         Instruction Type:Provider   
Instructions for Treatment  
  
   
                                                    How to access health informa  
tion   
online  
Indication:Abnormal glucose   
tolerance test  
                          Start:2018         Instruction Type:Patient   
Education  
  
   
                                                    How to access health informa  
tion   
online - Detail  
Indication:Abnormal glucose   
tolerance test  
                          Start:2018         Instruction Type:Patient   
Education  
  
   
                                                    Patient Instructions  
Indication:Abnormal glucose   
tolerance test  
                          Start:2018         Instruction Type:Provider   
Instructions for Treatment  
  
   
                                                    How to access health informa  
tion   
online  
Indication:Smoker  
                          Start:8-Dec-2017          Instruction Type:Patient   
Education  
  
   
                                                    How to access health informa  
tion   
online - Detail  
Indication:Smoker  
                          Start:8-Dec-2017          Instruction Type:Patient   
Education  
  
   
                                                    Patient Instructions  
Indication:Smoker  
                          Start:8-Dec-2017          Instruction Type:Provider   
Instructions for Treatment  
  
   
                                                    How to access health informa  
tion   
online  
Indication:Smoker  
                          Start:4-Aug-2017          Instruction Type:Patient   
Education  
  
   
                                                    How to access health informa  
tion   
online - Detail  
Indication:Smoker  
                          Start:4-Aug-2017          Instruction Type:Patient   
Education  
  
   
                                                    Patient Instructions  
Indication:Smoker  
                          Start:4-Aug-2017          Instruction Type:Provider   
Instructions for Treatment  
  
   
                                                    How to access health informa  
tion   
online  
Indication:Smoker  
                          Start:3-Mar-2017          Instruction Type:Patient   
Education  
  
   
                                                    How to access health informa  
tion   
online - Detail  
Indication:Smoker  
                          Start:3-Mar-2017          Instruction Type:Patient   
Education  
  
   
                                                    Patient Instructions  
Indication:Smoker  
                          Start:3-Mar-2017          Instruction Type:Provider   
Instructions for Treatment  
  
   
                                                    How to access health informa  
tion   
online  
Indication:Hypertension,   
essential, benign  
                          Start:28-Oct-2016         Instruction Type:Patient   
Education  
  
   
                                                    How to access health informa  
tion   
online - Detail  
Indication:Hypertension,   
essential, benign  
                          Start:28-Oct-2016         Instruction Type:Patient   
Education  
  
   
                                                    Patient Instructions  
Indication:Hypertension,   
essential, benign  
                          Start:28-Oct-2016         Instruction Type:Provider   
Instructions for Treatment  
  
   
                                                    How to access health informa  
tion   
online  
Indication:Hypertension,   
essential, benign  
                          Start:2016         Instruction Type:Patient   
Education  
  
   
                                                    How to access health informa  
tion   
online - Detail  
Indication:Hypertension,   
essential, benign  
                          Start:2016         Instruction Type:Patient   
Education  
  
   
                                                    Patient Instructions  
Indication:Hypertension,   
essential, benign  
                          Start:2016         Instruction Type:Provider   
Instructions for Treatment  
  
   
                                                    How to access health informa  
tion   
online  
Indication:BMI 27.0-27.9,adult  
                          Start:2016         Instruction Type:Patient   
Education  
  
   
                                                    How to access health informa  
tion   
online - Detail  
Indication:BMI 27.0-27.9,adult  
                          Start:2016         Instruction Type:Patient   
Education  
  
   
                                                    Patient Instructions  
Indication:BMI 27.0-27.9,adult  
                          Start:2016         Instruction Type:Provider   
Instructions for Treatment  
  
   
                                                    How to access health informa  
tion   
online  
Indication:Abnormal glucose   
tolerance test  
                          Start:2016         Instruction Type:Patient   
Education  
  
   
                                                    How to access health informa  
tion   
online - Detail  
Indication:Abnormal glucose   
tolerance test  
                          Start:2016         Instruction Type:Patient   
Education  
  
   
                                                    Patient Instructions  
Indication:Abnormal glucose   
tolerance test  
                          Start:2016         Instruction Type:Provider   
Instructions for Treatment  
  
   
                                                    How to access health informa  
tion   
online  
Indication:Abnormal glucose   
tolerance test  
                          Start:16-Dec-2015         Instruction Type:Patient   
Education  
  
   
                                                    How to access health informa  
tion   
online - Detail  
Indication:Abnormal glucose   
tolerance test  
                          Start:16-Dec-2015         Instruction Type:Patient   
Education  
  
   
                                                    Patient Instructions  
Indication:Abnormal glucose   
tolerance test  
                          Start:16-Dec-2015         Instruction Type:Provider   
Instructions for Treatment  
  
   
                                                    How to access health informa  
tion   
online  
Indication:Hypercholesteremia  
                          Start:16-Sep-2015         Instruction Type:Patient   
Education  
  
   
                                                    How to access health informa  
tion   
online - Detail  
Indication:Hypercholesteremia  
                          Start:16-Sep-2015         Instruction Type:Patient   
Education  
  
   
                                                    Patient Instructions  
Indication:Hypercholesteremia  
                          Start:16-Sep-2015         Instruction Type:Provider   
Instructions for Treatment  
  
   
                                                    Patient Instructions  
Indication:Abnormal glucose   
tolerance test  
                          Start:9-May-2014          Instruction Type:Provider   
Instructions for Treatment  
  
   
                                                    Patient Instructions  
Indication:Abnormal glucose   
tolerance test  
                          Start:2014          Instruction Type:Provider   
Instructions for Treatment  
  
   
                                                    Patient Instructions  
Indication:Abnormal glucose   
tolerance test  
                          Start:12-Sep-2013         Instruction Type:Provider   
Instructions for Treatment  
  
   
                                                    Patient Instructions  
Indication:Hypercholesteremia  
                          Start:15-Mar-2013         Instruction Type:Provider   
Instructions for Treatment  
  
   
                                                    Patient Instructions  
Indication:Hypertension,   
essential, benign  
                          Start:14-Sep-2012         Instruction Type:Provider   
Instructions for Treatment  
  
  
  
  
                                Name            Dates           Details  
   
                                                    How to access health informa  
tion   
online  
Indication:Smoker  
                          Start:24-Aug-2020         Instruction Type:Patient   
Education  
  
   
                                                    How to access health informa  
tion   
online - Detail  
Indication:Smoker  
                          Start:24-Aug-2020         Instruction Type:Patient   
Education  
  
   
                                                    Patient Instructions  
Indication:Smoker  
                          Start:24-Aug-2020         Instruction Type:Provider   
Instructions for Treatment  
  
   
                                                    How to access health informa  
tion   
online - Detail  
Indication:BMI 25.0-25.9,adult  
                          Start:2020         Instruction Type:Patient   
Education  
  
   
                                                    How to access health informa  
tion   
online  
Indication:BMI 25.0-25.9,adult  
                          Start:2020         Instruction Type:Patient   
Education  
  
   
                                                    Patient Instructions  
Indication:BMI 25.0-25.9,adult  
                          Start:2020         Instruction Type:Provider   
Instructions for Treatment  
  
   
                                                    How to access health informa  
tion   
online  
Indication:Smoker  
                          Start:2020          Instruction Type:Patient   
Education  
  
   
                                                    How to access health informa  
tion   
online - Detail  
Indication:Smoker  
                          Start:2020          Instruction Type:Patient   
Education  
  
   
                                                    Patient Instructions  
Indication:Smoker  
                          Start:2020          Instruction Type:Provider   
Instructions for Treatment  
  
   
                                                    How to access health informa  
tion   
online  
Indication:Smoker  
                          Start:2020         Instruction Type:Patient   
Education  
  
   
                                                    How to access health informa  
tion   
online - Detail  
Indication:Smoker  
                          Start:2020         Instruction Type:Patient   
Education  
  
   
                                                    Patient Instructions  
Indication:Smoker  
                          Start:2020         Instruction Type:Provider   
Instructions for Treatment  
  
   
                                                    How to access health informa  
tion   
online  
Indication:Smoker  
                          Start:13-Sep-2019         Instruction Type:Patient   
Education  
  
   
                                                    How to access health informa  
tion   
online - Detail  
Indication:Smoker  
                          Start:13-Sep-2019         Instruction Type:Patient   
Education  
  
   
                                                    Patient Instructions  
Indication:Smoker  
                          Start:13-Sep-2019         Instruction Type:Provider   
Instructions for Treatment  
  
   
                                                    How to access health informa  
tion   
online  
Indication:BMI 26.0-26.9,adult  
                          Start:10-May-2019         Instruction Type:Patient   
Education  
  
   
                                                    How to access health informa  
tion   
online - Detail  
Indication:BMI 26.0-26.9,adult  
                          Start:10-May-2019         Instruction Type:Patient   
Education  
  
   
                                                    Patient Instructions  
Indication:BMI 26.0-26.9,adult  
                          Start:10-May-2019         Instruction Type:Provider   
Instructions for Treatment  
  
   
                                                    How to access health informa  
tion   
online  
Indication:Abnormal glucose   
tolerance test  
                          Start:28-Dec-2018         Instruction Type:Patient   
Education  
  
   
                                                    How to access health informa  
tion   
online - Detail  
Indication:Abnormal glucose   
tolerance test  
                          Start:28-Dec-2018         Instruction Type:Patient   
Education  
  
   
                                                    Patient Instructions  
Indication:Abnormal glucose   
tolerance test  
                          Start:28-Dec-2018         Instruction Type:Provider   
Instructions for Treatment  
  
   
                                                    How to access health informa  
tion   
online  
Indication:Smoker  
                          Start:29-Aug-2018         Instruction Type:Patient   
Education  
  
   
                                                    How to access health informa  
tion   
online - Detail  
Indication:Smoker  
                          Start:29-Aug-2018         Instruction Type:Patient   
Education  
  
   
                                                    Patient Instructions  
Indication:Smoker  
                          Start:29-Aug-2018         Instruction Type:Provider   
Instructions for Treatment  
  
   
                                                    How to access health informa  
tion   
online  
Indication:Abnormal glucose   
tolerance test  
                          Start:2018         Instruction Type:Patient   
Education  
  
   
                                                    How to access health informa  
tion   
online - Detail  
Indication:Abnormal glucose   
tolerance test  
                          Start:2018         Instruction Type:Patient   
Education  
  
   
                                                    Patient Instructions  
Indication:Abnormal glucose   
tolerance test  
                          Start:2018         Instruction Type:Provider   
Instructions for Treatment  
  
   
                                                    How to access health informa  
tion   
online  
Indication:Smoker  
                          Start:8-Dec-2017          Instruction Type:Patient   
Education  
  
   
                                                    How to access health informa  
tion   
online - Detail  
Indication:Smoker  
                          Start:8-Dec-2017          Instruction Type:Patient   
Education  
  
   
                                                    Patient Instructions  
Indication:Smoker  
                          Start:8-Dec-2017          Instruction Type:Provider   
Instructions for Treatment  
  
   
                                                    How to access health informa  
tion   
online  
Indication:Smoker  
                          Start:4-Aug-2017          Instruction Type:Patient   
Education  
  
   
                                                    How to access health informa  
tion   
online - Detail  
Indication:Smoker  
                          Start:4-Aug-2017          Instruction Type:Patient   
Education  
  
   
                                                    Patient Instructions  
Indication:Smoker  
                          Start:4-Aug-2017          Instruction Type:Provider   
Instructions for Treatment  
  
   
                                                    How to access health informa  
tion   
online  
Indication:Smoker  
                          Start:3-Mar-2017          Instruction Type:Patient   
Education  
  
   
                                                    How to access health informa  
tion   
online - Detail  
Indication:Smoker  
                          Start:3-Mar-2017          Instruction Type:Patient   
Education  
  
   
                                                    Patient Instructions  
Indication:Smoker  
                          Start:3-Mar-2017          Instruction Type:Provider   
Instructions for Treatment  
  
   
                                                    How to access health informa  
tion   
online  
Indication:Hypertension,   
essential, benign  
                          Start:28-Oct-2016         Instruction Type:Patient   
Education  
  
   
                                                    How to access health informa  
tion   
online - Detail  
Indication:Hypertension,   
essential, benign  
                          Start:28-Oct-2016         Instruction Type:Patient   
Education  
  
   
                                                    Patient Instructions  
Indication:Hypertension,   
essential, benign  
                          Start:28-Oct-2016         Instruction Type:Provider   
Instructions for Treatment  
  
   
                                                    How to access health informa  
tion   
online  
Indication:Hypertension,   
essential, benign  
                          Start:2016         Instruction Type:Patient   
Education  
  
   
                                                    How to access health informa  
tion   
online - Detail  
Indication:Hypertension,   
essential, benign  
                          Start:2016         Instruction Type:Patient   
Education  
  
   
                                                    Patient Instructions  
Indication:Hypertension,   
essential, benign  
                          Start:2016         Instruction Type:Provider   
Instructions for Treatment  
  
   
                                                    How to access health informa  
tion   
online  
Indication:BMI 27.0-27.9,adult  
                          Start:2016         Instruction Type:Patient   
Education  
  
   
                                                    How to access health informa  
tion   
online - Detail  
Indication:BMI 27.0-27.9,adult  
                          Start:2016         Instruction Type:Patient   
Education  
  
   
                                                    Patient Instructions  
Indication:BMI 27.0-27.9,adult  
                          Start:2016         Instruction Type:Provider   
Instructions for Treatment  
  
   
                                                    How to access health informa  
tion   
online  
Indication:Abnormal glucose   
tolerance test  
                          Start:2016         Instruction Type:Patient   
Education  
  
   
                                                    How to access health informa  
tion   
online - Detail  
Indication:Abnormal glucose   
tolerance test  
                          Start:2016         Instruction Type:Patient   
Education  
  
   
                                                    Patient Instructions  
Indication:Abnormal glucose   
tolerance test  
                          Start:2016         Instruction Type:Provider   
Instructions for Treatment  
  
   
                                                    How to access health informa  
tion   
online  
Indication:Abnormal glucose   
tolerance test  
                          Start:16-Dec-2015         Instruction Type:Patient   
Education  
  
   
                                                    How to access health informa  
tion   
online - Detail  
Indication:Abnormal glucose   
tolerance test  
                          Start:16-Dec-2015         Instruction Type:Patient   
Education  
  
   
                                                    Patient Instructions  
Indication:Abnormal glucose   
tolerance test  
                          Start:16-Dec-2015         Instruction Type:Provider   
Instructions for Treatment  
  
   
                                                    How to access health informa  
tion   
online  
Indication:Hypercholesteremia  
                          Start:16-Sep-2015         Instruction Type:Patient   
Education  
  
   
                                                    How to access health informa  
tion   
online - Detail  
Indication:Hypercholesteremia  
                          Start:16-Sep-2015         Instruction Type:Patient   
Education  
  
   
                                                    Patient Instructions  
Indication:Hypercholesteremia  
                          Start:16-Sep-2015         Instruction Type:Provider   
Instructions for Treatment  
  
   
                                                    Patient Instructions  
Indication:Abnormal glucose   
tolerance test  
                          Start:9-May-2014          Instruction Type:Provider   
Instructions for Treatment  
  
   
                                                    Patient Instructions  
Indication:Abnormal glucose   
tolerance test  
                          Start:2014          Instruction Type:Provider   
Instructions for Treatment  
  
   
                                                    Patient Instructions  
Indication:Abnormal glucose   
tolerance test  
                          Start:12-Sep-2013         Instruction Type:Provider   
Instructions for Treatment  
  
   
                                                    Patient Instructions  
Indication:Hypercholesteremia  
                          Start:15-Mar-2013         Instruction Type:Provider   
Instructions for Treatment  
  
   
                                                    Patient Instructions  
Indication:Hypertension,   
essential, benign  
                          Start:14-Sep-2012         Instruction Type:Provider   
Instructions for Treatment  
  
  
  
  
                                Name            Dates           Details  
   
                                                    How to access health informa  
tion   
online  
Indication:Smoker  
                          Start:24-Aug-2020         Instruction Type:Patient   
Education  
  
   
                                                    How to access health informa  
tion   
online - Detail  
Indication:Smoker  
                          Start:24-Aug-2020         Instruction Type:Patient   
Education  
  
   
                                                    Patient Instructions  
Indication:Smoker  
                          Start:24-Aug-2020         Instruction Type:Provider   
Instructions for Treatment  
  
   
                                                    How to access health informa  
tion   
online - Detail  
Indication:BMI 25.0-25.9,adult  
                          Start:2020         Instruction Type:Patient   
Education  
  
   
                                                    How to access health informa  
tion   
online  
Indication:BMI 25.0-25.9,adult  
                          Start:2020         Instruction Type:Patient   
Education  
  
   
                                                    Patient Instructions  
Indication:BMI 25.0-25.9,adult  
                          Start:2020         Instruction Type:Provider   
Instructions for Treatment  
  
   
                                                    How to access health informa  
tion   
online  
Indication:Smoker  
                          Start:2020          Instruction Type:Patient   
Education  
  
   
                                                    How to access health informa  
tion   
online - Detail  
Indication:Smoker  
                          Start:2020          Instruction Type:Patient   
Education  
  
   
                                                    Patient Instructions  
Indication:Smoker  
                          Start:2020          Instruction Type:Provider   
Instructions for Treatment  
  
   
                                                    How to access health informa  
tion   
online  
Indication:Smoker  
                          Start:2020         Instruction Type:Patient   
Education  
  
   
                                                    How to access health informa  
tion   
online - Detail  
Indication:Smoker  
                          Start:2020         Instruction Type:Patient   
Education  
  
   
                                                    Patient Instructions  
Indication:Smoker  
                          Start:2020         Instruction Type:Provider   
Instructions for Treatment  
  
   
                                                    How to access health informa  
tion   
online  
Indication:Smoker  
                          Start:13-Sep-2019         Instruction Type:Patient   
Education  
  
   
                                                    How to access health informa  
tion   
online - Detail  
Indication:Smoker  
                          Start:13-Sep-2019         Instruction Type:Patient   
Education  
  
   
                                                    Patient Instructions  
Indication:Smoker  
                          Start:13-Sep-2019         Instruction Type:Provider   
Instructions for Treatment  
  
   
                                                    How to access health informa  
tion   
online  
Indication:BMI 26.0-26.9,adult  
                          Start:10-May-2019         Instruction Type:Patient   
Education  
  
   
                                                    How to access health informa  
tion   
online - Detail  
Indication:BMI 26.0-26.9,adult  
                          Start:10-May-2019         Instruction Type:Patient   
Education  
  
   
                                                    Patient Instructions  
Indication:BMI 26.0-26.9,adult  
                          Start:10-May-2019         Instruction Type:Provider   
Instructions for Treatment  
  
   
                                                    How to access health informa  
tion   
online  
Indication:Abnormal glucose   
tolerance test  
                          Start:28-Dec-2018         Instruction Type:Patient   
Education  
  
   
                                                    How to access health informa  
tion   
online - Detail  
Indication:Abnormal glucose   
tolerance test  
                          Start:28-Dec-2018         Instruction Type:Patient   
Education  
  
   
                                                    Patient Instructions  
Indication:Abnormal glucose   
tolerance test  
                          Start:28-Dec-2018         Instruction Type:Provider   
Instructions for Treatment  
  
   
                                                    How to access health informa  
tion   
online  
Indication:Smoker  
                          Start:29-Aug-2018         Instruction Type:Patient   
Education  
  
   
                                                    How to access health informa  
tion   
online - Detail  
Indication:Smoker  
                          Start:29-Aug-2018         Instruction Type:Patient   
Education  
  
   
                                                    Patient Instructions  
Indication:Smoker  
                          Start:29-Aug-2018         Instruction Type:Provider   
Instructions for Treatment  
  
   
                                                    How to access health informa  
tion   
online  
Indication:Abnormal glucose   
tolerance test  
                          Start:2018         Instruction Type:Patient   
Education  
  
   
                                                    How to access health informa  
tion   
online - Detail  
Indication:Abnormal glucose   
tolerance test  
                          Start:2018         Instruction Type:Patient   
Education  
  
   
                                                    Patient Instructions  
Indication:Abnormal glucose   
tolerance test  
                          Start:2018         Instruction Type:Provider   
Instructions for Treatment  
  
   
                                                    How to access health informa  
tion   
online  
Indication:Smoker  
                          Start:8-Dec-2017          Instruction Type:Patient   
Education  
  
   
                                                    How to access health informa  
tion   
online - Detail  
Indication:Smoker  
                          Start:8-Dec-2017          Instruction Type:Patient   
Education  
  
   
                                                    Patient Instructions  
Indication:Smoker  
                          Start:8-Dec-2017          Instruction Type:Provider   
Instructions for Treatment  
  
   
                                                    How to access health informa  
tion   
online  
Indication:Smoker  
                          Start:4-Aug-2017          Instruction Type:Patient   
Education  
  
   
                                                    How to access health informa  
tion   
online - Detail  
Indication:Smoker  
                          Start:4-Aug-2017          Instruction Type:Patient   
Education  
  
   
                                                    Patient Instructions  
Indication:Smoker  
                          Start:4-Aug-2017          Instruction Type:Provider   
Instructions for Treatment  
  
   
                                                    How to access health informa  
tion   
online  
Indication:Smoker  
                          Start:3-Mar-2017          Instruction Type:Patient   
Education  
  
   
                                                    How to access health informa  
tion   
online - Detail  
Indication:Smoker  
                          Start:3-Mar-2017          Instruction Type:Patient   
Education  
  
   
                                                    Patient Instructions  
Indication:Smoker  
                          Start:3-Mar-2017          Instruction Type:Provider   
Instructions for Treatment  
  
   
                                                    How to access health informa  
tion   
online  
Indication:Hypertension,   
essential, benign  
                          Start:28-Oct-2016         Instruction Type:Patient   
Education  
  
   
                                                    How to access health informa  
tion   
online - Detail  
Indication:Hypertension,   
essential, benign  
                          Start:28-Oct-2016         Instruction Type:Patient   
Education  
  
   
                                                    Patient Instructions  
Indication:Hypertension,   
essential, benign  
                          Start:28-Oct-2016         Instruction Type:Provider   
Instructions for Treatment  
  
   
                                                    How to access health informa  
tion   
online  
Indication:Hypertension,   
essential, benign  
                          Start:2016         Instruction Type:Patient   
Education  
  
   
                                                    How to access health informa  
tion   
online - Detail  
Indication:Hypertension,   
essential, benign  
                          Start:2016         Instruction Type:Patient   
Education  
  
   
                                                    Patient Instructions  
Indication:Hypertension,   
essential, benign  
                          Start:2016         Instruction Type:Provider   
Instructions for Treatment  
  
   
                                                    How to access health informa  
tion   
online  
Indication:BMI 27.0-27.9,adult  
                          Start:2016         Instruction Type:Patient   
Education  
  
   
                                                    How to access health informa  
tion   
online - Detail  
Indication:BMI 27.0-27.9,adult  
                          Start:2016         Instruction Type:Patient   
Education  
  
   
                                                    Patient Instructions  
Indication:BMI 27.0-27.9,adult  
                          Start:2016         Instruction Type:Provider   
Instructions for Treatment  
  
   
                                                    How to access health informa  
tion   
online  
Indication:Abnormal glucose   
tolerance test  
                          Start:2016         Instruction Type:Patient   
Education  
  
   
                                                    How to access health informa  
tion   
online - Detail  
Indication:Abnormal glucose   
tolerance test  
                          Start:2016         Instruction Type:Patient   
Education  
  
   
                                                    Patient Instructions  
Indication:Abnormal glucose   
tolerance test  
                          Start:2016         Instruction Type:Provider   
Instructions for Treatment  
  
   
                                                    How to access health informa  
tion   
online  
Indication:Abnormal glucose   
tolerance test  
                          Start:16-Dec-2015         Instruction Type:Patient   
Education  
  
   
                                                    How to access health informa  
tion   
online - Detail  
Indication:Abnormal glucose   
tolerance test  
                          Start:16-Dec-2015         Instruction Type:Patient   
Education  
  
   
                                                    Patient Instructions  
Indication:Abnormal glucose   
tolerance test  
                          Start:16-Dec-2015         Instruction Type:Provider   
Instructions for Treatment  
  
   
                                                    How to access health informa  
tion   
online  
Indication:Hypercholesteremia  
                          Start:16-Sep-2015         Instruction Type:Patient   
Education  
  
   
                                                    How to access health informa  
tion   
online - Detail  
Indication:Hypercholesteremia  
                          Start:16-Sep-2015         Instruction Type:Patient   
Education  
  
   
                                                    Patient Instructions  
Indication:Hypercholesteremia  
                          Start:16-Sep-2015         Instruction Type:Provider   
Instructions for Treatment  
  
   
                                                    Patient Instructions  
Indication:Abnormal glucose   
tolerance test  
                          Start:9-May-2014          Instruction Type:Provider   
Instructions for Treatment  
  
   
                                                    Patient Instructions  
Indication:Abnormal glucose   
tolerance test  
                          Start:2014          Instruction Type:Provider   
Instructions for Treatment  
  
   
                                                    Patient Instructions  
Indication:Abnormal glucose   
tolerance test  
                          Start:12-Sep-2013         Instruction Type:Provider   
Instructions for Treatment  
  
   
                                                    Patient Instructions  
Indication:Hypercholesteremia  
                          Start:15-Mar-2013         Instruction Type:Provider   
Instructions for Treatment  
  
   
                                                    Patient Instructions  
Indication:Hypertension,   
essential, benign  
                          Start:14-Sep-2012         Instruction Type:Provider   
Instructions for Treatment  
  
  
  
  
                                Name            Dates           Details  
   
                                                    How to access health informa  
tion   
online  
Indication:Smoker  
                          Start:2020         Instruction Type:Patient   
Education  
  
   
                                                    How to access health informa  
tion   
online - Detail  
Indication:Smoker  
                          Start:2020         Instruction Type:Patient   
Education  
  
   
                                                    Patient Instructions  
Indication:Smoker  
                          Start:2020         Instruction Type:Provider   
Instructions for Treatment  
  
   
                                                    How to access health informa  
tion   
online  
Indication:Smoker  
                          Start:24-Aug-2020         Instruction Type:Patient   
Education  
  
   
                                                    How to access health informa  
tion   
online - Detail  
Indication:Smoker  
                          Start:24-Aug-2020         Instruction Type:Patient   
Education  
  
   
                                                    Patient Instructions  
Indication:Smoker  
                          Start:24-Aug-2020         Instruction Type:Provider   
Instructions for Treatment  
  
   
                                                    How to access health informa  
tion   
online - Detail  
Indication:BMI 25.0-25.9,adult  
                          Start:2020         Instruction Type:Patient   
Education  
  
   
                                                    How to access health informa  
tion   
online  
Indication:BMI 25.0-25.9,adult  
                          Start:2020         Instruction Type:Patient   
Education  
  
   
                                                    Patient Instructions  
Indication:BMI 25.0-25.9,adult  
                          Start:2020         Instruction Type:Provider   
Instructions for Treatment  
  
   
                                                    How to access health informa  
tion   
online  
Indication:Smoker  
                          Start:2020          Instruction Type:Patient   
Education  
  
   
                                                    How to access health informa  
tion   
online - Detail  
Indication:Smoker  
                          Start:2020          Instruction Type:Patient   
Education  
  
   
                                                    Patient Instructions  
Indication:Smoker  
                          Start:2020          Instruction Type:Provider   
Instructions for Treatment  
  
   
                                                    How to access health informa  
tion   
online  
Indication:Smoker  
                          Start:2020         Instruction Type:Patient   
Education  
  
   
                                                    How to access health informa  
tion   
online - Detail  
Indication:Smoker  
                          Start:2020         Instruction Type:Patient   
Education  
  
   
                                                    Patient Instructions  
Indication:Smoker  
                          Start:2020         Instruction Type:Provider   
Instructions for Treatment  
  
   
                                                    How to access health informa  
tion   
online  
Indication:Smoker  
                          Start:13-Sep-2019         Instruction Type:Patient   
Education  
  
   
                                                    How to access health informa  
tion   
online - Detail  
Indication:Smoker  
                          Start:13-Sep-2019         Instruction Type:Patient   
Education  
  
   
                                                    Patient Instructions  
Indication:Smoker  
                          Start:13-Sep-2019         Instruction Type:Provider   
Instructions for Treatment  
  
   
                                                    How to access health informa  
tion   
online  
Indication:BMI 26.0-26.9,adult  
                          Start:10-May-2019         Instruction Type:Patient   
Education  
  
   
                                                    How to access health informa  
tion   
online - Detail  
Indication:BMI 26.0-26.9,adult  
                          Start:10-May-2019         Instruction Type:Patient   
Education  
  
   
                                                    Patient Instructions  
Indication:BMI 26.0-26.9,adult  
                          Start:10-May-2019         Instruction Type:Provider   
Instructions for Treatment  
  
   
                                                    How to access health informa  
tion   
online  
Indication:Abnormal glucose   
tolerance test  
                          Start:28-Dec-2018         Instruction Type:Patient   
Education  
  
   
                                                    How to access health informa  
tion   
online - Detail  
Indication:Abnormal glucose   
tolerance test  
                          Start:28-Dec-2018         Instruction Type:Patient   
Education  
  
   
                                                    Patient Instructions  
Indication:Abnormal glucose   
tolerance test  
                          Start:28-Dec-2018         Instruction Type:Provider   
Instructions for Treatment  
  
   
                                                    How to access health informa  
tion   
online  
Indication:Smoker  
                          Start:29-Aug-2018         Instruction Type:Patient   
Education  
  
   
                                                    How to access health informa  
tion   
online - Detail  
Indication:Smoker  
                          Start:29-Aug-2018         Instruction Type:Patient   
Education  
  
   
                                                    Patient Instructions  
Indication:Smoker  
                          Start:29-Aug-2018         Instruction Type:Provider   
Instructions for Treatment  
  
   
                                                    How to access health informa  
tion   
online  
Indication:Abnormal glucose   
tolerance test  
                          Start:2018         Instruction Type:Patient   
Education  
  
   
                                                    How to access health informa  
tion   
online - Detail  
Indication:Abnormal glucose   
tolerance test  
                          Start:2018         Instruction Type:Patient   
Education  
  
   
                                                    Patient Instructions  
Indication:Abnormal glucose   
tolerance test  
                          Start:2018         Instruction Type:Provider   
Instructions for Treatment  
  
   
                                                    How to access health informa  
tion   
online  
Indication:Smoker  
                          Start:8-Dec-2017          Instruction Type:Patient   
Education  
  
   
                                                    How to access health informa  
tion   
online - Detail  
Indication:Smoker  
                          Start:8-Dec-2017          Instruction Type:Patient   
Education  
  
   
                                                    Patient Instructions  
Indication:Smoker  
                          Start:8-Dec-2017          Instruction Type:Provider   
Instructions for Treatment  
  
   
                                                    How to access health informa  
tion   
online  
Indication:Smoker  
                          Start:4-Aug-2017          Instruction Type:Patient   
Education  
  
   
                                                    How to access health informa  
tion   
online - Detail  
Indication:Smoker  
                          Start:4-Aug-2017          Instruction Type:Patient   
Education  
  
   
                                                    Patient Instructions  
Indication:Smoker  
                          Start:4-Aug-2017          Instruction Type:Provider   
Instructions for Treatment  
  
   
                                                    How to access health informa  
tion   
online  
Indication:Smoker  
                          Start:3-Mar-2017          Instruction Type:Patient   
Education  
  
   
                                                    How to access health informa  
tion   
online - Detail  
Indication:Smoker  
                          Start:3-Mar-2017          Instruction Type:Patient   
Education  
  
   
                                                    Patient Instructions  
Indication:Smoker  
                          Start:3-Mar-2017          Instruction Type:Provider   
Instructions for Treatment  
  
   
                                                    How to access health informa  
tion   
online  
Indication:Hypertension,   
essential, benign  
                          Start:28-Oct-2016         Instruction Type:Patient   
Education  
  
   
                                                    How to access health informa  
tion   
online - Detail  
Indication:Hypertension,   
essential, benign  
                          Start:28-Oct-2016         Instruction Type:Patient   
Education  
  
   
                                                    Patient Instructions  
Indication:Hypertension,   
essential, benign  
                          Start:28-Oct-2016         Instruction Type:Provider   
Instructions for Treatment  
  
   
                                                    How to access health informa  
tion   
online  
Indication:Hypertension,   
essential, benign  
                          Start:2016         Instruction Type:Patient   
Education  
  
   
                                                    How to access health informa  
tion   
online - Detail  
Indication:Hypertension,   
essential, benign  
                          Start:2016         Instruction Type:Patient   
Education  
  
   
                                                    Patient Instructions  
Indication:Hypertension,   
essential, benign  
                          Start:2016         Instruction Type:Provider   
Instructions for Treatment  
  
   
                                                    How to access health informa  
tion   
online  
Indication:BMI 27.0-27.9,adult  
                          Start:2016         Instruction Type:Patient   
Education  
  
   
                                                    How to access health informa  
tion   
online - Detail  
Indication:BMI 27.0-27.9,adult  
                          Start:2016         Instruction Type:Patient   
Education  
  
   
                                                    Patient Instructions  
Indication:BMI 27.0-27.9,adult  
                          Start:2016         Instruction Type:Provider   
Instructions for Treatment  
  
   
                                                    How to access health informa  
tion   
online  
Indication:Abnormal glucose   
tolerance test  
                          Start:2016         Instruction Type:Patient   
Education  
  
   
                                                    How to access health informa  
tion   
online - Detail  
Indication:Abnormal glucose   
tolerance test  
                          Start:2016         Instruction Type:Patient   
Education  
  
   
                                                    Patient Instructions  
Indication:Abnormal glucose   
tolerance test  
                          Start:2016         Instruction Type:Provider   
Instructions for Treatment  
  
   
                                                    How to access health informa  
tion   
online  
Indication:Abnormal glucose   
tolerance test  
                          Start:16-Dec-2015         Instruction Type:Patient   
Education  
  
   
                                                    How to access health informa  
tion   
online - Detail  
Indication:Abnormal glucose   
tolerance test  
                          Start:16-Dec-2015         Instruction Type:Patient   
Education  
  
   
                                                    Patient Instructions  
Indication:Abnormal glucose   
tolerance test  
                          Start:16-Dec-2015         Instruction Type:Provider   
Instructions for Treatment  
  
   
                                                    How to access health informa  
tion   
online  
Indication:Hypercholesteremia  
                          Start:16-Sep-2015         Instruction Type:Patient   
Education  
  
   
                                                    How to access health informa  
tion   
online - Detail  
Indication:Hypercholesteremia  
                          Start:16-Sep-2015         Instruction Type:Patient   
Education  
  
   
                                                    Patient Instructions  
Indication:Hypercholesteremia  
                          Start:16-Sep-2015         Instruction Type:Provider   
Instructions for Treatment  
  
   
                                                    Patient Instructions  
Indication:Abnormal glucose   
tolerance test  
                          Start:9-May-2014          Instruction Type:Provider   
Instructions for Treatment  
  
   
                                                    Patient Instructions  
Indication:Abnormal glucose   
tolerance test  
                          Start:2014          Instruction Type:Provider   
Instructions for Treatment  
  
   
                                                    Patient Instructions  
Indication:Abnormal glucose   
tolerance test  
                          Start:12-Sep-2013         Instruction Type:Provider   
Instructions for Treatment  
  
   
                                                    Patient Instructions  
Indication:Hypercholesteremia  
                          Start:15-Mar-2013         Instruction Type:Provider   
Instructions for Treatment  
  
   
                                                    Patient Instructions  
Indication:Hypertension,   
essential, benign  
                          Start:14-Sep-2012         Instruction Type:Provider   
Instructions for Treatment  
  
  
  
  
                                Name            Dates           Details  
   
                                                    How to access health informa  
tion   
online  
Indication:Smoker  
                          Start:2020         Instruction Type:Patient   
Education  
  
   
                                                    How to access health informa  
tion   
online - Detail  
Indication:Smoker  
                          Start:2020         Instruction Type:Patient   
Education  
  
   
                                                    Patient Instructions  
Indication:Smoker  
                          Start:2020         Instruction Type:Provider   
Instructions for Treatment  
  
   
                                                    How to access health informa  
tion   
online  
Indication:Smoker  
                          Start:24-Aug-2020         Instruction Type:Patient   
Education  
  
   
                                                    How to access health informa  
tion   
online - Detail  
Indication:Smoker  
                          Start:24-Aug-2020         Instruction Type:Patient   
Education  
  
   
                                                    Patient Instructions  
Indication:Smoker  
                          Start:24-Aug-2020         Instruction Type:Provider   
Instructions for Treatment  
  
   
                                                    How to access health informa  
tion   
online - Detail  
Indication:BMI 25.0-25.9,adult  
                          Start:2020         Instruction Type:Patient   
Education  
  
   
                                                    How to access health informa  
tion   
online  
Indication:BMI 25.0-25.9,adult  
                          Start:2020         Instruction Type:Patient   
Education  
  
   
                                                    Patient Instructions  
Indication:BMI 25.0-25.9,adult  
                          Start:2020         Instruction Type:Provider   
Instructions for Treatment  
  
   
                                                    How to access health informa  
tion   
online  
Indication:Smoker  
                          Start:2020          Instruction Type:Patient   
Education  
  
   
                                                    How to access health informa  
tion   
online - Detail  
Indication:Smoker  
                          Start:2020          Instruction Type:Patient   
Education  
  
   
                                                    Patient Instructions  
Indication:Smoker  
                          Start:2020          Instruction Type:Provider   
Instructions for Treatment  
  
   
                                                    How to access health informa  
tion   
online  
Indication:Smoker  
                          Start:2020         Instruction Type:Patient   
Education  
  
   
                                                    How to access health informa  
tion   
online - Detail  
Indication:Smoker  
                          Start:2020         Instruction Type:Patient   
Education  
  
   
                                                    Patient Instructions  
Indication:Smoker  
                          Start:2020         Instruction Type:Provider   
Instructions for Treatment  
  
   
                                                    How to access health informa  
tion   
online  
Indication:Smoker  
                          Start:13-Sep-2019         Instruction Type:Patient   
Education  
  
   
                                                    How to access health informa  
tion   
online - Detail  
Indication:Smoker  
                          Start:13-Sep-2019         Instruction Type:Patient   
Education  
  
   
                                                    Patient Instructions  
Indication:Smoker  
                          Start:13-Sep-2019         Instruction Type:Provider   
Instructions for Treatment  
  
   
                                                    How to access health informa  
tion   
online  
Indication:BMI 26.0-26.9,adult  
                          Start:10-May-2019         Instruction Type:Patient   
Education  
  
   
                                                    How to access health informa  
tion   
online - Detail  
Indication:BMI 26.0-26.9,adult  
                          Start:10-May-2019         Instruction Type:Patient   
Education  
  
   
                                                    Patient Instructions  
Indication:BMI 26.0-26.9,adult  
                          Start:10-May-2019         Instruction Type:Provider   
Instructions for Treatment  
  
   
                                                    How to access health informa  
tion   
online  
Indication:Abnormal glucose   
tolerance test  
                          Start:28-Dec-2018         Instruction Type:Patient   
Education  
  
   
                                                    How to access health informa  
tion   
online - Detail  
Indication:Abnormal glucose   
tolerance test  
                          Start:28-Dec-2018         Instruction Type:Patient   
Education  
  
   
                                                    Patient Instructions  
Indication:Abnormal glucose   
tolerance test  
                          Start:28-Dec-2018         Instruction Type:Provider   
Instructions for Treatment  
  
   
                                                    How to access health informa  
tion   
online  
Indication:Smoker  
                          Start:29-Aug-2018         Instruction Type:Patient   
Education  
  
   
                                                    How to access health informa  
tion   
online - Detail  
Indication:Smoker  
                          Start:29-Aug-2018         Instruction Type:Patient   
Education  
  
   
                                                    Patient Instructions  
Indication:Smoker  
                          Start:29-Aug-2018         Instruction Type:Provider   
Instructions for Treatment  
  
   
                                                    How to access health informa  
tion   
online  
Indication:Abnormal glucose   
tolerance test  
                          Start:2018         Instruction Type:Patient   
Education  
  
   
                                                    How to access health informa  
tion   
online - Detail  
Indication:Abnormal glucose   
tolerance test  
                          Start:2018         Instruction Type:Patient   
Education  
  
   
                                                    Patient Instructions  
Indication:Abnormal glucose   
tolerance test  
                          Start:2018         Instruction Type:Provider   
Instructions for Treatment  
  
   
                                                    How to access health informa  
tion   
online  
Indication:Smoker  
                          Start:8-Dec-2017          Instruction Type:Patient   
Education  
  
   
                                                    How to access health informa  
tion   
online - Detail  
Indication:Smoker  
                          Start:8-Dec-2017          Instruction Type:Patient   
Education  
  
   
                                                    Patient Instructions  
Indication:Smoker  
                          Start:8-Dec-2017          Instruction Type:Provider   
Instructions for Treatment  
  
   
                                                    How to access health informa  
tion   
online  
Indication:Smoker  
                          Start:4-Aug-2017          Instruction Type:Patient   
Education  
  
   
                                                    How to access health informa  
tion   
online - Detail  
Indication:Smoker  
                          Start:4-Aug-2017          Instruction Type:Patient   
Education  
  
   
                                                    Patient Instructions  
Indication:Smoker  
                          Start:4-Aug-2017          Instruction Type:Provider   
Instructions for Treatment  
  
   
                                                    How to access health informa  
tion   
online  
Indication:Smoker  
                          Start:3-Mar-2017          Instruction Type:Patient   
Education  
  
   
                                                    How to access health informa  
tion   
online - Detail  
Indication:Smoker  
                          Start:3-Mar-2017          Instruction Type:Patient   
Education  
  
   
                                                    Patient Instructions  
Indication:Smoker  
                          Start:3-Mar-2017          Instruction Type:Provider   
Instructions for Treatment  
  
   
                                                    How to access health informa  
tion   
online  
Indication:Hypertension,   
essential, benign  
                          Start:28-Oct-2016         Instruction Type:Patient   
Education  
  
   
                                                    How to access health informa  
tion   
online - Detail  
Indication:Hypertension,   
essential, benign  
                          Start:28-Oct-2016         Instruction Type:Patient   
Education  
  
   
                                                    Patient Instructions  
Indication:Hypertension,   
essential, benign  
                          Start:28-Oct-2016         Instruction Type:Provider   
Instructions for Treatment  
  
   
                                                    How to access health informa  
tion   
online  
Indication:Hypertension,   
essential, benign  
                          Start:2016         Instruction Type:Patient   
Education  
  
   
                                                    How to access health informa  
tion   
online - Detail  
Indication:Hypertension,   
essential, benign  
                          Start:2016         Instruction Type:Patient   
Education  
  
   
                                                    Patient Instructions  
Indication:Hypertension,   
essential, benign  
                          Start:2016         Instruction Type:Provider   
Instructions for Treatment  
  
   
                                                    How to access health informa  
tion   
online  
Indication:BMI 27.0-27.9,adult  
                          Start:2016         Instruction Type:Patient   
Education  
  
   
                                                    How to access health informa  
tion   
online - Detail  
Indication:BMI 27.0-27.9,adult  
                          Start:2016         Instruction Type:Patient   
Education  
  
   
                                                    Patient Instructions  
Indication:BMI 27.0-27.9,adult  
                          Start:2016         Instruction Type:Provider   
Instructions for Treatment  
  
   
                                                    How to access health informa  
tion   
online  
Indication:Abnormal glucose   
tolerance test  
                          Start:2016         Instruction Type:Patient   
Education  
  
   
                                                    How to access health informa  
tion   
online - Detail  
Indication:Abnormal glucose   
tolerance test  
                          Start:2016         Instruction Type:Patient   
Education  
  
   
                                                    Patient Instructions  
Indication:Abnormal glucose   
tolerance test  
                          Start:2016         Instruction Type:Provider   
Instructions for Treatment  
  
   
                                                    How to access health informa  
tion   
online  
Indication:Abnormal glucose   
tolerance test  
                          Start:16-Dec-2015         Instruction Type:Patient   
Education  
  
   
                                                    How to access health informa  
tion   
online - Detail  
Indication:Abnormal glucose   
tolerance test  
                          Start:16-Dec-2015         Instruction Type:Patient   
Education  
  
   
                                                    Patient Instructions  
Indication:Abnormal glucose   
tolerance test  
                          Start:16-Dec-2015         Instruction Type:Provider   
Instructions for Treatment  
  
   
                                                    How to access health informa  
tion   
online  
Indication:Hypercholesteremia  
                          Start:16-Sep-2015         Instruction Type:Patient   
Education  
  
   
                                                    How to access health informa  
tion   
online - Detail  
Indication:Hypercholesteremia  
                          Start:16-Sep-2015         Instruction Type:Patient   
Education  
  
   
                                                    Patient Instructions  
Indication:Hypercholesteremia  
                          Start:16-Sep-2015         Instruction Type:Provider   
Instructions for Treatment  
  
   
                                                    Patient Instructions  
Indication:Abnormal glucose   
tolerance test  
                          Start:9-May-2014          Instruction Type:Provider   
Instructions for Treatment  
  
   
                                                    Patient Instructions  
Indication:Abnormal glucose   
tolerance test  
                          Start:2014          Instruction Type:Provider   
Instructions for Treatment  
  
   
                                                    Patient Instructions  
Indication:Abnormal glucose   
tolerance test  
                          Start:12-Sep-2013         Instruction Type:Provider   
Instructions for Treatment  
  
   
                                                    Patient Instructions  
Indication:Hypercholesteremia  
                          Start:15-Mar-2013         Instruction Type:Provider   
Instructions for Treatment  
  
   
                                                    Patient Instructions  
Indication:Hypertension,   
essential, benign  
                          Start:14-Sep-2012         Instruction Type:Provider   
Instructions for Treatment  
  
  
  
  
                                Name            Dates           Details  
   
                                                    How to Access Health Informa  
tion   
Online using Patient Portal and   
[employer]  
Indication:Smoker  
                          Start:16-Dec-2020         Instruction Type:Patient   
Education  
  
   
                                                    Patient Instructions  
Indication:Smoker  
                          Start:16-Dec-2020         Instruction Type:Provider   
Instructions for Treatment  
  
   
                                                    How to access health informa  
tion   
online  
Indication:Smoker  
                          Start:2020         Instruction Type:Patient   
Education  
  
   
                                                    How to access health informa  
tion   
online - Detail  
Indication:Smoker  
                          Start:2020         Instruction Type:Patient   
Education  
  
   
                                                    Patient Instructions  
Indication:Smoker  
                          Start:2020         Instruction Type:Provider   
Instructions for Treatment  
  
   
                                                    How to access health informa  
tion   
online  
Indication:Smoker  
                          Start:24-Aug-2020         Instruction Type:Patient   
Education  
  
   
                                                    How to access health informa  
tion   
online - Detail  
Indication:Smoker  
                          Start:24-Aug-2020         Instruction Type:Patient   
Education  
  
   
                                                    Patient Instructions  
Indication:Smoker  
                          Start:24-Aug-2020         Instruction Type:Provider   
Instructions for Treatment  
  
   
                                                    How to access health informa  
tion   
online - Detail  
Indication:BMI 25.0-25.9,adult  
                          Start:2020         Instruction Type:Patient   
Education  
  
   
                                                    How to access health informa  
tion   
online  
Indication:BMI 25.0-25.9,adult  
                          Start:2020         Instruction Type:Patient   
Education  
  
   
                                                    Patient Instructions  
Indication:BMI 25.0-25.9,adult  
                          Start:2020         Instruction Type:Provider   
Instructions for Treatment  
  
   
                                                    How to access health informa  
tion   
online  
Indication:Smoker  
                          Start:2020          Instruction Type:Patient   
Education  
  
   
                                                    How to access health informa  
tion   
online - Detail  
Indication:Smoker  
                          Start:2020          Instruction Type:Patient   
Education  
  
   
                                                    Patient Instructions  
Indication:Smoker  
                          Start:2020          Instruction Type:Provider   
Instructions for Treatment  
  
   
                                                    How to access health informa  
tion   
online  
Indication:Smoker  
                          Start:2020         Instruction Type:Patient   
Education  
  
   
                                                    How to access health informa  
tion   
online - Detail  
Indication:Smoker  
                          Start:2020         Instruction Type:Patient   
Education  
  
   
                                                    Patient Instructions  
Indication:Smoker  
                          Start:2020         Instruction Type:Provider   
Instructions for Treatment  
  
   
                                                    How to access health informa  
tion   
online  
Indication:Smoker  
                          Start:13-Sep-2019         Instruction Type:Patient   
Education  
  
   
                                                    How to access health informa  
tion   
online - Detail  
Indication:Smoker  
                          Start:13-Sep-2019         Instruction Type:Patient   
Education  
  
   
                                                    Patient Instructions  
Indication:Smoker  
                          Start:13-Sep-2019         Instruction Type:Provider   
Instructions for Treatment  
  
   
                                                    How to access health informa  
tion   
online  
Indication:BMI 26.0-26.9,adult  
                          Start:10-May-2019         Instruction Type:Patient   
Education  
  
   
                                                    How to access health informa  
tion   
online - Detail  
Indication:BMI 26.0-26.9,adult  
                          Start:10-May-2019         Instruction Type:Patient   
Education  
  
   
                                                    Patient Instructions  
Indication:BMI 26.0-26.9,adult  
                          Start:10-May-2019         Instruction Type:Provider   
Instructions for Treatment  
  
   
                                                    How to access health informa  
tion   
online  
Indication:Abnormal glucose   
tolerance test  
                          Start:28-Dec-2018         Instruction Type:Patient   
Education  
  
   
                                                    How to access health informa  
tion   
online - Detail  
Indication:Abnormal glucose   
tolerance test  
                          Start:28-Dec-2018         Instruction Type:Patient   
Education  
  
   
                                                    Patient Instructions  
Indication:Abnormal glucose   
tolerance test  
                          Start:28-Dec-2018         Instruction Type:Provider   
Instructions for Treatment  
  
   
                                                    How to access health informa  
tion   
online  
Indication:Smoker  
                          Start:29-Aug-2018         Instruction Type:Patient   
Education  
  
   
                                                    How to access health informa  
tion   
online - Detail  
Indication:Smoker  
                          Start:29-Aug-2018         Instruction Type:Patient   
Education  
  
   
                                                    Patient Instructions  
Indication:Smoker  
                          Start:29-Aug-2018         Instruction Type:Provider   
Instructions for Treatment  
  
   
                                                    How to access health informa  
tion   
online  
Indication:Abnormal glucose   
tolerance test  
                          Start:2018         Instruction Type:Patient   
Education  
  
   
                                                    How to access health informa  
tion   
online - Detail  
Indication:Abnormal glucose   
tolerance test  
                          Start:2018         Instruction Type:Patient   
Education  
  
   
                                                    Patient Instructions  
Indication:Abnormal glucose   
tolerance test  
                          Start:2018         Instruction Type:Provider   
Instructions for Treatment  
  
   
                                                    How to access health informa  
tion   
online  
Indication:Smoker  
                          Start:8-Dec-2017          Instruction Type:Patient   
Education  
  
   
                                                    How to access health informa  
tion   
online - Detail  
Indication:Smoker  
                          Start:8-Dec-2017          Instruction Type:Patient   
Education  
  
   
                                                    Patient Instructions  
Indication:Smoker  
                          Start:8-Dec-2017          Instruction Type:Provider   
Instructions for Treatment  
  
   
                                                    How to access health informa  
tion   
online  
Indication:Smoker  
                          Start:4-Aug-2017          Instruction Type:Patient   
Education  
  
   
                                                    How to access health informa  
tion   
online - Detail  
Indication:Smoker  
                          Start:4-Aug-2017          Instruction Type:Patient   
Education  
  
   
                                                    Patient Instructions  
Indication:Smoker  
                          Start:4-Aug-2017          Instruction Type:Provider   
Instructions for Treatment  
  
   
                                                    How to access health informa  
tion   
online  
Indication:Smoker  
                          Start:3-Mar-2017          Instruction Type:Patient   
Education  
  
   
                                                    How to access health informa  
tion   
online - Detail  
Indication:Smoker  
                          Start:3-Mar-2017          Instruction Type:Patient   
Education  
  
   
                                                    Patient Instructions  
Indication:Smoker  
                          Start:3-Mar-2017          Instruction Type:Provider   
Instructions for Treatment  
  
   
                                                    How to access health informa  
tion   
online  
Indication:Hypertension,   
essential, benign  
                          Start:28-Oct-2016         Instruction Type:Patient   
Education  
  
   
                                                    How to access health informa  
tion   
online - Detail  
Indication:Hypertension,   
essential, benign  
                          Start:28-Oct-2016         Instruction Type:Patient   
Education  
  
   
                                                    Patient Instructions  
Indication:Hypertension,   
essential, benign  
                          Start:28-Oct-2016         Instruction Type:Provider   
Instructions for Treatment  
  
   
                                                    How to access health informa  
tion   
online  
Indication:Hypertension,   
essential, benign  
                          Start:2016         Instruction Type:Patient   
Education  
  
   
                                                    How to access health informa  
tion   
online - Detail  
Indication:Hypertension,   
essential, benign  
                          Start:2016         Instruction Type:Patient   
Education  
  
   
                                                    Patient Instructions  
Indication:Hypertension,   
essential, benign  
                          Start:2016         Instruction Type:Provider   
Instructions for Treatment  
  
   
                                                    How to access health informa  
tion   
online  
Indication:BMI 27.0-27.9,adult  
                          Start:2016         Instruction Type:Patient   
Education  
  
   
                                                    How to access health informa  
tion   
online - Detail  
Indication:BMI 27.0-27.9,adult  
                          Start:2016         Instruction Type:Patient   
Education  
  
   
                                                    Patient Instructions  
Indication:BMI 27.0-27.9,adult  
                          Start:2016         Instruction Type:Provider   
Instructions for Treatment  
  
   
                                                    How to access health informa  
tion   
online  
Indication:Abnormal glucose   
tolerance test  
                          Start:2016         Instruction Type:Patient   
Education  
  
   
                                                    How to access health informa  
tion   
online - Detail  
Indication:Abnormal glucose   
tolerance test  
                          Start:2016         Instruction Type:Patient   
Education  
  
   
                                                    Patient Instructions  
Indication:Abnormal glucose   
tolerance test  
                          Start:2016         Instruction Type:Provider   
Instructions for Treatment  
  
   
                                                    How to access health informa  
tion   
online  
Indication:Abnormal glucose   
tolerance test  
                          Start:16-Dec-2015         Instruction Type:Patient   
Education  
  
   
                                                    How to access health informa  
tion   
online - Detail  
Indication:Abnormal glucose   
tolerance test  
                          Start:16-Dec-2015         Instruction Type:Patient   
Education  
  
   
                                                    Patient Instructions  
Indication:Abnormal glucose   
tolerance test  
                          Start:16-Dec-2015         Instruction Type:Provider   
Instructions for Treatment  
  
   
                                                    How to access health informa  
tion   
online  
Indication:Hypercholesteremia  
                          Start:16-Sep-2015         Instruction Type:Patient   
Education  
  
   
                                                    How to access health informa  
tion   
online - Detail  
Indication:Hypercholesteremia  
                          Start:16-Sep-2015         Instruction Type:Patient   
Education  
  
   
                                                    Patient Instructions  
Indication:Hypercholesteremia  
                          Start:16-Sep-2015         Instruction Type:Provider   
Instructions for Treatment  
  
   
                                                    Patient Instructions  
Indication:Abnormal glucose   
tolerance test  
                          Start:9-May-2014          Instruction Type:Provider   
Instructions for Treatment  
  
   
                                                    Patient Instructions  
Indication:Abnormal glucose   
tolerance test  
                          Start:2014          Instruction Type:Provider   
Instructions for Treatment  
  
   
                                                    Patient Instructions  
Indication:Abnormal glucose   
tolerance test  
                          Start:12-Sep-2013         Instruction Type:Provider   
Instructions for Treatment  
  
   
                                                    Patient Instructions  
Indication:Hypercholesteremia  
                          Start:15-Mar-2013         Instruction Type:Provider   
Instructions for Treatment  
  
   
                                                    Patient Instructions  
Indication:Hypertension,   
essential, benign  
                          Start:14-Sep-2012         Instruction Type:Provider   
Instructions for Treatment  
  
  
  
  
                                Name            Dates           Details  
   
                                                    How to Access Health Informa  
tion   
Online using Patient Portal and   
3rd Party Apps  
Indication:Smoker  
                          Start:2021          Instruction Type:Patient   
Education  
  
   
                                                    Patient Instructions  
Indication:Smoker  
                          Start:2021          Instruction Type:Provider   
Instructions for Treatment  
  
   
                                                    How to Access Health Informa  
tion   
Online using Patient Portal and   
3rd Party Apps  
Indication:Smoker  
                          Start:16-Dec-2020         Instruction Type:Patient   
Education  
  
   
                                                    Patient Instructions  
Indication:Smoker  
                          Start:16-Dec-2020         Instruction Type:Provider   
Instructions for Treatment  
  
   
                                                    How to access health informa  
tion   
online  
Indication:Smoker  
                          Start:2020         Instruction Type:Patient   
Education  
  
   
                                                    How to access health informa  
tion   
online - Detail  
Indication:Smoker  
                          Start:2020         Instruction Type:Patient   
Education  
  
   
                                                    Patient Instructions  
Indication:Smoker  
                          Start:2020         Instruction Type:Provider   
Instructions for Treatment  
  
   
                                                    How to access health informa  
tion   
online  
Indication:Smoker  
                          Start:24-Aug-2020         Instruction Type:Patient   
Education  
  
   
                                                    How to access health informa  
tion   
online - Detail  
Indication:Smoker  
                          Start:24-Aug-2020         Instruction Type:Patient   
Education  
  
   
                                                    Patient Instructions  
Indication:Smoker  
                          Start:24-Aug-2020         Instruction Type:Provider   
Instructions for Treatment  
  
   
                                                    How to access health informa  
tion   
online - Detail  
Indication:BMI 25.0-25.9,adult  
                          Start:2020         Instruction Type:Patient   
Education  
  
   
                                                    How to access health informa  
tion   
online  
Indication:BMI 25.0-25.9,adult  
                          Start:2020         Instruction Type:Patient   
Education  
  
   
                                                    Patient Instructions  
Indication:BMI 25.0-25.9,adult  
                          Start:2020         Instruction Type:Provider   
Instructions for Treatment  
  
   
                                                    How to access health informa  
tion   
online  
Indication:Smoker  
                          Start:2020          Instruction Type:Patient   
Education  
  
   
                                                    How to access health informa  
tion   
online - Detail  
Indication:Smoker  
                          Start:2020          Instruction Type:Patient   
Education  
  
   
                                                    Patient Instructions  
Indication:Smoker  
                          Start:2020          Instruction Type:Provider   
Instructions for Treatment  
  
   
                                                    How to access health informa  
tion   
online  
Indication:Smoker  
                          Start:2020         Instruction Type:Patient   
Education  
  
   
                                                    How to access health informa  
tion   
online - Detail  
Indication:Smoker  
                          Start:2020         Instruction Type:Patient   
Education  
  
   
                                                    Patient Instructions  
Indication:Smoker  
                          Start:2020         Instruction Type:Provider   
Instructions for Treatment  
  
   
                                                    How to access health informa  
tion   
online  
Indication:Smoker  
                          Start:13-Sep-2019         Instruction Type:Patient   
Education  
  
   
                                                    How to access health informa  
tion   
online - Detail  
Indication:Smoker  
                          Start:13-Sep-2019         Instruction Type:Patient   
Education  
  
   
                                                    Patient Instructions  
Indication:Smoker  
                          Start:13-Sep-2019         Instruction Type:Provider   
Instructions for Treatment  
  
   
                                                    How to access health informa  
tion   
online  
Indication:BMI 26.0-26.9,adult  
                          Start:10-May-2019         Instruction Type:Patient   
Education  
  
   
                                                    How to access health informa  
tion   
online - Detail  
Indication:BMI 26.0-26.9,adult  
                          Start:10-May-2019         Instruction Type:Patient   
Education  
  
   
                                                    Patient Instructions  
Indication:BMI 26.0-26.9,adult  
                          Start:10-May-2019         Instruction Type:Provider   
Instructions for Treatment  
  
   
                                                    How to access health informa  
tion   
online  
Indication:Abnormal glucose   
tolerance test  
                          Start:28-Dec-2018         Instruction Type:Patient   
Education  
  
   
                                                    How to access health informa  
tion   
online - Detail  
Indication:Abnormal glucose   
tolerance test  
                          Start:28-Dec-2018         Instruction Type:Patient   
Education  
  
   
                                                    Patient Instructions  
Indication:Abnormal glucose   
tolerance test  
                          Start:28-Dec-2018         Instruction Type:Provider   
Instructions for Treatment  
  
   
                                                    How to access health informa  
tion   
online  
Indication:Smoker  
                          Start:29-Aug-2018         Instruction Type:Patient   
Education  
  
   
                                                    How to access health informa  
tion   
online - Detail  
Indication:Smoker  
                          Start:29-Aug-2018         Instruction Type:Patient   
Education  
  
   
                                                    Patient Instructions  
Indication:Smoker  
                          Start:29-Aug-2018         Instruction Type:Provider   
Instructions for Treatment  
  
   
                                                    How to access health informa  
tion   
online  
Indication:Abnormal glucose   
tolerance test  
                          Start:2018         Instruction Type:Patient   
Education  
  
   
                                                    How to access health informa  
tion   
online - Detail  
Indication:Abnormal glucose   
tolerance test  
                          Start:2018         Instruction Type:Patient   
Education  
  
   
                                                    Patient Instructions  
Indication:Abnormal glucose   
tolerance test  
                          Start:2018         Instruction Type:Provider   
Instructions for Treatment  
  
   
                                                    How to access health informa  
tion   
online  
Indication:Smoker  
                          Start:8-Dec-2017          Instruction Type:Patient   
Education  
  
   
                                                    How to access health informa  
tion   
online - Detail  
Indication:Smoker  
                          Start:8-Dec-2017          Instruction Type:Patient   
Education  
  
   
                                                    Patient Instructions  
Indication:Smoker  
                          Start:8-Dec-2017          Instruction Type:Provider   
Instructions for Treatment  
  
   
                                                    How to access health informa  
tion   
online  
Indication:Smoker  
                          Start:4-Aug-2017          Instruction Type:Patient   
Education  
  
   
                                                    How to access health informa  
tion   
online - Detail  
Indication:Smoker  
                          Start:4-Aug-2017          Instruction Type:Patient   
Education  
  
   
                                                    Patient Instructions  
Indication:Smoker  
                          Start:4-Aug-2017          Instruction Type:Provider   
Instructions for Treatment  
  
   
                                                    How to access health informa  
tion   
online  
Indication:Smoker  
                          Start:3-Mar-2017          Instruction Type:Patient   
Education  
  
   
                                                    How to access health informa  
tion   
online - Detail  
Indication:Smoker  
                          Start:3-Mar-2017          Instruction Type:Patient   
Education  
  
   
                                                    Patient Instructions  
Indication:Smoker  
                          Start:3-Mar-2017          Instruction Type:Provider   
Instructions for Treatment  
  
   
                                                    How to access health informa  
tion   
online  
Indication:Hypertension,   
essential, benign  
                          Start:28-Oct-2016         Instruction Type:Patient   
Education  
  
   
                                                    How to access health informa  
tion   
online - Detail  
Indication:Hypertension,   
essential, benign  
                          Start:28-Oct-2016         Instruction Type:Patient   
Education  
  
   
                                                    Patient Instructions  
Indication:Hypertension,   
essential, benign  
                          Start:28-Oct-2016         Instruction Type:Provider   
Instructions for Treatment  
  
   
                                                    How to access health informa  
tion   
online  
Indication:Hypertension,   
essential, benign  
                          Start:2016         Instruction Type:Patient   
Education  
  
   
                                                    How to access health informa  
tion   
online - Detail  
Indication:Hypertension,   
essential, benign  
                          Start:2016         Instruction Type:Patient   
Education  
  
   
                                                    Patient Instructions  
Indication:Hypertension,   
essential, benign  
                          Start:2016         Instruction Type:Provider   
Instructions for Treatment  
  
   
                                                    How to access health informa  
tion   
online  
Indication:BMI 27.0-27.9,adult  
                          Start:2016         Instruction Type:Patient   
Education  
  
   
                                                    How to access health informa  
tion   
online - Detail  
Indication:BMI 27.0-27.9,adult  
                          Start:2016         Instruction Type:Patient   
Education  
  
   
                                                    Patient Instructions  
Indication:BMI 27.0-27.9,adult  
                          Start:2016         Instruction Type:Provider   
Instructions for Treatment  
  
   
                                                    How to access health informa  
tion   
online  
Indication:Abnormal glucose   
tolerance test  
                          Start:2016         Instruction Type:Patient   
Education  
  
   
                                                    How to access health informa  
tion   
online - Detail  
Indication:Abnormal glucose   
tolerance test  
                          Start:2016         Instruction Type:Patient   
Education  
  
   
                                                    Patient Instructions  
Indication:Abnormal glucose   
tolerance test  
                          Start:2016         Instruction Type:Provider   
Instructions for Treatment  
  
   
                                                    How to access health informa  
tion   
online  
Indication:Abnormal glucose   
tolerance test  
                          Start:16-Dec-2015         Instruction Type:Patient   
Education  
  
   
                                                    How to access health informa  
tion   
online - Detail  
Indication:Abnormal glucose   
tolerance test  
                          Start:16-Dec-2015         Instruction Type:Patient   
Education  
  
   
                                                    Patient Instructions  
Indication:Abnormal glucose   
tolerance test  
                          Start:16-Dec-2015         Instruction Type:Provider   
Instructions for Treatment  
  
   
                                                    How to access health informa  
tion   
online  
Indication:Hypercholesteremia  
                          Start:16-Sep-2015         Instruction Type:Patient   
Education  
  
   
                                                    How to access health informa  
tion   
online - Detail  
Indication:Hypercholesteremia  
                          Start:16-Sep-2015         Instruction Type:Patient   
Education  
  
   
                                                    Patient Instructions  
Indication:Hypercholesteremia  
                          Start:16-Sep-2015         Instruction Type:Provider   
Instructions for Treatment  
  
   
                                                    Patient Instructions  
Indication:Abnormal glucose   
tolerance test  
                          Start:9-May-2014          Instruction Type:Provider   
Instructions for Treatment  
  
   
                                                    Patient Instructions  
Indication:Abnormal glucose   
tolerance test  
                          Start:2014          Instruction Type:Provider   
Instructions for Treatment  
  
   
                                                    Patient Instructions  
Indication:Abnormal glucose   
tolerance test  
                          Start:12-Sep-2013         Instruction Type:Provider   
Instructions for Treatment  
  
   
                                                    Patient Instructions  
Indication:Hypercholesteremia  
                          Start:15-Mar-2013         Instruction Type:Provider   
Instructions for Treatment  
  
   
                                                    Patient Instructions  
Indication:Hypertension,   
essential, benign  
                          Start:14-Sep-2012         Instruction Type:Provider   
Instructions for Treatment  
  
  
  
  
                                Name            Dates           Details  
   
                                                    How to access health informa  
tion   
online  
Indication:Smoker  
                          Start:13-Sep-2019         Instruction Type:Patient   
Education  
  
   
                                                    How to access health informa  
tion   
online - Detail  
Indication:Smoker  
                          Start:13-Sep-2019         Instruction Type:Patient   
Education  
  
   
                                                    Patient Instructions  
Indication:Smoker  
                          Start:13-Sep-2019         Instruction Type:Provider   
Instructions for Treatment  
  
   
                                                    How to access health informa  
tion   
online  
Indication:BMI 26.0-26.9,adult  
                          Start:10-May-2019         Instruction Type:Patient   
Education  
  
   
                                                    How to access health informa  
tion   
online - Detail  
Indication:BMI 26.0-26.9,adult  
                          Start:10-May-2019         Instruction Type:Patient   
Education  
  
   
                                                    Patient Instructions  
Indication:BMI 26.0-26.9,adult  
                          Start:10-May-2019         Instruction Type:Provider   
Instructions for Treatment  
  
   
                                                    How to access health informa  
tion   
online  
Indication:Abnormal glucose   
tolerance test  
                          Start:28-Dec-2018         Instruction Type:Patient   
Education  
  
   
                                                    How to access health informa  
tion   
online - Detail  
Indication:Abnormal glucose   
tolerance test  
                          Start:28-Dec-2018         Instruction Type:Patient   
Education  
  
   
                                                    Patient Instructions  
Indication:Abnormal glucose   
tolerance test  
                          Start:28-Dec-2018         Instruction Type:Provider   
Instructions for Treatment  
  
   
                                                    How to access health informa  
tion   
online  
Indication:Smoker  
                          Start:29-Aug-2018         Instruction Type:Patient   
Education  
  
   
                                                    How to access health informa  
tion   
online - Detail  
Indication:Smoker  
                          Start:29-Aug-2018         Instruction Type:Patient   
Education  
  
   
                                                    Patient Instructions  
Indication:Smoker  
                          Start:29-Aug-2018         Instruction Type:Provider   
Instructions for Treatment  
  
   
                                                    How to access health informa  
tion   
online  
Indication:Abnormal glucose   
tolerance test  
                          Start:2018         Instruction Type:Patient   
Education  
  
   
                                                    How to access health informa  
tion   
online - Detail  
Indication:Abnormal glucose   
tolerance test  
                          Start:2018         Instruction Type:Patient   
Education  
  
   
                                                    Patient Instructions  
Indication:Abnormal glucose   
tolerance test  
                          Start:2018         Instruction Type:Provider   
Instructions for Treatment  
  
   
                                                    How to access health informa  
tion   
online  
Indication:Smoker  
                          Start:8-Dec-2017          Instruction Type:Patient   
Education  
  
   
                                                    How to access health informa  
tion   
online - Detail  
Indication:Smoker  
                          Start:8-Dec-2017          Instruction Type:Patient   
Education  
  
   
                                                    Patient Instructions  
Indication:Smoker  
                          Start:8-Dec-2017          Instruction Type:Provider   
Instructions for Treatment  
  
   
                                                    How to access health informa  
tion   
online  
Indication:Smoker  
                          Start:4-Aug-2017          Instruction Type:Patient   
Education  
  
   
                                                    How to access health informa  
tion   
online - Detail  
Indication:Smoker  
                          Start:4-Aug-2017          Instruction Type:Patient   
Education  
  
   
                                                    Patient Instructions  
Indication:Smoker  
                          Start:4-Aug-2017          Instruction Type:Provider   
Instructions for Treatment  
  
   
                                                    How to access health informa  
tion   
online  
Indication:Smoker  
                          Start:3-Mar-2017          Instruction Type:Patient   
Education  
  
   
                                                    How to access health informa  
tion   
online - Detail  
Indication:Smoker  
                          Start:3-Mar-2017          Instruction Type:Patient   
Education  
  
   
                                                    Patient Instructions  
Indication:Smoker  
                          Start:3-Mar-2017          Instruction Type:Provider   
Instructions for Treatment  
  
   
                                                    How to access health informa  
tion   
online  
Indication:Hypertension,   
essential, benign  
                          Start:28-Oct-2016         Instruction Type:Patient   
Education  
  
   
                                                    How to access health informa  
tion   
online - Detail  
Indication:Hypertension,   
essential, benign  
                          Start:28-Oct-2016         Instruction Type:Patient   
Education  
  
   
                                                    Patient Instructions  
Indication:Hypertension,   
essential, benign  
                          Start:28-Oct-2016         Instruction Type:Provider   
Instructions for Treatment  
  
   
                                                    How to access health informa  
tion   
online  
Indication:Hypertension,   
essential, benign  
                          Start:2016         Instruction Type:Patient   
Education  
  
   
                                                    How to access health informa  
tion   
online - Detail  
Indication:Hypertension,   
essential, benign  
                          Start:2016         Instruction Type:Patient   
Education  
  
   
                                                    Patient Instructions  
Indication:Hypertension,   
essential, benign  
                          Start:2016         Instruction Type:Provider   
Instructions for Treatment  
  
   
                                                    How to access health informa  
tion   
online  
Indication:BMI 27.0-27.9,adult  
                          Start:2016         Instruction Type:Patient   
Education  
  
   
                                                    How to access health informa  
tion   
online - Detail  
Indication:BMI 27.0-27.9,adult  
                          Start:2016         Instruction Type:Patient   
Education  
  
   
                                                    Patient Instructions  
Indication:BMI 27.0-27.9,adult  
                          Start:2016         Instruction Type:Provider   
Instructions for Treatment  
  
   
                                                    How to access health informa  
tion   
online  
Indication:Abnormal glucose   
tolerance test  
                          Start:2016         Instruction Type:Patient   
Education  
  
   
                                                    How to access health informa  
tion   
online - Detail  
Indication:Abnormal glucose   
tolerance test  
                          Start:2016         Instruction Type:Patient   
Education  
  
   
                                                    Patient Instructions  
Indication:Abnormal glucose   
tolerance test  
                          Start:2016         Instruction Type:Provider   
Instructions for Treatment  
  
   
                                                    How to access health informa  
tion   
online  
Indication:Abnormal glucose   
tolerance test  
                          Start:16-Dec-2015         Instruction Type:Patient   
Education  
  
   
                                                    How to access health informa  
tion   
online - Detail  
Indication:Abnormal glucose   
tolerance test  
                          Start:16-Dec-2015         Instruction Type:Patient   
Education  
  
   
                                                    Patient Instructions  
Indication:Abnormal glucose   
tolerance test  
                          Start:16-Dec-2015         Instruction Type:Provider   
Instructions for Treatment  
  
   
                                                    How to access health informa  
tion   
online  
Indication:Hypercholesteremia  
                          Start:16-Sep-2015         Instruction Type:Patient   
Education  
  
   
                                                    How to access health informa  
tion   
online - Detail  
Indication:Hypercholesteremia  
                          Start:16-Sep-2015         Instruction Type:Patient   
Education  
  
   
                                                    Patient Instructions  
Indication:Hypercholesteremia  
                          Start:16-Sep-2015         Instruction Type:Provider   
Instructions for Treatment  
  
   
                                                    Patient Instructions  
Indication:Abnormal glucose   
tolerance test  
                          Start:9-May-2014          Instruction Type:Provider   
Instructions for Treatment  
  
   
                                                    Patient Instructions  
Indication:Abnormal glucose   
tolerance test  
                          Start:2014          Instruction Type:Provider   
Instructions for Treatment  
  
   
                                                    Patient Instructions  
Indication:Abnormal glucose   
tolerance test  
                          Start:12-Sep-2013         Instruction Type:Provider   
Instructions for Treatment  
  
   
                                                    Patient Instructions  
Indication:Hypercholesteremia  
                          Start:15-Mar-2013         Instruction Type:Provider   
Instructions for Treatment  
  
   
                                                    Patient Instructions  
Indication:Hypertension,   
essential, benign  
                          Start:14-Sep-2012         Instruction Type:Provider   
Instructions for Treatment  
  
  
  
  
                                Name            Dates           Details  
   
                                                    How to Access Health Informa  
tion   
Online using Patient Portal and   
[employer]  
Indication:Smoker  
                          Start:2021         Instruction Type:Patient   
Education  
  
   
                                                    Patient Instructions  
Indication:Smoker  
                          Start:2021         Instruction Type:Provider   
Instructions for Treatment  
  
   
                                                    How to Access Health Informa  
tion   
Online using Patient Portal and   
[employer]  
Indication:Smoker  
                          Start:2021          Instruction Type:Patient   
Education  
  
   
                                                    Patient Instructions  
Indication:Smoker  
                          Start:2021          Instruction Type:Provider   
Instructions for Treatment  
  
   
                                                    How to Access Health Informa  
tion   
Online using Patient Portal and   
[employer] Apps  
Indication:Smoker  
                          Start:16-Dec-2020         Instruction Type:Patient   
Education  
  
   
                                                    Patient Instructions  
Indication:Smoker  
                          Start:16-Dec-2020         Instruction Type:Provider   
Instructions for Treatment  
  
   
                                                    How to access health informa  
tion   
online  
Indication:Smoker  
                          Start:2020         Instruction Type:Patient   
Education  
  
   
                                                    How to access health informa  
tion   
online - Detail  
Indication:Smoker  
                          Start:2020         Instruction Type:Patient   
Education  
  
   
                                                    Patient Instructions  
Indication:Smoker  
                          Start:2020         Instruction Type:Provider   
Instructions for Treatment  
  
   
                                                    How to access health informa  
tion   
online  
Indication:Smoker  
                          Start:24-Aug-2020         Instruction Type:Patient   
Education  
  
   
                                                    How to access health informa  
tion   
online - Detail  
Indication:Smoker  
                          Start:24-Aug-2020         Instruction Type:Patient   
Education  
  
   
                                                    Patient Instructions  
Indication:Smoker  
                          Start:24-Aug-2020         Instruction Type:Provider   
Instructions for Treatment  
  
   
                                                    How to access health informa  
tion   
online - Detail  
Indication:BMI 25.0-25.9,adult  
                          Start:2020         Instruction Type:Patient   
Education  
  
   
                                                    How to access health informa  
tion   
online  
Indication:BMI 25.0-25.9,adult  
                          Start:2020         Instruction Type:Patient   
Education  
  
   
                                                    Patient Instructions  
Indication:BMI 25.0-25.9,adult  
                          Start:2020         Instruction Type:Provider   
Instructions for Treatment  
  
   
                                                    How to access health informa  
tion   
online  
Indication:Smoker  
                          Start:2020          Instruction Type:Patient   
Education  
  
   
                                                    How to access health informa  
tion   
online - Detail  
Indication:Smoker  
                          Start:2020          Instruction Type:Patient   
Education  
  
   
                                                    Patient Instructions  
Indication:Smoker  
                          Start:2020          Instruction Type:Provider   
Instructions for Treatment  
  
   
                                                    How to access health informa  
tion   
online  
Indication:Smoker  
                          Start:2020         Instruction Type:Patient   
Education  
  
   
                                                    How to access health informa  
tion   
online - Detail  
Indication:Smoker  
                          Start:2020         Instruction Type:Patient   
Education  
  
   
                                                    Patient Instructions  
Indication:Smoker  
                          Start:2020         Instruction Type:Provider   
Instructions for Treatment  
  
   
                                                    How to access health informa  
tion   
online  
Indication:Smoker  
                          Start:13-Sep-2019         Instruction Type:Patient   
Education  
  
   
                                                    How to access health informa  
tion   
online - Detail  
Indication:Smoker  
                          Start:13-Sep-2019         Instruction Type:Patient   
Education  
  
   
                                                    Patient Instructions  
Indication:Smoker  
                          Start:13-Sep-2019         Instruction Type:Provider   
Instructions for Treatment  
  
   
                                                    How to access health informa  
tion   
online  
Indication:BMI 26.0-26.9,adult  
                          Start:10-May-2019         Instruction Type:Patient   
Education  
  
   
                                                    How to access health informa  
tion   
online - Detail  
Indication:BMI 26.0-26.9,adult  
                          Start:10-May-2019         Instruction Type:Patient   
Education  
  
   
                                                    Patient Instructions  
Indication:BMI 26.0-26.9,adult  
                          Start:10-May-2019         Instruction Type:Provider   
Instructions for Treatment  
  
   
                                                    How to access health informa  
tion   
online  
Indication:Abnormal glucose   
tolerance test  
                          Start:28-Dec-2018         Instruction Type:Patient   
Education  
  
   
                                                    How to access health informa  
tion   
online - Detail  
Indication:Abnormal glucose   
tolerance test  
                          Start:28-Dec-2018         Instruction Type:Patient   
Education  
  
   
                                                    Patient Instructions  
Indication:Abnormal glucose   
tolerance test  
                          Start:28-Dec-2018         Instruction Type:Provider   
Instructions for Treatment  
  
   
                                                    How to access health informa  
tion   
online  
Indication:Smoker  
                          Start:29-Aug-2018         Instruction Type:Patient   
Education  
  
   
                                                    How to access health informa  
tion   
online - Detail  
Indication:Smoker  
                          Start:29-Aug-2018         Instruction Type:Patient   
Education  
  
   
                                                    Patient Instructions  
Indication:Smoker  
                          Start:29-Aug-2018         Instruction Type:Provider   
Instructions for Treatment  
  
   
                                                    How to access health informa  
tion   
online  
Indication:Abnormal glucose   
tolerance test  
                          Start:2018         Instruction Type:Patient   
Education  
  
   
                                                    How to access health informa  
tion   
online - Detail  
Indication:Abnormal glucose   
tolerance test  
                          Start:2018         Instruction Type:Patient   
Education  
  
   
                                                    Patient Instructions  
Indication:Abnormal glucose   
tolerance test  
                          Start:2018         Instruction Type:Provider   
Instructions for Treatment  
  
   
                                                    How to access health informa  
tion   
online  
Indication:Smoker  
                          Start:8-Dec-2017          Instruction Type:Patient   
Education  
  
   
                                                    How to access health informa  
tion   
online - Detail  
Indication:Smoker  
                          Start:8-Dec-2017          Instruction Type:Patient   
Education  
  
   
                                                    Patient Instructions  
Indication:Smoker  
                          Start:8-Dec-2017          Instruction Type:Provider   
Instructions for Treatment  
  
   
                                                    How to access health informa  
tion   
online  
Indication:Smoker  
                          Start:4-Aug-2017          Instruction Type:Patient   
Education  
  
   
                                                    How to access health informa  
tion   
online - Detail  
Indication:Smoker  
                          Start:4-Aug-2017          Instruction Type:Patient   
Education  
  
   
                                                    Patient Instructions  
Indication:Smoker  
                          Start:4-Aug-2017          Instruction Type:Provider   
Instructions for Treatment  
  
   
                                                    How to access health informa  
tion   
online  
Indication:Smoker  
                          Start:3-Mar-2017          Instruction Type:Patient   
Education  
  
   
                                                    How to access health informa  
tion   
online - Detail  
Indication:Smoker  
                          Start:3-Mar-2017          Instruction Type:Patient   
Education  
  
   
                                                    Patient Instructions  
Indication:Smoker  
                          Start:3-Mar-2017          Instruction Type:Provider   
Instructions for Treatment  
  
   
                                                    How to access health informa  
tion   
online  
Indication:Hypertension,   
essential, benign  
                          Start:28-Oct-2016         Instruction Type:Patient   
Education  
  
   
                                                    How to access health informa  
tion   
online - Detail  
Indication:Hypertension,   
essential, benign  
                          Start:28-Oct-2016         Instruction Type:Patient   
Education  
  
   
                                                    Patient Instructions  
Indication:Hypertension,   
essential, benign  
                          Start:28-Oct-2016         Instruction Type:Provider   
Instructions for Treatment  
  
   
                                                    How to access health informa  
tion   
online  
Indication:Hypertension,   
essential, benign  
                          Start:2016         Instruction Type:Patient   
Education  
  
   
                                                    How to access health informa  
tion   
online - Detail  
Indication:Hypertension,   
essential, benign  
                          Start:2016         Instruction Type:Patient   
Education  
  
   
                                                    Patient Instructions  
Indication:Hypertension,   
essential, benign  
                          Start:2016         Instruction Type:Provider   
Instructions for Treatment  
  
   
                                                    How to access health informa  
tion   
online  
Indication:BMI 27.0-27.9,adult  
                          Start:2016         Instruction Type:Patient   
Education  
  
   
                                                    How to access health informa  
tion   
online - Detail  
Indication:BMI 27.0-27.9,adult  
                          Start:2016         Instruction Type:Patient   
Education  
  
   
                                                    Patient Instructions  
Indication:BMI 27.0-27.9,adult  
                          Start:2016         Instruction Type:Provider   
Instructions for Treatment  
  
   
                                                    How to access health informa  
tion   
online  
Indication:Abnormal glucose   
tolerance test  
                          Start:2016         Instruction Type:Patient   
Education  
  
   
                                                    How to access health informa  
tion   
online - Detail  
Indication:Abnormal glucose   
tolerance test  
                          Start:2016         Instruction Type:Patient   
Education  
  
   
                                                    Patient Instructions  
Indication:Abnormal glucose   
tolerance test  
                          Start:2016         Instruction Type:Provider   
Instructions for Treatment  
  
   
                                                    How to access health informa  
tion   
online  
Indication:Abnormal glucose   
tolerance test  
                          Start:16-Dec-2015         Instruction Type:Patient   
Education  
  
   
                                                    How to access health informa  
tion   
online - Detail  
Indication:Abnormal glucose   
tolerance test  
                          Start:16-Dec-2015         Instruction Type:Patient   
Education  
  
   
                                                    Patient Instructions  
Indication:Abnormal glucose   
tolerance test  
                          Start:16-Dec-2015         Instruction Type:Provider   
Instructions for Treatment  
  
   
                                                    How to access health informa  
tion   
online  
Indication:Hypercholesteremia  
                          Start:16-Sep-2015         Instruction Type:Patient   
Education  
  
   
                                                    How to access health informa  
tion   
online - Detail  
Indication:Hypercholesteremia  
                          Start:16-Sep-2015         Instruction Type:Patient   
Education  
  
   
                                                    Patient Instructions  
Indication:Hypercholesteremia  
                          Start:16-Sep-2015         Instruction Type:Provider   
Instructions for Treatment  
  
   
                                                    Patient Instructions  
Indication:Abnormal glucose   
tolerance test  
                          Start:9-May-2014          Instruction Type:Provider   
Instructions for Treatment  
  
   
                                                    Patient Instructions  
Indication:Abnormal glucose   
tolerance test  
                          Start:2014          Instruction Type:Provider   
Instructions for Treatment  
  
   
                                                    Patient Instructions  
Indication:Abnormal glucose   
tolerance test  
                          Start:12-Sep-2013         Instruction Type:Provider   
Instructions for Treatment  
  
   
                                                    Patient Instructions  
Indication:Hypercholesteremia  
                          Start:15-Mar-2013         Instruction Type:Provider   
Instructions for Treatment  
  
   
                                                    Patient Instructions  
Indication:Hypertension,   
essential, benign  
                          Start:14-Sep-2012         Instruction Type:Provider   
Instructions for Treatment  
  
  
  
  
                                Name            Dates           Details  
   
                                                    How to access health informa  
tion   
online  
Indication:BMI 26.0-26.9,adult  
                          Start:10-May-2019         Instruction Type:Patient   
Education  
  
   
                                                    How to access health informa  
tion   
online - Detail  
Indication:BMI 26.0-26.9,adult  
                          Start:10-May-2019         Instruction Type:Patient   
Education  
  
   
                                                    Patient Instructions  
Indication:BMI 26.0-26.9,adult  
                          Start:10-May-2019         Instruction Type:Provider   
Instructions for Treatment  
  
   
                                                    How to access health informa  
tion   
online  
Indication:Abnormal glucose   
tolerance test  
                          Start:28-Dec-2018         Instruction Type:Patient   
Education  
  
   
                                                    How to access health informa  
tion   
online - Detail  
Indication:Abnormal glucose   
tolerance test  
                          Start:28-Dec-2018         Instruction Type:Patient   
Education  
  
   
                                                    Patient Instructions  
Indication:Abnormal glucose   
tolerance test  
                          Start:28-Dec-2018         Instruction Type:Provider   
Instructions for Treatment  
  
   
                                                    How to access health informa  
tion   
online  
Indication:Smoker  
                          Start:29-Aug-2018         Instruction Type:Patient   
Education  
  
   
                                                    How to access health informa  
tion   
online - Detail  
Indication:Smoker  
                          Start:29-Aug-2018         Instruction Type:Patient   
Education  
  
   
                                                    Patient Instructions  
Indication:Smoker  
                          Start:29-Aug-2018         Instruction Type:Provider   
Instructions for Treatment  
  
   
                                                    How to access health informa  
tion   
online  
Indication:Abnormal glucose   
tolerance test  
                          Start:2018         Instruction Type:Patient   
Education  
  
   
                                                    How to access health informa  
tion   
online - Detail  
Indication:Abnormal glucose   
tolerance test  
                          Start:2018         Instruction Type:Patient   
Education  
  
   
                                                    Patient Instructions  
Indication:Abnormal glucose   
tolerance test  
                          Start:2018         Instruction Type:Provider   
Instructions for Treatment  
  
   
                                                    How to access health informa  
tion   
online  
Indication:Smoker  
                          Start:8-Dec-2017          Instruction Type:Patient   
Education  
  
   
                                                    How to access health informa  
tion   
online - Detail  
Indication:Smoker  
                          Start:8-Dec-2017          Instruction Type:Patient   
Education  
  
   
                                                    Patient Instructions  
Indication:Smoker  
                          Start:8-Dec-2017          Instruction Type:Provider   
Instructions for Treatment  
  
   
                                                    How to access health informa  
tion   
online  
Indication:Smoker  
                          Start:4-Aug-2017          Instruction Type:Patient   
Education  
  
   
                                                    How to access health informa  
tion   
online - Detail  
Indication:Smoker  
                          Start:4-Aug-2017          Instruction Type:Patient   
Education  
  
   
                                                    Patient Instructions  
Indication:Smoker  
                          Start:4-Aug-2017          Instruction Type:Provider   
Instructions for Treatment  
  
   
                                                    How to access health informa  
tion   
online  
Indication:Smoker  
                          Start:3-Mar-2017          Instruction Type:Patient   
Education  
  
   
                                                    How to access health informa  
tion   
online - Detail  
Indication:Smoker  
                          Start:3-Mar-2017          Instruction Type:Patient   
Education  
  
   
                                                    Patient Instructions  
Indication:Smoker  
                          Start:3-Mar-2017          Instruction Type:Provider   
Instructions for Treatment  
  
   
                                                    How to access health informa  
tion   
online  
Indication:Hypertension,   
essential, benign  
                          Start:28-Oct-2016         Instruction Type:Patient   
Education  
  
   
                                                    How to access health informa  
tion   
online - Detail  
Indication:Hypertension,   
essential, benign  
                          Start:28-Oct-2016         Instruction Type:Patient   
Education  
  
   
                                                    Patient Instructions  
Indication:Hypertension,   
essential, benign  
                          Start:28-Oct-2016         Instruction Type:Provider   
Instructions for Treatment  
  
   
                                                    How to access health informa  
tion   
online  
Indication:Hypertension,   
essential, benign  
                          Start:2016         Instruction Type:Patient   
Education  
  
   
                                                    How to access health informa  
tion   
online - Detail  
Indication:Hypertension,   
essential, benign  
                          Start:2016         Instruction Type:Patient   
Education  
  
   
                                                    Patient Instructions  
Indication:Hypertension,   
essential, benign  
                          Start:2016         Instruction Type:Provider   
Instructions for Treatment  
  
   
                                                    How to access health informa  
tion   
online  
Indication:BMI 27.0-27.9,adult  
                          Start:2016         Instruction Type:Patient   
Education  
  
   
                                                    How to access health informa  
tion   
online - Detail  
Indication:BMI 27.0-27.9,adult  
                          Start:2016         Instruction Type:Patient   
Education  
  
   
                                                    Patient Instructions  
Indication:BMI 27.0-27.9,adult  
                          Start:2016         Instruction Type:Provider   
Instructions for Treatment  
  
   
                                                    How to access health informa  
tion   
online  
Indication:Abnormal glucose   
tolerance test  
                          Start:2016         Instruction Type:Patient   
Education  
  
   
                                                    How to access health informa  
tion   
online - Detail  
Indication:Abnormal glucose   
tolerance test  
                          Start:2016         Instruction Type:Patient   
Education  
  
   
                                                    Patient Instructions  
Indication:Abnormal glucose   
tolerance test  
                          Start:2016         Instruction Type:Provider   
Instructions for Treatment  
  
   
                                                    How to access health informa  
tion   
online  
Indication:Abnormal glucose   
tolerance test  
                          Start:16-Dec-2015         Instruction Type:Patient   
Education  
  
   
                                                    How to access health informa  
tion   
online - Detail  
Indication:Abnormal glucose   
tolerance test  
                          Start:16-Dec-2015         Instruction Type:Patient   
Education  
  
   
                                                    Patient Instructions  
Indication:Abnormal glucose   
tolerance test  
                          Start:16-Dec-2015         Instruction Type:Provider   
Instructions for Treatment  
  
   
                                                    How to access health informa  
tion   
online  
Indication:Hypercholesteremia  
                          Start:16-Sep-2015         Instruction Type:Patient   
Education  
  
   
                                                    How to access health informa  
tion   
online - Detail  
Indication:Hypercholesteremia  
                          Start:16-Sep-2015         Instruction Type:Patient   
Education  
  
   
                                                    Patient Instructions  
Indication:Hypercholesteremia  
                          Start:16-Sep-2015         Instruction Type:Provider   
Instructions for Treatment  
  
   
                                                    Patient Instructions  
Indication:Abnormal glucose   
tolerance test  
                          Start:9-May-2014          Instruction Type:Provider   
Instructions for Treatment  
  
   
                                                    Patient Instructions  
Indication:Abnormal glucose   
tolerance test  
                          Start:2014          Instruction Type:Provider   
Instructions for Treatment  
  
   
                                                    Patient Instructions  
Indication:Abnormal glucose   
tolerance test  
                          Start:12-Sep-2013         Instruction Type:Provider   
Instructions for Treatment  
  
   
                                                    Patient Instructions  
Indication:Hypercholesteremia  
                          Start:15-Mar-2013         Instruction Type:Provider   
Instructions for Treatment  
  
   
                                                    Patient Instructions  
Indication:Hypertension,   
essential, benign  
                          Start:14-Sep-2012         Instruction Type:Provider   
Instructions for Treatment  
  
  
  
  
                                Name            Dates           Details  
   
                                                    How to Access Health Informa  
tion   
Online using Patient Portal and   
3rd Party Apps  
Indication:Smoker  
                          Start:1-Mar-2021          Instruction Type:Patient   
Education  
  
   
                                                    Patient Instructions  
Indication:Smoker  
                          Start:1-Mar-2021          Instruction Type:Provider   
Instructions for Treatment  
  
   
                                                    How to Access Health Informa  
tion   
Online using Patient Portal and   
3rd Party Apps  
Indication:Smoker  
                          Start:2021         Instruction Type:Patient   
Education  
  
   
                                                    Patient Instructions  
Indication:Smoker  
                          Start:2021         Instruction Type:Provider   
Instructions for Treatment  
  
   
                                                    How to Access Health Informa  
tion   
Online using Patient Portal and   
3rd Party Apps  
Indication:Smoker  
                          Start:2021          Instruction Type:Patient   
Education  
  
   
                                                    Patient Instructions  
Indication:Smoker  
                          Start:2021          Instruction Type:Provider   
Instructions for Treatment  
  
   
                                                    How to Access Health Informa  
tion   
Online using Patient Portal and   
3rd Party Apps  
Indication:Smoker  
                          Start:16-Dec-2020         Instruction Type:Patient   
Education  
  
   
                                                    Patient Instructions  
Indication:Smoker  
                          Start:16-Dec-2020         Instruction Type:Provider   
Instructions for Treatment  
  
   
                                                    How to access health informa  
tion   
online  
Indication:Smoker  
                          Start:2020         Instruction Type:Patient   
Education  
  
   
                                                    How to access health informa  
tion   
online - Detail  
Indication:Smoker  
                          Start:2020         Instruction Type:Patient   
Education  
  
   
                                                    Patient Instructions  
Indication:Smoker  
                          Start:2020         Instruction Type:Provider   
Instructions for Treatment  
  
   
                                                    How to access health informa  
tion   
online  
Indication:Smoker  
                          Start:24-Aug-2020         Instruction Type:Patient   
Education  
  
   
                                                    How to access health informa  
tion   
online - Detail  
Indication:Smoker  
                          Start:24-Aug-2020         Instruction Type:Patient   
Education  
  
   
                                                    Patient Instructions  
Indication:Smoker  
                          Start:24-Aug-2020         Instruction Type:Provider   
Instructions for Treatment  
  
   
                                                    How to access health informa  
tion   
online - Detail  
Indication:BMI 25.0-25.9,adult  
                          Start:2020         Instruction Type:Patient   
Education  
  
   
                                                    How to access health informa  
tion   
online  
Indication:BMI 25.0-25.9,adult  
                          Start:2020         Instruction Type:Patient   
Education  
  
   
                                                    Patient Instructions  
Indication:BMI 25.0-25.9,adult  
                          Start:2020         Instruction Type:Provider   
Instructions for Treatment  
  
   
                                                    How to access health informa  
tion   
online  
Indication:Smoker  
                          Start:2020          Instruction Type:Patient   
Education  
  
   
                                                    How to access health informa  
tion   
online - Detail  
Indication:Smoker  
                          Start:2020          Instruction Type:Patient   
Education  
  
   
                                                    Patient Instructions  
Indication:Smoker  
                          Start:2020          Instruction Type:Provider   
Instructions for Treatment  
  
   
                                                    How to access health informa  
tion   
online  
Indication:Smoker  
                          Start:2020         Instruction Type:Patient   
Education  
  
   
                                                    How to access health informa  
tion   
online - Detail  
Indication:Smoker  
                          Start:2020         Instruction Type:Patient   
Education  
  
   
                                                    Patient Instructions  
Indication:Smoker  
                          Start:2020         Instruction Type:Provider   
Instructions for Treatment  
  
   
                                                    How to access health informa  
tion   
online  
Indication:Smoker  
                          Start:13-Sep-2019         Instruction Type:Patient   
Education  
  
   
                                                    How to access health informa  
tion   
online - Detail  
Indication:Smoker  
                          Start:13-Sep-2019         Instruction Type:Patient   
Education  
  
   
                                                    Patient Instructions  
Indication:Smoker  
                          Start:13-Sep-2019         Instruction Type:Provider   
Instructions for Treatment  
  
   
                                                    How to access health informa  
tion   
online  
Indication:BMI 26.0-26.9,adult  
                          Start:10-May-2019         Instruction Type:Patient   
Education  
  
   
                                                    How to access health informa  
tion   
online - Detail  
Indication:BMI 26.0-26.9,adult  
                          Start:10-May-2019         Instruction Type:Patient   
Education  
  
   
                                                    Patient Instructions  
Indication:BMI 26.0-26.9,adult  
                          Start:10-May-2019         Instruction Type:Provider   
Instructions for Treatment  
  
   
                                                    How to access health informa  
tion   
online  
Indication:Abnormal glucose   
tolerance test  
                          Start:28-Dec-2018         Instruction Type:Patient   
Education  
  
   
                                                    How to access health informa  
tion   
online - Detail  
Indication:Abnormal glucose   
tolerance test  
                          Start:28-Dec-2018         Instruction Type:Patient   
Education  
  
   
                                                    Patient Instructions  
Indication:Abnormal glucose   
tolerance test  
                          Start:28-Dec-2018         Instruction Type:Provider   
Instructions for Treatment  
  
   
                                                    How to access health informa  
tion   
online  
Indication:Smoker  
                          Start:29-Aug-2018         Instruction Type:Patient   
Education  
  
   
                                                    How to access health informa  
tion   
online - Detail  
Indication:Smoker  
                          Start:29-Aug-2018         Instruction Type:Patient   
Education  
  
   
                                                    Patient Instructions  
Indication:Smoker  
                          Start:29-Aug-2018         Instruction Type:Provider   
Instructions for Treatment  
  
   
                                                    How to access health informa  
tion   
online  
Indication:Abnormal glucose   
tolerance test  
                          Start:2018         Instruction Type:Patient   
Education  
  
   
                                                    How to access health informa  
tion   
online - Detail  
Indication:Abnormal glucose   
tolerance test  
                          Start:2018         Instruction Type:Patient   
Education  
  
   
                                                    Patient Instructions  
Indication:Abnormal glucose   
tolerance test  
                          Start:2018         Instruction Type:Provider   
Instructions for Treatment  
  
   
                                                    How to access health informa  
tion   
online  
Indication:Smoker  
                          Start:8-Dec-2017          Instruction Type:Patient   
Education  
  
   
                                                    How to access health informa  
tion   
online - Detail  
Indication:Smoker  
                          Start:8-Dec-2017          Instruction Type:Patient   
Education  
  
   
                                                    Patient Instructions  
Indication:Smoker  
                          Start:8-Dec-2017          Instruction Type:Provider   
Instructions for Treatment  
  
   
                                                    How to access health informa  
tion   
online  
Indication:Smoker  
                          Start:4-Aug-2017          Instruction Type:Patient   
Education  
  
   
                                                    How to access health informa  
tion   
online - Detail  
Indication:Smoker  
                          Start:4-Aug-2017          Instruction Type:Patient   
Education  
  
   
                                                    Patient Instructions  
Indication:Smoker  
                          Start:4-Aug-2017          Instruction Type:Provider   
Instructions for Treatment  
  
   
                                                    How to access health informa  
tion   
online  
Indication:Smoker  
                          Start:3-Mar-2017          Instruction Type:Patient   
Education  
  
   
                                                    How to access health informa  
tion   
online - Detail  
Indication:Smoker  
                          Start:3-Mar-2017          Instruction Type:Patient   
Education  
  
   
                                                    Patient Instructions  
Indication:Smoker  
                          Start:3-Mar-2017          Instruction Type:Provider   
Instructions for Treatment  
  
   
                                                    How to access health informa  
tion   
online  
Indication:Hypertension,   
essential, benign  
                          Start:28-Oct-2016         Instruction Type:Patient   
Education  
  
   
                                                    How to access health informa  
tion   
online - Detail  
Indication:Hypertension,   
essential, benign  
                          Start:28-Oct-2016         Instruction Type:Patient   
Education  
  
   
                                                    Patient Instructions  
Indication:Hypertension,   
essential, benign  
                          Start:28-Oct-2016         Instruction Type:Provider   
Instructions for Treatment  
  
   
                                                    How to access health informa  
tion   
online  
Indication:Hypertension,   
essential, benign  
                          Start:2016         Instruction Type:Patient   
Education  
  
   
                                                    How to access health informa  
tion   
online - Detail  
Indication:Hypertension,   
essential, benign  
                          Start:2016         Instruction Type:Patient   
Education  
  
   
                                                    Patient Instructions  
Indication:Hypertension,   
essential, benign  
                          Start:2016         Instruction Type:Provider   
Instructions for Treatment  
  
   
                                                    How to access health informa  
tion   
online  
Indication:BMI 27.0-27.9,adult  
                          Start:2016         Instruction Type:Patient   
Education  
  
   
                                                    How to access health informa  
tion   
online - Detail  
Indication:BMI 27.0-27.9,adult  
                          Start:2016         Instruction Type:Patient   
Education  
  
   
                                                    Patient Instructions  
Indication:BMI 27.0-27.9,adult  
                          Start:2016         Instruction Type:Provider   
Instructions for Treatment  
  
   
                                                    How to access health informa  
tion   
online  
Indication:Abnormal glucose   
tolerance test  
                          Start:2016         Instruction Type:Patient   
Education  
  
   
                                                    How to access health informa  
tion   
online - Detail  
Indication:Abnormal glucose   
tolerance test  
                          Start:2016         Instruction Type:Patient   
Education  
  
   
                                                    Patient Instructions  
Indication:Abnormal glucose   
tolerance test  
                          Start:2016         Instruction Type:Provider   
Instructions for Treatment  
  
   
                                                    How to access health informa  
tion   
online  
Indication:Abnormal glucose   
tolerance test  
                          Start:16-Dec-2015         Instruction Type:Patient   
Education  
  
   
                                                    How to access health informa  
tion   
online - Detail  
Indication:Abnormal glucose   
tolerance test  
                          Start:16-Dec-2015         Instruction Type:Patient   
Education  
  
   
                                                    Patient Instructions  
Indication:Abnormal glucose   
tolerance test  
                          Start:16-Dec-2015         Instruction Type:Provider   
Instructions for Treatment  
  
   
                                                    How to access health informa  
tion   
online  
Indication:Hypercholesteremia  
                          Start:16-Sep-2015         Instruction Type:Patient   
Education  
  
   
                                                    How to access health informa  
tion   
online - Detail  
Indication:Hypercholesteremia  
                          Start:16-Sep-2015         Instruction Type:Patient   
Education  
  
   
                                                    Patient Instructions  
Indication:Hypercholesteremia  
                          Start:16-Sep-2015         Instruction Type:Provider   
Instructions for Treatment  
  
   
                                                    Patient Instructions  
Indication:Abnormal glucose   
tolerance test  
                          Start:9-May-2014          Instruction Type:Provider   
Instructions for Treatment  
  
   
                                                    Patient Instructions  
Indication:Abnormal glucose   
tolerance test  
                          Start:2014          Instruction Type:Provider   
Instructions for Treatment  
  
   
                                                    Patient Instructions  
Indication:Abnormal glucose   
tolerance test  
                          Start:12-Sep-2013         Instruction Type:Provider   
Instructions for Treatment  
  
   
                                                    Patient Instructions  
Indication:Hypercholesteremia  
                          Start:15-Mar-2013         Instruction Type:Provider   
Instructions for Treatment  
  
   
                                                    Patient Instructions  
Indication:Hypertension,   
essential, benign  
                          Start:14-Sep-2012         Instruction Type:Provider   
Instructions for Treatment  
  
  
  
  
                                Name            Dates           Details  
   
                                                    How to Access Health Informa  
tion   
Online using Patient Portal and   
3rd Party Apps  
Indication:Smoker  
                          Start:1-Mar-2021          Instruction Type:Patient   
Education  
  
   
                                                    Patient Instructions  
Indication:Smoker  
                          Start:1-Mar-2021          Instruction Type:Provider   
Instructions for Treatment  
  
   
                                                    How to Access Health Informa  
tion   
Online using Patient Portal and   
3rd Party Apps  
Indication:Smoker  
                          Start:2021         Instruction Type:Patient   
Education  
  
   
                                                    Patient Instructions  
Indication:Smoker  
                          Start:2021         Instruction Type:Provider   
Instructions for Treatment  
  
   
                                                    How to Access Health Informa  
tion   
Online using Patient Portal and   
3rd Party Apps  
Indication:Smoker  
                          Start:2021          Instruction Type:Patient   
Education  
  
   
                                                    Patient Instructions  
Indication:Smoker  
                          Start:2021          Instruction Type:Provider   
Instructions for Treatment  
  
   
                                                    How to Access Health Informa  
tion   
Online using Patient Portal and   
3rd Party Apps  
Indication:Smoker  
                          Start:16-Dec-2020         Instruction Type:Patient   
Education  
  
   
                                                    Patient Instructions  
Indication:Smoker  
                          Start:16-Dec-2020         Instruction Type:Provider   
Instructions for Treatment  
  
   
                                                    How to access health informa  
tion   
online  
Indication:Smoker  
                          Start:2020         Instruction Type:Patient   
Education  
  
   
                                                    How to access health informa  
tion   
online - Detail  
Indication:Smoker  
                          Start:2020         Instruction Type:Patient   
Education  
  
   
                                                    Patient Instructions  
Indication:Smoker  
                          Start:2020         Instruction Type:Provider   
Instructions for Treatment  
  
   
                                                    How to access health informa  
tion   
online  
Indication:Smoker  
                          Start:24-Aug-2020         Instruction Type:Patient   
Education  
  
   
                                                    How to access health informa  
tion   
online - Detail  
Indication:Smoker  
                          Start:24-Aug-2020         Instruction Type:Patient   
Education  
  
   
                                                    Patient Instructions  
Indication:Smoker  
                          Start:24-Aug-2020         Instruction Type:Provider   
Instructions for Treatment  
  
   
                                                    How to access health informa  
tion   
online - Detail  
Indication:BMI 25.0-25.9,adult  
                          Start:2020         Instruction Type:Patient   
Education  
  
   
                                                    How to access health informa  
tion   
online  
Indication:BMI 25.0-25.9,adult  
                          Start:2020         Instruction Type:Patient   
Education  
  
   
                                                    Patient Instructions  
Indication:BMI 25.0-25.9,adult  
                          Start:2020         Instruction Type:Provider   
Instructions for Treatment  
  
   
                                                    How to access health informa  
tion   
online  
Indication:Smoker  
                          Start:2020          Instruction Type:Patient   
Education  
  
   
                                                    How to access health informa  
tion   
online - Detail  
Indication:Smoker  
                          Start:2020          Instruction Type:Patient   
Education  
  
   
                                                    Patient Instructions  
Indication:Smoker  
                          Start:2020          Instruction Type:Provider   
Instructions for Treatment  
  
   
                                                    How to access health informa  
tion   
online  
Indication:Smoker  
                          Start:2020         Instruction Type:Patient   
Education  
  
   
                                                    How to access health informa  
tion   
online - Detail  
Indication:Smoker  
                          Start:2020         Instruction Type:Patient   
Education  
  
   
                                                    Patient Instructions  
Indication:Smoker  
                          Start:2020         Instruction Type:Provider   
Instructions for Treatment  
  
   
                                                    How to access health informa  
tion   
online  
Indication:Smoker  
                          Start:13-Sep-2019         Instruction Type:Patient   
Education  
  
   
                                                    How to access health informa  
tion   
online - Detail  
Indication:Smoker  
                          Start:13-Sep-2019         Instruction Type:Patient   
Education  
  
   
                                                    Patient Instructions  
Indication:Smoker  
                          Start:13-Sep-2019         Instruction Type:Provider   
Instructions for Treatment  
  
   
                                                    How to access health informa  
tion   
online  
Indication:BMI 26.0-26.9,adult  
                          Start:10-May-2019         Instruction Type:Patient   
Education  
  
   
                                                    How to access health informa  
tion   
online - Detail  
Indication:BMI 26.0-26.9,adult  
                          Start:10-May-2019         Instruction Type:Patient   
Education  
  
   
                                                    Patient Instructions  
Indication:BMI 26.0-26.9,adult  
                          Start:10-May-2019         Instruction Type:Provider   
Instructions for Treatment  
  
   
                                                    How to access health informa  
tion   
online  
Indication:Abnormal glucose   
tolerance test  
                          Start:28-Dec-2018         Instruction Type:Patient   
Education  
  
   
                                                    How to access health informa  
tion   
online - Detail  
Indication:Abnormal glucose   
tolerance test  
                          Start:28-Dec-2018         Instruction Type:Patient   
Education  
  
   
                                                    Patient Instructions  
Indication:Abnormal glucose   
tolerance test  
                          Start:28-Dec-2018         Instruction Type:Provider   
Instructions for Treatment  
  
   
                                                    How to access health informa  
tion   
online  
Indication:Smoker  
                          Start:29-Aug-2018         Instruction Type:Patient   
Education  
  
   
                                                    How to access health informa  
tion   
online - Detail  
Indication:Smoker  
                          Start:29-Aug-2018         Instruction Type:Patient   
Education  
  
   
                                                    Patient Instructions  
Indication:Smoker  
                          Start:29-Aug-2018         Instruction Type:Provider   
Instructions for Treatment  
  
   
                                                    How to access health informa  
tion   
online  
Indication:Abnormal glucose   
tolerance test  
                          Start:2018         Instruction Type:Patient   
Education  
  
   
                                                    How to access health informa  
tion   
online - Detail  
Indication:Abnormal glucose   
tolerance test  
                          Start:2018         Instruction Type:Patient   
Education  
  
   
                                                    Patient Instructions  
Indication:Abnormal glucose   
tolerance test  
                          Start:2018         Instruction Type:Provider   
Instructions for Treatment  
  
   
                                                    How to access health informa  
tion   
online  
Indication:Smoker  
                          Start:8-Dec-2017          Instruction Type:Patient   
Education  
  
   
                                                    How to access health informa  
tion   
online - Detail  
Indication:Smoker  
                          Start:8-Dec-2017          Instruction Type:Patient   
Education  
  
   
                                                    Patient Instructions  
Indication:Smoker  
                          Start:8-Dec-2017          Instruction Type:Provider   
Instructions for Treatment  
  
   
                                                    How to access health informa  
tion   
online  
Indication:Smoker  
                          Start:4-Aug-2017          Instruction Type:Patient   
Education  
  
   
                                                    How to access health informa  
tion   
online - Detail  
Indication:Smoker  
                          Start:4-Aug-2017          Instruction Type:Patient   
Education  
  
   
                                                    Patient Instructions  
Indication:Smoker  
                          Start:4-Aug-2017          Instruction Type:Provider   
Instructions for Treatment  
  
   
                                                    How to access health informa  
tion   
online  
Indication:Smoker  
                          Start:3-Mar-2017          Instruction Type:Patient   
Education  
  
   
                                                    How to access health informa  
tion   
online - Detail  
Indication:Smoker  
                          Start:3-Mar-2017          Instruction Type:Patient   
Education  
  
   
                                                    Patient Instructions  
Indication:Smoker  
                          Start:3-Mar-2017          Instruction Type:Provider   
Instructions for Treatment  
  
   
                                                    How to access health informa  
tion   
online  
Indication:Hypertension,   
essential, benign  
                          Start:28-Oct-2016         Instruction Type:Patient   
Education  
  
   
                                                    How to access health informa  
tion   
online - Detail  
Indication:Hypertension,   
essential, benign  
                          Start:28-Oct-2016         Instruction Type:Patient   
Education  
  
   
                                                    Patient Instructions  
Indication:Hypertension,   
essential, benign  
                          Start:28-Oct-2016         Instruction Type:Provider   
Instructions for Treatment  
  
   
                                                    How to access health informa  
tion   
online  
Indication:Hypertension,   
essential, benign  
                          Start:2016         Instruction Type:Patient   
Education  
  
   
                                                    How to access health informa  
tion   
online - Detail  
Indication:Hypertension,   
essential, benign  
                          Start:2016         Instruction Type:Patient   
Education  
  
   
                                                    Patient Instructions  
Indication:Hypertension,   
essential, benign  
                          Start:2016         Instruction Type:Provider   
Instructions for Treatment  
  
   
                                                    How to access health informa  
tion   
online  
Indication:BMI 27.0-27.9,adult  
                          Start:2016         Instruction Type:Patient   
Education  
  
   
                                                    How to access health informa  
tion   
online - Detail  
Indication:BMI 27.0-27.9,adult  
                          Start:2016         Instruction Type:Patient   
Education  
  
   
                                                    Patient Instructions  
Indication:BMI 27.0-27.9,adult  
                          Start:2016         Instruction Type:Provider   
Instructions for Treatment  
  
   
                                                    How to access health informa  
tion   
online  
Indication:Abnormal glucose   
tolerance test  
                          Start:2016         Instruction Type:Patient   
Education  
  
   
                                                    How to access health informa  
tion   
online - Detail  
Indication:Abnormal glucose   
tolerance test  
                          Start:2016         Instruction Type:Patient   
Education  
  
   
                                                    Patient Instructions  
Indication:Abnormal glucose   
tolerance test  
                          Start:2016         Instruction Type:Provider   
Instructions for Treatment  
  
   
                                                    How to access health informa  
tion   
online  
Indication:Abnormal glucose   
tolerance test  
                          Start:16-Dec-2015         Instruction Type:Patient   
Education  
  
   
                                                    How to access health informa  
tion   
online - Detail  
Indication:Abnormal glucose   
tolerance test  
                          Start:16-Dec-2015         Instruction Type:Patient   
Education  
  
   
                                                    Patient Instructions  
Indication:Abnormal glucose   
tolerance test  
                          Start:16-Dec-2015         Instruction Type:Provider   
Instructions for Treatment  
  
   
                                                    How to access health informa  
tion   
online  
Indication:Hypercholesteremia  
                          Start:16-Sep-2015         Instruction Type:Patient   
Education  
  
   
                                                    How to access health informa  
tion   
online - Detail  
Indication:Hypercholesteremia  
                          Start:16-Sep-2015         Instruction Type:Patient   
Education  
  
   
                                                    Patient Instructions  
Indication:Hypercholesteremia  
                          Start:16-Sep-2015         Instruction Type:Provider   
Instructions for Treatment  
  
   
                                                    Patient Instructions  
Indication:Abnormal glucose   
tolerance test  
                          Start:9-May-2014          Instruction Type:Provider   
Instructions for Treatment  
  
   
                                                    Patient Instructions  
Indication:Abnormal glucose   
tolerance test  
                          Start:2014          Instruction Type:Provider   
Instructions for Treatment  
  
   
                                                    Patient Instructions  
Indication:Abnormal glucose   
tolerance test  
                          Start:12-Sep-2013         Instruction Type:Provider   
Instructions for Treatment  
  
   
                                                    Patient Instructions  
Indication:Hypercholesteremia  
                          Start:15-Mar-2013         Instruction Type:Provider   
Instructions for Treatment  
  
   
                                                    Patient Instructions  
Indication:Hypertension,   
essential, benign  
                          Start:14-Sep-2012         Instruction Type:Provider   
Instructions for Treatment  
  
  
  
  
                                Name            Dates           Details  
   
                                                    How to access health informa  
tion   
online  
Indication:BMI 26.0-26.9,adult  
                          Start:10-May-2019         Instruction Type:Patient   
Education  
  
   
                                                    How to access health informa  
tion   
online - Detail  
Indication:BMI 26.0-26.9,adult  
                          Start:10-May-2019         Instruction Type:Patient   
Education  
  
   
                                                    Patient Instructions  
Indication:BMI 26.0-26.9,adult  
                          Start:10-May-2019         Instruction Type:Provider   
Instructions for Treatment  
  
   
                                                    How to access health informa  
tion   
online  
Indication:Abnormal glucose   
tolerance test  
                          Start:28-Dec-2018         Instruction Type:Patient   
Education  
  
   
                                                    How to access health informa  
tion   
online - Detail  
Indication:Abnormal glucose   
tolerance test  
                          Start:28-Dec-2018         Instruction Type:Patient   
Education  
  
   
                                                    Patient Instructions  
Indication:Abnormal glucose   
tolerance test  
                          Start:28-Dec-2018         Instruction Type:Provider   
Instructions for Treatment  
  
   
                                                    How to access health informa  
tion   
online  
Indication:Smoker  
                          Start:29-Aug-2018         Instruction Type:Patient   
Education  
  
   
                                                    How to access health informa  
tion   
online - Detail  
Indication:Smoker  
                          Start:29-Aug-2018         Instruction Type:Patient   
Education  
  
   
                                                    Patient Instructions  
Indication:Smoker  
                          Start:29-Aug-2018         Instruction Type:Provider   
Instructions for Treatment  
  
   
                                                    How to access health informa  
tion   
online  
Indication:Abnormal glucose   
tolerance test  
                          Start:2018         Instruction Type:Patient   
Education  
  
   
                                                    How to access health informa  
tion   
online - Detail  
Indication:Abnormal glucose   
tolerance test  
                          Start:2018         Instruction Type:Patient   
Education  
  
   
                                                    Patient Instructions  
Indication:Abnormal glucose   
tolerance test  
                          Start:2018         Instruction Type:Provider   
Instructions for Treatment  
  
   
                                                    How to access health informa  
tion   
online  
Indication:Smoker  
                          Start:8-Dec-2017          Instruction Type:Patient   
Education  
  
   
                                                    How to access health informa  
tion   
online - Detail  
Indication:Smoker  
                          Start:8-Dec-2017          Instruction Type:Patient   
Education  
  
   
                                                    Patient Instructions  
Indication:Smoker  
                          Start:8-Dec-2017          Instruction Type:Provider   
Instructions for Treatment  
  
   
                                                    How to access health informa  
tion   
online  
Indication:Smoker  
                          Start:4-Aug-2017          Instruction Type:Patient   
Education  
  
   
                                                    How to access health informa  
tion   
online - Detail  
Indication:Smoker  
                          Start:4-Aug-2017          Instruction Type:Patient   
Education  
  
   
                                                    Patient Instructions  
Indication:Smoker  
                          Start:4-Aug-2017          Instruction Type:Provider   
Instructions for Treatment  
  
   
                                                    How to access health informa  
tion   
online  
Indication:Smoker  
                          Start:3-Mar-2017          Instruction Type:Patient   
Education  
  
   
                                                    How to access health informa  
tion   
online - Detail  
Indication:Smoker  
                          Start:3-Mar-2017          Instruction Type:Patient   
Education  
  
   
                                                    Patient Instructions  
Indication:Smoker  
                          Start:3-Mar-2017          Instruction Type:Provider   
Instructions for Treatment  
  
   
                                                    How to access health informa  
tion   
online  
Indication:Hypertension,   
essential, benign  
                          Start:28-Oct-2016         Instruction Type:Patient   
Education  
  
   
                                                    How to access health informa  
tion   
online - Detail  
Indication:Hypertension,   
essential, benign  
                          Start:28-Oct-2016         Instruction Type:Patient   
Education  
  
   
                                                    Patient Instructions  
Indication:Hypertension,   
essential, benign  
                          Start:28-Oct-2016         Instruction Type:Provider   
Instructions for Treatment  
  
   
                                                    How to access health informa  
tion   
online  
Indication:Hypertension,   
essential, benign  
                          Start:2016         Instruction Type:Patient   
Education  
  
   
                                                    How to access health informa  
tion   
online - Detail  
Indication:Hypertension,   
essential, benign  
                          Start:2016         Instruction Type:Patient   
Education  
  
   
                                                    Patient Instructions  
Indication:Hypertension,   
essential, benign  
                          Start:2016         Instruction Type:Provider   
Instructions for Treatment  
  
   
                                                    How to access health informa  
tion   
online  
Indication:BMI 27.0-27.9,adult  
                          Start:2016         Instruction Type:Patient   
Education  
  
   
                                                    How to access health informa  
tion   
online - Detail  
Indication:BMI 27.0-27.9,adult  
                          Start:2016         Instruction Type:Patient   
Education  
  
   
                                                    Patient Instructions  
Indication:BMI 27.0-27.9,adult  
                          Start:2016         Instruction Type:Provider   
Instructions for Treatment  
  
   
                                                    How to access health informa  
tion   
online  
Indication:Abnormal glucose   
tolerance test  
                          Start:2016         Instruction Type:Patient   
Education  
  
   
                                                    How to access health informa  
tion   
online - Detail  
Indication:Abnormal glucose   
tolerance test  
                          Start:2016         Instruction Type:Patient   
Education  
  
   
                                                    Patient Instructions  
Indication:Abnormal glucose   
tolerance test  
                          Start:2016         Instruction Type:Provider   
Instructions for Treatment  
  
   
                                                    How to access health informa  
tion   
online  
Indication:Abnormal glucose   
tolerance test  
                          Start:16-Dec-2015         Instruction Type:Patient   
Education  
  
   
                                                    How to access health informa  
tion   
online - Detail  
Indication:Abnormal glucose   
tolerance test  
                          Start:16-Dec-2015         Instruction Type:Patient   
Education  
  
   
                                                    Patient Instructions  
Indication:Abnormal glucose   
tolerance test  
                          Start:16-Dec-2015         Instruction Type:Provider   
Instructions for Treatment  
  
   
                                                    How to access health informa  
tion   
online  
Indication:Hypercholesteremia  
                          Start:16-Sep-2015         Instruction Type:Patient   
Education  
  
   
                                                    How to access health informa  
tion   
online - Detail  
Indication:Hypercholesteremia  
                          Start:16-Sep-2015         Instruction Type:Patient   
Education  
  
   
                                                    Patient Instructions  
Indication:Hypercholesteremia  
                          Start:16-Sep-2015         Instruction Type:Provider   
Instructions for Treatment  
  
   
                                                    Patient Instructions  
Indication:Abnormal glucose   
tolerance test  
                          Start:9-May-2014          Instruction Type:Provider   
Instructions for Treatment  
  
   
                                                    Patient Instructions  
Indication:Abnormal glucose   
tolerance test  
                          Start:2014          Instruction Type:Provider   
Instructions for Treatment  
  
   
                                                    Patient Instructions  
Indication:Abnormal glucose   
tolerance test  
                          Start:12-Sep-2013         Instruction Type:Provider   
Instructions for Treatment  
  
   
                                                    Patient Instructions  
Indication:Hypercholesteremia  
                          Start:15-Mar-2013         Instruction Type:Provider   
Instructions for Treatment  
  
   
                                                    Patient Instructions  
Indication:Hypertension,   
essential, benign  
                          Start:14-Sep-2012         Instruction Type:Provider   
Instructions for Treatment  
  
  
  
  
                                Name            Dates           Details  
   
                                                    How to Access Health Informa  
tion   
Online using Patient Portal and   
3rd Party Apps  
Indication:Smoker  
                          Start:1-Mar-2021          Instruction Type:Patient   
Education  
  
   
                                                    Patient Instructions  
Indication:Smoker  
                          Start:1-Mar-2021          Instruction Type:Provider   
Instructions for Treatment  
  
   
                                                    How to Access Health Informa  
tion   
Online using Patient Portal and   
3rd Party Apps  
Indication:Smoker  
                          Start:2021         Instruction Type:Patient   
Education  
  
   
                                                    Patient Instructions  
Indication:Smoker  
                          Start:2021         Instruction Type:Provider   
Instructions for Treatment  
  
   
                                                    How to Access Health Informa  
tion   
Online using Patient Portal and   
3rd Party Apps  
Indication:Smoker  
                          Start:2021          Instruction Type:Patient   
Education  
  
   
                                                    Patient Instructions  
Indication:Smoker  
                          Start:2021          Instruction Type:Provider   
Instructions for Treatment  
  
   
                                                    How to Access Health Informa  
tion   
Online using Patient Portal and   
3rd Party Apps  
Indication:Smoker  
                          Start:16-Dec-2020         Instruction Type:Patient   
Education  
  
   
                                                    Patient Instructions  
Indication:Smoker  
                          Start:16-Dec-2020         Instruction Type:Provider   
Instructions for Treatment  
  
   
                                                    How to access health informa  
tion   
online  
Indication:Smoker  
                          Start:2020         Instruction Type:Patient   
Education  
  
   
                                                    How to access health informa  
tion   
online - Detail  
Indication:Smoker  
                          Start:2020         Instruction Type:Patient   
Education  
  
   
                                                    Patient Instructions  
Indication:Smoker  
                          Start:2020         Instruction Type:Provider   
Instructions for Treatment  
  
   
                                                    How to access health informa  
tion   
online  
Indication:Smoker  
                          Start:24-Aug-2020         Instruction Type:Patient   
Education  
  
   
                                                    How to access health informa  
tion   
online - Detail  
Indication:Smoker  
                          Start:24-Aug-2020         Instruction Type:Patient   
Education  
  
   
                                                    Patient Instructions  
Indication:Smoker  
                          Start:24-Aug-2020         Instruction Type:Provider   
Instructions for Treatment  
  
   
                                                    How to access health informa  
tion   
online - Detail  
Indication:BMI 25.0-25.9,adult  
                          Start:2020         Instruction Type:Patient   
Education  
  
   
                                                    How to access health informa  
tion   
online  
Indication:BMI 25.0-25.9,adult  
                          Start:2020         Instruction Type:Patient   
Education  
  
   
                                                    Patient Instructions  
Indication:BMI 25.0-25.9,adult  
                          Start:2020         Instruction Type:Provider   
Instructions for Treatment  
  
   
                                                    How to access health informa  
tion   
online  
Indication:Smoker  
                          Start:2020          Instruction Type:Patient   
Education  
  
   
                                                    How to access health informa  
tion   
online - Detail  
Indication:Smoker  
                          Start:2020          Instruction Type:Patient   
Education  
  
   
                                                    Patient Instructions  
Indication:Smoker  
                          Start:2020          Instruction Type:Provider   
Instructions for Treatment  
  
   
                                                    How to access health informa  
tion   
online  
Indication:Smoker  
                          Start:2020         Instruction Type:Patient   
Education  
  
   
                                                    How to access health informa  
tion   
online - Detail  
Indication:Smoker  
                          Start:2020         Instruction Type:Patient   
Education  
  
   
                                                    Patient Instructions  
Indication:Smoker  
                          Start:2020         Instruction Type:Provider   
Instructions for Treatment  
  
   
                                                    How to access health informa  
tion   
online  
Indication:Smoker  
                          Start:13-Sep-2019         Instruction Type:Patient   
Education  
  
   
                                                    How to access health informa  
tion   
online - Detail  
Indication:Smoker  
                          Start:13-Sep-2019         Instruction Type:Patient   
Education  
  
   
                                                    Patient Instructions  
Indication:Smoker  
                          Start:13-Sep-2019         Instruction Type:Provider   
Instructions for Treatment  
  
   
                                                    How to access health informa  
tion   
online  
Indication:BMI 26.0-26.9,adult  
                          Start:10-May-2019         Instruction Type:Patient   
Education  
  
   
                                                    How to access health informa  
tion   
online - Detail  
Indication:BMI 26.0-26.9,adult  
                          Start:10-May-2019         Instruction Type:Patient   
Education  
  
   
                                                    Patient Instructions  
Indication:BMI 26.0-26.9,adult  
                          Start:10-May-2019         Instruction Type:Provider   
Instructions for Treatment  
  
   
                                                    How to access health informa  
tion   
online  
Indication:Abnormal glucose   
tolerance test  
                          Start:28-Dec-2018         Instruction Type:Patient   
Education  
  
   
                                                    How to access health informa  
tion   
online - Detail  
Indication:Abnormal glucose   
tolerance test  
                          Start:28-Dec-2018         Instruction Type:Patient   
Education  
  
   
                                                    Patient Instructions  
Indication:Abnormal glucose   
tolerance test  
                          Start:28-Dec-2018         Instruction Type:Provider   
Instructions for Treatment  
  
   
                                                    How to access health informa  
tion   
online  
Indication:Smoker  
                          Start:29-Aug-2018         Instruction Type:Patient   
Education  
  
   
                                                    How to access health informa  
tion   
online - Detail  
Indication:Smoker  
                          Start:29-Aug-2018         Instruction Type:Patient   
Education  
  
   
                                                    Patient Instructions  
Indication:Smoker  
                          Start:29-Aug-2018         Instruction Type:Provider   
Instructions for Treatment  
  
   
                                                    How to access health informa  
tion   
online  
Indication:Abnormal glucose   
tolerance test  
                          Start:2018         Instruction Type:Patient   
Education  
  
   
                                                    How to access health informa  
tion   
online - Detail  
Indication:Abnormal glucose   
tolerance test  
                          Start:2018         Instruction Type:Patient   
Education  
  
   
                                                    Patient Instructions  
Indication:Abnormal glucose   
tolerance test  
                          Start:2018         Instruction Type:Provider   
Instructions for Treatment  
  
   
                                                    How to access health informa  
tion   
online  
Indication:Smoker  
                          Start:8-Dec-2017          Instruction Type:Patient   
Education  
  
   
                                                    How to access health informa  
tion   
online - Detail  
Indication:Smoker  
                          Start:8-Dec-2017          Instruction Type:Patient   
Education  
  
   
                                                    Patient Instructions  
Indication:Smoker  
                          Start:8-Dec-2017          Instruction Type:Provider   
Instructions for Treatment  
  
   
                                                    How to access health informa  
tion   
online  
Indication:Smoker  
                          Start:4-Aug-2017          Instruction Type:Patient   
Education  
  
   
                                                    How to access health informa  
tion   
online - Detail  
Indication:Smoker  
                          Start:4-Aug-2017          Instruction Type:Patient   
Education  
  
   
                                                    Patient Instructions  
Indication:Smoker  
                          Start:4-Aug-2017          Instruction Type:Provider   
Instructions for Treatment  
  
   
                                                    How to access health informa  
tion   
online  
Indication:Smoker  
                          Start:3-Mar-2017          Instruction Type:Patient   
Education  
  
   
                                                    How to access health informa  
tion   
online - Detail  
Indication:Smoker  
                          Start:3-Mar-2017          Instruction Type:Patient   
Education  
  
   
                                                    Patient Instructions  
Indication:Smoker  
                          Start:3-Mar-2017          Instruction Type:Provider   
Instructions for Treatment  
  
   
                                                    How to access health informa  
tion   
online  
Indication:Hypertension,   
essential, benign  
                          Start:28-Oct-2016         Instruction Type:Patient   
Education  
  
   
                                                    How to access health informa  
tion   
online - Detail  
Indication:Hypertension,   
essential, benign  
                          Start:28-Oct-2016         Instruction Type:Patient   
Education  
  
   
                                                    Patient Instructions  
Indication:Hypertension,   
essential, benign  
                          Start:28-Oct-2016         Instruction Type:Provider   
Instructions for Treatment  
  
   
                                                    How to access health informa  
tion   
online  
Indication:Hypertension,   
essential, benign  
                          Start:2016         Instruction Type:Patient   
Education  
  
   
                                                    How to access health informa  
tion   
online - Detail  
Indication:Hypertension,   
essential, benign  
                          Start:2016         Instruction Type:Patient   
Education  
  
   
                                                    Patient Instructions  
Indication:Hypertension,   
essential, benign  
                          Start:2016         Instruction Type:Provider   
Instructions for Treatment  
  
   
                                                    How to access health informa  
tion   
online  
Indication:BMI 27.0-27.9,adult  
                          Start:2016         Instruction Type:Patient   
Education  
  
   
                                                    How to access health informa  
tion   
online - Detail  
Indication:BMI 27.0-27.9,adult  
                          Start:2016         Instruction Type:Patient   
Education  
  
   
                                                    Patient Instructions  
Indication:BMI 27.0-27.9,adult  
                          Start:2016         Instruction Type:Provider   
Instructions for Treatment  
  
   
                                                    How to access health informa  
tion   
online  
Indication:Abnormal glucose   
tolerance test  
                          Start:2016         Instruction Type:Patient   
Education  
  
   
                                                    How to access health informa  
tion   
online - Detail  
Indication:Abnormal glucose   
tolerance test  
                          Start:2016         Instruction Type:Patient   
Education  
  
   
                                                    Patient Instructions  
Indication:Abnormal glucose   
tolerance test  
                          Start:2016         Instruction Type:Provider   
Instructions for Treatment  
  
   
                                                    How to access health informa  
tion   
online  
Indication:Abnormal glucose   
tolerance test  
                          Start:16-Dec-2015         Instruction Type:Patient   
Education  
  
   
                                                    How to access health informa  
tion   
online - Detail  
Indication:Abnormal glucose   
tolerance test  
                          Start:16-Dec-2015         Instruction Type:Patient   
Education  
  
   
                                                    Patient Instructions  
Indication:Abnormal glucose   
tolerance test  
                          Start:16-Dec-2015         Instruction Type:Provider   
Instructions for Treatment  
  
   
                                                    How to access health informa  
tion   
online  
Indication:Hypercholesteremia  
                          Start:16-Sep-2015         Instruction Type:Patient   
Education  
  
   
                                                    How to access health informa  
tion   
online - Detail  
Indication:Hypercholesteremia  
                          Start:16-Sep-2015         Instruction Type:Patient   
Education  
  
   
                                                    Patient Instructions  
Indication:Hypercholesteremia  
                          Start:16-Sep-2015         Instruction Type:Provider   
Instructions for Treatment  
  
   
                                                    Patient Instructions  
Indication:Abnormal glucose   
tolerance test  
                          Start:9-May-2014          Instruction Type:Provider   
Instructions for Treatment  
  
   
                                                    Patient Instructions  
Indication:Abnormal glucose   
tolerance test  
                          Start:2014          Instruction Type:Provider   
Instructions for Treatment  
  
   
                                                    Patient Instructions  
Indication:Abnormal glucose   
tolerance test  
                          Start:12-Sep-2013         Instruction Type:Provider   
Instructions for Treatment  
  
   
                                                    Patient Instructions  
Indication:Hypercholesteremia  
                          Start:15-Mar-2013         Instruction Type:Provider   
Instructions for Treatment  
  
   
                                                    Patient Instructions  
Indication:Hypertension,   
essential, benign  
                          Start:14-Sep-2012         Instruction Type:Provider   
Instructions for Treatment  
  
  
  
  
Summary Purpose  
  
  
                                                      
  
  
  
Advance Directives  
No Advanced Directives Records Found  
  
Additional Source Comments  
  
  
  
                                                    PREGNANCY (unrecognized sect  
ion and content)  
   
                                                    No Pregnancy Status Records   
Found  
  
  
  
  
                                                    INFORMATION SOURCE (unrecogn  
ized section and content)  
   
                                          
  
  
  
                                        DATE CREATED        AUTHOR  
   
                                2019                      [facility]  
  
  
FOR RECORDS PERTAINING TO PATIENTS WHO ARE OR HAVE BEEN ENROLLED IN A CHEMICAL 
DEPENDENCY/SUBSTANCEABUSE PROGRAM, SOME INFORMATION MAY BE OMITTED. This 
clinical summary was aggregated from multiple sources. Caution should be 
exercised in using it in the provision of clinical care. This summary normalizes
 information from multiple sources, and as a consequence, information in this 
document may materially change the coding, format and clinical context of 
patient data. In addition, data may be omitted in some cases. CLINICAL DECISIONS
 SHOULD BE BASED ON THE PRIMARY CLINICAL RECORDS. [employer] provides
 no warranty or guarantee of the accuracy or completeness of information in this
 document.

## 2024-08-21 ENCOUNTER — HOSPITAL ENCOUNTER (OUTPATIENT)
Dept: RADIOLOGY | Facility: HOSPITAL | Age: 59
Discharge: HOME | End: 2024-08-21
Payer: COMMERCIAL

## 2024-08-21 DIAGNOSIS — E11.9 TYPE 2 DIABETES MELLITUS WITHOUT COMPLICATIONS (MULTI): ICD-10-CM

## 2024-08-21 PROCEDURE — 75571 CT HRT W/O DYE W/CA TEST: CPT

## 2024-09-17 ENCOUNTER — HOSPITAL ENCOUNTER (OUTPATIENT)
Age: 59
Discharge: HOME | End: 2024-09-17
Payer: COMMERCIAL

## 2024-09-17 DIAGNOSIS — R94.39: Primary | ICD-10-CM

## 2024-09-17 DIAGNOSIS — R06.00: ICD-10-CM

## 2024-09-17 PROCEDURE — A4216 STERILE WATER/SALINE, 10 ML: HCPCS

## 2024-09-17 PROCEDURE — A9500 TC99M SESTAMIBI: HCPCS

## 2024-09-17 PROCEDURE — 78452 HT MUSCLE IMAGE SPECT MULT: CPT

## 2024-09-17 PROCEDURE — 93017 CV STRESS TEST TRACING ONLY: CPT

## 2024-09-17 NOTE — STRESSREP_ITS
Stress Test Report    
Date: 9/17/2024    
     
Procedure: Pharmacologic stress nuclear imaging study      
     
Indications: Dyspnea    
     
Consent: Per the patient    
     
Procedure:    
     
The patient underwent pharmacologic (Regadenoson 0.4mg ) evaluation with a peak   
heart rate of 82 beats per minute (51%predicted maximal heart rate) and a peak   
blood pressure of 130/82 mmHg.    
     
The baseline ECG demonstrated sinus rhythm with left bundle branch block.  The   
peak pharmacologic ECG demonstrated no diagnostic changes secondary to baseline   
abnormality.    
     
There were no cardiac dysrhythmias pretest, during pharmacologic infusion, or   
recovery.    
     
There was no complaint of chest discomfort during pharmacologic infusion or   
recovery.    
    
The patient was injected with 14.7 millicuries of technetium 99m Cardiolite and   
subsequently rest SPECT Cardiolite nuclear imaging was obtained in the   
horizontal long, vertical long, and short axis views. The patient underwent   
pharmacologic (Regadenoson) evaluation. The patient was injected with 44.3   
millicuries of technetium 99m Cardiolite and subsequently stress SPECT   
Cardiolite nuclear imaging was obtained in the horizontal long, vertical long,   
and short axis views.  A gated Cardiolite study at peak stress was obtained.    
     
The examination was stopped secondary to completion of protocol.    
    
Rest and stress SPECT Cardiolite nuclear imaging status post realignment,   
normalization, and attenuation correction demonstrate a moderate size reversible  
perfusion defect of the inferior wall.  Hypoperfusion of the septum noted in   
both resting as well as post pharmacological stress images, likely related to   
the underlying left bundle branch block.  On gated images, the LV appears   
dilated.  Severe LV systolic dysfunction with ejection fraction of 28%.     
    
     
Impression:    
     
1.  Pharmacologic (Regadenoson) evaluation    
2.  Peak pharmacologic ECG with no diagnostic changes secondary to underlying   
left bundle branch block.    
3.  There were no cardiac dysrhythmias pretest, during pharmacologic infusion,   
or recovery.    
5.  Moderate size reversible perfusion defect of the inferior wall.  Possible   
mild ischemia of the anterior wall as well.    
6.  The gated Cardiolite study reports an LVEF of 28%.  Dilated left ventricle.    
    
    
     
This note was generated with Dragon dictation software. It may contain incorrect  
words, spelling, and punctuation that were not noted in checking the note before  
signing.

## 2024-09-17 NOTE — XMS RPT_ITS
Comprehensive CCD (C-CDA v2.1)  
  
                         Created on: 2024  
  
  
BETHANIE GALAN  
External Reference #: CDR,PersonID:[id number]  
: [id number]  
Sex: Male  
  
Demographics  
  
  
                                        Address             [address] [place]  [address]  
   
                                        Home Phone          [phone]  
   
                                        Work Phone          [phone]  
   
                                        Home Phone          [phone]  
   
                                        Work Phone          [phone]  
   
                                        Preferred Language  en  
   
                                        Marital Status        
   
                                        Pentec[employer]alism Affiliation Unknown  
   
                                        Race                White  
   
                                        Ethnic Group        Not  or Lati  
no  
  
  
Author  
  
  
                                        Organization        [facility]  
ion [facility]  
  
  
Care Team Providers  
  
  
                                Care Team Member Name Role            Phone  
   
                                Mere Chavez Unavailable     [phone]  
   
                                Nikko Hanson Unavailable     [phone]  
   
                                Verena Tuttle   Unavailable     Unavailable  
   
                                Unavailable     Unavailable     [phone]  
   
                                Mere Chavez Attending       Unavailable  
   
                                Mere Chavez Referring       Unavailable  
   
                                Mere Chavez Consulting      Unavailable  
   
                                Mere Chavez Unavailable     [phone]  
   
                                Nikko Hanson Unavailable     [phone]  
   
                                Verena Tuttle   Unavailable     Unavailable  
   
                                Unavailable     Unavailable     [phone]  
   
                                Slarb, Lashell   Unavailable     Unavailable  
   
                                Gravius, Gaby Unavailable     Unavailable  
   
                                Anjelica Gonzalez   Unavailable     Unavailable  
   
                                Neal, Catina Unavailable     Unavailable  
   
                                Marcos Spencer   Unavailable     Unavailable  
   
                                Tanphaichitr - Wilkins, Donald Unavailable     [phone]  
[phone]  
   
                                Cory DO, Mere Unavailable     [phone]  
[phone]  
   
                                Nikko Hanson MD Unavailable     [phone]  
[phone]  
   
                                Tanphaichitr - Wilkins, Donald Unavailable     [phone]  
[phone]  
   
                                Anjelica Gonzalez LPN Unavailable     Unavailable  
   
                                Gravius CMA, Gaby Unavailable     Unavailable  
   
                                Unavailable     Unavailable     [phone]  
   
                                Sierra Low LPN Unavailable     Unavailable  
   
                                Cory DO Mere Unavailable     [phone]  
[phone]  
   
                                Nikko Hanson MD Unavailable     [phone]  
[phone]  
   
                                Tanphaichitr - Wilkins, Donald Unavailable     [phone]  
[phone]  
   
                                Marcos Spencer LPN Unavailable     Unavailable  
   
                                Manchak CAROLINE, Rowena Unavailable     Unavailable  
   
                                Yolie Orona MA Unavailable     Unavailable  
   
                                Gravius CMA, Gaby Unavailable     Unavailable  
   
                                Unavailable     Unavailable     [phone]  
   
                                Marco Antonio VELAZQUEZ, Kayela Unavailable     Unavailable  
   
                                Cory DO Mere Unavailable     [phone]  
[phone]  
   
                                Michael, Sarkis   Unavailable     [phone]  
   
                                Slarb LPN, Lashell Unavailable     Unavailable  
   
                                [place] CHOCO, Ned Unavailable     Unavailable  
   
                                MERE CHAVEZ Referring       Unavailable  
   
                                MERE CHAVEZ Primary Care    Unavailable  
  
  
  
Allergies  
  
  
                                                    Allergy   
Classification                          Reported   
Allergen(s)               Allergy Type              Date of   
Onset                     Reaction(s)               Facility  
   
                                                    Penicillins   
(antibiotic)  
(3 sources)                             Penicillins;   
Translations:   
[Penicillins]   Drug Allergy                                    [facility]   
[facility]   
Med[facility];   
[facility]   
[facility]  
Work Phone:   
[phone]  
   
                                                      
(20 sources)                            Penicillins;   
Translations:   
[Penicillins]                           allergy to   
substance                                                   [facility]   
[facility]  
Work Phone:   
[phone]  
   
                                                      
(1 source)                              ALLERGIES NOT   
ON FILE;   
Translations:   
[ALLERGIES NOT   
ON FILE]                                Propensity to   
adverse   
reactions   
(disorder)                                                  [facility]   
Repository  
  
  
  
Medications  
Current Medications  
  
  
  
                      Medication Drug Class(es) Dates      Sig (Normalized) Sig   
(Original)  
   
                                                    niacin 500 mg oral   
tablet  
(20 sources)              Nicotinic Acid            Start:   
10-                                            
  
  
  
                                Start: 10-                   
   
                                Start: 2021                   
   
                                        Start: 10-   take 1 tablet by kayla  
th once   
daily                                   Niacin 500 MG Oral Tablet 1 (one)   
Tablet qd for 90 days Quantity: 90   
{Tablet} Refills: 3 Ordered:   
2-Oct-2020 Anjelica Gonzalez LPN Start   
: 2-Oct-2020 Active  
   
                                        Start: 2019   take 1 tablet by kayla  
th once   
daily                                   Niacin 500 MG Oral Tablet 1 (one)   
Tablet qd for 90 days Quantity: 90   
{Tablet} Refills: 3 Ordered:   
2019 Mere Chavez DO, DO, Kathleen Start :   
2019 Active  
   
                                        Start: 05-   take 1 tablet by kayla  
th once   
daily                                   Niacin 500 MG Oral Tablet 1 (one)   
Tablet qd for 90 days Quantity: 90   
{Tablet} Refills: 3 Ordered:   
10-May-2019 Catina De Jesus LPN   
Start : 10-May-2019 Active  
   
                                        Start: 2018   take 1 tablet by kayla  
th once   
daily                                   Niacin 500 MG Oral Tablet 1 (one)   
Tablet qd for 90 days Quantity: 90   
{Tablet} Refills: 3 Ordered:   
13-Aug-2018 Mere Chavez DO, DO, Kathleen Start :   
13-Aug-2018 Active  
   
                                                    Start: 2011  
End: 10-                                       
  
  
  
Completed/Discontinued Medications  
  
  
  
                      Medication Drug Class(es) Dates      Sig (Normalized) Sig   
(Original)  
   
                                                    amLODIPine 5 mg   
oral tablet  
(20 sources)                            Dihydropyridine Calcium   
Channel Blocker                         Start:   
02-                                            
  
  
  
                                Start: 2022                   
   
                                Start: 2021                   
   
                                        Start: 2020   take 1 tablet by kayla  
th once   
daily                                   Norvasc 5 MG Oral Tablet 1 (one)   
Tablet qd for 90 days Quantity: 90   
{Tablet} Refills: 3 Ordered:   
16-Dec-2020 Mere Chavez DO, DO, Kathleen Start :   
16-Dec-2020 Active  
   
                                        Start: 2020   take 1 tablet by kayla  
th twice   
daily                                   Norvasc 5 MG Oral Tablet 1 (one)   
Tablet bid for 90 days Quantity:   
180 {Tablet} Refills: 3 Ordered:   
2020 Mere Chavez DO, DO, Kathleen Start :   
2020 Active  
   
                                        Start: 06-   take 1 tablet by kayla  
th twice   
daily                                   Norvasc 2.5 MG Oral Tablet 1 Tablet   
bid for 90 days Quantity: 180   
{Tablet} Refills: 3 Ordered:   
15-Aaron-2020 Mere Chavez DO, DO, Kathleen Start :   
15-Aaron-2020 Active  
   
                                        Start: 05-   take 1 tablet by kayla  
th twice   
daily                                   Norvasc 2.5 MG Oral Tablet 1 Tablet   
bid for 90 days Quantity: 180   
{Tablet} Refills: 3 Ordered:   
10-May-2019 Catina De Jesus LPN   
Start : 10-May-2019 Active  
   
                                        Start: 2019   take 1 tablet by kayla  
th twice   
daily                                   Norvasc 2.5 MG Oral Tablet 1 Tablet   
bid for 90 days Quantity: 180   
{Tablet} Refills: 3 Ordered:   
2019 Cory Mere COSTELLO DO, Kathleen Start :   
2019 Active  
   
                                        Start: 2018   take 1 tablet by kayla  
th twice   
daily                                   Norvasc 2.5 MG Oral Tablet 1 Tablet   
bid for 90 days Quantity: 180   
{Tablet} Refills: 3 Ordered:   
2018 Cory DOMere DO, Kathleen Start :   
2018 Active  
  
  
  
                                                    aspirin 325 mg oral tablet  
(20 sources)                            Platelet Aggregation Inhibitor,   
Nonsteroidal Anti-inflammatory   
Drug                Start: 2012                         
   
                                                    cetirizine hydrochloride 10   
mg   
disintegrating oral tablet  
(20 sources)              Histamine-1 Receptor Antagonist Start: 2011  
End: 10-                                       
   
                                                    cholecalciferol 0.05 mg oral  
   
capsule  
(20 sources)    Vitamin D       Start: 2017                   
  
  
  
                                        Start: 2017   take 3 capsules by m  
out once   
daily                                   Vitamin D3 2000 UNIT Oral Capsule 3   
(three) Capsule Capsule qd for 0   
days Quantity: 90 {Capsule}   
Refills: 8 Ordered: 2017   
Mere Chavez DO, DO, Kathleen Start : 2017 Active  
  
  
  
                                                    hydroCHLOROthiazide 25 mg or  
al tablet  
(20 sources)    Thiazide Diuretic Start: 2022                   
  
  
  
                                Start: 2021                   
   
                                        Start: 2021   take 1 tablet by kayla  
th once   
daily in the morning                    hydroCHLOROthiazide 25 MG Oral Tablet 1   
(one) Tablet qam for 90 days Quantity:   
90 {Tablet} Refills: 3 Ordered:   
2021 Anjelica Gonzalez LPN Start :   
2021 Active  
  
  
  
                                                    LORazepam 0.5 mg oral tablet  
(20 sources)    Benzodiazepine  Start: 2022                   
  
  
  
                                                    Start: 2014  
End: 2020                                       
  
  
  
                                        Comment on above:   twenty   
   
                                                    losartan potassium 100 mg or  
al   
tablet  
(20 sources)    Angiotensin 2 Receptor Blocker Start: 2023                  
   
  
  
  
                                Start: 2023                   
   
                                Start: 2023                   
   
                                Start: 2023                   
   
                                Start: 2023                   
   
                                Start: 2022                   
   
                                Start: 2022                   
   
                                Start: 2021                   
   
                                        Start: 2021   take 1 tablet by kayla  
th once   
daily                                   Losartan Potassium 100 MG Oral   
Tablet 1 (one) Tablet qd for 0 days   
Quantity: 30 {Tablet} Refills: 3   
Ordered: 2021 Mere Chavez DO, DO, Kathleen Start   
: 2021 Active  
   
                                        Start: 2020   take 1 tablet by kayla  
th once   
daily                                   Losartan Potassium 100 MG Oral   
Tablet 1 (one) Tablet qd for 0 days   
Quantity: 30 {Tablet} Refills: 3   
Ordered: 16-Dec-2020 Anjelica Gonzalez LPN Start : 16-Dec-2020 Active  
  
  
  
                                                    lovastatin 20 mg oral tablet  
(20 sources)    HMG-CoA Reductase Inhibitor Start: 06-                   
  
  
  
                                Start: 2022                   
   
                                Start: 2021                   
   
                                        Start: 2020   take 1 tablet by kayla  
th once   
daily                                   Lovastatin 20 MG Oral Tablet 1   
(one) Tablet qd for 90 days   
Quantity: 90 {Tablet} Refills: 3   
Ordered: 2020 Mere Chavez DO, DO, Kathleen Start   
: 2020 Active  
   
                                        Start: 05-   take 1 tablet by kayla  
th once   
daily                                   Lovastatin 20 MG Oral Tablet 1   
(one) Tablet qd for 90 days   
Quantity: 90 {Tablet} Refills: 3   
Ordered: 10-May-2019 Catina De Jesus CHOCO Start : 10-May-2019   
Active  
   
                                        Start: 2018   take 1 tablet by kayla  
th once   
daily                                   Lovastatin 20 MG Oral Tablet 1   
(one) Tablet qd for 90 days   
Quantity: 90 {Tablet} Refills: 3   
Ordered: 13-Aug-2018 Mere Chavez DO, DO, Kathleen Start   
: 13-Aug-2018 Active  
  
  
  
                                                    24 hr lovastatin 20 mg / angel  
albert   
500 mg extended release oral   
tablet  
(20 sources)                            HMG-CoA Reductase Inhibitor,   
Nicotinic Acid                          Start: 2015  
End: 2018                                       
  
  
  
                                                    Start: 2015  
End: 2018                         take 1 tablet by mouth once   
daily                                   ADVICOR, 500-20MG (Oral Tablet   
Extended Release 24 Hour) 1 Tablet   
ER 24HR qd for 90 days Quantity: 90   
{Tablet} Refills: 3 Ordered:   
16-Dec-2015 Carline Spencer Start :   
16-Dec-2015 End : 2018   
Discontinued Comments: This order   
discontinued per [facility].  
   
                                                    Start: 2015  
End: 2018                         take 500-20 mg by mouth every   
twenty-four hours                       ADVICOR, 500-20MG (Oral Tablet   
Extended Release 24 Hour) 1 Tablet   
ER 24HR qd for 90 days Quantity: 90   
{Tablet} Refills: 3 Ordered:   
16-Dec-2015 Carline Spencer Start :   
16-Dec-2015 End : 2018   
Discontinued Comments: This order   
discontinued per [facility].  
  
  
  
                                        Comment on above:   This order discontin  
ued per [facility].   
   
                                                    24 hr metFORMIN hydrochlorid  
e   
500 mg extended release oral   
tablet  
(20 sources)    Biguanide       Start: 2023                   
  
  
  
                                Start: 2022                   
   
                                Start: 07-                   
   
                                Start: 05-                 metFORMIN HCl   
 MG Oral Tablet Extended Release 24 Hour   
1   
Tablet ER 24HR bid for 0 days Quantity: 180 {Tablet} Refills: 3   
Ordered: 15-Jul-2020 Cory COSTELLO Mere Cory DO, Mere   
Start : 15-Jul-2020 Active  
   
                                Start: 2018                 MetFORMIN HCl   
 MG Oral Tablet Extended Release 24 Hour   
1   
Tablet ER 24HR bid for 0 days Quantity: 180 {Tablet} Refills: 3   
Ordered: 13-Aug-2018 Cory Mere CoryNghia donohue DOeen   
Start : 13-Aug-2018 Active  
  
  
  
                                                    traMADol hydrochloride 50 mg  
 oral tablet  
(20 sources)    Opioid Agonist  Start: 2023                   
  
  
  
                                Start: 2022                   
   
                                Start: 2022                   
   
                                                    Start: 2017  
End: 2020                         take 1 tablet by mouth every   
eight hours as needed                   Ultram 50 MG Oral Tablet 1 (one)   
Tablet q 8 hr prn for 0 days   
Quantity: 30 {Tablet} Refills: 0   
Ordered: 2020 Gaby Chavez CMA Start : 8-Dec-2017 End :   
2020 Inactive Comments:   
thirtycalled in  oarrs reviwed   
[id number] oars kf  
  
  
  
                                        Comment on above:   thirtycalled in  o  
arrs reviwed [id number]   
oars kf   
   
                                                    vitamin k 0.1 mg oral tablet  
(20 sources)                                                      
   
                                                    NEGATED: Highlighted row has  
 not   
occurred!drug or medication  
(20 sources)                                                      
  
  
  
                                                                No Known Histori  
nelida Medications  
  
  
  
                                                    NEGATED: Highlighted row has  
 not occurred!No   
Known Historical Medications  
(5 sources)                                                     No Known Histori  
nelida Medications  
  
  
  
Problems  
Active Problems  
  
  
                                                    Problem   
Classification  Problem         Date            Documented Date Episodic/Chronic  
   
                                                    Adjustment disorders  
(20 sources)                            Acute situational   
disturbance;   
Translations: [Grief   
finding]                                  
Resolved:   
  
3                         2018                Chronic  
   
                                                    Administrative/socia  
l admission  
(20 sources)                            Administrative reason   
for encounter;   
Translations: [Other   
general medical   
examination for   
administrative purposes]                     2018          Episodic  
   
                                                    Anxiety disorders  
(20 sources)                            Anxiety; Translations:   
[Situational anxiety]                     2019          Chronic  
   
                                                    Diabetes mellitus   
without complication  
(20 sources)                            Type 2 diabetes   
mellitus; Translations:   
[Type II diabetes   
mellitus, well   
controlled]                             Onset:   
  
4                         2023                Chronic  
   
                                                    Diabetes mellitus   
without complication  
(20 sources)                            Abnormal glucose   
tolerance test;   
Translations:   
[Hyperglycemia]                         2018          Episodic  
   
                                                    Disorders of lipid   
metabolism  
(20 sources)                            Hypercholesterolemia;   
Translations:   
[Hypercholesteremia]                     2018          Chronic  
   
                                        Comment on above:   will work on diet mo  
re and add exercise   
   
                                                    Essential   
hypertension  
(20 sources)                            Benign essential   
hypertension;   
Translations:   
[Hypertension,   
essential, benign]                      2018          Chronic  
   
                                                    Immunizations and   
screening for   
infectious disease  
(20 sources)                            Need for prophylactic   
vaccination and   
inoculation against   
influenza; Translations:   
[Needs influenza   
immunization]                           2021          Episodic  
   
                                        Comment on above:   NOT GIVENNOT GIVEN   
   
                                                    Nonspecific chest   
pain  
(20 sources)                            Chest pain;   
Translations: [Chest   
pain]                                     
Resolved:   
  
1                         2016                Episodic  
   
                                                    Nutritional   
deficiencies  
(20 sources)                            Vitamin D deficiency;   
Translations: [Vitamin D   
deficiency]                             2018          Chronic  
   
                                                    Osteoarthritis  
(20 sources)                            Degenerative joint   
disease involving   
multiple joints;   
Translations:   
[Generalized   
osteoarthritis]                         2018          Chronic  
   
                                                    Other endocrine   
disorders  
(20 sources)                            Increased norepinephrine   
level; Translations:   
[Elevated norepinephrine   
level]                                  2021          Episodic  
   
                                                    Other hematologic   
conditions  
(20 sources)                            Erythrocytosis;   
Translations:   
[Polycythemia]                          2018          Episodic  
   
                                                    Other nutritional;   
endocrine; and   
metabolic disorders  
(20 sources)                            Body mass index 25-29 -   
overweight;   
Translations: [BMI   
28.0-28.9,adult]                          
Resolved:   
  
0                         2018                Chronic  
   
                                                    Other nutritional;   
endocrine; and   
metabolic disorders  
(20 sources)                            Hypercalcemia;   
Translations:   
[Hypercalcemia]                         2018          Chronic  
   
                                                    Other nutritional;   
endocrine; and   
metabolic disorders  
(20 sources)                            Body mass index 25-29 -   
overweight;   
Translations: [BMI   
25.0-25.9,adult]                          
Resolved:   
  
0                         2021                Episodic  
   
                                                    Other nutritional;   
endocrine; and   
metabolic disorders  
(20 sources)                            Overweight in adulthood   
with body mass index of   
25 or more but less than   
30; Translations: [BMI   
25.0-25.9,adult]                          
Resolved:   
  
0                         2022                Episodic  
   
                                                    Other screening for   
suspected conditions   
(not mental   
disorders or   
infectious disease)  
(20 sources)                            CT of chest abnormal;   
Translations: [Abnormal   
chest CT]                               2022          Chronic  
   
                                                    Other screening for   
suspected conditions   
(not mental   
disorders or   
infectious disease)  
(20 sources)                            Abnormal result of   
cardiovascular function   
study, unspecified;   
Translations: [Blood   
chemistry abnormal]                       
Resolved:   
  
6                         2018                Episodic  
   
                                        Comment on above:   i spoke to dr Niko chi nd he is going to see the cardiologist   
today   
at 3:30 -- he remembered seeing this echo-- i cxn his stress test   
and i want to see if cardio agrees that he needs a cath   
   
                                                            ordered cologardlast  
 scope    
   
                                                    Residual codes;   
unclassified  
(20 sources)                            Needs influenza   
immunization;   
Translations: [Need for   
prophylactic vaccination   
and inoculation against   
influenza (Renamed from   
Need for immunization   
against influenza)]                     2019          Episodic  
   
                                        Comment on above:   NOT GIVENNOT GIVEN   
   
                                                    Residual codes;   
unclassified  
(20 sources)                            Body mass index 20-24 -   
normal; Translations:   
[BMI 24.0-24.9, adult]                    
Resolved:   
  
0                         2021                Episodic  
   
                                                    Residual codes;   
unclassified  
(20 sources)                            Influenza vaccination   
declined; Translations:   
[Influenza vaccination   
declined (Renamed from   
Refused influenza   
vaccine)]                               2021          Episodic  
   
                                                    Spondylosis;   
intervertebral disc   
disorders; other   
back problems  
(20 sources)                            Degeneration of lumbar   
intervertebral disc;   
Translations: [DDD   
(degenerative disc   
disease), lumbar]                       2018          Chronic  
   
                                        Comment on above:   flares more during g  
olf season and takes one pain pill prn   
   
                                                    Spondylosis;   
intervertebral disc   
disorders; other   
back problems  
(20 sources)                            Low back pain;   
Translations: [Low back   
pain]                                   2020          Episodic  
   
                                                    Substance-related   
disorders  
(20 sources)                            Smoker; Translations:   
[Smoker]                                2018          Chronic  
   
                                        Comment on above:   weaning -- currently  
 5-7 cig daily   
   
                                                            weaning -- currently  
 5-10 cig daily---- back up to almost pack a   
day   
   
                                                    Unclassified  
(20 sources)                                                      
   
                                                    Unclassified  
(20 sources)                            Screening status;   
Translations: [Encounter   
for screening for   
malignant neoplasm of   
colon (Renamed from   
Special screening for   
malignant neoplasms,   
colon)]                                 2018            
   
                                        Comment on above:   ordered cologardlast  
 scope    
   
                                                    Unclassified  
(20 sources)                            Hypercholesteremia   
(272.0)                                                       
   
                                                    Unclassified  
(20 sources)                            Influenza vaccination   
declined; Translations:   
[Influenza vaccination   
declined (Renamed from   
Refused influenza   
vaccine)]                               2018            
   
                                                    Unclassified  
(20 sources)                            SCREENING FOR CANCER OF   
THE PROSTATE (V76.44)                                           
   
                                                    Unclassified  
(20 sources)                            HYPERTENSION, BENIGN   
ESSENTIAL (401.1)                                             
   
                                                    Unclassified  
(20 sources)    BMI 28.0-28.9,adult                                   
   
                                                    Unclassified  
(20 sources)    Hypercholesteremia                                   
   
                                                    Unclassified  
(20 sources)                            Hypertension, essential,   
benign                                                        
   
                                                    Unclassified  
(20 sources)                            DDD (degenerative disc   
disease), lumbar                                              
   
                                                    Unclassified  
(20 sources)    Polycythemia                                      
   
                                                    Unclassified  
(20 sources)                            Screening for prostate   
cancer                                                        
   
                                                    Unclassified  
(20 sources)    BMI 29.0-29.9,adult                                   
   
                                                    Unclassified  
(20 sources)    BMI 27.0-27.9,adult                                   
   
                                                    Unclassified  
(20 sources)    Situational anxiety                                   
   
                                                    Unclassified  
(20 sources)    BMI 26.0-26.9,adult                                   
   
                                                    Unclassified  
(20 sources)                            Body mass index 20-24 -   
normal; Translations:   
[BMI 24.0-24.9, adult]                    
Resolved:   
  
0                         2020                  
   
                                                    Unclassified  
(20 sources)    BMI 25.0-25.9,adult                                   
   
                                                    Unclassified  
(4 sources)                             Abnormal glucose   
tolerance test (Renamed   
from Abnormal glucose   
tolerance test (GTT))                                           
   
                                                    Unclassified  
(2 sources)                             Encounter for hepatitis   
C virus screening test   
for high risk patient                                           
  
  
Past or Other Problems  
  
  
                                                    Problem   
Classification  Problem         Date            Documented Date Episodic/Chronic  
   
                                                    Residual codes;   
unclassified  
(8 sources)                             Increased body mass   
index; Translations:   
[BMI 29.0-29.9,adult]                     
Resolved:   
2018                Episodic  
   
                                                    Unclassified  
(20 sources)    Abnormal EKG(794.31)                                   
   
                                                    Unclassified  
(20 sources)                            Other general medical   
examination for   
administrative   
purposes (V70.3)                                              
   
                                                    Unclassified  
(20 sources)                            Abnormal Cardiac Test   
(794.30)                                                      
   
                                                    Unclassified  
(20 sources)    Unspecified Diagnosis                 2018        
   
                                                    Unclassified  
(15 sources)                            Elevated   
norepinephrine level                                           
   
                                                    Unclassified  
(2 sources)     Stress reaction                                   
   
                                                    Unclassified  
(2 sources)     Grieving                                          
   
                                                    Unclassified  
(2 sources)                             Screening PSA   
(prostate specific   
antigen)                                                      
  
  
  
Results  
  
  
                          Test Name    Value        Interpretation Reference   
Range                                   Facility  
   
                                                    CT CARDIAC SCORING WO IV CON  
TRASTon 2024   
   
                                                    CT CARDIAC SCORING WO   
IV CONTRAST                             Interpreted By: Rahat Yousif,  
STUDY:  
CT CARDIAC SCORING WO IV   
CONTRAST; 2024 8:14   
am  
  
INDICATION:  
Signs/Symptoms:CARDIAC   
SCREENING TYPE 2 DM.  
  
COMPARISON:  
None.  
  
ACCESSION NUMBER(S):  
[id number]  
  
ORDERING CLINICIAN:  
MERE CHAVEZ  
  
TECHNIQUE:  
Using prospective ECG   
gating, CT scan of the   
coronary arteries was  
performed without   
intravenous contrast.   
Coronary calcium scoring   
was  
performed according to   
the method of Agatston.  
  
FINDINGS:  
The score and   
distribution of calcium   
in the coronary arteries   
is as  
follows:  
  
LM 0  
.54  
LCx 0  
RCA 0  
  
Total 562.54  
  
The visualized mid/lower   
ascending thoracic aorta   
measures 3.5 cm in  
diameter. The heart is   
normal in size. No   
pericardial effusion is  
present.  
  
No gross evidence of   
mediastinal or hilar   
lymphadenopathy or masses  
is identified. The   
visualized segments of   
the lungs are normally  
expanded.  
  
The visualized   
subdiaphragmatic   
structures appear intact.  
  
IMPRESSION:  
1. Coronary artery   
calcium score of 562.54  
  
*Coronary artery calcium   
scoring may be helpful in   
predicting the  
risk for future coronary   
heart disease events.   
According to the  
[employer]   
[employer] Foundation   
Clinical Expert Consensus  
Task Force, such testing   
provides important   
prognostic information in  
patients with more than   
one coronary heart   
disease risk factor. The  
coronary artery calcium   
score correlates with the   
annual risk of a  
non-fatal myocardial   
infarction or coronary   
heart disease death.  
  
Coronary artery score   
Annual Risk  
  
0-99 0.4%  
100-399 1.3%  
>400 2.4%  
  
These three  breakpoints    
correspond to lower,   
intermediate and high  
risk states for future   
coronary events. Such   
information should be  
used, along with   
appropriate clinical   
judgment, to make   
decisions  
regarding the intensity   
of risk factor management   
strategies to treat  
blood lipids and to   
modify other non-lipid   
coronary risk factors.  
  
Reference: [place] P et   
al. Circulation. 2007;   
115:402-426  
  
MACRO:  
None  
  
Signed by: Rahat Yousif 2024 1:35   
PM  
Dictation workstation:   
[id number]        [facility]  
   
                                                    CALCIFEDIOL (03125)Ordered B  
y:  on 2023   
   
                                                    25-hydroxyvitamin D   
[Mass/Vol]      66.7 ng/mL      Normal          30.0-100.0      [facility];   
[facility]  
Work Phone:   
[phone]  
   
                                                    CBC with auto diff (35301)Or  
dered By:  on 2023   
   
                                                    Basophils (Bld)   
[#/Vol]         0.1 10*3/uL     Normal          0.0-0.2         [facility];   
[facility]  
Work Phone:   
[phone]  
   
                                                    Basophils/100 WBC   
(Bld)           1 %             Normal                          [facility];   
[facility]  
Work Phone:   
[phone]  
   
                                                    Eosinophils (Bld)   
[#/Vol]         0.1 10*3/uL     Normal          0.0-0.4         [facility];   
[facility]  
Work Phone:   
[phone]  
   
                                                    Eosinophils/100 WBC   
(Bld)           2 %             Normal                          [facility];   
[facility]  
Work Phone:   
[phone]  
   
                                                    Erythrocyte   
distribution width   
(RBC) [Ratio]   11.9 %          Normal          11.6-15.4       [facility];   
[facility]  
Work Phone:   
[phone]  
   
                                                    Hematocrit (Bld)   
[Volume fraction] 48.0 %          Normal          37.5-51.0       [facility];   
[facility]  
Work Phone:   
[phone]  
   
                                                    Hemoglobin (Bld)   
[Mass/Vol]      16.3 g/dL       Normal          13.0-17.7       [facility];   
[facility]  
Work Phone:   
[phone]  
   
                                                    Immature granulocytes   
(Bld) [#/Vol]   0.0 10*3/uL     Normal          0.0-0.1         [facility];   
[facility]  
Work Phone:   
[phone]  
   
                                                    Immature   
granulocytes/100 WBC   
(Bld)           0 %             Normal                          [facility];   
[facility]  
Work Phone:   
[phone]  
   
                                                    Lymphocytes (Bld)   
[#/Vol]         2.4 10*3/uL     Normal          0.7-3.1         [facility];   
[facility]  
Work Phone:   
[phone]  
   
                                                    Lymphocytes/100 WBC   
(Bld)           34 %            Normal                          [facility];   
[facility]  
Work Phone:   
[phone]  
   
                                                    MCH (RBC) [Entitic   
mass]           31.6 pg         Normal          26.6-33.0       [facility];   
[facility]  
Work Phone:   
[phone]  
   
                      MCHC (RBC) [Mass/Vol] 34.0 g/dL  Normal     31.5-35.7  [facility]  
[facility];   
[facility]  
Work Phone:   
[phone]  
   
                                                    MCV (RBC) [Entitic   
vol]            93 fL           Normal          79-97           [facility];   
[facility]  
Work Phone:   
[phone]  
   
                                                    Monocytes (Bld)   
[#/Vol]         0.6 10*3/uL     Normal          0.1-0.9         [facility];   
[facility]  
Work Phone:   
[phone]  
   
                                                    Monocytes/100 WBC   
(Bld)           9 %             Normal                          [facility];   
[facility]  
Work Phone:   
[phone]  
   
                                                    Neutrophils (Bld)   
[#/Vol]         3.7 10*3/uL     Normal          1.4-7.0         [facility];   
[facility]  
Work Phone:   
[phone]  
   
                                                    Neutrophils/100 WBC   
(Bld)           54 %            Normal                          [facility];   
[facility]  
Work Phone:   
[phone]  
   
                                                    Platelets (Bld)   
[#/Vol]         263 10*3/uL     Normal          150-450         [facility];   
[facility]  
Work Phone:   
[phone]  
   
                      RBC (Bld) [#/Vol] 5.16 10*6/uL Normal     4.14-5.80  [facility];   
[facility]  
Work Phone:   
[phone]  
   
                      WBC (Bld) [#/Vol] 6.9 10*3/uL Normal     3.4-10.8   [facility];   
[facility]  
Work Phone:   
[phone]  
   
                                                    HGB A1C (27997)Ordered By: S  
ystem Manager on 2023   
   
                                                    HbA1c (Bld) [Mass   
fraction]       6.0 %           Abnormal        4.8-5.6         [facility];   
[facility]  
Work Phone:   
[phone]  
   
                                                    LIPID PANEL (67810)Ordered B  
y:  on 2023   
   
                                                    Cholesterol   
[Mass/Vol]      138 mg/dL       Normal          100-199         [facility];   
[facility]  
Work Phone:   
[phone]  
   
                                                    Cholesterol in HDL   
[Mass/Vol]      59 mg/dL        Normal                          [facility];   
[facility]  
Work Phone:   
[phone]  
   
                                                    Triglyceride   
[Mass/Vol]      90 mg/dL        Normal          0-149           [facility];   
[facility]  
Work Phone:   
[phone]  
   
                      LIPID PANEL (89616) 17 mg/dL   Normal     5-40       [facility];   
[facility]  
Work Phone:   
[phone]  
   
                      LIPID PANEL (56411) 62 mg/dL   Normal     0-99       [facility]  
[facility]   
[facility]   
[facility];   
[facility]  
Work Phone:   
[phone]  
   
                      LIPID PANEL (60030) 1.1 {ratio} Normal     0.0-3.6    [facility]  
[facility];   
[facility]  
Work Phone:   
[phone]  
   
                                                    METABOLIC PANEL, COMPREHENSI  
VE (64159)Ordered By:  on 2023   
   
                      Albumin [Mass/Vol] 4.6 g/dL   Normal     3.8-4.9    [facility]   
Internal   
Medicine;   
[facility]  
Work Phone:   
[phone]  
   
                                                    Albumin/Globulin   
[Mass ratio]    2.3 {ratio}     Abnormal        1.2-2.2         [facility];   
[facility]  
Work Phone:   
[phone]  
   
                                                    ALP [Catalytic   
activity/Vol]   49 U/L          Normal                    [facility];   
[facility]  
Work Phone:   
[phone]  
   
                                                    ALT [Catalytic   
activity/Vol]   35 U/L          Normal          0-44            [facility];   
[facility]  
Work Phone:   
[phone]  
   
                                                    AST [Catalytic   
activity/Vol]   26 U/L          Normal          0-40            [facility];   
[facility]  
Work Phone:   
[phone]  
   
                      Bilirubin [Mass/Vol] 0.5 mg/dL  Normal     0.0-1.2    [facility]  
[facility]   
[facility]   
[facility];   
[facility]  
Work Phone:   
[phone]  
   
                      Calcium [Mass/Vol] 9.9 mg/dL  Normal     8.7-10.2   [facility]   
Internal   
Medicine;   
[facility]  
Work Phone:   
[phone]  
   
                      Chloride [Moles/Vol] 101 mmol/L Normal          [facility]  
[facility]   
[facility]   
[facility];   
[facility]  
Work Phone:   
[phone]  
   
                      CO2 [Moles/Vol] 24 mmol/L  Normal     20-29      [facility]   
Internal   
Medicine;   
[facility]  
Work Phone:   
[phone]  
   
                      Creatinine [Mass/Vol] 0.98 mg/dL Normal     0.76-1.27  [facility]  
[facility]   
[facility]   
[facility];   
[facility]  
Work Phone:   
[phone]  
   
                                                    Globulin (S)   
[Mass/Vol]      2.0 g/dL        Normal          1.5-4.5         [facility];   
[facility]  
Work Phone:   
[phone]  
   
                      Glucose [Mass/Vol] 127 mg/dL  Abnormal   70-99      [facility]   
Internal   
Medicine;   
[facility]  
Work Phone:   
[phone]  
   
                      Potassium [Moles/Vol] 4.8 mmol/L Normal     3.5-5.2    [facility]  
[facility]   
[facility]   
[facility];   
[facility]  
Work Phone:   
[phone]  
   
                      Protein [Mass/Vol] 6.6 g/dL   Normal     6.0-8.5    [facility]  
[facility]   
[facility];   
[facility]  
Work Phone:   
[phone]  
   
                      Sodium [Moles/Vol] 140 mmol/L Normal     134-144    [facility]  
[facility]   
[facility];   
[facility]  
Work Phone:   
[phone]  
   
                                                    Urea nitrogen   
[Mass/Vol]      10 mg/dL        Normal          6-24            [facility];   
[facility]  
Work Phone:   
[phone]  
   
                                                    Urea   
nitrogen/Creatinine   
[Mass ratio]    10 mg/mg        Normal          9-20            [facility];   
[facility]  
Work Phone:   
[phone]  
   
                                                    METABOLIC PANEL,   
COMPREHENSIVE (72234) 89 mL/min/1.73  Normal                          [facility]  
[facility]   
Internal   
Medicine;   
[facility]   
[facility]  
Work Phone:   
[phone]  
   
                                                    MICROALBUMINOrdered By: Syst  
em Manager on 2023   
   
                                                    Albumin DL <= 20 mg/L   
(U) [Mass/Vol]  7.6 ug/mL       Normal                          [facility];   
[facility]  
Work Phone:   
[phone]  
   
                                                    Albumin/Creatinine   
(U) [Mass ratio] 6 {mg/g_creat}  Normal          0-29            [facility];   
[facility]  
Work Phone:   
[phone]  
   
                                                    Creatinine (U)   
[Mass/Vol]      129.2 mg/dL     Normal                          [facility];   
[facility]  
Work Phone:   
[phone]  
   
                                                    TSH (THYROID STIMULATING HOR  
ALICIA) (58181)Ordered By:  on   
2023   
   
                          TSH Qn       1.630 {uIU/mL} Normal       0.450-4.50  
0                                       [facility];   
[facility]  
Work Phone:   
[phone]  
   
                                                    HGB A1C (73983)Ordered By: S  
ystem Manager on 2023   
   
                                                    HbA1c (Bld) [Mass   
fraction]       6.0 %           Abnormal        4.8-5.6         [facility];   
[facility]  
Work Phone:   
[phone]  
   
                                                    PSA (PROSTATE SPECIFIC ANTIG  
EN) (V76.44)Ordered By:  on 2023  
  
   
                                                    Prostate specific Ag   
[Mass/Vol]      0.9 ng/mL       Normal          0.0-4.0         [facility];   
[facility]  
Work Phone:   
[phone]  
   
                                                    CALCIFIDIOL (74574) VIT D 25  
Ordered By:  on 2022   
   
                                                    25-hydroxyvitamin D   
[Mass/Vol]      110.0 ng/mL     Abnormal        30.0-100.0      [facility];   
[facility]  
Work Phone:   
[phone]  
   
                                                    CBC with auto diff (99292)Or  
dered By:  on 2022   
   
                                                    Basophils (Bld)   
[#/Vol]         0.1 10*3/uL     Normal          0.0-0.2         [facility];   
[facility]  
Work Phone:   
[phone]  
   
                                                    Basophils/100 WBC   
(Bld)           1 %             Normal                          [facility];   
[facility]  
Work Phone:   
[phone]  
   
                                                    Eosinophils (Bld)   
[#/Vol]         0.1 10*3/uL     Normal          0.0-0.4         [facility];   
[facility]  
Work Phone:   
[phone]  
   
                                                    Eosinophils/100 WBC   
(Bld)           1 %             Normal                          [facility];   
[facility]  
Work Phone:   
[phone]  
   
                                                    Erythrocyte   
distribution width   
(RBC) [Ratio]   12.0 %          Normal          11.6-15.4       [facility];   
[facility]  
Work Phone:   
[phone]  
   
                                                    Hematocrit (Bld)   
[Volume fraction] 47.9 %          Normal          37.5-51.0       [facility];   
[facility]  
Work Phone:   
[phone]  
   
                                                    Hemoglobin (Bld)   
[Mass/Vol]      16.5 g/dL       Normal          13.0-17.7       [facility];   
[facility]  
Work Phone:   
[phone]  
   
                                                    Immature granulocytes   
(Bld) [#/Vol]   0.0 10*3/uL     Normal          0.0-0.1         [facility];   
[facility]  
Work Phone:   
[phone]  
   
                                                    Immature   
granulocytes/100 WBC   
(Bld)           0 %             Normal                          [facility];   
[facility]  
Work Phone:   
[phone]  
   
                                                    Lymphocytes (Bld)   
[#/Vol]         2.3 10*3/uL     Normal          0.7-3.1         [facility];   
[facility]  
Work Phone:   
[phone]  
   
                                                    Lymphocytes/100 WBC   
(Bld)           29 %            Normal                          [facility];   
[facility]  
Work Phone:   
[phone]  
   
                                                    MCH (RBC) [Entitic   
mass]           33.8 pg         Abnormal        26.6-33.0       [facility];   
[facility]  
Work Phone:   
[phone]  
   
                      MCHC (RBC) [Mass/Vol] 34.4 g/dL  Normal     31.5-35.7  [facility]  
[facility]   
[facility]   
Med[facility];   
[facility]  
Work Phone:   
[phone]  
   
                                                    MCV (RBC) [Entitic   
vol]            98 fL           Abnormal        79-97           [facility];   
[facility]  
Work Phone:   
[phone]  
   
                                                    Monocytes (Bld)   
[#/Vol]         0.8 10*3/uL     Normal          0.1-0.9         [facility];   
[facility]  
Work Phone:   
[phone]  
   
                                                    Monocytes/100 WBC   
(Bld)           10 %            Normal                          [facility];   
[facility]  
Work Phone:   
[phone]  
   
                                                    Neutrophils (Bld)   
[#/Vol]         4.7 10*3/uL     Normal          1.4-7.0         [facility];   
[facility]  
Work Phone:   
[phone]  
   
                                                    Neutrophils/100 WBC   
(Bld)           59 %            Normal                          [facility];   
[facility]  
Work Phone:   
[phone]  
   
                                                    Platelets (Bld)   
[#/Vol]         218 10*3/uL     Normal          150-450         [facility];   
[facility]  
Work Phone:   
[phone]  
   
                      RBC (Bld) [#/Vol] 4.88 10*6/uL Normal     4.14-5.80  [facility]  
ehensive   
Internal   
Medicine;   
[facility]   
[facility]  
Work Phone:   
[phone]  
   
                      WBC (Bld) [#/Vol] 8.0 10*3/uL Normal     3.4-10.8   [facility]   
Internal   
Medicine;   
[facility]   
[facility]  
Work Phone:   
[phone]  
   
                                                    HGB A1C (09447)Ordered By: S  
ystem Manager on 2022   
   
                                                    HbA1c (Bld) [Mass   
fraction]       6.0 %           Abnormal        4.8-5.6         [facility];   
[facility]  
Work Phone:   
[phone]  
   
                                                    LIPID PANEL (80522)Ordered B  
y:  on 2022   
   
                                                    Cholesterol   
[Mass/Vol]      159 mg/dL       Normal          100-199         [facility];   
[facility]  
Work Phone:   
[phone]  
   
                                                    Cholesterol in HDL   
[Mass/Vol]      48 mg/dL        Normal                          [facility];   
[facility]   
[facility]  
Work Phone:   
[phone]  
   
                                                    Triglyceride   
[Mass/Vol]      97 mg/dL        Normal          0-149           [facility];   
[facility]   
[facility]  
Work Phone:   
[phone]  
   
                      LIPID PANEL (52624) 18 mg/dL   Normal     5-40       [facility]   
Internal   
Medicine;   
[facility]   
Med[facility]  
Work Phone:   
[phone]  
   
                      LIPID PANEL (50709) 93 mg/dL   Normal     0-99       [facility]   
Internal   
Medicine;   
[facility]   
Med[facility]  
Work Phone:   
[phone]  
   
                      LIPID PANEL (21296) 1.9 {ratio} Normal     0.0-3.6    [facility]  
[facility]   
[facility]   
Medicine;   
[facility]   
Med[facility]  
Work Phone:   
[phone]  
   
                                                    METABOLIC PANEL, COMPREHENSI  
VE (78585)Ordered By:  on 2022   
   
                      Albumin [Mass/Vol] 4.7 g/dL   Normal     3.8-4.9    [facility]   
Internal   
Medicine;   
[facility]   
Med[facility]  
Work Phone:   
[phone]  
   
                                                    Albumin/Globulin   
[Mass ratio]    2.1 {ratio}     Normal          1.2-2.2         [facility];   
[facility]   
[facility]  
Work Phone:   
[phone]  
   
                                                    ALP [Catalytic   
activity/Vol]   51 U/L          Normal                    [facility]   
[facility];   
[facility]   
[facility]  
Work Phone:   
[phone]  
   
                                                    ALT [Catalytic   
activity/Vol]   24 U/L          Normal          0-44            [facility];   
[facility]   
[facility]  
Work Phone:   
[phone]  
   
                                                    AST [Catalytic   
activity/Vol]   22 U/L          Normal          0-40            [facility]   
[facility];   
[facility]   
[facility]  
Work Phone:   
[phone]  
   
                      Bilirubin [Mass/Vol] 0.4 mg/dL  Normal     0.0-1.2    [facility]  
[facility]   
[facility]   
Medicine;   
[facility]   
Medicine  
Work Phone:   
[phone]  
   
                      Calcium [Mass/Vol] 10.1 mg/dL Normal     8.7-10.2   [facility]   
Internal   
Medicine;   
[facility]   
[facility]   
Medicine  
Work Phone:   
[phone]  
   
                      Chloride [Moles/Vol] 100 mmol/L Normal          [facility]  
[facility]   
Internal   
Medicine;   
[facility]   
[facility]   
Medicine  
Work Phone:   
[phone]  
   
                      CO2 [Moles/Vol] 24 mmol/L  Normal     20-29      [facility]   
Internal   
Medicine;   
[facility]   
[facility]   
Medicine  
Work Phone:   
[phone]  
   
                      Creatinine [Mass/Vol] 0.96 mg/dL Normal     0.76-1.27  Com  
prehensive   
Internal   
Medicine;   
[facility]   
[facility]   
Medicine  
Work Phone:   
[phone]  
   
                                                    Globulin (S)   
[Mass/Vol]      2.2 g/dL        Normal          1.5-4.5         [facility]   
[facility];   
[facility]   
[facility]   
Medicine  
Work Phone:   
[phone]  
   
                      Glucose [Mass/Vol] 121 mg/dL  Abnormal   70-99      [facility]   
Internal   
Medicine;   
[facility]   
[facility]   
Medicine  
Work Phone:   
[phone]  
   
                      Potassium [Moles/Vol] 4.4 mmol/L Normal     3.5-5.2    Com  
[facility]   
Internal   
Medicine;   
[facility]   
[facility]   
Medicine  
Work Phone:   
[phone]  
   
                      Protein [Mass/Vol] 6.9 g/dL   Normal     6.0-8.5    [facility]   
Internal   
Medicine;   
[facility]   
[facility]   
Medicine  
Work Phone:   
[phone]  
   
                      Sodium [Moles/Vol] 139 mmol/L Normal     134-144    [facility]   
Internal   
Medicine;   
[facility]   
[facility]   
Medicine  
Work Phone:   
[phone]  
   
                                                    Urea nitrogen   
[Mass/Vol]      11 mg/dL        Normal          6-24            [facility]   
[facility];   
[facility]   
Medicine  
Work Phone:   
[phone]  
   
                                                    Urea   
nitrogen/Creatinine   
[Mass ratio]    11 mg/mg        Normal          9-20            [facility]   
[facility];   
[facility]   
[facility]   
Medicine  
Work Phone:   
[phone]  
   
                                                    METABOLIC PANEL,   
COMPREHENSIVE (51637) 91 mL/min/1.73  Normal                          [facility]   
Internal   
Medicine;   
[facility]   
[facility]   
Medicine  
Work Phone:   
[phone]  
   
                                                    MICROALBUMINOrdered By: Syst  
em Manager on 2022   
   
                                                    Albumin DL <= 20 mg/L   
(U) [Mass/Vol]  mg/dL           Normal                          [facility]   
[facility];   
[facility]   
Medicine  
Work Phone:   
[phone]  
   
                                                    Albumin/Creatinine   
(U) [Mass ratio] <11             Normal          0-29            [facility]   
[facility];   
[facility]   
[facility]   
Medicine  
Work Phone:   
[phone]  
   
                                                    Creatinine (U)   
[Mass/Vol]      26.5 mg/dL      Normal                          [facility]   
[facility];   
[facility]   
[facility]   
Medicine  
Work Phone:   
[phone]  
   
                                                    TSH (THYROID STIMULATING HOR  
ALICIA) (25053)Ordered By:  on   
2022   
   
                          TSH Qn       1.670 {uIU/mL} Normal       0.450-4.50  
0                                       [facility]   
[facility];   
[facility]   
[facility]   
Medicine  
Work Phone:   
[phone]  
   
                                                    CBC, PLATELETS & MANUAL DIFF  
 (27429)Ordered By:  on 2022   
   
                                                    Basophils (Bld)   
[#/Vol]         0.1 10*3/uL     Normal          0.0-0.2         [facility]   
[facility];   
[facility]   
[facility]   
Medicine  
Work Phone:   
[phone]  
   
                                                    Basophils/100 WBC   
(Bld)           1 %             Normal                          [facility]   
[facility]   
Med[facility];   
[facility]   
[facility]   
Medicine  
Work Phone:   
[phone]  
   
                                                    Eosinophils (Bld)   
[#/Vol]         0.2 10*3/uL     Normal          0.0-0.4         [facility]   
[facility];   
[facility]   
[facility]   
Medicine  
Work Phone:   
[phone]  
   
                                                    Eosinophils/100 WBC   
(Bld)           2 %             Normal                          [facility]   
Med[facility];   
[facility]   
[facility]   
Medicine  
Work Phone:   
[phone]  
   
                                                    Erythrocyte   
distribution width   
(RBC) [Ratio]   11.9 %          Normal          11.6-15.4       [facility]   
[facility];   
[facility]   
[facility]   
Medicine  
Work Phone:   
[phone]  
   
                                                    Hematocrit (Bld)   
[Volume fraction] 48.3 %          Normal          37.5-51.0       [facility]   
[facility];   
[facility]   
[facility]   
Medicine  
Work Phone:   
[phone]  
   
                                                    Hemoglobin (Bld)   
[Mass/Vol]      16.7 g/dL       Normal          13.0-17.7       [facility]   
[facility];   
[facility]   
[facility]   
Medicine  
Work Phone:   
[phone]  
   
                                                    Immature granulocytes   
(Bld) [#/Vol]   0.0 10*3/uL     Normal          0.0-0.1         [facility]   
[facility];   
[facility]   
[facility]   
Medicine  
Work Phone:   
[phone]  
   
                                                    Immature   
granulocytes/100 WBC   
(Bld)           0 %             Normal                          [facility]   
[facility]   
Med[facility];   
[facility]   
[facility]   
Medicine  
Work Phone:   
[phone]  
   
                                                    Lymphocytes (Bld)   
[#/Vol]         3.4 10*3/uL     Abnormal        0.7-3.1         [facility]   
Med[facility];   
[facility]   
[facility]   
Medicine  
Work Phone:   
[phone]  
   
                                                    Lymphocytes/100 WBC   
(Bld)           35 %            Normal                          [facility]   
[facility]   
Med[facility];   
[facility]   
[facility]   
Medicine  
Work Phone:   
[phone]  
   
                                                    MCH (RBC) [Entitic   
mass]           33.9 pg         Abnormal        26.6-33.0       [facility]   
[facility]   
Med[facility];   
[facility]   
[facility]   
Medicine  
Work Phone:   
[phone]  
   
                      MCHC (RBC) [Mass/Vol] 34.6 g/dL  Normal     31.5-35.7  [facility];   
[facility]   
[facility]   
Medicine  
Work Phone:   
[phone]  
   
                                                    MCV (RBC) [Entitic   
vol]            98 fL           Abnormal        79-97           [facility]   
Med[facility];   
[facility]   
[facility]   
Medicine  
Work Phone:   
[phone]  
   
                                                    Monocytes (Bld)   
[#/Vol]         0.8 10*3/uL     Normal          0.1-0.9         [facility]   
[facility];   
[facility]   
[facility]   
Medicine  
Work Phone:   
[phone]  
   
                                                    Monocytes/100 WBC   
(Bld)           9 %             Normal                          [facility]   
[facility]   
Med[facility];   
[facility]   
[facility]   
Medicine  
Work Phone:   
[phone]  
   
                                                    Neutrophils (Bld)   
[#/Vol]         5.2 10*3/uL     Normal          1.4-7.0         [facility]   
[facility]   
Med[facility];   
[facility]   
Internal   
Medicine  
Work Phone:   
[phone]  
   
                                                    Neutrophils/100 WBC   
(Bld)           53 %            Normal                          [facility]   
[facility]   
Medicine;   
[facility]   
[facility]   
Medicine  
Work Phone:   
[phone]  
   
                                                    Platelets (Bld)   
[#/Vol]         216 10*3/uL     Normal          150-450         [facility]   
[facility]   
Med[facility];   
[facility]   
Internal   
Medicine  
Work Phone:   
[phone]  
   
                      RBC (Bld) [#/Vol] 4.93 10*6/uL Normal     4.14-5.80  [facility]  
ehensive   
Internal   
Medicine;   
[facility]   
Internal   
Medicine  
Work Phone:   
[phone]  
   
                      WBC (Bld) [#/Vol] 9.6 10*3/uL Normal     3.4-10.8   [facility]   
[facility]   
[facility];   
[facility]   
Internal   
Medicine  
Work Phone:   
[phone]  
   
                                                    CALCIFEDIOL (81987)Ordered B  
y:  on 2022   
   
                                                    25-hydroxyvitamin D   
[Mass/Vol]      95.6 ng/mL      Normal          30.0-100.0      [facility]   
[facility]   
Med[facility];   
[facility]   
[facility]   
Medicine  
Work Phone:   
[phone]  
   
                                                    CBC with auto diff (42357)Or  
dered By:  on 2022   
   
                                                    Basophils (Bld)   
[#/Vol]         0.1 10*3/uL     Normal          0.0-0.2         [facility]   
[facility]   
Med[facility];   
[facility]   
Internal   
Medicine  
Work Phone:   
[phone]  
   
                                                    Basophils/100 WBC   
(Bld)           0 %             Normal                          [facility]   
[facility]   
Medicine;   
[facility]   
Internal   
Medicine  
Work Phone:   
[phone]  
   
                                                    Eosinophils (Bld)   
[#/Vol]         0.1 10*3/uL     Normal          0.0-0.4         [facility]   
[facility]   
Med[facility];   
[facility]   
Internal   
Medicine  
Work Phone:   
[phone]  
   
                                                    Eosinophils/100 WBC   
(Bld)           0 %             Normal                          [facility]   
[facility]   
Medicine;   
[facility]   
Internal   
Medicine  
Work Phone:   
[phone]  
   
                                                    Erythrocyte   
distribution width   
(RBC) [Ratio]   11.8 %          Normal          11.6-15.4       [facility]   
[facility]   
Medicine;   
[facility]   
Internal   
Medicine  
Work Phone:   
[phone]  
   
                                                    Hematocrit (Bld)   
[Volume fraction] 49.9 %          Normal          37.5-51.0       [facility]   
[facility]   
Medicine;   
[facility]   
Internal   
Medicine  
Work Phone:   
[phone]  
   
                                                    Hemoglobin (Bld)   
[Mass/Vol]      17.2 g/dL       Normal          13.0-17.7       [facility]   
[facility]   
Medicine;   
[facility]   
Internal   
Medicine  
Work Phone:   
[phone]  
   
                                                    Immature granulocytes   
(Bld) [#/Vol]   0.1 10*3/uL     Normal          0.0-0.1         [facility]   
[facility]   
Medicine;   
[facility]   
Internal   
Medicine  
Work Phone:   
[phone]  
   
                                                    Immature   
granulocytes/100 WBC   
(Bld)           1 %             Normal                          [facility]   
[facility]   
Medicine;   
[facility]   
Internal   
Medicine  
Work Phone:   
[phone]  
   
                                                    Lymphocytes (Bld)   
[#/Vol]         3.8 10*3/uL     Abnormal        0.7-3.1         [facility]   
[facility]   
Medicine;   
[facility]   
Internal   
Medicine  
Work Phone:   
[phone]  
   
                                                    Lymphocytes/100 WBC   
(Bld)           31 %            Normal                          [facility]   
Internal   
Medicine;   
[facility]   
Internal   
Medicine  
Work Phone:   
[phone]  
   
                                                    MCH (RBC) [Entitic   
mass]           32.9 pg         Normal          26.6-33.0       [facility]   
[facility]   
Medicine;   
[facility]   
Internal   
Medicine  
Work Phone:   
[phone]  
   
                      MCHC (RBC) [Mass/Vol] 34.5 g/dL  Normal     31.5-35.7  Com  
prehensive   
Internal   
Medicine;   
[facility]   
Internal   
Medicine  
Work Phone:   
[phone]  
   
                                                    MCV (RBC) [Entitic   
vol]            95 fL           Normal          79-97           [facility]   
[facility]   
Medicine;   
[facility]   
Internal   
Medicine  
Work Phone:   
[phone]  
   
                                                    Monocytes (Bld)   
[#/Vol]         0.8 10*3/uL     Normal          0.1-0.9         [facility]   
[facility]   
Medicine;   
[facility]   
Internal   
Medicine  
Work Phone:   
[phone]  
   
                                                    Monocytes/100 WBC   
(Bld)           7 %             Normal                          [facility]   
Internal   
Medicine;   
[facility]   
Internal   
Medicine  
Work Phone:   
[phone]  
   
                                                    Neutrophils (Bld)   
[#/Vol]         7.2 10*3/uL     Abnormal        1.4-7.0         [facility]   
[facility];   
[facility]   
[facility]  
Work Phone:   
[phone]  
   
                                                    Neutrophils/100 WBC   
(Bld)           61 %            Normal                          [facility]   
[facility];   
[facility]   
[facility]   
Med[facility]  
Work Phone:   
[phone]  
   
                                                    Platelets (Bld)   
[#/Vol]         265 10*3/uL     Normal          150-450         [facility]   
[facility];   
[facility]   
[facility]   
Medicine  
Work Phone:   
[phone]  
   
                      RBC (Bld) [#/Vol] 5.23 10*6/uL Normal     4.14-5.80  Compr  
[facility]   
Internal   
Medicine;   
[facility]   
[facility]   
Medicine  
Work Phone:   
[phone]  
   
                      WBC (Bld) [#/Vol] 12.0 10*3/uL Abnormal   3.4-10.8   [facility]   
Internal   
Medicine;   
[facility]   
[facility]   
Medicine  
Work Phone:   
[phone]  
   
                                                    HGB A1C (16834)Ordered By: S  
ystem Manager on 2022   
   
                                                    HbA1c (Bld) [Mass   
fraction]       5.8 %           Abnormal        4.8-5.6         [facility]   
[facility];   
[facility]   
[facility]   
Medicine  
Work Phone:   
[phone]  
   
                                                    LIPID PANEL (69816)Ordered B  
y:  on 2022   
   
                                                    Cholesterol   
[Mass/Vol]      158 mg/dL       Normal          100-199         [facility]   
[facility];   
[facility]   
Medicine  
Work Phone:   
[phone]  
   
                                                    Cholesterol in HDL   
[Mass/Vol]      60 mg/dL        Normal                          [facility]   
[facility]   
Med[facility];   
[facility]   
[facility]   
Medicine  
Work Phone:   
[phone]  
   
                                                    Triglyceride   
[Mass/Vol]      85 mg/dL        Normal          0-149           [facility]   
[facility];   
[facility]   
[facility]   
Medicine  
Work Phone:   
[phone]  
   
                      LIPID PANEL (12406) 16 mg/dL   Normal     5-40       [facility]   
Internal   
Medicine;   
[facility]   
Internal   
Medicine  
Work Phone:   
[phone]  
   
                      LIPID PANEL (16404) 82 mg/dL   Normal     0-99       [facility]   
Internal   
Medicine;   
[facility]   
[facility]   
Medicine  
Work Phone:   
[phone]  
   
                      LIPID PANEL (67051) 1.4 {ratio} Normal     0.0-3.6    Comp  
rehensive   
Internal   
Medicine;   
[facility]   
[facility]   
Medicine  
Work Phone:   
[phone]  
   
                                                    METABOLIC PANEL, COMPREHENSI  
VE (16246)Ordered By:  on 2022   
   
                      Albumin [Mass/Vol] 4.4 g/dL   Normal     3.8-4.9    [facility]   
Internal   
Medicine;   
[facility]   
[facility]   
Medicine  
Work Phone:   
[phone]  
   
                                                    Albumin/Globulin   
[Mass ratio]    1.7 {ratio}     Normal          1.2-2.2         [facility]   
[facility]   
Med[facility];   
[facility]   
[facility]   
Medicine  
Work Phone:   
[phone]  
   
                                                    ALP [Catalytic   
activity/Vol]   51 U/L          Normal                    [facility]   
[facility]   
Medicine;   
[facility]   
[facility]   
Medicine  
Work Phone:   
[phone]  
   
                                                    ALT [Catalytic   
activity/Vol]   33 U/L          Normal          0-44            [facility]   
[facility]   
Med[facility];   
[facility]   
[facility]   
Medicine  
Work Phone:   
[phone]  
   
                                                    AST [Catalytic   
activity/Vol]   24 U/L          Normal          0-40            [facility]   
[facility];   
[facility]   
[facility]   
Medicine  
Work Phone:   
[phone]  
   
                      Bilirubin [Mass/Vol] 0.4 mg/dL  Normal     0.0-1.2    Comp  
rehensive   
Internal   
Medicine;   
[facility]   
[facility]   
Medicine  
Work Phone:   
[phone]  
   
                      Calcium [Mass/Vol] 10.3 mg/dL Abnormal   8.7-10.2   [facility]   
Internal   
Medicine;   
[facility]   
[facility]   
Medicine  
Work Phone:   
[phone]  
   
                      Chloride [Moles/Vol] 100 mmol/L Normal          Comp  
rehensive   
Internal   
Medicine;   
[facility]   
Internal   
Medicine  
Work Phone:   
[phone]  
   
                      CO2 [Moles/Vol] 24 mmol/L  Normal     20-29      [facility]   
Internal   
Medicine;   
[facility]   
Internal   
Medicine  
Work Phone:   
[phone]  
   
                      Creatinine [Mass/Vol] 0.93 mg/dL Normal     0.76-1.27  Com  
prehensive   
Internal   
Medicine;   
[facility]   
Internal   
Medicine  
Work Phone:   
[phone]  
   
                                                    Globulin (S)   
[Mass/Vol]      2.6 g/dL        Normal          1.5-4.5         [facility]   
[facility]   
Medicine;   
[facility]   
Internal   
Medicine  
Work Phone:   
[phone]  
   
                      Glucose [Mass/Vol] 100 mg/dL  Abnormal   65-99      [facility]   
Internal   
Medicine;   
[facility]   
Internal   
Medicine  
Work Phone:   
[phone]  
   
                      Potassium [Moles/Vol] 4.1 mmol/L Normal     3.5-5.2    Com  
prehensive   
Internal   
Medicine;   
[facility]   
Internal   
Medicine  
Work Phone:   
[phone]  
   
                      Protein [Mass/Vol] 7.0 g/dL   Normal     6.0-8.5    [facility]   
Internal   
Medicine;   
[facility]   
Internal   
Medicine  
Work Phone:   
[phone]  
   
                      Sodium [Moles/Vol] 142 mmol/L Normal     134-144    [facility]   
Internal   
Medicine;   
[facility]   
Internal   
Medicine  
Work Phone:   
[phone]  
   
                                                    Urea nitrogen   
[Mass/Vol]      8 mg/dL         Normal          6-24            [facility]   
[facility];   
[facility]   
[facility]   
Medicine  
Work Phone:   
[phone]  
   
                                                    Urea   
nitrogen/Creatinine   
[Mass ratio]    9 mg/mg         Normal          9-20            [facility]   
[facility]   
Med[facility];   
[facility]   
[facility]   
Medicine  
Work Phone:   
[phone]  
   
                                                    METABOLIC PANEL,   
COMPREHENSIVE (67801) 95 mL/min/1.73  Normal                          Comprehens  
[facility]   
Internal   
Medicine;   
[facility]   
Internal   
Medicine  
Work Phone:   
[phone]  
   
                                                    MICROALBUMINOrdered By: Syst  
em Manager on 2022   
   
                                                    Albumin DL <= 20 mg/L   
(U) [Mass/Vol]  mg/dL           Normal                          [facility]   
[facility]   
Medicine;   
[facility]   
[facility]   
Medicine  
Work Phone:   
[phone]  
   
                                                    Albumin/Creatinine   
(U) [Mass ratio] <10             Normal          0-29            [facility]   
[facility]   
Medicine;   
[facility]   
Internal   
Medicine  
Work Phone:   
[phone]  
   
                                                    Creatinine (U)   
[Mass/Vol]      31.4 mg/dL      Normal                          [facility]   
[facility]   
Medicine;   
[facility]   
Internal   
Medicine  
Work Phone:   
[phone]  
   
                                                    TSH (THYROID STIMULATING HOR  
ALICIA) (78244)Ordered By:  on   
2022   
   
                          TSH Qn       1.480 {uIU/mL} Normal       0.450-4.50  
0                                       [facility]   
[facility]   
Medicine;   
[facility]   
[facility]   
Medicine  
Work Phone:   
[phone]  
   
                                                    HEPATITIS C ANTIBODY (10161)  
Ordered By:  on 2021   
   
                                                    HCV Ab Signal/Cutoff   
IA [Rel units/Vol] {ratio}         Normal          0.0-0.9         [facility]  
   
Internal   
Medicine;   
[facility]   
Internal   
Medicine  
Work Phone:   
[phone]  
   
                                        Comment on above:   Negative: < 0.8 Inde  
terminate: 0.8 - 0.9 Positive: > 0.9 . The  
  
CDC recommends that a positive HCV antibody result be followed   
up with a HCV Nucleic Acid Amplification test (326760).   
   
                                                            PATIENT NOT FASTINGP  
ERFORMED BY: [employer] [place] [id number]   
   
                                                    CALCIFEDIOL (78867)Ordered B  
y:  on 2021   
   
                                                    25-hydroxyvitamin D   
[Mass/Vol]      93.7 ng/mL      Normal          30.0-100.0      [facility]   
[facility]   
Medicine;   
[facility]   
Internal   
Medicine  
Work Phone:   
[phone]  
   
                                        Comment on above:   Vitamin D deficiency  
 has been defined by the [place]   
of[place]icine and an Endocrine Society practice guideline as alevel   
of serum 25-OH vitamin D less than 20 ng/mL (1,2).The Endocrine   
Society went on to further define vitamin Dinsufficiency as a   
level between 21 and 29 ng/mL (2).1. IOM ([place]   
[place]). 2010. Dietary reference intakes for calcium and D.   
[place]: The National Academies Press.2. Kodi MF,   
Phillip NC, Archana SAMS, et al. Evaluation, treatment,   
and prevention of vitamin D deficiency: an Endocrine Society   
clinical practice guideline. JCEM. 2011; 96(7):1911-30.   
   
                                                            PATIENT WAS FASTINGP  
ERFORMED BY: [employer] [id number]   
   
                                                    CBC, PLATELETS & MANUAL DIFF  
 (46001)Ordered By:  on 2021   
   
                                                    Basophils (Bld)   
[#/Vol]         0.0 10*3/uL     Normal          0.0-0.2         [facility]   
[facility]   
Med[facility];   
[facility]   
[facility]   
Medicine  
Work Phone:   
[phone]  
   
                                        Comment on above:   PATIENT WAS FASTINGP  
ERFORMED BY: [employer]6[address] [facility] [id number]   
   
                                                    Basophils/100 WBC   
(Bld)           0 %             Normal                          [facility]   
Internal   
Medicine;   
[facility]   
Internal   
Medicine  
Work Phone:   
[phone]  
   
                                        Comment on above:   PATIENT WAS FASTINGP  
ERFORMED BY: CB [facility] [address]   
Road[address] [id number]   
   
                                                    Eosinophils (Bld)   
[#/Vol]         0.1 10*3/uL     Normal          0.0-0.4         [facility]   
[facility]   
Medicine;   
[facility]   
Internal   
Medicine  
Work Phone:   
[phone]  
   
                                        Comment on above:   PATIENT WAS FASTINGP  
ERFORMED BY: CB [facility] [address]   
Road[address] [id number]   
   
                                                    Eosinophils/100 WBC   
(Bld)           1 %             Normal                          [facility]   
Internal   
Medicine;   
[facility]   
Internal   
Medicine  
Work Phone:   
[phone]  
   
                                        Comment on above:   PATIENT WAS FASTINGP  
ERFORMED BY: CB [facility] [address]   
Road[place] [id number]   
   
                                                    Erythrocyte   
distribution width   
(RBC) [Ratio]   12.1 %          Normal          11.6-15.4       [facility]   
[facility]   
Med[facility];   
[facility]   
[facility]   
Medicine  
Work Phone:   
[phone]  
   
                                        Comment on above:   PATIENT WAS FASTINGP  
ERFORMED BY:  [facility] [address]   
Road[place] [id number]   
   
                                                    Hematocrit (Bld)   
[Volume fraction] 49.2 %          Normal          37.5-51.0       [facility]   
[facility]   
Medicine;   
[facility]   
Internal   
Medicine  
Work Phone:   
[phone]  
   
                                        Comment on above:   PATIENT WAS FASTINGP  
ERFORMED BY:  [facility] [address]   
Road[address] [id number]   
   
                                                    Hemoglobin (Bld)   
[Mass/Vol]      17.1 g/dL       Normal          13.0-17.7       [facility]   
[facility]   
Medicine;   
[facility]   
Internal   
Medicine  
Work Phone:   
[phone]  
   
                                        Comment on above:   PATIENT WAS FASTINGP  
ERFORMED BY: CB [facility] [address]   
Road[place] [id number]   
   
                                                    Immature granulocytes   
(Bld) [#/Vol]   0.0 10*3/uL     Normal          0.0-0.1         [facility]   
[facility]   
Medicine;   
[facility]   
Internal   
Medicine  
Work Phone:   
[phone]  
   
                                        Comment on above:   PATIENT WAS FASTINGP  
ERFORMED BY: CB [facility] [address]   
Road[address] [id number]   
   
                                                    Immature   
granulocytes/100 WBC   
(Bld)           0 %             Normal                          [facility]   
Internal   
Medicine;   
[facility]   
Internal   
Medicine  
Work Phone:   
[phone]  
   
                                        Comment on above:   PATIENT WAS FASTINGP  
ERFORMED BY:  [facility] [address]   
Road[address] [id number]   
   
                                                    Lymphocytes (Bld)   
[#/Vol]         3.4 10*3/uL     Abnormal        0.7-3.1         [facility]   
[facility]   
Medicine;   
[facility]   
Internal   
Medicine  
Work Phone:   
[phone]  
   
                                        Comment on above:   PATIENT WAS FASTINGP  
ERFORMED BY:  [facility] [address]   
[address] [id number]   
   
                                                    Lymphocytes/100 WBC   
(Bld)           33 %            Normal                          [facility]   
Internal   
Medicine;   
[facility]   
Internal   
Medicine  
Work Phone:   
[phone]  
   
                                        Comment on above:   PATIENT WAS FASTINGP  
ERFORMED BY:  [facility] [address]   
[facility] [place] [id number]   
   
                                                    MCH (RBC) [Entitic   
mass]           33.7 pg         Abnormal        26.6-33.0       [facility]   
Internal   
Medicine;   
[facility]   
Internal   
Medicine  
Work Phone:   
[phone]  
   
                                        Comment on above:   PATIENT WAS FASTINGP  
ERFORMED BY:  [facility]   
[facility] [place] [id number]   
   
                      MCHC (RBC) [Mass/Vol] 34.8 g/dL  Normal     31.5-35.7  Com  
prehensive   
Internal   
Medicine;   
[facility]   
Internal   
Medicine  
Work Phone:   
[phone]  
   
                                        Comment on above:   PATIENT WAS FASTINGP  
ERFORMED BY:  [facility] [address]   
[facility] [place] [id number]   
   
                                                    MCV (RBC) [Entitic   
vol]            97 fL           Normal          79-97           [facility]   
Internal   
Medicine;   
[facility]   
Internal   
Medicine  
Work Phone:   
[phone]  
   
                                        Comment on above:   PATIENT WAS FASTINGP  
ERFORMED BY:  [facility] [address]   
[facility] [place] [id number]   
   
                                                    Monocytes (Bld)   
[#/Vol]         1.0 10*3/uL     Abnormal        0.1-0.9         [facility]   
Internal   
Medicine;   
[facility]   
Internal   
Medicine  
Work Phone:   
[phone]  
   
                                        Comment on above:   PATIENT WAS FASTINGP  
ERFORMED BY:  [facility] [address]   
Road[address] [id number]   
   
                                                    Monocytes/100 WBC   
(Bld)           9 %             Normal                          [facility]   
Internal   
Medicine;   
[facility]   
Internal   
Medicine  
Work Phone:   
[phone]  
   
                                        Comment on above:   PATIENT WAS FASTINGP  
ERFORMED BY: AMOR Sangeetasaurabh [address]   
Road[address] [id number]   
   
                                                    Neutrophils (Bld)   
[#/Vol]         5.9 10*3/uL     Normal          1.4-7.0         [facility]   
Internal   
Medicine;   
[facility]   
Internal   
Medicine  
Work Phone:   
[phone]  
   
                                        Comment on above:   PATIENT WAS FASTINGP  
ERFORMED BY: AMOR [facility] [address]Cdn[address]   
Road[address] [id number]   
   
                                                    Neutrophils/100 WBC   
(Bld)           57 %            Normal                          [facility]   
Internal   
Medicine;   
[facility]   
Internal   
Medicine  
Work Phone:   
[phone]  
   
                                        Comment on above:   PATIENT WAS FASTINGP  
ERFORMED BY: AMOR [address]   
Road[place] [id number]   
   
                                                    Platelets (Bld)   
[#/Vol]         251 10*3/uL     Normal          150-450         [facility]   
Internal   
Medicine;   
[facility]   
Internal   
Medicine  
Work Phone:   
[phone]  
   
                                        Comment on above:   PATIENT WAS FASTINGP  
ERFORMED BY: AMOR [address]   
Road[address] [id number]   
   
                      RBC (Bld) [#/Vol] 5.08 10*6/uL Normal     4.14-5.80  Compr  
ehensive   
Internal   
Medicine;   
[facility]   
Internal   
Medicine  
Work Phone:   
[phone]  
   
                                        Comment on above:   PATIENT WAS FASTINGP  
ERFORMED BY: AMOR [address]lin[address]   
Road[address] [id number]   
   
                      WBC (Bld) [#/Vol] 10.4 10*3/uL Normal     3.4-10.8   Compr  
ehensive   
Internal   
Medicine;   
[facility]   
Internal   
Medicine  
Work Phone:   
[phone]  
   
                                        Comment on above:   PATIENT WAS FASTINGP  
ERFORMED BY: AMOR [facility] [address]   
Road[place] [id number]   
   
                                                    LIPID PANEL (93005)Ordered B  
y:  on 2021   
   
                                                    Cholesterol   
[Mass/Vol]      160 mg/dL       Normal          100-199         [facility]   
Internal   
Medicine;   
[facility]   
Internal   
Medicine  
Work Phone:   
[phone]  
   
                                        Comment on above:   PATIENT WAS FASTINGP  
ERFORMED BY: CB [facility] [address]   
Road[address] [id number]   
   
                                                    Cholesterol in HDL   
[Mass/Vol]      65 mg/dL        Normal                          [facility]   
Internal   
Medicine;   
[facility]   
Internal   
Medicine  
Work Phone:   
[phone]  
   
                                        Comment on above:   PATIENT WAS FASTINGP  
ERFORMED BY: AMOR [facility] [address]   
Road[address] [id number]   
   
                                                    Triglyceride   
[Mass/Vol]      95 mg/dL        Normal          0-149           [facility]   
Internal   
Medicine;   
[facility]   
Internal   
Medicine  
Work Phone:   
[phone]  
   
                                        Comment on above:   PATIENT WAS FASTINGP  
ERFORMED BY: AMOR [facility] [address]   
Road[address] [id number]   
   
                      LIPID PANEL (08259) 17 mg/dL   Normal     5-40       [facility]   
[facility]   
[facility];   
[facility]   
Internal   
Medicine  
Work Phone:   
[phone]  
   
                                        Comment on above:   PATIENT WAS FASTINGP  
ERFORMED BY: AMOR [facility] [address]   
Road[address] [id number]   
   
                      LIPID PANEL (07423) 78 mg/dL   Normal     0-99       [facility]   
[facility]   
[facility];   
[facility]   
Internal   
Medicine  
Work Phone:   
[phone]  
   
                                        Comment on above:   PATIENT WAS FASTINGP  
ERFORMED BY: AMOR LabCosaurabh [address]   
Road[address] [id number]   
   
                      LIPID PANEL (51980) 1.2 {ratio} Normal     0.0-3.6    Comp  
rehensive   
Internal   
Medicine;   
[facility]   
Internal   
Medicine  
Work Phone:   
[phone]  
   
                                        Comment on above:   LDL/HDL Ratio Men Wo  
men 1/2 Avg.Risk 1.0 1.5 Avg.Risk 3.6 3.2   
2X   
Avg.Risk 6.2 5.0 3X Avg.Risk 8.0 6.1   
   
                                                            PATIENT WAS FASTINGP  
ERFORMED BY: AMOR [facility] [address]   
Road[address] [id number]   
   
                                                    METABOLIC PANEL, COMPREHENSI  
VE (98971)Ordered By:  on 2021   
   
                      Albumin [Mass/Vol] 4.6 g/dL   Normal     3.8-4.9    [facility]  
[facility]   
Internal   
Medicine;   
[facility]   
Internal   
Medicine  
Work Phone:   
[phone]  
   
                                        Comment on above:   PATIENT WAS FASTINGP  
ERFORMED BY: AMOR [facility] [address]   
Road[address] [place] [id number]   
   
                                                    Albumin/Globulin   
[Mass ratio]    2.0 {ratio}     Normal          1.2-2.2         [facility]   
Internal   
Medicine;   
[facility]   
Internal   
Medicine  
Work Phone:   
[phone]  
   
                                        Comment on above:   PATIENT WAS FASTINGP  
ERFORMED BY: AMOR Sangeetasaurabh [address]   
Road[address] [id number]   
   
                                                    ALP [Catalytic   
activity/Vol]   54 U/L          Normal                    [facility]   
Internal   
Medicine;   
[facility]   
Internal   
Medicine  
Work Phone:   
[phone]  
   
                                        Comment on above:   PATIENT WAS FASTINGP  
ERFORMED BY: AMOR [facility] [address]   
Road[address] [id number]   
   
                                                    ALT [Catalytic   
activity/Vol]   26 U/L          Normal          0-44            [facility]   
Internal   
Medicine;   
[facility]   
Internal   
Medicine  
Work Phone:   
[phone]  
   
                                        Comment on above:   PATIENT WAS FASTINGP  
ERFORMED BY: AMOR [facility] [address]   
[address] [place] [id number]   
   
                                                    AST [Catalytic   
activity/Vol]   19 U/L          Normal          0-40            Comprehensive   
Internal   
Medicine;   
Comprehensive   
Internal   
Medicine  
Work Phone:   
[phone]  
   
                                        Comment on above:   PATIENT WAS FASTINGP  
ERFORMED BY: [facility] [id number]   
[address] [id number]   
   
                      Bilirubin [Mass/Vol] 0.7 mg/dL  Normal     0.0-1.2    Comp  
rehensive   
Internal   
Medicine;   
Comprehensive   
Internal   
Medicine  
Work Phone:   
[phone]  
   
                                        Comment on above:   PATIENT WAS FASTINGP  
ERFORMED BY: [facility] [id number]   
[facility] [place] [id number]   
   
                      Calcium [Mass/Vol] 10.3 mg/dL Abnormal   8.7-10.2   [facility]   
Internal   
Medicine;   
Comprehensive   
Internal   
Medicine  
Work Phone:   
[phone]  
   
                                        Comment on above:   PATIENT WAS FASTINGP  
ERFORMED BY: [facility] [id number]   
Road[place] [id number]   
   
                      Chloride [Moles/Vol] 99 mmol/L  Normal          Comp  
rehensive   
Internal   
Medicine;   
Comprehensive   
Internal   
Medicine  
Work Phone:   
[phone]  
   
                                        Comment on above:   PATIENT WAS FASTINGP  
ERFORMED BY: [facility] [facility] [id number]   
   
                      CO2 [Moles/Vol] 24 mmol/L  Normal     20-29      [facility]  
[facility]   
Internal   
Medicine;   
Comprehensive   
Internal   
Medicine  
Work Phone:   
[phone]  
   
                                        Comment on above:   PATIENT WAS FASTINGP  
ERFORMED BY: [facility] [facility] [id number]   
   
                      Creatinine [Mass/Vol] 0.99 mg/dL Normal     0.76-1.27  Com  
prehensive   
Internal   
Medicine;   
Comprehensive   
Internal   
Medicine  
Work Phone:   
[phone]  
   
                                        Comment on above:   PATIENT WAS FASTINGP  
ERFORMED BY: [facility] [facility] [id number]   
   
                                                    GFR/1.73 sq   
M.predicted among   
blacks CKD-EPI   
(S/P/Bld) [Vol   
rate/Area]      97 mL/min/1.73  Normal                          Comprehensive   
Internal   
Medicine;   
Comprehensive   
Internal   
Medicine  
Work Phone:   
[phone]  
   
                                        Comment on above:   **[facility]   
reports eGFR in compliance with the   
current** recommendations of the National Kidney Foundation.   
[facility] will update reporting as new guidelines are published   
from the NKF-ASN Task force.   
   
                                                            PATIENT WAS FASTINGP  
ERFORMED BY: [facility] [facility] [id number]   
   
                                                    GFR/1.73 sq   
M.predicted among   
non-blacks CKD-EPI   
(S/P/Bld) [Vol   
rate/Area]      84 mL/min/1.73  Normal                          Comprehensive   
Internal   
Medicine;   
Comprehensive   
Internal   
Medicine  
Work Phone:   
[phone]  
   
                                        Comment on above:   PATIENT WAS FASTINGP  
ERFORMED BY:  [facility] [facility] [id number]   
   
                                                    Globulin (S)   
[Mass/Vol]      2.3 g/dL        Normal          1.5-4.5         Comprehensive   
Internal   
Medicine;   
Comprehensive   
Internal   
Medicine  
Work Phone:   
[phone]  
   
                                        Comment on above:   PATIENT WAS FASTINGP  
ERFORMED BY: [facility] [facility] [id number]   
   
                      Glucose [Mass/Vol] 117 mg/dL  Abnormal   65-99      [facility]  
[facility]   
Internal   
Medicine;   
Comprehensive   
Internal   
Medicine  
Work Phone:   
[phone]  
   
                                        Comment on above:   PATIENT WAS FASTINGP  
ERFORMED BY: [facility] [id number]   
Road[place] [id number]   
   
                      Potassium [Moles/Vol] 4.9 mmol/L Normal     3.5-5.2    Com  
prehensive   
Internal   
Medicine;   
Comprehensive   
Internal   
Medicine  
Work Phone:   
[phone]  
   
                                        Comment on above:   PATIENT WAS FASTINGP  
ERFORMED BY: [facility] [id number]   
Road[place] [id number]   
   
                      Protein [Mass/Vol] 6.9 g/dL   Normal     6.0-8.5    [facility]   
Internal   
Medicine;   
Comprehensive   
Internal   
Medicine  
Work Phone:   
[phone]  
   
                                        Comment on above:   PATIENT WAS FASTINGP  
ERFORMED BY: [facility] [id number]   
[address] [id number]   
   
                      Sodium [Moles/Vol] 137 mmol/L Normal     134-144    [facility]   
Internal   
Medicine;   
Comprehensive   
Internal   
Medicine  
Work Phone:   
[phone]  
   
                                        Comment on above:   PATIENT WAS FASTINGP  
ERFORMED BY: [facility] [id number]   
Road[place] [id number]   
   
                                                    Urea nitrogen   
[Mass/Vol]      10 mg/dL        Normal          6-24            Comprehensive   
Internal   
Medicine;   
Comprehensive   
Internal   
Medicine  
Work Phone:   
[phone]  
   
                                        Comment on above:   PATIENT WAS FASTINGP  
ERFORMED BY: [facility] [id number]   
[facility]Nov[facility]in [place] [id number]   
   
                                                    Urea   
nitrogen/Creatinine   
[Mass ratio]    10 mg/mg        Normal          9-20            Comprehensive   
Internal   
Medicine;   
Comprehensive   
Internal   
Medicine  
Work Phone:   
[phone]  
   
                                        Comment on above:   PATIENT WAS FASTINGP  
ERFORMED BY:  [facility] [id number]   
Road[place] [id number]   
   
                                                    MICROALBUMINOrdered By: Syst  
em Manager on 2021   
   
                                                    Albumin DL <= 20 mg/L   
(U) [Mass/Vol]  6.8 ug/mL       Normal                          Comprehensive   
Internal   
Medicine;   
Comprehensive   
Internal   
Medicine  
Work Phone:   
[phone]  
   
                                        Comment on above:   PATIENT WAS FASTINGP  
ERFORMED BY: [facility] [id number]   
Road[place] [id number]   
   
                                                    Albumin/Creatinine   
(U) [Mass ratio] 6 {mg/g_creat}  Normal          0-29            Comprehensive   
Internal   
Medicine;   
Comprehensive   
Internal   
Medicine  
Work Phone:   
[phone]  
   
                                        Comment on above:   Normal: 0 - 29 Moder  
ately increased: 30 - 300 Severely   
increased: >300   
   
                                                            PATIENT WAS FASTINGP  
ERFORMED BY: [employer]   
[facility] [id number]   
   
                                                    Creatinine (U)   
[Mass/Vol]      114.5 mg/dL     Normal                          Comprehensive   
Internal   
Medicine;   
Comprehensive   
Internal   
Medicine  
Work Phone:   
[phone]  
   
                                        Comment on above:   PATIENT WAS FASTINGP  
ERFORMED BY: [employer]   
[facility] [id number]   
   
                                                    PSA (PROSTATE SPECIFIC ANTIG  
EN) (80578)Ordered By:  on 2021   
   
                                                    Prostate specific Ag   
[Mass/Vol]      0.9 ng/mL       Normal          0.0-4.0         Comprehensive   
Internal   
Medicine;   
Comprehensive   
Internal   
Medicine  
Work Phone:   
[phone]  
   
                                        Comment on above:   Roche ECLIA methodol  
ogy. .According to the American Urological  
   
Association, Serum PSA shoulddecrease and remain at undetectable   
levels after radicalprostatectomy. The AUA defines biochemical   
recurrence as an initialPSA value 0.2 ng/mL or greater followed   
by a subsequent confirmatoryPSA value 0.2 ng/mL or   
greater.Values obtained with different assay methods or kits   
cannot be usedinterchangeably. Results cannot be interpreted as   
absolute evidenceof the presence or absence of malignant   
disease.   
   
                                                            PATIENT WAS FASTINGP  
ERFORMED BY: [employer] [facility] KitchenUNC [facility] [id number]   
   
                                                    TSH (THYROID STIMULATING HOR  
ALICIA) (19236)Ordered By:  on   
2021   
   
                          TSH Qn       2.150 {uIU/mL} Normal       0.450-4.50  
0                                       Comprehensive   
Internal   
Medicine;   
Comprehensive   
Internal   
Medicine  
Work Phone:   
[phone]  
   
                                        Comment on above:   PATIENT WAS FASTINGP  
ERFORMED BY: [employer] [facility] [id number]   
   
                                                    URINALYSIS (36105)Ordered By  
:  on 2021   
   
                      Appearance (U) Clear      Normal                Comprehens  
bebe   
Internal   
Medicine;   
Comprehensive   
Internal   
Medicine  
Work Phone:   
[phone]  
   
                                        Comment on above:   PATIENT WAS FASTINGP  
ERFORMED BY: [facility] [address]   
Road[place] [id number]   
   
                      Bilirubin Ql (U) Negative   Normal                Comprehe  
nsive   
Internal   
Medicine;   
Comprehensive   
Internal   
Medicine  
Work Phone:   
[phone]  
   
                                        Comment on above:   PATIENT WAS FASTINGP  
ERFORMED BY: [facility] [id number]   
Road[place] [id number]   
   
                      Color (U)  Yellow     Normal                Comprehensive   
Internal   
Medicine;   
Comprehensive   
Internal   
Medicine  
Work Phone:   
[phone]  
   
                                        Comment on above:   PATIENT WAS FASTINGP  
ERFORMED BY: [facility] [id number]   
Road[place] [id number]   
   
                      Glucose Ql (U) Negative   Normal                Comprehens  
bebe   
Internal   
Medicine;   
Comprehensive   
Internal   
Medicine  
Work Phone:   
[phone]  
   
                                        Comment on above:   PATIENT WAS FASTINGP  
ERFORMED BY: [facility] [address]   
Road[place] [id number]   
   
                      Hemoglobin Ql (U) Negative   Normal                Compreh  
ensive   
Internal   
Medicine;   
Comprehensive   
Internal   
Medicine  
Work Phone:   
[phone]  
   
                                        Comment on above:   PATIENT WAS FASTINGP  
ERFORMED BY: [facility] [id number]   
Road[place] [id number]   
   
                      Ketones Ql (U) Trace      Abnormal              Comprehens  
bebe   
Internal   
Medicine;   
Comprehensive   
Internal   
Medicine  
Work Phone:   
[phone]  
   
                                        Comment on above:   PATIENT WAS FASTINGP  
ERFORMED BY: [facility] [id number]   
Road[place] [id number]   
   
                                                    Leukocyte esterase   
Test strip Ql (U) Negative        Normal                          Comprehensive   
Internal   
Medicine;   
Comprehensive   
Internal   
Medicine  
Work Phone:   
[phone]  
   
                                        Comment on above:   PATIENT WAS FASTINGP  
ERFORMED BY: [facility] [id number]   
Road[place] [id number]   
   
                                                    Microscopic   
observation LM Nom   
(Urine sed)     MICNIP          Normal                          Comprehensive   
Internal   
Medicine;   
Comprehensive   
Internal   
Medicine  
Work Phone:   
[phone]  
   
                                        Comment on above:   Microscopic not benny  
cated and not performed.   
   
                                                            PATIENT WAS FASTINGP  
ERFORMED BY: [facility] [address]   
Road[place] [id number]   
   
                      Nitrite Ql (U) Negative   Normal                Comprehens  
bebe   
Internal   
Medicine;   
Comprehensive   
Internal   
Medicine  
Work Phone:   
[phone]  
   
                                        Comment on above:   PATIENT WAS FASTINGP  
ERFORMED BY: [facility] [facility] [id number]   
   
                      pH (U)     7.0 [pH]   Normal     5.0-7.5    Comprehensive   
Internal   
Medicine;   
Comprehensive   
Internal   
Medicine  
Work Phone:   
[phone]  
   
                                        Comment on above:   PATIENT WAS FASTINGP  
ERFORMED BY: [facility] [facility] [id number]   
   
                      Protein Ql (U) Negative   Normal                Comprehens  
[facility]   
Internal   
Medicine;   
Comprehensive   
Internal   
Medicine  
Work Phone:   
[phone]  
   
                                        Comment on above:   PATIENT WAS FASTINGP  
ERFORMED BY: [facility] [facility] [id number]   
   
                                                    Specific gravity (U)   
[Rel density]       1.018 1             Normal              1.005-1.03  
0                                       Comprehensive   
Internal   
Medicine;   
Comprehensive   
Internal   
Medicine  
Work Phone:   
[phone]  
   
                                        Comment on above:   PATIENT WAS FASTINGP  
ERFORMED BY: [facility] [facility] [id number]   
   
                                                    Urobilinogen (U)   
[Mass/Vol]      1.0 mg/dL       Normal          0.2-1.0         Comprehensive   
Internal   
Medicine;   
Comprehensive   
Internal   
Medicine  
Work Phone:   
[phone]  
   
                                        Comment on above:   PATIENT WAS FASTINGP  
ERFORMED BY: [facility] [facility] [id number]   
   
                                                    Catecholamines,24-Hour Urine  
 (87692)Ordered By:  on 2021   
   
                                                    Dopamine (24H U)   
[Mass/Time]     673 {ug/24_hr}  Abnormal        0-510           Comprehensive   
Internal   
Medicine;   
Comprehensive   
Internal   
Medicine  
Work Phone:   
[phone]  
   
                                        Comment on above:   Test(s) 417266-Wuibp  
phrine, Urine; -Norepinephrine, Ur;   
-Dopamine, Urine; 510081-Apwqfxpqybrdzus, Ur;   
870154-Hdgffcphvmhv, Urwas developed and its performance   
characteristics determinedby [facility]. It has not been cleared or   
approved by the Foodand Drug Administration.PATIENT NOT   
FASTINGPERFORMED BY: [address] [id number] Information:   
START 21@7AM   
   
                                                    Dopamine (U)   
[Mass/Vol]      177 ug/L        Normal                          Comprehensive   
Internal   
Medicine;   
Comprehensive   
Internal   
Medicine  
Work Phone:   
[phone]  
   
                                        Comment on above:   Test(s) 067776-Rxpnk  
phrine, Urine; 004201-Norepinephrine, Ur;   
004203-Dopamine, Urine; 815781-Gjnpykhuvydijsd, Ur;   
940080-Hcpiauaaauug, Urwas developed and its performance   
characteristics determinedby [facility]. It has not been cleared or   
approved by the Foodand Drug Administration.PATIENT NOT   
FASTINGPERFORMED BY: [employer] [address] [id number] Information:   
START 21@7AM   
   
                                                    Epinephrine (24H U)   
[Mass/Time]     <4              Normal          0-20            Comprehensive   
Internal   
Medicine;   
Comprehensive   
Internal   
Medicine  
Work Phone:   
[phone]  
   
                                        Comment on above:   Test(s) 183122-Ezkmc  
phrine, Urine; 004201-Norepinephrine, Ur;   
004203-Dopamine, Urine; 672398-Leuafamxwjwoswb, Ur;   
177923-Rchvimlbbwli, Urwas developed and its performance   
characteristics determinedby [facility]. It has not been cleared or   
approved by the Foodand Drug Administration.PATIENT NOT   
FASTINGPERFORMED BY: [employer] [id number] Information:   
START 21@7AM   
   
                                                    Epinephrine (U)   
[Mass/Vol]      <1              Normal                          Comprehensive   
Internal   
Medicine;   
Comprehensive   
Internal   
Medicine  
Work Phone:   
[phone]  
   
                                        Comment on above:   Test(s) 129204-Cewuq  
phrine, Urine; 004201-Norepinephrine, Ur;   
004203-Dopamine, Urine; 030199-Sbwhijijffmjqod, Ur;   
449281-Aqcsmkujovvs, Urwas developed and its performance   
characteristics determinedby [facility]. It has not been cleared or   
approved by the Foodand Drug Administration.PATIENT NOT   
FASTINGPERFORMED BY:  [employer] [id number] Information:   
START 21@7AM   
   
                                                    Norepinephrine (24H   
U) [Mass/Time]  87 {ug/24_hr}   Normal          0-135           Comprehensive   
Internal   
Medicine;   
Comprehensive   
Internal   
Medicine  
Work Phone:   
[phone]  
   
                                        Comment on above:   Test(s) 798772-Igvhw  
phrine, Urine; 004201-Norepinephrine, Ur;   
004203-Dopamine, Urine; 665476-Vatqqtoudhxvily, Ur;   
161837-Mlkmnnocjddd, Urwas developed and its performance   
characteristics determinedby [facility]. It has not been cleared or   
approved by the Foodand Drug Administration.PATIENT NOT   
FASTINGPERFORMED BY:  [employer] [id number] Information:   
START 21@7AM   
   
                                                    Norepinephrine (U)   
[Mass/Vol]      23 ug/L         Normal                          Comprehensive   
Internal   
Medicine;   
Comprehensive   
Internal   
Medicine  
Work Phone:   
[phone]  
   
                                        Comment on above:   Test(s) 080962-Colbb  
phrine, Urine; 004201-Norepinephrine, Ur;   
004203-Dopamine, Urine; 329135-Qxoyksutdcneopv, Ur;   
440248-Ymrxlpjcufjc, Urwas developed and its performance   
characteristics determinedby [facility]. It has not been cleared or   
approved by the Foodand Drug Administration.PATIENT NOT   
FASTINGPERFORMED BY:  [employer] [id number] Information:   
START 21@7AM   
   
                                                    METANEPHRINES (63522)Ordered  
 By:  on 2021   
   
                                                    Metanephrine (24H U)   
[Mass/Time]     205 {ug/24_hr}  Normal                    Comprehensive   
Internal   
Medicine;   
Comprehensive   
Internal   
Medicine  
Work Phone:   
[phone]  
   
                                        Comment on above:   Test(s) 112353-Nvszf  
phrine, Urine; 004201-Norepinephrine, Ur;   
004203-Dopamine, Urine; 128847-Clutjezknqglaog, Ur;   
153053-Rrafeqajavuo, Urwas developed and its performance   
characteristics determinedby [facility]. It has not been cleared or   
approved by the Foodand Drug Administration.PATIENT NOT   
FASTINGPERFORMED BY:  [employer] [id number]   
   
                                                    Metanephrines (24H U)   
[Mass/Vol]      54 ug/L         Normal                          Comprehensive   
Internal   
Medicine;   
Comprehensive   
Internal   
Medicine  
Work Phone:   
[phone]  
   
                                        Comment on above:   Test(s) 898362-Fawmd  
phrine, Urine; 004201-Norepinephrine, Ur;   
004203-Dopamine, Urine; 596094-Mpzpnlxcomnpdwh, Ur;   
669981-Becqboheoqnm, Urwas developed and its performance   
characteristics determinedby [facility]. It has not been cleared or   
approved by the Foodand Drug Administration.PATIENT NOT   
FASTINGPERFORMED BY: [employer] [address] [id number]   
   
                                                    Normetanephrine (24H   
U) [Mass/Time]  1041 {ug/24_hr} Abnormal        156-729         Comprehensive   
Internal   
Medicine;   
Comprehensive   
Internal   
Medicine  
Work Phone:   
[phone]  
   
                                        Comment on above:   Test(s) 250762-Ljzsi  
phrine, Urine; 004201-Norepinephrine, Ur;   
004203-Dopamine, Urine; 433794-Bslfgrjyqqnfumi, Ur;   
687013-Cnayfcblpifn, Urwas developed and its performance   
characteristics determinedby [facility]. It has not been cleared or   
approved by the Foodand Drug Administration.PATIENT NOT   
FASTINGPERFORMED BY: [employer] [address] [id number]   
   
                                                    Normetanephrine (24H   
U) [Mass/Vol]   274 ug/L        Normal                          Comprehensive   
Internal   
Medicine;   
Comprehensive   
Internal   
Medicine  
Work Phone:   
[phone]  
   
                                        Comment on above:   Test(s) 764718-Uudmx  
phrine, Urine; 004201-Norepinephrine, Ur;   
004203-Dopamine, Urine; 087351-Mjsozwkhoibovoc, Ur;   
005309-Mujbmtlcmsgo, Urwas developed and its performance   
characteristics determinedby [facility]. It has not been cleared or   
approved by the Foodand Drug Administration.PATIENT NOT   
FASTINGPERFORMED BY: [employer] [facility] [address] [id number]   
   
                                                    URINE VMA (58306)Ordered By:  
  on 2021   
   
                                                    Vanillylmandelate   
(24H U) [Mass/Time] 4.6 {mg/24_hr}  Normal          0.0-7.5         Comprehensiv  
e   
Internal   
Medicine;   
Comprehensive   
Internal   
Medicine  
Work Phone:   
[phone]  
   
                                        Comment on above:   Test(s) 737252-Djxhx  
phrine, Urine; 004201-Norepinephrine, Ur;   
004203-Dopamine, Urine; 532991-Dbjqrrzzeeetxps, Ur;   
315292-Exnjepfihxyx, Urwas developed and its performance   
characteristics determinedby [employer]. It has not been cleared or   
approved by the Foodand Drug Administration.PATIENT NOT   
FASTINGPERFORMED BY:  [employer] [address] [id number]   
   
                                                    Vanillylmandelate (U)   
[Mass/Vol]      1.2 mg/L        Normal                          Comprehensive   
Internal   
Medicine;   
Comprehensive   
Internal   
Medicine  
Work Phone:   
[phone]  
   
                                        Comment on above:   This test was develo  
ped and its performance   
characteristicsdetermined by [employer]. It has not been cleared or   
approvedby the Food and Drug Administration.   
   
                                                            Test(s) 952517-Mdzsi  
phrine, Urine; -Norepinephrine, Ur;   
-Dopamine, Urine; 294830-Wnvbofxbqqyvfnj, Ur;   
875782-Diophmfawaun, Urwas developed and its performance   
characteristics determinedby [employer]. It has not been cleared or   
approved by the Foodand Drug Administration.PATIENT NOT   
FASTINGPERFORMED BY:  [facility] [address] [id number]   
   
                                                    ALDOSTERONE (74404)Ordered B  
y:  on 2021   
   
                                                    Aldosterone   
[Mass/Vol]      8.8 ng/dL       Normal          0.0-30.0        [facility]   
[facility];   
[facility]   
[facility]   
Med[facility]  
Work Phone:   
[phone]  
   
                                        Comment on above:   This test was develo  
ped and its performance   
characteristicsdetermined by [employer]. It has not been cleared or   
approvedby the Food and Drug Administration.   
   
                                                            PATIENT WAS FASTINGP  
ERFORMED BY:  [facility] [address] [id number]   
   
                                                    CALCIFEDIOL (04033)Ordered B  
y:  on 2021   
   
                                                    25-Hydroxyvitamin   
D2+25-Hydroxyvitamin   
D3 [Mass/Vol]   85.3 ng/mL      Normal          30.0-100.0      [facility]   
[facility];   
[facility]   
[facility]   
Medicine  
Work Phone:   
[phone]  
   
                                        Comment on above:   Vitamin D deficiency  
 has been defined by the [place]   
ofMedicine and an Endocrine Society practice guideline as alevel   
of serum 25-OH vitamin D less than 20 ng/mL (1,2).The Endocrine   
Society went on to further define vitamin Dinsufficiency as a   
level between 21 and 29 ng/mL (2).1. IOM ([place]   
[employer]). 2010. Dietary reference intakes for calcium and D.   
[place]: The National Academies Press.2. Kodi MF,   
Phillip WALKER, Archana SAMS, et al. Evaluation, treatment,   
and prevention of vitamin D deficiency: an Endocrine Society   
clinical practice guideline. JCEM. 2011; 96(7):1911-30.   
   
                                                            PATIENT WAS FASTINGP  
ERFORMED BY:  [facility] Qujccr4724 [facility] [id number]   
   
                                                    CBC W/AUTO DIFF WBC (24357)O  
rdered By:  on 2021   
   
                                                    Basophils (Bld)   
[#/Vol]         0.1 {x10E3/uL}  Normal          0.0-0.2         [facility]   
[facility];   
[facility]   
[facility]   
Medicine  
Work Phone:   
[phone]  
   
                                        Comment on above:   PATIENT WAS FASTINGP  
ERFORMED BY:  [facility] Vtaqyo9587 [facility] [id number]   
   
                                                    Basophils (Bld)   
[#/Vol]         0.1 10*3/uL     Normal          0.0-0.2         [facility]   
[facility]   
Medicine;   
[facility]   
Internal   
Medicine  
Work Phone:   
[phone]  
   
                                        Comment on above:   PATIENT WAS FASTINGP  
ERFORMED BY:  [facility] [address]   
[facility] [place] [id number]   
   
                                                    Basophils/100 WBC   
(Bld)           1 %             Normal                          [facility]   
[facility]   
Medicine;   
[facility]   
Internal   
Medicine  
Work Phone:   
[phone]  
   
                                        Comment on above:   PATIENT WAS FASTINGP  
ERFORMED BY:  [facility] [address]   
[facility] [place] [id number]   
   
                                                    Eosinophils (Bld)   
[#/Vol]         0.2 {x10E3/uL}  Normal          0.0-0.4         [facility]   
[facility]   
Medicine;   
[facility]   
Internal   
Medicine  
Work Phone:   
[phone]  
   
                                        Comment on above:   PATIENT WAS FASTINGP  
ERFORMED BY:  [facility] [address]   
[facility] [id number]   
   
                                                    Eosinophils (Bld)   
[#/Vol]         0.2 10*3/uL     Normal          0.0-0.4         [facility]   
[facility]   
Medicine;   
[facility]   
Internal   
Medicine  
Work Phone:   
[phone]  
   
                                        Comment on above:   PATIENT WAS FASTINGP  
ERFORMED BY: CB [facility] [address]   
Road[place] [id number]   
   
                                                    Eosinophils/100 WBC   
(Bld)           2 %             Normal                          [facility]   
Internal   
Medicine;   
[facility]   
Internal   
Medicine  
Work Phone:   
[phone]  
   
                                        Comment on above:   PATIENT WAS FASTINGP  
ERFORMED BY: CB [facility] [address]nc[address]   
Road[place] [id number]   
   
                                                    Erythrocyte   
distribution width   
(RBC) [Ratio]   12.1 %          Normal          11.6-15.4       [facility]   
[facility]   
Medicine;   
[facility]   
Internal   
Medicine  
Work Phone:   
[phone]  
   
                                        Comment on above:   PATIENT WAS FASTINGP  
ERFORMED BY: CB [facility] [address]   
Road[place] [id number]   
   
                                                    Hematocrit (Bld)   
[Volume fraction] 49.2 %          Normal          37.5-51.0       [facility]   
[facility]   
Medicine;   
[facility]   
Internal   
Medicine  
Work Phone:   
[phone]  
   
                                        Comment on above:   PATIENT WAS FASTINGP  
ERFORMED BY: CB [facility] [address]   
Road[place] [id number]   
   
                                                    Hemoglobin (Bld)   
[Mass/Vol]      17.0 g/dL       Normal          13.0-17.7       [facility]   
[facility]   
Medicine;   
[facility]   
Internal   
Medicine  
Work Phone:   
[phone]  
   
                                        Comment on above:   PATIENT WAS FASTINGP  
ERFORMED BY: CB [facility] [address]pe[address]   
Road[place] [id number]   
   
                                                    Immature granulocytes   
(Bld) [#/Vol]   0.0 {x10E3/uL}  Normal          0.0-0.1         [facility]   
[facility]   
Medicine;   
[facility]   
Internal   
Medicine  
Work Phone:   
[phone]  
   
                                        Comment on above:   PATIENT WAS FASTINGP  
ERFORMED BY: CB [facility] [address]vfdg[address]   
Road[place] [id number]   
   
                                                    Immature granulocytes   
(Bld) [#/Vol]   0.0 10*3/uL     Normal          0.0-0.1         [facility]   
Internal   
Medicine;   
[facility]   
Internal   
Medicine  
Work Phone:   
[phone]  
   
                                        Comment on above:   PATIENT WAS FASTINGP  
ERFORMED BY: CB [facility] [address]df[address]   
Road[place] [id number]   
   
                                                    Immature   
granulocytes/100 WBC   
(Bld)           1 %             Normal                          [facility]   
Internal   
Medicine;   
[facility]   
Internal   
Medicine  
Work Phone:   
[phone]  
   
                                        Comment on above:   PATIENT WAS FASTINGP  
ERFORMED BY: [facility]OR [facility] SomersetStsqjj[address]   
[facility] [place] [id number]   
   
                                                    Lymphocytes (Bld)   
[#/Vol]         2.6 {x10E3/uL}  Normal          0.7-3.1         [facility]   
Internal   
Medicine;   
[facility]   
Internal   
Medicine  
Work Phone:   
[phone]  
   
                                        Comment on above:   PATIENT WAS FASTINGP  
ERFORMED BY:  [facility]   
[facility] [place] [id number]   
   
                                                    Lymphocytes (Bld)   
[#/Vol]         2.6 10*3/uL     Normal          0.7-3.1         [facility]   
Internal   
Medicine;   
[facility]   
Internal   
Medicine  
Work Phone:   
[phone]  
   
                                        Comment on above:   PATIENT WAS FASTINGP  
ERFORMED BY: AMOR [employer] [address]ms[address]   
[facility] [id number]   
   
                                                    Lymphocytes/100 WBC   
(Bld)           33 %            Normal                          [facility]   
Internal   
Medicine;   
[facility]   
Internal   
Medicine  
Work Phone:   
[phone]  
   
                                        Comment on above:   PATIENT WAS FASTINGP  
ERFORMED BY: AM[facility]   
[facility] [place] [id number]   
   
                                                    MCH (RBC) [Entitic   
mass]           33.5 pg         Abnormal        26.6-33.0       [facility]   
Internal   
Medicine;   
[facility]   
Internal   
Medicine  
Work Phone:   
[phone]  
   
                                        Comment on above:   PATIENT WAS FASTINGP  
ERFORMED BY:  [facility]   
[facility] [id number]   
   
                      MCHC (RBC) [Mass/Vol] 34.6 g/dL  Normal     31.5-35.7  Com  
prehensive   
Internal   
Medicine;   
[facility]   
Internal   
Medicine  
Work Phone:   
[phone]  
   
                                        Comment on above:   PATIENT WAS FASTINGP  
ERFORMED BY:  [facility] [address]   
[facility] [place] [id number]   
   
                                                    MCV (RBC) [Entitic   
vol]            97 fL           Normal          79-97           [facility]   
Internal   
Medicine;   
[facility]   
Internal   
Medicine  
Work Phone:   
[phone]  
   
                                        Comment on above:   PATIENT WAS FASTINGP  
ERFORMED BY:  [facility] [address]   
[facility] [place] [id number]   
   
                                                    Monocytes (Bld)   
[#/Vol]         0.6 {x10E3/uL}  Normal          0.1-0.9         [facility]   
Internal   
Medicine;   
[facility]   
Internal   
Medicine  
Work Phone:   
[phone]  
   
                                        Comment on above:   PATIENT WAS FASTINGP  
ERFORMED BY: AMOR Sangeetasaurabh [address]   
Road[place] [id number]   
   
                                                    Monocytes (Bld)   
[#/Vol]         0.6 10*3/uL     Normal          0.1-0.9         [facility]   
Internal   
Medicine;   
[facility]   
Internal   
Medicine  
Work Phone:   
[phone]  
   
                                        Comment on above:   PATIENT WAS FASTINGP  
ERFORMED BY: AMOR [facility] MullerQheajp[address]   
Road[place] [id number]   
   
                                                    Monocytes/100 WBC   
(Bld)           8 %             Normal                          [facility]   
Internal   
Medicine;   
Com[facility]   
Internal   
Medicine  
Work Phone:   
[phone]  
   
                                        Comment on above:   PATIENT WAS FASTINGP  
ERFORMED BY: AMOR Mullerlin[address]   
Road[place] [id number]   
   
                                                    Neutrophils (Bld)   
[#/Vol]         4.2 {x10E3/uL}  Normal          1.4-7.0         [facility]   
Internal   
Medicine;   
[facility]   
Internal   
Medicine  
Work Phone:   
[phone]  
   
                                        Comment on above:   PATIENT WAS FASTINGP  
ERFORMED BY: AMOR Mullerlin[address]   
Roadbl[place] [id number]   
   
                                                    Neutrophils (Bld)   
[#/Vol]         4.2 10*3/uL     Normal          1.4-7.0         [facility]   
Internal   
Medicine;   
Com[facility]   
Internal   
Medicine  
Work Phone:   
[phone]  
   
                                        Comment on above:   PATIENT WAS FASTINGP  
ERFORMED BY: AMOR [facility] [address]zzn[address]   
RoadD[place] [id number]   
   
                                                    Neutrophils/100 WBC   
(Bld)           55 %            Normal                          [facility]   
Internal   
Medicine;   
Com[facility]   
Internal   
Medicine  
Work Phone:   
[phone]  
   
                                        Comment on above:   PATIENT WAS FASTINGP  
ERFORMED BY: AMOR [facility] MullerEoghhc[address]   
RoadD[place] [id number]   
   
                                                    Platelets (Bld)   
[#/Vol]         213 {x10E3/uL}  Normal          150-450         [facility]   
Internal   
Medicine;   
[facility]   
Internal   
Medicine  
Work Phone:   
[phone]  
   
                                        Comment on above:   PATIENT WAS FASTINGP  
ERFORMED BY: CB [facility] Dadpte[address]   
Road[place] [id number]   
   
                                                    Platelets (Bld)   
[#/Vol]         213 10*3/uL     Normal          150-450         [facility]   
Internal   
Medicine;   
Comprehensive   
Internal   
Medicine  
Work Phone:   
[phone]  
   
                                        Comment on above:   PATIENT WAS FASTINGP  
ERFORMED BY: CB [facility] Otegif[address]   
Road[place] [id number]   
   
                      RBC (Bld) [#/Vol] 5.07 {x10E6/uL} Normal     4.14-5.80  [facility]   
Internal   
Medicine;   
Comprehensive   
Internal   
Medicine  
Work Phone:   
[phone]  
   
                                        Comment on above:   PATIENT WAS FASTINGP  
ERFORMED BY: CB [facility] [address]vtlql[address]   
Road[place] [id number]   
   
                      RBC (Bld) [#/Vol] 5.07 10*6/uL Normal     4.14-5.80  [facility]   
Internal   
Medicine;   
Comprehensive   
Internal   
Medicine  
Work Phone:   
[phone]  
   
                                        Comment on above:   PATIENT WAS FASTINGP  
ERFORMED BY: CB [facility] [address]qgb[address]   
Road[place] [id number]   
   
                      WBC (Bld) [#/Vol] 7.7 {x10E3/uL} Normal     3.4-10.8   Com  
prehensive   
Internal   
Medicine;   
Comprehensive   
Internal   
Medicine  
Work Phone:   
[phone]  
   
                                        Comment on above:   PATIENT WAS FASTINGP  
ERFORMED BY: CB [facility] Smxlee[address]   
Road[place] [id number]   
   
                      WBC (Bld) [#/Vol] 7.7 10*3/uL Normal     3.4-10.8   [facility]   
Internal   
Medicine;   
Comprehensive   
Internal   
Medicine  
Work Phone:   
[phone]  
   
                                        Comment on above:   PATIENT WAS FASTINGP  
ERFORMED BY: CB [facility] Q[address]ywn[address]   
Road[place] [id number]   
   
                                                    HGB A1C (08689)Ordered By: S  
ystem Manager on 2021   
   
                                                    HbA1c (Bld) [Mass   
fraction]       5.5 %           Normal          4.8-5.6         [facility]   
Internal   
Medicine;   
Comprehensive   
Internal   
Medicine  
Work Phone:   
[phone]  
   
                                        Comment on above:   . Prediabetes: 5.7 -  
 6.4 Diabetes: >6.4 Glycemic control for   
adults with diabetes: <7.0   
   
                                                            PATIENT WAS FASTINGP  
ERFORMED BY: AMOR Ilene Mullerlin[address]   
[facility] [place] [id number]   
   
                                                    LIPID PANEL (33848)Ordered B  
y:  on 2021   
   
                                                    Cholesterol   
[Mass/Vol]      170 mg/dL       Normal          100-199         Comprehensive   
Internal   
Medicine;   
Comprehensive   
Internal   
Medicine  
Work Phone:   
[phone]  
   
                                        Comment on above:   PATIENT WAS FASTINGP  
ERFORMED BY: AMOR Ilene Jason6370 [facility] [id number]   
   
                                                    Cholesterol in HDL   
[Mass/Vol]      57 mg/dL        Normal                          Comprehensive   
Internal   
Medicine;   
Comprehensive   
Internal   
Medicine  
Work Phone:   
[phone]  
   
                                        Comment on above:   PATIENT WAS FASTINGP  
ERFORMED BY: AMOR Sangeetasaurabh Egcyiq3869 [facility] [id number]   
   
                                                    Cholesterol in   
LDL/Cholesterol in   
HDL [Mass ratio] 1.6 {ratio}     Normal          0.0-3.6         Comprehensive   
Internal   
Medicine;   
Comprehensive   
Internal   
Medicine  
Work Phone:   
[phone]  
   
                                        Comment on above:   LDL/HDL Ratio Men Wo  
men 1/2 Avg.Risk 1.0 1.5 Avg.Risk 3.6 3.2   
2X   
Avg.Risk 6.2 5.0 3X Avg.Risk 8.0 6.1   
   
                                                            PATIENT WAS FASTINGP  
ERFORMED BY: AMOR Sangeetasaurabh Xoodpy0280 [facility] [id number]   
   
                                                    Triglyceride   
[Mass/Vol]      122 mg/dL       Normal          0-149           Comprehensive   
Internal   
Medicine;   
Comprehensive   
Internal   
Medicine  
Work Phone:   
[phone]  
   
                                        Comment on above:   PATIENT WAS FASTINGP  
ERFORMED BY: AMOR LabCosaurabh Eknylg0518 [facility] [id number]   
   
                      LIPID PANEL (47422) 22 mg/dL   Normal     5-40       Compr  
ehensive   
Internal   
Medicine;   
Comprehensive   
Internal   
Medicine  
Work Phone:   
[phone]  
   
                                        Comment on above:   PATIENT WAS FASTINGP  
ERFORMED BY: AMOR Sangeetasaurabh Asleog8739 [facility] [id number]   
   
                      LIPID PANEL (36189) 91 mg/dL   Normal     0-99       Compr  
ehensive   
Internal   
Medicine;   
Comprehensive   
Internal   
Medicine  
Work Phone:   
[phone]  
   
                                        Comment on above:   PATIENT WAS FASTINGP  
ERFORMED BY: CB [facility] [address]ajp[address]   
Road[place] [id number]   
   
                      LIPID PANEL (34107) 1.6 {ratio} Normal     0.0-3.6    Comp  
rehensive   
Internal   
Medicine;   
Comprehensive   
Internal   
Medicine  
Work Phone:   
[phone]  
   
                                        Comment on above:   LDL/HDL Ratio Men Wo  
men 1/2 Avg.Risk 1.0 1.5 Avg.Risk 3.6 3.2   
2X   
Avg.Risk 6.2 5.0 3X Avg.Risk 8.0 6.1   
   
                                                            PATIENT WAS FASTINGP  
ERFORMED BY: CB [facility] Dbcrwn[address]   
Road[place] [id number]   
   
                                                    METABOLIC PANEL, COMPREHENSI  
VE (46468)Ordered By:  on 2021   
   
                      Albumin [Mass/Vol] 4.6 g/dL   Normal     3.8-4.9    [facility]   
Internal   
Medicine;   
Comprehensive   
Internal   
Medicine  
Work Phone:   
[phone]  
   
                                        Comment on above:   PATIENT WAS FASTINGP  
ERFORMED BY: CB [facility] Vvvyfe[address]   
Road[place] [id number]   
   
                                                    Albumin/Globulin   
[Mass ratio]    1.9 {ratio}     Normal          1.2-2.2         Comprehensive   
Internal   
Medicine;   
Comprehensive   
Internal   
Medicine  
Work Phone:   
[phone]  
   
                                        Comment on above:   PATIENT WAS FASTINGP  
ERFORMED BY: CB [facility] Appust[address]   
Road[place] [id number]   
   
                                                    ALP [Catalytic   
activity/Vol]   48 [iU]/L       Normal                    Comprehensive   
Internal   
Medicine;   
Comprehensive   
Internal   
Medicine  
Work Phone:   
[phone]  
   
                                        Comment on above:   PATIENT WAS FASTINGP  
ERFORMED BY: CB [facility] [address]fu[address]   
Road[place] [id number]   
   
                                                    ALP [Catalytic   
activity/Vol]   48 U/L          Normal                    Comprehensive   
Internal   
Medicine;   
Comprehensive   
Internal   
Medicine  
Work Phone:   
[phone]  
   
                                        Comment on above:   PATIENT WAS FASTINGP  
ERFORMED BY: CB [facility] [address]qzkn[address]   
Road[place] [id number]   
   
                                                    ALT [Catalytic   
activity/Vol]   34 [iU]/L       Normal          0-44            Comprehensive   
Internal   
Medicine;   
Comprehensive   
Internal   
Medicine  
Work Phone:   
[phone]  
   
                                        Comment on above:   PATIENT WAS FASTINGP  
ERFORMED BY: CB [facility] Jcqpfs[address]   
Road[place] [id number]   
   
                                                    ALT [Catalytic   
activity/Vol]   34 U/L          Normal          0-44            Comprehensive   
Internal   
Medicine;   
Comprehensive   
Internal   
Medicine  
Work Phone:   
[phone]  
   
                                        Comment on above:   PATIENT WAS FASTINGP  
ERFORMED BY: CB [facility] [address]paib[address]   
Road[place] [id number]   
   
                                                    AST [Catalytic   
activity/Vol]   25 [iU]/L       Normal          0-40            Comprehensive   
Internal   
Medicine;   
Comprehensive   
Internal   
Medicine  
Work Phone:   
[phone]  
   
                                        Comment on above:   PATIENT WAS FASTINGP  
ERFORMED BY: CB [facility] K[address]nyr[address]   
Road[place] [id number]   
   
                                                    AST [Catalytic   
activity/Vol]   25 U/L          Normal          0-40            Comprehensive   
Internal   
Medicine;   
Comprehensive   
Internal   
Medicine  
Work Phone:   
[phone]  
   
                                        Comment on above:   PATIENT WAS FASTINGP  
ERFORMED BY: CB [facility] [address]qqo[address]   
Road[place] [id number]   
   
                      Bilirubin [Mass/Vol] 0.5 mg/dL  Normal     0.0-1.2    Comp  
rehensive   
Internal   
Medicine;   
Comprehensive   
Internal   
Medicine  
Work Phone:   
[phone]  
   
                                        Comment on above:   PATIENT WAS FASTINGP  
ERFORMED BY: CB [facility] [address]mawnu[address]   
Road[place] [id number]   
   
                      Calcium [Mass/Vol] 10.1 mg/dL Normal     8.7-10.2   [facility]   
Internal   
Medicine;   
Comprehensive   
Internal   
Medicine  
Work Phone:   
[phone]  
   
                                        Comment on above:   PATIENT WAS FASTINGP  
ERFORMED BY: CB [facility] [address]ocq[address]   
Road[address] [id number]   
   
                      Chloride [Moles/Vol] 101 mmol/L Normal          Comp  
rehensive   
Internal   
Medicine;   
Comprehensive   
Internal   
Medicine  
Work Phone:   
[phone]  
   
                                        Comment on above:   PATIENT WAS FASTINGP  
ERFORMED BY: CB [facility] [address]totz[address]   
Road[place] [id number]   
   
                      CO2 [Moles/Vol] 25 mmol/L  Normal     20-29      Comprehen  
South [facility]   
[facility]   
[facility];   
Comprehensive   
Internal   
Medicine  
Work Phone:   
[phone]  
   
                                        Comment on above:   PATIENT WAS FASTINGP  
ERFORMED BY: AMOR [facility] [address]   
Road[place] [id number]   
   
                      Creatinine [Mass/Vol] 0.92 mg/dL Normal     0.76-1.27  Com  
prehensive   
Internal   
Medicine;   
Comprehensive   
Internal   
Medicine  
Work Phone:   
[phone]  
   
                                        Comment on above:   PATIENT WAS FASTINGP  
ERFORMED BY: CB [facility] [address]   
Road[place] [id number]   
   
                                                    GFR/1.73 sq M   
predicted among   
blacks CKD-EPI   
(S/P/Bld) [Vol   
rate/Area]      106 mL/min/1.73 Normal                          Comprehensive   
Internal   
Medicine;   
Comprehensive   
Internal   
Medicine  
Work Phone:   
[phone]  
   
                                        Comment on above:   PATIENT WAS FASTINGP  
ERFORMED BY: AMOR [facility] [address]   
RoadDublin [place] [id number]   
   
                                                    GFR/1.73 sq M   
predicted among   
non-blacks CKD-EPI   
(S/P/Bld) [Vol   
rate/Area]      92 mL/min/1.73  Normal                          Comprehensive   
Internal   
Medicine;   
Comprehensive   
Internal   
Medicine  
Work Phone:   
[phone]  
   
                                        Comment on above:   PATIENT WAS FASTINGP  
ERFORMED BY: AMOR [facility] [address]   
[facility] [place] [id number]   
   
                                                    Globulin (S)   
[Mass/Vol]      2.4 g/dL        Normal          1.5-4.5         Comprehensive   
Internal   
Medicine;   
Comprehensive   
Internal   
Medicine  
Work Phone:   
[phone]  
   
                                        Comment on above:   PATIENT WAS FASTINGP  
ERFORMED BY: CB [facility] [address]   
[facility] [place] [id number]   
   
                      Glucose [Mass/Vol] 126 mg/dL  Abnormal   65-99      [facility]   
Internal   
Medicine;   
Comprehensive   
Internal   
Medicine  
Work Phone:   
[phone]  
   
                                        Comment on above:   PATIENT WAS FASTINGP  
ERFORMED BY: CB [facility] [address]   
[facility] [place] [id number]   
   
                      Potassium [Moles/Vol] 4.5 mmol/L Normal     3.5-5.2    Com  
prehensive   
Internal   
Medicine;   
Comprehensive   
Internal   
Medicine  
Work Phone:   
[phone]  
   
                                        Comment on above:   PATIENT WAS FASTINGP  
ERFORMED BY: AMOR [facility] [address]   
RoadD[place] [id number]   
   
                      Protein [Mass/Vol] 7.0 g/dL   Normal     6.0-8.5    [facility]   
Internal   
Medicine;   
Comprehensive   
Internal   
Medicine  
Work Phone:   
[phone]  
   
                                        Comment on above:   PATIENT WAS FASTINGP  
ERFORMED BY: AMOR [facility] [address]   
RoadD[place] [id number]   
   
                      Sodium [Moles/Vol] 141 mmol/L Normal     134-144    [facility]   
Internal   
Medicine;   
Comprehensive   
Internal   
Medicine  
Work Phone:   
[phone]  
   
                                        Comment on above:   PATIENT WAS FASTINGP  
ERFORMED BY: AMOR [facility] [address]   
RoadD[place] [id number]   
   
                                                    Urea nitrogen   
[Mass/Vol]      14 mg/dL        Normal          6-24            Comprehensive   
Internal   
Medicine;   
Comprehensive   
Internal   
Medicine  
Work Phone:   
[phone]  
   
                                        Comment on above:   PATIENT WAS FASTINGP  
ERFORMED BY: AMOR LabCosaurabh [address]   
RoadD[place] [id number]   
   
                                                    Urea   
nitrogen/Creatinine   
[Mass ratio]    15 mg/mg        Normal          9-20            Comprehensive   
Internal   
Medicine;   
Comprehensive   
Internal   
Medicine  
Work Phone:   
[phone]  
   
                                        Comment on above:   PATIENT WAS FASTINGP  
ERFORMED BY: AMOR [facility] [address]   
RoadD[place] [id number]   
   
                                                    MICROALBUMINOrdered By: Syst  
em Manager on 2021   
   
                                                    Albumin DL <= 20 mg/L   
(U) [Mass/Vol]  mg/dL           Normal                          Comprehensive   
Internal   
Medicine;   
Comprehensive   
Internal   
Medicine  
Work Phone:   
[phone]  
   
                                        Comment on above:   **Verified by repeat  
 analysis**   
   
                                                            PATIENT WAS FASTINGP  
ERFORMED BY: AMOR [facility] [address]   
RoadD[place] [id number]   
   
                                                    Albumin DL <= 20 mg/L   
(U) [Mass/Vol]  mg/dL           Normal                          Comprehensive   
Internal   
Medicine;   
Comprehensive   
Internal   
Medicine  
Work Phone:   
[phone]  
   
                                        Comment on above:   **Verified by repeat  
 analysis**   
   
                                                            PATIENT WAS FASTINGP  
ERFORMED BY: AMOR [facility] [address]   
RoadD[place] [id number]   
   
                                                    Albumin/Creatinine   
(U) [Mass ratio] <4              Normal          0-29            Comprehensive   
Internal   
Medicine;   
Comprehensive   
Internal   
Medicine  
Work Phone:   
[phone]  
   
                                        Comment on above:   Normal: 0 - 29 Moder  
ately increased: 30 - 300 Severely   
increased: >300   
   
                                                            PATIENT WAS FASTINGP  
ERFORMED BY: [facility] [id number]   
   
                                                    Creatinine (U)   
[Mass/Vol]      82.2 mg/dL      Normal                          Comprehensive   
Internal   
Medicine;   
Comprehensive   
Internal   
Medicine  
Work Phone:   
[phone]  
   
                                        Comment on above:   PATIENT WAS FASTINGP  
ERFORMED BY: [facility]6[address] [facility] [id number]   
   
                                                    RENIN (74800)Ordered By: X2IMPACTs  
tem Manager on 2021   
   
                                                    Renin (P) [Catalytic   
activity/Vol]       0.723 {ng/mL/hr}    Normal              0.167-5.38  
0                                       Comprehensive   
Internal   
Medicine;   
Comprehensive   
Internal   
Medicine  
Work Phone:   
[phone]  
   
                                        Comment on above:   This test was devrenaldoo  
ped and its performance   
characteristicsdetermined by [employer]. It has not been cleared or   
approvedby the Food and Drug Administration.   
   
                                                            PATIENT WAS FASTINGP  
ERFORMED BY:  ThanxDeborah Heart and Lung CenterEnjknkblnv2570 [facility] [id number]   
   
                                                    TSH (65734)Ordered By: Solaiemes  
m Manager on 2021   
   
                          TSH Qn       1.760 {uIU/mL} Normal       0.450-4.50  
0                                       Comprehensive   
Internal   
Medicine;   
Comprehensive   
Internal   
Medicine  
Work Phone:   
[phone]  
   
                                        Comment on above:   PATIENT WAS FASTINGP  
ERFORMED BY:  [facility]Robert [facility] SomersetHosxzd1206 [facility] [id number]   
   
                                                    URINALYSIS, W/ MICRO (66897)  
Ordered By:  on 2021   
   
                      Appearance (U) Clear      Normal                Comprehens  
bebe   
Internal   
Medicine;   
Comprehensive   
Internal   
Medicine  
Work Phone:   
[phone]  
   
                                        Comment on above:   PATIENT WAS FASTINGP  
ERFORMED BY:  [facility]xRobert [facility] SomersetHznrbq7947 [facility] [id number]   
   
                      Bilirubin Ql (U) Negative   Normal                Comprehe  
nsive   
Internal   
Medicine;   
Comprehensive   
Internal   
Medicine  
Work Phone:   
[phone]  
   
                                        Comment on above:   PATIENT WAS FASTINGP  
ERFORMED BY: AMOR GodinezCoRobert Wood Johnson [facility] Hospital SomersetSlhvfd[address]   
[facility] [place] [id number]   
   
                      Bilirubin Ql (U) Negative   Normal                Comprehe  
nsive   
Internal   
Medicine;   
Comprehensive   
Internal   
Medicine  
Work Phone:   
[phone]  
   
                                        Comment on above:   PATIENT WAS FASTINGP  
ERFORMED BY: AMOR Sangeetasaurabh [address]   
[facility] [place] [id number]   
   
                      Color (U)  Yellow     Normal                Comprehensive   
Internal   
Medicine;   
Comprehensive   
Internal   
Medicine  
Work Phone:   
[phone]  
   
                                        Comment on above:   PATIENT WAS FASTINGP  
ERFORMED BY: AMOR Lab[facility] [address]   
[facility] [id number]   
   
                      Glucose Ql (U) Negative   Normal                Comprehens  
bebe   
Internal   
Medicine;   
Comprehensive   
Internal   
Medicine  
Work Phone:   
[phone]  
   
                                        Comment on above:   PATIENT WAS FASTINGP  
ERFORMED BY: AMOR Mull[address]lin[address]   
[facility] [id number]   
   
                      Glucose Ql (U) Negative   Normal                Comprehens  
bebe   
Internal   
Medicine;   
Comprehensive   
Internal   
Medicine  
Work Phone:   
[phone]  
   
                                        Comment on above:   PATIENT WAS FASTINGP  
ERFORMED BY: AMOR GodinezCosaurabh [address]   
[facility] [place] [id number]   
   
                      Hemoglobin Ql (U) Negative   Normal                Compreh  
ensive   
Internal   
Medicine;   
Comprehensive   
Internal   
Medicine  
Work Phone:   
[phone]  
   
                                        Comment on above:   PATIENT WAS FASTINGP  
ERFORMED BY: AMOR Mullerlin[address]   
[facility] [place] [id number]   
   
                      Hemoglobin Ql (U) Negative   Normal                Compreh  
ensive   
Internal   
Medicine;   
Comprehensive   
Internal   
Medicine  
Work Phone:   
[phone]  
   
                                        Comment on above:   PATIENT WAS FASTINGP  
ERFORMED BY: AMOR Mullerlin[address]   
[facility] [id number]   
   
                      Ketones Ql (U) Negative   Normal                Comprehens  
bebe   
Internal   
Medicine;   
Comprehensive   
Internal   
Medicine  
Work Phone:   
[phone]  
   
                                        Comment on above:   PATIENT WAS FASTINGP  
ERFORMED BY: AMOR [facility] [address]   
[facility] [place] [id number]   
   
                      Ketones Ql (U) Negative   Normal                Comprehens  
bebe   
Internal   
Medicine;   
Comprehensive   
Internal   
Medicine  
Work Phone:   
[phone]  
   
                                        Comment on above:   PATIENT WAS FASTINGP  
ERFORMED BY: AMOR Mullerlin[address]   
RoadD[place] [id number]   
   
                                                    Leukocyte esterase   
Test strip Ql (U) Negative        Normal                          Comprehensive   
Internal   
Medicine;   
Comprehensive   
Internal   
Medicine  
Work Phone:   
[phone]  
   
                                        Comment on above:   PATIENT WAS FASTINGP  
ERFORMED BY: AMOR Ilene Jason[address]   
RoadD[place] [id number]   
   
                                                    Leukocyte esterase   
Test strip Ql (U) Negative        Normal                          Comprehensive   
Internal   
Medicine;   
Comprehensive   
Internal   
Medicine  
Work Phone:   
[phone]  
   
                                        Comment on above:   PATIENT WAS FASTINGP  
ERFORMED BY: AMOR [facility] [address]   
RoadD[place] [id number]   
   
                                                    Microscopic   
observation LM Nom   
(Urine sed)     See below:      Normal                          Comprehensive   
Internal   
Medicine;   
Comprehensive   
Internal   
Medicine  
Work Phone:   
[phone]  
   
                                        Comment on above:   Microscopic was benny  
cated and was performed.   
   
                                                            PATIENT WAS FASTINGP  
ERFORMED BY: AMOR LabCosaurabh [address]erFyptwa[address]   
RoadD[place] [id number]   
   
                                                    Microscopic   
observation LM Nom   
(Urine sed)     MICRON          Normal                          Comprehensive   
Internal   
Medicine;   
Comprehensive   
Internal   
Medicine  
Work Phone:   
[phone]  
   
                                        Comment on above:   Microscopic follows   
if indicated.   
   
                                                            PATIENT WAS FASTINGP  
ERFORMED BY: AMOR [facility] [address]   
RoadD[place] [id number]   
   
                      Nitrite Ql (U) Negative   Normal                Comprehens  
bebe   
Internal   
Medicine;   
Comprehensive   
Internal   
Medicine  
Work Phone:   
[phone]  
   
                                        Comment on above:   PATIENT WAS FASTINGP  
ERFORMED BY: AMOR [facility] [address]   
RoadD[place] [id number]   
   
                      Nitrite Ql (U) Negative   Normal                Comprehens  
bebe   
Internal   
Medicine;   
Comprehensive   
Internal   
Medicine  
Work Phone:   
[phone]  
   
                                        Comment on above:   PATIENT WAS FASTINGP  
ERFORMED BY: AMOR [facility] [address]   
RoadD[place] [id number]   
   
                      pH (U)     7.0 [pH]   Normal     5.0-7.5    Comprehensive   
Internal   
Medicine;   
Comprehensive   
Internal   
Medicine  
Work Phone:   
[phone]  
   
                                        Comment on above:   PATIENT WAS FASTINGP  
ERFORMED BY: AMOR [facility] [address]cko[address]   
RoadD[place] [id number]   
   
                      Protein Ql (U) Negative   Normal                Comprehens  
bebe   
Internal   
Medicine;   
Comprehensive   
Internal   
Medicine  
Work Phone:   
[phone]  
   
                                        Comment on above:   PATIENT WAS FASTINGP  
ERFORMED BY:  [facility] [address]   
RoadD[place] [id number]   
   
                      Protein Ql (U) Negative   Normal                Comprehens  
bebe   
Internal   
Medicine;   
Comprehensive   
Internal   
Medicine  
Work Phone:   
[phone]  
   
                                        Comment on above:   PATIENT WAS FASTINGP  
ERFORMED BY:  [facility] [address]   
RoadD[place] [id number]   
   
                                                    Specific gravity (U)   
[Rel density]       1.018 1             Normal              1.005-1.03  
0                                       Comprehensive   
Internal   
Medicine;   
Comprehensive   
Internal   
Medicine  
Work Phone:   
[phone]  
   
                                        Comment on above:   PATIENT WAS FASTINGP  
ERFORMED BY: CB [facility] [address]hw[address]   
RoadD[place] [id number]   
   
                                                    Urobilinogen (U)   
[Mass/Vol]      1.0 mg/dL       Normal          0.2-1.0         Comprehensive   
Internal   
Medicine;   
Comprehensive   
Internal   
Medicine  
Work Phone:   
[phone]  
   
                                        Comment on above:   PATIENT WAS FASTINGP  
ERFORMED BY:  [facility] [address]vfmem[address]   
RoadD[place] [id number]   
   
                                                    Urobilinogen Test   
strip (U) [Mass/Vol] 1.0 mg/dL       Normal          0.2-1.0         Comprehensi  
   
Internal   
Medicine;   
Comprehensive   
Internal   
Medicine  
Work Phone:   
[phone]  
   
                                        Comment on above:   PATIENT WAS FASTINGP  
ERFORMED BY:  [facility] [address]ax[address]   
RoadD[place] [id number]   
   
                                                    HGB A1C (00165)Ordered By: S  
ystem Manager on 2020   
   
                                                    HbA1c (Bld) [Mass   
fraction]       5.5 %           Normal          4.8-5.6         Comprehensive   
Internal   
Medicine  
Work Phone:   
[phone]  
   
                                        Comment on above:   . Prediabetes: 5.7 -  
 6.4 Diabetes: >6.4 Glycemic control for   
adults with diabetes: <7.0   
   
                                                            PATIENT NOT FASTINGP  
ERFORMED BY: CB [facility] [address]wa[address]   
RoadD[place] [id number]; fu 11-30 kf   
   
                                                    HGB A1C (12612)Ordered By: S  
ystem Manager on 2020   
   
                                                    HbA1c (Bld) [Mass   
fraction]       5.2 %           Normal          4.8-5.6         Comprehensive   
Internal   
Medicine  
Work Phone:   
[phone]  
   
                                        Comment on above:   . Prediabetes: 5.7 -  
 6.4 Diabetes: >6.4 Glycemic control for   
adults with diabetes: <7.0   
   
                                                            Test(s) 018134-GOK-K  
; 861858-NSE-C; 913515-DUD-E;   
347671-Oedmrmewbpodk; 123477-Cholesterol, Total; 920999-JYF-Y   
(Total);245906-Gsrnf LDL-P; 291365-LJS Size; 377495-RQ-BK   
Scorewas developed and its performance characteristics   
determinedby [employer]. It has not been cleared or approved by the   
Foodand Drug Administration.PATIENT WAS FASTINGPERFORMED BY: [facility]   
[id number] BY: [facility]DWAs[facility] Hospital [id number]   
   
                                                    NMR Profile (05404)Ordered B  
y:  on 2020   
   
                                                    Cholesterol   
[Mass/Vol]      163 mg/dL       Normal          100-199         Comprehensive   
Internal   
Medicine  
Work Phone:   
[phone]  
   
                                        Comment on above:   Test(s) 814475-IBH-T  
; 102219-FCD-L; 149570-PXF-T;   
790651-Eetaynzugudor; 123477-Cholesterol, Total; 311286-RLR-V   
(Total);394715-Lwvcr LDL-P; 993952-OVR Size; 082687-WU-OR   
Scorewas developed and its performance characteristics   
determinedby [employer]. It has not been cleared or approved by the   
Foodand Drug Administration.PATIENT WAS FASTINGPERFORMED BY: [employer] [address]   
[id number] BY: Jiujiuweikang70 FundologyAs[facility] Hospital [id number]   
   
                                                    Lipoprotein.alpha   
[Moles/Vol]     39.5 umol/L     Normal                          Comprehensive   
Internal   
Medicine  
Work Phone:   
[phone]  
   
                                        Comment on above:   Test(s) 482646-JKJ-V  
; 719322-INI-N; 520607-ZGI-R;   
725293-Rpxqpuiagjxog; 123477-Cholesterol, Total; 424728-RUK-N   
(Total);423288-Arnbz LDL-P; 659328-VMQ Size; 211044-KN-XB   
Scorewas developed and its performance characteristics   
determinedby [employer]. It has not been cleared or approved by the   
Foodand Drug Administration.PATIENT WAS FASTINGPERFORMED BY: BN   
[facility] Tvkicfzfic3803 [facility]   
[id number] BY: CB [facility] Zwjvii2308 [facility] [id number]   
   
                                                    Lipoprotein.beta.subp  
article [Entitic   
length]         20.8 nm         Normal                          Comprehensive   
Internal   
Medicine  
Work Phone:   
[phone]  
   
                                        Comment on above:   --------------------  
-------------------------------------- **   
INTERPRETATIVE INFORMATION** PARTICLE CONCENTRATION AND SIZE   
<--Lower CVD Risk Higher CVD Risk--> LDL AND HDL PARTICLES   
Percentile in Reference Population HDL-P (total) High 75th 50th   
25th Low >34.9 34.9 30.5 26.7 <26.7 . Small LDL-P Low 25th 50th   
75th High <[phone] >839 . LDL Size <-Large (Pattern A)->   
&lt;-Small (Pattern B)-> 23.0 20.6 20.5 19.0   
----------------------------------------------------------Small   
LDL-P and LDL Size are associated with CVD risk, but not   
afterLDL-P is taken into account.   
   
                                                            Test(s) 009860-WSI-F  
; 123016-QFZ-Q; 408858-BRC-C;   
956998-Xtfmsvdmsgmfr; 123477-Cholesterol, Total; 747066-KTE-D   
(Total);709948-Wriyt LDL-P; 935702-NZW Size; 813230-WL-YT   
Scorewas developed and its performance characteristics   
determinedby [employer]. It has not been cleared or approved by the   
Foodand Drug Administration.PATIENT WAS FASTINGPERFORMED BY: [employer] [address]   
[id number] BY:  Thanx Lcspvs1170 [facility] [id number]   
   
                                                    Lipoprotein.beta.subp  
article [Moles/Vol] 975 nmol/L      Normal                          Comprehensiv  
e   
Internal   
Medicine  
Work Phone:   
[phone]  
   
                                        Comment on above:   Low < 1000 Moderate   
1000 - 1299 Borderline-High 1300 - 1599   
High   
1600 - 2000 Very High > 2000   
   
                                                            Test(s) 129573-LGG-K  
; 317131-GWL-R; 939792-AAR-O;   
587926-Cuakulvgogzbu; 123477-Cholesterol, Total; 609368-LCX-A   
(Total);659272-Fysol LDL-P; 417471-KHI Size; 924905-XP-BY   
Scorewas developed and its performance characteristics   
determinedby [employer]. It has not been cleared or approved by the   
Foodand Drug Administration.PATIENT WAS FASTINGPERFORMED BY: [employer] [address]   
[id number] BY: Jiujiuweikang70 [facility] [id number]   
   
                                                    Lipoprotein.beta.subp  
article.small   
[Moles/Vol]     391 nmol/L      Normal                          Comprehensive   
Internal   
Medicine  
Work Phone:   
[phone]  
   
                                        Comment on above:   Test(s) 520077-VEY-H  
; 609821-GDZ-D; 646904-IYM-H;   
446211-Fjtqwvfhpnhhx; 123477-Cholesterol, Total; 872746-XQP-U   
(Total);408746-Jaxwv LDL-P; 064203-QPJ Size; 096620-OE-KQ   
Scorewas developed and its performance characteristics   
determinedby [employer]. It has not been cleared or approved by the   
Foodand Drug Administration.PATIENT WAS FASTINGPERFORMED BY: [employer] [address]   
[id number] BY: [employer]6370 [facility] [id number]   
   
                                                    Triglyceride   
[Mass/Vol]      82 mg/dL        Normal          0-149           [facility]   
Internal   
Medicine  
Work Phone:   
[phone]  
   
                                        Comment on above:   Test(s) 930478-ICZ-M  
; 924913-BKH-Y; 032912-OPS-P;   
781433-Yrkjyydggiimk; 123477-Cholesterol, Total; 554095-KUT-F   
(Total);791547-Chdke LDL-P; 617463-WYE Size; 993700-IS-OZ   
Scorewas developed and its performance characteristics   
determinedby [employer]. It has not been cleared or approved by the   
Foodand Drug Administration.PATIENT WAS FASTINGPERFORMED BY: [facility]   
[id number] BY: Dok[facility]om [facility] [id number]   
   
                      NMR Profile (41982) 62 mg/dL   Normal                [facility]   
Internal   
Medicine  
Work Phone:   
[phone]  
   
                                        Comment on above:   Test(s) 848743-DVR-K  
; 957647-WKS-F; 957992-UDW-B;   
033987-Hrrxxwsiqrbjd; 123477-Cholesterol, Total; 498328-PJP-I   
(Total);302931-Iburh LDL-P; 487824-ZAY Size; 142382-SE-WV   
Scorewas developed and its performance characteristics   
determinedby [employer]. It has not been cleared or approved by the   
Foodand Drug Administration.PATIENT WAS FASTINGPERFORMED BY: [facility]   
[id number] BY: Jiujiuweikang70 [facility] [id number]   
   
                      NMR Profile (11454) 85 mg/dL   Normal     0-99       [facility]   
Internal   
Medicine  
Work Phone:   
[phone]  
   
                                        Comment on above:   . Optimal < 100 Abov  
e optimal 100 - 129 Borderline 130 - 159   
High 160 - 189 Very high > 189 .LDL-C is inaccurate if patient   
is non-fasting.   
   
                                                            Test(s) 208534-ENF-O  
; 092142-BPR-N; 487833-MMM-A;   
196730-Dcrpmnejxbjcm; 123477-Cholesterol, Total; 146356-LZW-N   
(Total);505586-Nwlvu LDL-P; 976252-SQC Size; 863502-JA-PN   
Scorewas developed and its performance characteristics   
determinedby [employer]. It has not been cleared or approved by the   
Foodand Drug Administration.PATIENT WAS FASTINGPERFORMED BY:    
Thanx[address]   
[id number] BY:  ThanxRobert [facility] Johnson [facility] Hospital SomersetOcggqx8843 [facility] [id number]   
   
                      NMR Profile (99982) 82 mg/dL   Normal     0-149      Compr  
ehensive   
Internal   
Medicine;   
Comprehensive   
Internal   
Medicine  
Work Phone:   
[phone]  
   
                      NMR Profile (88061) 163 mg/dL  Normal     100-199    Compr  
ehensive   
Internal   
Medicine;   
Comprehensive   
Internal   
Medicine  
Work Phone:   
[phone]  
   
                                                    CALCIFEDIOL (14821)Ordered B  
y:  on 2019   
   
                                                    25-Hydroxyvitamin   
D2+25-Hydroxyvitamin   
D3 [Mass/Vol]   82.0 ng/mL      Normal          30.0-100.0      Comprehensive   
Internal   
Medicine  
Work Phone:   
[phone]  
   
                                        Comment on above:   Vitamin D deficiency  
 has been defined by the [place]   
of[place]icine and an Endocrine Society practice guideline as alevel   
of serum 25-OH vitamin D less than 20 ng/mL (1,2).The Endocrine   
Society went on to further define vitamin Dinsufficiency as a   
level between 21 and 29 ng/mL (2).1. IOM ([place]   
[place]). 2010. Dietary reference intakes for calcium and D.   
[place]: The National Academies Press.2. Kodi MF,   
Phillip NC, Archana SAMS, et al. Evaluation, treatment,   
and prevention of vitamin D deficiency: an Endocrine Society   
clinical practice guideline. JCEM. 2011; 96(7):1911-30.   
   
                                                            PATIENT WAS FASTINGP  
ERFORMED BY:  Thanx[address] [id number] BY:  [facility]xRobert [facility] Somerset6370 [facility] [id number]   
   
                                                    CBC with auto diff (87969)Or  
dered By:  on 2019   
   
                                                    Basophils (Bld)   
[#/Vol]         0.0 {x10E3/uL}  Normal          0.0-0.2         Comprehensive   
Internal   
Medicine  
Work Phone:   
[phone]  
   
                                        Comment on above:   PATIENT WAS FASTINGP  
ERFORMED BY:  Lab[address] [id number] BY:  [facility]   
Xjsqqs28[address] RoadDublin [place] [id number]   
   
                                                    Basophils (Bld)   
[#/Vol]         0.0 10*3/uL     Normal          0.0-0.2         Comprehensive   
Internal   
Medicine;   
Comprehensive   
Internal   
Medicine  
Work Phone:   
[phone]  
   
                                        Comment on above:   PATIENT WAS FASTINGP  
ERFORMED BY:  [address] [id number] BY:  [facility]   
Fqpqpx17[address] RoadDublin [place] [id number]   
   
                                                    Basophils/100 WBC   
(Bld)           1 %             Normal                          Comprehensive   
Internal   
Medicine  
Work Phone:   
[phone]  
   
                                        Comment on above:   PATIENT WAS FASTINGP  
ERFORMED BY: [address] [id number] BY:  [facility]   
Owkive4076 [address] RoadDuin [place] [id number]   
   
                                                    Eosinophils (Bld)   
[#/Vol]         0.2 {x10E3/uL}  Normal          0.0-0.4         Comprehensive   
Internal   
Medicine  
Work Phone:   
[phone]  
   
                                        Comment on above:   PATIENT WAS FASTINGP  
ERFORMED BY: [address] [id number] BY:  [facility]   
Pdywyc[address] RoadDublin [place] [id number]   
   
                                                    Eosinophils (Bld)   
[#/Vol]         0.2 10*3/uL     Normal          0.0-0.4         Comprehensive   
Internal   
Medicine;   
Comprehensive   
Internal   
Medicine  
Work Phone:   
[phone]  
   
                                        Comment on above:   PATIENT WAS FASTINGP  
ERFORMED BY: [address] [id number] BY:  [facility]   
Rqlqim7[address] RoadDublin [place] [id number]   
   
                                                    Eosinophils/100 WBC   
(Bld)           2 %             Normal                          Comprehensive   
Internal   
Medicine  
Work Phone:   
[phone]  
   
                                        Comment on above:   PATIENT WAS FASTINGP  
ERFORMED BY: [address] [id number] BY:  LabCoRobert [facility] Johnson [facility] Hospital Somerset[address] [facility] [id number]   
   
                                                    Erythrocyte   
distribution width   
(RBC) [Ratio]   13.4 %          Normal          12.3-15.4       Comprehensive   
Internal   
Medicine  
Work Phone:   
[phone]  
   
                                        Comment on above:   PATIENT WAS FASTINGP  
ERFORMED BY: [address] [id number] BY:  [facility]   
Kasstl[address] [facility] [id number]   
   
                                                    Hematocrit (Bld)   
[Volume fraction] 50.4 %          Normal          37.5-51.0       Comprehensive   
Internal   
Medicine  
Work Phone:   
[phone]  
   
                                        Comment on above:   PATIENT WAS FASTINGP  
ERFORMED BY: [address] [id number] BY: [place] [address] [facility] [id number]   
   
                                                    Hemoglobin (Bld)   
[Mass/Vol]      17.3 g/dL       Normal          13.0-17.7       Comprehensive   
Internal   
Medicine  
Work Phone:   
[phone]  
   
                                        Comment on above:   PATIENT WAS FASTINGP  
ERFORMED BY: [address] [id number] BY:  LabSusan Ville [address] [facility] [id number]   
   
                                                    Immature granulocytes   
(Bld) [#/Vol]   0.0 {x10E3/uL}  Normal          0.0-0.1         Comprehensive   
Internal   
Medicine  
Work Phone:   
[phone]  
   
                                        Comment on above:   PATIENT WAS FASTINGP  
ERFORMED BY: [address] [id number] BY:  LabJohn [facility]. [facility] [facility] [id number]   
   
                                                    Immature granulocytes   
(Bld) [#/Vol]   0.0 10*3/uL     Normal          0.0-0.1         Comprehensive   
Internal   
Medicine;   
Comprehensive   
Internal   
Medicine  
Work Phone:   
[phone]  
   
                                        Comment on above:   PATIENT WAS FASTINGP  
ERFORMED BY: [address] [id number] BY:  [facility]   
[address]hsdgn[address] [facility] [id number]   
   
                                                    Immature   
granulocytes/100 WBC   
(Bld)           0 %             Normal                          Comprehensive   
Internal   
Medicine  
Work Phone:   
[phone]  
   
                                        Comment on above:   PATIENT WAS FASTINGP  
ERFORMED BY:  [address] [id number] BY:  [facility]   
Wrtdwl54[address] RoadDuin [place] [id number]   
   
                                                    Lymphocytes (Bld)   
[#/Vol]         3.2 {x10E3/uL}  Abnormal        0.7-3.1         Comprehensive   
Internal   
Medicine  
Work Phone:   
[phone]  
   
                                        Comment on above:   PATIENT WAS FASTINGP  
ERFORMED BY: [address] [id number] BY:  [facility]   
Syihts[address] [facility] [id number]   
   
                                                    Lymphocytes (Bld)   
[#/Vol]         3.2 10*3/uL     Abnormal        0.7-3.1         Comprehensive   
Internal   
Medicine;   
Comprehensive   
Internal   
Medicine  
Work Phone:   
[phone]  
   
                                        Comment on above:   PATIENT WAS FASTINGP  
ERFORMED BY: [address] [id number] BY:  LabS[facility]   
Kzrack[address] [facility] [id number]   
   
                                                    Lymphocytes/100 WBC   
(Bld)           40 %            Normal                          Comprehensive   
Internal   
Medicine  
Work Phone:   
[phone]  
   
                                        Comment on above:   PATIENT WAS FASTINGP  
ERFORMED BY: [address] [id number] BY:  LabS[facility]   
Yqghyu7021 [facility] [id number]   
   
                                                    MCH (RBC) [Entitic   
mass]           32.1 pg         Normal          26.6-33.0       Comprehensive   
Internal   
Medicine  
Work Phone:   
[phone]  
   
                                        Comment on above:   PATIENT WAS FASTINGP  
ERFORMED BY: [address] [id number] BY:  [facility]   
Jwjoam[address] RoadNovant Health Franklin Medical Centerin [place] [id number]   
   
                      MCHC (RBC) [Mass/Vol] 34.3 g/dL  Normal     31.5-35.7  Com  
prehensive   
Internal   
Medicine  
Work Phone:   
[phone]  
   
                                        Comment on above:   PATIENT WAS FASTINGP  
ERFORMED BY:  [address] [id number] BY:  [facility]   
Zaewrc7[address] RoadDublin [place] [id number]   
   
                                                    MCV (RBC) [Entitic   
vol]            94 fL           Normal          79-97           Comprehensive   
Internal   
Medicine  
Work Phone:   
[phone]  
   
                                        Comment on above:   PATIENT WAS FASTINGP  
ERFORMED BY: [address] [id number] BY:  LabJohn [facility]. [facility] [facility] [id number]   
   
                                                    Monocytes (Bld)   
[#/Vol]         0.8 {x10E3/uL}  Normal          0.1-0.9         Comprehensive   
Internal   
Medicine  
Work Phone:   
[phone]  
   
                                        Comment on above:   PATIENT WAS FASTINGP  
ERFORMED BY: [address] [id number] BY:  LabJohn [facility]. [facility] [facility] South[facility] HospitalDublin [place] [id number]   
   
                                                    Monocytes (Bld)   
[#/Vol]         0.8 10*3/uL     Normal          0.1-0.9         Comprehensive   
Internal   
Medicine;   
Comprehensive   
Internal   
Medicine  
Work Phone:   
[phone]  
   
                                        Comment on above:   PATIENT WAS FASTINGP  
ERFORMED BY: [address] [id number] BY:  [facility]   
Ndssme[address] [facility] Hospitalin [place] [id number]   
   
                                                    Monocytes/100 WBC   
(Bld)           10 %            Normal                          Comprehensive   
Internal   
Medicine  
Work Phone:   
[phone]  
   
                                        Comment on above:   PATIENT WAS FASTINGP  
ERFORMED BY:  [address] [id number] BY: AMOR [facility]   
Xdeopt69[address] RoadDublin [place] [id number]   
   
                                                    Neutrophils (Bld)   
[#/Vol]         4.0 {x10E3/uL}  Normal          1.4-7.0         Comprehensive   
Internal   
Medicine  
Work Phone:   
[phone]  
   
                                        Comment on above:   PATIENT WAS FASTINGP  
ERFORMED BY:  [address] [id number] BY: AMOR [facility]   
Wwkchy23[address] RoadDublin [place] [id number]   
   
                                                    Neutrophils (Bld)   
[#/Vol]         4.0 10*3/uL     Normal          1.4-7.0         Comprehensive   
Internal   
Medicine;   
Comprehensive   
Internal   
Medicine  
Work Phone:   
[phone]  
   
                                        Comment on above:   PATIENT WAS FASTINGP  
ERFORMED BY: [address] [id number] BY: AMOR [facility]   
Nidpvc6834 [address] RoadDublin [place] [id number]   
   
                                                    Neutrophils/100 WBC   
(Bld)           47 %            Normal                          Comprehensive   
Internal   
Medicine  
Work Phone:   
[phone]  
   
                                        Comment on above:   PATIENT WAS FASTINGP  
ERFORMED BY: [address] [id number] BY: AMOR [facility]   
Ykqbvn4868 [address] RoadDublin [place] [id number]   
   
                                                    Platelets (Bld)   
[#/Vol]         211 {x10E3/uL}  Normal          150-450         Comprehensive   
Internal   
Medicine  
Work Phone:   
[phone]  
   
                                        Comment on above:   PATIENT WAS FASTINGP  
ERFORMED BY:  [address] [id number] BY: AMOR [facility]   
Ksttkh7[address] RoadDublin [place] [id number]   
   
                                                    Platelets (Bld)   
[#/Vol]         211 10*3/uL     Normal          150-450         Comprehensive   
Internal   
Medicine;   
Comprehensive   
Internal   
Medicine  
Work Phone:   
[phone]  
   
                                        Comment on above:   PATIENT WAS FASTINGP  
ERFORMED BY: [address] [id number] BY: AMOR [facility]   
Cvehya2872 [facility] [id number]   
   
                      RBC (Bld) [#/Vol] 5.39 {x10E6/uL} Normal     4.14-5.80  [facility]   
Internal   
Medicine  
Work Phone:   
[phone]  
   
                                        Comment on above:   PATIENT WAS FASTINGP  
ERFORMED BY:  LabCo[address] [id number] BY:  LabCoRobert [facility] Somerset6370 [facility] [id number]   
   
                      RBC (Bld) [#/Vol] 5.39 10*6/uL Normal     4.14-5.80  [facility]   
Internal   
Medicine;   
Comprehensive   
Internal   
Medicine  
Work Phone:   
[phone]  
   
                                        Comment on above:   PATIENT WAS FASTINGP  
ERFORMED BY:  LabCo[address] [id number] BY:  LabSusan Ville 2650070 [facility] [id number]   
   
                      WBC (Bld) [#/Vol] 8.2 {x10E3/uL} Normal     3.4-10.8   Com  
prehensive   
Internal   
Medicine  
Work Phone:   
[phone]  
   
                                        Comment on above:   PATIENT WAS FASTINGP  
ERFORMED BY:  LabCo[address] [id number] BY:  LabJohn [facility]. [facility]6370 [facility] [id number]   
   
                      WBC (Bld) [#/Vol] 8.2 10*3/uL Normal     3.4-10.8   [facility]   
Internal   
Medicine;   
Comprehensive   
Internal   
Medicine  
Work Phone:   
[phone]  
   
                                        Comment on above:   PATIENT WAS FASTINGP  
ERFORMED BY:  Thanx[address] [id number] BY:  LabJohn [facility]. [facility]6370 [facility] [id number]   
   
                                                    HGB A1C (81502)Ordered By: S  
ystem Manager on 2019   
   
                                                    HbA1c (Bld) [Mass   
fraction]       5.6 %           Normal          4.8-5.6         Comprehensive   
Internal   
Medicine  
Work Phone:   
[phone]  
   
                                        Comment on above:   . Prediabetes: 5.7 -  
 6.4 Diabetes: >6.4 Glycemic control for   
adults with diabetes: <7.0   
   
                                                            PATIENT WAS FASTINGP  
ERFORMED BY:  Thanx[address] [id number] BY:  [facility]   
Lqqbaf9[address] RoadDublin [place] [id number]   
   
                                                    METABOLIC PANEL, COMPREHENSI  
CHRIS (86172)Ordered By:  on 2019   
   
                      Albumin [Mass/Vol] 4.6 g/dL   Normal     3.5-5.5    [facility]   
Internal   
Medicine  
Work Phone:   
[phone]  
   
                                        Comment on above:   PATIENT WAS FASTINGP  
ERFORMED BY:  Thanx[address] [id number] BY:  [facility]   
Ydeqmi8[address] RoadDublin [place] [id number]   
   
                                                    Albumin/Globulin   
[Mass ratio]    2.1 {ratio}     Normal          1.2-2.2         Comprehensive   
Internal   
Medicine  
Work Phone:   
[phone]  
   
                                        Comment on above:   PATIENT WAS FASTINGP  
ERFORMED BY:  Thanx[address] [id number] BY:  [facility]   
Mhynlq3099 [address] RoadDublin [place] [id number]   
   
                                                    ALP [Catalytic   
activity/Vol]   55 [iU]/L       Normal                    Comprehensive   
Internal   
Medicine  
Work Phone:   
[phone]  
   
                                        Comment on above:   PATIENT WAS FASTINGP  
ERFORMED BY:  Thanx[address] [id number] BY:  [facility]   
Syuijz6[address] RoadDublin [place] [id number]   
   
                                                    ALP [Catalytic   
activity/Vol]   55 U/L          Normal                    Comprehensive   
Internal   
Medicine;   
Comprehensive   
Internal   
Medicine  
Work Phone:   
[phone]  
   
                                        Comment on above:   PATIENT WAS FASTINGP  
ERFORMED BY:  LabCo[address] [id number] BY: CB [facility]   
Tckjyf0618 [address] RoadDublin [place] [id number]   
   
                                                    ALT [Catalytic   
activity/Vol]   18 [iU]/L       Normal          0-44            Comprehensive   
Internal   
Medicine  
Work Phone:   
[phone]  
   
                                        Comment on above:   PATIENT WAS FASTINGP  
ERFORMED BY:  [address] [id number] BY:  [facility]   
Sipovf[address] RoadDublin [place] [id number]   
   
                                                    ALT [Catalytic   
activity/Vol]   18 U/L          Normal          0-44            Comprehensive   
Internal   
Medicine;   
Comprehensive   
Internal   
Medicine  
Work Phone:   
[phone]  
   
                                        Comment on above:   PATIENT WAS FASTINGP  
ERFORMED BY:  [address] [id number] BY: AMOR [facility]   
Hnrcft79[address] RoadDublin [place] [id number]   
   
                                                    AST [Catalytic   
activity/Vol]   19 [iU]/L       Normal          0-40            Comprehensive   
Internal   
Medicine  
Work Phone:   
[phone]  
   
                                        Comment on above:   PATIENT WAS FASTINGP  
ERFORMED BY: [address] [id number] BY:  [facility]   
Vrhuua[address] RoadDublin [place] [id number]   
   
                                                    AST [Catalytic   
activity/Vol]   19 U/L          Normal          0-40            Comprehensive   
Internal   
Medicine;   
Comprehensive   
Internal   
Medicine  
Work Phone:   
[phone]  
   
                                        Comment on above:   PATIENT WAS FASTINGP  
ERFORMED BY: [address] [id number] BY:  [facility]   
Njnjyq8[address] RoadDublin [place] [id number]   
   
                      Bilirubin [Mass/Vol] 0.7 mg/dL  Normal     0.0-1.2    Comp  
[facility]   
Internal   
Medicine  
Work Phone:   
[phone]  
   
                                        Comment on above:   PATIENT WAS FASTINGP  
ERFORMED BY: [address] [id number] BY:  [facility]   
Puiruh[address] RoadDublin [place] [id number]   
   
                      Calcium [Mass/Vol] 10.1 mg/dL Normal     8.7-10.2   [facility]  
[facility]   
Internal   
Medicine  
Work Phone:   
[phone]  
   
                                        Comment on above:   PATIENT WAS FASTINGP  
ERFORMED BY: BN [facility] Oamigmqazl8196   
[facility] [id number] BY: CB [facility]   
Wujgtn77[address] RoadDublin [place] [id number]   
   
                      Chloride [Moles/Vol] 102 mmol/L Normal          Comp  
rehensive   
Internal   
Medicine  
Work Phone:   
[phone]  
   
                                        Comment on above:   PATIENT WAS FASTINGP  
ERFORMED BY: BN [facility] Ikujvgx[address] [id number] BY: CB [facility]   
Vujsvr[address] RoadDublin [place] [id number]   
   
                      CO2 [Moles/Vol] 24 mmol/L  Normal     20-29      [facility]   
Internal   
Medicine  
Work Phone:   
[phone]  
   
                                        Comment on above:   PATIENT WAS FASTINGP  
ERFORMED BY: BN [facility] Pcpczxumgw[address] [id number] BY: CB [facility]   
Vrnjhi7[address] RoadDublin [place] [id number]   
   
                      Creatinine [Mass/Vol] 0.97 mg/dL Normal     0.76-1.27  Com  
prehensive   
Internal   
Medicine  
Work Phone:   
[phone]  
   
                                        Comment on above:   PATIENT WAS FASTINGP  
ERFORMED BY: BN [facility] [address] [id number] BY: CB [facility]   
Axszof[address] RoadDublin [place] [id number]   
   
                                                    GFR/1.73 sq M   
predicted among   
blacks CKD-EPI   
(S/P/Bld) [Vol   
rate/Area]      100 mL/min/1.73 Normal                          Comprehensive   
Internal   
Medicine  
Work Phone:   
[phone]  
   
                                        Comment on above:   PATIENT WAS FASTINGP  
ERFORMED BY: BN [facility] [address] [id number] BY: CB [facility]   
Ajixvi[address] RoadDublin [place] [id number]   
   
                                                    GFR/1.73 sq M   
predicted among   
non-blacks CKD-EPI   
(S/P/Bld) [Vol   
rate/Area]      87 mL/min/1.73  Normal                          Comprehensive   
Internal   
Medicine  
Work Phone:   
[phone]  
   
                                        Comment on above:   PATIENT WAS FASTINGP  
ERFORMED BY:  LabCoJennifer Ville 235407   
[facility] [id number] BY: AMOR [facility]   
Wdzjka[address] RoadDuin [place] [id number]   
   
                                                    Globulin (S)   
[Mass/Vol]      2.2 g/dL        Normal          1.5-4.5         Comprehensive   
Internal   
Medicine  
Work Phone:   
[phone]  
   
                                        Comment on above:   PATIENT WAS FASTINGP  
ERFORMED BY:  [facility] [address] [id number] BY: AMOR [facility]   
Forzkz[address] RoadDublin [place] [id number]   
   
                      Glucose [Mass/Vol] 111 mg/dL  Abnormal   65-99      [facility]   
Internal   
Medicine  
Work Phone:   
[phone]  
   
                                        Comment on above:   PATIENT WAS FASTINGP  
ERFORMED BY:  Lab[address] [id number] BY: AMOR [facility]   
Gbntuz[address] RoadNovant Health Franklin Medical Centerin [place] [id number]   
   
                      Potassium [Moles/Vol] 4.8 mmol/L Normal     3.5-5.2    Com  
prehensive   
Internal   
Medicine  
Work Phone:   
[phone]  
   
                                        Comment on above:   PATIENT WAS FASTINGP  
ERFORMED BY:  [address] [id number] BY: AMOR [facility]   
Wmxtos[address]Duin [place] [id number]   
   
                      Protein [Mass/Vol] 6.8 g/dL   Normal     6.0-8.5    [facility]   
Internal   
Medicine  
Work Phone:   
[phone]  
   
                                        Comment on above:   PATIENT WAS FASTINGP  
ERFORMED BY:  [address] [id number] BY: AMOR [facility]   
[address]xhtlr[address] [facility]in [place] [id number]   
   
                      Sodium [Moles/Vol] 142 mmol/L Normal     134-144    [facility]   
Internal   
Medicine  
Work Phone:   
[phone]  
   
                                        Comment on above:   PATIENT WAS FASTINGP  
ERFORMED BY:  [address] [id number] BY:  [facility]   
Qkorpl[address] RoadNovant Health Franklin Medical Centerin [place] [id number]   
   
                                                    Urea nitrogen   
[Mass/Vol]      10 mg/dL        Normal          6-24            Comprehensive   
Internal   
Medicine  
Work Phone:   
[phone]  
   
                                        Comment on above:   PATIENT WAS FASTINGP  
ERFORMED BY: [address] [id number] BY:  [facility]   
Ssdbca[address] RoadNovant Health Franklin Medical Centerin [place] [id number]   
   
                                                    Urea   
nitrogen/Creatinine   
[Mass ratio]    10 mg/mg        Normal          9-20            Comprehensive   
Internal   
Medicine  
Work Phone:   
[phone]  
   
                                        Comment on above:   PATIENT WAS FASTINGP  
ERFORMED BY:  Thanx[address] [id number] BY:  [facility]   
[address]jkmnr[address] [facility] [id number]   
   
                                                    MICROALBUMINOrdered By: Syst  
em Manager on 2019   
   
                                                    Albumin DL <= 20 mg/L   
(U) [Mass/Vol]  3.6 ug/mL       Normal                          Comprehensive   
Internal   
Medicine  
Work Phone:   
[phone]  
   
                                        Comment on above:   PATIENT WAS FASTINGP  
ERFORMED BY: [address] [id number] BY: [facility] [facility] [id number]   
   
                                                    Albumin/Creatinine   
(U) [Mass ratio] 3.0 {mg/g_creat} Normal          0.0-30.0        Comprehensive   
Internal   
Medicine  
Work Phone:   
[phone]  
   
                                        Comment on above:   Normal: 0.0 - 30.0 A  
lbuminuria: 31.0 - 300.0 Clinical   
albuminuria: >300.0   
   
                                                            PATIENT WAS FASTINGP  
ERFORMED BY:  Thanx[address] [id number] BY:  [facility]   
Hljeko[address] [facility]in [place] [id number]   
   
                                                    Creatinine (U)   
[Mass/Vol]      121.1 mg/dL     Normal                          Comprehensive   
Internal   
Medicine  
Work Phone:   
[phone]  
   
                                        Comment on above:   PATIENT WAS FASTINGP  
ERFORMED BY:  Thanx[address] [id number] BY: [facility]6370 [facility] [id number]   
   
                                                    NMR Profile (87850)Ordered B  
y:  on 2019   
   
                                                    Cholesterol   
[Mass/Vol]      172 mg/dL       Normal          100-199         Comprehensive   
Internal   
Medicine  
Work Phone:   
[phone]  
   
                                        Comment on above:   PATIENT WAS FASTINGP  
ERFORMED BY:  Thanx[address] [id number] BY:  InbiomotionSusan Ville 2650070 [facility] [id number]   
Information: R89495, 580300   
   
                                                    Lipoprotein.alpha   
[Moles/Vol]     32.3 umol/L     Normal                          Comprehensive   
Internal   
Medicine  
Work Phone:   
[phone]  
   
                                        Comment on above:   PATIENT WAS FASTINGP  
ERFORMED BY: [facility] [id number] BY:  ThanxKylie Ville 3846770 [facility] [id number]   
Information: [id number], 150518   
   
                                                    Lipoprotein.beta.subp  
article [Entitic   
length]         20.3 nm         Abnormal                        Comprehensive   
Internal   
Medicine  
Work Phone:   
[phone]  
   
                                        Comment on above:   --------------------  
-------------------------------------- **   
INTERPRETATIVE INFORMATION** PARTICLE CONCENTRATION AND SIZE   
<--Lower CVD Risk Higher CVD Risk--> LDL AND HDL PARTICLES   
Percentile in Reference Population HDL-P (total) High 75th 50th   
25th Low >34.9 34.9 30.5 26.7 <26.7 . Small LDL-P Low 25th 50th   
75th High <117 [phone] >839 . LDL Size <-Large (Pattern A)->   
&lt;-Small (Pattern B)-> 23.0 20.6 20.5 19.0   
----------------------------------------------------------Small   
LDL-P and LDL Size are associated with CVD risk, but not   
afterLDL-P is taken into account. .These assays were developed   
and their performance characteristicsdetermined by [employer].   
These assays have not been cleared by [place] Food and Drug   
Administration. The clinical utility of theselaboratory values   
have not been fully established.   
   
                                                            PATIENT WAS FASTINGP  
ERFORMED BY:  Thanx[address] [id number] BY:  [facility]   
Spxuwp8849 [facility] [id number]   
Information: C28249, 679346   
   
                                                    Lipoprotein.beta.subp  
article [Moles/Vol] 1215 nmol/L     Abnormal                        Comprehensiv  
e   
Internal   
Medicine  
Work Phone:   
[phone]  
   
                                        Comment on above:   Low < 1000 Moderate   
1000 - 1299 Borderline-High 1300 - 1599   
High   
1600 - 2000 Very High > 2000   
   
                                                            PATIENT WAS FASTINGP  
ERFORMED BY: [employer] [facility] [address] [id number] BY:  [facility]   
Sztgoi3926 [facility] [id number]   
Information: I27195, 684560   
   
                                                    Lipoprotein.beta.subp  
article.small   
[Moles/Vol]     671 nmol/L      Abnormal                        Comprehensive   
Internal   
Medicine  
Work Phone:   
[phone]  
   
                                        Comment on above:   PATIENT WAS FASTINGP  
ERFORMED BY:  LabCo[address] [id number] BY:  [facility]   
Kvvxzz7730 [facility] [id number]   
Information: [id number], 539935   
   
                                                    Triglyceride   
[Mass/Vol]      192 mg/dL       Abnormal        0-149           Comprehensive   
Internal   
Medicine  
Work Phone:   
[phone]  
   
                                        Comment on above:   PATIENT WAS FASTINGP  
ERFORMED BY:  Thanx[address] [id number] BY:  [facility]   
Kwzbbq1601 [facility] [id number]   
Information: R09690, 909593   
   
                      NMR Profile (50845) 43 mg/dL   Normal                [facility]   
Internal   
Medicine  
Work Phone:   
[phone]  
   
                                        Comment on above:   PATIENT WAS FASTINGP  
ERFORMED BY:  Thanx[address] [id number] BY: [facility]6370 [facility] [id number]   
Information: [id number], 083754   
   
                      NMR Profile (53110) 91 mg/dL   Normal     0-99       Compr  
[facility]   
Internal   
Medicine  
Work Phone:   
[phone]  
   
                                        Comment on above:   . Optimal < 100 Abov  
e optimal 100 - 129 Borderline 130 - 159   
High 160 - 189 Very high > 189 .LDL-C is inaccurate if patient   
is non-fasting.   
   
                                                            PATIENT WAS FASTINGP  
ERFORMED BY:  Thanx[address] [id number] BY: [facility]lin6370 [facility] [id number]   
Information: U98031, 232795   
   
                      NMR Profile (61590) 192 mg/dL  Abnormal   0-149      [facility]   
[facility]   
[facility];   
Comprehensive   
Internal   
Medicine  
Work Phone:   
[phone]  
   
                      NMR Profile (31048) 172 mg/dL  Normal     100-199    Compr  
[facility]   
Internal   
Medicine;   
Comprehensive   
Internal   
Medicine  
Work Phone:   
[phone]  
   
                                                    PSA (PROSTATE SPECIFIC ANTIG  
EN) (V76.44)Ordered By:  on 2019  
   
   
                                                    Prostate specific Ag   
[Mass/Vol]      0.9 ng/mL       Normal          0.0-4.0         Comprehensive   
Internal   
Medicine  
Work Phone:   
[phone]  
   
                                        Comment on above:   Roche ECLIA methodol  
ogy. .According to the American Urological  
   
Association, Serum PSA shoulddecrease and remain at undetectable   
levels after radicalprostatectomy. The AUA defines biochemical   
recurrence as an initialPSA value 0.2 ng/mL or greater followed   
by a subsequent confirmatoryPSA value 0.2 ng/mL or   
greater.Values obtained with different assay methods or kits   
cannot be usedinterchangeably. Results cannot be interpreted as   
absolute evidenceof the presence or absence of malignant   
disease.   
   
                                                            PATIENT WAS FASTINGP  
ERFORMED BY: [employer] [id number] BY: [employer]6[address] Fundologyin [place] [id number]   
   
                                                    TSH (THYROID STIMULATING HOR  
ALICIA) (53368)Ordered By:  on   
2019   
   
                          TSH Qn       2.550 {uIU/mL} Normal       0.450-4.50  
0                                       Comprehensive   
Internal   
Medicine  
Work Phone:   
[phone]  
   
                                        Comment on above:   PATIENT WAS FASTINGP  
ERFORMED BY: [employer] [address] [id number] BY: Jiujiuweikang70 [facility]ologyAs[facility] [id number]   
   
                                                    CALCIUM SERUM (13028)Ordered  
 By:  on 2019   
   
                      Calcium [Mass/Vol] 10.2 mg/dL Normal     8.7-10.2   [facility]   
Internal   
Medicine  
Work Phone:   
[phone]  
   
                                        Comment on above:   PATIENT NOT FASTINGP  
ERFORMED BY: [employer] [place] [id number]   
   
                                                    HGB A1C (02474)Ordered By: S  
ystem Manager on 2019   
   
                                                    HbA1c (Bld) [Mass   
fraction]       5.9 %           Abnormal        4.8-5.6         Comprehensive   
Internal   
Medicine  
Work Phone:   
[phone]  
   
                                        Comment on above:   . Prediabetes: 5.7 -  
 6.4 Diabetes: >6.4 Glycemic control for   
adults with diabetes: <7.0   
   
                                                            PATIENT NOT FASTINGP  
ERFORMED BY: [employer]   
[facility] [id number]   
   
                                                    CALCIFIDIOL (43785) VIT D 25  
Ordered By:  on 2018   
   
                                                    25-Hydroxyvitamin   
D2+25-Hydroxyvitamin   
D3 mass conc    91.5 ng/mL      Normal          30.0-100.0      Comprehensive   
Internal   
Medicine  
Work Phone:   
[phone]  
   
                                        Comment on above:   Vitamin D deficiency  
 has been defined by the [place]   
ofMedicine and an Endocrine Society practice guideline as alevel   
of serum 25-OH vitamin D less than 20 ng/mL (1,2).The Endocrine   
Society went on to further define vitamin Dinsufficiency as a   
level between 21 and 29 ng/mL (2).1. IOM ([place] of   
Medicine). 2010. Dietary reference intakes for calcium and D.   
[place]: The National Academies Press.2. Kodi MF,   
Phillip WALKER, Archana SAMS, et al. Evaluation, treatment,   
and prevention of vitamin D deficiency: an Endocrine Society   
clinical practice guideline. JCEM. 2011; 96(7):1911-30.   
   
                                                            PATIENT WAS FASTINGP  
ERFORMED BY: [employer] [address] [id number] BY:  [employer]   
Rrfbvm7624 [facility] [id number]   
   
                                                    CBC with auto diff (24649)Or  
dered By:  on 2018   
   
                                                    Basophils (Bld)   
[#/Vol]         0.1 {x10E3/uL}  Normal          0.0-0.2         Comprehensive   
Internal   
Medicine  
Work Phone:   
[phone]  
   
                                        Comment on above:   PATIENT WAS FASTINGP  
ERFORMED BY: [facility] [id number] BY:  [employer]   
Bkqede5727 [facility] [id number]   
   
                                                    Basophils (Bld)   
[#/Vol]         0.1 10*3/uL     Normal          0.0-0.2         Comprehensive   
Internal   
Medicine;   
Comprehensive   
Internal   
Medicine  
Work Phone:   
[phone]  
   
                                        Comment on above:   PATIENT WAS FASTINGP  
ERFORMED BY:  [facility] [id number] BY:  [facility]xRobert [facility]6370 [facility] [id number]   
   
                                                    Basophils Auto #/vol   
(Bld)           0.1 {x10E3/uL}  Normal          0.0-0.2         Comprehensive   
Internal   
Medicine  
Work Phone:   
[phone]  
   
                                                    Basophils/100 WBC   
(Bld)           1 %             Normal                          Comprehensive   
Internal   
Medicine  
Work Phone:   
[phone]  
   
                                        Comment on above:   PATIENT WAS FASTINGP  
ERFORMED BY: [employer] [address]oodgitpg[address] [id number] BY:  [facility]CoRobert [facility]6370 [facility] [id number]   
   
                                                    Basophils/100 WBC   
Auto (Bld)      1 %             Normal                          Comprehensive   
Internal   
Medicine  
Work Phone:   
[phone]  
   
                                                    Eosinophils (Bld)   
[#/Vol]         0.1 {x10E3/uL}  Normal          0.0-0.4         Comprehensive   
Internal   
Medicine  
Work Phone:   
[phone]  
   
                                        Comment on above:   PATIENT WAS FASTINGP  
ERFORMED BY:  Thanx[address] [id number] BY:  ThanxKylie V[place] 3846770 [facility] [id number]   
   
                                                    Eosinophils (Bld)   
[#/Vol]         0.1 10*3/uL     Normal          0.0-0.4         Comprehensive   
Internal   
Medicine;   
Comprehensive   
Internal   
Medicine  
Work Phone:   
[phone]  
   
                                        Comment on above:   PATIENT WAS FASTINGP  
ERFORMED BY:  Thanx[address] [id number] BY:  ThanxKylie [place] 3846770 [facility] [id number]   
   
                                                    Eosinophils Auto   
#/vol (Bld)     0.1 {x10E3/uL}  Normal          0.0-0.4         Comprehensive   
Internal   
Medicine  
Work Phone:   
[phone]  
   
                                                    Eosinophils/100 WBC   
(Bld)           1 %             Normal                          Comprehensive   
Internal   
Medicine  
Work Phone:   
[phone]  
   
                                        Comment on above:   PATIENT WAS FASTINGP  
ERFORMED BY:  Thanx[address] [id number] BY:  ThanxKylie [place] 3846770 [facility] [id number]   
   
                                                    Eosinophils/100 WBC   
Auto (Bld)      1 %             Normal                          Comprehensive   
Internal   
Medicine  
Work Phone:   
[phone]  
   
                                                    Erythrocyte   
distribution width   
(RBC) [Ratio]   12.7 %          Normal          12.3-15.4       Comprehensive   
Internal   
Medicine  
Work Phone:   
[phone]  
   
                                        Comment on above:   PATIENT WAS FASTINGP  
ERFORMED BY:  Thanx[address] [id number] BY:  ThanxKylie [place] 3846770 [facility] [id number]   
   
                                                    Erythrocyte   
distribution width   
Auto Ratio (RBC) 12.7 %          Normal          12.3-15.4       Comprehensive   
Internal   
Medicine  
Work Phone:   
[phone]  
   
                                                    Hematocrit (Bld)   
[Volume fraction] 49.8 %          Normal          37.5-51.0       Comprehensive   
Internal   
Medicine  
Work Phone:   
[phone]  
   
                                        Comment on above:   PATIENT WAS FASTINGP  
ERFORMED BY:  Thanx[address] [id number] BY:  [facility]xRobert [facility]6370 [facility] [id number]   
   
                                                    Hematocrit Auto   
Volume Fraction (Bld) 49.8 %          Normal          37.5-51.0       [facility]   
Internal   
Medicine  
Work Phone:   
[phone]  
   
                                                    Hemoglobin mass conc   
(Bld)           17.5 g/dL       Normal          13.0-17.7       Comprehensive   
Internal   
Medicine  
Work Phone:   
[phone]  
   
                                        Comment on above:   PATIENT WAS FASTINGP  
ERFORMED BY:  Thanx[address] [id number] BY:  ThanxKylie [place] 3846770 [facility] [id number]   
   
                                                    Immature granulocytes   
#/vol (Bld)     0.0 {x10E3/uL}  Normal          0.0-0.1         Comprehensive   
Internal   
Medicine  
Work Phone:   
[phone]  
   
                                        Comment on above:   PATIENT WAS FASTINGP  
ERFORMED BY:  Thanx[address] [id number] BY:  ThanxRobert [facility] [facility] [id number]   
   
                                                    Immature granulocytes   
(Bld) [#/Vol]   0.0 10*3/uL     Normal          0.0-0.1         Comprehensive   
Internal   
Medicine;   
Comprehensive   
Internal   
Medicine  
Work Phone:   
[phone]  
   
                                        Comment on above:   PATIENT WAS FASTINGP  
ERFORMED BY:  Thanx[address] [id number] BY:  InbiomotionJohn D. D[facility] [facility] [id number]   
   
                                                    Immature   
granulocytes/100 WBC   
(Bld)           0 %             Normal                          Comprehensive   
Internal   
Medicine  
Work Phone:   
[phone]  
   
                                        Comment on above:   PATIENT WAS FASTINGP  
ERFORMED BY:  Thanx[address] [id number] BY:  LabJohn D. Dingell [facility]6370 [facility] [id number]   
   
                                                    Lymphocytes (Bld)   
[#/Vol]         2.5 {x10E3/uL}  Normal          0.7-3.1         Comprehensive   
Internal   
Medicine  
Work Phone:   
[phone]  
   
                                        Comment on above:   PATIENT WAS FASTINGP  
ERFORMED BY:  [facility] [id number] BY:  LabCoKylie Ville 38467[address] [facility] [id number]   
   
                                                    Lymphocytes (Bld)   
[#/Vol]         2.5 10*3/uL     Normal          0.7-3.1         Comprehensive   
Internal   
Medicine;   
Comprehensive   
Internal   
Medicine  
Work Phone:   
[phone]  
   
                                        Comment on above:   PATIENT WAS FASTINGP  
ERFORMED BY:  Thanx[address] [id number] BY:  LabJohn D. [facility]6370 [facility] [id number]   
   
                                                    Lymphocytes Auto   
#/vol (Bld)     2.5 {x10E3/uL}  Normal          0.7-3.1         Comprehensive   
Internal   
Medicine  
Work Phone:   
[phone]  
   
                                                    Lymphocytes/100 WBC   
(Bld)           27 %            Normal                          Comprehensive   
Internal   
Medicine  
Work Phone:   
[phone]  
   
                                        Comment on above:   PATIENT WAS FASTINGP  
ERFORMED BY:  Thanx[address] [id number] BY:  [facility]John [facility]. [facility]6370 [facility] [id number]   
   
                                                    Lymphocytes/100 WBC   
Auto (Bld)      27 %            Normal                          Comprehensive   
Internal   
Medicine  
Work Phone:   
[phone]  
   
                                                    MCH (RBC) [Entitic   
mass]           32.6 pg         Normal          26.6-33.0       Comprehensive   
Internal   
Medicine  
Work Phone:   
[phone]  
   
                                        Comment on above:   PATIENT WAS FASTINGP  
ERFORMED BY:  Thanx[address] [id number] BY:  [facility]. [facility]6370 [facility] [id number]   
   
                                                    MCH Auto Entitic mass   
(RBC)           32.6 pg         Normal          26.6-33.0       Comprehensive   
Internal   
Medicine  
Work Phone:   
[phone]  
   
                      MCHC (RBC) [Mass/Vol] 35.1 g/dL  Normal     31.5-35.7  Com  
prehensive   
Internal   
Medicine  
Work Phone:   
[phone]  
   
                                        Comment on above:   PATIENT WAS FASTINGP  
ERFORMED BY: Gameotic[address] [id number] BY:  Thanx   
Ncqqeb4381 [facility] [id number]   
   
                                                    MCHC Auto mass conc   
(RBC)           35.1 g/dL       Normal          31.5-35.7       Comprehensive   
Internal   
Medicine  
Work Phone:   
[phone]  
   
                                                    MCV (RBC) [Entitic   
vol]            93 fL           Normal          79-97           Comprehensive   
Internal   
Medicine  
Work Phone:   
[phone]  
   
                                        Comment on above:   PATIENT WAS FASTINGP  
ERFORMED BY: [facility] [id number] BY:  Thanx   
Kfbpfv1093 [facility] [id number]   
   
                                                    MCV Auto Entitic   
volume (RBC)    93 fL           Normal          79-97           Comprehensive   
Internal   
Medicine  
Work Phone:   
[phone]  
   
                                                    Monocytes (Bld)   
[#/Vol]         0.5 {x10E3/uL}  Normal          0.1-0.9         Comprehensive   
Internal   
Medicine  
Work Phone:   
[phone]  
   
                                        Comment on above:   PATIENT WAS FASTINGP  
ERFORMED BY:  Thanx[address] [id number] BY:  [facility]xRobert [facility]6370 [facility] [id number]   
   
                                                    Monocytes (Bld)   
[#/Vol]         0.5 10*3/uL     Normal          0.1-0.9         Comprehensive   
Internal   
Medicine;   
Comprehensive   
Internal   
Medicine  
Work Phone:   
[phone]  
   
                                        Comment on above:   PATIENT WAS FASTINGP  
ERFORMED BY:  Thanx[address] [id number] BY:  ThanxKylie Ville [address] [facility] [id number]   
   
                                                    Monocytes Auto #/vol   
(Bld)           0.5 {x10E3/uL}  Normal          0.1-0.9         Comprehensive   
Internal   
Medicine  
Work Phone:   
[phone]  
   
                                                    Monocytes/100 WBC   
(Bld)           5 %             Normal                          Comprehensive   
Internal   
Medicine  
Work Phone:   
[phone]  
   
                                        Comment on above:   PATIENT WAS FASTINGP  
ERFORMED BY:  Inbiomotion[address] [id number] BY: [facility] [facility] [id number]   
   
                                                    Monocytes/100 WBC   
Auto (Bld)      5 %             Normal                          Comprehensive   
Internal   
Medicine  
Work Phone:   
[phone]  
   
                                                    Neutrophils (Bld)   
[#/Vol]         6.1 {x10E3/uL}  Normal          1.4-7.0         Comprehensive   
Internal   
Medicine  
Work Phone:   
[phone]  
   
                                        Comment on above:   PATIENT WAS FASTINGP  
ERFORMED BY:  Thanx[address] [id number] BY: [facility] HospitalCoKylie Ville 38467[address] [facility] [id number]   
   
                                                    Neutrophils (Bld)   
[#/Vol]         6.1 10*3/uL     Normal          1.4-7.0         Comprehensive   
Internal   
Medicine;   
Comprehensive   
Internal   
Medicine  
Work Phone:   
[phone]  
   
                                        Comment on above:   PATIENT WAS FASTINGP  
ERFORMED BY:  Thanx[address] [id number] BY: [facility] HospitalCoRobert Wood Johnson [facility]6370 [facility] [id number]   
   
                                                    Neutrophils Auto   
#/vol (Bld)     6.1 {x10E3/uL}  Normal          1.4-7.0         Comprehensive   
Internal   
Medicine  
Work Phone:   
[phone]  
   
                                                    Neutrophils/100 WBC   
(Bld)           66 %            Normal                          Comprehensive   
Internal   
Medicine  
Work Phone:   
[phone]  
   
                                        Comment on above:   PATIENT WAS FASTINGP  
ERFORMED BY: [address] [id number] BY: [place] 4312470 [facility] [id number]   
   
                                                    Neutrophils/100 WBC   
Auto (Bld)      66 %            Normal                          Comprehensive   
Internal   
Medicine  
Work Phone:   
[phone]  
   
                                                    Platelets (Bld)   
[#/Vol]         244 {x10E3/uL}  Normal          150-379         Comprehensive   
Internal   
Medicine  
Work Phone:   
[phone]  
   
                                        Comment on above:   PATIENT WAS FASTINGP  
ERFORMED BY: [address] [id number] BY:  LabSusan Ville 265[address] [id number]   
   
                                                    Platelets (Bld)   
[#/Vol]         244 10*3/uL     Normal          150-379         Comprehensive   
Internal   
Medicine;   
Comprehensive   
Internal   
Medicine  
Work Phone:   
[phone]  
   
                                        Comment on above:   PATIENT WAS FASTINGP  
ERFORMED BY: [address] [id number] BY:  LabSusan [place] 265[address] [facility] [id number]   
   
                                                    Platelets Auto #/vol   
(Bld)           244 {x10E3/uL}  Normal          150-379         Comprehensive   
Internal   
Medicine  
Work Phone:   
[phone]  
   
                      RBC (Bld) [#/Vol] 5.37 {x10E6/uL} Normal     4.14-5.80  Co  
[facility]   
Internal   
Medicine  
Work Phone:   
[phone]  
   
                                        Comment on above:   PATIENT WAS FASTINGP  
ERFORMED BY: [address] [id number] BY:  LabSusan [place] 2650070 [facility] [id number]   
   
                      RBC (Bld) [#/Vol] 5.37 10*6/uL Normal     4.14-5.80  [facility]   
Internal   
Medicine;   
Comprehensive   
Internal   
Medicine  
Work Phone:   
[phone]  
   
                                        Comment on above:   PATIENT WAS FASTINGP  
ERFORMED BY: [address] [id number] BY: AMOR [facility]   
Oecmap[address] [facility] [id number]   
   
                      RBC Auto #/vol (Bld) 5.37 {x10E6/uL} Normal     4.14-5.80   
 Comprehensive   
Internal   
Medicine  
Work Phone:   
[phone]  
   
                      WBC (Bld) [#/Vol] 9.2 {x10E3/uL} Normal     3.4-10.8   Com  
prehensive   
Internal   
Medicine  
Work Phone:   
[phone]  
   
                                        Comment on above:   PATIENT WAS FASTINGP  
ERFORMED BY: [facility] [id number] BY: REGISTRAT[facility]MAPI [facility]   
Nrpiht7914 [facility] [id number]   
   
                      WBC (Bld) [#/Vol] 9.2 10*3/uL Normal     3.4-10.8   [facility]   
Internal   
Medicine;   
Comprehensive   
Internal   
Medicine  
Work Phone:   
[phone]  
   
                                        Comment on above:   PATIENT WAS FASTINGP  
ERFORMED BY: [facility] [id number] BY: Jiujiuweikang70 [facility] [id number]   
   
                      WBC Auto #/vol (Bld) 9.2 {x10E3/uL} Normal     3.4-10.8     
Comprehensive   
Internal   
Medicine  
Work Phone:   
[phone]  
   
                                                    HGB A1C (92612)Ordered By: S  
ystem Manager on 2018   
   
                                                    Hemoglobin   
A1c/Hemoglobin.total   
mass fraction (Bld) 5.7 %           Abnormal        4.8-5.6         Comprehensiv  
e   
Internal   
Medicine  
Work Phone:   
[phone]  
   
                                        Comment on above:   . Prediabetes: 5.7 -  
 6.4 Diabetes: >6.4 Glycemic control for   
adults with diabetes: <7.0   
   
                                                            do in 4mo; PATIENT W  
AS FASTINGPERFORMED BY: [facility]   
[id number] BY: mimoOn Kovxuj5777 [facility] [id number]   
   
                                                    LIPOPROTEIN, BLD, BY NMR (83  
704)Ordered By:  on 2018   
   
                                                    Cholesterol in HDL   
mass conc       42 mg/dL        Normal                          Comprehensive   
Internal   
Medicine  
Work Phone:   
[phone]  
   
                                        Comment on above:   PATIENT WAS FASTINGP  
ERFORMED BY: BN [facility] [address] [id number] BY: [employer]6370 [facility] [id number]   
   
                                                    Cholesterol in LDL   
mass conc       86 mg/dL        Normal          0-99            Comprehensive   
Internal   
Medicine  
Work Phone:   
[phone]  
   
                                        Comment on above:   . Optimal < 100 Abov  
e optimal 100 - 129 Borderline 130 - 159   
High 160 - 189 Very high > 189 .LDL-C is inaccurate if patient   
is non-fasting.   
   
                                                            PATIENT WAS FASTINGP  
ERFORMED BY: [employer] [address] [id number] BY: [employer]6370 [facility] [id number]   
   
                      Cholesterol mass conc 155 mg/dL  Normal     100-199    Com  
prehensive   
Internal   
Medicine  
Work Phone:   
[phone]  
   
                                        Comment on above:   PATIENT WAS FASTINGP  
ERFORMED BY: [employer] [address] [id number] BY: Jiujiuweikang70 [facility] [id number]   
   
                                                    Lipoprotein.alpha   
molar conc      30.6 umol/L     Normal                          Comprehensive   
Internal   
Medicine  
Work Phone:   
[phone]  
   
                                        Comment on above:   PATIENT WAS FASTINGP  
ERFORMED BY: [employer] [address] [id number] BY: [employer]6370 [facility] [id number]   
   
                                                    Lipoprotein.beta.subp  
article Entitic   
length          20.5 nm         Abnormal                        Comprehensive   
Internal   
Medicine  
Work Phone:   
[phone]  
   
                                        Comment on above:   --------------------  
-------------------------------------- **   
INTERPRETATIVE INFORMATION** PARTICLE CONCENTRATION AND SIZE   
<--Lower CVD Risk Higher CVD Risk--> LDL AND HDL PARTICLES   
Percentile in Reference Population HDL-P (total) High 75th 50th   
25th Low >34.9 34.9 30.5 26.7 <26.7 . Small LDL-P Low 25th 50th   
75th High <117 117 527 839 >839 . LDL Size <-Large (Pattern A)->   
&lt;-Small (Pattern B)-> 23.0 20.6 20.5 19.0   
----------------------------------------------------------Small   
LDL-P and LDL Size are associated with CVD risk, but not   
afterLDL-P is taken into account. .These assays were developed   
and their performance characteristicsdetermined by [employer].   
These assays have not been cleared by [place] Food and Drug   
Administration. The clinical utility of theselaboratory values   
have not been fully established.   
   
                                                            PATIENT WAS FASTINGP  
ERFORMED BY: [facility] Medicalton1447 [facility] [id number] BY: Jiujiuweikang70 [facility] [id number]   
   
                                                    Lipoprotein.beta.subp  
article molar conc 1046 nmol/L     Abnormal                        Comprehensive  
   
Internal   
Medicine  
Work Phone:   
[phone]  
   
                                        Comment on above:   Low < 1000 Moderate   
1000 - 1299 Borderline-High 1300 - 1599   
High   
1600 - 2000 Very High > 2000   
   
                                                            PATIENT WAS FASTINGP  
ERFORMED BY: [facility] [id number] BY: [employer]   
Dwhvhy4499 [facility] [id number]   
   
                                                    Lipoprotein.beta.subp  
article.small molar   
conc            414 nmol/L      Normal                          Comprehensive   
Internal   
Medicine  
Work Phone:   
[phone]  
   
                                        Comment on above:   PATIENT WAS FASTINGP  
ERFORMED BY: [facility] [id number] BY: Jiujiuweikang[address] [facility] [id number]   
   
                                                    Triglyceride mass   
conc            134 mg/dL       Normal          0-149           Comprehensive   
Internal   
Medicine  
Work Phone:   
[phone]  
   
                                        Comment on above:   PATIENT WAS FASTINGP  
ERFORMED BY: [facility] [id number] BY: Jiujiuweikang70 [address] RoadDublin [place] [id number]   
   
                                                    METABOLIC PANEL, COMPREHENSI  
VE (42243)Ordered By:  on 2018   
   
                      Albumin mass conc 4.8 g/dL   Normal     3.5-5.5    Compreh  
ensive   
Internal   
Medicine  
Work Phone:   
[phone]  
   
                                        Comment on above:   PATIENT WAS FASTINGP  
ERFORMED BY:  Lab[address] [id number] BY:  [facility]   
Lvrwpa3322 [address] RoadDublin [place] [id number]   
   
                                                    Albumin/Globulin mass   
ratio           2.4 {ratio}     Abnormal        1.2-2.2         Comprehensive   
Internal   
Medicine  
Work Phone:   
[phone]  
   
                                        Comment on above:   PATIENT WAS FASTINGP  
ERFORMED BY:  Lab[address] [id number] BY:  [facility]   
Ssffqh7307 [address] RoadDublin [place] [id number]   
   
                                                    ALP [Catalytic   
activity/Vol]   57 U/L          Normal                    Comprehensive   
Internal   
Medicine;   
Comprehensive   
Internal   
Medicine  
Work Phone:   
[phone]  
   
                                        Comment on above:   PATIENT WAS FASTINGP  
ERFORMED BY:  Lab[address] [id number] BY:  [facility]   
Xmwewm2106 [address] RoadDublin [place] [id number]   
   
                      ALP enzyme act/vol 57 [iU]/L  Normal          Compre  
[facility]   
Internal   
Medicine  
Work Phone:   
[phone]  
   
                                        Comment on above:   PATIENT WAS FASTINGP  
ERFORMED BY:  Lab[address] [id number] BY:  [facility]   
Gsrpdr7431 [address] RoadDublin [place] [id number]   
   
                                                    ALT [Catalytic   
activity/Vol]   26 U/L          Normal          0-44            Comprehensive   
Internal   
Medicine;   
Comprehensive   
Internal   
Medicine  
Work Phone:   
[phone]  
   
                                        Comment on above:   PATIENT WAS FASTINGP  
ERFORMED BY:  Lab[address] [id number] BY:  [facility]   
Cteqab8700 [address] RoadDublin [place] [id number]   
   
                      ALT enzyme act/vol 26 [iU]/L  Normal     0-44       [facility]   
Internal   
Medicine  
Work Phone:   
[phone]  
   
                                        Comment on above:   PATIENT WAS FASTINGP  
ERFORMED BY:  Lab[address] [id number] BY:  [facility]   
Syjxjr4407 [address] RoadDublin [place] [id number]   
   
                                                    AST [Catalytic   
activity/Vol]   24 U/L          Normal          0-40            Comprehensive   
Internal   
Medicine;   
Comprehensive   
Internal   
Medicine  
Work Phone:   
[phone]  
   
                                        Comment on above:   PATIENT WAS FASTINGP  
ERFORMED BY: [address] [id number] BY:  LabCoKylie Ville 3846770 [address] Roadblin [place] [id number]   
   
                      AST enzyme act/vol 24 [iU]/L  Normal     0-40       [facility]   
Internal   
Medicine  
Work Phone:   
[phone]  
   
                                        Comment on above:   PATIENT WAS FASTINGP  
ERFORMED BY:  Lab[address] [id number] BY:  [facility]   
Nffmsp7778 [address] RoadDublin [place] [id number]   
   
                      Bilirubin mass conc 0.6 mg/dL  Normal     0.0-1.2    Compr  
ensive   
Internal   
Medicine  
Work Phone:   
[phone]  
   
                                        Comment on above:   PATIENT WAS FASTINGP  
ERFORMED BY: [address] [id number] BY:  [facility]   
Gnajnz[address] Hospitalin [place] [id number]   
   
                      Calcium mass conc 10.5 mg/dL Abnormal   8.7-10.2   Compreh  
[facility]   
Internal   
Medicine  
Work Phone:   
[phone]  
   
                                        Comment on above:   PATIENT WAS FASTINGP  
ERFORMED BY: [address] [id number] BY:  [facility]   
Exztcg2796 [address] RoadDublin [place] [id number]   
   
                      Chloride molar conc 102 mmol/L Normal          Compr  
ensive   
Internal   
Medicine  
Work Phone:   
[phone]  
   
                                        Comment on above:   PATIENT WAS FASTINGP  
ERFORMED BY: BN [facility] Bdippunblg[address] [id number] BY: CB [facility]   
Qlvmko0535 [address] RoadDublin [place] [id number]   
   
                      CO2 molar conc 21 mmol/L  Normal     20-29      Comprehens  
bebe   
Internal   
Medicine  
Work Phone:   
[phone]  
   
                                        Comment on above:   PATIENT WAS FASTINGP  
ERFORMED BY: BN [facility] Ehsyxjebon[address] [id number] BY: CB [facility]   
Gtszzb9391 [address] RoadDublin [place] [id number]   
   
                      Creatinine mass conc 1.08 mg/dL Normal     0.76-1.27  Comp  
rehensive   
Internal   
Medicine  
Work Phone:   
[phone]  
   
                                        Comment on above:   PATIENT WAS FASTINGP  
ERFORMED BY: BN [facility] [address] [id number] BY: CB [facility]   
Gupwne0171 [address] RoadDublin [place] [id number]   
   
                                                    GFR/1.73 sq M   
predicted among   
blacks CKD-EPI vol   
rate/area (S/P/Bld) 89 mL/min/1.73  Normal                          Comprehensiv  
e   
Internal   
Medicine  
Work Phone:   
[phone]  
   
                                        Comment on above:   PATIENT WAS FASTINGP  
ERFORMED BY: BN [facility] [address] [id number] BY: CB [facility]   
Nexvhx6599 [address] RoadDublin [place] [id number]   
   
                                                    GFR/1.73 sq M   
predicted among   
non-blacks CKD-EPI   
vol rate/area   
(S/P/Bld)       77 mL/min/1.73  Normal                          Comprehensive   
Internal   
Medicine  
Work Phone:   
[phone]  
   
                                        Comment on above:   PATIENT WAS FASTINGP  
ERFORMED BY: BN [facility] [address] [id number] BY: CB [facility]   
Adsqky9[address] RoadDublin [place] [id number]   
   
                                                    Globulin (S)   
[Mass/Vol]      2.0 g/dL        Normal          1.5-4.5         Comprehensive   
Internal   
Medicine  
Work Phone:   
[phone]  
   
                                        Comment on above:   PATIENT WAS FASTINGP  
ERFORMED BY:  LabCoJennifer Ville 235407   
[facility] [id number] BY: AMOR [facility]   
Pjpblr[address] [facility] [id number]   
   
                                                    Globulin Calculated   
mass conc (S)   2.0 g/dL        Normal          1.5-4.5         Comprehensive   
Internal   
Medicine  
Work Phone:   
[phone]  
   
                      Glucose mass conc 112 mg/dL  Abnormal   65-99      Compreh  
ensive   
Internal   
Medicine  
Work Phone:   
[phone]  
   
                                        Comment on above:   PATIENT WAS FASTINGP  
ERFORMED BY:  Lab[address] [id number] BY: AMOR LabCoRobert [facility]6370 [facility] [id number]   
   
                      Potassium molar conc 4.8 mmol/L Normal     3.5-5.2    Comp  
rehensive   
Internal   
Medicine  
Work Phone:   
[phone]  
   
                                        Comment on above:   PATIENT WAS FASTINGP  
ERFORMED BY:  LabCo[address] [id number] BY: AMOR [facility]   
Qmdvuv0196 [facility] [id number]   
   
                      Protein mass conc 6.8 g/dL   Normal     6.0-8.5    Compreh  
ensive   
Internal   
Medicine  
Work Phone:   
[phone]  
   
                                        Comment on above:   PATIENT WAS FASTINGP  
ERFORMED BY:  [employer] [id number] BY:  [facility]   
Wgilyl0528 [facility] [id number]   
   
                      Sodium molar conc 142 mmol/L Normal     134-144    Compreh  
ensive   
Internal   
Medicine  
Work Phone:   
[phone]  
   
                                        Comment on above:   PATIENT WAS FASTINGP  
ERFORMED BY:  [employer] [id number] BY:  [facility]   
Adqucm8985 [facility] [id number]   
   
                                                    Urea nitrogen mass   
conc            10 mg/dL        Normal          6-24            Comprehensive   
Internal   
Medicine  
Work Phone:   
[phone]  
   
                                        Comment on above:   PATIENT WAS FASTINGP  
ERFORMED BY:  [employer] [id number] BY:  [facility]6370 [facility] [id number]   
   
                                                    Urea   
nitrogen/Creatinine   
mass ratio      9 mg/mg         Normal          9-20            Comprehensive   
Internal   
Medicine  
Work Phone:   
[phone]  
   
                                        Comment on above:   PATIENT WAS FASTINGP  
ERFORMED BY:  [facility] [id number] BY:  LabSusan Ville 2650070 [facility] [id number]   
   
                                                    MICROALBUMINOrdered By: Syst  
em Manager on 2018   
   
                                                    Albumin DL <= 20 mg/L   
mass conc (U)   4.5 ug/mL       Normal                          Comprehensive   
Internal   
Medicine  
Work Phone:   
[phone]  
   
                                        Comment on above:   PATIENT WAS FASTINGP  
ERFORMED BY:  Thanx[address] [id number] BY: [place] [id number] [facility] [id number]   
   
                                                    Albumin/Creatinine   
mass ratio (U)  5.8 {mg/g_creat} Normal          0.0-30.0        Comprehensive   
Internal   
Medicine  
Work Phone:   
[phone]  
   
                                        Comment on above:   Normal: 0.0 - 30.0 A  
lbuminuria: 31.0 - 300.0 Clinical   
albuminuria: >300.0   
   
                                                            PATIENT WAS FASTINGP  
ERFORMED BY:  [employer] [id number] BY: [facility]6370 [facility] [id number]   
   
                                                    Creatinine mass conc   
(U)             78.0 mg/dL      Normal                          Comprehensive   
Internal   
Medicine  
Work Phone:   
[phone]  
   
                                        Comment on above:   PATIENT WAS FASTINGP  
ERFORMED BY:  [employer] [id number] BY: [facility]6370 [facility] [id number]   
   
                                                    TSH (THYROID STIMULATING HOR  
ALICIA) (16225)Ordered By:  on   
2018   
   
                          Thyrotropin Qn 2.530 {uIU/mL} Normal       0.450-4.50  
0                                       Comprehensive   
Internal   
Medicine  
Work Phone:   
[phone]  
   
                                        Comment on above:   PATIENT WAS FASTINGP  
ERFORMED BY:  ThanxDeborah Heart and Lung CenterOjhgnttceb6051   
[facility] [id number] BY: AMOR [facility]xRobert [facility]6370 [facility] [id number]   
   
                                                    HGB A1C (10559)Ordered By: S  
ystem Manager on 2018   
   
                                                    Hemoglobin   
A1c/Hemoglobin.total   
mass fraction (Bld) 5.7 %           Abnormal        4.8-5.6         Comprehensiv  
e   
Internal   
Medicine  
Work Phone:   
[phone]  
   
                                        Comment on above:   . Prediabetes: 5.7 -  
 6.4 Diabetes: >6.4 Glycemic control for   
adults with diabetes: <7.0   
   
                                                            standing order every  
 4months; PATIENT NOT FASTINGPERFORMED BY:   
AMOR ThanxRobert [facility] Hospital SomersetTmsgys3217 [facility]   
[id number] Information: [id number]   
   
                                                    CBC W/Diff, AutomatedOrdered  
 By:  on 2018   
   
                                                    Basophils/100 WBC   
Auto (Bld)      0.9 %           Normal          0-1             Comprehensive   
Internal   
Medicine  
Work Phone:   
[phone]  
   
                                                    Eosinophils/100 WBC   
Auto (Bld)      2.7 %           Normal          0-5             Comprehensive   
Internal   
Medicine  
Work Phone:   
[phone]  
   
                                                    Erythrocyte   
distribution width   
Auto Ratio (RBC) 12.1 %          Normal          11.6-14.6       Comprehensive   
Internal   
Medicine  
Work Phone:   
[phone]  
   
                                                    Hematocrit Auto   
Volume Fraction (Bld) 47.2 %          Normal          40-54           Comprehens  
bebe   
Internal   
Medicine  
Work Phone:   
[phone]  
   
                                                    Hemoglobin mass conc   
(Bld)           16.8 g/dL       Abnormal        13.0-16.5       Comprehensive   
Internal   
Medicine  
Work Phone:   
[phone]  
   
                                                    Lymphocytes/100 WBC   
Auto (Bld)      41.2 %          Abnormal        19-41           Comprehensive   
Internal   
Medicine  
Work Phone:   
[phone]  
   
                                                    MCH Auto Entitic mass   
(RBC)           32.6 pg         Abnormal        27.0-32.0       Comprehensive   
Internal   
Medicine  
Work Phone:   
[phone]  
   
                                                    MCHC Auto mass conc   
(RBC)           35.6 {g/gl}     Normal          32-36           Comprehensive   
Internal   
Medicine  
Work Phone:   
[phone]  
   
                                                    MCV Auto Entitic   
volume (RBC)    91.7 fL         Normal          80-94           Comprehensive   
Internal   
Medicine  
Work Phone:   
[phone]  
   
                                                    Monocytes/100 WBC   
Auto (Bld)      9.8 %           Normal          0-10            Comprehensive   
Internal   
Medicine  
Work Phone:   
[phone]  
   
                                                    Neutrophils/100 WBC   
Auto (Bld)      45.1 %          Abnormal        47-70           Comprehensive   
Internal   
Medicine  
Work Phone:   
[phone]  
   
                                                    Platelet mean volume   
Auto Entitic volume   
(Bld)           10.1 fL         Normal          6.2-12.0        Comprehensive   
Internal   
Medicine  
Work Phone:   
[phone]  
   
                                                    Platelets Auto #/vol   
(Bld)           205 10*3/uL     Normal          150-450         Comprehensive   
Internal   
Medicine  
Work Phone:   
[phone]  
   
                      RBC Auto #/vol (Bld) 5.15 {M/mm3} Normal     4.6-6.2    Co  
[facility]   
Internal   
Medicine  
Work Phone:   
[phone]  
   
                      WBC Auto #/vol (Bld) 6.7 10*3/uL Normal     4.4-11.0   Com  
prehensive   
Internal   
Medicine  
Work Phone:   
[phone]  
   
                      CBC W/Diff, Automated 40.0 fL    Normal     35.1-43.9  Com  
prehensive   
Internal   
Medicine  
Work Phone:   
[phone]  
   
                      CBC W/Diff, Automated 0.300 %    Normal     0.0-0.9    Com  
prehensive   
Internal   
Medicine  
Work Phone:   
[phone]  
   
                                        Comment on above:   IG% - Immature Granu  
locytes (promyelocytes, myelocytes   
andmetamyelocytes) > 1% indicates that a LEFT SHIFT is Present.   
   
                      CBC W/Diff, Automated 2.77 {X10_3/ul} Normal     0.83-4.51  
  Comprehensive   
Internal   
Medicine  
Work Phone:   
[phone]  
   
                      CBC W/Diff, Automated 3.0 {X10_3/uL} Normal     2.0-7.7     
 Comprehensive   
Internal   
Medicine  
Work Phone:   
[phone]  
   
                                                    Comprehensive Metabolic Prof  
ilOrdered By:  on 2018   
   
                                                    Comprehensive   
metabolic 2000 panel 3.9 g/dL        Normal          3.2-5.0         Comprehensi  
   
Internal   
Medicine  
Work Phone:   
[phone]  
   
                                                    Comprehensive   
metabolic 2000 panel 3.1 g/dL        Normal          2.2-4.2         Comprehensi  
ve   
Internal   
Medicine  
Work Phone:   
[phone]  
   
                                                    Comprehensive   
metabolic 2000 panel 1.3 {RATIO}     Normal          0.9-2.4         Comprehensi  
ve   
Internal   
Medicine  
Work Phone:   
[phone]  
   
                                                    Comprehensive   
metabolic  panel 8.7 mg/dL       Normal          8.5-10.1        Comprehensi  
ve   
Internal   
Medicine  
Work Phone:   
[phone]  
   
                                                    Comprehensive   
metabolic  panel 18 U/L          Normal          15-37           Comprehensi  
ve   
Internal   
Medicine  
Work Phone:   
[phone]  
   
                                                    Comprehensive   
metabolic  panel 51 U/L          Normal                    Comprehensi  
ve   
Internal   
Medicine  
Work Phone:   
[phone]  
   
                                                    Comprehensive   
metabolic  panel 0.86 mg/dL      Normal          0.70-1.30       Comprehensi  
ve   
Internal   
Medicine  
Work Phone:   
[phone]  
   
                                        Comment on above:   The validity of the   
calculated GFR AND GFRAA in patients   
over70   
years has not been determined. Clinical correlation isessential.   
   
                                                    Comprehensive   
metabolic  panel 125 mg/dL       Abnormal                  Comprehensi  
ve   
Internal   
Medicine  
Work Phone:   
[phone]  
   
                                        Comment on above:   Fasting Glucose resu  
lt from 100 to 125 mg/dLsuggests IMPAIRED   
HOMEOSTASIS per A.D.A. criteria.Please note revised GLUCOSE   
reference range fxoixwfjw19/02/2018.   
   
                                                    Comprehensive   
metabolic  panel 9 mg/dL         Normal          7-18            Comprehensi  
ve   
Internal   
Medicine  
Work Phone:   
[phone]  
   
                                                    Comprehensive   
metabolic  panel 31 U/L          Normal          16-61           Comprehensi  
ve   
Internal   
Medicine  
Work Phone:   
[phone]  
   
                                                    Comprehensive   
metabolic 2000 panel 10.5 {RATIO}    Normal          10-20           Comprehensi  
ve   
Internal   
Medicine  
Work Phone:   
[phone]  
   
                                                    Comprehensive   
metabolic  panel 109 mmol/L      Abnormal                  Comprehensi  
ve   
Internal   
Medicine  
Work Phone:   
[phone]  
   
                                                    Comprehensive   
metabolic  panel 24.0 mmol/L     Normal          21.0-32.0       Comprehensi  
ve   
Internal   
Medicine  
Work Phone:   
[phone]  
   
                                                    Comprehensive   
metabolic 2000 panel 8 1             Normal          5-15            Comprehensi  
ve   
Internal   
Medicine  
Work Phone:   
[phone]  
   
                                                    Comprehensive   
metabolic  panel 0.50 mg/dL      Normal          0.20-1.00       Comprehensi  
ve   
Internal   
Medicine  
Work Phone:   
[phone]  
   
                                                    Comprehensive   
metabolic 2000 panel 98 mL/min       Normal                          Comprehensi  
ve   
Internal   
Medicine  
Work Phone:   
[phone]  
   
                                        Comment on above:   Non-  
 GFR Calc   
   
                                                    Comprehensive   
metabolic  panel 119 mL/min      Normal                          Comprehensi  
ve   
Internal   
Medicine  
Work Phone:   
[phone]  
   
                                        Comment on above:    GFR  
 Calc   
   
                                                    Comprehensive   
metabolic 2000 panel 7.0 g/dL        Normal          6.4-8.2         Comprehensi  
ve   
Internal   
Medicine  
Work Phone:   
[phone]  
   
                                                    Comprehensive   
metabolic 2000 panel 141 mmol/L      Normal          136-145         Comprehensi  
ve   
Internal   
Medicine  
Work Phone:   
[phone]  
   
                                                    Comprehensive   
metabolic 2000 panel 4.2 mmol/L      Normal          3.5-5.1         Comprehensi  
ve   
Internal   
Medicine  
Work Phone:   
[phone]  
   
                                                    Lipid ProfileOrdered By: Shannon  
tem Manager on 2018   
   
                                                    Cholesterol in HDL   
mass conc       47 mg/dL        Normal                          Comprehensive   
Internal   
Medicine  
Work Phone:   
[phone]  
   
                                        Comment on above:   The drugs N-Acetylcy  
steine and Metamizole may falselydepress   
this assay. Reference Range HDL <40 mg/dL Low HDL Cholesterol   
HDL >or= 60 mg/dL High HDL Cholesterol   
   
                                                    Cholesterol in LDL   
mass conc       76 mg/dL        Normal          0-130           Comprehensive   
Internal   
Medicine  
Work Phone:   
[phone]  
   
                      Cholesterol mass conc 151 mg/dL  Normal                Com  
prehensive   
Internal   
Medicine  
Work Phone:   
[phone]  
   
                                        Comment on above:   <200 mg/dL Desirable  
 200-240 mg/dL Borderline >240 mg/dL High   
Risk   
   
                                                    Triglyceride mass   
conc            142 mg/dL       Normal                          Comprehensive   
Internal   
Medicine  
Work Phone:   
[phone]  
   
                                        Comment on above:   The drugs N-Acetylcy  
steine and Metamizole may falselydepress   
this assay.Serum Triglycerides Reference Interval Normal <150   
mg/dL Borderline high 150 - 199 mg/dL High 200 - 499 mg/dL Very   
High > or = 500 mg/dL   
   
                      Lipid Profile 28 mg/dL   Normal     5-40       Comprehensi  
ve   
Internal   
Medicine  
Work Phone:   
[phone]  
   
                                                    MicroalbOrdered By: System M  
anager on 2018   
   
                      Creatinine mass conc 5.0 {mg/g_CRE} Normal                  
Comprehensive   
Internal   
Medicine  
Work Phone:   
[phone]  
   
                      Microalb   177.00 mg/dL Normal                Comprehensiv  
e   
Internal   
Medicine  
Work Phone:   
[phone]  
   
                      Microalb   8.8 mg/L   Normal                Comprehensive   
Internal   
Medicine  
Work Phone:   
[phone]  
   
                                                    PSA,Total - Annual ScreenOrd  
ered By:  on 2018   
   
                                                    Prostate specific Ag   
mass conc       0.68 ng/mL      Normal          0.00-4.00       Comprehensive   
Internal   
Medicine  
Work Phone:   
[phone]  
   
                                        Comment on above:   This test was perfor  
med using the TPSA assay method for   
[employer] chemistry system. Values obtained with   
differentassay methods cannot be used interchangably.When   
changing PSA assays in the course of monitoring apatient,   
additional sequential testing should be carriedout to confirm   
baseline values.   
   
                                                    Thyroid Stim Hormone (TSH)Or  
dered By:  on 2018   
   
                      Thyrotropin Qn 2.71 {uIU/mL} Normal     0.358-3.74 Compreh  
ensive   
Internal   
Medicine  
Work Phone:   
[phone]  
   
                                                    Urinalysis, CompleteOrdered   
By:  on 2018   
   
                                                    RBC Test strip #/vol   
(U)             0-5 SEEN        Normal          0-5             Comprehensive   
Internal   
Medicine  
Work Phone:   
[phone]  
   
                                                    Urinalysis complete   
panel - Urine   Negative        Normal                          Comprehensive   
Internal   
Medicine  
Work Phone:   
[phone]  
   
                                                    Urinalysis complete   
panel - Urine       1.025 1             Normal              1.002-1.03  
0                                       Comprehensive   
Internal   
Medicine  
Work Phone:   
[phone]  
   
                                                    Urinalysis complete   
panel - Urine   6.0 1           Normal          5.0 - 8.0       Comprehensive   
Internal   
Medicine  
Work Phone:   
[phone]  
   
                                                    Urinalysis complete   
panel - Urine   Normal          Normal                          Comprehensive   
Internal   
Medicine  
Work Phone:   
[phone]  
   
                                                    Urinalysis complete   
panel - Urine   0 SEEN          Normal          0-5             Comprehensive   
Internal   
Medicine  
Work Phone:   
[phone]  
   
                                                    Urinalysis complete   
panel - Urine   Yellow          Normal                          Comprehensive   
Internal   
Medicine  
Work Phone:   
[phone]  
   
                                                    Urinalysis complete   
panel - Urine   0-5 SEEN        Normal          0-5             Comprehensive   
Internal   
Medicine  
Work Phone:   
[phone]  
   
                                                    Urinalysis complete   
panel - Urine   RARE            Normal                          Comprehensive   
Internal   
Medicine  
Work Phone:   
[phone]  
   
                                                    Urinalysis complete   
panel - Urine   Clear           Normal                          Comprehensive   
Internal   
Medicine  
Work Phone:   
[phone]  
   
                                                    Urinalysis complete   
panel - Urine   1+              Normal                          Comprehensive   
Internal   
Medicine  
Work Phone:   
[phone]  
   
                                                    Vitamin D,25 HydroxyOrdered   
By:  on 2018   
   
                          Vitamin D,25 Hydroxy 79.9 ng/mL   Normal       29.95-1  
00.  
01                                      Comprehensive   
Internal   
Medicine  
Work Phone:   
[phone]  
   
                                        Comment on above:   Vitamin D 25(OH) Sta  
tus Range Deficiency <20 ng/mL (50nmol/L)   
Insuffciency 20 - 30 ng/mL (50 - 75 nmol/L) Sufficiency 30 - 100   
ng/mL (75 - 250 nmol/L) Toxicity >100 ng/mL (>250 nmol/L)   
   
                                                    HGB A1C (87058)Ordered By: S  
AirWatchtem Manager on 2017   
   
                                                    Hemoglobin   
A1c/Hemoglobin.total   
mass fraction (Bld) 5.7 %           Abnormal        4.8-5.6         Comprehensiv  
e   
Internal   
Medicine  
Work Phone:   
[phone]  
   
                                        Comment on above:   . Pre-diabetes: 5.7   
- 6.4 Diabetes: >6.4 Glycemic control for   
adults with diabetes: <7.0   
   
                                                            PATIENT NOT FASTINGP  
ERFORMED BY: REGIST[facility]AT-MAPI [facility] Tjnwgt6639 [facility] [id number]   
   
                                                    HGB A1C (97470)Ordered By: S  
AirWatchtem Manager on 2017   
   
                                                    Hemoglobin   
A1c/Hemoglobin.total   
mass fraction (Bld) 5.9 %           Abnormal        4.8-5.6         Comprehensiv  
e   
Internal   
Medicine  
Work Phone:   
[phone]  
   
                                        Comment on above:   . Pre-diabetes: 5.7   
- 6.4 Diabetes: >6.4 Glycemic control for   
adults with diabetes: <7.0   
   
                                                            PATIENT NOT FASTINGP  
ERFORMED BY: REGISTRAT-MAPI [facility] Bhwcth4042 [facility] [id number]   
   
                                                    CBC W/Diff, AutomatedOrdered  
 By:  on 2017   
   
                                                    Basophils/100 WBC   
Auto (Bld)      0.4 %           Normal          0-1             Comprehensive   
Internal   
Medicine  
Work Phone:   
[phone]  
   
                                                    Eosinophils/100 WBC   
Auto (Bld)      2.3 %           Normal          0-5             Comprehensive   
Internal   
Medicine  
Work Phone:   
[phone]  
   
                                                    Erythrocyte   
distribution width   
Auto Ratio (RBC) 12.2 %          Normal          11.6-14.6       Comprehensive   
Internal   
Medicine  
Work Phone:   
[phone]  
   
                                                    Hematocrit Auto   
Volume Fraction (Bld) 47.3 %          Normal          40-54           Comprehens  
bebe   
Internal   
Medicine  
Work Phone:   
[phone]  
   
                                                    Hemoglobin mass conc   
(Bld)           16.5 g/dL       Normal          13.0-16.5       Comprehensive   
Internal   
Medicine  
Work Phone:   
[phone]  
   
                                                    Lymphocytes/100 WBC   
Auto (Bld)      37.6 %          Normal          19-41           Comprehensive   
Internal   
Medicine  
Work Phone:   
[phone]  
   
                                                    MCH Auto Entitic mass   
(RBC)           32.5 pg         Abnormal        27.0-32.0       Comprehensive   
Internal   
Medicine  
Work Phone:   
[phone]  
   
                                                    MCHC Auto mass conc   
(RBC)           34.9 {g/gl}     Normal          32-36           Comprehensive   
Internal   
Medicine  
Work Phone:   
[phone]  
   
                                                    MCV Auto Entitic   
volume (RBC)    93.3 fL         Normal          80-94           Comprehensive   
Internal   
Medicine  
Work Phone:   
[phone]  
   
                                                    Monocytes/100 WBC   
Auto (Bld)      8.9 %           Normal          0-10            Comprehensive   
Internal   
Medicine  
Work Phone:   
[phone]  
   
                                                    Neutrophils/100 WBC   
Auto (Bld)      50.4 %          Normal          47-70           Comprehensive   
Internal   
Medicine  
Work Phone:   
[phone]  
   
                                                    Platelet mean volume   
Auto Entitic volume   
(Bld)           10.1 fL         Normal          6.2-12.0        Comprehensive   
Internal   
Medicine  
Work Phone:   
[phone]  
   
                                                    Platelets Auto #/vol   
(Bld)           172 10*3/uL     Normal          150-450         Comprehensive   
Internal   
Medicine  
Work Phone:   
[phone]  
   
                      RBC Auto #/vol (Bld) 5.07 {M/mm3} Normal     4.6-6.2    Co  
[facility]   
Internal   
Medicine  
Work Phone:   
[phone]  
   
                      WBC Auto #/vol (Bld) 7.9 10*3/uL Normal     4.4-11.0   Com  
prehensive   
Internal   
Medicine  
Work Phone:   
[phone]  
   
                      CBC W/Diff, Automated 40.8 fL    Normal     35.1-43.9  Com  
prehensive   
Internal   
Medicine  
Work Phone:   
[phone]  
   
                      CBC W/Diff, Automated 0.400 %    Normal     0.0-0.9    Com  
prehensive   
Internal   
Medicine  
Work Phone:   
[phone]  
   
                                        Comment on above:   IG% - Immature Granu  
locytes (promyelocytes, myelocytes   
andmetamyelocytes) > 1% indicates that a LEFT SHIFT is Present.   
   
                      CBC W/Diff, Automated 4.0 {X10_3/uL} Normal     2.0-7.7     
 Comprehensive   
Internal   
Medicine  
Work Phone:   
[phone]  
   
                      CBC W/Diff, Automated 2.96 {X10_3/ul} Normal     0.83-4.51  
  Comprehensive   
Internal   
Medicine  
Work Phone:   
[phone]  
   
                                                    Comprehensive Metabolic Prof  
ilOrdered By:  on 2017   
   
                                                    Comprehensive   
metabolic 2000 panel 42 U/L          Abnormal        15-37           Comprehensi  
ve   
Internal   
Medicine  
Work Phone:   
[phone]  
   
                                        Comment on above:   Slight Hemolysis, Re  
sult may be falsely increased.   
   
                                                    Comprehensive   
metabolic 2000 panel 3.4 g/dL        Normal          2.3-3.5         Comprehensi  
ve   
Internal   
Medicine  
Work Phone:   
[phone]  
   
                                                    Comprehensive   
metabolic 2000 panel 7.4 g/dL        Normal          6.4-8.2         Comprehensi  
ve   
Internal   
Medicine  
Work Phone:   
[phone]  
   
                                                    Comprehensive   
metabolic 2000 panel 109 mL/min      Normal                          Comprehensi  
ve   
Internal   
Medicine  
Work Phone:   
[phone]  
   
                                        Comment on above:    GFR  
 Calc   
   
                                                    Comprehensive   
metabolic 2000 panel 91 mL/min       Normal                          Comprehensi  
ve   
Internal   
Medicine  
Work Phone:   
[phone]  
   
                                        Comment on above:   Non-  
 GFR Calc   
   
                                                    Comprehensive   
metabolic 2000 panel 0.93 mg/dL      Normal          0.70-1.30       Comprehensi  
ve   
Internal   
Medicine  
Work Phone:   
[phone]  
   
                                        Comment on above:   The validity of the   
calculated GFR AND GFRAA in patients   
over70   
years has not been determined. Clinical correlation isessential.   
   
                                                    Comprehensive   
metabolic 2000 panel 1.2 {RATIO}     Normal          0.9-2.4         Comprehensi  
ve   
Internal   
Medicine  
Work Phone:   
[phone]  
   
                                                    Comprehensive   
metabolic 2000 panel 10 mg/dL        Normal          7-18            Comprehensi  
ve   
Internal   
Medicine  
Work Phone:   
[phone]  
   
                                                    Comprehensive   
metabolic 2000 panel 9.0 mg/dL       Normal          8.5-10.1        Comprehensi  
ve   
Internal   
Medicine  
Work Phone:   
[phone]  
   
                                                    Comprehensive   
metabolic 2000 panel 136 mg/dL       Abnormal                  Comprehensi  
ve   
Internal   
Medicine  
Work Phone:   
[phone]  
   
                                        Comment on above:   Fasting Glucose resu  
lt greater than or equal to 126   
mg/dLsuggests DIABETES MELLITUS per A.D.A. criteria.   
   
                                                    Comprehensive   
metabolic 2000 panel 4.0 g/dL        Normal          3.4-5.0         Comprehensi  
ve   
Internal   
Medicine  
Work Phone:   
[phone]  
   
                                                    Comprehensive   
metabolic 2000 panel 56 U/L          Normal                    Comprehensi  
ve   
Internal   
Medicine  
Work Phone:   
[phone]  
   
                                                    Comprehensive   
metabolic 2000 panel 65 U/L          Normal          12-78           Comprehensi  
ve   
Internal   
Medicine  
Work Phone:   
[phone]  
   
                                                    Comprehensive   
metabolic 2000 panel 0.30 mg/dL      Normal          0.20-1.00       Comprehensi  
ve   
Internal   
Medicine  
Work Phone:   
[phone]  
   
                                                    Comprehensive   
metabolic 2000 panel 140 mmol/L      Normal          136-145         Comprehensi  
ve   
Internal   
Medicine  
Work Phone:   
[phone]  
   
                                                    Comprehensive   
metabolic 2000 panel 4.2 mmol/L      Normal          3.5-5.1         Comprehensi  
ve   
Internal   
Medicine  
Work Phone:   
[phone]  
   
                                        Comment on above:   Slight Hemolysis, Re  
sult may be falsely increased.   
   
                                                    Comprehensive   
metabolic  panel 107 mmol/L      Normal                    Comprehensi  
ve   
Internal   
Medicine  
Work Phone:   
[phone]  
   
                                                    Comprehensive   
metabolic 2000 panel 8 1             Normal          5-15            Comprehensi  
ve   
Internal   
Medicine  
Work Phone:   
[phone]  
   
                                                    Comprehensive   
metabolic 2000 panel 10.8 {RATIO}    Normal          10-20           Comprehensi  
ve   
Internal   
Medicine  
Work Phone:   
[phone]  
   
                                                    Comprehensive   
metabolic 2000 panel 25.0 mmol/L     Normal          21.0-32.0       Comprehensi  
ve   
Internal   
Medicine  
Work Phone:   
[phone]  
   
                                                    Lipid ProfileOrdered By: Shannon  
tem Manager on 2017   
   
                                                    Cholesterol in HDL   
mass conc       45 mg/dL        Normal                          Comprehensive   
Internal   
Medicine  
Work Phone:   
[phone]  
   
                                        Comment on above:   The drugs N-Acetylcy  
steine and Metamizole may falsely   
deressthis   
assay. Reference Range HDL <40 mg/dL Low HDL Cholesterol HDL   
>or= 60 mg/dL High HDL Cholesterol   
   
                                                    Cholesterol in LDL   
mass conc       91 mg/dL        Normal          0-130           Comprehensive   
Internal   
Medicine  
Work Phone:   
[phone]  
   
                      Cholesterol mass conc 175 mg/dL  Normal                Com  
prehensive   
Internal   
Medicine  
Work Phone:   
[phone]  
   
                                        Comment on above:   <200 mg/dL Desirable  
 200-240 mg/dL Borderline >240 mg/dL High   
Risk   
   
                                                    Triglyceride mass   
conc            195 mg/dL       Normal                          Comprehensive   
Internal   
Medicine  
Work Phone:   
[phone]  
   
                                        Comment on above:   The drugs N-Acetylcy  
steine and Metamizole may falsely   
deressthis   
assay.Serum Triglycerides Reference Interval Normal <150 mg/dL   
Borderline high 150 - 199 mg/dL High 200 - 499 mg/dL Very High >   
or = 500 mg/dL   
   
                      Lipid Profile 39 mg/dL   Normal     5-40       Comprehensi  
ve   
Internal   
Medicine  
Work Phone:   
[phone]  
   
                                                    MicroalbOrdered By: System M  
anager on 2017   
   
                      Creatinine mass conc 4.0 {mg/g_CRE} Normal                  
Comprehensive   
Internal   
Medicine  
Work Phone:   
[phone]  
   
                      Microalb   6.8 mg/L   Normal                Comprehensive   
Internal   
Medicine  
Work Phone:   
[phone]  
   
                      Microalb   169.00 mg/dL Normal                Comprehensiv  
e   
Internal   
Medicine  
Work Phone:   
[phone]  
   
                                                    Thyroid Stim Hormone (TSH)Or  
dered By:  on 2017   
   
                      Thyrotropin Qn 1.97 {uIU/mL} Normal     0.358-3.74 Compreh  
ensive   
Internal   
Medicine  
Work Phone:   
[phone]  
   
                                                    Urinalysis, CompleteOrdered   
By:  on 2017   
   
                                                    RBC Test strip #/vol   
(U)             0 SEEN          Normal          0-5             Comprehensive   
Internal   
Medicine  
Work Phone:   
[phone]  
   
                                                    Urinalysis complete   
panel - Urine   Negative        Normal                          Comprehensive   
Internal   
Medicine  
Work Phone:   
[phone]  
   
                                                    Urinalysis complete   
panel - Urine   0 SEEN          Normal          0-5             Comprehensive   
Internal   
Medicine  
Work Phone:   
[phone]  
   
                                                    Urinalysis complete   
panel - Urine   Normal          Normal                          Comprehensive   
Internal   
Medicine  
Work Phone:   
[phone]  
   
                                                    Urinalysis complete   
panel - Urine   5.0 1           Normal          5.0 - 8.0       Comprehensive   
Internal   
Medicine  
Work Phone:   
[phone]  
   
                                                    Urinalysis complete   
panel - Urine   Yellow          Normal                          Comprehensive   
Internal   
Medicine  
Work Phone:   
[phone]  
   
                                                    Urinalysis complete   
panel - Urine   Clear           Normal                          Comprehensive   
Internal   
Medicine  
Work Phone:   
[phone]  
   
                                                    Urinalysis complete   
panel - Urine   5 mg/dL         Abnormal                        Comprehensive   
Internal   
Medicine  
Work Phone:   
[phone]  
   
                                                    Urinalysis complete   
panel - Urine       1.020 1             Normal              1.002-1.03  
0                                       Comprehensive   
Internal   
Medicine  
Work Phone:   
[phone]  
   
                                                    Vitamin D,25 HydroxyOrdered   
By:  on 2017   
   
                      Vitamin D,25 Hydroxy 39.1 ng/mL Normal                Comp  
rehensive   
Internal   
Medicine  
Work Phone:   
[phone]  
   
                                        Comment on above:   Vitamin D 25(OH) Sta  
tus Range Deficiency <20 ng/mL (50nmol/L)   
Insuffciency 20 - 30 ng/mL (50 - 75 nmol/L) Sufficiency 30 - 100   
ng/mL (75 - 250 nmol/L) Toxicity >100 ng/mL (>250 nmol/L)   
   
                                                    HGB A1C (97846)Ordered By: S  
ystem Manager on 2017   
   
                                                    Hemoglobin   
A1c/Hemoglobin.total   
mass fraction (Bld) 5.9 %           Abnormal        4.8-5.6         Comprehensiv  
e   
Internal   
Medicine  
Work Phone:   
[phone]  
   
                                        Comment on above:   . Pre-diabetes: 5.7   
- 6.4 Diabetes: >6.4 Glycemic control for   
adults with diabetes: <7.0   
   
                                                            PATIENT NOT FASTINGP  
ERFORMED BY:  [facility] [facility] [id number]   
   
                                                    CBC W/Diff, AutomatedOrdered  
 By:  on 10-   
   
                                                    Basophils/100 WBC   
Auto (Bld)      0.8 %           Normal          0-1             Comprehensive   
Internal   
Medicine  
Work Phone:   
[phone]  
   
                                                    Eosinophils/100 WBC   
Auto (Bld)      1.2 %           Normal          0-5             Comprehensive   
Internal   
Medicine  
Work Phone:   
[phone]  
   
                                                    Erythrocyte   
distribution width   
Auto Ratio (RBC) 12.0 %          Normal          11.6-14.6       Comprehensive   
Internal   
Medicine  
Work Phone:   
[phone]  
   
                                                    Hematocrit Auto   
Volume Fraction (Bld) 47.0 %          Normal          40-54           Comprehens  
bebe   
Internal   
Medicine  
Work Phone:   
[phone]  
   
                                                    Hemoglobin mass conc   
(Bld)           16.7 g/dL       Abnormal        13.0-16.5       Comprehensive   
Internal   
Medicine  
Work Phone:   
[phone]  
   
                                                    Lymphocytes/100 WBC   
Auto (Bld)      40.4 %          Normal          19-41           Comprehensive   
Internal   
Medicine  
Work Phone:   
[phone]  
   
                                                    MCH Auto Entitic mass   
(RBC)           32.6 pg         Abnormal        27.0-32.0       Comprehensive   
Internal   
Medicine  
Work Phone:   
[phone]  
   
                                                    MCHC Auto mass conc   
(RBC)           35.5 {g/gl}     Normal          32-36           Comprehensive   
Internal   
Medicine  
Work Phone:   
[phone]  
   
                                                    MCV Auto Entitic   
volume (RBC)    91.8 fL         Normal          80-94           Comprehensive   
Internal   
Medicine  
Work Phone:   
[phone]  
   
                                                    Monocytes/100 WBC   
Auto (Bld)      12.4 %          Abnormal        0-10            Comprehensive   
Internal   
Medicine  
Work Phone:   
[phone]  
   
                                                    Neutrophils/100 WBC   
Auto (Bld)      44.9 %          Abnormal        47-70           Comprehensive   
Internal   
Medicine  
Work Phone:   
[phone]  
   
                                                    Platelet mean volume   
Auto Entitic volume   
(Bld)           10.1 fL         Normal          6.2-12.0        Comprehensive   
Internal   
[facility]  
Work Phone:   
[phone]  
   
                                                    Platelets Auto #/vol   
(Bld)           179 10*3/uL     Normal          150-450         Comprehensive   
Internal   
Medicine  
Work Phone:   
[phone]  
   
                      RBC Auto #/vol (Bld) 5.12 {M/mm3} Normal     4.6-6.2    Co  
[facility]   
Internal   
Medicine  
Work Phone:   
[phone]  
   
                      WBC Auto #/vol (Bld) 6.4 10*3/uL Normal     4.4-11.0   Com  
prehensive   
Internal   
Medicine  
Work Phone:   
[phone]  
   
                      CBC W/Diff, Automated 2.60 {X10_3/ul} Normal     0.83-4.51  
  Comprehensive   
Internal   
Medicine  
Work Phone:   
[phone]  
   
                      CBC W/Diff, Automated 0.300 %    Normal     0.0-0.9    Com  
prehensive   
Internal   
Medicine  
Work Phone:   
[phone]  
   
                                        Comment on above:   IG% - Immature Granu  
locytes (promyelocytes, myelocytes   
andmetamyelocytes) > 1% indicates that a LEFT SHIFT is Present.   
   
                      CBC W/Diff, Automated 39.8 fL    Normal     35.1-43.9  Com  
prehensive   
Internal   
Medicine  
Work Phone:   
[phone]  
   
                      CBC W/Diff, Automated 2.9 {X10_3/uL} Normal     2.0-7.7     
 Comprehensive   
Internal   
Medicine  
Work Phone:   
[phone]  
   
                                                    Comprehensive Metabolic Prof  
ilOrdered By:  on 10-   
   
                                                    Comprehensive   
metabolic  panel 42 U/L          Normal          12-78           Comprehensi  
ve   
Internal   
Medicine  
Work Phone:   
[phone]  
   
                                                    Comprehensive   
metabolic  panel 8 1             Normal          5-15            Comprehensi  
ve   
Internal   
Medicine  
Work Phone:   
[phone]  
   
                                                    Comprehensive   
metabolic  panel 26.0 mmol/L     Normal          21.0-32.0       Comprehensi  
ve   
Internal   
Medicine  
Work Phone:   
[phone]  
   
                                                    Comprehensive   
metabolic  panel 89 mg/dL        Normal                    Comprehensi  
ve   
Internal   
Medicine  
Work Phone:   
[phone]  
   
                                                    Comprehensive   
metabolic  panel 107 mmol/L      Normal                    Comprehensi  
ve   
Internal   
Medicine  
Work Phone:   
[phone]  
   
                                                    Comprehensive   
metabolic  panel 4.1 mmol/L      Normal          3.5-5.1         Comprehensi  
ve   
Internal   
Medicine  
Work Phone:   
[phone]  
   
                                                    Comprehensive   
metabolic  panel 141 mmol/L      Normal          136-145         Comprehensi  
ve   
Internal   
Medicine  
Work Phone:   
[phone]  
   
                                                    Comprehensive   
metabolic  panel 0.50 mg/dL      Normal          0.20-1.00       Comprehensi  
ve   
Internal   
Medicine  
Work Phone:   
[phone]  
   
                                                    Comprehensive   
metabolic  panel 7 mg/dL         Normal          7-18            Comprehensi  
ve   
Internal   
Medicine  
Work Phone:   
[phone]  
   
                                                    Comprehensive   
metabolic  panel 55 U/L          Normal                    Comprehensi  
ve   
Internal   
Medicine  
Work Phone:   
[phone]  
   
                                                    Comprehensive   
metabolic  panel 22 U/L          Normal          15-37           Comprehensi  
ve   
Internal   
Medicine  
Work Phone:   
[phone]  
   
                                                    Comprehensive   
metabolic  panel 8.9 mg/dL       Normal          8.5-10.1        Comprehensi  
ve   
Internal   
Medicine  
Work Phone:   
[phone]  
   
                                                    Comprehensive   
metabolic 2000 panel 1.3 {RATIO}     Normal          0.9-2.4         Comprehensi  
ve   
Internal   
Medicine  
Work Phone:   
[phone]  
   
                                                    Comprehensive   
metabolic  panel 3.3 g/dL        Normal          2.3-3.5         Comprehensi  
ve   
Internal   
Medicine  
Work Phone:   
[phone]  
   
                                                    Comprehensive   
metabolic 2000 panel 4.2 g/dL        Normal          3.4-5.0         Comprehensi  
ve   
Internal   
Medicine  
Work Phone:   
[phone]  
   
                                                    Comprehensive   
metabolic 2000 panel 7.5 g/dL        Normal          6.4-8.2         Comprehensi  
ve   
Internal   
Medicine  
Work Phone:   
[phone]  
   
                                                    Comprehensive   
metabolic 2000 panel 7.9 {RATIO}     Abnormal        10-20           Comprehensi  
ve   
Internal   
Medicine  
Work Phone:   
[phone]  
   
                                                    Comprehensive   
metabolic 2000 panel 115 mL/min      Normal                          Comprehensi  
ve   
Internal   
Medicine  
Work Phone:   
[phone]  
   
                                        Comment on above:    GFR  
 Calc   
   
                                                    Comprehensive   
metabolic 2000 panel 95 mL/min       Normal                          Comprehensi  
ve   
Internal   
Medicine  
Work Phone:   
[phone]  
   
                                        Comment on above:   Non-  
 GFR Calc   
   
                                                    Comprehensive   
metabolic 2000 panel 0.89 mg/dL      Normal          0.70-1.30       Comprehensi  
ve   
Internal   
Medicine  
Work Phone:   
[phone]  
   
                                        Comment on above:   The validity of the   
calculated GFR AND GFRAA in patients   
over70   
years has not been determined. Clinical correlation isessential.   
   
                                                    Hemoglobin [id number] By: [id number]  
stem Manager on 10-   
   
                                                    Hemoglobin   
A1c/Hemoglobin.total   
mass fraction (Bld) 5.5 %           Normal          4.2-6.3         Comprehensiv  
e   
Internal   
Medicine  
Work Phone:   
[phone]  
   
                                                    Lipid ProfileOrdered By: [id number]  
tem Manager on 10-   
   
                                                    Cholesterol in HDL   
mass conc       47 mg/dL        Normal                          Comprehensive   
Internal   
Medicine  
Work Phone:   
[phone]  
   
                                        Comment on above:   The drugs N-Acetylcy  
steine and Metamizole may falsely   
deressthis   
assay. Reference Range HDL <40 mg/dL Low HDL Cholesterol HDL   
>or= 60 mg/dL High HDL Cholesterol   
   
                                                    Cholesterol in LDL   
mass conc       85 mg/dL        Normal          0-130           Comprehensive   
Internal   
Medicine  
Work Phone:   
[phone]  
   
                      Cholesterol mass conc 153 mg/dL  Normal                Com  
prehensive   
Internal   
Medicine  
Work Phone:   
[phone]  
   
                                        Comment on above:   <200 mg/dL Desirable  
 200-240 mg/dL Borderline >240 mg/dL High   
Risk   
   
                                                    Triglyceride mass   
conc            104 mg/dL       Normal                          Comprehensive   
Internal   
Medicine  
Work Phone:   
[phone]  
   
                                        Comment on above:   The drugs N-Acetylcy  
steine and Metamizole may falsely   
deressthis   
assay.Serum Triglycerides Reference Interval Normal <150 mg/dL   
Borderline high 150 - 199 mg/dL High 200 - 499 mg/dL Very High >   
or = 500 mg/dL   
   
                      Lipid Profile 21 mg/dL   Normal     5-40       Comprehensi  
ve   
Internal   
Medicine  
Work Phone:   
[phone]  
   
                                                    MicroalbOrdered By: System M  
anager on 10-   
   
                      Creatinine mass conc 9.3 {mg/g_CRE} Normal                  
Comprehensive   
Internal   
Medicine  
Work Phone:   
[phone]  
   
                      Microalb   6.6 mg/L   Normal                Comprehensive   
Internal   
Medicine  
Work Phone:   
[phone]  
   
                      Microalb   71.60 mg/dL Normal                Comprehensive  
   
Internal   
Medicine  
Work Phone:   
[phone]  
   
                                                    Thyroid Stim Hormone (TSH)Or  
dered By:  on 10-   
   
                      Thyrotropin Qn 1.62 {uIU/mL} Normal     0.358-3.74 Compreh  
ensive   
Internal   
Medicine  
Work Phone:   
[phone]  
   
                                                    Urinalysis, CompleteOrdered   
By:  on 10-   
   
                                                    RBC Test strip #/vol   
(U)             0-5 SEEN        Normal          0-5             [facility]   
Internal   
Medicine  
Work Phone:   
[phone]  
   
                                                    Urinalysis complete   
panel - Urine   0 SEEN          Normal          0-5             [facility]   
Internal   
Medicine  
Work Phone:   
[phone]  
   
                                                    Urinalysis complete   
panel - Urine       1.010 1             Normal              1.002-1.03  
0                                       Com[facility]   
Internal   
Medicine  
Work Phone:   
[phone]  
   
                                                    Urinalysis complete   
panel - Urine   Yellow          Normal                          Com[facility]   
Internal   
Medicine  
Work Phone:   
[phone]  
   
                                                    Urinalysis complete   
panel - Urine   Clear           Normal                          [facility]   
Internal   
Medicine  
Work Phone:   
[phone]  
   
                                                    Urinalysis complete   
panel - Urine   Normal          Normal                          Com[facility]   
Internal   
Medicine  
Work Phone:   
[phone]  
   
                                                    Urinalysis complete   
panel - Urine   Negative        Normal                          Com[facility]   
Internal   
Medicine  
Work Phone:   
[phone]  
   
                                                    Urinalysis complete   
panel - Urine   6.5 1           Normal          5.0 - 8.0       [facility]   
Internal   
Medicine  
Work Phone:   
[phone]  
   
                                                    Vitamin D,25 HydroxyOrdered   
By:  on 10-   
   
                      Vitamin D,25 Hydroxy 45.0 ng/mL Normal                Comp  
rehensive   
Internal   
Medicine  
Work Phone:   
[phone]  
   
                                        Comment on above:   Vitamin D 25(OH) Sta  
tus Range Deficiency <20 ng/mL (50nmol/L)   
Insuffciency 20 - 30 ng/mL (50 - 75 nmol/L) Sufficiency 30 - 100   
ng/mL (75 - 250 nmol/L) Toxicity >100 ng/mL (>250 nmol/L)   
   
                                                    CALCIFIDIOL (63877) VIT D 25  
Ordered By:  on 2016   
   
                                                    25-Hydroxyvitamin   
D2+25-Hydroxyvitamin   
D3 mass conc    48.9 ng/mL      Normal          30.0-100.0      Com[facility]   
Internal   
Medicine  
Work Phone:   
[phone]  
   
                                        Comment on above:   Vitamin D deficiency  
 has been defined by the [place]   
[facility] and an Endocrine Society practice guideline as alevel   
of serum 25-OH vitamin D less than 20 ng/mL (1,2).The Endocrine   
Society went on to further define vitamin Dinsufficiency as a   
level between 21 and 29 ng/mL (2).1. IOM ([place] of   
Medicine). 2010. Dietary reference intakes for calcium and D.   
[place]: The National Academies Press.2. Kodi MF,   
Phillip WALKER, Archana SAMS, et al. Evaluation, treatment,   
and prevention of vitamin D deficiency: an Endocrine Society   
clinical practice guideline. JCEM. 2011; 96(7):1911-30.   
   
                                                            PATIENT WAS FASTINGP  
ERFORMED BY: Jiujiuweikang70 [employer] KitchenUNC Health Rex [facility] [id number]   
   
                                                    CBC W/AUTO DIFF WBC (64645)O  
rdered By:  on 2016   
   
                                                    Basophils (Bld)   
[#/Vol]         0.0 {x10E3/uL}  Normal          0.0-0.2         Comprehensive   
Internal   
Medicine  
Work Phone:   
[phone]  
   
                                        Comment on above:   PATIENT WAS FASTINGP  
ERFORMED BY: Jiujiuwe[address]ang[address]   
[facility] [id number] Information:   
[id number]   
   
                                                    Basophils (Bld)   
[#/Vol]         0.0 10*3/uL     Normal          0.0-0.2         Comprehensive   
Internal   
Medicine;   
Comprehensive   
Internal   
Medicine  
Work Phone:   
[phone]  
   
                                        Comment on above:   PATIENT WAS FASTINGP  
ERFORMED BY: Jiujiuweikang70 [facility]asS[facility] [id number] Information:   
[id number]   
   
                                                    Basophils Auto #/vol   
(Bld)           0.0 {x10E3/uL}  Normal          0.0-0.2         Comprehensive   
Internal   
Medicine  
Work Phone:   
[phone]  
   
                                                    Basophils/100 WBC   
(Bld)           1 %             Normal                          Comprehensive   
Internal   
Medicine  
Work Phone:   
[phone]  
   
                                        Comment on above:   PATIENT WAS FASTINGP  
ERFORMED BY: AMOR [address] [id number] Information:   
[id number]   
   
                                                    Basophils/100 WBC   
Auto (Bld)      1 %             Normal                          Comprehensive   
Internal   
Medicine  
Work Phone:   
[phone]  
   
                                                    Eosinophils (Bld)   
[#/Vol]         0.1 {x10E3/uL}  Normal          0.0-0.4         Com[facility]   
Internal   
Medicine  
Work Phone:   
[phone]  
   
                                        Comment on above:   PATIENT WAS FASTINGP  
ERFORMED BY: [address] [id number] Information:   
[id number]   
   
                                                    Eosinophils (Bld)   
[#/Vol]         0.1 10*3/uL     Normal          0.0-0.4         Comprehensive   
Internal   
Medicine;   
Comprehensive   
Internal   
Medicine  
Work Phone:   
[phone]  
   
                                        Comment on above:   PATIENT WAS FASTINGP  
ERFORMED BY: [address] [id number] Information:   
[id number]   
   
                                                    Eosinophils Auto   
#/vol (Bld)     0.1 {x10E3/uL}  Normal          0.0-0.4         Comprehensive   
Internal   
Medicine  
Work Phone:   
[phone]  
   
                                                    Eosinophils/100 WBC   
(Bld)           1 %             Normal                          Comprehensive   
Internal   
Medicine  
Work Phone:   
[phone]  
   
                                        Comment on above:   PATIENT WAS FASTINGP  
ERFORMED BY: [address] [id number] Information:   
[id number]   
   
                                                    Eosinophils/100 WBC   
Auto (Bld)      1 %             Normal                          Comprehensive   
Internal   
Medicine  
Work Phone:   
[phone]  
   
                                                    Erythrocyte   
distribution width   
(RBC) [Ratio]   13.4 %          Normal          12.3-15.4       Com[facility]   
Internal   
Medicine  
Work Phone:   
[phone]  
   
                                        Comment on above:   PATIENT WAS FASTINGP  
ERFORMED BY: [address] [id number] Information:   
[id number]   
   
                                                    Erythrocyte   
distribution width   
Auto Ratio (RBC) 13.4 %          Normal          12.3-15.4       Comprehensive   
Internal   
Medicine  
Work Phone:   
[phone]  
   
                                                    Hematocrit (Bld)   
[Volume fraction] 49.5 %          Normal          37.5-51.0       Comprehensive   
Internal   
Medicine  
Work Phone:   
[phone]  
   
                                        Comment on above:   PATIENT WAS FASTINGP  
ERFORMED BY: AMOR LabJohn [facility]6[address] [facility] [id number] Information:   
[id number]   
   
                                                    Hematocrit Auto   
Volume Fraction (Bld) 49.5 %          Normal          37.5-51.0       [facility]   
Internal   
Medicine  
Work Phone:   
[phone]  
   
                                                    Hemoglobin mass conc   
(Bld)           16.8 g/dL       Normal          12.6-17.7       Comprehensive   
Internal   
Medicine  
Work Phone:   
[phone]  
   
                                        Comment on above:   PATIENT WAS FASTINGP  
ERFORMED BY: [place] [address] [facility] [id number] Information:   
[id number]   
   
                                                    Immature granulocytes   
#/vol (Bld)     0.0 {x10E3/uL}  Normal          0.0-0.1         Comprehensive   
Internal   
Medicine  
Work Phone:   
[phone]  
   
                                        Comment on above:   PATIENT WAS FASTINGP  
ERFORMED BY: AMOR [facility]CoRobert [facility] SomersetJhricc1692 [facility] [id number] Information:   
[id number]   
   
                                                    Immature granulocytes   
(Bld) [#/Vol]   0.0 10*3/uL     Normal          0.0-0.1         Comprehensive   
Internal   
Medicine;   
Comprehensive   
Internal   
Medicine  
Work Phone:   
[phone]  
   
                                        Comment on above:   PATIENT WAS FASTINGP  
ERFORMED BY:  LabCo Umkcga0087 [facility] [id number] Information:   
[id number]   
   
                                                    Immature   
granulocytes/100 WBC   
(Bld)           0 %             Normal                          Comprehensive   
Internal   
Medicine  
Work Phone:   
[phone]  
   
                                        Comment on above:   PATIENT WAS FASTINGP  
ERFORMED BY:  LabCoRobert [facility] Johnson [facility] Hospital SomersetEyvrlz8938 [facility] [id number] Information:   
[id number]   
   
                                                    Lymphocytes (Bld)   
[#/Vol]         2.5 {x10E3/uL}  Normal          0.7-3.1         Comprehensive   
Internal   
Medicine  
Work Phone:   
[phone]  
   
                                        Comment on above:   PATIENT WAS FASTINGP  
ERFORMED BY: AMOR LabAb MullerLqupyg4287 [facility] [id number] Information:   
[id number]   
   
                                                    Lymphocytes (Bld)   
[#/Vol]         2.5 10*3/uL     Normal          0.7-3.1         Comprehensive   
Internal   
Medicine;   
Comprehensive   
Internal   
Medicine  
Work Phone:   
[phone]  
   
                                        Comment on above:   PATIENT WAS FASTINGP  
ERFORMED BY: AMOR [place] [address] [facility] [id number] Information:   
[id number]   
   
                                                    Lymphocytes Auto   
#/vol (Bld)     2.5 {x10E3/uL}  Normal          0.7-3.1         Comprehensive   
Internal   
Medicine  
Work Phone:   
[phone]  
   
                                                    Lymphocytes/100 WBC   
(Bld)           30 %            Normal                          Comprehensive   
Internal   
Medicine  
Work Phone:   
[phone]  
   
                                        Comment on above:   PATIENT WAS FASTINGP  
ERFORMED BY: AMOR [employer] Iodtch9375 [facility] [id number] Information:   
[id number]   
   
                                                    Lymphocytes/100 WBC   
Auto (Bld)      30 %            Normal                          Comprehensive   
Internal   
Medicine  
Work Phone:   
[phone]  
   
                                                    MCH (RBC) [Entitic   
mass]           31.9 pg         Normal          26.6-33.0       Comprehensive   
Internal   
Medicine  
Work Phone:   
[phone]  
   
                                        Comment on above:   PATIENT WAS FASTINGP  
ERFORMED BY: AMOR [place] [address] [facility] [id number] Information:   
[id number]   
   
                                                    MCH Auto Entitic mass   
(RBC)           31.9 pg         Normal          26.6-33.0       Comprehensive   
Internal   
Medicine  
Work Phone:   
[phone]  
   
                      MCHC (RBC) [Mass/Vol] 33.9 g/dL  Normal     31.5-35.7  Com  
prehensive   
Internal   
Medicine  
Work Phone:   
[phone]  
   
                                        Comment on above:   PATIENT WAS FASTINGP  
ERFORMED BY: AMOR [place] [address] [facility] [id number] Information:   
[id number]   
   
                                                    MCHC Auto mass conc   
(RBC)           33.9 g/dL       Normal          31.5-35.7       Comprehensive   
Internal   
Medicine  
Work Phone:   
[phone]  
   
                                                    MCV (RBC) [Entitic   
vol]            94 fL           Normal          79-97           Comprehensive   
Internal   
Medicine  
Work Phone:   
[phone]  
   
                                        Comment on above:   PATIENT WAS FASTINGP  
ERFORMED BY: [address] [id number] Information:   
[id number]   
   
                                                    MCV Auto Entitic   
volume (RBC)    94 fL           Normal          79-97           Comprehensive   
Internal   
Medicine  
Work Phone:   
[phone]  
   
                                                    Monocytes (Bld)   
[#/Vol]         0.6 {x10E3/uL}  Normal          0.1-0.9         Comprehensive   
Internal   
Medicine  
Work Phone:   
[phone]  
   
                                        Comment on above:   PATIENT WAS FASTINGP  
ERFORMED BY: [address] [id number] Information:   
[id number]   
   
                                                    Monocytes (Bld)   
[#/Vol]         0.6 10*3/uL     Normal          0.1-0.9         Comprehensive   
Internal   
Medicine;   
Comprehensive   
Internal   
Medicine  
Work Phone:   
[phone]  
   
                                        Comment on above:   PATIENT WAS FASTINGP  
ERFORMED BY: [place] [address]70 [facility] [id number] Information:   
[id number]   
   
                                                    Monocytes Auto #/vol   
(Bld)           0.6 {x10E3/uL}  Normal          0.1-0.9         Comprehensive   
Internal   
Medicine  
Work Phone:   
[phone]  
   
                                                    Monocytes/100 WBC   
(Bld)           7 %             Normal                          Comprehensive   
Internal   
Medicine  
Work Phone:   
[phone]  
   
                                        Comment on above:   PATIENT WAS FASTINGP  
ERFORMED BY: [address] [id number] Information:   
[id number]   
   
                                                    Monocytes/100 WBC   
Auto (Bld)      7 %             Normal                          Comprehensive   
Internal   
Medicine  
Work Phone:   
[phone]  
   
                                                    Neutrophils (Bld)   
[#/Vol]         5.0 {x10E3/uL}  Normal          1.4-7.0         Comprehensive   
Internal   
Medicine  
Work Phone:   
[phone]  
   
                                        Comment on above:   PATIENT WAS FASTINGP  
ERFORMED BY: AMOR [facility] Zcvyyh1907 [facility] [id number] Information:   
[id number]   
   
                                                    Neutrophils (Bld)   
[#/Vol]         5.0 10*3/uL     Normal          1.4-7.0         Comprehensive   
Internal   
Medicine;   
Comprehensive   
Internal   
Medicine  
Work Phone:   
[phone]  
   
                                        Comment on above:   PATIENT WAS FASTINGP  
ERFORMED BY: AMOR [facility] Qgaygu5807 [facility] [id number] Information:   
[id number]   
   
                                                    Neutrophils Auto   
#/vol (Bld)     5.0 {x10E3/uL}  Normal          1.4-7.0         Comprehensive   
Internal   
Medicine  
Work Phone:   
[phone]  
   
                                                    Neutrophils/100 WBC   
(Bld)           61 %            Normal                          Comprehensive   
Internal   
Medicine  
Work Phone:   
[phone]  
   
                                        Comment on above:   PATIENT WAS FASTINGP  
ERFORMED BY: AMOR Mullerlin6370 [facility] [id number] Information:   
[id number]   
   
                                                    Neutrophils/100 WBC   
Auto (Bld)      61 %            Normal                          Comprehensive   
Internal   
Medicine  
Work Phone:   
[phone]  
   
                                                    Platelets (Bld)   
[#/Vol]         188 {x10E3/uL}  Normal          150-379         Comprehensive   
Internal   
Medicine  
Work Phone:   
[phone]  
   
                                        Comment on above:   PATIENT WAS FASTINGP  
ERFORMED BY: AMOR [facility] Hospital Fpkdxy0670 [facility] [id number] Information:   
[id number]   
   
                                                    Platelets (Bld)   
[#/Vol]         188 10*3/uL     Normal          150-379         Comprehensive   
Internal   
Medicine;   
Comprehensive   
Internal   
Medicine  
Work Phone:   
[phone]  
   
                                        Comment on above:   PATIENT WAS FASTINGP  
ERFORMED BY: AMOR [facility]6370 [facility] [id number] Information:   
[id number]   
   
                                                    Platelets Auto #/vol   
(Bld)           188 {x10E3/uL}  Normal          150-379         Comprehensive   
Internal   
Medicine  
Work Phone:   
[phone]  
   
                      RBC (Bld) [#/Vol] 5.26 {x10E6/uL} Normal     4.14-5.80  [facility]   
Internal   
Medicine  
Work Phone:   
[phone]  
   
                                        Comment on above:   PATIENT WAS FASTINGP  
ERFORMED BY: [place] [address]12470 [facility] [id number] Information:   
[id number]   
   
                      RBC (Bld) [#/Vol] 5.26 10*6/uL Normal     4.14-5.80  [facility]   
Internal   
Medicine;   
Comprehensive   
Internal   
Medicine  
Work Phone:   
[phone]  
   
                                        Comment on above:   PATIENT WAS FASTINGP  
ERFORMED BY: [address] [id number] Information:   
[id number]   
   
                      RBC Auto #/vol (Bld) 5.26 {x10E6/uL} Normal     4.14-5.80   
 Comprehensive   
Internal   
Medicine  
Work Phone:   
[phone]  
   
                      WBC (Bld) [#/Vol] 8.2 {x10E3/uL} Normal     3.4-10.8   Com  
prehensive   
Internal   
Medicine  
Work Phone:   
[phone]  
   
                                        Comment on above:   PATIENT WAS FASTINGP  
ERFORMED BY: [address] [id number] Information:   
[id number]   
   
                      WBC (Bld) [#/Vol] 8.2 10*3/uL Normal     3.4-10.8   [facility]   
Internal   
Medicine;   
Comprehensive   
Internal   
Medicine  
Work Phone:   
[phone]  
   
                                        Comment on above:   PATIENT WAS FASTINGP  
ERFORMED BY: [facility]6370 [facility] [id number] Information:   
[id number]   
   
                      WBC Auto #/vol (Bld) 8.2 {x10E3/uL} Normal     3.4-10.8     
Comprehensive   
Internal   
Medicine  
Work Phone:   
[phone]  
   
                                                    HGB A1C (57784)Ordered By: S  
ystem Manager on 2016   
   
                                                    Hemoglobin   
A1c/Hemoglobin.total   
mass fraction (Bld) 5.8 %           Abnormal        4.8-5.6         Comprehensiv  
e   
Internal   
Medicine  
Work Phone:   
[phone]  
   
                                        Comment on above:   . Pre-diabetes: 5.7   
- 6.4 Diabetes: >6.4 Glycemic control for   
adults with diabetes: <7.0   
   
                                                            PATIENT WAS FASTINGP  
ERFORMED BY: AMOR Odom Orbztt6352 [facility] [id number]   
   
                                                    LIPID PANEL (22557)Ordered B  
y:  on 2016   
   
                                                    Cholesterol in HDL   
mass conc       44 mg/dL        Normal                          Comprehensive   
Internal   
Medicine  
Work Phone:   
[phone]  
   
                                        Comment on above:   According to ATP-III  
 Guidelines, HDL-C >59 mg/dL is considered  
   
anegative risk factor for CHD.   
   
                                                            PATIENT WAS FASTINGP  
ERFORMED BY: AMOR Jason6370 [facility] [id number]   
   
                                                    Cholesterol in LDL   
mass conc       98 mg/dL        Normal          0-99            Comprehensive   
Internal   
Medicine  
Work Phone:   
[phone]  
   
                                        Comment on above:   PATIENT WAS FASTINGP  
ERFORMED BY: AMOR Mullerlin6370 [facility] [id number]   
   
                                                    Cholesterol in   
LDL/Cholesterol in   
HDL mass ratio  2.2 {ratio_units} Normal          0.0-3.6         Comprehensive   
Internal   
Medicine  
Work Phone:   
[phone]  
   
                                        Comment on above:   LDL/HDL Ratio Men Wo  
men 1/2 Avg.Risk 1.0 1.5 Avg.Risk 3.6 3.2   
2X   
Avg.Risk 6.2 5.0 3X Avg.Risk 8.0 6.1   
   
                                                            PATIENT WAS FASTINGP  
ERFORMED BY: AMOR Mullerlin6370 [facility] [id number]   
   
                                                    Cholesterol in VLDL   
mass conc       31 mg/dL        Normal          5-40            Comprehensive   
Internal   
Medicine  
Work Phone:   
[phone]  
   
                                        Comment on above:   PATIENT WAS FASTINGP  
ERFORMED BY: AMOR Mullerlin6370 [facility] [id number]   
   
                      Cholesterol mass conc 173 mg/dL  Normal     100-199    Com  
prehensive   
Internal   
Medicine  
Work Phone:   
[phone]  
   
                                        Comment on above:   PATIENT WAS FASTINGP  
ERFORMED BY: AMOR Jason6370 [facility] [id number]   
   
                                                    Triglyceride mass   
conc            153 mg/dL       Abnormal        0-149           Comprehensive   
Internal   
Medicine  
Work Phone:   
[phone]  
   
                                        Comment on above:   PATIENT WAS FASTINGP  
ERFORMED BY: [facility] Nqqogm[address]   
RoadDublin [place] [id number]   
   
                                                    METABOLIC PANEL, COMPREHENSI  
VE (10664)Ordered By:  on 2016   
   
                      Albumin mass conc 4.5 g/dL   Normal     3.5-5.5    [facility]  
[facility]   
Internal   
Medicine  
Work Phone:   
[phone]  
   
                                        Comment on above:   PATIENT WAS FASTINGP  
ERFORMED BY: CB [facility] [address]   
Road[place] [id number]   
   
                                                    Albumin/Globulin mass   
ratio           1.8 {ratio}     Normal          1.1-2.5         [facility]   
Internal   
Medicine  
Work Phone:   
[phone]  
   
                                        Comment on above:   PATIENT WAS FASTINGP  
ERFORMED BY: CB [facility] [address]   
RoadD[place] [id number]   
   
                                                    ALP [Catalytic   
activity/Vol]   53 U/L          Normal                    Comprehensive   
Internal   
Medicine;   
Comprehensive   
Internal   
Medicine  
Work Phone:   
[phone]  
   
                                        Comment on above:   PATIENT WAS FASTINGP  
ERFORMED BY: CB [facility] [address]   
RoadD[place] [id number]   
   
                      ALP enzyme act/vol 53 [iU]/L  Normal          [facility]   
Internal   
Medicine  
Work Phone:   
[phone]  
   
                                        Comment on above:   PATIENT WAS FASTINGP  
ERFORMED BY: CB [facility] [address]   
Road[place] [id number]   
   
                                                    ALT [Catalytic   
activity/Vol]   30 U/L          Normal          0-44            Comprehensive   
Internal   
Medicine;   
Comprehensive   
Internal   
Medicine  
Work Phone:   
[phone]  
   
                                        Comment on above:   PATIENT WAS FASTINGP  
ERFORMED BY: CB [facility] [address]   
Road[place] [id number]   
   
                      ALT enzyme act/vol 30 [iU]/L  Normal     0-44       [facility]   
Internal   
Medicine  
Work Phone:   
[phone]  
   
                                        Comment on above:   PATIENT WAS FASTINGP  
ERFORMED BY: CB [facility] [address]   
Road[place] [id number]   
   
                                                    AST [Catalytic   
activity/Vol]   25 U/L          Normal          0-40            Comprehensive   
Internal   
Medicine;   
Comprehensive   
Internal   
Medicine  
Work Phone:   
[phone]  
   
                                        Comment on above:   PATIENT WAS FASTINGP  
ERFORMED BY: AMOR [facility] [address]   
[facility] [id number]   
   
                      AST enzyme act/vol 25 [iU]/L  Normal     0-40       Compre  
[facility]   
Internal   
Medicine  
Work Phone:   
[phone]  
   
                                        Comment on above:   PATIENT WAS FASTINGP  
ERFORMED BY: AMOR LabCosaurabh MullerTqhlqn3029 [facility] [id number]   
   
                      Bilirubin mass conc 0.5 mg/dL  Normal     0.0-1.2    Compr  
ensive   
Internal   
Medicine  
Work Phone:   
[phone]  
   
                                        Comment on above:   PATIENT WAS FASTINGP  
ERFORMED BY: AMOR LabCosaurab[facility] MullerEzybur4979 [facility] [id number]   
   
                      Calcium mass conc 9.6 mg/dL  Normal     8.7-10.2   Compreh  
[facility]   
Internal   
Medicine  
Work Phone:   
[phone]  
   
                                        Comment on above:   PATIENT WAS FASTINGP  
ERFORMED BY: AMOR Lab[facility] MullerWqwvgc8318 [facility] [id number]   
   
                      Chloride molar conc 98 mmol/L  Normal          Compr  
[facility]   
Internal   
Medicine  
Work Phone:   
[phone]  
   
                                        Comment on above:   PATIENT WAS FASTINGP  
ERFORMED BY: AMOR Lab[facility] Vmeidc7525 [facility] [id number]   
   
                      CO2 molar conc 23 mmol/L  Normal     18-29      Comprehens  
bebe   
Internal   
Medicine  
Work Phone:   
[phone]  
   
                                        Comment on above:   PATIENT WAS FASTINGP  
ERFORMED BY: AMOR Lab[facility] MullerQmaiar3518 [facility] [id number]   
   
                      Creatinine mass conc 0.95 mg/dL Normal     0.76-1.27  Comp  
[facility]   
Internal   
Medicine  
Work Phone:   
[phone]  
   
                                        Comment on above:   PATIENT WAS FASTINGP  
ERFORMED BY: AMOR LabCosaurab[facility] MullerDvssct1230 [facility] [id number]   
   
                                                    GFR/1.73 sq M   
predicted among   
blacks CKD-EPI vol   
rate/area (S/P/Bld) 106 mL/min/1.73 Normal                          Comprehensiv  
e   
Internal   
Medicine  
Work Phone:   
[phone]  
   
                                        Comment on above:   PATIENT WAS FASTINGP  
ERFORMED BY: AMOR [facility] [address]   
Road[place] [id number]   
   
                                                    GFR/1.73 sq M   
predicted among   
non-blacks CKD-EPI   
vol rate/area   
(S/P/Bld)       92 mL/min/1.73  Normal                          Comprehensive   
Internal   
Medicine  
Work Phone:   
[phone]  
   
                                        Comment on above:   PATIENT WAS FASTINGP  
ERFORMED BY: AMOR [facility] [address]cel[address]   
Road[place] [id number]   
   
                                                    Globulin (S)   
[Mass/Vol]      2.5 g/dL        Normal          1.5-4.5         Comprehensive   
Internal   
Medicine  
Work Phone:   
[phone]  
   
                                        Comment on above:   PATIENT WAS FASTINGP  
ERFORMED BY: AMOR [facility] [address]   
Roadbl[place] [id number]   
   
                                                    Globulin Calculated   
mass conc (S)   2.5 g/dL        Normal          1.5-4.5         Comprehensive   
Internal   
Medicine  
Work Phone:   
[phone]  
   
                      Glucose mass conc 122 mg/dL  Abnormal   65-99      Compreh  
ensive   
Internal   
Medicine  
Work Phone:   
[phone]  
   
                                        Comment on above:   PATIENT WAS FASTINGP  
ERFORMED BY: AMOR [facility] [address]   
[facility] [place] [id number]   
   
                      Potassium molar conc 4.6 mmol/L Normal     3.5-5.2    Comp  
rehensive   
Internal   
Medicine  
Work Phone:   
[phone]  
   
                                        Comment on above:   PATIENT WAS FASTINGP  
ERFORMED BY: AMOR [facility] [address]   
[facility] [place] [id number]   
   
                      Protein mass conc 7.0 g/dL   Normal     6.0-8.5    Compreh  
ensive   
Internal   
Medicine  
Work Phone:   
[phone]  
   
                                        Comment on above:   PATIENT WAS FASTINGP  
ERFORMED BY: AMOR [facility] [address]   
[facility] [place] [id number]   
   
                      Sodium molar conc 139 mmol/L Normal     134-144    Compreh  
ensive   
Internal   
Medicine  
Work Phone:   
[phone]  
   
                                        Comment on above:   PATIENT WAS FASTINGP  
ERFORMED BY: AMOR [facility] [address]   
[facility] [place] [id number]   
   
                                                    Urea nitrogen mass   
conc            12 mg/dL        Normal          6-24            Comprehensive   
Internal   
Medicine  
Work Phone:   
[phone]  
   
                                        Comment on above:   PATIENT WAS FASTINGP  
ERFORMED BY:  [facility] Reqefo7494 [facility] [id number]   
   
                                                    Urea   
nitrogen/Creatinine   
mass ratio      13 mg/mg        Normal          9-20            Comprehensive   
Internal   
Medicine  
Work Phone:   
[phone]  
   
                                        Comment on above:   PATIENT WAS FASTINGP  
ERFORMED BY:  [facility] [address]   
[facility] [id number]   
   
                                                    MICROALBUMINOrdered By: Syst  
em Manager on 2016   
   
                                                    Albumin DL <= 20 mg/L   
mass conc (U)   5.9 ug/mL       Normal                          Comprehensive   
Internal   
Medicine  
Work Phone:   
[phone]  
   
                                        Comment on above:   PATIENT WAS FASTINGP  
ERFORMED BY:  [facility] Bphzzk0935 [facility] [id number]   
   
                                                    Albumin/Creatinine   
mass ratio (U)  3.5 {mg/g_creat} Normal          0.0-30.0        Comprehensive   
Internal   
Medicine  
Work Phone:   
[phone]  
   
                                        Comment on above:   PATIENT WAS FASTINGP  
ERFORMED BY:  [facility] Rbvxxa0674 [facility] [id number]   
   
                                                    Creatinine mass conc   
(U)             170.7 mg/dL     Normal                          Comprehensive   
Internal   
Medicine  
Work Phone:   
[phone]  
   
                                        Comment on above:   PATIENT WAS FASTINGP  
ERFORMED BY:  [facility] Mxlbvf3904 [facility] [id number]   
   
                                                    Microscopic ExaminationOrder  
ed By:  on 2016   
   
                                                    Bacteria LM.HPF   
#/area (Urine sed) Few             Normal                          Comprehensive  
   
Internal   
Medicine  
Work Phone:   
[phone]  
   
                                                    Epithelial cells   
LM.HPF #/area (Urine   
sed)            None seen       Normal          0 - 10          Comprehensive   
Internal   
Medicine  
Work Phone:   
[phone]  
   
                                                    Mucus LM Ql (Urine   
sed)            Present         Normal                          Comprehensive   
Internal   
Medicine  
Work Phone:   
[phone]  
   
                                                    RBC LM.HPF #/area   
(Urine sed)     0-2             Normal          0 - 2           Comprehensive   
Internal   
Medicine  
Work Phone:   
[phone]  
   
                                                    WBC LM.HPF #/area   
(Urine sed)     0-5             Normal          0 - 5           Comprehensive   
Internal   
Medicine  
Work Phone:   
[phone]  
   
                                                    TSH (01168)Ordered By: Syste  
m Manager on 2016   
   
                          Thyrotropin Qn 2.550 {uIU/mL} Normal       0.450-4.50  
0                                       Comprehensive   
Internal   
Medicine  
Work Phone:   
[phone]  
   
                                        Comment on above:   PATIENT WAS FASTINGP  
ERFORMED BY: AMOR LabCosaurab[facility] [address]   
RoadD[place] [id number]   
   
                                                    URINALYSIS, W/ MICRO (74923)  
Ordered By:  on 2016   
   
                      Appearance Nom (U) Clear      Normal                Compre  
hensive   
Internal   
Medicine  
Work Phone:   
[phone]  
   
                                        Comment on above:   PATIENT WAS FASTINGP  
ERFORMED BY: AMOR LabCosaurab[facility] [address]Ppaijv[address]   
RoadD[place] [id number]   
   
                      Bilirubin Ql (U) Negative   Normal                Comprehe  
nsive   
Internal   
Medicine  
Work Phone:   
[phone]  
   
                                        Comment on above:   PATIENT WAS FASTINGP  
ERFORMED BY: AMOR [facility] [address]Lqumbt[address]   
RoadD[place] [id number]   
   
                      Bilirubin Ql (U) Negative   Normal                Comprehe  
nsive   
Internal   
Medicine;   
Comprehensive   
Internal   
Medicine  
Work Phone:   
[phone]  
   
                                        Comment on above:   PATIENT WAS FASTINGP  
ERFORMED BY: AMOR [facility] [address]Ouocen[address]   
RoadD[place] [id number]   
   
                      Color Nom (U) Yellow     Normal                Comprehensi  
ve   
Internal   
Medicine  
Work Phone:   
[phone]  
   
                                        Comment on above:   PATIENT WAS FASTINGP  
ERFORMED BY: AMOR Mullerlin[address]   
Road[place] [id number]   
   
                      Glucose Ql (U) Negative   Normal                Comprehens  
bebe   
Internal   
Medicine  
Work Phone:   
[phone]  
   
                                        Comment on above:   PATIENT WAS FASTINGP  
ERFORMED BY: AMOR [facility] [address]zci[address]   
RoadD[place] [id number]   
   
                      Glucose Ql (U) Negative   Normal                Comprehens  
bebe   
Internal   
Medicine;   
Comprehensive   
Internal   
Medicine  
Work Phone:   
[phone]  
   
                                        Comment on above:   PATIENT WAS FASTINGP  
ERFORMED BY: AMOR [facility] [address]peb[address]   
RoadD[place] [id number]   
   
                      Hemoglobin Ql (U) Negative   Normal                Compreh  
ensive   
Internal   
Medicine  
Work Phone:   
[phone]  
   
                                        Comment on above:   PATIENT WAS FASTINGP  
ERFORMED BY: AMOR [facility] [address]goz[address]   
RoadD[place] [id number]   
   
                      Hemoglobin Ql (U) Negative   Normal                Compreh  
ensive   
Internal   
Medicine;   
Comprehensive   
Internal   
Medicine  
Work Phone:   
[phone]  
   
                                        Comment on above:   PATIENT WAS FASTINGP  
ERFORMED BY:  [facility] [address]   
RoadD[place] [id number]   
   
                                                    Hemoglobin Test strip   
Ql (U)          Negative        Normal                          Comprehensive   
Internal   
Medicine  
Work Phone:   
[phone]  
   
                      Ketones Ql (U) Trace      Abnormal              Comprehens  
bebe   
Internal   
Medicine  
Work Phone:   
[phone]  
   
                                        Comment on above:   PATIENT WAS FASTINGP  
ERFORMED BY:  [facility] [address]nz[address]   
RoadD[place] [id number]   
   
                                                    Leukocyte esterase   
Test strip Ql (U) Negative        Normal                          Comprehensive   
Internal   
Medicine  
Work Phone:   
[phone]  
   
                                        Comment on above:   PATIENT WAS FASTINGP  
[facility]FORMED BY:  [facility] Hwkdyx[address]   
RoadD[place] [id number]   
   
                                                    Leukocyte esterase   
Test strip Ql (U) Negative        Normal                          Comprehensive   
Internal   
Medicine;   
Comprehensive   
Internal   
Medicine  
Work Phone:   
[phone]  
   
                                        Comment on above:   PATIENT WAS FASTINGP  
[facility]FORMED BY:  [facility] Lmhvvs[address]   
RoadD[place] [id number]   
   
                                                    Microscopic   
observation LM Nom   
(Urine sed)     MICRON          Normal                          Comprehensive   
Internal   
Medicine  
Work Phone:   
[phone]  
   
                                        Comment on above:   Microscopic follows   
if indicated.   
   
                                                            PATIENT WAS FASTINGP  
ERFORMED BY:  [facility] [address]hn[address]   
Road[place] [id number]   
   
                                                    Microscopic   
observation LM Nom   
(Urine sed)     See below:      Normal                          Comprehensive   
Internal   
Medicine  
Work Phone:   
[phone]  
   
                                        Comment on above:   Microscopic was benny  
cated and was performed.   
   
                                                            PATIENT WAS FASTINGP  
ERFORMED BY:  [facility] [address]fbn[address]   
RoadD[place] [id number]   
   
                      Nitrite Ql (U) Negative   Normal                Comprehens  
bebe   
Internal   
Medicine  
Work Phone:   
[phone]  
   
                                        Comment on above:   PATIENT WAS FASTINGP  
ERFORMED BY:  [facility] [address]pvz[address]   
RoadD[place] [id number]   
   
                      Nitrite Ql (U) Negative   Normal                Comprehens  
bebe   
Internal   
Medicine;   
Comprehensive   
Internal   
Medicine  
Work Phone:   
[phone]  
   
                                        Comment on above:   PATIENT WAS FASTINGP  
ERFORMED BY: AMOR [facility] [address]rx[address]   
RoadD[place] [id number]   
   
                                                    Nitrite Test strip Ql   
(U)             Negative        Normal                          Comprehensive   
Internal   
Medicine  
Work Phone:   
[phone]  
   
                      pH (U)     6.0 [pH]   Normal     5.0-7.5    Comprehensive   
Internal   
Medicine  
Work Phone:   
[phone]  
   
                                        Comment on above:   PATIENT WAS FASTINGP  
ERFORMED BY: AMOR [facility] [address]   
RoadD[place] [id number]   
   
                      pH Test strip (U) 6.0 [pH]   Normal     5.0-7.5    Compreh  
ensive   
Internal   
Medicine  
Work Phone:   
[phone]  
   
                      Protein Ql (U) Negative   Normal                Comprehens  
bebe   
Internal   
Medicine  
Work Phone:   
[phone]  
   
                                        Comment on above:   PATIENT WAS FASTINGP  
ERFORMED BY: AMOR [facility] [address]hu[address]   
RoadD[place] [id number]   
   
                      Protein Ql (U) Negative   Normal                Comprehens  
bebe   
Internal   
Medicine;   
Comprehensive   
Internal   
Medicine  
Work Phone:   
[phone]  
   
                                        Comment on above:   PATIENT WAS FASTINGP  
ERFORMED BY: AMOR [facility] [address]   
RoadD[place] [id number]   
   
                                                    Protein Test strip Ql   
(U)             Negative        Normal                          Comprehensive   
Internal   
Medicine  
Work Phone:   
[phone]  
   
                                                    Specific gravity   
Relative Density (U) 1.021 1             Normal              1.005-1.03  
0                                       Comprehensive   
Internal   
Medicine  
Work Phone:   
[phone]  
   
                                        Comment on above:   PATIENT WAS FASTINGP  
ERFORMED BY: AMOR [facility] [address]mt[address]   
RoadD[place] [id number]   
   
                                                    Urobilinogen (U)   
[Mass/Vol]      1.0 mg/dL       Normal          0.2-1.0         Comprehensive   
Internal   
Medicine;   
Comprehensive   
Internal   
Medicine  
Work Phone:   
[phone]  
   
                                        Comment on above:   PATIENT WAS FASTINGP  
ERFORMED BY: AMOR [facility] [address]ocnc[address]   
RoadDublin [place] [id number]   
   
                                                    Urobilinogen Test   
strip mass conc (U) 1.0 mg/dL       Normal          0.2-1.0         Comprehensiv  
e   
Internal   
Medicine  
Work Phone:   
[phone]  
   
                                        Comment on above:   PATIENT WAS FASTINGP  
ERFORMED BY: [place] [id number] [facility] [id number]   
   
                                                    CBC W/AUTO DIFF WBC ([id number])O  
rdered By:  on 2016   
   
                                                    Basophils (Bld)   
[#/Vol]         0.1 {x10E3/uL}  Normal          0.0-0.2         Comprehensive   
Internal   
Medicine  
Work Phone:   
[phone]  
   
                                        Comment on above:   PATIENT WAS FASTINGP  
ERFORMED BY: [address] [id number] Information:   
[id number]   
   
                                                    Basophils (Bld)   
[#/Vol]         0.1 10*3/uL     Normal          0.0-0.2         Comprehensive   
Internal   
Medicine;   
Comprehensive   
Internal   
Medicine  
Work Phone:   
[phone]  
   
                                        Comment on above:   PATIENT WAS FASTINGP  
ERFORMED BY: [address] [id number] Information:   
[id number]   
   
                                                    Basophils Auto #/vol   
(Bld)           0.1 {x10E3/uL}  Normal          0.0-0.2         Comprehensive   
Internal   
Medicine  
Work Phone:   
[phone]  
   
                                                    Basophils/100 WBC   
(Bld)           1 %             Normal                          Comprehensive   
Internal   
Medicine  
Work Phone:   
[phone]  
   
                                        Comment on above:   PATIENT WAS FASTINGP  
ERFORMED BY: [address] [id number] Information:   
[id number]   
   
                                                    Basophils/100 WBC   
Auto (Bld)      1 %             Normal                          Comprehensive   
Internal   
Medicine  
Work Phone:   
[phone]  
   
                                                    Eosinophils (Bld)   
[#/Vol]         0.2 {x10E3/uL}  Normal          0.0-0.4         Comprehensive   
Internal   
Medicine  
Work Phone:   
[phone]  
   
                                        Comment on above:   PATIENT WAS FASTINGP  
ERFORMED BY: [place] [id number] [facility] [id number] Information:   
[id number]   
   
                                                    Eosinophils (Bld)   
[#/Vol]         0.2 10*3/uL     Normal          0.0-0.4         Comprehensive   
Internal   
Medicine;   
Comprehensive   
Internal   
Medicine  
Work Phone:   
[phone]  
   
                                        Comment on above:   PATIENT WAS FASTINGP  
ERFORMED BY: AMOR [facility]6370 [facility] [id number] Information:   
[id number]   
   
                                                    Eosinophils Auto   
#/vol (Bld)     0.2 {x10E3/uL}  Normal          0.0-0.4         Comprehensive   
Internal   
Medicine  
Work Phone:   
[phone]  
   
                                                    Eosinophils/100 WBC   
(Bld)           2 %             Normal                          Comprehensive   
Internal   
Medicine  
Work Phone:   
[phone]  
   
                                        Comment on above:   PATIENT WAS FASTINGP  
ERFORMED BY: AMOR [place] [id number] [facility] [id number] Information:   
[id number]   
   
                                                    Eosinophils/100 WBC   
Auto (Bld)      2 %             Normal                          Comprehensive   
Internal   
Medicine  
Work Phone:   
[phone]  
   
                                                    Erythrocyte   
distribution width   
(RBC) [Ratio]   12.9 %          Normal          12.3-15.4       Comprehensive   
Internal   
Medicine  
Work Phone:   
[phone]  
   
                                        Comment on above:   PATIENT WAS FASTINGP  
ERFORMED BY: AMOR [place] [id number] [facility] [id number] Information:   
[id number]   
   
                                                    Erythrocyte   
distribution width   
Auto Ratio (RBC) 12.9 %          Normal          12.3-15.4       [facility]   
Internal   
Medicine  
Work Phone:   
[phone]  
   
                                                    Hematocrit (Bld)   
[Volume fraction] 47.6 %          Normal          37.5-51.0       [facility]   
Internal   
Medicine  
Work Phone:   
[phone]  
   
                                        Comment on above:   PATIENT WAS FASTINGP  
ERFORMED BY: AMOR [place] [id number] [facility] [id number] Information:   
[id number]   
   
                                                    Hematocrit Auto   
Volume Fraction (Bld) 47.6 %          Normal          37.5-51.0       [facility]   
Internal   
Medicine  
Work Phone:   
[phone]  
   
                                                    Hemoglobin mass conc   
(Bld)           16.4 g/dL       Normal          12.6-17.7       [facility]   
Internal   
Medicine  
Work Phone:   
[phone]  
   
                                        Comment on above:   PATIENT WAS FASTINGP  
ERFORMED BY: [place] 4312470 [facility] [id number] Information:   
[id number]   
   
                                                    Immature granulocytes   
#/vol (Bld)     0.0 {x10E3/uL}  Normal          0.0-0.1         [facility]   
Internal   
Medicine  
Work Phone:   
[phone]  
   
                                        Comment on above:   PATIENT WAS FASTINGP  
ERFORMED BY: [address] [id number] Information:   
[id number]   
   
                                                    Immature granulocytes   
(Bld) [#/Vol]   0.0 10*3/uL     Normal          0.0-0.1         Com[facility]   
Internal   
Medicine;   
Com[facility]   
Internal   
Medicine  
Work Phone:   
[phone]  
   
                                        Comment on above:   PATIENT WAS FASTINGP  
ERFORMED BY: [address] [id number] Information:   
[id number]   
   
                                                    Immature   
granulocytes/100 WBC   
(Bld)           0 %             Normal                          Com[facility]   
Internal   
Medicine  
Work Phone:   
[phone]  
   
                                        Comment on above:   PATIENT WAS FASTINGP  
ERFORMED BY: [address] [id number] Information:   
[id number]   
   
                                                    Lymphocytes (Bld)   
[#/Vol]         3.3 {x10E3/uL}  Abnormal        0.7-3.1         [facility]   
Internal   
Medicine  
Work Phone:   
[phone]  
   
                                        Comment on above:   PATIENT WAS FASTINGP  
ERFORMED BY: [address] [id number] Information:   
[id number]   
   
                                                    Lymphocytes (Bld)   
[#/Vol]         3.3 10*3/uL     Abnormal        0.7-3.1         Comprehensive   
Internal   
Medicine;   
Comprehensive   
Internal   
Medicine  
Work Phone:   
[phone]  
   
                                        Comment on above:   PATIENT WAS FASTINGP  
ERFORMED BY: [address] [id number] Information:   
[id number]   
   
                                                    Lymphocytes Auto   
#/vol (Bld)     3.3 {x10E3/uL}  Abnormal        0.7-3.1         Comprehensive   
Internal   
Medicine  
Work Phone:   
[phone]  
   
                                                    Lymphocytes/100 WBC   
(Bld)           43 %            Normal                          Comprehensive   
Internal   
Medicine  
Work Phone:   
[phone]  
   
                                        Comment on above:   PATIENT WAS FASTINGP  
ERFORMED BY: AMOR [facility]6370 [facility] [id number] Information:   
[id number]   
   
                                                    Lymphocytes/100 WBC   
Auto (Bld)      43 %            Normal                          Comprehensive   
Internal   
Medicine  
Work Phone:   
[phone]  
   
                                                    MCH (RBC) [Entitic   
mass]           32.0 pg         Normal          26.6-33.0       Comprehensive   
Internal   
Medicine  
Work Phone:   
[phone]  
   
                                        Comment on above:   PATIENT WAS FASTINGP  
ERFORMED BY: AMOR [place] 2556770 [facility] [id number] Information:   
[id number]   
   
                                                    MCH Auto Entitic mass   
(RBC)           32.0 pg         Normal          26.6-33.0       Comprehensive   
Internal   
Medicine  
Work Phone:   
[phone]  
   
                      MCHC (RBC) [Mass/Vol] 34.5 g/dL  Normal     31.5-35.7  Com  
prehensive   
Internal   
Medicine  
Work Phone:   
[phone]  
   
                                        Comment on above:   PATIENT WAS FASTINGP  
ERFORMED BY: AMOR [place] 2556770 [facility] [id number] Information:   
[id number]   
   
                                                    MCHC Auto mass conc   
(RBC)           34.5 g/dL       Normal          31.5-35.7       Comprehensive   
Internal   
Medicine  
Work Phone:   
[phone]  
   
                                                    MCV (RBC) [Entitic   
vol]            93 fL           Normal          79-97           Comprehensive   
Internal   
Medicine  
Work Phone:   
[phone]  
   
                                        Comment on above:   PATIENT WAS FASTINGP  
ERFORMED BY: [place] 4312470 [facility] [id number] Information:   
[id number]   
   
                                                    MCV Auto Entitic   
volume (RBC)    93 fL           Normal          79-97           Comprehensive   
Internal   
Medicine  
Work Phone:   
[phone]  
   
                                                    Monocytes (Bld)   
[#/Vol]         0.7 {x10E3/uL}  Normal          0.1-0.9         Comprehensive   
Internal   
Medicine  
Work Phone:   
[phone]  
   
                                        Comment on above:   PATIENT WAS FASTINGP  
ERFORMED BY: [place] 4312470 [facility] [id number] Information:   
[id number]   
   
                                                    Monocytes (Bld)   
[#/Vol]         0.7 10*3/uL     Normal          0.1-0.9         Comprehensive   
Internal   
Medicine;   
Comprehensive   
Internal   
Medicine  
Work Phone:   
[phone]  
   
                                        Comment on above:   PATIENT WAS FASTINGP  
ERFORMED BY: [place] 4312470 [facility] [id number] Information:   
[id number]   
   
                                                    Monocytes Auto #/vol   
(Bld)           0.7 {x10E3/uL}  Normal          0.1-0.9         Comprehensive   
Internal   
Medicine  
Work Phone:   
[phone]  
   
                                                    Monocytes/100 WBC   
(Bld)           9 %             Normal                          Comprehensive   
Internal   
Medicine  
Work Phone:   
[phone]  
   
                                        Comment on above:   PATIENT WAS FASTINGP  
ERFORMED BY: [place] 4312470 [facility] [id number] Information:   
[id number]   
   
                                                    Monocytes/100 WBC   
Auto (Bld)      9 %             Normal                          Comprehensive   
Internal   
Medicine  
Work Phone:   
[phone]  
   
                                                    Neutrophils (Bld)   
[#/Vol]         3.4 {x10E3/uL}  Normal          1.4-7.0         Comprehensive   
Internal   
Medicine  
Work Phone:   
[phone]  
   
                                        Comment on above:   PATIENT WAS FASTINGP  
ERFORMED BY: [place] 4312470 [facility] [id number] Information:   
[id number]   
   
                                                    Neutrophils (Bld)   
[#/Vol]         3.4 10*3/uL     Normal          1.4-7.0         Comprehensive   
Internal   
Medicine;   
Comprehensive   
Internal   
Medicine  
Work Phone:   
[phone]  
   
                                        Comment on above:   PATIENT WAS FASTINGP  
ERFORMED BY: [place] 4312470 [facility] [id number] Information:   
[id number]   
   
                                                    Neutrophils Auto   
#/vol (Bld)     3.4 {x10E3/uL}  Normal          1.4-7.0         Comprehensive   
Internal   
Medicine  
Work Phone:   
[phone]  
   
                                                    Neutrophils/100 WBC   
(Bld)           45 %            Normal                          Comprehensive   
Internal   
Medicine  
Work Phone:   
[phone]  
   
                                        Comment on above:   PATIENT WAS FASTINGP  
ERFORMED BY: AMOR [facility]6370 [facility] [id number] Information:   
[id number]   
   
                                                    Neutrophils/100 WBC   
Auto (Bld)      45 %            Normal                          Comprehensive   
Internal   
Medicine  
Work Phone:   
[phone]  
   
                                                    Platelets (Bld)   
[#/Vol]         203 {x10E3/uL}  Normal          150-379         Comprehensive   
Internal   
Medicine  
Work Phone:   
[phone]  
   
                                        Comment on above:   PATIENT WAS FASTINGP  
ERFORMED BY: [address] [id number] Information:   
[id number]   
   
                                                    Platelets (Bld)   
[#/Vol]         203 10*3/uL     Normal          150-379         Comprehensive   
Internal   
Medicine;   
Comprehensive   
Internal   
Medicine  
Work Phone:   
[phone]  
   
                                        Comment on above:   PATIENT WAS FASTINGP  
ERFORMED BY: AMOR [address] [id number] Information:   
[id number]   
   
                                                    Platelets Auto #/vol   
(Bld)           203 {x10E3/uL}  Normal          150-379         Comprehensive   
Internal   
Medicine  
Work Phone:   
[phone]  
   
                      RBC (Bld) [#/Vol] 5.12 {x10E6/uL} Normal     4.14-5.80  [facility]   
Internal   
Medicine  
Work Phone:   
[phone]  
   
                                        Comment on above:   PATIENT WAS FASTINGP  
ERFORMED BY: [place] 4312470 [facility] [id number] Information:   
[id number]   
   
                      RBC (Bld) [#/Vol] 5.12 10*6/uL Normal     4.14-5.80  [facility]   
Internal   
Medicine;   
Comprehensive   
Internal   
Medicine  
Work Phone:   
[phone]  
   
                                        Comment on above:   PATIENT WAS FASTINGP  
ERFORMED BY: [place] 4312470 [facility] [id number] Information:   
[id number]   
   
                      RBC Auto #/vol (Bld) 5.12 {x10E6/uL} Normal     4.14-5.80   
 Comprehensive   
Internal   
Medicine  
Work Phone:   
[phone]  
   
                      WBC (Bld) [#/Vol] 7.6 {x10E3/uL} Normal     3.4-10.8   Com  
prehensive   
Internal   
Medicine  
Work Phone:   
[phone]  
   
                                        Comment on above:   PATIENT WAS FASTINGP  
ERFORMED BY: AMOR LabAb MullerLmhzhe6918 [facility] [id number] Information:   
[id number]   
   
                      WBC (Bld) [#/Vol] 7.6 10*3/uL Normal     3.4-10.8   [facility]  
[facility]   
Internal   
Medicine;   
Comprehensive   
Internal   
Medicine  
Work Phone:   
[phone]  
   
                                        Comment on above:   PATIENT WAS FASTINGP  
ERFORMED BY: AMOR LabAb MullerTblrwk1913 [facility] [id number] Information:   
[id number]   
   
                      WBC Auto #/vol (Bld) 7.6 {x10E3/uL} Normal     3.4-10.8     
Comprehensive   
Internal   
Medicine  
Work Phone:   
[phone]  
   
                                                    HGB A1C (97442)Ordered By: S  
ystem Manager on 2016   
   
                                                    Hemoglobin   
A1c/Hemoglobin.total   
mass fraction (Bld) 5.8 %           Abnormal        4.8-5.6         Comprehensiv  
e   
Internal   
Medicine  
Work Phone:   
[phone]  
   
                                        Comment on above:   . Pre-diabetes: 5.7   
- 6.4 Diabetes: >6.4 Glycemic control for   
adults with diabetes: <7.0   
   
                                                            PATIENT WAS FASTINGP  
ERFORMED BY: AMOR [facility]Robert [facility] SomersetRokxgv6522 [facility] [id number]   
   
                                                    LIPID PANEL (23842)Ordered B  
y:  on 2016   
   
                                                    Cholesterol in HDL   
mass conc       55 mg/dL        Normal                          Comprehensive   
Internal   
Medicine  
Work Phone:   
[phone]  
   
                                        Comment on above:   According to ATP-III  
 Guidelines, HDL-C >59 mg/dL is considered  
   
anegative risk factor for CHD.   
   
                                                            PATIENT WAS FASTINGP  
ERFORMED BY: AMOR ThanxRobert Wood Johnson [facility] Hospital SomersetRsdcin9176 [facility] [id number]   
   
                                                    Cholesterol in LDL   
mass conc       90 mg/dL        Normal          0-99            Comprehensive   
Internal   
Medicine  
Work Phone:   
[phone]  
   
                                        Comment on above:   PATIENT WAS FASTINGP  
ERFORMED BY: AMOR Thanx Sbzflr3984 [facility] [id number]   
   
                                                    Cholesterol in   
LDL/Cholesterol in   
HDL mass ratio  1.6 {ratio_units} Normal          0.0-3.6         Comprehensive   
Internal   
Medicine  
Work Phone:   
[phone]  
   
                                        Comment on above:   LDL/HDL Ratio Men Wo  
men 1/2 Avg.Risk 1.0 1.5 Avg.Risk 3.6 3.2   
2X   
Avg.Risk 6.2 5.0 3X Avg.Risk 8.0 6.1   
   
                                                            PATIENT WAS FASTINGP  
ERFORMED BY: AMOR [facility] Uiqoil8408 [facility] [id number]   
   
                                                    Cholesterol in VLDL   
mass conc       18 mg/dL        Normal          5-40            Comprehensive   
Internal   
Medicine  
Work Phone:   
[phone]  
   
                                        Comment on above:   PATIENT WAS FASTINGP  
ERFORMED BY: AMOR LabAb MullerRirajv4980 [facility] [id number]   
   
                      Cholesterol mass conc 163 mg/dL  Normal     100-199    Com  
prehensive   
Internal   
Medicine  
Work Phone:   
[phone]  
   
                                        Comment on above:   PATIENT WAS FASTINGP  
ERFORMED BY: AMOR [facility] Dfciti9850 [facility] [id number]   
   
                                                    Triglyceride mass   
conc            92 mg/dL        Normal          0-149           Comprehensive   
Internal   
Medicine  
Work Phone:   
[phone]  
   
                                        Comment on above:   PATIENT WAS FASTINGP  
ERFORMED BY: AMOR GodinezS[facility] Hospital Caidko8010 [facility] [id number]   
   
                                                    METABOLIC PANEL, COMPREHENSI  
VE (47969)Ordered By:  on 2016   
   
                      Albumin mass conc 4.6 g/dL   Normal     3.5-5.5    Compreh  
ensive   
Internal   
Medicine  
Work Phone:   
[phone]  
   
                                        Comment on above:   PATIENT WAS FASTINGP  
ERFORMED BY: AMOR [facility] Dzyoqn2027 [facility] [id number]   
   
                                                    Albumin/Globulin mass   
ratio           2.1 {ratio}     Normal          1.1-2.5         Comprehensive   
Internal   
Medicine  
Work Phone:   
[phone]  
   
                                        Comment on above:   PATIENT WAS FASTINGP  
ERFORMED BY: AMOR [facility]John [facility]6370 [facility] [id number]   
   
                                                    ALP [Catalytic   
activity/Vol]   51 U/L          Normal                    Comprehensive   
Internal   
Medicine;   
Comprehensive   
Internal   
Medicine  
Work Phone:   
[phone]  
   
                                        Comment on above:   PATIENT WAS FASTINGP  
ERFORMED BY: AMOR [facility] [address]bc[address]   
Road[address]ublin [place] [id number]   
   
                      ALP enzyme act/vol 51 [iU]/L  Normal          [facility]   
Internal   
Medicine  
Work Phone:   
[phone]  
   
                                        Comment on above:   PATIENT WAS FASTINGP  
ERFORMED BY:  [facility] Zpfcno[address]   
RoadDublin [place] [id number]   
   
                                                    ALT [Catalytic   
activity/Vol]   20 U/L          Normal          0-44            Comprehensive   
Internal   
Medicine;   
Comprehensive   
Internal   
Medicine  
Work Phone:   
[phone]  
   
                                        Comment on above:   PATIENT WAS FASTINGP  
ERFORMED BY:  [facility] Qwnese[address]   
RoadDublin [place] [id number]   
   
                      ALT enzyme act/vol 20 [iU]/L  Normal     0-44       [facility]   
Internal   
Medicine  
Work Phone:   
[phone]  
   
                                        Comment on above:   PATIENT WAS FASTINGP  
ERFORMED BY:  [facility] [address]ysw[address]   
RoadDublin [place] [id number]   
   
                                                    AST [Catalytic   
activity/Vol]   22 U/L          Normal          0-40            Comprehensive   
Internal   
Medicine;   
Comprehensive   
Internal   
Medicine  
Work Phone:   
[phone]  
   
                                        Comment on above:   PATIENT WAS FASTINGP  
ERFORMED BY:  [facility] Zwpquc[address]   
RoadDublin [place] [id number]   
   
                      AST enzyme act/vol 22 [iU]/L  Normal     0-40       [facility]   
Internal   
Medicine  
Work Phone:   
[phone]  
   
                                        Comment on above:   PATIENT WAS FASTINGP  
ERFORMED BY:  [facility] Y[address]qpss[address]   
RoadD[place] [id number]   
   
                      Bilirubin mass conc 0.5 mg/dL  Normal     0.0-1.2    [facility]   
Internal   
Medicine  
Work Phone:   
[phone]  
   
                                        Comment on above:   PATIENT WAS FASTINGP  
ERFORMED BY:  [facility] Urtdbt[address]   
RoadD[place] [id number]   
   
                      Calcium mass conc 9.5 mg/dL  Normal     8.7-10.2   [facility]   
Internal   
Medicine  
Work Phone:   
[phone]  
   
                                        Comment on above:   PATIENT WAS FASTINGP  
ERFORMED BY: AMOR [facility] [address]   
RoadD[place] [id number]   
   
                      Chloride molar conc 100 mmol/L Normal          [facility]   
Internal   
Medicine  
Work Phone:   
[phone]  
   
                                        Comment on above:   PATIENT WAS FASTINGP  
ERFORMED BY:  [facility] X[address]cvow[address]   
RoadD[place] [id number]   
   
                      CO2 molar conc 23 mmol/L  Normal     18-29      Comprehens  
bebe   
Internal   
Medicine  
Work Phone:   
[phone]  
   
                                        Comment on above:   PATIENT WAS FASTINGP  
ERFORMED BY:  [facility] [address]bkx[address]   
Road[address] [id number]   
   
                      Creatinine mass conc 0.87 mg/dL Normal     0.76-1.27  Comp  
rehensive   
Internal   
Medicine  
Work Phone:   
[phone]  
   
                                        Comment on above:   PATIENT WAS FASTINGP  
ERFORMED BY:  [facility] W[address]ij[address]   
RoadNov[facility] Franklin Medical Centerin [place] [id number]   
   
                                                    GFR/1.73 sq M   
predicted among   
blacks CKD-EPI vol   
rate/area (S/P/Bld) 115 mL/min/1.73 Normal                          [facility]ens[facility]  
e   
Internal   
Medicine  
Work Phone:   
[phone]  
   
                                        Comment on above:   PATIENT WAS FASTINGP  
ERFORMED BY:  [facility] [address]kuu[address]   
Roadblin [place] [id number]   
   
                                                    GFR/1.73 sq M   
predicted among   
non-blacks CKD-EPI   
vol rate/area   
(S/P/Bld)       99 mL/min/1.73  Normal                          Comprehensive   
Internal   
Medicine  
Work Phone:   
[phone]  
   
                                        Comment on above:   PATIENT WAS FASTINGP  
ERFORMED BY:  [facility] A[address]mof[address]   
RoadNovant Health Franklin Medical Centerin [place] [id number]   
   
                                                    Globulin (S)   
[Mass/Vol]      2.2 g/dL        Normal          1.5-4.5         Comprehensive   
Internal   
Medicine  
Work Phone:   
[phone]  
   
                                        Comment on above:   PATIENT WAS FASTINGP  
ERFORMED BY:  [facility] [address]pal[address]   
RoadD[place] [id number]   
   
                                                    Globulin Calculated   
mass conc (S)   2.2 g/dL        Normal          1.5-4.5         Comprehensive   
Internal   
Medicine  
Work Phone:   
[phone]  
   
                      Glucose mass conc 102 mg/dL  Abnormal   65-99      Compreh  
ensive   
Internal   
Medicine  
Work Phone:   
[phone]  
   
                                        Comment on above:   PATIENT WAS FASTINGP  
ERFORMED BY: AMOR Ilene Jason[address]   
[facility] [id number]   
   
                      Potassium molar conc 4.9 mmol/L Normal     3.5-5.2    Comp  
rehensive   
Internal   
Medicine  
Work Phone:   
[phone]  
   
                                        Comment on above:   PATIENT WAS FASTINGP  
ERFORMED BY: AMOR Ilene Jason6370 [facility] [id number]   
   
                      Protein mass conc 6.8 g/dL   Normal     6.0-8.5    Compreh  
ensive   
Internal   
Medicine  
Work Phone:   
[phone]  
   
                                        Comment on above:   PATIENT WAS FASTINGP  
ERFORMED BY: AMOR Sangeetasaurabh MullerPwpakm2432 [facility] [id number]   
   
                      Sodium molar conc 141 mmol/L Normal     134-144    Compreh  
ensive   
Internal   
Medicine  
Work Phone:   
[phone]  
   
                                        Comment on above:   PATIENT WAS FASTINGP  
ERFORMED BY: AMOR LabAb MullerBidbcz9185 [facility] [id number]   
   
                                                    Urea nitrogen mass   
conc            10 mg/dL        Normal          6-24            Comprehensive   
Internal   
Medicine  
Work Phone:   
[phone]  
   
                                        Comment on above:   PATIENT WAS FASTINGP  
ERFORMED BY: AMOR [facility] Czmerb10[address] [facility] [id number]   
   
                                                    Urea   
nitrogen/Creatinine   
mass ratio      11 mg/mg        Normal          9-20            Comprehensive   
Internal   
Medicine  
Work Phone:   
[phone]  
   
                                        Comment on above:   PATIENT WAS FASTINGP  
ERFORMED BY: AMOR [facility] MullerWydrxy8610 [facility] [id number]   
   
                                                    MICROALBUMINOrdered By: Syst  
em Manager on 2016   
   
                                                    Albumin DL <= 20 mg/L   
(U) [Mass/Vol]  mg/dL           Normal          0.0-17.0        Comprehensive   
Internal   
Medicine;   
Comprehensive   
Internal   
Medicine  
Work Phone:   
[phone]  
   
                                        Comment on above:   **Effective May 9, 2  
016 the reference interval for**   
Microalbumin, Urine will be changing to: Not Estab.   
   
                                                            PATIENT WAS FASTINGP  
ERFORMED BY: [facility] Tnaplq8397 [facility] [id number]   
   
                                                    Albumin DL <= 20 mg/L   
mass conc (U)   mg/dL           Normal          0.0-17.0        Comprehensive   
Internal   
Medicine  
Work Phone:   
[phone]  
   
                                        Comment on above:   **Effective May 9, 2  
016 the reference interval for**   
Microalbumin, Urine will be changing to: Not Estab.   
   
                                                            PATIENT WAS FASTINGP  
ERFORMED BY: Arccos Golf6370 [employer]in [place] [id number]   
   
                                                    Albumin/Creatinine   
mass ratio (U)  <12.5           Normal          0.0-30.0        Comprehensive   
Internal   
Medicine  
Work Phone:   
[phone]  
   
                                        Comment on above:   PATIENT WAS FASTINGP  
ERFORMED BY: Jiujiuweikang70 [facility] [id number]   
   
                                                    Creatinine mass conc   
(U)             24.0 mg/dL      Normal          22.0-328.0      Comprehensive   
Internal   
Medicine  
Work Phone:   
[phone]  
   
                                        Comment on above:   **Effective May 9, 2  
016 the reference interval for**   
Creatinine,   
Urine will be changing to: Not Estab.   
   
                                                            PATIENT WAS FASTINGP  
ERFORMED BY: Jiujiuweikang70 [facility] [id number]   
   
                                                    Microscopic ExaminationOrder  
ed By:  on 2016   
   
                                                    Bacteria LM.HPF   
#/area (Urine sed) Few             Normal                          Comprehensive  
   
Internal   
Medicine  
Work Phone:   
[phone]  
   
                                                    Epithelial cells   
LM.HPF #/area (Urine   
sed)            None seen       Normal          0 - 10          Comprehensive   
Internal   
Medicine  
Work Phone:   
[phone]  
   
                                                    Mucus LM Ql (Urine   
sed)            Present         Normal                          Comprehensive   
Internal   
Medicine  
Work Phone:   
[phone]  
   
                                                    RBC LM.HPF #/area   
(Urine sed)     None seen       Normal          0 - 2           Comprehensive   
Internal   
Medicine  
Work Phone:   
[phone]  
   
                                                    WBC LM.HPF #/area   
(Urine sed)     None seen       Normal          0 - 5           Comprehensive   
Internal   
Medicine  
Work Phone:   
[phone]  
   
                                                    TSH (74851)Ordered By: Chacorta  
m Manager on 2016   
   
                          Thyrotropin Qn 2.540 {uIU/mL} Normal       0.450-4.50  
0                                       Comprehensive   
Internal   
Medicine  
Work Phone:   
[phone]  
   
                                        Comment on above:   PATIENT WAS FASTINGP  
ERFORMED BY: KDWin [place] [id number]   
   
                                                    URINALYSIS, W/ MICRO (56212)  
Ordered By:  on 2016   
   
                      Appearance Nom (U) Clear      Normal                Compre  
hensive   
Internal   
Medicine  
Work Phone:   
[phone]  
   
                                        Comment on above:   PATIENT WAS FASTINGP  
ERFORMED BY: AMOR [facility] [address]   
Road[place] [id number]   
   
                      Bilirubin Ql (U) Negative   Normal                Comprehe  
nsive   
Internal   
Medicine  
Work Phone:   
[phone]  
   
                                        Comment on above:   PATIENT WAS FASTINGP  
ERFORMED BY: AMOR [facility] [address]   
Road[place] [id number]   
   
                      Bilirubin Ql (U) Negative   Normal                Comprehe  
nsive   
Internal   
Medicine;   
Comprehensive   
Internal   
Medicine  
Work Phone:   
[phone]  
   
                                        Comment on above:   PATIENT WAS FASTINGP  
ERFORMED BY: AMOR LabCosaurabh MullerWxrlfr[address]   
Road[place] [id number]   
   
                      Color Nom (U) Yellow     Normal                Comprehensi  
ve   
Internal   
Medicine  
Work Phone:   
[phone]  
   
                                        Comment on above:   PATIENT WAS FASTINGP  
ERFORMED BY: AMOR LabCosaurabh MullerIzqejr[address]   
Road[place] [id number]   
   
                      Glucose Ql (U) Negative   Normal                Comprehens  
bebe   
Internal   
Medicine  
Work Phone:   
[phone]  
   
                                        Comment on above:   PATIENT WAS FASTINGP  
ERFORMED BY: AMOR [facility] [address]Vsyb[address]   
Road[place] [id number]   
   
                      Glucose Ql (U) Negative   Normal                Comprehens  
bebe   
Internal   
Medicine;   
Comprehensive   
Internal   
Medicine  
Work Phone:   
[phone]  
   
                                        Comment on above:   PATIENT WAS FASTINGP  
ERFORMED BY: AMOR [facility] [address]   
Road[address] [id number]   
   
                      Hemoglobin Ql (U) Negative   Normal                Compreh  
ensive   
Internal   
Medicine  
Work Phone:   
[phone]  
   
                                        Comment on above:   PATIENT WAS FASTINGP  
ERFORMED BY: AMOR [facility] [address]   
Road[place] [id number]   
   
                      Hemoglobin Ql (U) Negative   Normal                Compreh  
ensive   
Internal   
Medicine;   
Comprehensive   
Internal   
Medicine  
Work Phone:   
[phone]  
   
                                        Comment on above:   PATIENT WAS FASTINGP  
ERFORMED BY: AMOR [facility] [address]   
Road[place] [id number]   
   
                                                    Hemoglobin Test strip   
Ql (U)          Negative        Normal                          Comprehensive   
Internal   
Medicine  
Work Phone:   
[phone]  
   
                      Ketones Ql (U) Negative   Normal                Comprehens  
bebe   
Internal   
Medicine  
Work Phone:   
[phone]  
   
                                        Comment on above:   PATIENT WAS FASTINGP  
ERFORMED BY: AMOR [facility] [address]   
Road[address] [id number]   
   
                      Ketones Ql (U) Negative   Normal                Comprehens  
bebe   
Internal   
Medicine;   
Comprehensive   
Internal   
Medicine  
Work Phone:   
[phone]  
   
                                        Comment on above:   PATIENT WAS FASTINGP  
ERFORMED BY: AMOR [facility] [address]   
Road[address] [id number]   
   
                                                    Leukocyte esterase   
Test strip Ql (U) Negative        Normal                          Comprehensive   
Internal   
Medicine  
Work Phone:   
[phone]  
   
                                        Comment on above:   PATIENT WAS FASTINGP  
ERFORMED BY: AMOR [facility] [address]   
Road[place] [id number]   
   
                                                    Leukocyte esterase   
Test strip Ql (U) Negative        Normal                          Comprehensive   
Internal   
Medicine;   
Comprehensive   
Internal   
Medicine  
Work Phone:   
[phone]  
   
                                        Comment on above:   PATIENT WAS FASTINGP  
ERFORMED BY: AMOR [facility] [address]   
Road[address] [id number]   
   
                                                    Microscopic   
observation LM Nom   
(Urine sed)     MICRON          Normal                          Comprehensive   
Internal   
Medicine  
Work Phone:   
[phone]  
   
                                        Comment on above:   Microscopic follows   
if indicated.   
   
                                                            PATIENT WAS FASTINGP  
ERFORMED BY: AMOR [facility] [address]   
Road[address] [id number]   
   
                                                    Microscopic   
observation LM Nom   
(Urine sed)     See below:      Normal                          Comprehensive   
Internal   
Medicine  
Work Phone:   
[phone]  
   
                                        Comment on above:   Microscopic was benny  
cated and was performed.   
   
                                                            PATIENT WAS FASTINGP  
ERFORMED BY: AMOR [facility] [address]   
Road[address] [id number]   
   
                      Nitrite Ql (U) Negative   Normal                Comprehens  
bebe   
Internal   
Medicine  
Work Phone:   
[phone]  
   
                                        Comment on above:   PATIENT WAS FASTINGP  
ERFORMED BY: AMOR [facility] [address]   
Road[place] [id number]   
   
                      Nitrite Ql (U) Negative   Normal                Comprehens  
bebe   
Internal   
Medicine;   
Comprehensive   
Internal   
Medicine  
Work Phone:   
[phone]  
   
                                        Comment on above:   PATIENT WAS FASTINGP  
ERFORMED BY: AMOR [facility] [address]   
Road[place] [id number]   
   
                                                    Nitrite Test strip Ql   
(U)             Negative        Normal                          Comprehensive   
Internal   
Medicine  
Work Phone:   
[phone]  
   
                      pH (U)     7.5 [pH]   Normal     5.0-7.5    Comprehensive   
Internal   
Medicine  
Work Phone:   
[phone]  
   
                                        Comment on above:   PATIENT WAS FASTINGP  
ERFORMED BY: AMOR [facility] [address]   
Road[address] [id number]   
   
                      pH Test strip (U) 7.5 [pH]   Normal     5.0-7.5    Compreh  
ensive   
Internal   
Medicine  
Work Phone:   
[phone]  
   
                      Protein Ql (U) Negative   Normal                Comprehens  
bebe   
Internal   
Medicine  
Work Phone:   
[phone]  
   
                                        Comment on above:   PATIENT WAS FASTINGP  
ERFORMED BY: AMOR [facility] [address]   
Road[address] [id number]   
   
                      Protein Ql (U) Negative   Normal                Comprehens  
bebe   
Internal   
Medicine;   
Comprehensive   
Internal   
Medicine  
Work Phone:   
[phone]  
   
                                        Comment on above:   PATIENT WAS FASTINGP  
ERFORMED BY: AMOR [facility] [address]   
Road[address] [id number]   
   
                                                    Protein Test strip Ql   
(U)             Negative        Normal                          Comprehensive   
Internal   
Medicine  
Work Phone:   
[phone]  
   
                                                    Specific gravity   
Relative Density (U) 1.009 1             Normal              1.005-1.03  
0                                       Comprehensive   
Internal   
Medicine  
Work Phone:   
[phone]  
   
                                        Comment on above:   PATIENT WAS FASTINGP  
ERFORMED BY: AMOR [facility] [address]   
Road[place] [id number]   
   
                                                    Urobilinogen (U)   
[Mass/Vol]      0.2 mg/dL       Normal          0.2-1.0         Comprehensive   
Internal   
Medicine;   
Comprehensive   
Internal   
Medicine  
Work Phone:   
[phone]  
   
                                        Comment on above:   PATIENT WAS FASTINGP  
ERFORMED BY: AMOR [facility] [address]   
Road[address] [id number]   
   
                                                    Urobilinogen Test   
strip mass conc (U) 0.2 mg/dL       Normal          0.2-1.0         Comprehensiv  
e   
Internal   
Medicine  
Work Phone:   
[phone]  
   
                                        Comment on above:   PATIENT WAS FASTINGP  
ERFORMED BY: Arccos [address]   
[facility] [id number]   
   
                                                    HEMOGLOBIN GLYCLATED (HGB A1  
C) (50269)Ordered By:  on 2015   
   
                                                    Hemoglobin   
A1c/Hemoglobin.total   
mass fraction (Bld) 5.6 %           Normal          4.8-5.6         Comprehensiv  
e   
Internal   
Medicine  
Work Phone:   
[phone]  
   
                                        Comment on above:   . Pre-diabetes: 5.7   
- 6.4 Diabetes: >6.4 Glycemic control for   
adults with diabetes: <7.0   
   
                                                            PATIENT NOT FASTINGP  
ERFORMED BY: Arccos Golf6370 [facility] [id number] Information:   
[id number]   
   
                                                    CBC W/Diff, AutomatedOrdered  
 By:  on 2015   
   
                                                    Basophils/100 WBC   
Auto (Bld)      0.5 %           Normal          0-1             Comprehensive   
Internal   
Medicine  
Work Phone:   
[phone]  
   
                                                    Eosinophils/100 WBC   
Auto (Bld)      2.7 %           Normal          0-5             Comprehensive   
Internal   
Medicine  
Work Phone:   
[phone]  
   
                                                    Erythrocyte   
distribution width   
Auto Ratio (RBC) 12.2 %          Normal          11.6-14.6       Comprehensive   
Internal   
Medicine  
Work Phone:   
[phone]  
   
                                                    Hematocrit Auto   
Volume Fraction (Bld) 46.5 %          Normal          40-54           Comprehens  
bebe   
Internal   
Medicine  
Work Phone:   
[phone]  
   
                                                    Hemoglobin mass conc   
(Bld)           16.6 g/dL       Abnormal        13.0-16.5       Comprehensive   
Internal   
Medicine  
Work Phone:   
[phone]  
   
                                                    Lymphocytes/100 WBC   
Auto (Bld)      36.0 %          Normal          19-41           Comprehensive   
Internal   
Medicine  
Work Phone:   
[phone]  
   
                                                    MCH Auto Entitic mass   
(RBC)           33.0 pg         Abnormal        27.0-32.0       Comprehensive   
Internal   
Medicine  
Work Phone:   
[phone]  
   
                                                    MCHC Auto mass conc   
(RBC)           35.7 {g/gl}     Normal          32-36           Comprehensive   
Internal   
Medicine  
Work Phone:   
[phone]  
   
                                                    MCV Auto Entitic   
volume (RBC)    92.4 fL         Normal          80-94           Comprehensive   
Internal   
Medicine  
Work Phone:   
[phone]  
   
                                                    Monocytes/100 WBC   
Auto (Bld)      9.4 %           Normal          0-10            Comprehensive   
Internal   
Medicine  
Work Phone:   
[phone]  
   
                                                    Neutrophils/100 WBC   
Auto (Bld)      51.1 %          Normal          47-70           Comprehensive   
Internal   
Medicine  
Work Phone:   
[phone]  
   
                                                    Platelet mean volume   
Auto Entitic volume   
(Bld)           10.0 fL         Normal          6.2-12.0        Comprehensive   
Internal   
Medicine  
Work Phone:   
[phone]  
   
                                                    Platelets Auto #/vol   
(Bld)           212 10*3/uL     Normal          150-450         Comprehensive   
Internal   
Medicine  
Work Phone:   
[phone]  
   
                      RBC Auto #/vol (Bld) 5.03 {M/mm3} Normal     4.6-6.2    Co  
[facility]   
Internal   
Medicine  
Work Phone:   
[phone]  
   
                      WBC Auto #/vol (Bld) 7.7 10*3/uL Normal     4.4-11.0   Com  
prehensive   
Internal   
Medicine  
Work Phone:   
[phone]  
   
                      CBC W/Diff, Automated 2.77 {X10_3/ul} Normal     0.83-4.51  
  Comprehensive   
Internal   
Medicine  
Work Phone:   
[phone]  
   
                      CBC W/Diff, Automated 3.9 {X10_3/uL} Normal     2.0-7.7     
 Comprehensive   
Internal   
Medicine  
Work Phone:   
[phone]  
   
                      CBC W/Diff, Automated 0.300 %    Normal     0.0-0.9    Com  
prehensive   
Internal   
Medicine  
Work Phone:   
[phone]  
   
                                        Comment on above:   IG% - Immature Granu  
locytes (promyelocytes, myelocytes   
andmetamyelocytes) > 1% indicates that a LEFT SHIFT is Present.   
   
                      CBC W/Diff, Automated 40.5 fL    Normal     35.1-43.9  Com  
prehensive   
Internal   
Medicine  
Work Phone:   
[phone]  
   
                                                    Comprehensive Metabolic Prof  
ilOrdered By:  on 2015   
   
                                                    Comprehensive   
metabolic 2000 panel 9.1 {RATIO}     Abnormal        10-20           Comprehensi  
ve   
Internal   
Medicine  
Work Phone:   
[phone]  
   
                                                    Comprehensive   
metabolic 2000 panel 3.1 g/dL        Normal          2.3-3.5         Comprehensi  
ve   
Internal   
Medicine  
Work Phone:   
[phone]  
   
                                                    Comprehensive   
metabolic 2000 panel 19 U/L          Normal          15-37           Comprehensi  
ve   
Internal   
Medicine  
Work Phone:   
[phone]  
   
                                                    Comprehensive   
metabolic 2000 panel 5 1             Normal          5-15            Comprehensi  
ve   
Internal   
Medicine  
Work Phone:   
[phone]  
   
                                                    Comprehensive   
metabolic  panel 8.6 mg/dL       Normal          8.5-10.1        Comprehensi  
ve   
Internal   
Medicine  
Work Phone:   
[phone]  
   
                                                    Comprehensive   
metabolic  panel 42 U/L          Normal          12-78           Comprehensi  
ve   
Internal   
Medicine  
Work Phone:   
[phone]  
   
                                                    Comprehensive   
metabolic  panel 28.0 mmol/L     Normal          21.0-32.0       Comprehensi  
ve   
Internal   
Medicine  
Work Phone:   
[phone]  
   
                                                    Comprehensive   
metabolic 2000 panel 1.3 {RATIO}     Normal          0.9-2.4         Comprehensi  
ve   
Internal   
Medicine  
Work Phone:   
[phone]  
   
                                                    Comprehensive   
metabolic  panel 7.1 g/dL        Normal          6.4-8.2         Comprehensi  
ve   
Internal   
Medicine  
Work Phone:   
[phone]  
   
                                                    Comprehensive   
metabolic  panel 107 mmol/L      Normal                    Comprehensi  
ve   
Internal   
Medicine  
Work Phone:   
[phone]  
   
                                                    Comprehensive   
metabolic  panel 4.0 g/dL        Normal          3.4-5.0         Comprehensi  
ve   
Internal   
Medicine  
Work Phone:   
[phone]  
   
                                                    Comprehensive   
metabolic 2000 panel 4.1 mmol/L      Normal          3.5-5.1         Comprehensi  
ve   
Internal   
Medicine  
Work Phone:   
[phone]  
   
                                                    Comprehensive   
metabolic  panel 140 mmol/L      Normal          136-145         Comprehensi  
ve   
Internal   
Medicine  
Work Phone:   
[phone]  
   
                                                    Comprehensive   
metabolic 2000 panel 0.70 mg/dL      Normal          0.20-1.00       Comprehensi  
ve   
Internal   
Medicine  
Work Phone:   
[phone]  
   
                                                    Comprehensive   
metabolic 2000 panel 0.98 mg/dL      Normal          0.70-1.30       Comprehensi  
ve   
Internal   
Medicine  
Work Phone:   
[phone]  
   
                                        Comment on above:   The validity of the   
calculated GFR AND GFRAA in patients   
over70   
years has not been determined. Clinical correlation isessential.   
   
                                                    Comprehensive   
metabolic 2000 panel 103 mL/min      Normal                          Comprehensi  
ve   
Internal   
Medicine  
Work Phone:   
[phone]  
   
                                        Comment on above:    GFR  
 Calc   
   
                                                    Comprehensive   
metabolic  panel 85 mL/min       Normal                          Comprehensi  
ve   
Internal   
Medicine  
Work Phone:   
[phone]  
   
                                        Comment on above:   Non-  
 GFR Calc   
   
                                                    Comprehensive   
metabolic 2000 panel 60 U/L          Normal                    Comprehensi  
ve   
Internal   
Medicine  
Work Phone:   
[phone]  
   
                                                    Comprehensive   
metabolic 2000 panel 130 mg/dL       Abnormal                  Comprehensi  
ve   
Internal   
Medicine  
Work Phone:   
[phone]  
   
                                        Comment on above:   Fasting Glucose resu  
lt greater than or equal to 126   
mg/dLsuggests DIABETES MELLITUS per A.D.A. criteria.   
   
                                                    Comprehensive   
metabolic 2000 panel 9 mg/dL         Normal          7-18            Comprehensi  
ve   
Internal   
Medicine  
Work Phone:   
[phone]  
   
                                                    Lipid ProfileOrdered By: Shannon  
tem Manager on 2015   
   
                                                    Cholesterol in HDL   
mass conc       47 mg/dL        Normal                          Comprehensive   
Internal   
Medicine  
Work Phone:   
[phone]  
   
                                        Comment on above:   Reference Range HDL   
<40 mg/dL Low HDL Cholesterol HDL >or= 60   
mg/dL High HDL Cholesterol   
   
                                                    Cholesterol in LDL   
mass conc       81 mg/dL        Normal          0-130           [facility]   
[facility]   
Medicine  
Work Phone:   
[phone]  
   
                      Cholesterol mass conc 151 mg/dL  Normal                Com  
prehensive   
Internal   
Medicine  
Work Phone:   
[phone]  
   
                                        Comment on above:   <200 mg/dL Desirable  
 200-240 mg/dL Borderline >240 mg/dL High   
Risk   
   
                                                    Triglyceride mass   
conc            115 mg/dL       Normal                          Com[facility]   
Internal   
Medicine  
Work Phone:   
[phone]  
   
                                        Comment on above:   Serum Triglycerides   
Reference Interval Normal <150 mg/dL   
Borderline high 150 - 199 mg/dL High 200 - 499 mg/dL Very High >   
or = 500 mg/dL   
   
                      Lipid Profile 23 mg/dL   Normal     5-40       Comprehensi  
ve   
Internal   
Medicine  
Work Phone:   
[phone]  
   
                                                    MicroalbOrdered By: System M  
anager on 2015   
   
                      Creatinine mass conc 4.1 {mg/g_CRE} Normal                  
Com[facility]   
Internal   
Medicine  
Work Phone:   
[phone]  
   
                      Microalb   163.00 mg/dL Normal                Comprehensiv  
e   
Internal   
Medicine  
Work Phone:   
[phone]  
   
                      Microalb   6.8 mg/L   Normal                Com[facility]   
Internal   
Medicine  
Work Phone:   
[phone]  
   
                                                    PSA,Total - Annual ScreenOrd  
ered By:  on 2015   
   
                                                    Prostate specific Ag   
mass conc       0.63 ng/mL      Normal          0.00-4.00       [facility]   
[facility]   
Medicine  
Work Phone:   
[phone]  
   
                                        Comment on above:   This test was perfor  
med using the TPSA assay method for   
theBtarget chemistry system. Values obtained with   
differentassay methods cannot be used interchangably.When   
changing PSA assays in the course of monitoring apatient,   
additional sequential testing should be carriedout to confirm   
baseline values.   
   
                                                    Thyroid Stim Hormone (TSH)Or  
dered By:  on 2015   
   
                      Thyrotropin Qn 2.14 {uIU/mL} Normal     0.358-3.74 Compreh  
ensive   
Internal   
Medicine  
Work Phone:   
[phone]  
   
                                                    Urinalysis, CompleteOrdered   
By:  on 2015   
   
                                                    RBC Test strip #/vol   
(U)             0 SEEN          Normal          0-5             Com[facility]   
Internal   
Medicine  
Work Phone:   
[phone]  
   
                                                    Urinalysis complete   
panel - Urine   Negative        Normal                          Comprehensive   
Internal   
Medicine  
Work Phone:   
[phone]  
   
                                                    Urinalysis complete   
panel - Urine       1.020 1             Normal              1.002-1.03  
0                                       Com[facility]   
Internal   
Medicine  
Work Phone:   
[phone]  
   
                                                    Urinalysis complete   
panel - Urine   5.0 1           Normal          5.0 - 8.0       Comprehensive   
Internal   
Medicine  
Work Phone:   
[phone]  
   
                                                    Urinalysis complete   
panel - Urine   Normal          Normal                          Com[facility]   
Internal   
Medicine  
Work Phone:   
[phone]  
   
                                                    Urinalysis complete   
panel - Urine   Clear           Normal                          Com[facility]   
Internal   
Medicine  
Work Phone:   
[phone]  
   
                                                    Urinalysis complete   
panel - Urine   0 SEEN          Normal                          Comprehensive   
Internal   
Medicine  
Work Phone:   
[phone]  
   
                                                    Urinalysis complete   
panel - Urine   Yellow          Normal                          Com[facility]   
Internal   
Medicine  
Work Phone:   
[phone]  
   
                                                    Vitamin D,25 HydroxyOrdered   
By:  on 2015   
   
                      Vitamin D,25 Hydroxy 54.7 ng/mL Normal                Comp  
rehensive   
Internal   
Medicine  
Work Phone:   
[phone]  
   
                                        Comment on above:   Vitamin D 25(OH) Sta  
tus Range Deficiency <20 ng/mL (50nmol/L)   
Insuffciency 20 - 30 ng/mL (50 - 75 nmol/L) Sufficiency 30 - 100   
ng/mL (75 - 250 nmol/L) Toxicity >100 ng/mL (>250 nmol/L)   
   
                                                    Blood Glucose , Office (8296  
2)Ordered By: Verena Tuttle on 2015   
   
                                                    Glucose Glucometer   
molar conc (BldC) 127 1           Normal                          Com[facility]   
Internal   
Medicine  
Work Phone:   
[phone]  
   
                                                    HgA1C , Office (30337)Ordere  
d By: Verena Tuttle on 2015   
   
                                                    Hemoglobin   
A1c/Hemoglobin.total   
mass fraction (Bld) 6.1 %           Normal          4.6 - 7.1       Comprehensiv  
e   
Internal   
Medicine  
Work Phone:   
[phone]  
   
                                                    CBC With Differential/Platel  
etOrdered By:  on 2015   
   
                                                    Basophils Auto #/vol   
(Bld)           0.0 {x10E3/uL}  Normal          0.0-0.2         Comprehensive   
Internal   
Medicine  
Work Phone:   
[phone]  
   
                                                    Basophils/100 WBC   
Auto (Bld)      0 %             Normal                          Comprehensive   
Internal   
Medicine  
Work Phone:   
[phone]  
   
                                                    Eosinophils Auto   
#/vol (Bld)     0.1 {x10E3/uL}  Normal          0.0-0.4         Com[facility]   
Internal   
Medicine  
Work Phone:   
[phone]  
   
                                                    Eosinophils/100 WBC   
Auto (Bld)      1 %             Normal                          Com[facility]   
Internal   
Medicine  
Work Phone:   
[phone]  
   
                                                    Erythrocyte   
distribution width   
Auto Ratio (RBC) 13.6 %          Normal          12.3-15.4       Comprehensive   
Internal   
Medicine  
Work Phone:   
[phone]  
   
                                                    Hematocrit Auto   
Volume Fraction (Bld) 48.8 %          Normal          37.5-51.0       Comprehens  
bebe   
Internal   
Medicine  
Work Phone:   
[phone]  
   
                                                    Hemoglobin mass conc   
(Bld)           16.9 g/dL       Normal          12.6-17.7       Com[facility]   
Internal   
Medicine  
Work Phone:   
[phone]  
   
                                                    Immature granulocytes   
#/vol (Bld)     0.0 {x10E3/uL}  Normal          0.0-0.1         Com[facility]   
Internal   
Medicine  
Work Phone:   
[phone]  
   
                                                    Immature   
granulocytes/100 WBC   
(Bld)           0 %             Normal                          Com[facility]   
Internal   
Medicine  
Work Phone:   
[phone]  
   
                                                    Lymphocytes Auto   
#/vol (Bld)     1.4 {x10E3/uL}  Normal          0.7-3.1         [facility]   
Internal   
Medicine  
Work Phone:   
[phone]  
   
                                                    Lymphocytes/100 WBC   
Auto (Bld)      17 %            Normal                          Com[facility]   
Internal   
Medicine  
Work Phone:   
[phone]  
   
                                                    MCH Auto Entitic mass   
(RBC)           32.3 pg         Normal          26.6-33.0       [facility]   
Internal   
Medicine  
Work Phone:   
[phone]  
   
                                                    MCHC Auto mass conc   
(RBC)           34.6 g/dL       Normal          31.5-35.7       [facility]   
Internal   
Medicine  
Work Phone:   
[phone]  
   
                                                    MCV Auto Entitic   
volume (RBC)    93 fL           Normal          79-97           Com[facility]   
Internal   
Medicine  
Work Phone:   
[phone]  
   
                                                    Monocytes Auto #/vol   
(Bld)           1.2 {x10E3/uL}  Abnormal        0.1-0.9         [facility]   
Internal   
Medicine  
Work Phone:   
[phone]  
   
                                                    Monocytes/100 WBC   
Auto (Bld)      14 %            Normal                          Com[facility]   
Internal   
Medicine  
Work Phone:   
[phone]  
   
                                                    Neutrophils Auto   
#/vol (Bld)     5.9 {x10E3/uL}  Normal          1.4-7.0         [facility]   
Internal   
Medicine  
Work Phone:   
[phone]  
   
                                                    Neutrophils/100 WBC   
Auto (Bld)      68 %            Normal                          Com[facility]   
Internal   
Medicine  
Work Phone:   
[phone]  
   
                                                    Platelets Auto #/vol   
(Bld)           203 {x10E3/uL}  Normal          150-379         Com[facility]   
Internal   
Medicine  
Work Phone:   
[phone]  
   
                      RBC Auto #/vol (Bld) 5.24 {x10E6/uL} Normal     4.14-5.80   
 Comprehensive   
Internal   
Medicine  
Work Phone:   
[phone]  
   
                      WBC Auto #/vol (Bld) 8.6 {x10E3/uL} Normal     3.4-10.8     
Comprehensive   
Internal   
Medicine  
Work Phone:   
[phone]  
   
                                                    Comp. Metabolic Panel (14)Or  
dered By:  on 2015   
   
                      Albumin mass conc 4.7 g/dL   Normal     3.5-5.5    Compreh  
[facility]   
Internal   
Medicine  
Work Phone:   
[phone]  
   
                                                    Albumin/Globulin mass   
ratio           2.0 {ratio}     Normal          1.1-2.5         Comprehensive   
Internal   
Medicine  
Work Phone:   
[phone]  
   
                      ALP enzyme act/vol 58 [iU]/L  Normal          Compre  
[facility]   
Internal   
Medicine  
Work Phone:   
[phone]  
   
                      ALT enzyme act/vol 29 [iU]/L  Normal     0-44       [facility]   
[facility]  
Work Phone:   
[phone]  
   
                      AST enzyme act/vol 24 [iU]/L  Normal     0-40       [facility]   
Internal   
Medicine  
Work Phone:   
[phone]  
   
                      Bilirubin mass conc 0.7 mg/dL  Normal     0.0-1.2    Compr  
ehensive   
Internal   
Medicine  
Work Phone:   
[phone]  
   
                      Calcium mass conc 9.7 mg/dL  Normal     8.7-10.2   Compreh  
ensive   
Internal   
Medicine  
Work Phone:   
[phone]  
   
                      Chloride molar conc 98 mmol/L  Normal          Compr  
ehensive   
Internal   
Medicine  
Work Phone:   
[phone]  
   
                      CO2 molar conc 24 mmol/L  Normal     18-29      Comprehens  
bebe   
Internal   
Medicine  
Work Phone:   
[phone]  
   
                      Creatinine mass conc 0.89 mg/dL Normal     0.76-1.27  Comp  
rehensive   
Internal   
Medicine  
Work Phone:   
[phone]  
   
                                                    GFR/1.73 sq M   
predicted among   
blacks CKD-EPI vol   
rate/area (S/P/Bld) 114 mL/min/1.73 Normal                          Comprehensiv  
e   
Internal   
Medicine  
Work Phone:   
[phone]  
   
                                                    GFR/1.73 sq M   
predicted among   
non-blacks CKD-EPI   
vol rate/area   
(S/P/Bld)       99 mL/min/1.73  Normal                          Comprehensive   
Internal   
Medicine  
Work Phone:   
[phone]  
   
                                                    Globulin Calculated   
mass conc (S)   2.4 g/dL        Normal          1.5-4.5         Comprehensive   
Internal   
Medicine  
Work Phone:   
[phone]  
   
                      Glucose mass conc 116 mg/dL  Abnormal   65-99      Compreh  
ensive   
Internal   
Medicine  
Work Phone:   
[phone]  
   
                      Potassium molar conc 4.7 mmol/L Normal     3.5-5.2    Comp  
rehensive   
Internal   
Medicine  
Work Phone:   
[phone]  
   
                      Protein mass conc 7.1 g/dL   Normal     6.0-8.5    Compreh  
ensive   
Internal   
Medicine  
Work Phone:   
[phone]  
   
                      Sodium molar conc 139 mmol/L Normal     134-144    Compreh  
ensive   
Internal   
Medicine  
Work Phone:   
[phone]  
   
                                                    Urea nitrogen mass   
conc            9 mg/dL         Normal          6-24            Comprehensive   
Internal   
Medicine  
Work Phone:   
[phone]  
   
                                                    Urea   
nitrogen/Creatinine   
mass ratio      10 mg/mg        Normal          9-20            Comprehensive   
Internal   
Medicine  
Work Phone:   
[phone]  
   
                                                    Hemoglobin M7hSbmldla By: Sy  
stem Manager on 2015   
   
                                                    Hemoglobin   
A1c/Hemoglobin.total   
mass fraction (Bld) 5.7 %           Abnormal        4.8-5.6         Comprehensiv  
e   
Internal   
Medicine  
Work Phone:   
[phone]  
   
                                        Comment on above:   . Increased risk for  
 diabetes: 5.7 - 6.4 Diabetes: >6.4   
Glycemic   
control for adults with diabetes: <7.0   
   
                                                    Lipid Panel With LDL/HDL Rat  
ioOrdered By:  on 2015   
   
                                                    Cholesterol in HDL   
mass conc       51 mg/dL        Normal                          Comprehensive   
Internal   
Medicine  
Work Phone:   
[phone]  
   
                                        Comment on above:   According to ATP-III  
 Guidelines, HDL-C >59 mg/dL is considered  
   
anegative risk factor for CHD.   
   
                                                    Cholesterol in LDL   
mass conc       119 mg/dL       Abnormal        0-99            Comprehensive   
Internal   
Medicine  
Work Phone:   
[phone]  
   
                                                    Cholesterol in   
LDL/Cholesterol in   
HDL mass ratio  2.3 {ratio_units} Normal          0.0-3.6         Comprehensive   
Internal   
Medicine  
Work Phone:   
[phone]  
   
                                        Comment on above:   LDL/HDL Ratio Men Wo  
men 1/2 Avg.Risk 1.0 1.5 Avg.Risk 3.6 3.2   
2X   
Avg.Risk 6.2 5.0 3X Avg.Risk 8.0 6.1   
   
                                                    Cholesterol in VLDL   
mass conc       22 mg/dL        Normal          5-40            Comprehensive   
Internal   
Medicine  
Work Phone:   
[phone]  
   
                      Cholesterol mass conc 192 mg/dL  Normal     100-199    Com  
prehensive   
Internal   
Medicine  
Work Phone:   
[phone]  
   
                                                    Triglyceride mass   
conc            111 mg/dL       Normal          0-149           Comprehensive   
Internal   
Medicine  
Work Phone:   
[phone]  
   
                                                    Microalb/Creat Ratio, Randm   
UrOrdered By:  on 2015   
   
                                                    Albumin DL <= 20 mg/L   
mass conc (U)   4.0 ug/mL       Normal          0.0-17.0        Comprehensive   
Internal   
Medicine  
Work Phone:   
[phone]  
   
                                                    Albumin/Creatinine   
mass ratio (U)  4.2 {mg/g_creat} Normal          0.0-30.0        Comprehensive   
Internal   
Medicine  
Work Phone:   
[phone]  
   
                                                    Creatinine mass conc   
(U)             95.4 mg/dL      Normal          22.0-328.0      Comprehensive   
Internal   
Medicine  
Work Phone:   
[phone]  
   
                                                    Microscopic ExaminationOrder  
ed By:  on 2015   
   
                                                    Bacteria LM.HPF   
#/area (Urine sed) None seen       Normal                          Comprehensive  
   
Internal   
Medicine  
Work Phone:   
[phone]  
   
                                                    Epithelial cells   
LM.HPF #/area (Urine   
sed)            None seen       Normal          0 - 10          Comprehensive   
Internal   
Medicine  
Work Phone:   
[phone]  
   
                                                    Mucus LM Ql (Urine   
sed)            Present         Normal                          Comprehensive   
Internal   
Medicine  
Work Phone:   
[phone]  
   
                                                    RBC LM.HPF #/area   
(Urine sed)     0-2             Normal          0 - 2           Comprehensive   
Internal   
Medicine  
Work Phone:   
[phone]  
   
                                                    WBC LM.HPF #/area   
(Urine sed)     0-5             Normal          0 - 5           Comprehensive   
Internal   
Medicine  
Work Phone:   
[phone]  
   
                                                    TSHOrdered By: System Manage  
r on 2015   
   
                          Thyrotropin Qn 2.080 {uIU/mL} Normal       0.450-4.50  
0                                       Comprehensive   
Internal   
Medicine  
Work Phone:   
[phone]  
   
                                                    Urinalysis, CompleteOrdered   
By:  on 2015   
   
                      Appearance Nom (U) Clear      Normal                Compre  
hensive   
Internal   
Medicine  
Work Phone:   
[phone]  
   
                      Bilirubin Ql (U) Negative   Normal                Comprehe  
nsive   
Internal   
Medicine  
Work Phone:   
[phone]  
   
                      Color Nom (U) Yellow     Normal                Comprehensi  
ve   
Internal   
Medicine  
Work Phone:   
[phone]  
   
                      Glucose Ql (U) Negative   Normal                Comprehens  
bebe   
Internal   
Medicine  
Work Phone:   
[phone]  
   
                                                    Hemoglobin Test strip   
Ql (U)          Negative        Normal                          Comprehensive   
Internal   
Medicine  
Work Phone:   
[phone]  
   
                      Ketones Ql (U) Trace      Abnormal              Comprehens  
bebe   
Internal   
Medicine  
Work Phone:   
[phone]  
   
                                                    Leukocyte esterase   
Test strip Ql (U) Negative        Normal                          Comprehensive   
Internal   
Medicine  
Work Phone:   
[phone]  
   
                                                    Microscopic   
observation LM Nom   
(Urine sed)     MICRON          Normal                          Comprehensive   
Internal   
Medicine  
Work Phone:   
[phone]  
   
                                        Comment on above:   Microscopic follows   
if indicated.   
   
                                                    Microscopic   
observation LM Nom   
(Urine sed)     See below:      Normal                          Comprehensive   
Internal   
Medicine  
Work Phone:   
[phone]  
   
                                        Comment on above:   Microscopic was benny  
cated and was performed.   
   
                                                    Nitrite Test strip Ql   
(U)             Negative        Normal                          Comprehensive   
Internal   
Medicine  
Work Phone:   
[phone]  
   
                      pH Test strip (U) 7.5 [pH]   Normal     5.0-7.5    Compreh  
ensive   
Internal   
Medicine  
Work Phone:   
[phone]  
   
                                                    Protein Test strip Ql   
(U)             Negative        Normal                          Comprehensive   
Internal   
Medicine  
Work Phone:   
[phone]  
   
                                                    Specific gravity   
Relative Density (U) 1.015 1             Normal              1.005-1.03  
0                                       Comprehensive   
Internal   
Medicine  
Work Phone:   
[phone]  
   
                                                    Urobilinogen Test   
strip mass conc (U) 1.0 mg/dL       Normal          0.0-1.9         Comprehensiv  
e   
Internal   
Medicine  
Work Phone:   
[phone]  
   
                                                    Vitamin D, 25-HydroxyOrdered  
 By:  on 2015   
   
                                                    25-Hydroxyvitamin   
D2+25-Hydroxyvitamin   
D3 mass conc    14.4 ng/mL      Abnormal        30.0-100.0      Comprehensive   
Internal   
Medicine  
Work Phone:   
[phone]  
   
                                        Comment on above:   Vitamin D deficiency  
 has been defined by the [place]   
of[facility] and an Endocrine Society practice guideline as alevel   
of serum 25-OH vitamin D less than 20 ng/mL (1,2).The Endocrine   
Society went on to further define vitamin Dinsufficiency as a   
level between 21 and 29 ng/mL (2).1. IOM ([place] of   
Medicine). 2010. Dietary reference intakes for calcium and D.   
[place]: The National Academies Press.2. Kodi MF,   
Phillip NC, Archana SAMS, et al. Evaluation, treatment,   
and prevention of vitamin D deficiency: an Endocrine Society   
clinical practice guideline. JCEM. 2011; 96(7):1911-30.   
   
                                                    Blood Glucose , Office (9996  
2)Ordered By: Catina De Jesus on 2014   
   
                                                    Glucose Glucometer   
molar conc (BldC) 125 1           Normal                          Comprehensive   
Internal   
Medicine  
Work Phone:   
[phone]  
   
                                                    HgA1C , Office (52931)Ordere  
d By: Lashell Valdez on 2014   
   
                                                    Hemoglobin   
A1c/Hemoglobin.total   
mass fraction (Bld) 5.6 %           Normal          4.6 - 7.1       Comprehensiv  
e   
Internal   
Medicine  
Work Phone:   
[phone]  
   
                                                    CBC WITH MANUAL DIFF (62521)  
Ordered By:  on 2014   
   
                                                    Basophils (Bld)   
[#/Vol]         0.0 {x10E3/uL}  Normal          0.0-0.2         Comprehensive   
Internal   
Medicine  
Work Phone:   
[phone]  
   
                                        Comment on above:   PATIENT WAS FASTINGP  
ERFORMED BY:  [facility] [facility] [id number] Information:   
[id number]   
   
                                                    Basophils (Bld)   
[#/Vol]         0.0 10*3/uL     Normal          0.0-0.2         Comprehensive   
Internal   
Medicine;   
Comprehensive   
Internal   
Medicine  
Work Phone:   
[phone]  
   
                                        Comment on above:   PATIENT WAS FASTINGP  
ERFORMED BY: [address] [id number] Information:   
[id number]   
   
                                                    Basophils Auto #/vol   
(Bld)           0.0 {x10E3/uL}  Normal          0.0-0.2         [facility]   
[facility]   
Medicine  
Work Phone:   
[phone]  
   
                                                    Basophils/100 WBC   
(Bld)           0 %             Normal                          [facility]   
[facility]   
Medicine  
Work Phone:   
[phone]  
   
                                        Comment on above:   PATIENT WAS FASTINGP  
ERFORMED BY: [address] [id number] Information:   
[id number]   
   
                                                    Basophils/100 WBC   
Auto (Bld)      0 %             Normal                          [facility]   
[facility]   
Medicine  
Work Phone:   
[phone]  
   
                                                    Eosinophils (Bld)   
[#/Vol]         0.1 {x10E3/uL}  Normal          0.0-0.4         [facility]   
[facility]   
Medicine  
Work Phone:   
[phone]  
   
                                        Comment on above:   PATIENT WAS FASTINGP  
ERFORMED BY: [address] [id number] Information:   
[id number]   
   
                                                    Eosinophils (Bld)   
[#/Vol]         0.1 10*3/uL     Normal          0.0-0.4         [facility]   
[facility]   
Medicine;   
[facility]   
[facility]   
Medicine  
Work Phone:   
[phone]  
   
                                        Comment on above:   PATIENT WAS FASTINGP  
ERFORMED BY: [address] [id number] Information:   
[id number]   
   
                                                    Eosinophils Auto   
#/vol (Bld)     0.1 {x10E3/uL}  Normal          0.0-0.4         [facility]   
[facility]   
Medicine  
Work Phone:   
[phone]  
   
                                                    Eosinophils/100 WBC   
(Bld)           1 %             Normal                          [facility]   
Internal   
Medicine  
Work Phone:   
[phone]  
   
                                        Comment on above:   PATIENT WAS FASTINGP  
ERFORMED BY: [facility] [id number] Information:   
[id number]   
   
                                                    Eosinophils/100 WBC   
Auto (Bld)      1 %             Normal                          [facility]   
[facility]   
Medicine  
Work Phone:   
[phone]  
   
                                                    Erythrocyte   
distribution width   
(RBC) [Ratio]   13.0 %          Normal          12.3-15.4       [facility]   
[facility]   
Medicine  
Work Phone:   
[phone]  
   
                                        Comment on above:   PATIENT WAS FASTINGP  
ERFORMED BY: [address] [id number] Information:   
[id number]   
   
                                                    Erythrocyte   
distribution width   
Auto Ratio (RBC) 13.0 %          Normal          12.3-15.4       [facility]   
[facility]   
Medicine  
Work Phone:   
[phone]  
   
                                                    Hematocrit (Bld)   
[Volume fraction] 48.6 %          Normal          37.5-51.0       [facility]   
[facility]   
Medicine  
Work Phone:   
[phone]  
   
                                        Comment on above:   PATIENT WAS FASTINGP  
ERFORMED BY: [address] [id number] Information:   
[id number]   
   
                                                    Hematocrit Auto   
Volume Fraction (Bld) 48.6 %          Normal          37.5-51.0       [facility]   
Internal   
Medicine  
Work Phone:   
[phone]  
   
                                                    Hemoglobin mass conc   
(Bld)           16.7 g/dL       Normal          12.6-17.7       [facility]   
Internal   
Medicine  
Work Phone:   
[phone]  
   
                                        Comment on above:   PATIENT WAS FASTINGP  
ERFORMED BY: [place] [address] [facility] [id number] Information:   
[id number]   
   
                                                    Immature granulocytes   
#/vol (Bld)     0.0 {x10E3/uL}  Normal          0.0-0.1         [facility]   
[facility]   
Medicine  
Work Phone:   
[phone]  
   
                                        Comment on above:   PATIENT WAS FASTINGP  
ERFORMED BY: [place] [address] [facility] [id number] Information:   
[id number]   
   
                                                    Immature granulocytes   
(Bld) [#/Vol]   0.0 10*3/uL     Normal          0.0-0.1         [facility]   
Internal   
Medicine;   
[facility]   
Internal   
Medicine  
Work Phone:   
[phone]  
   
                                        Comment on above:   PATIENT WAS FASTINGP  
ERFORMED BY: [facility] Lqyaza2423 [facility] [id number] Information:   
[id number]   
   
                                                    Immature   
granulocytes/100 WBC   
(Bld)           0 %             Normal                          [facility]   
Internal   
Medicine  
Work Phone:   
[phone]  
   
                                        Comment on above:   PATIENT WAS FASTINGP  
ERFORMED BY: [address] [id number] Information:   
[id number]   
   
                                                    Lymphocytes (Bld)   
[#/Vol]         3.6 {x10E3/uL}  Abnormal        0.7-3.1         [facility]   
Internal   
Medicine  
Work Phone:   
[phone]  
   
                                        Comment on above:   PATIENT WAS FASTINGP  
ERFORMED BY: [place] [address] [facility] [id number] Information:   
[id number]   
   
                                                    Lymphocytes (Bld)   
[#/Vol]         3.6 10*3/uL     Abnormal        0.7-3.1         [facility]   
Internal   
Medicine;   
[facility]   
Internal   
Medicine  
Work Phone:   
[phone]  
   
                                        Comment on above:   PATIENT WAS FASTINGP  
ERFORMED BY:  LabSusan [place] [address] [facility] [id number] Information:   
[id number]   
   
                                                    Lymphocytes Auto   
#/vol (Bld)     3.6 {x10E3/uL}  Abnormal        0.7-3.1         [facility]   
Internal   
Medicine  
Work Phone:   
[phone]  
   
                                                    Lymphocytes/100 WBC   
(Bld)           41 %            Normal                          [facility]   
Internal   
Medicine  
Work Phone:   
[phone]  
   
                                        Comment on above:   PATIENT WAS FASTINGP  
ERFORMED BY: [place] [address] [facility] [id number] Information:   
[id number]   
   
                                                    Lymphocytes/100 WBC   
Auto (Bld)      41 %            Normal                          [facility]   
Internal   
Medicine  
Work Phone:   
[phone]  
   
                                                    MCH (RBC) [Entitic   
mass]           32.1 pg         Normal          26.6-33.0       [facility]   
Internal   
Medicine  
Work Phone:   
[phone]  
   
                                        Comment on above:   PATIENT WAS FASTINGP  
ERFORMED BY: [facility]6[address] [facility] [id number] Information:   
[id number]   
   
                                                    MCH Auto Entitic mass   
(RBC)           32.1 pg         Normal          26.6-33.0       [facility]   
Internal   
Medicine  
Work Phone:   
[phone]  
   
                      MCHC (RBC) [Mass/Vol] 34.4 g/dL  Normal     31.5-35.7  Com  
prehensive   
Internal   
Medicine  
Work Phone:   
[phone]  
   
                                        Comment on above:   PATIENT WAS FASTINGP  
ERFORMED BY: [address] [id number] Information:   
[id number]   
   
                                                    MCHC Auto mass conc   
(RBC)           34.4 g/dL       Normal          31.5-35.7       [facility]   
Internal   
Medicine  
Work Phone:   
[phone]  
   
                                                    MCV (RBC) [Entitic   
vol]            94 fL           Normal          79-97           [facility]   
Internal   
Medicine  
Work Phone:   
[phone]  
   
                                        Comment on above:   PATIENT WAS FASTINGP  
ERFORMED BY: [place] [address] [facility] [id number] Information:   
[id number]   
   
                                                    MCV Auto Entitic   
volume (RBC)    94 fL           Normal          79-97           [facility]   
Internal   
Medicine  
Work Phone:   
[phone]  
   
                                                    Monocytes (Bld)   
[#/Vol]         0.7 {x10E3/uL}  Normal          0.1-0.9         [facility]   
Internal   
Medicine  
Work Phone:   
[phone]  
   
                                        Comment on above:   PATIENT WAS FASTINGP  
ERFORMED BY: [place] [address] [facility] [id number] Information:   
[id number]   
   
                                                    Monocytes (Bld)   
[#/Vol]         0.7 10*3/uL     Normal          0.1-0.9         [facility]   
Internal   
Medicine;   
[facility]   
Internal   
Medicine  
Work Phone:   
[phone]  
   
                                        Comment on above:   PATIENT WAS FASTINGP  
ERFORMED BY: [place] [address] [facility] [id number] Information:   
[id number]   
   
                                                    Monocytes Auto #/vol   
(Bld)           0.7 {x10E3/uL}  Normal          0.1-0.9         [facility]   
Internal   
Medicine  
Work Phone:   
[phone]  
   
                                                    Monocytes/100 WBC   
(Bld)           8 %             Normal                          [facility]   
Internal   
Medicine  
Work Phone:   
[phone]  
   
                                        Comment on above:   PATIENT WAS FASTINGP  
ERFORMED BY: AMOR [place] [id number] [facility] [id number] Information:   
[id number]   
   
                                                    Monocytes/100 WBC   
Auto (Bld)      8 %             Normal                          [facility]   
Internal   
Medicine  
Work Phone:   
[phone]  
   
                                                    Neutrophils (Bld)   
[#/Vol]         4.3 {x10E3/uL}  Normal          1.4-7.0         [facility]   
Internal   
Medicine  
Work Phone:   
[phone]  
   
                                        Comment on above:   PATIENT WAS FASTINGP  
ERFORMED BY: AMOR [employer] Slfroo2874 [facility] [id number] Information:   
[id number]   
   
                                                    Neutrophils (Bld)   
[#/Vol]         4.3 10*3/uL     Normal          1.4-7.0         [facility]   
Internal   
Medicine;   
[facility]   
Internal   
Medicine  
Work Phone:   
[phone]  
   
                                        Comment on above:   PATIENT WAS FASTINGP  
ERFORMED BY: [place] [address] [facility] [id number] Information:   
[id number]   
   
                                                    Neutrophils Auto   
#/vol (Bld)     4.3 {x10E3/uL}  Normal          1.4-7.0         [facility]   
Internal   
Medicine  
Work Phone:   
[phone]  
   
                                                    Neutrophils/100 WBC   
(Bld)           50 %            Normal                          [facility]   
Internal   
Medicine  
Work Phone:   
[phone]  
   
                                        Comment on above:   PATIENT WAS FASTINGP  
ERFORMED BY: [place] [address] [facility] [id number] Information:   
[id number]   
   
                                                    Neutrophils/100 WBC   
Auto (Bld)      50 %            Normal                          [facility]   
Internal   
Medicine  
Work Phone:   
[phone]  
   
                                                    Platelets (Bld)   
[#/Vol]         209 {x10E3/uL}  Normal          150-379         [facility]   
Internal   
Medicine  
Work Phone:   
[phone]  
   
                                        Comment on above:   PATIENT WAS FASTINGP  
ERFORMED BY: [place] [address] [facility] [id number] Information:   
[id number]   
   
                                                    Platelets (Bld)   
[#/Vol]         209 10*3/uL     Normal          150-379         [facility]   
Internal   
Medicine;   
[facility]   
Internal   
Medicine  
Work Phone:   
[phone]  
   
                                        Comment on above:   PATIENT WAS FASTINGP  
ERFORMED BY: AMOR Jason6370 [facility] [id number] Information:   
[id number]   
   
                                                    Platelets Auto #/vol   
(Bld)           209 {x10E3/uL}  Normal          150-379         [facility]   
Internal   
Medicine  
Work Phone:   
[phone]  
   
                      RBC (Bld) [#/Vol] 5.20 {x10E6/uL} Normal     4.14-5.80  [facility]   
Internal   
Medicine  
Work Phone:   
[phone]  
   
                                        Comment on above:   PATIENT WAS FASTINGP  
ERFORMED BY: AMOR Mullerlin6370 [facility] [id number] Information:   
[id number]   
   
                      RBC (Bld) [#/Vol] 5.20 10*6/uL Normal     4.14-5.80  [facility]   
Internal   
Medicine;   
Com[facility]   
Internal   
Medicine  
Work Phone:   
[phone]  
   
                                        Comment on above:   PATIENT WAS FASTINGP  
ERFORMED BY: AMOR Jason6370 [facility] [id number] Information:   
[id number]   
   
                      RBC Auto #/vol (Bld) 5.20 {x10E6/uL} Normal     4.14-5.80   
 Comprehensive   
Internal   
Medicine  
Work Phone:   
[phone]  
   
                      WBC (Bld) [#/Vol] 8.8 {x10E3/uL} Normal     3.4-10.8   Com  
prehensive   
Internal   
Medicine  
Work Phone:   
[phone]  
   
                                        Comment on above:   PATIENT WAS FASTINGP  
ERFORMED BY: AMOR Mullerlin6[address] [facility] [id number] Information:   
[id number]   
   
                      WBC (Bld) [#/Vol] 8.8 10*3/uL Normal     3.4-10.8   [facility]   
Internal   
Medicine;   
Comprehensive   
Internal   
Medicine  
Work Phone:   
[phone]  
   
                                        Comment on above:   PATIENT WAS FASTINGP  
ERFORMED BY: AMOR Ilene Jason6370 [facility] [id number] Information:   
[id number]   
   
                      WBC Auto #/vol (Bld) 8.8 {x10E3/uL} Normal     3.4-10.8     
[facility]   
Internal   
Medicine  
Work Phone:   
[phone]  
   
                                                    LIPID PANEL (73857)Ordered B  
y:  on 2014   
   
                                                    Cholesterol in HDL   
mass conc       50 mg/dL        Normal                          [facility]   
Internal   
Medicine  
Work Phone:   
[phone]  
   
                                        Comment on above:   According to ATP-III  
 Guidelines, HDL-C >59 mg/dL is considered  
   
anegative risk factor for CHD.   
   
                                                            PATIENT WAS FASTINGP  
ERFORMED BY: AMOR [facility] Vkwtrv6244 [facility] [id number]   
   
                                                    Cholesterol in LDL   
mass conc       90 mg/dL        Normal          0-99            [facility]   
Internal   
Medicine  
Work Phone:   
[phone]  
   
                                        Comment on above:   PATIENT WAS FASTINGP  
ERFORMED BY: AMOR Mullerlin6370 [facility] [id number]   
   
                                                    Cholesterol in   
LDL/Cholesterol in   
HDL mass ratio  1.8 {ratio_units} Normal          0.0-3.6         [facility]   
Internal   
Medicine  
Work Phone:   
[phone]  
   
                                        Comment on above:   LDL/HDL Ratio Men Wo  
men 1/2 Avg.Risk 1.0 1.5 Avg.Risk 3.6 3.2   
2X   
Avg.Risk 6.2 5.0 3X Avg.Risk 8.0 6.1   
   
                                                            PATIENT WAS FASTINGP  
ERFORMED BY: AMOR Mullerlin6370 [facility] [id number]   
   
                                                    Cholesterol in VLDL   
mass conc       35 mg/dL        Normal          5-40            [facility]   
Internal   
Medicine  
Work Phone:   
[phone]  
   
                                        Comment on above:   PATIENT WAS FASTINGP  
ERFORMED BY: AMOR [facility] Ncxwhq5148 [facility] [id number]   
   
                      Cholesterol mass conc 175 mg/dL  Normal     100-199    Com  
prehensive   
Internal   
Medicine  
Work Phone:   
[phone]  
   
                                        Comment on above:   PATIENT WAS FASTINGP  
ERFORMED BY: AMOR [facility] MullerXnizgi3497 [facility] [id number]   
   
                                                    Triglyceride mass   
conc            176 mg/dL       Abnormal        0-149           [facility]   
Internal   
Medicine  
Work Phone:   
[phone]  
   
                                        Comment on above:   PATIENT WAS FASTINGP  
ERFORMED BY: AMOR Sangeetasaurabh [address]   
[facility] [place] [id number]   
   
                                                    METABOLIC PANEL, COMPREHENSI  
VE (61105)Ordered By:  on 2014   
   
                      Albumin mass conc 4.9 g/dL   Normal     3.5-5.5    Compreh  
[facility]   
Internal   
Medicine  
Work Phone:   
[phone]  
   
                                        Comment on above:   PATIENT WAS FASTINGP  
ERFORMED BY: AMOR LabCosaurabh MullerWfndvj[address]   
[facility] [id number]   
   
                                                    Albumin/Globulin mass   
ratio           2.1 {ratio}     Normal          1.1-2.5         [facility]   
Internal   
Medicine  
Work Phone:   
[phone]  
   
                                        Comment on above:   PATIENT WAS FASTINGP  
ERFORMED BY: AMOR Mullerlin6370 [facility] [id number]   
   
                                                    ALP [Catalytic   
activity/Vol]   57 U/L          Normal                    Comprehensive   
Internal   
Medicine;   
Comprehensive   
Internal   
Medicine  
Work Phone:   
[phone]  
   
                                        Comment on above:   PATIENT WAS FASTINGP  
ERFORMED BY: AMOR [facility] MullerFueyen[address]   
[facility] [place] [id number]   
   
                      ALP enzyme act/vol 57 [iU]/L  Normal          [facility]   
Internal   
Medicine  
Work Phone:   
[phone]  
   
                                        Comment on above:   PATIENT WAS FASTINGP  
ERFORMED BY: AMOR [facility] [id number]   
[facility] [place] [id number]   
   
                                                    ALT [Catalytic   
activity/Vol]   35 U/L          Normal          0-44            Com[facility]   
Internal   
Medicine;   
Comprehensive   
Internal   
Medicine  
Work Phone:   
[phone]  
   
                                        Comment on above:   PATIENT WAS FASTINGP  
ERFORMED BY: AMOR [facility] [address]if[address]   
[facility] [place] [id number]   
   
                      ALT enzyme act/vol 35 [iU]/L  Normal     0-44       [facility]   
Internal   
Medicine  
Work Phone:   
[phone]  
   
                                        Comment on above:   PATIENT WAS FASTINGP  
ERFORMED BY: AMOR [facility] [address]   
[facility] [place] [id number]   
   
                                                    AST [Catalytic   
activity/Vol]   35 U/L          Normal          0-40            Comprehensive   
Internal   
Medicine;   
Comprehensive   
Internal   
Medicine  
Work Phone:   
[phone]  
   
                                        Comment on above:   PATIENT WAS FASTINGP  
ERFORMED BY: AMOR [facility] Jason[address]   
[facility] [id number]   
   
                      AST enzyme act/vol 35 [iU]/L  Normal     0-40       [facility]pre  
[facility]   
Internal   
Medicine  
Work Phone:   
[phone]  
   
                                        Comment on above:   PATIENT WAS FASTINGP  
ERFORMED BY:  [facility] [address]   
[facility] [id number]   
   
                      Bilirubin mass conc 0.6 mg/dL  Normal     0.0-1.2    Compr  
ehensive   
Internal   
Medicine  
Work Phone:   
[phone]  
   
                                        Comment on above:   PATIENT WAS FASTINGP  
ERFORMED BY: AMOR [place] Pmsjyo9551 [facility] [id number]   
   
                      Calcium mass conc 10.0 mg/dL Normal     8.7-10.2   Compreh  
ensive   
Internal   
Medicine  
Work Phone:   
[phone]  
   
                                        Comment on above:   PATIENT WAS FASTINGP  
ERFORMED BY:  [facility] Goikmn9746 [facility] [id number]   
   
                      Chloride molar conc 97 mmol/L  Normal          Compr  
[facility]   
Internal   
Medicine  
Work Phone:   
[phone]  
   
                                        Comment on above:   PATIENT WAS FASTINGP  
ERFORMED BY:  Sangeeta Outurd6436 [facility] [id number]   
   
                      CO2 molar conc 23 mmol/L  Normal     18-29      Comprehens  
bebe   
Internal   
Medicine  
Work Phone:   
[phone]  
   
                                        Comment on above:   PATIENT WAS FASTINGP  
ERFORMED BY:  [facility]6370 [facility] [id number]   
   
                      Creatinine mass conc 0.96 mg/dL Normal     0.76-1.27  Comp  
[facility]   
Internal   
Medicine  
Work Phone:   
[phone]  
   
                                        Comment on above:   PATIENT WAS FASTINGP  
ERFORMED BY:  [facility] Psztwa7939 [facility] [id number]   
   
                                                    GFR/1.73 sq M   
predicted among   
blacks CKD-EPI vol   
rate/area (S/P/Bld) 105 mL/min/1.73 Normal                          Comprehensiv  
e   
Internal   
Medicine  
Work Phone:   
[phone]  
   
                                        Comment on above:   PATIENT WAS FASTINGP  
ERFORMED BY: MAOR Mullerlin6370 [facility] [id number]   
   
                                                    GFR/1.73 sq M   
predicted among   
non-blacks CKD-EPI   
vol rate/area   
(S/P/Bld)       91 mL/min/1.73  Normal                          Comprehensive   
Internal   
Medicine  
Work Phone:   
[phone]  
   
                                        Comment on above:   PATIENT WAS FASTINGP  
ERFORMED BY: AMOR Mullerlin6370 [facility] [id number]   
   
                                                    Globulin (S)   
[Mass/Vol]      2.3 g/dL        Normal          1.5-4.5         Comprehensive   
Internal   
Medicine  
Work Phone:   
[phone]  
   
                                        Comment on above:   PATIENT WAS FASTINGP  
ERFORMED BY: AMOR Mullerlin6370 [facility] [id number]   
   
                                                    Globulin Calculated   
mass conc (S)   2.3 g/dL        Normal          1.5-4.5         Comprehensive   
Internal   
Medicine  
Work Phone:   
[phone]  
   
                      Glucose mass conc 100 mg/dL  Abnormal   65-99      Compreh  
ensive   
Internal   
Medicine  
Work Phone:   
[phone]  
   
                                        Comment on above:   PATIENT WAS FASTINGP  
ERFORMED BY: AMOR Mullerlin6370 [facility] [id number]   
   
                      Potassium molar conc 4.5 mmol/L Normal     3.5-5.2    Comp  
rehensive   
Internal   
Medicine  
Work Phone:   
[phone]  
   
                                        Comment on above:   PATIENT WAS FASTINGP  
ERFORMED BY: AMOR Mullerlin6370 [facility] [id number]   
   
                      Protein mass conc 7.2 g/dL   Normal     6.0-8.5    Compreh  
ensive   
Internal   
Medicine  
Work Phone:   
[phone]  
   
                                        Comment on above:   PATIENT WAS FASTINGP  
ERFORMED BY: AMOR Mullerlin6370 [facility] [id number]   
   
                      Sodium molar conc 137 mmol/L Normal     134-144    Compreh  
ensive   
Internal   
Medicine  
Work Phone:   
[phone]  
   
                                        Comment on above:   PATIENT WAS FASTINGP  
ERFORMED BY: AMOR Mullerlin6370 [facility] [id number]   
   
                                                    Urea nitrogen mass   
conc            10 mg/dL        Normal          6-24            Comprehensive   
Internal   
Medicine  
Work Phone:   
[phone]  
   
                                        Comment on above:   PATIENT WAS FASTINGP  
ERFORMED BY: AMOR Sangeeta [address]   
[facility] [id number]   
   
                                                    Urea   
nitrogen/Creatinine   
mass ratio      10 mg/mg        Normal          9-20            Comprehensive   
Internal   
Medicine  
Work Phone:   
[phone]  
   
                                        Comment on above:   PATIENT WAS FASTINGP  
ERFORMED BY:  [facility] [address]   
[facility] [id number]   
   
                                                    MICROALBUMINOrdered By: Syst  
em Manager on 2014   
   
                                                    Albumin DL <= 20 mg/L   
mass conc (U)   3.6 ug/mL       Normal          0.0-17.0        Comprehensive   
Internal   
Medicine  
Work Phone:   
[phone]  
   
                                        Comment on above:   PATIENT WAS FASTINGP  
ERFORMED BY: AMOR Thanx Gfsqsn6126 [facility] [id number]   
   
                                                    Albumin/Creatinine   
mass ratio (U)  10.5 {mg/g_creat} Normal          0.0-30.0        Comprehensive   
Internal   
Medicine  
Work Phone:   
[phone]  
   
                                        Comment on above:   PATIENT WAS FASTINGP  
ERFORMED BY:  [facility] SomersetPhtcrr8904 [facility] [id number]   
   
                                                    Creatinine mass conc   
(U)             34.4 mg/dL      Normal          22.0-328.0      Comprehensive   
Internal   
Medicine  
Work Phone:   
[phone]  
   
                                        Comment on above:   PATIENT WAS FASTINGP  
ERFORMED BY:  [facility]6370 [facility] [id number]   
   
                                                    Microscopic ExaminationOrder  
ed By:  on 2014   
   
                                                    Bacteria LM.HPF   
#/area (Urine sed) Few             Normal                          Comprehensive  
   
Internal   
Medicine  
Work Phone:   
[phone]  
   
                                                    Crystals LM Nom   
(Urine sed)     Amorphous Sediment Normal                          Comprehensive  
   
Internal   
Medicine  
Work Phone:   
[phone]  
   
                                                    Epithelial cells   
LM.HPF #/area (Urine   
sed)            0-10            Normal          0 - 10          Comprehensive   
Internal   
Medicine  
Work Phone:   
[phone]  
   
                                                    Mucus LM Ql (Urine   
sed)            Present         Normal                          Comprehensive   
Internal   
Medicine  
Work Phone:   
[phone]  
   
                                                    RBC LM.HPF #/area   
(Urine sed)     0-2             Normal          0 - 2           Comprehensive   
Internal   
Medicine  
Work Phone:   
[phone]  
   
                                                    Unidentified crystals   
LM Ql (Urine sed) Present         Abnormal                        Comprehensive   
Internal   
Medicine  
Work Phone:   
[phone]  
   
                                                    WBC LM.HPF #/area   
(Urine sed)     0-5             Normal          0 - 5           Comprehensive   
Internal   
Medicine  
Work Phone:   
[phone]  
   
                                                    TSH (66121)Ordered By: Syste  
m Manager on 2014   
   
                          Thyrotropin Qn 2.510 {uIU/mL} Normal       0.450-4.50  
0                                       Comprehensive   
Internal   
Medicine  
Work Phone:   
[phone]  
   
                                        Comment on above:   PATIENT WAS FASTINGP  
ERFORMED BY: AMOR [facility] Xpertj[address]   
Road[place]ublin [place] [id number]   
   
                                                    URINALYSIS, W/ MICRO (39279)  
Ordered By:  on 2014   
   
                      Appearance Nom (U) Clear      Normal                Compre  
hensive   
Internal   
Medicine  
Work Phone:   
[phone]  
   
                                        Comment on above:   PATIENT WAS FASTINGP  
ERFORMED BY: AMOR [facility] [address]   
Road[place] [id number]   
   
                      Bilirubin Ql (U) Negative   Normal                Comprehe  
nsive   
Internal   
Medicine  
Work Phone:   
[phone]  
   
                                        Comment on above:   PATIENT WAS FASTINGP  
ERFORMED BY: AMOR [facility] [address]ul[address]   
Road[place] [id number]   
   
                      Bilirubin Ql (U) Negative   Normal                Comprehe  
nsive   
Internal   
Medicine;   
Comprehensive   
Internal   
Medicine  
Work Phone:   
[phone]  
   
                                        Comment on above:   PATIENT WAS FASTINGP  
ERFORMED BY: AMOR [facility] [id number]   
Road[place] [id number]   
   
                      Color Nom (U) Yellow     Normal                Comprehensi  
ve   
Internal   
Medicine  
Work Phone:   
[phone]  
   
                                        Comment on above:   PATIENT WAS FASTINGP  
ERFORMED BY: AMOR [facility] [address]   
Road[place] [id number]   
   
                      Glucose Ql (U) Negative   Normal                Comprehens  
bebe   
Internal   
Medicine  
Work Phone:   
[phone]  
   
                                        Comment on above:   PATIENT WAS FASTINGP  
ERFORMED BY: AMOR [facility] [address]   
Road[place] [id number]   
   
                      Glucose Ql (U) Negative   Normal                Comprehens  
bebe   
Internal   
Medicine;   
Comprehensive   
Internal   
Medicine  
Work Phone:   
[phone]  
   
                                        Comment on above:   PATIENT WAS FASTINGP  
ERFORMED BY: AMOR [facility] [address]xsfe[address]   
Road[place] [id number]   
   
                      Hemoglobin Ql (U) Negative   Normal                Compreh  
ensive   
Internal   
Medicine  
Work Phone:   
[phone]  
   
                                        Comment on above:   PATIENT WAS FASTINGP  
ERFORMED BY: AMOR [facility] [address]lik[address]   
Road[place] [id number]   
   
                      Hemoglobin Ql (U) Negative   Normal                Compreh  
ensive   
Internal   
Medicine;   
Comprehensive   
Internal   
Medicine  
Work Phone:   
[phone]  
   
                                        Comment on above:   PATIENT WAS FASTINGP  
ERFORMED BY: AMOR LabCosaurabh Zjrlsc[address]   
Road[place] [id number]   
   
                                                    Hemoglobin Test strip   
Ql (U)          Negative        Normal                          Comprehensive   
Internal   
Medicine  
Work Phone:   
[phone]  
   
                      Ketones Ql (U) 1+         Abnormal              Comprehens  
bebe   
Internal   
Medicine  
Work Phone:   
[phone]  
   
                                        Comment on above:   PATIENT WAS FASTINGP  
ERFORMED BY: AMOR Mullerlin[address]   
Road[place] [id number]   
   
                                                    Leukocyte esterase   
Test strip Ql (U) Negative        Normal                          Comprehensive   
Internal   
Medicine  
Work Phone:   
[phone]  
   
                                        Comment on above:   PATIENT WAS FASTINGP  
ERFORMED BY: AMOR LabAb MullerDghdyv[address]   
Road[place] [id number]   
   
                                                    Leukocyte esterase   
Test strip Ql (U) Negative        Normal                          Comprehensive   
Internal   
Medicine;   
Comprehensive   
Internal   
Medicine  
Work Phone:   
[phone]  
   
                                        Comment on above:   PATIENT WAS FASTINGP  
ERFORMED BY: AMOR [facility] [id number]   
Road[place] [id number]   
   
                                                    Microscopic   
observation LM Nom   
(Urine sed)     See below:      Normal                          Comprehensive   
Internal   
Medicine  
Work Phone:   
[phone]  
   
                                        Comment on above:   Microscopic was benny  
cated and was performed.   
   
                                                            PATIENT WAS FASTINGP  
ERFORMED BY: AMOR [facility] [address]qsv[address]   
Road[place] [id number]   
   
                                                    Microscopic   
observation LM Nom   
(Urine sed)     MICRON          Normal                          Comprehensive   
Internal   
Medicine  
Work Phone:   
[phone]  
   
                                        Comment on above:   Microscopic follows   
if indicated.   
   
                                                            PATIENT WAS FASTINGP  
ERFORMED BY: AMOR [facility] [address]etv[address]   
RoadD[place] [id number]   
   
                      Nitrite Ql (U) Negative   Normal                Comprehens  
bebe   
Internal   
Medicine  
Work Phone:   
[phone]  
   
                                        Comment on above:   PATIENT WAS FASTINGP  
ERFORMED BY: AMOR Jason[address]   
Road[place] [id number]   
   
                      Nitrite Ql (U) Negative   Normal                Comprehens  
bebe   
Internal   
Medicine;   
Comprehensive   
Internal   
Medicine  
Work Phone:   
[phone]  
   
                                        Comment on above:   PATIENT WAS FASTINGP  
ERFORMED BY: AMOR Jason[address]   
Road[place] [id number]   
   
                                                    Nitrite Test strip Ql   
(U)             Negative        Normal                          Comprehensive   
Internal   
Medicine  
Work Phone:   
[phone]  
   
                      pH (U)     6.0 [pH]   Normal     5.0-7.5    Comprehensive   
Internal   
Medicine  
Work Phone:   
[phone]  
   
                                        Comment on above:   PATIENT WAS FASTINGP  
ERFORMED BY: AMOR Jason[address]   
Road[place] [id number]   
   
                      pH Test strip (U) 6.0 [pH]   Normal     5.0-7.5    Compreh  
ensive   
Internal   
Medicine  
Work Phone:   
[phone]  
   
                      Protein Ql (U) Negative   Normal                Comprehens  
bebe   
Internal   
Medicine  
Work Phone:   
[phone]  
   
                                        Comment on above:   PATIENT WAS FASTINGP  
ERFORMED BY: AMOR Jason[address]   
Road[place] [id number]   
   
                      Protein Ql (U) Negative   Normal                Comprehens  
bebe   
Internal   
Medicine;   
Comprehensive   
Internal   
Medicine  
Work Phone:   
[phone]  
   
                                        Comment on above:   PATIENT WAS FASTINGP  
ERFORMED BY: AMOR Jason[address]   
Road[place] [id number]   
   
                                                    Protein Test strip Ql   
(U)             Negative        Normal                          Comprehensive   
Internal   
Medicine  
Work Phone:   
[phone]  
   
                                                    Specific gravity   
Relative Density (U) 1.007 1             Normal              1.005-1.03  
0                                       Comprehensive   
Internal   
Medicine  
Work Phone:   
[phone]  
   
                                        Comment on above:   PATIENT WAS FASTINGP  
ERFORMED BY: AMOR Mullerlin[address]   
Road[place] [id number]   
   
                                                    Urobilinogen (U)   
[Mass/Vol]      0.2 mg/dL       Normal          0.0-1.9         Comprehensive   
Internal   
Medicine;   
Comprehensive   
Internal   
Medicine  
Work Phone:   
[phone]  
   
                                        Comment on above:   PATIENT WAS FASTINGP  
ERFORMED BY:  Thanx Tlwkvc1054 [facility] [id number]   
   
                                                    Urobilinogen Test   
strip mass conc (U) 0.2 mg/dL       Normal          0.0-1.9         Comprehensiv  
e   
Internal   
Medicine  
Work Phone:   
[phone]  
   
                                        Comment on above:   PATIENT WAS FASTINGP  
ERFORMED BY:  [facility] Hzppad1605 [facility] [id number]   
   
                                                    Blood Glucose , Office (3396  
2)Ordered By: Catina De Jesus on 2014   
   
                                                    Glucose Glucometer   
molar conc (BldC) 108 1           Normal                          Comprehensive   
Internal   
Medicine  
Work Phone:   
[phone]  
   
                                                    HgA1C , Office (37512)Ordere  
d By: Catina De Jesus on 2014   
   
                                                    Hemoglobin   
A1c/Hemoglobin.total   
mass fraction (Bld) 5.6 %           Normal          4.6 - 7.1       Comprehensiv  
e   
Internal   
Medicine  
Work Phone:   
[phone]  
   
                                                    CBCDOrdered By: System Manag  
er on 2014   
   
                                                    Erythrocyte   
distribution width   
Auto Ratio (RBC) 12.0 %          Normal          11.6-14.6       Comprehensive   
Internal   
Medicine  
Work Phone:   
[phone]  
   
                                                    Hematocrit Auto   
Volume Fraction (Bld) 44.3 %          Normal          40-54           Comprehens  
bebe   
Internal   
Medicine  
Work Phone:   
[phone]  
   
                                                    Hemoglobin mass conc   
(Bld)           15.8 g/dL       Normal          13.0-16.5       Comprehensive   
Internal   
Medicine  
Work Phone:   
[phone]  
   
                                                    MCH Auto Entitic mass   
(RBC)           32.0 pg         Normal          27.0-32.0       Comprehensive   
Internal   
Medicine  
Work Phone:   
[phone]  
   
                                                    MCHC Auto mass conc   
(RBC)           35.7 {g/gl}     Normal          32-36           Comprehensive   
Internal   
Medicine  
Work Phone:   
[phone]  
   
                                                    MCV Auto Entitic   
volume (RBC)    89.9 fL         Normal          80-94           Comprehensive   
Internal   
Medicine  
Work Phone:   
[phone]  
   
                                                    Platelet mean volume   
Auto Entitic volume   
(Bld)           9.9 fL          Normal          6.2-12.0        Comprehensive   
Internal   
Medicine  
Work Phone:   
[phone]  
   
                                                    Platelets Auto #/vol   
(Bld)           210 10*3/uL     Normal          150-450         Comprehensive   
Internal   
Medicine  
Work Phone:   
[phone]  
   
                      RBC Auto #/vol (Bld) 4.93 {M/mm3} Normal     4.6-6.2    Co  
mprehensive   
Internal   
Medicine  
Work Phone:   
[phone]  
   
                      WBC Auto #/vol (Bld) 8.7 10*3/uL Normal     4.4-11.0   Com  
prehensive   
Internal   
Medicine  
Work Phone:   
[phone]  
   
                      CBCD       0.3 %      Normal     0-1        Comprehensive   
Internal   
Medicine  
Work Phone:   
[phone]  
   
                      CBCD       0.200 %    Normal     0.0-0.9    Comprehensive   
Internal   
Medicine  
Work Phone:   
[phone]  
   
                                        Comment on above:   IG% - Immature Granu  
locytes (promyelocytes, myelocytes   
andmetamyelocytes) > 1% indicates that a LEFT SHIFT is Present.   
   
                      CBCD       4.2 {X10_3/uL} Normal     2.0-7.7    Comprehens  
bebe   
Internal   
Medicine  
Work Phone:   
[phone]  
   
                      CBCD       48.4 %     Normal     47-70      Comprehensive   
Internal   
Medicine  
Work Phone:   
[phone]  
   
                      CBCD       39.5 %     Normal     19-41      Comprehensive   
Internal   
Medicine  
Work Phone:   
[phone]  
   
                      CBCD       9.5 %      Normal     0-10       Comprehensive   
Internal   
Medicine  
Work Phone:   
[phone]  
   
                      CBCD       2.1 %      Normal     0-5        Comprehensive   
Internal   
Medicine  
Work Phone:   
[phone]  
   
                      CBCD       38.9 fL    Normal     35.1-43.9  Comprehensive   
Internal   
Medicine  
Work Phone:   
[phone]  
   
                                                    CMPOrdered By: System Manage  
r on 2014   
   
                      Albumin mass conc 4.1 g/dL   Normal     3.4-5.0    Compreh  
ensive   
Internal   
Medicine  
Work Phone:   
[phone]  
   
                                                    Albumin/Globulin mass   
ratio           1.3 {RATIO}     Normal          0.9-2.4         Comprehensive   
Internal   
Medicine  
Work Phone:   
[phone]  
   
                      ALP enzyme act/vol 55 U/L     Normal          Compre  
hensive   
Internal   
Medicine  
Work Phone:   
[phone]  
   
                      ALT enzyme act/vol 41 U/L     Normal     12-78      Compre  
[facility]   
[facility]   
[facility]  
Work Phone:   
[phone]  
   
                      AST enzyme act/vol 19 U/L     Normal     15-37      Compre  
hensive   
Internal   
Medicine  
Work Phone:   
[phone]  
   
                      Bilirubin mass conc 0.90 mg/dL Normal     0.00-1.00  Compr  
ehensive   
Internal   
Medicine  
Work Phone:   
[phone]  
   
                      Calcium mass conc 8.8 mg/dL  Normal     8.5-10.1   Compreh  
ensive   
Internal   
Medicine  
Work Phone:   
[phone]  
   
                      Chloride molar conc 106 mmol/L Normal          Compr  
ensive   
Internal   
Medicine  
Work Phone:   
[phone]  
   
                      CO2 molar conc 26.0 mmol/L Normal     21.0-32.0  Comprehen  
[facility]   
[facility]   
[facility]  
Work Phone:   
[phone]  
   
                      Creatinine mass conc 0.8 mg/dL  Normal     0.8-1.3    Comp  
[facility]   
Internal   
Medicine  
Work Phone:   
[phone]  
   
                                                    GFR/1.73 sq M   
predicted among   
non-blacks MDRD vol   
rate/area (S/P/Bld) 109 mL/min/{1.73_m2} Normal                          Compreh  
ensive   
Internal   
Medicine  
Work Phone:   
[phone]  
   
                                                    Globulin Calculated   
mass conc (S)   3.2 g/dL        Normal          2.7-4.2         Comprehensive   
Internal   
Medicine  
Work Phone:   
[phone]  
   
                      Glucose mass conc 116 mg/dL  Abnormal        Compreh  
ensive   
Internal   
Medicine  
Work Phone:   
[phone]  
   
                                        Comment on above:   Fasting Glucose resu  
lt from 110 to <126 mg/dLsuggests IMPAIRED  
   
HOMEOSTASIS per A.D.A. criteria.   
   
                      Potassium molar conc 4.1 mmol/L Normal     3.5-5.1    Comp  
rehensive   
Internal   
Medicine  
Work Phone:   
[phone]  
   
                      Protein mass conc 7.3 g/dL   Normal     6.4-8.2    Compreh  
ensive   
Internal   
Medicine  
Work Phone:   
[phone]  
   
                      Sodium molar conc 137 mmol/L Normal     136-145    Compreh  
ensive   
Internal   
Medicine  
Work Phone:   
[phone]  
   
                                                    Urea nitrogen mass   
conc            13 mg/dL        Normal          7-18            Comprehensive   
Internal   
Medicine  
Work Phone:   
[phone]  
   
                                                    Urea   
nitrogen/Creatinine   
mass ratio      16.3 {RATIO}    Normal          10-20           Comprehensive   
Internal   
Medicine  
Work Phone:   
[phone]  
   
                      CMP        5 1        Normal     5-15       Comprehensive   
Internal   
Medicine  
Work Phone:   
[phone]  
   
                      CMP        132 mL/min Normal                Comprehensive   
Internal   
Medicine  
Work Phone:   
[phone]  
   
                                                    LIPIDOrdered By: System Amrita  
viktor on 2014   
   
                                                    Cholesterol in HDL   
mass conc       39 mg/dL        Abnormal                        Comprehensive   
Internal   
Medicine  
Work Phone:   
[phone]  
   
                                        Comment on above:   Reference RangeHDL <  
40 mg/dL Low HDL CholesterolHDL >or= 60   
mg/dL High HDL Cholesterol   
   
                                                    Cholesterol in LDL   
mass conc       103 mg/dL       Normal          0-130           Comprehensive   
Internal   
Medicine  
Work Phone:   
[phone]  
   
                      Cholesterol mass conc 179 mg/dL  Normal                Com  
prehensive   
Internal   
Medicine  
Work Phone:   
[phone]  
   
                                        Comment on above:   <200 mg/dL Desirable  
200-240 mg/dL Borderline>240 mg/dL High   
Risk   
   
                                                    Triglyceride mass   
conc            187 mg/dL       Normal          0-199           [facility]   
Internal   
Medicine  
Work Phone:   
[phone]  
   
                                        Comment on above:   Serum Triglycerides   
Reference IntervalNormal <150   
mg/dLBorderline high 150 - 199 mg/dLHigh 200 - 499 mg/dLVery   
High > or = 500 mg/dL   
   
                      LIPID      37 mg/dL   Normal     5-40       Com[facility]   
Internal   
Medicine  
Work Phone:   
[phone]  
   
                                                    MIACREOrdered By: System Man  
joser on 2014   
   
                      Creatinine mass conc 5.3 {mg/g_CRE} Normal                  
Comprehensive   
Internal   
Medicine  
Work Phone:   
[phone]  
   
                      MIACRE     5.8 mg/L   Normal                Com[facility]   
Internal   
Medicine  
Work Phone:   
[phone]  
   
                      MIACRE     109.1 mg/dL Normal                Comprehensive  
   
Internal   
Medicine  
Work Phone:   
[phone]  
   
                                                    TSHOrdered By: System Manage  
r on 2014   
   
                      Thyrotropin Qn 1.98 {uIU/mL} Normal     0.358-3.74 Compreh  
ensive   
Internal   
Medicine  
Work Phone:   
[phone]  
   
                                                    UACOrdered By: System Manage  
r on 2014   
   
                      UAC        0 SEEN     Normal     0-5        Com[facility]   
Internal   
Medicine  
Work Phone:   
[phone]  
   
                      UAC        Negative   Normal                Com[facility]   
Internal   
Medicine  
Work Phone:   
[phone]  
   
                      UAC        Normal     Normal                Comprehensive   
Internal   
Medicine  
Work Phone:   
[phone]  
   
                      UAC        6.0 1      Normal     5.0 - 8.0  Comprehensive   
Internal   
Medicine  
Work Phone:   
[phone]  
   
                          UAC          1.015 1      Normal       1.002-1.03  
0                                       Comprehensive   
Internal   
Medicine  
Work Phone:   
[phone]  
   
                      [facility]        Clear      Normal                Comprehensive   
Internal   
Medicine  
Work Phone:   
[phone]  
   
                      [facility]        Yellow     Normal                Comprehensive   
Internal   
Medicine  
Work Phone:   
[phone]  
   
                                                    Blood Glucose , Office (1219  
2)Ordered By: Catina De Jesus on 2014   
   
                                                    Glucose Glucometer   
molar conc (BldC) 107 1           Normal                          Comprehensive   
Internal   
Medicine  
Work Phone:   
[phone]  
   
                                                    HgA1C , Office (33452)Ordere  
d By: Catina De Jesus on 2014   
   
                                                    Hemoglobin   
A1c/Hemoglobin.total   
mass fraction (Bld) 5.7 %           Normal          4.6 - 7.1       Comprehensiv  
e   
Internal   
Medicine  
Work Phone:   
[phone]  
   
                                                    Blood Glucose , Office (5994  
2)Ordered By: Catina De Jesus on 2013   
   
                                                    Glucose Glucometer   
molar conc (BldC) 106 1           Normal                          Comprehensive   
Internal   
Medicine  
Work Phone:   
[phone]  
   
                                                    CBC WITH MANUAL DIFF (88517)  
Ordered By:  on 2013   
   
                                                    Basophils (Bld)   
[#/Vol]         0.0 {x10E3/uL}  Normal          0.0-0.2         Comprehensive   
Internal   
Medicine  
Work Phone:   
[phone]  
   
                                        Comment on above:   PATIENT WAS FASTINGP  
ERFORMED BY: [employer]OR [employer] KitchenUNC Health Rex [facility] [id number] Information:   
[id number]   
   
                                                    Basophils (Bld)   
[#/Vol]         0.0 10*3/uL     Normal          0.0-0.2         Comprehensive   
Internal   
Medicine;   
Comprehensive   
Internal   
Medicine  
Work Phone:   
[phone]  
   
                                        Comment on above:   PATIENT WAS FASTINGP  
ERFORMED BY: [employer]   
[facility] HoldingUNC [facility] [id number] Information:   
[id number]   
   
                                                    Basophils Auto #/vol   
(Bld)           0.0 {x10E3/uL}  Normal          0.0-0.2         Comprehensive   
Internal   
Medicine  
Work Phone:   
[phone]  
   
                                                    Basophils/100 WBC   
(Bld)           1 %             Normal          0-3             Comprehensive   
Internal   
Medicine  
Work Phone:   
[phone]  
   
                                        Comment on above:   PATIENT WAS FASTINGP  
ERFORMED BY: Jiujiuweikang70 [facility] [id number] Information:   
[id number]   
   
                                                    Basophils/100 WBC   
Auto (Bld)      1 %             Normal          0-3             Comprehensive   
Internal   
Medicine  
Work Phone:   
[phone]  
   
                                                    Eosinophils (Bld)   
[#/Vol]         0.2 {x10E3/uL}  Normal          0.0-0.4         Comprehensive   
Internal   
Medicine  
Work Phone:   
[phone]  
   
                                        Comment on above:   **Please note refere  
nce interval change**   
   
                                                            PATIENT WAS FASTINGP  
ERFORMED BY: [place] [id number] [facility] [id number] Information:   
[id number]   
   
                                                    Eosinophils (Bld)   
[#/Vol]         0.2 10*3/uL     Normal          0.0-0.4         Comprehensive   
Internal   
Medicine;   
Comprehensive   
Internal   
Medicine  
Work Phone:   
[phone]  
   
                                        Comment on above:   **Please note refere  
nce interval change**   
   
                                                            PATIENT WAS FASTINGP  
ERFORMED BY: [place] [id number] [facility] [id number] Information:   
[id number]   
   
                                                    Eosinophils Auto   
#/vol (Bld)     0.2 {x10E3/uL}  Normal          0.0-0.4         Comprehensive   
Internal   
Medicine  
Work Phone:   
[phone]  
   
                                        Comment on above:   **Please note refere  
nce interval change**   
   
                                                    Eosinophils/100 WBC   
(Bld)           2 %             Normal          0-5             Comprehensive   
Internal   
Medicine  
Work Phone:   
[phone]  
   
                                        Comment on above:   **Please note refere  
nce interval change**   
   
                                                            PATIENT WAS FASTINGP  
ERFORMED BY: [place] [id number] [facility] [id number] Information:   
[id number]   
   
                                                    Eosinophils/100 WBC   
Auto (Bld)      2 %             Normal          0-5             Comprehensive   
Internal   
Medicine  
Work Phone:   
[phone]  
   
                                        Comment on above:   **Please note refere  
nce interval change**   
   
                                                    Erythrocyte   
distribution width   
(RBC) [Ratio]   12.9 %          Normal          12.3-15.4       Comprehensive   
Internal   
Medicine  
Work Phone:   
[phone]  
   
                                        Comment on above:   PATIENT WAS FASTINGP  
ERFORMED BY: [address]   
RoadDublin [place] [id number] Information:   
[id number]   
   
                                                    Erythrocyte   
distribution width   
Auto Ratio (RBC) 12.9 %          Normal          12.3-15.4       Comprehensive   
Internal   
Medicine  
Work Phone:   
[phone]  
   
                                                    Hematocrit (Bld)   
[Volume fraction] 46.6 %          Normal          37.5-51.0       Comprehensive   
Internal   
Medicine  
Work Phone:   
[phone]  
   
                                        Comment on above:   PATIENT WAS FASTINGP  
ERFORMED BY: AMOR [facility] Iyjuwf3991 [facility] [id number] Information:   
[id number]   
   
                                                    Hematocrit Auto   
Volume Fraction (Bld) 46.6 %          Normal          37.5-51.0       [facility]   
Internal   
Medicine  
Work Phone:   
[phone]  
   
                                                    Hemoglobin mass conc   
(Bld)           15.7 g/dL       Normal          12.6-17.7       Comprehensive   
Internal   
Medicine  
Work Phone:   
[phone]  
   
                                        Comment on above:   PATIENT WAS FASTINGP  
ERFORMED BY: AMOR Sangeeta Uuvqdf7024 [facility] [id number] Information:   
[id number]   
   
                                                    Immature granulocytes   
#/vol (Bld)     0.0 {x10E3/uL}  Normal          0.0-0.1         Comprehensive   
Internal   
Medicine  
Work Phone:   
[phone]  
   
                                        Comment on above:   PATIENT WAS FASTINGP  
ERFORMED BY: AMOR Sangeeta Kbegqg6413 [facility] [id number] Information:   
[id number]   
   
                                                    Immature granulocytes   
(Bld) [#/Vol]   0.0 10*3/uL     Normal          0.0-0.1         Comprehensive   
Internal   
Medicine;   
Comprehensive   
Internal   
Medicine  
Work Phone:   
[phone]  
   
                                        Comment on above:   PATIENT WAS FASTINGP  
ERFORMED BY: AMOR [facility]John [facility]. [facility]6370 [facility] [id number] Information:   
[id number]   
   
                                                    Immature   
granulocytes/100 WBC   
(Bld)           0 %             Normal          0-2             Comprehensive   
Internal   
Medicine  
Work Phone:   
[phone]  
   
                                        Comment on above:   PATIENT WAS FASTINGP  
ERFORMED BY: AMOR LabSusan Ville [address]0070 [facility] [id number] Information:   
[id number]   
   
                                                    Lymphocytes (Bld)   
[#/Vol]         2.9 {x10E3/uL}  Normal          0.7-3.1         Comprehensive   
Internal   
Medicine  
Work Phone:   
[phone]  
   
                                        Comment on above:   **Please note refere  
nce interval change**   
   
                                                            PATIENT WAS FASTINGP  
ERFORMED BY: [address] [id number] Information:   
[id number]   
   
                                                    Lymphocytes (Bld)   
[#/Vol]         2.9 10*3/uL     Normal          0.7-3.1         Comprehensive   
Internal   
Medicine;   
Comprehensive   
Internal   
Medicine  
Work Phone:   
[phone]  
   
                                        Comment on above:   **Please note refere  
nce interval change**   
   
                                                            PATIENT WAS FASTINGP  
ERFORMED BY: [address] [id number] Information:   
[id number]   
   
                                                    Lymphocytes Auto   
#/vol (Bld)     2.9 {x10E3/uL}  Normal          0.7-3.1         Comprehensive   
Internal   
Medicine  
Work Phone:   
[phone]  
   
                                        Comment on above:   **Please note refere  
nce interval change**   
   
                                                    Lymphocytes/100 WBC   
(Bld)           42 %            Normal          14-46           Comprehensive   
Internal   
Medicine  
Work Phone:   
[phone]  
   
                                        Comment on above:   **Please note refere  
nce interval change**   
   
                                                            PATIENT WAS FASTINGP  
ERFORMED BY: [place] [address]70 [facility] [id number] Information:   
[id number]   
   
                                                    Lymphocytes/100 WBC   
Auto (Bld)      42 %            Normal          14-46           Comprehensive   
Internal   
Medicine  
Work Phone:   
[phone]  
   
                                        Comment on above:   **Please note refere  
nce interval change**   
   
                                                    MCH (RBC) [Entitic   
mass]           31.3 pg         Normal          26.6-33.0       Comprehensive   
Internal   
Medicine  
Work Phone:   
[phone]  
   
                                        Comment on above:   PATIENT WAS FASTINGP  
ERFORMED BY: [place] [address]70 [facility] [id number] Information:   
[id number]   
   
                                                    MCH Auto Entitic mass   
(RBC)           31.3 pg         Normal          26.6-33.0       Comprehensive   
Internal   
Medicine  
Work Phone:   
[phone]  
   
                      MCHC (RBC) [Mass/Vol] 33.7 g/dL  Normal     31.5-35.7  Com  
prehensive   
Internal   
Medicine  
Work Phone:   
[phone]  
   
                                        Comment on above:   PATIENT WAS FASTINGP  
ERFORMED BY: [address] [id number] Information:   
[id number]   
   
                                                    MCHC Auto mass conc   
(RBC)           33.7 g/dL       Normal          31.5-35.7       Comprehensive   
Internal   
Medicine  
Work Phone:   
[phone]  
   
                                                    MCV (RBC) [Entitic   
vol]            93 fL           Normal          79-97           Comprehensive   
Internal   
Medicine  
Work Phone:   
[phone]  
   
                                        Comment on above:   PATIENT WAS FASTINGP  
ERFORMED BY: [place] [address]12470 [facility] [id number] Information:   
[id number]   
   
                                                    MCV Auto Entitic   
volume (RBC)    93 fL           Normal          79-97           Comprehensive   
Internal   
Medicine  
Work Phone:   
[phone]  
   
                                                    Monocytes (Bld)   
[#/Vol]         0.8 {x10E3/uL}  Normal          0.1-0.9         Comprehensive   
Internal   
Medicine  
Work Phone:   
[phone]  
   
                                        Comment on above:   **Please note refere  
nce interval change**   
   
                                                            PATIENT WAS FASTINGP  
ERFORMED BY: [facility] [place] [address] [id number] Information:   
[id number]   
   
                                                    Monocytes (Bld)   
[#/Vol]         0.8 10*3/uL     Normal          0.1-0.9         Comprehensive   
Internal   
Medicine;   
Comprehensive   
Internal   
Medicine  
Work Phone:   
[phone]  
   
                                        Comment on above:   **Please note refere  
nce interval change**   
   
                                                            PATIENT WAS FASTINGP  
ERFORMED BY: [place] 4312470 [facility] [id number] Information:   
[id number]   
   
                                                    Monocytes Auto #/vol   
(Bld)           0.8 {x10E3/uL}  Normal          0.1-0.9         Comprehensive   
Internal   
Medicine  
Work Phone:   
[phone]  
   
                                        Comment on above:   **Please note refere  
nce interval change**   
   
                                                    Monocytes/100 WBC   
(Bld)           11 %            Normal          4-12            Comprehensive   
Internal   
Medicine  
Work Phone:   
[phone]  
   
                                        Comment on above:   **Please note refere  
nce interval change**   
   
                                                            PATIENT WAS FASTINGP  
ERFORMED BY:  [facility] Aqqlfc5700 [facility] [id number] Information:   
[id number]   
   
                                                    Monocytes/100 WBC   
Auto (Bld)      11 %            Normal          4-12            Comprehensive   
Internal   
Medicine  
Work Phone:   
[phone]  
   
                                        Comment on above:   **Please note refere  
nce interval change**   
   
                                                    Neutrophils (Bld)   
[#/Vol]         3.0 {x10E3/uL}  Normal          1.4-7.0         Comprehensive   
Internal   
Medicine  
Work Phone:   
[phone]  
   
                                        Comment on above:   **Please note refere  
nce interval change**   
   
                                                            PATIENT WAS FASTINGP  
ERFORMED BY: [address] [id number] Information:   
[id number]   
   
                                                    Neutrophils (Bld)   
[#/Vol]         3.0 10*3/uL     Normal          1.4-7.0         Comprehensive   
Internal   
Medicine;   
Comprehensive   
Internal   
Medicine  
Work Phone:   
[phone]  
   
                                        Comment on above:   **Please note refere  
nce interval change**   
   
                                                            PATIENT WAS FASTINGP  
ERFORMED BY:  LabSusan Ville [address] [facility] [id number] Information:   
[id number]   
   
                                                    Neutrophils Auto   
#/vol (Bld)     3.0 {x10E3/uL}  Normal          1.4-7.0         Comprehensive   
Internal   
Medicine  
Work Phone:   
[phone]  
   
                                        Comment on above:   **Please note refere  
nce interval change**   
   
                                                    Neutrophils/100 WBC   
(Bld)           44 %            Normal          40-74           Comprehensive   
Internal   
Medicine  
Work Phone:   
[phone]  
   
                                        Comment on above:   **Please note refere  
nce interval change**   
   
                                                            PATIENT WAS FASTINGP  
ERFORMED BY:  LabSusan Ville 265[address] [facility] [id number] Information:   
[id number]   
   
                                                    Neutrophils/100 WBC   
Auto (Bld)      44 %            Normal          40-74           Comprehensive   
Internal   
Medicine  
Work Phone:   
[phone]  
   
                                        Comment on above:   **Please note refere  
nce interval change**   
   
                                                    Platelets (Bld)   
[#/Vol]         205 {x10E3/uL}  Normal          155-379         Comprehensive   
Internal   
Medicine  
Work Phone:   
[phone]  
   
                                        Comment on above:   **Please note refere  
nce interval change**   
   
                                                            PATIENT WAS FASTINGP  
ERFORMED BY: [address] [id number] Information:   
[id number]   
   
                                                    Platelets (Bld)   
[#/Vol]         205 10*3/uL     Normal          155-379         [facility]   
Internal   
Medicine;   
Comprehensive   
Internal   
Medicine  
Work Phone:   
[phone]  
   
                                        Comment on above:   **Please note refere  
nce interval change**   
   
                                                            PATIENT WAS FASTINGP  
ERFORMED BY: [address] [id number] Information:   
[id number]   
   
                                                    Platelets Auto #/vol   
(Bld)           205 {x10E3/uL}  Normal          155-379         Comprehensive   
Internal   
Medicine  
Work Phone:   
[phone]  
   
                                        Comment on above:   **Please note refere  
nce interval change**   
   
                      RBC (Bld) [#/Vol] 5.02 {x10E6/uL} Normal     4.14-5.80  [facility]   
Internal   
[facility]  
Work Phone:   
[phone]  
   
                                        Comment on above:   PATIENT WAS FASTINGP  
ERFORMED BY: [address] [id number] Information:   
[id number]   
   
                      RBC (Bld) [#/Vol] 5.02 10*6/uL Normal     4.14-5.80  [facility]   
Internal   
Medicine;   
Comprehensive   
Internal   
Medicine  
Work Phone:   
[phone]  
   
                                        Comment on above:   PATIENT WAS FASTINGP  
ERFORMED BY: [place] 4312470 [facility] [id number] Information:   
[id number]   
   
                      RBC Auto #/vol (Bld) 5.02 {x10E6/uL} Normal     4.14-5.80   
 Comprehensive   
Internal   
Medicine  
Work Phone:   
[phone]  
   
                      WBC (Bld) [#/Vol] 6.9 {x10E3/uL} Normal     3.4-10.8   Com  
prehensive   
Internal   
Medicine  
Work Phone:   
[phone]  
   
                                        Comment on above:   **Please note refere  
nce interval change**   
   
                                                            PATIENT WAS FASTINGP  
ERFORMED BY: AMOR [facility] [address]   
[facility] [id number] Information:   
[id number]   
   
                      WBC (Bld) [#/Vol] 6.9 10*3/uL Normal     3.4-10.8   [facility]   
Internal   
Medicine;   
Comprehensive   
Internal   
Medicine  
Work Phone:   
[phone]  
   
                                        Comment on above:   **Please note refere  
nce interval change**   
   
                                                            PATIENT WAS FASTINGP  
ERFORMED BY: AMOR [facility] Rcaspo9149 [facility] [id number] Information:   
[id number]   
   
                      WBC Auto #/vol (Bld) 6.9 {x10E3/uL} Normal     3.4-10.8     
Comprehensive   
Internal   
Medicine  
Work Phone:   
[phone]  
   
                                        Comment on above:   **Please note refere  
nce interval change**   
   
                                                    HgA1C , Office (15276)Ordere  
d By: Catina De Jesus on 2013   
   
                                                    Hemoglobin   
A1c/Hemoglobin.total   
mass fraction (Bld) 5.4 %           Normal          4.6 - 7.1       Comprehensiv  
e   
Internal   
Medicine  
Work Phone:   
[phone]  
   
                                                    LIPID PANEL (97114)Ordered B  
y:  on 2013   
   
                                                    Cholesterol in HDL   
mass conc       41 mg/dL        Normal                          Comprehensive   
Internal   
Medicine  
Work Phone:   
[phone]  
   
                                        Comment on above:   According to ATP-III  
 Guidelines, HDL-C >59 mg/dL is considered  
   
anegative risk factor for CHD.   
   
                                                            PATIENT WAS FASTINGP  
ERFORMED BY: AMOR [facility] [id number]   
[facility] [id number]   
   
                                                    Cholesterol in LDL   
mass conc       94 mg/dL        Normal          0-99            Comprehensive   
Internal   
Medicine  
Work Phone:   
[phone]  
   
                                        Comment on above:   PATIENT WAS FASTINGP  
ERFORMED BY: AMOR [facility] [id number]   
[facility] [id number]   
   
                                                    Cholesterol in   
LDL/Cholesterol in   
HDL mass ratio  2.3 {ratio_units} Normal          0.0-3.6         Comprehensive   
Internal   
Medicine  
Work Phone:   
[phone]  
   
                                        Comment on above:   PATIENT WAS FASTINGP  
ERFORMED BY: AMOR [facility] [address]   
[facility] [place] [id number]   
   
                                                    Cholesterol in VLDL   
mass conc       27 mg/dL        Normal          5-40            Comprehensive   
Internal   
Medicine  
Work Phone:   
[phone]  
   
                                        Comment on above:   PATIENT WAS FASTINGP  
ERFORMED BY: AMOR [facility] [id number]   
[facility] [id number]   
   
                      Cholesterol mass conc 162 mg/dL  Normal     100-199    Com  
prehensive   
Internal   
Medicine  
Work Phone:   
[phone]  
   
                                        Comment on above:   PATIENT WAS FASTINGP  
ERFORMED BY: AMOR [facility] [address]Buceae[address]   
[facility] [id number]   
   
                                                    Triglyceride mass   
conc            137 mg/dL       Normal          0-149           Comprehensive   
Internal   
Medicine  
Work Phone:   
[phone]  
   
                                        Comment on above:   PATIENT WAS FASTINGP  
ERFORMED BY: AMOR [facility] [address]   
[facility] [id number]   
   
                                                    METABOLIC PANEL, COMPREHENSI  
VE (33655)Ordered By:  on 2013   
   
                      Albumin mass conc 4.6 g/dL   Normal     3.5-5.5    [facility]  
[facility]   
[facility]   
Med[facility]  
Work Phone:   
[phone]  
   
                                        Comment on above:   PATIENT WAS FASTINGP  
ERFORMED BY:  [facility] [address]   
[facility] [place] [id number]   
   
                                                    Albumin/Globulin mass   
ratio           1.9 {ratio}     Normal          1.1-2.5         Comprehensive   
Internal   
Medicine  
Work Phone:   
[phone]  
   
                                        Comment on above:   PATIENT WAS FASTINGP  
ERFORMED BY:  [facility] [address]   
[facility] [place] [id number]   
   
                                                    ALP [Catalytic   
activity/Vol]   51 U/L          Normal                    Comprehensive   
Internal   
Medicine;   
Comprehensive   
Internal   
Medicine  
Work Phone:   
[phone]  
   
                                        Comment on above:   PATIENT WAS FASTINGP  
ERFORMED BY:  [facility] [address]   
[place] [id number]   
   
                      ALP enzyme act/vol 51 [iU]/L  Normal          [facility]   
Internal   
Medicine  
Work Phone:   
[phone]  
   
                                        Comment on above:   PATIENT WAS FASTINGP  
ERFORMED BY: AMOR [facility] [address]   
[place] [id number]   
   
                                                    ALT [Catalytic   
activity/Vol]   22 U/L          Normal          0-44            Comprehensive   
Internal   
Medicine;   
Comprehensive   
Internal   
Medicine  
Work Phone:   
[phone]  
   
                                        Comment on above:   PATIENT WAS FASTINGP  
ERFORMED BY: AMOR [facility] [address]   
Road[place] [id number]   
   
                      ALT enzyme act/vol 22 [iU]/L  Normal     0-44       [facility]   
Internal   
Medicine  
Work Phone:   
[phone]  
   
                                        Comment on above:   PATIENT WAS FASTINGP  
ERFORMED BY: AMOR LabCosaurabh [address]Jlwmwj[address]   
Road[place] [id number]   
   
                                                    AST [Catalytic   
activity/Vol]   20 U/L          Normal          0-40            Comprehensive   
Internal   
Medicine;   
Comprehensive   
Internal   
Medicine  
Work Phone:   
[phone]  
   
                                        Comment on above:   PATIENT WAS FASTINGP  
ERFORMED BY: AMOR Rutherford [address]   
[place] [id number]   
   
                      AST enzyme act/vol 20 [iU]/L  Normal     0-40       [facility]   
Internal   
Medicine  
Work Phone:   
[phone]  
   
                                        Comment on above:   PATIENT WAS FASTINGP  
ERFORMED BY: AMOR GodinezCosaurabh [address]ykq[address]   
[place] [id number]   
   
                      Bilirubin mass conc 1.0 mg/dL  Normal     0.0-1.2    [facility]   
Internal   
Medicine  
Work Phone:   
[phone]  
   
                                        Comment on above:   PATIENT WAS FASTINGP  
ERFORMED BY: AMOR [facility] [address]war[address]   
[place] [id number]   
   
                      Calcium mass conc 9.6 mg/dL  Normal     8.7-10.2   [facility]   
Internal   
Medicine  
Work Phone:   
[phone]  
   
                                        Comment on above:   PATIENT WAS FASTINGP  
ERFORMED BY: AMOR [facility] [address]   
Road[place] [id number]   
   
                      Chloride molar conc 102 mmol/L Normal          [facility]   
Internal   
Medicine  
Work Phone:   
[phone]  
   
                                        Comment on above:   PATIENT WAS FASTINGP  
ERFORMED BY: [facility]   
[facility] [id number]   
   
                      CO2 molar conc 20 mmol/L  Normal     19-28      Comprehens  
bebe   
Internal   
Medicine  
Work Phone:   
[phone]  
   
                                        Comment on above:   PATIENT WAS FASTINGP  
ERFORMED BY:  [facility]   
[facility] [id number]   
   
                      Creatinine mass conc 0.86 mg/dL Normal     0.76-1.27  Comp  
rehensive   
Internal   
Medicine  
Work Phone:   
[phone]  
   
                                        Comment on above:   PATIENT WAS FASTINGP  
ERFORMED BY: [facility]6370 [facility] [id number]   
   
                                                    GFR/1.73 sq M   
predicted among   
blacks CKD-EPI vol   
rate/area (S/P/Bld) 117 mL/min/1.73 Normal                          Comprehensiv  
e   
Internal   
Medicine  
Work Phone:   
[phone]  
   
                                        Comment on above:   PATIENT WAS FASTINGP  
ERFORMED BY: [facility]6370 [facility] [id number]   
   
                                                    GFR/1.73 sq M   
predicted among   
non-blacks CKD-EPI   
vol rate/area   
(S/P/Bld)       101 mL/min/1.73 Normal                          Comprehensive   
Internal   
Medicine  
Work Phone:   
[phone]  
   
                                        Comment on above:   PATIENT WAS FASTINGP  
ERFORMED BY: [facility]6370 [facility] [id number]   
   
                                                    Globulin (S)   
[Mass/Vol]      2.4 g/dL        Normal          1.5-4.5         Comprehensive   
Internal   
Medicine  
Work Phone:   
[phone]  
   
                                        Comment on above:   PATIENT WAS FASTINGP  
ERFORMED BY: [facility]6370 [facility] [id number]   
   
                                                    Globulin Calculated   
mass conc (S)   2.4 g/dL        Normal          1.5-4.5         Comprehensive   
Internal   
Medicine  
Work Phone:   
[phone]  
   
                      Glucose mass conc 116 mg/dL  Abnormal   65-99      Compreh  
ensive   
Internal   
Medicine  
Work Phone:   
[phone]  
   
                                        Comment on above:   PATIENT WAS FASTINGP  
ERFORMED BY:  [facility]6370 [facility] [id number]   
   
                      Potassium molar conc 4.0 mmol/L Normal     3.5-5.2    Comp  
rehensive   
Internal   
Medicine  
Work Phone:   
[phone]  
   
                                        Comment on above:   PATIENT WAS FASTINGP  
ERFORMED BY: AMOR Ilene Jason6370 [facility] [id number]   
   
                      Protein mass conc 7.0 g/dL   Normal     6.0-8.5    Compreh  
ensive   
Internal   
Medicine  
Work Phone:   
[phone]  
   
                                        Comment on above:   PATIENT WAS FASTINGP  
ERFORMED BY: AMOR Ilene Cytyqx3097 [facility] [id number]   
   
                      Sodium molar conc 137 mmol/L Normal     134-144    Compreh  
ensive   
Internal   
Medicine  
Work Phone:   
[phone]  
   
                                        Comment on above:   PATIENT WAS FASTINGP  
ERFORMED BY: AMOR Sangeetasaurabh MullerFcrsqy6160 [facility] [id number]   
   
                                                    Urea nitrogen mass   
conc            13 mg/dL        Normal          6-24            Comprehensive   
Internal   
Medicine  
Work Phone:   
[phone]  
   
                                        Comment on above:   PATIENT WAS FASTINGP  
ERFORMED BY: AMOR Sangeetasaurabh MullerPlujeo3669 [facility] [id number]   
   
                                                    Urea   
nitrogen/Creatinine   
mass ratio      15 mg/mg        Normal          9-20            Comprehensive   
Internal   
Medicine  
Work Phone:   
[phone]  
   
                                        Comment on above:   PATIENT WAS FASTINGP  
ERFORMED BY: AMOR Sangeetasaurabh MullerLggekj3432 [facility] [id number]   
   
                                                    MICROALBUMINOrdered By: Syst  
em Manager on 2013   
   
                                                    Albumin DL <= 20 mg/L   
mass conc (U)   1.4 ug/mL       Normal          0.0-17.0        Comprehensive   
Internal   
Medicine  
Work Phone:   
[phone]  
   
                                        Comment on above:   PATIENT WAS FASTINGP  
ERFORMED BY: AMOR Sangeetasaurabh Pivgmg0056 [facility] [id number]   
   
                                                    Albumin/Creatinine   
mass ratio (U)  3.9 {mg/g_creat} Normal          0.0-30.0        Comprehensive   
Internal   
Medicine  
Work Phone:   
[phone]  
   
                                        Comment on above:   PATIENT WAS FASTINGP  
ERFORMED BY: AMOR Sangeeta Joakez9206 [facility] [id number]   
   
                                                    Creatinine mass conc   
(U)             35.5 mg/dL      Normal          22.0-328.0      Comprehensive   
Internal   
Medicine  
Work Phone:   
[phone]  
   
                                        Comment on above:   PATIENT WAS FASTINGP  
ERFORMED BY: AMOR Jason[address]   
[facility] [id number]   
   
                                                    Microscopic ExaminationOrder  
ed By:  on 2013   
   
                                                    Bacteria LM.HPF   
#/area (Urine sed) None seen       Normal                          Comprehensive  
   
Internal   
Medicine  
Work Phone:   
[phone]  
   
                                                    Epithelial cells   
LM.HPF #/area (Urine   
sed)            None seen       Normal          0 - 10          Comprehensive   
Internal   
Medicine  
Work Phone:   
[phone]  
   
                                                    Mucus LM Ql (Urine   
sed)            Present         Normal                          Comprehensive   
Internal   
Medicine  
Work Phone:   
[phone]  
   
                                                    RBC LM.HPF #/area   
(Urine sed)     None seen       Normal          0 - 3           Comprehensive   
Internal   
Medicine  
Work Phone:   
[phone]  
   
                                                    WBC LM.HPF #/area   
(Urine sed)     0-5             Normal          0 - 5           Comprehensive   
Internal   
Medicine  
Work Phone:   
[phone]  
   
                                                    TSH (11377)Ordered By: Chacorta  
m Manager on 2013   
   
                          Thyrotropin Qn 1.840 {uIU/mL} Normal       0.450-4.50  
0                                       Comprehensive   
Internal   
Medicine  
Work Phone:   
[phone]  
   
                                        Comment on above:   PATIENT WAS FASTINGP  
ERFORMED BY: AMOR Mullrelin[address]   
[facility] [id number]   
   
                                                    URINALYSIS, W/ MICRO (18677)  
Ordered By:  on 2013   
   
                      Appearance Nom (U) Clear      Normal                Compre  
hensive   
Internal   
Medicine  
Work Phone:   
[phone]  
   
                                        Comment on above:   PATIENT WAS FASTINGP  
ERFORMED BY: AMOR Mullerlin[address]   
[facility] [id number]   
   
                      Bilirubin Ql (U) Negative   Normal                Comprehe  
nsive   
Internal   
Medicine  
Work Phone:   
[phone]  
   
                                        Comment on above:   PATIENT WAS FASTINGP  
ERFORMED BY: AMOR [facility] MullerCgrrxs[address]   
[facility] [id number]   
   
                      Bilirubin Ql (U) Negative   Normal                Comprehe  
nsive   
Internal   
Medicine;   
Comprehensive   
Internal   
Medicine  
Work Phone:   
[phone]  
   
                                        Comment on above:   PATIENT WAS FASTINGP  
ERFORMED BY: AMOR [facility] MullerXkvirf2579 [facility] [id number]   
   
                      Color Nom (U) Yellow     Normal                Comprehensi  
ve   
Internal   
Medicine  
Work Phone:   
[phone]  
   
                                        Comment on above:   PATIENT WAS FASTINGP  
ERFORMED BY: AMOR Jason[address]   
[facility] [id number]   
   
                      Glucose Ql (U) Negative   Normal                Comprehens  
bebe   
Internal   
Medicine  
Work Phone:   
[phone]  
   
                                        Comment on above:   PATIENT WAS FASTINGP  
ERFORMED BY: AMOR Wheeler[address]   
[facility] [id number]   
   
                      Glucose Ql (U) Negative   Normal                Comprehens  
bebe   
Internal   
Medicine;   
Comprehensive   
Internal   
Medicine  
Work Phone:   
[phone]  
   
                                        Comment on above:   PATIENT WAS FASTINGP  
ERFORMED BY: AMOR Monson [facility] [id number]   
   
                      Hemoglobin Ql (U) Negative   Normal                Compreh  
ensive   
Internal   
Medicine  
Work Phone:   
[phone]  
   
                                        Comment on above:   PATIENT WAS FASTINGP  
ERFORMED BY: AMOR [address]   
[facility] [id number]   
   
                      Hemoglobin Ql (U) Negative   Normal                Compreh  
ensive   
Internal   
Medicine;   
Comprehensive   
Internal   
Medicine  
Work Phone:   
[phone]  
   
                                        Comment on above:   PATIENT WAS FASTINGP  
ERFORMED BY: AMOR Mons[address]   
[facility] [id number]   
   
                                                    Hemoglobin Test strip   
Ql (U)          Negative        Normal                          Comprehensive   
Internal   
Medicine  
Work Phone:   
[phone]  
   
                      Ketones Ql (U) Negative   Normal                Comprehens  
bebe   
Internal   
Medicine  
Work Phone:   
[phone]  
   
                                        Comment on above:   PATIENT WAS FASTINGP  
ERFORMED BY: AMOR Mullerlin[address]   
[facility] [id number]   
   
                      Ketones Ql (U) Negative   Normal                Comprehens  
bebe   
Internal   
Medicine;   
Comprehensive   
Internal   
Medicine  
Work Phone:   
[phone]  
   
                                        Comment on above:   PATIENT WAS FASTINGP  
ERFORMED BY: AMOR Mullerlin[address]   
[facility] [id number]   
   
                                                    Leukocyte esterase   
Test strip Ql (U) Negative        Normal                          Comprehensive   
Internal   
Medicine  
Work Phone:   
[phone]  
   
                                        Comment on above:   PATIENT WAS FASTINGP  
ERFORMED BY: AMOR Mullerlin6370 [facility] [id number]   
   
                                                    Leukocyte esterase   
Test strip Ql (U) Negative        Normal                          Comprehensive   
Internal   
Medicine;   
Comprehensive   
Internal   
Medicine  
Work Phone:   
[phone]  
   
                                        Comment on above:   PATIENT WAS FASTINGP  
ERFORMED BY: AMOR Ilene Jason[address]   
[facility] [id number]   
   
                                                    Microscopic   
observation LM Nom   
(Urine sed)     MICRON          Normal                          Comprehensive   
Internal   
Medicine  
Work Phone:   
[phone]  
   
                                        Comment on above:   Microscopic follows   
if indicated.   
   
                                                            PATIENT WAS FASTINGP  
ERFORMED BY: AMOR Ilene Mullerlin6370 [facility] [id number]   
   
                                                    Microscopic   
observation LM Nom   
(Urine sed)     See below:      Normal                          Comprehensive   
Internal   
Medicine  
Work Phone:   
[phone]  
   
                                        Comment on above:   PATIENT WAS FASTINGP  
ERFORMED BY: AMOR Ilene Mullerlin6370 [facility] [id number]   
   
                      Nitrite Ql (U) Negative   Normal                Comprehens  
bebe   
Internal   
Medicine  
Work Phone:   
[phone]  
   
                                        Comment on above:   PATIENT WAS FASTINGP  
ERFORMED BY: AMOR Ilene Mullerlin6370 [facility] [id number]   
   
                      Nitrite Ql (U) Negative   Normal                Comprehens  
bebe   
Internal   
Medicine;   
Comprehensive   
Internal   
Medicine  
Work Phone:   
[phone]  
   
                                        Comment on above:   PATIENT WAS FASTINGP  
ERFORMED BY: AMOR Ilene Jason6370 [facility] [id number]   
   
                                                    Nitrite Test strip Ql   
(U)             Negative        Normal                          Comprehensive   
Internal   
Medicine  
Work Phone:   
[phone]  
   
                      pH (U)     7.0 [pH]   Normal     5.0-7.5    Comprehensive   
Internal   
Medicine  
Work Phone:   
[phone]  
   
                                        Comment on above:   PATIENT WAS FASTINGP  
ERFORMED BY: [facility] Wmaaxj[address]   
[facility] [id number]   
   
                      pH Test strip (U) 7.0 [pH]   Normal     5.0-7.5    Compreh  
ensive   
Internal   
Medicine  
Work Phone:   
[phone]  
   
                      Protein Ql (U) Negative   Normal                Comprehens  
bebe   
Internal   
Medicine  
Work Phone:   
[phone]  
   
                                        Comment on above:   PATIENT WAS FASTINGP  
ERFORMED BY: [facility] Vvgqfd5124 [facility] [id number]   
   
                      Protein Ql (U) Negative   Normal                Comprehens  
bebe   
Internal   
Medicine;   
Comprehensive   
Internal   
Medicine  
Work Phone:   
[phone]  
   
                                        Comment on above:   PATIENT WAS FASTINGP  
ERFORMED BY: AMOR [facility] MullerQmbzip9177 [facility] [id number]   
   
                                                    Protein Test strip Ql   
(U)             Negative        Normal                          Comprehensive   
Internal   
Medicine  
Work Phone:   
[phone]  
   
                                                    Specific gravity   
Relative Density (U) 1.006 1             Normal              1.005-1.03  
0                                       Comprehensive   
Internal   
Medicine  
Work Phone:   
[phone]  
   
                                        Comment on above:   PATIENT WAS FASTINGP  
ERFORMED BY: AMOR [facility] Zixtts1820 [facility] [id number]   
   
                                                    Urobilinogen (U)   
[Mass/Vol]      1.0 mg/dL       Normal          0.0-1.9         Comprehensive   
Internal   
Medicine;   
Comprehensive   
Internal   
Medicine  
Work Phone:   
[phone]  
   
                                        Comment on above:   PATIENT WAS FASTINGP  
ERFORMED BY: AMOR [facility] MullerUcewda6749 [facility] [id number]   
   
                                                    Urobilinogen Test   
strip mass conc (U) 1.0 mg/dL       Normal          0.0-1.9         Comprehensiv  
e   
Internal   
Medicine  
Work Phone:   
[phone]  
   
                                        Comment on above:   PATIENT WAS FASTINGP  
ERFORMED BY: AMOR Mullerlin6370 [facility] [id number]   
   
                                                    Blood Glucose , Office (1796  
2)Ordered By: Catina De Jesus on 03-   
   
                                                    Glucose Glucometer   
molar conc (BldC) 113 1           Normal                          Comprehensive   
Internal   
Medicine  
Work Phone:   
[phone]  
   
                                                    HgA1C , Office (85432)Ordere  
d By: Catina De Jesus on 03-   
   
                                                    Hemoglobin   
A1c/Hemoglobin.total   
mass fraction (Bld) 5.6 %           Normal          4.6 - 7.1       Comprehensiv  
e   
Internal   
Medicine  
Work Phone:   
[phone]  
   
                                                    CBC WITH MANUAL DIFF (03780)  
Ordered By:  on 2013   
   
                                                    Basophils (Bld)   
[#/Vol]         0.0 {x10E3/uL}  Normal          0.0-0.2         Comprehensive   
Internal   
Medicine  
Work Phone:   
[phone]  
   
                                        Comment on above:   PATIENT WAS FASTINGP  
ERFORMED BY: [place] [id number] [facility] [id number] Information:   
[id number]   
   
                                                    Basophils (Bld)   
[#/Vol]         0.0 10*3/uL     Normal          0.0-0.2         [facility]   
[facility]   
Medicine;   
[facility]   
Internal   
Medicine  
Work Phone:   
[phone]  
   
                                        Comment on above:   PATIENT WAS FASTINGP  
ERFORMED BY: [address] [id number] Information:   
[id number]   
   
                                                    Basophils Auto #/vol   
(Bld)           0.0 {x10E3/uL}  Normal          0.0-0.2         [facility]   
[facility]   
Medicine  
Work Phone:   
[phone]  
   
                                                    Basophils/100 WBC   
(Bld)           1 %             Normal          0-3             [facility]   
Internal   
Medicine  
Work Phone:   
[phone]  
   
                                        Comment on above:   PATIENT WAS FASTINGP  
ERFORMED BY: [address] [id number] Information:   
[id number]   
   
                                                    Basophils/100 WBC   
Auto (Bld)      1 %             Normal          0-3             [facility]   
[facility]   
Medicine  
Work Phone:   
[phone]  
   
                                                    Eosinophils (Bld)   
[#/Vol]         0.1 {x10E3/uL}  Normal          0.0-0.4         [facility]   
[facility]   
Medicine  
Work Phone:   
[phone]  
   
                                        Comment on above:   PATIENT WAS FASTINGP  
ERFORMED BY: [address] [id number] Information:   
[id number]   
   
                                                    Eosinophils (Bld)   
[#/Vol]         0.1 10*3/uL     Normal          0.0-0.4         [facility]   
Internal   
Medicine;   
[facility]   
Internal   
Medicine  
Work Phone:   
[phone]  
   
                                        Comment on above:   PATIENT WAS FASTINGP  
ERFORMED BY: [address] [id number] Information:   
[id number]   
   
                                                    Eosinophils Auto   
#/vol (Bld)     0.1 {x10E3/uL}  Normal          0.0-0.4         [facility]   
[facility]   
Medicine  
Work Phone:   
[phone]  
   
                                                    Eosinophils/100 WBC   
(Bld)           1 %             Normal          0-7             [facility]   
Internal   
Medicine  
Work Phone:   
[phone]  
   
                                        Comment on above:   PATIENT WAS FASTINGP  
ERFORMED BY: AMOR [place] [address] [facility] [id number] Information:   
[id number]   
   
                                                    Eosinophils/100 WBC   
Auto (Bld)      1 %             Normal          0-7             [facility]   
Internal   
Medicine  
Work Phone:   
[phone]  
   
                                                    Erythrocyte   
distribution width   
(RBC) [Ratio]   12.9 %          Normal          12.3-15.4       [facility]   
Internal   
Medicine  
Work Phone:   
[phone]  
   
                                        Comment on above:   PATIENT WAS FASTINGP  
ERFORMED BY: AMOR [place] [address] [facility] [id number] Information:   
[id number]   
   
                                                    Erythrocyte   
distribution width   
Auto Ratio (RBC) 12.9 %          Normal          12.3-15.4       [facility]   
Internal   
Medicine  
Work Phone:   
[phone]  
   
                                                    Hematocrit (Bld)   
[Volume fraction] 45.7 %          Normal          37.5-51.0       [facility]   
Internal   
Medicine  
Work Phone:   
[phone]  
   
                                        Comment on above:   PATIENT WAS FASTINGP  
ERFORMED BY: AMOR [place] [address] [facility] [id number] Information:   
[id number]   
   
                                                    Hematocrit Auto   
Volume Fraction (Bld) 45.7 %          Normal          37.5-51.0       [facility]   
Internal   
Medicine  
Work Phone:   
[phone]  
   
                                                    Hemoglobin mass conc   
(Bld)           16.0 g/dL       Normal          12.6-17.7       [facility]   
Internal   
Medicine  
Work Phone:   
[phone]  
   
                                        Comment on above:   PATIENT WAS FASTINGP  
ERFORMED BY: AMOR [facility]6370 [facility] [id number] Information:   
[id number]   
   
                                                    Immature granulocytes   
#/vol (Bld)     0.0 {x10E3/uL}  Normal          0.0-0.1         [facility]   
Internal   
Medicine  
Work Phone:   
[phone]  
   
                                        Comment on above:   PATIENT WAS FASTINGP  
ERFORMED BY: [place] [id number] [facility] [id number] Information:   
[id number]   
   
                                                    Immature granulocytes   
(Bld) [#/Vol]   0.0 10*3/uL     Normal          0.0-0.1         [facility]   
Internal   
Medicine;   
Com[facility]   
Internal   
Medicine  
Work Phone:   
[phone]  
   
                                        Comment on above:   PATIENT WAS FASTINGP  
ERFORMED BY: [facility]6370 [facility] [id number] Information:   
[id number]   
   
                                                    Immature   
granulocytes/100 WBC   
(Bld)           0 %             Normal          0-2             [facility]   
Internal   
Medicine  
Work Phone:   
[phone]  
   
                                        Comment on above:   PATIENT WAS FASTINGP  
ERFORMED BY: [address] [id number] Information:   
[id number]   
   
                                                    Lymphocytes (Bld)   
[#/Vol]         3.5 {x10E3/uL}  Normal          0.7-4.5         [facility]   
Internal   
Medicine  
Work Phone:   
[phone]  
   
                                        Comment on above:   PATIENT WAS FASTINGP  
ERFORMED BY: [address] [id number] Information:   
[id number]   
   
                                                    Lymphocytes (Bld)   
[#/Vol]         3.5 10*3/uL     Normal          0.7-4.5         [facility]   
Internal   
Medicine;   
Comprehensive   
Internal   
Medicine  
Work Phone:   
[phone]  
   
                                        Comment on above:   PATIENT WAS FASTINGP  
ERFORMED BY: [place] [id number] [facility] [id number] Information:   
[id number]   
   
                                                    Lymphocytes Auto   
#/vol (Bld)     3.5 {x10E3/uL}  Normal          0.7-4.5         [facility]   
Internal   
Medicine  
Work Phone:   
[phone]  
   
                                                    Lymphocytes/100 WBC   
(Bld)           46 %            Normal          14-46           [facility]   
Internal   
Medicine  
Work Phone:   
[phone]  
   
                                        Comment on above:   PATIENT WAS FASTINGP  
ERFORMED BY: [place] [address] [facility] [id number] Information:   
[id number]   
   
                                                    Lymphocytes/100 WBC   
Auto (Bld)      46 %            Normal          14-46           [facility]   
Internal   
Medicine  
Work Phone:   
[phone]  
   
                                                    MCH (RBC) [Entitic   
mass]           31.9 pg         Normal          26.6-33.0       [facility]   
Internal   
Medicine  
Work Phone:   
[phone]  
   
                                        Comment on above:   PATIENT WAS FASTINGP  
ERFORMED BY: AMOR [facility] MullerVrhgvr6161 [facility] [id number] Information:   
[id number]   
   
                                                    MCH Auto Entitic mass   
(RBC)           31.9 pg         Normal          26.6-33.0       [facility]   
Internal   
Medicine  
Work Phone:   
[phone]  
   
                      MCHC (RBC) [Mass/Vol] 35.0 g/dL  Normal     31.5-35.7  Com  
prehensive   
Internal   
Medicine  
Work Phone:   
[phone]  
   
                                        Comment on above:   PATIENT WAS FASTINGP  
ERFORMED BY: AMOR [facility] Amqpdt5589 [facility] [id number] Information:   
[id number]   
   
                                                    MCHC Auto mass conc   
(RBC)           35.0 g/dL       Normal          31.5-35.7       [facility]   
Internal   
Medicine  
Work Phone:   
[phone]  
   
                                                    MCV (RBC) [Entitic   
vol]            91 fL           Normal          79-97           [facility]   
Internal   
Medicine  
Work Phone:   
[phone]  
   
                                        Comment on above:   PATIENT WAS FASTINGP  
ERFORMED BY: AMOR Odom Xuguov8788 [facility] [id number] Information:   
[id number]   
   
                                                    MCV Auto Entitic   
volume (RBC)    91 fL           Normal          79-97           [facility]   
Internal   
Medicine  
Work Phone:   
[phone]  
   
                                                    Monocytes (Bld)   
[#/Vol]         0.7 {x10E3/uL}  Normal          0.1-1.0         [facility]   
Internal   
Medicine  
Work Phone:   
[phone]  
   
                                        Comment on above:   PATIENT WAS FASTINGP  
ERFORMED BY: AMOR [facility]6370 [facility] [id number] Information:   
[id number]   
   
                                                    Monocytes (Bld)   
[#/Vol]         0.7 10*3/uL     Normal          0.1-1.0         [facility]   
Internal   
Medicine;   
Comprehensive   
Internal   
Medicine  
Work Phone:   
[phone]  
   
                                        Comment on above:   PATIENT WAS FASTINGP  
ERFORMED BY: AMOR [place] [address] [facility] [id number] Information:   
[id number]   
   
                                                    Monocytes Auto #/vol   
(Bld)           0.7 {x10E3/uL}  Normal          0.1-1.0         [facility]   
Internal   
Medicine  
Work Phone:   
[phone]  
   
                                                    Monocytes/100 WBC   
(Bld)           9 %             Normal          4-13            [facility]   
Internal   
Medicine  
Work Phone:   
[phone]  
   
                                        Comment on above:   PATIENT WAS FASTINGP  
ERFORMED BY: AMOR Lab[facility] MullerAroxfd8242 [facility] [id number] Information:   
[id number]   
   
                                                    Monocytes/100 WBC   
Auto (Bld)      9 %             Normal          -            [facility]   
Internal   
Medicine  
Work Phone:   
[phone]  
   
                                                    Neutrophils (Bld)   
[#/Vol]         3.2 {x10E3/uL}  Normal          1.8-7.8         [facility]   
Internal   
Medicine  
Work Phone:   
[phone]  
   
                                        Comment on above:   PATIENT WAS FASTINGP  
ERFORMED BY: AMOR Mullerlin6370 [facility] [id number] Information:   
[id number]   
   
                                                    Neutrophils (Bld)   
[#/Vol]         3.2 10*3/uL     Normal          1.8-7.8         [facility]   
Internal   
Medicine;   
Comprehensive   
Internal   
Medicine  
Work Phone:   
[phone]  
   
                                        Comment on above:   PATIENT WAS FASTINGP  
ERFORMED BY: AMOR [facility] Neqhtk3213 [facility] [id number] Information:   
[id number]   
   
                                                    Neutrophils Auto   
#/vol (Bld)     3.2 {x10E3/uL}  Normal          1.8-7.8         [facility]   
Internal   
Medicine  
Work Phone:   
[phone]  
   
                                                    Neutrophils/100 WBC   
(Bld)           43 %            Normal          40-74           [facility]   
Internal   
Medicine  
Work Phone:   
[phone]  
   
                                        Comment on above:   PATIENT WAS FASTINGP  
ERFORMED BY: AMOR [place] [address] [facility] [id number] Information:   
[id number]   
   
                                                    Neutrophils/100 WBC   
Auto (Bld)      43 %            Normal          40-74           [facility]   
Internal   
Medicine  
Work Phone:   
[phone]  
   
                                                    Platelets (Bld)   
[#/Vol]         194 {x10E3/uL}  Normal          140-415         [facility]   
Internal   
Medicine  
Work Phone:   
[phone]  
   
                                        Comment on above:   PATIENT WAS FASTINGP  
ERFORMED BY: AMOR Ilene Wheeler70 [facility] [id number] Information:   
[id number]   
   
                                                    Platelets (Bld)   
[#/Vol]         194 10*3/uL     Normal          140-415         Com[facility]   
Internal   
Medicine;   
Comprehensive   
Internal   
Medicine  
Work Phone:   
[phone]  
   
                                        Comment on above:   PATIENT WAS FASTINGP  
ERFORMED BY: AMOR LabCo Yuapyl7488 [facility] [id number] Information:   
[id number]   
   
                                                    Platelets Auto #/vol   
(Bld)           194 {x10E3/uL}  Normal          140-415         Com[facility]   
Internal   
Medicine  
Work Phone:   
[phone]  
   
                      RBC (Bld) [#/Vol] 5.02 {x10E6/uL} Normal     4.14-5.80  [facility]   
Internal   
Medicine  
Work Phone:   
[phone]  
   
                                        Comment on above:   PATIENT WAS FASTINGP  
ERFORMED BY: AMOR Mullerlin6370 [facility] [id number] Information:   
[id number]   
   
                      RBC (Bld) [#/Vol] 5.02 10*6/uL Normal     4.14-5.80  [facility]   
Internal   
Medicine;   
Comprehensive   
Internal   
Medicine  
Work Phone:   
[phone]  
   
                                        Comment on above:   PATIENT WAS FASTINGP  
ERFORMED BY: AMOR [facility] [id number] Information:   
[id number]   
   
                      RBC Auto #/vol (Bld) 5.02 {x10E6/uL} Normal     4.14-5.80   
 Comprehensive   
Internal   
Medicine  
Work Phone:   
[phone]  
   
                      WBC (Bld) [#/Vol] 7.5 {x10E3/uL} Normal     4.0-10.5   Com  
prehensive   
Internal   
Medicine  
Work Phone:   
[phone]  
   
                                        Comment on above:   PATIENT WAS FASTINGP  
ERFORMED BY: AMOR [place] [address] [facility] [id number] Information:   
[id number]   
   
                      WBC (Bld) [#/Vol] 7.5 10*3/uL Normal     4.0-10.5   [facility]  
[facility]   
Internal   
[facility];   
Comprehensive   
Internal   
Medicine  
Work Phone:   
[phone]  
   
                                        Comment on above:   PATIENT WAS FASTINGP  
ERFORMED BY: AMOR Jason6370 [facility] [id number] Information:   
[id number]   
   
                      WBC Auto #/vol (Bld) 7.5 {x10E3/uL} Normal     4.0-10.5     
Comprehensive   
Internal   
Medicine  
Work Phone:   
[phone]  
   
                                                    Lipid Panel With LDL/HDL Rat  
ioOrdered By:  on 2013   
   
                                                    Cholesterol in HDL   
mass conc       55 mg/dL        Normal                          Comprehensive   
Internal   
Medicine  
Work Phone:   
[phone]  
   
                                        Comment on above:   According to ATP-III  
 Guidelines, HDL-C >59 mg/dL is considered  
   
anegative risk factor for CHD.   
   
                                                    Cholesterol in LDL   
mass conc       102 mg/dL       Abnormal        0-99            Comprehensive   
Internal   
Medicine  
Work Phone:   
[phone]  
   
                                                    Cholesterol in   
LDL/Cholesterol in   
HDL mass ratio  1.9 {ratio_units} Normal          0.0-3.6         Comprehensive   
Internal   
Medicine  
Work Phone:   
[phone]  
   
                                                    Cholesterol in VLDL   
mass conc       19 mg/dL        Normal          5-40            Comprehensive   
Internal   
Medicine  
Work Phone:   
[phone]  
   
                      Cholesterol mass conc 176 mg/dL  Normal     100-199    Com  
prehensive   
Internal   
Medicine  
Work Phone:   
[phone]  
   
                                                    Triglyceride mass   
conc            93 mg/dL        Normal          0-149           Comprehensive   
Internal   
Medicine  
Work Phone:   
[phone]  
   
                                                    METABOLIC PANEL, COMPREHENSI  
VE (71076)Ordered By:  on 2013   
   
                      Albumin mass conc 4.6 g/dL   Normal     3.5-5.5    Compreh  
ensive   
Internal   
Medicine  
Work Phone:   
[phone]  
   
                                        Comment on above:   PATIENT WAS FASTINGP  
ERFORMED BY: AMOR Mullerlin6370 [facility] [id number]   
   
                                                    Albumin/Globulin mass   
ratio           2.0 {ratio}     Normal          1.1-2.5         Comprehensive   
Internal   
Medicine  
Work Phone:   
[phone]  
   
                                        Comment on above:   PATIENT WAS FASTINGP  
ERFORMED BY: CB [facility] [address]   
Road[address]ublin [place] [id number]   
   
                                                    ALP [Catalytic   
activity/Vol]   51 U/L          Normal                    Comprehensive   
Internal   
Medicine;   
Comprehensive   
Internal   
Medicine  
Work Phone:   
[phone]  
   
                                        Comment on above:   PATIENT WAS FASTINGP  
ERFORMED BY: AMOR [facility] [address]   
Road[address]ublin [place] [id number]   
   
                      ALP enzyme act/vol 51 [iU]/L  Normal          [facility]   
Internal   
Medicine  
Work Phone:   
[phone]  
   
                                        Comment on above:   PATIENT WAS FASTINGP  
ERFORMED BY: AMOR [facility] Vqlvtk[address]   
Road[address] [place] [id number]   
   
                                                    ALT [Catalytic   
activity/Vol]   31 U/L          Normal          0-44            Comprehensive   
Internal   
Medicine;   
Comprehensive   
Internal   
Medicine  
Work Phone:   
[phone]  
   
                                        Comment on above:   PATIENT WAS FASTINGP  
ERFORMED BY: AMOR [facility] Mqnemg[address]   
Road[address] [place] [id number]   
   
                      ALT enzyme act/vol 31 [iU]/L  Normal     0-44       [facility]   
Internal   
Medicine  
Work Phone:   
[phone]  
   
                                        Comment on above:   PATIENT WAS FASTINGP  
ERFORMED BY: AMOR [facility] [address]iem[address]   
Road[address] [place] [id number]   
   
                                                    AST [Catalytic   
activity/Vol]   28 U/L          Normal          0-40            Comprehensive   
Internal   
Medicine;   
Comprehensive   
Internal   
Medicine  
Work Phone:   
[phone]  
   
                                        Comment on above:   PATIENT WAS FASTINGP  
ERFORMED BY: AMOR [facility] [address]   
Road[address] [place] [id number]   
   
                      AST enzyme act/vol 28 [iU]/L  Normal     0-40       [facility]   
Internal   
Medicine  
Work Phone:   
[phone]  
   
                                        Comment on above:   PATIENT WAS FASTINGP  
ERFORMED BY: AMOR [facility] [address]ec[address]   
Road[address] [place] [id number]   
   
                      Bilirubin mass conc 0.5 mg/dL  Normal     0.0-1.2    [facility]   
Internal   
Medicine  
Work Phone:   
[phone]  
   
                                        Comment on above:   PATIENT WAS FASTINGP  
ERFORMED BY: AMOR [facility] Ukftvr[address]   
Road[address] [place] [id number]   
   
                      Calcium mass conc 9.7 mg/dL  Normal     8.7-10.2   Compreh  
ensive   
Internal   
Medicine  
Work Phone:   
[phone]  
   
                                        Comment on above:   PATIENT WAS FASTINGP  
ERFORMED BY: AMOR LabCosaurabh Noyhyn7225 [facility] [id number]   
   
                      Chloride molar conc 102 mmol/L Normal          Compr  
ehensive   
Internal   
Medicine  
Work Phone:   
[phone]  
   
                                        Comment on above:   PATIENT WAS FASTINGP  
ERFORMED BY: AMOR [facility] Fbjxox3670 [facility] [id number]   
   
                      CO2 molar conc 24 mmol/L  Normal     20-32      Comprehens  
bebe   
Internal   
Medicine  
Work Phone:   
[phone]  
   
                                        Comment on above:   PATIENT WAS FASTINGP  
ERFORMED BY: AMOR [facility] Wpxluu9833 [facility] [id number]   
   
                      Creatinine mass conc 0.83 mg/dL Normal     0.76-1.27  Comp  
[facility]   
Internal   
Medicine  
Work Phone:   
[phone]  
   
                                        Comment on above:   PATIENT WAS FASTINGP  
ERFORMED BY: AMOR [facility] Kokoem6645 [facility] [id number]   
   
                                                    GFR/1.73 sq M   
predicted among   
blacks CKD-EPI vol   
rate/area (S/P/Bld) 119 mL/min/1.73 Normal                          Comprehensiv  
e   
Internal   
Medicine  
Work Phone:   
[phone]  
   
                                        Comment on above:   PATIENT WAS FASTINGP  
ERFORMED BY: AMOR [facility] Iohvcg9794 [facility] [id number]   
   
                                                    GFR/1.73 sq M   
predicted among   
non-blacks CKD-EPI   
vol rate/area   
(S/P/Bld)       103 mL/min/1.73 Normal                          Comprehensive   
Internal   
Medicine  
Work Phone:   
[phone]  
   
                                        Comment on above:   PATIENT WAS FASTINGP  
ERFORMED BY: AMOR [facility] Vxuphd0299 [facility] [id number]   
   
                                                    Globulin (S)   
[Mass/Vol]      2.3 g/dL        Normal          1.5-4.5         Comprehensive   
Internal   
Medicine  
Work Phone:   
[phone]  
   
                                        Comment on above:   PATIENT WAS FASTINGP  
ERFORMED BY: AMOR [facility] Tqaavx7765 [facility] [id number]   
   
                                                    Globulin Calculated   
mass conc (S)   2.3 g/dL        Normal          1.5-4.5         Comprehensive   
Internal   
Medicine  
Work Phone:   
[phone]  
   
                      Glucose mass conc 78 mg/dL   Normal     65-99      Compreh  
ensive   
Internal   
Medicine  
Work Phone:   
[phone]  
   
                                        Comment on above:   PATIENT WAS FASTINGP  
ERFORMED BY: AMOR LabAb [address]   
[facility] [id number]   
   
                      Potassium molar conc 3.8 mmol/L Normal     3.5-5.2    Comp  
rehensive   
Internal   
Medicine  
Work Phone:   
[phone]  
   
                                        Comment on above:   PATIENT WAS FASTINGP  
ERFORMED BY: AMOR LabCosaurabh MullerAibjwi[address]   
[facility] [id number]   
   
                      Protein mass conc 6.9 g/dL   Normal     6.0-8.5    Compreh  
ensive   
Internal   
Medicine  
Work Phone:   
[phone]  
   
                                        Comment on above:   PATIENT WAS FASTINGP  
ERFORMED BY: AMOR LabAb [address]Aqby[address]   
[facility] [id number]   
   
                      Sodium molar conc 140 mmol/L Normal     134-144    Compreh  
ensive   
Internal   
Medicine  
Work Phone:   
[phone]  
   
                                        Comment on above:   PATIENT WAS FASTINGP  
ERFORMED BY: AMOR LabAb MullerAwtdwl7904 [facility] [id number]   
   
                                                    Urea nitrogen mass   
conc            9 mg/dL         Normal          6-24            Comprehensive   
Internal   
Medicine  
Work Phone:   
[phone]  
   
                                        Comment on above:   PATIENT WAS FASTINGP  
ERFORMED BY: AMOR LabAb Mull[address]Gltgau[address]   
[facility] [id number]   
   
                                                    Urea   
nitrogen/Creatinine   
mass ratio      11 mg/mg        Normal          9-20            Comprehensive   
Internal   
Medicine  
Work Phone:   
[phone]  
   
                                        Comment on above:   PATIENT WAS FASTINGP  
ERFORMED BY: AMOR LabCo [address]   
[facility] [id number]   
   
                                                    MICROALBUMINOrdered By: Syst  
em Manager on 2013   
   
                                                    Albumin DL <= 20 mg/L   
mass conc (U)   1.4 ug/mL       Normal          0.0-17.0        Comprehensive   
Internal   
Medicine  
Work Phone:   
[phone]  
   
                                        Comment on above:   PATIENT WAS FASTINGP  
ERFORMED BY: AMOR LabCo Difxkh8115 [facility] [id number]   
   
                                                    Albumin/Creatinine   
mass ratio (U)  1.8 {mg/g_creat} Normal          0.0-30.0        Comprehensive   
Internal   
Medicine  
Work Phone:   
[phone]  
   
                                        Comment on above:   PATIENT WAS FASTINGP  
ERFORMED BY: AMOR [facility]6370 [facility] [id number]   
   
                                                    Creatinine mass conc   
(U)             79.9 mg/dL      Normal          22.0-328.0      Comprehensive   
Internal   
Medicine  
Work Phone:   
[phone]  
   
                                        Comment on above:   PATIENT WAS FASTINGP  
ERFORMED BY: AMOR [facility]6370 [facility] [id number]   
   
                                                    Microscopic ExaminationOrder  
ed By:  on 2013   
   
                                                    Bacteria LM.HPF   
#/area (Urine sed) Few             Normal                          Comprehensive  
   
Internal   
Medicine  
Work Phone:   
[phone]  
   
                                                    Epithelial cells   
LM.HPF #/area (Urine   
sed)            None seen       Normal          0 - 10          Comprehensive   
Internal   
Medicine  
Work Phone:   
[phone]  
   
                                                    Mucus LM Ql (Urine   
sed)            Present         Normal                          Comprehensive   
Internal   
Medicine  
Work Phone:   
[phone]  
   
                                                    RBC LM.HPF #/area   
(Urine sed)     0-3             Normal          0 - 3           Comprehensive   
Internal   
Medicine  
Work Phone:   
[phone]  
   
                                                    WBC LM.HPF #/area   
(Urine sed)     0-5             Normal          0 - 5           Comprehensive   
Internal   
Medicine  
Work Phone:   
[phone]  
   
                                                    TSH (47447)Ordered By: Syste  
m Manager on 2013   
   
                          Thyrotropin Qn 2.570 {uIU/mL} Normal       0.450-4.50  
0                                       Comprehensive   
Internal   
Medicine  
Work Phone:   
[phone]  
   
                                        Comment on above:   PATIENT WAS FASTINGP  
ERFORMED BY: [facility] [id number]   
   
                                                    URINALYSIS, W/ MICRO (60758)  
Ordered By:  on 2013   
   
                      Appearance Nom (U) Clear      Normal                Compre  
hensive   
Internal   
Medicine  
Work Phone:   
[phone]  
   
                                        Comment on above:   PATIENT WAS FASTINGP  
ERFORMED BY: AMOR [facility] Zdexey2370 [facility] [id number]   
   
                      Bilirubin Ql (U) Negative   Normal                Comprehe  
nsive   
Internal   
Medicine  
Work Phone:   
[phone]  
   
                                        Comment on above:   PATIENT WAS FASTINGP  
ERFORMED BY: AMOR [facility] [address]   
[place] [id number]   
   
                      Bilirubin Ql (U) Negative   Normal                Comprehe  
nsive   
Internal   
Medicine;   
Comprehensive   
Internal   
Medicine  
Work Phone:   
[phone]  
   
                                        Comment on above:   PATIENT WAS FASTINGP  
ERFORMED BY: AMOR [facility] [address]   
Road[place] [id number]   
   
                      Color Nom (U) Yellow     Normal                Comprehensi  
ve   
Internal   
Medicine  
Work Phone:   
[phone]  
   
                                        Comment on above:   PATIENT WAS FASTINGP  
ERFORMED BY: AMOR [facility] [address]cejn[address]   
Road[place] [id number]   
   
                      Glucose Ql (U) Negative   Normal                Comprehens  
bebe   
Internal   
Medicine  
Work Phone:   
[phone]  
   
                                        Comment on above:   PATIENT WAS FASTINGP  
ERFORMED BY: AMOR [facility] [address]qpa[address]   
Road[place] [id number]   
   
                      Glucose Ql (U) Negative   Normal                Comprehens  
bebe   
Internal   
Medicine;   
Comprehensive   
Internal   
Medicine  
Work Phone:   
[phone]  
   
                                        Comment on above:   PATIENT WAS FASTINGP  
ERFORMED BY: AMOR [facility] [address]bqu[address]   
Road[place] [id number]   
   
                      Hemoglobin Ql (U) Negative   Normal                Compreh  
ensive   
Internal   
Medicine  
Work Phone:   
[phone]  
   
                                        Comment on above:   PATIENT WAS FASTINGP  
ERFORMED BY: AMOR [facility] [address]udl[address]   
Road[place] [id number]   
   
                      Hemoglobin Ql (U) Negative   Normal                Compreh  
ensive   
Internal   
Medicine;   
Comprehensive   
Internal   
Medicine  
Work Phone:   
[phone]  
   
                                        Comment on above:   PATIENT WAS FASTINGP  
ERFORMED BY: AMOR [facility] [address]uyisd[address]   
Road[place] [id number]   
   
                                                    Hemoglobin Test strip   
Ql (U)          Negative        Normal                          Comprehensive   
Internal   
Medicine  
Work Phone:   
[phone]  
   
                      Ketones Ql (U) Negative   Normal                Comprehens  
bebe   
Internal   
Medicine  
Work Phone:   
[phone]  
   
                                        Comment on above:   PATIENT WAS FASTINGP  
ERFORMED BY: AMOR [facility] [address]   
Road[place] [id number]   
   
                      Ketones Ql (U) Negative   Normal                Comprehens  
bebe   
Internal   
Medicine;   
Comprehensive   
Internal   
Medicine  
Work Phone:   
[phone]  
   
                                        Comment on above:   PATIENT WAS FASTINGP  
ERFORMED BY: AMOR Jason[address]   
[place] [id number]   
   
                                                    Leukocyte esterase   
Test strip Ql (U) Negative        Normal                          Comprehensive   
Internal   
Medicine  
Work Phone:   
[phone]  
   
                                        Comment on above:   PATIENT WAS FASTINGP  
ERFORMED BY: AMOR Jason[address]   
Road[place] [id number]   
   
                                                    Leukocyte esterase   
Test strip Ql (U) Negative        Normal                          Comprehensive   
Internal   
Medicine;   
Comprehensive   
Internal   
Medicine  
Work Phone:   
[phone]  
   
                                        Comment on above:   PATIENT WAS FASTINGP  
ERFORMED BY: AMOR Jason[address]   
[place] [id number]   
   
                                                    Microscopic   
observation LM Nom   
(Urine sed)     See below:      Normal                          Comprehensive   
Internal   
Medicine  
Work Phone:   
[phone]  
   
                                        Comment on above:   PATIENT WAS FASTINGP  
ERFORMED BY: AMOR Jason[address]   
Road[place] [id number]   
   
                                                    Microscopic   
observation LM Nom   
(Urine sed)     MICRON          Normal                          Comprehensive   
Internal   
Medicine  
Work Phone:   
[phone]  
   
                                        Comment on above:   Microscopic follows   
if indicated.   
   
                                                            PATIENT WAS FASTINGP  
ERFORMED BY: AMOR Jason[address]   
Road[place] [id number]   
   
                      Nitrite Ql (U) Negative   Normal                Comprehens  
bebe   
Internal   
Medicine  
Work Phone:   
[phone]  
   
                                        Comment on above:   PATIENT WAS FASTINGP  
ERFORMED BY: AMOR Jason[address]   
Road[place] [id number]   
   
                      Nitrite Ql (U) Negative   Normal                Comprehens  
bebe   
Internal   
Medicine;   
Comprehensive   
Internal   
Medicine  
Work Phone:   
[phone]  
   
                                        Comment on above:   PATIENT WAS FASTINGP  
ERFORMED BY: AMOR Mullerlin[address]   
Road[place] [id number]   
   
                                                    Nitrite Test strip Ql   
(U)             Negative        Normal                          Comprehensive   
Internal   
Medicine  
Work Phone:   
[phone]  
   
                      pH (U)     6.5 [pH]   Normal     5.0-7.5    Comprehensive   
Internal   
Medicine  
Work Phone:   
[phone]  
   
                                        Comment on above:   PATIENT WAS FASTINGP  
ERFORMED BY: AMOR Mullerlin[address]   
[place] [id number]   
   
                      pH Test strip (U) 6.5 [pH]   Normal     5.0-7.5    Compreh  
ensive   
Internal   
Medicine  
Work Phone:   
[phone]  
   
                      Protein Ql (U) Negative   Normal                Comprehens  
bebe   
Internal   
Medicine  
Work Phone:   
[phone]  
   
                                        Comment on above:   PATIENT WAS FASTINGP  
ERFORMED BY: CB [facility] [address]   
Road[place] [id number]   
   
                      Protein Ql (U) Negative   Normal                Comprehens  
bebe   
Internal   
Medicine;   
Comprehensive   
Internal   
Medicine  
Work Phone:   
[phone]  
   
                                        Comment on above:   PATIENT WAS FASTINGP  
ERFORMED BY: CB [facility] [address]   
Road[place] [id number]   
   
                                                    Protein Test strip Ql   
(U)             Negative        Normal                          Comprehensive   
Internal   
Medicine  
Work Phone:   
[phone]  
   
                                                    Specific gravity   
Relative Density (U) 1.007 1             Normal              1.005-1.03  
0                                       Comprehensive   
Internal   
Medicine  
Work Phone:   
[phone]  
   
                                        Comment on above:   PATIENT WAS FASTINGP  
ERFORMED BY: CB [facility] [address]ngj[address]   
Road[place] [id number]   
   
                                                    Urobilinogen (U)   
[Mass/Vol]      0.2 mg/dL       Normal          0.0-1.9         Comprehensive   
Internal   
Medicine;   
Comprehensive   
Internal   
Medicine  
Work Phone:   
[phone]  
   
                                        Comment on above:   PATIENT WAS FASTINGP  
ERFORMED BY: CB [facility] [address]ub[address]   
Road[place] [id number]   
   
                                                    Urobilinogen Test   
strip mass conc (U) 0.2 mg/dL       Normal          0.0-1.9         Comprehensiv  
e   
Internal   
Medicine  
Work Phone:   
[phone]  
   
                                        Comment on above:   PATIENT WAS FASTINGP  
ERFORMED BY: CB [facility] [address]   
Road[place] [id number]   
   
                                                    Written AuthorizationOrdered  
 By:  on 2013   
   
                      Written Authorization WAR        Normal                Com  
prehensive   
Internal   
Medicine  
Work Phone:   
[phone]  
   
                                        Comment on above:   Written Authorizatio  
n Received.Authorization received from   
MERE CHAVEZ [id number] by Cathy Galan   
   
                                                    Blood Glucose , Office (8296  
2)Ordered By: Catina De Jesus on 2012   
   
                                                    Glucose Glucometer   
molar conc (BldC) 98 1            Normal                          Comprehensive   
Internal   
Medicine  
Work Phone:   
[phone]  
   
                                                    HgA1C , Office (50957)Ordere  
d By: Catina De Jesus on 2012   
   
                                                    Hemoglobin   
A1c/Hemoglobin.total   
mass fraction (Bld) 5.3 %           Normal          4.6 - 7.1       Comprehensiv  
e   
Internal   
Medicine  
Work Phone:   
[phone]  
   
                                                    Blood Glucose , Office (5796  
2)Ordered By: Verena Tuttle on 05-   
   
                                                    Glucose Glucometer   
molar conc (BldC) 131 1           Normal                          Comprehensive   
Internal   
Medicine  
Work Phone:   
[phone]  
   
                                        Comment on above:   is fasting   
   
                                                    HgA1C , Office (70951)Ordere  
d By: Alba Mustafa on 05-   
   
                                                    Hemoglobin   
A1c/Hemoglobin.total   
mass fraction (Bld) 5.3 %           Normal          4.6 - 7.1       Comprehensiv  
e   
Internal   
Medicine  
Work Phone:   
[phone]  
   
                                                    Q7BPvypjrc By: System Manage  
r on 2012   
   
                                                    Hemoglobin   
A1c/Hemoglobin.total   
mass fraction (Bld) 5.7 %           Normal          4.2-6.3         Comprehensiv  
e   
Internal   
Medicine  
Work Phone:   
[phone]  
   
                                                    CBCMDOrdered By: System Amrita  
Helixis on 2012   
   
                                                    Erythrocyte   
distribution width   
Auto Ratio (RBC) 12.1 %          Normal          11.6-14.6       Comprehensive   
Internal   
Medicine  
Work Phone:   
[phone]  
   
                                                    Hematocrit Auto   
Volume Fraction (Bld) 44.9 %          Normal          40-54           Comprehens  
bebe   
Internal   
Medicine  
Work Phone:   
[phone]  
   
                                                    Hemoglobin mass conc   
(Bld)           15.7 g/dL       Normal          14.0-18.0       Comprehensive   
Internal   
Medicine  
Work Phone:   
[phone]  
   
                                                    Lymphocytes/100 WBC   
Auto (Bld)      46 %            Abnormal        19-41           Comprehensive   
Internal   
Medicine  
Work Phone:   
[phone]  
   
                                                    MCH Auto Entitic mass   
(RBC)           32.7 pg         Abnormal        27.0-32.0       Comprehensive   
Internal   
Medicine  
Work Phone:   
[phone]  
   
                                                    MCHC Auto mass conc   
(RBC)           35.0 g/dL       Normal          32-36           Comprehensive   
Internal   
Medicine  
Work Phone:   
[phone]  
   
                                                    MCV Auto Entitic   
volume (RBC)    93.5 fL         Normal          80-94           Comprehensive   
Internal   
Medicine  
Work Phone:   
[phone]  
   
                                                    Neutrophils Auto   
#/vol (Bld)     3.5 3/uL        Normal          2.0-7.7         Comprehensive   
Internal   
Medicine  
Work Phone:   
[phone]  
   
                                                    Platelets Auto #/vol   
(Bld)           194 10*3/uL     Normal          150-450         Comprehensive   
Internal   
Medicine  
Work Phone:   
[phone]  
   
                      RBC Auto #/vol (Bld) 4.81 {M/mm3} Normal     4.6-6.2    Co  
mprehensive   
Internal   
Medicine  
Work Phone:   
[phone]  
   
                      RBC Auto #/vol (Bld) NORM C+C   Normal                Comp  
rehensive   
Internal   
Medicine  
Work Phone:   
[phone]  
   
                      WBC Auto #/vol (Bld) 7.4 10*3/uL Normal     4.4-11.0   Com  
prehensive   
Internal   
Medicine  
Work Phone:   
[phone]  
   
                      CBCMD      100 1      Normal                Comprehensive   
Internal   
Medicine  
Work Phone:   
[phone]  
   
                      CBCMD      ADEQUATE   Normal                Comprehensive   
Internal   
Medicine  
Work Phone:   
[phone]  
   
                      CBCMD      44 %       Abnormal   47-70      Comprehensive   
Internal   
Medicine  
Work Phone:   
[phone]  
   
                      CBCMD      10 %       Normal     0-10       Comprehensive   
Internal   
Medicine  
Work Phone:   
[phone]  
   
                                                    CMPOrdered By: System Manage  
r on 2012   
   
                      Albumin mass conc 4.7 g/dL   Normal     3.4-5.0    Compreh  
[facility]   
Internal   
Medicine  
Work Phone:   
[phone]  
   
                                                    Albumin/Globulin mass   
ratio           1.7 {RATIO}     Normal          0.9-2.4         Comprehensive   
Internal   
Medicine  
Work Phone:   
[phone]  
   
                      ALP enzyme act/vol 49 U/L     Abnormal        Compre  
[facility]   
Internal   
Medicine  
Work Phone:   
[phone]  
   
                      ALT enzyme act/vol 35 U/L     Normal     12-78      Compre  
[facility]   
Internal   
Medicine  
Work Phone:   
[phone]  
   
                                                    Anion gap 3 molar   
conc            12 mmol/L       Normal          5-15            Comprehensive   
Internal   
Medicine  
Work Phone:   
[phone]  
   
                      AST enzyme act/vol 21 U/L     Normal     15-37      Compre  
[facility]   
Internal   
Medicine  
Work Phone:   
[phone]  
   
                      Bilirubin mass conc 0.70 mg/dL Normal     0.00-1.00  Compr  
ehensive   
Internal   
Medicine  
Work Phone:   
[phone]  
   
                      Calcium mass conc 9.2 mg/dL  Normal     8.5-10.1   Compreh  
ensive   
Internal   
Medicine  
Work Phone:   
[phone]  
   
                      Chloride molar conc 102 mmol/L Normal          Compr  
ehensive   
Internal   
Medicine  
Work Phone:   
[phone]  
   
                      CO2 molar conc 27.0 mmol/L Normal     21.0-32.0  Comprehen  
sive   
Internal   
Medicine  
Work Phone:   
[phone]  
   
                      Creatinine mass conc 0.9 mg/dL  Normal     0.8-1.3    Comp  
rehensive   
Internal   
Medicine  
Work Phone:   
[phone]  
   
                                                    GFR/1.73 sq M   
predicted among   
blacks MDRD vol   
rate/area (S/P/Bld) 116 mL/min/{1.73_m2} Normal                          Compreh  
ensive   
Internal   
Medicine  
Work Phone:   
[phone]  
   
                                                    GFR/1.73 sq   
M.predicted MDRD vol   
rate/area       96 mL/min/{1.73_m2} Normal                          Comprehensiv  
e   
Internal   
Medicine  
Work Phone:   
[phone]  
   
                                                    Globulin Calculated   
mass conc (S)   2.7 g/dL        Normal          2.7-4.2         Comprehensive   
Internal   
Medicine  
Work Phone:   
[phone]  
   
                      Glucose mass conc 84 mg/dL   Normal          Compreh  
ensive   
Internal   
Medicine  
Work Phone:   
[phone]  
   
                      Potassium molar conc 4.3 mmol/L Normal     3.5-5.1    Comp  
rehensive   
Internal   
Medicine  
Work Phone:   
[phone]  
   
                      Protein mass conc 7.4 g/dL   Normal     6.4-8.2    Compreh  
ensive   
Internal   
Medicine  
Work Phone:   
[phone]  
   
                      Sodium molar conc 141 mmol/L Normal     136-145    Compreh  
ensive   
Internal   
Medicine  
Work Phone:   
[phone]  
   
                                                    Urea nitrogen mass   
conc            10 mg/dL        Normal          7-18            Comprehensive   
Internal   
Medicine  
Work Phone:   
[phone]  
   
                                                    Urea   
nitrogen/Creatinine   
mass ratio      11.1 {RATIO}    Normal          10-20           Comprehensive   
Internal   
Medicine  
Work Phone:   
[phone]  
   
                                                    LIPIDOrdered By: Tam hoang on 2012   
   
                                                    Cholesterol in HDL   
mass conc       43 mg/dL        Normal                          Comprehensive   
Internal   
Medicine  
Work Phone:   
[phone]  
   
                                        Comment on above:   Reference Range HDL   
<40 mg/dL Low HDL Cholesterol HDL >or= 60   
mg/dL High HDL Cholesterol   
   
                                                    Cholesterol in LDL   
mass conc       100 mg/dL       Normal          0-130           Comprehensive   
Internal   
Medicine  
Work Phone:   
[phone]  
   
                                                    Cholesterol in VLDL   
mass conc       24 mg/dL        Normal          5-40            Comprehensive   
Internal   
Medicine  
Work Phone:   
[phone]  
   
                      Cholesterol mass conc 167 mg/dL  Normal                Com  
prehensive   
Internal   
Medicine  
Work Phone:   
[phone]  
   
                                        Comment on above:   <200 mg/dL Desirable  
 200-240 mg/dL Borderline >240 mg/dL High   
Risk   
   
                                                    Triglyceride mass   
conc            121 mg/dL       Normal                          Comprehensive   
Internal   
Medicine  
Work Phone:   
[phone]  
   
                                        Comment on above:   Serum Triglycerides   
Reference Interval Normal <150 mg/dL   
Borderline high 150 - 199 mg/dL High 200 - 499 mg/dL Very High >   
or = 500 mg/dL   
   
                                                    MIACREOrdered By: System Man  
ager on 2012   
   
                      Creatinine mass conc            Normal                Comp  
rehensive   
Internal   
Medicine  
Work Phone:   
[phone]  
   
                      MIACRE     18.8 mg/dL Normal                Comprehensive   
Internal   
Medicine  
Work Phone:   
[phone]  
   
                      MIACRE     < 5.0      Normal                Comprehensive   
Internal   
Medicine  
Work Phone:   
[phone]  
   
                                                    PSAOrdered By: System Manage  
r on 2012   
   
                                                    Prostate specific Ag   
mass conc       0.5 ng/mL       Normal          0.0-4.0         Comprehensive   
Internal   
Medicine  
Work Phone:   
[phone]  
   
                                                    TSHOrdered By: System Manage  
r on 2012   
   
                      Thyrotropin Qn 1.57 {uIU/mL} Normal     0.358-3.74 Compreh  
ensive   
Internal   
Medicine  
Work Phone:   
[phone]  
   
                                                    UACOrdered By: System Manage  
r on 2012   
   
                      UAC        Negative   Normal                Comprehensive   
Internal   
Medicine  
Work Phone:   
[phone]  
   
                          UAC          <=1.005      Normal       1.002-1.03  
0                                       Comprehensive   
Internal   
Medicine  
Work Phone:   
[phone]  
   
                      UAC        5.5 1      Normal     5.0-8.0    Comprehensive   
Internal   
Medicine  
Work Phone:   
[phone]  
   
                      UAC        0.2 EU/dl  Normal     0.2 - 1.0  Comprehensive   
Internal   
Medicine  
Work Phone:   
[phone]  
   
                      UAC        0 SEEN     Normal                Comprehensive   
Internal   
Medicine  
Work Phone:   
[phone]  
   
                      UAC        STRAW      Normal                Comprehensive   
Internal   
Medicine  
Work Phone:   
[phone]  
   
                      UAC        CLEAR      Normal                Comprehensive   
Internal   
Medicine  
Work Phone:   
[phone]  
   
                                                    HGB [id number] By: Tam ivory on 2012   
   
                                                    Hemoglobin   
A1c/Hemoglobin.total   
mass fraction (Bld) 5.4 %           Normal          4.2-6.3         Comprehensiv  
e   
Internal   
Medicine  
Work Phone:   
[phone]  
   
                                                    Blood Glucose , Office (8296  
2)Ordered By: Catina De Jesus on 2011   
   
                                                    Glucose Glucometer   
molar conc (BldC) 98 1            Normal                          Comprehensive   
Internal   
Medicine  
Work Phone:   
[phone]  
   
                                                    HgA1C , Office (28256)Ordere  
d By: Catina De Jesus on 2011   
   
                                                    Hemoglobin   
A1c/Hemoglobin.total   
mass fraction (Bld) 5.6 %           Normal          4.6 - 7.1       Comprehensiv  
e   
Internal   
Medicine  
Work Phone:   
[phone]  
   
                                                    Blood Glucose , Office (8296  
2)Ordered By: Catina De Jesus on 2011   
   
                                                    Glucose Glucometer   
molar conc (BldC) 106 1           Normal                          Comprehensive   
Internal   
Medicine  
Work Phone:   
[phone]  
   
                                                    CBCD,SMEAR DIFFOrdered By: S  
ystem Manager on 2011   
   
                                                    Erythrocyte   
distribution width   
Auto Ratio (RBC) 12.3 %          Normal          11.6-14.6       Comprehensive   
Internal   
Medicine  
Work Phone:   
[phone]  
   
                                                    Hematocrit Auto   
Volume Fraction (Bld) 47.9 %          Normal          40-54           Comprehens  
bebe   
Internal   
Medicine  
Work Phone:   
[phone]  
   
                                                    Hemoglobin mass conc   
(Bld)           16.5 g/dL       Normal          14.0-18.0       Comprehensive   
Internal   
Medicine  
Work Phone:   
[phone]  
   
                                                    Lymphocytes/100 WBC   
Auto (Bld)      37 %            Normal          19-41           Comprehensive   
Internal   
Medicine  
Work Phone:   
[phone]  
   
                                                    MCH Auto Entitic mass   
(RBC)           32.7 pg         Abnormal        27.0-32.0       Comprehensive   
Internal   
Medicine  
Work Phone:   
[phone]  
   
                                                    MCHC Auto mass conc   
(RBC)           34.4 g/dL       Normal          32-36           Comprehensive   
Internal   
Medicine  
Work Phone:   
[phone]  
   
                                                    MCV Auto Entitic   
volume (RBC)    94.9 fL         Abnormal        80-94           Comprehensive   
Internal   
Medicine  
Work Phone:   
[phone]  
   
                                                    Monocytes/100 WBC   
Auto (Bld)      4 %             Normal          0-10            Comprehensive   
Internal   
Medicine  
Work Phone:   
[phone]  
   
                                                    Neutrophils Auto   
#/vol (Bld)     5.0 3/uL        Normal          2.0-7.7         Comprehensive   
Internal   
Medicine  
Work Phone:   
[phone]  
   
                                                    Platelets Auto #/vol   
(Bld)           201 10*3/uL     Normal          150-450         Comprehensive   
Internal   
Medicine  
Work Phone:   
[phone]  
   
                      RBC Auto #/vol (Bld) 5.05 {M/mm3} Normal     4.6-6.2    Co  
[facility]   
Internal   
Medicine  
Work Phone:   
[phone]  
   
                      WBC Auto #/vol (Bld) 9.0 10*3/uL Normal     4.4-11.0   Com  
[facility] [[email or web address]   
Internal   
Medicine  
Work Phone:   
[phone]  
   
                      CBCD,SMEAR DIFF 59 %       Normal     47-70      [facility]  
Work Phone:   
[phone]  
   
                      CBCD,SMEAR DIFF 100 1      Normal                [facility]   
[facility]  
Work Phone:   
[phone]  
   
                      CBCD,SMEAR DIFF SeeNote    Normal                [facility]   
[facility]  
Work Phone:   
[phone]  
   
                                        Comment on above:   Result: NORM C+C   
   
                                                            Result: ADEQUATE   
   
                      CBCD,SMEAR DIFF RARE       Normal                [facility]   
Internal   
Medicine  
Work Phone:   
[phone]  
   
                                                    COMP METABOLICOrdered By: Carlos  
stem Manager on 2011   
   
                      Albumin mass conc 4.3 g/dL   Normal     3.4-5.0    Compreh  
[facility]   
Internal   
Medicine  
Work Phone:   
[phone]  
   
                                                    Albumin/Globulin mass   
ratio           1.5 {RATIO}     Normal          0.9-2.4         Comprehensive   
Internal   
Medicine  
Work Phone:   
[phone]  
   
                      ALP enzyme act/vol 64 U/L     Normal          [facility]   
Internal   
Medicine  
Work Phone:   
[phone]  
   
                      ALT enzyme act/vol 50 U/L     Normal     12-78      [facility]   
Internal   
Medicine  
Work Phone:   
[phone]  
   
                                                    Anion gap 3 molar   
conc            12 mmol/L       Normal          5-15            Comprehensive   
Internal   
Medicine  
Work Phone:   
[phone]  
   
                      AST enzyme act/vol 26 U/L     Normal     15-37      [facility]   
Internal   
Medicine  
Work Phone:   
[phone]  
   
                      Bilirubin mass conc 0.90 mg/dL Normal     0.00-1.00  Compr  
ehensive   
Internal   
Medicine  
Work Phone:   
[phone]  
   
                      Calcium mass conc 9.8 mg/dL  Normal     8.5-10.1   Compreh  
ensive   
Internal   
Medicine  
Work Phone:   
[phone]  
   
                      Chloride molar conc 101 mmol/L Normal          Compr  
ehensive   
Internal   
Medicine  
Work Phone:   
[phone]  
   
                      CO2 molar conc 27.0 mmol/L Normal     21.0-32.0  Comprehen  
[facility]   
Internal   
Medicine  
Work Phone:   
[phone]  
   
                      Creatinine mass conc 1.0 mg/dL  Normal     0.8-1.3    Comp  
rehensive   
Internal   
Medicine  
Work Phone:   
[phone]  
   
                                                    GFR/1.73 sq M   
predicted among   
blacks MDRD vol   
rate/area (S/P/Bld) 103 mL/min/{1.73_m2} Normal                          Compreh  
ensive   
Internal   
Medicine  
Work Phone:   
[phone]  
   
                                                    GFR/1.73 sq   
M.predicted MDRD vol   
rate/area       85 mL/min/{1.73_m2} Normal                          Comprehensiv  
e   
Internal   
Medicine  
Work Phone:   
[phone]  
   
                                                    Globulin Calculated   
mass conc (S)   2.9 g/dL        Normal          2.7-4.2         Comprehensive   
Internal   
Medicine  
Work Phone:   
[phone]  
   
                      Glucose mass conc 101 mg/dL  Normal          Compreh  
ensive   
Internal   
Medicine  
Work Phone:   
[phone]  
   
                      Potassium molar conc 4.4 mmol/L Normal     3.5-5.1    Comp  
rehensive   
Internal   
Medicine  
Work Phone:   
[phone]  
   
                      Protein mass conc 7.2 g/dL   Normal     6.4-8.2    Compreh  
ensive   
Internal   
Medicine  
Work Phone:   
[phone]  
   
                      Sodium molar conc 140 mmol/L Normal     136-145    Compreh  
ensive   
Internal   
Medicine  
Work Phone:   
[phone]  
   
                                                    Urea nitrogen mass   
conc            12 mg/dL        Normal          7-18            Comprehensive   
Internal   
Medicine  
Work Phone:   
[phone]  
   
                                                    Urea   
nitrogen/Creatinine   
mass ratio      12.0 {RATIO}    Normal          10-20           Comprehensive   
Internal   
Medicine  
Work Phone:   
[phone]  
   
                                                    HgA1C , Office (17750)Abimbolae  
d By: Catina De Jesus on 2011   
   
                                                    Hemoglobin   
A1c/Hemoglobin.total   
mass fraction (Bld) 5.6 %           Normal          4.6 - 7.1       Comprehensiv  
e   
Internal   
Medicine  
Work Phone:   
[phone]  
   
                                                    MICROALBOrdered By: Tma VivoText  
aniyager on 2011   
   
                      Creatinine mass conc 4.3 {mg/g_CRE} Normal                  
Comprehensive   
Internal   
Medicine  
Work Phone:   
[phone]  
   
                      MICROALB   141.1 mg/dL Normal                Comprehensive  
   
Internal   
Medicine  
Work Phone:   
[phone]  
   
                      MICROALB   6.2 mg/L   Normal                Comprehensive   
Internal   
Medicine  
Work Phone:   
[phone]  
   
                                                    TSHOrdered By: System Manage  
r on 2011   
   
                      Thyrotropin Qn 1.65 {uIU/mL} Normal     0.358-3.74 Compreh  
ensive   
Internal   
Medicine  
Work Phone:   
[phone]  
   
                                                    BMPOrdered By: System Manage  
r on 2011   
   
                                                    Anion gap 3 molar   
conc            6 mmol/L        Normal          5-15            Comprehensive   
Internal   
Medicine  
Work Phone:   
[phone]  
   
                      Calcium mass conc 9.3 mg/dL  Normal     8.5-10.1   Compreh  
ensive   
Internal   
Medicine  
Work Phone:   
[phone]  
   
                      Chloride molar conc 103 mmol/L Normal          Compr  
ehensive   
Internal   
Medicine  
Work Phone:   
[phone]  
   
                      CO2 molar conc 28.0 mmol/L Normal     21.0-32.0  Comprehen  
[facility]   
Internal   
Medicine  
Work Phone:   
[phone]  
   
                      Creatinine mass conc 0.9 mg/dL  Normal     0.8-1.3    Comp  
rehensive   
Internal   
Medicine  
Work Phone:   
[phone]  
   
                                                    GFR/1.73 sq M   
predicted among   
blacks MDRD vol   
rate/area (S/P/Bld) 116 mL/min/{1.73_m2} Normal                          Compreh  
ensive   
Internal   
Medicine  
Work Phone:   
[phone]  
   
                                                    GFR/1.73 sq   
M.predicted MDRD vol   
rate/area       96 mL/min/{1.73_m2} Normal                          Comprehensiv  
e   
Internal   
Medicine  
Work Phone:   
[phone]  
   
                      Glucose mass conc 179 mg/dL  Abnormal        Compreh  
ensive   
Internal   
Medicine  
Work Phone:   
[phone]  
   
                                        Comment on above:   Fasting Glucose resu  
lt greater than or equal to 126 mg/dL   
suggests DIABETES MELLITUS per A.D.A. criteria.   
   
                      Potassium molar conc 4.2 mmol/L Normal     3.5-5.1    Comp  
rehensive   
Internal   
Medicine  
Work Phone:   
[phone]  
   
                      Sodium molar conc 137 mmol/L Normal     136-145    Compreh  
ensive   
Internal   
Medicine  
Work Phone:   
[phone]  
   
                                                    Urea nitrogen mass   
conc            13 mg/dL        Normal          7-18            Comprehensive   
Internal   
Medicine  
Work Phone:   
[phone]  
   
                                                    Urea   
nitrogen/Creatinine   
mass ratio      14.4 {RATIO}    Normal          10-20           Comprehensive   
Internal   
Medicine  
Work Phone:   
[phone]  
   
                                                    CKMBOrdered By: System Manag  
er on 2011   
   
                      CK.MB mass conc 1.9 ng/mL  Normal     0.0-5.0    Comprehen  
sive   
Internal   
Medicine  
Work Phone:   
[phone]  
   
                                        Comment on above:   CK-MB and RI Interpr  
etation MB Relative Index Non-AMI 5 5 > 4   
   
                      CKMB       83 U/L     Normal          Comprehensive   
Internal   
Medicine  
Work Phone:   
[phone]  
   
                                        Comment on above:   Please note: Revised  
 Creatinine Kinase (CK) Reference ramge   
effective 12/30/10.   
   
                                                    GLUPOrdered By: System Manag  
er on 2011   
   
                      GLUP       168 mg/dL  Abnormal              Comprehensive   
Internal   
Medicine  
Work Phone:   
[phone]  
   
                                        Comment on above:   Glucose result from   
140 to <200 mg/dL suggestsIMPAIRED GLUCOSE  
   
HOMEOSTASIS per A.D.A. criteria.   
   
                                                    MGOrdered By:   
 on 2011   
   
                      Magnesium mass conc 2.1 mg/dL  Normal     1.5-2.2    Compr  
ehensive   
Internal   
Medicine  
Work Phone:   
[phone]  
   
                                                    PSA, SCREENOrdered By: Syste  
m Manager on 2011   
   
                                                    Prostate specific Ag   
mass conc       0.5 ng/mL       Normal          0.0-4.0         Comprehensive   
Internal   
Medicine  
Work Phone:   
[phone]  
   
                                                    TROPONIN-IOrdered By: System  
 Manager on 2011   
   
                                                    Troponin I.cardiac   
mass conc       ng/mL           Normal                          Comprehensive   
Internal   
Medicine  
Work Phone:   
[phone]  
   
                                        Comment on above:   TROPONIN-I EXPECTED   
VALUES <0.05 NEGATIVE 0.06 - 0.59 AT RISK   
OF   
MI > OR = 0.60 SUGGEST MI   
  
  
  
Vital Signs  
  
  
                          Date Time    Vital Sign   Value        Performing   
Clinician                               Facility  
   
                                                    2023   
08:      Body height     195.58 cm       Ned Chato MANPREET [facility]   
Internal Medicine;   
Comprehensive   
Internal Medicine  
Work Phone:   
[phone]   
08:                              Body mass index   
(BMI) [Ratio]       28.47 kg/m2         [facility]    Comprehensive   
Internal Medicine;   
Comprehensive   
Internal Medicine  
Work Phone:   
[phone]   
08:                              Body surface area   
Derived from   
formula             2.42 m2             [facility]    Comprehensive   
Internal Medicine;   
Comprehensive   
Internal Medicine  
Work Phone:   
[phone]   
08:      Body temperature 97.5 [degF]     [facility] Comprehensive   
Internal Medicine;   
Comprehensive   
Internal Medicine  
Work Phone:   
[phone]   
08:      Body weight     108.92 kg       [facility] Comprehensive   
Internal Medicine;   
Comprehensive   
Internal Medicine  
Work Phone:   
[phone]   
08:                              Diastolic blood   
pressure            72 mm[Hg]           [facility]    Comprehensive   
Internal Medicine;   
Comprehensive   
Internal Medicine  
Work Phone:   
[phone]   
08:      Heart rate      83 /min         [facility] Comprehensive   
Internal Medicine;   
Comprehensive   
Internal Medicine  
Work Phone:   
[phone]   
08:      Respiratory rate 20 /min         [facility] Comprehensive   
Internal Medicine;   
Comprehensive   
Internal Medicine  
Work Phone:   
[phone]   
08:                              SaO2% (BldA) [Mass   
fraction]           96 %                [facility]    Comprehensive   
Internal Medicine;   
Comprehensive   
Internal Medicine  
Work Phone:   
[phone]   
08:                              Systolic blood   
pressure            128 mm[Hg]          [facility]    Comprehensive   
Internal Medicine;   
Comprehensive   
Internal Medicine  
Work Phone:   
[phone]   
12:      Body height     195.58 cm       [facility] Comprehensive   
Internal Medicine;   
Comprehensive   
Internal Medicine  
Work Phone:   
[phone]   
12:                              Body mass index   
(BMI) [Ratio]       18.62 kg/m2         [facility]    Comprehensive   
Internal Medicine;   
Comprehensive   
Internal Medicine  
Work Phone:   
[phone]   
12:                              Body surface area   
Derived from   
formula             2.02 m2             [facility]    Comprehensive   
Internal Medicine;   
Comprehensive   
Internal Medicine  
Work Phone:   
[phone]   
12:      Body temperature 96 [degF]       [facility] Comprehensive   
Internal Medicine;   
Comprehensive   
Internal Medicine  
Work Phone:   
[phone]   
12:      Body weight     71.22 kg        Ned Newport LPN Comprehensive   
Internal Medicine;   
Comprehensive   
Internal Medicine  
Work Phone:   
[phone]   
12:                              Diastolic blood   
pressure            60 mm[Hg]           [facility]    Comprehensive   
Internal Medicine;   
Comprehensive   
Internal Medicine  
Work Phone:   
[phone]   
12:      Heart rate      77 /min         [facility] Comprehensive   
Internal Medicine;   
Comprehensive   
Internal Medicine  
Work Phone:   
[phone]   
12:      Respiratory rate 18 /min         [facility] Comprehensive   
Internal Medicine;   
Comprehensive   
Internal Medicine  
Work Phone:   
[phone]   
12:                              SaO2% (BldA) [Mass   
fraction]           97 %                [facility]    Comprehensive   
Internal Medicine;   
Comprehensive   
Internal Medicine  
Work Phone:   
[phone]   
12:                              Systolic blood   
pressure            112 mm[Hg]          [facility]    Comprehensive   
Internal Medicine;   
Comprehensive   
Internal Medicine  
Work Phone:   
[phone]   
08:      Body height     195.58 cm       [facility] Comprehensive  
   
Internal Medicine;   
Comprehensive   
Internal Medicine  
Work Phone:   
[phone]   
08:                              Body mass index   
(BMI) [Ratio]       27.66 kg/m2         [facility]  Comprehensive   
Internal Medicine;   
Comprehensive   
Internal Medicine  
Work Phone:   
[phone]  
   
                                                    2023   
08:                              Body surface area   
Derived from   
formula             2.39 m2             [facility]  Comprehensive   
Internal Medicine;   
Comprehensive   
Internal Medicine  
Work Phone:   
[phone]  
   
                                                    2023   
08:      Body temperature 97.3 [degF]     [place]arti [place] CMA Comprehensiv  
e   
Internal Medicine;   
Comprehensive   
Internal Medicine  
Work Phone:   
[phone]  
   
                                                    2023   
08:      Body weight     105.8 kg        Abdullahi Castilloford CMA Comprehensive  
   
Internal Medicine;   
Comprehensive   
Internal Medicine  
Work Phone:   
[phone]   
08:                              Diastolic blood   
pressure            76 mm[Hg]           [place]i [place] CMA  Comprehensive   
Internal Medicine;   
Comprehensive   
Internal Medicine  
Work Phone:   
[phone]   
08:      Heart rate      81 /min         [place] CMA Comprehensive  
   
Internal Medicine;   
Comprehensive   
Internal Medicine  
Work Phone:   
[phone]   
08:      Respiratory rate 16 /min         Abdullahi Castilloford CMA Comprehensiv  
e   
Internal Medicine;   
Comprehensive   
Internal Medicine  
Work Phone:   
[phone]  
   
                                                    2023   
08:                              SaO2% (BldA) [Mass   
fraction]           96 %                Abdullahi Castilloford CMA  [facility];   
[facility]  
Work Phone:   
[phone]  
   
                                                    2023   
08:                              Systolic blood   
pressure            130 mm[Hg]          Abdullahi Castilloford CMA  [facility];   
[facility]  
Work Phone:   
[phone]   
08:      Body height     195.58 cm       Trangroyalarti [facility]  
   
Internal Medicine;   
[facility]   
[facility]  
Work Phone:   
[phone]   
08:                              Body mass index   
(BMI) [Ratio]       27.51 kg/m2         Abdullahi [facility]  [facility]   
[facility] Medicine;   
[facility]  
Work Phone:   
[phone]   
08:                              Body surface area   
Derived from   
formula             2.38 m2             Abdullahi Castilloford CMA  [facility];   
[facility]  
Work Phone:   
[phone]   
08:      Body temperature 96.9 [degF]     [place] CMA [facility]  
e   
Internal Medicine;   
[facility]   
[facility] Medicine  
Work Phone:   
[phone]   
08:      Body weight     105.24 kg       [facility]  
   
Internal Medicine;   
[facility]   
[facility] Medicine  
Work Phone:   
[phone]   
08:                              Diastolic blood   
pressure            74 mm[Hg]           [facility]  [facility]   
[facility] Medicine;   
[facility]   
[facility] Medicine  
Work Phone:   
[phone]   
08:      Heart rate      79 /min         [facility]  
   
Internal Medicine;   
[facility]   
[facility] Medicine  
Work Phone:   
[phone]   
08:      Respiratory rate 16 /min         [facility]  
e   
Internal Medicine;   
[facility]   
[facility] Medicine  
Work Phone:   
[phone]   
08:                              SaO2% (BldA) [Mass   
fraction]           98 %                [facility]  [facility]   
[facility];   
[facility]   
[facility]  
Work Phone:   
[phone]   
08:                              Systolic blood   
pressure            120 mm[Hg]          [facility]  [facility]   
[facility] Medicine;   
[facility]   
[facility] Medicine  
Work Phone:   
[phone]   
09:          Body height         195.58 cm           [facility]                                     Com[facility]   
Internal Medicine;   
[facility]   
[facility] Medicine  
Work Phone:   
[phone]   
09:                              Body mass index   
(BMI) [Ratio]             27.63 kg/m2               [facility]                                     [facility]   
Internal Medicine;   
[facility]   
[facility] Medicine  
Work Phone:   
[phone]   
09:                              Body surface area   
Derived from   
formula                   2.39 m2                   [facility]                                     Com[facility]   
Internal Medicine;   
[facility]   
[facility] Medicine  
Work Phone:   
[phone]   
09:          Body temperature    97.1 [degF]         [facility]                                     [facility]   
[facility] Medicine;   
[facility]   
[facility] Medicine  
Work Phone:   
[phone]   
09:          Body weight         105.69 kg           [facility]                                     Com[facility]   
Internal Medicine;   
[facility]   
[facility] Medicine  
Work Phone:   
[phone]   
09:                              Diastolic blood   
pressure                  78 mm[Hg]                 Rowena Preciado   
[facility]                                     Com[facility]   
Internal Medicine;   
[facility]   
[facility] Medicine  
Work Phone:   
[phone]  
   
                                                    2022   
09:          Heart rate          86 /min             Rowena Preciado   
[facility]                                     Com[facility]   
Internal Medicine;   
[facility]   
[facility] Medicine  
Work Phone:   
[phone]  
   
                                                    2022   
09:          Respiratory rate    16 /min             Rowenadenae Preciado   
[facility]                                     Com[facility]   
Internal Medicine;   
[facility] Medicine  
Work Phone:   
[phone]  
   
                                                    2022   
09:                              SaO2% (BldA) [Mass   
fraction]                 98 %                      Rowena Preciado   
[facility]                                     Com[facility]   
Internal Medicine;   
[facility]   
[facility] Medicine  
Work Phone:   
[phone]  
   
                                                    2022   
09:                              Systolic blood   
pressure                  138 mm[Hg]                Rowena Preciado   
[facility]                                     Com[facility]   
Internal Medicine;   
[facility]   
[facility] Medicine  
Work Phone:   
[phone]  
   
                                                    2021   
07:      Body height     195.58 cm       Gaby Chavez [facility]  
   
Internal Medicine;   
[facility]   
Internal Medicine  
Work Phone:   
[phone]  
   
                                                    2021   
07:                              Body mass index   
(BMI) [Ratio]       26.33 kg/m2         Gaby Chavez [facility]  [facility]   
[facility] Medicine;   
[facility]   
Internal Medicine  
Work Phone:   
[phone]  
   
                                                    2021   
07:                              Body surface area   
Derived from   
formula             2.34 m2             Gaby Chavez [facility]  [facility]   
[facility] Medicine;   
[facility]   
[facility] Medicine  
Work Phone:   
[phone]  
   
                                                    2021   
07:      Body temperature 97.3 [degF]     Gaby Chavez [facility]prehens[facility]  
e   
Internal Medicine;   
Com[facility]   
Internal Medicine  
Work Phone:   
[phone]  
   
                                                    Comment on   
above:                                  Method: Infrared   
   
                                                    2021   
07:      Body weight     100.7 kg        Gaby Chavez [facility]  
   
Internal Medicine;   
[facility]   
Internal Medicine  
Work Phone:   
[phone]  
   
                                                    2021   
07:                              Diastolic blood   
pressure            84 mm[Hg]           Gaby Chavez [facility]  [facility]   
[facility] Medicine;   
[facility]   
[facility] Medicine  
Work Phone:   
[phone]  
   
                                                    Comment on   
above:                                  Patient Position: Sitting; Cuff Location  
: Left Arm; Cuff Size:   
Standard   
   
                                                    2021   
07:      Heart rate      81 /min         Gaby Chavez CMA [facility]  
   
Internal Medicine;   
[facility]   
[facility] Medicine  
Work Phone:   
[phone]  
   
                                                    Comment on   
above:                                  Pattern: Regular   
   
                                                    2021   
07:      Respiratory rate 18 /min         Gaby Chavez CMA [facility]  
e   
Internal Medicine;   
[facility]   
[facility] Medicine  
Work Phone:   
[phone]  
   
                                                    Comment on   
above:                                  Pattern: Unlabored   
   
                                                    2021   
07:                              SaO2% (BldA) [Mass   
fraction]           97 %                Gaby Chavez [facility]  [facility]   
[facility];   
[facility]   
[facility] Medicine  
Work Phone:   
[phone]  
   
                                                    Comment on   
above:                                  Room air   
   
                                                    2021   
07:                              Systolic blood   
pressure            118 mm[Hg]          Gaby Chavez CMA  [facility];   
[facility]   
[facility] Medicine  
Work Phone:   
[phone]  
   
                                                    Comment on   
above:                                  Patient Position: Sitting; Cuff Location  
: Left Arm; Cuff Size:   
Standard   
   
                                                    2021   
11:      Body height     195.58 cm       Gaby Chavez [facility]  
   
Internal Medicine;   
[facility]   
Internal Medicine  
Work Phone:   
[phone]  
   
                                                    2021   
11:                              Body mass index   
(BMI) [Ratio]       26.09 kg/m2         [facility]aby Chavez [facility]  [facility]   
[facility] Medicine;   
[facility]   
[facility] Medicine  
Work Phone:   
[phone]  
   
                                                    2021   
11:                              Body surface area   
Derived from   
formula             2.33 m2             [facility] Chavez [facility]  [facility]   
[facility] Medicine;   
[facility]   
Internal Medicine  
Work Phone:   
[phone]  
   
                                                    2021   
11:      Body temperature 97.3 [degF]     Gaby Chavez [facility]  
e   
Internal Medicine;   
Com[facility]   
Internal Medicine  
Work Phone:   
[phone]  
   
                                                    Comment on   
above:                                  Method: Infrared   
   
                                                    2021   
11:      Body weight     99.79 kg        [facility] Chavez [facility]  
   
Internal Medicine;   
[facility]   
[facility] Medicine  
Work Phone:   
[phone]  
   
                                                    2021   
11:                              Diastolic blood   
pressure            78 mm[Hg]           [facility] Chavez [facility]  [facility]   
[facility] Medicine;   
[facility]   
[facility] Medicine  
Work Phone:   
[phone]  
   
                                                    Comment on   
above:                                  Patient Position: Sitting; Cuff Location  
: Left Arm; Cuff Size:   
Standard   
   
                                                    2021   
11:      Heart rate      88 /min         [facility] Chavez [facility]  
   
Internal Medicine;   
[facility]   
[facility] Medicine  
Work Phone:   
[phone]  
   
                                                    Comment on   
above:                                  Pattern: Regular   
   
                                                    2021   
11:      Respiratory rate 16 /min         [facility] Ch[facility]  
e   
Internal Medicine;   
[facility]   
Internal Medicine  
Work Phone:   
[phone]  
   
                                                    Comment on   
above:                                  Pattern: Unlabored   
   
                                                    2021   
11:                              SaO2% (BldA) [Mass   
fraction]           96 %                [facility] Ch[facility]  [facility]   
[facility] Medicine;   
[facility]   
[facility] Medicine  
Work Phone:   
[phone]  
   
                                                    Comment on   
above:                                  Room air   
   
                                                    2021   
11:                              Systolic blood   
pressure            130 mm[Hg]          [facility] Chavez [facility]  [facility]   
[facility] Medicine;   
[facility]   
Internal Medicine  
Work Phone:   
[phone]  
   
                                                    Comment on   
above:                                  Patient Position: Sitting; Cuff Location  
: Left Arm; Cuff Size:   
Standard   
   
                                                    2021   
10:                              BMI (Body Mass   
Index)                    25.5 kg/m2                Mere Cory DO  
Work Phone:   
[phone]                          [facility]   
[facility];   
[facility]  
Work Phone:   
[phone]  
   
                                                    Comment on   
above:                                  virtual visit   
   
                                                    2021   
10:          Body weight         97.52 kg            Mere Cory DO  
Work Phone:   
[phone]                          [facility];   
[facility]   
[facility]  
Work Phone:   
[phone]  
   
                                                    Comment on   
above:                                  virtual visit   
   
                                                    2021   
10:          BP Diastolic        80 mm[Hg]           Mere Cory DO  
Work Phone:   
[phone]                          [facility];   
[facility]  
Work Phone:   
[phone]  
   
                                                    Comment on   
above:                                  Patient Position: Sitting   
   
                                                            virtual visit   
   
                                                    2021   
10:          BP Systolic         140 mm[Hg]          Mere Cory DO  
Work Phone:   
[phone]                          [facility];   
[facility] Medicine  
Work Phone:   
[phone]  
   
                                                    Comment on   
above:                                  Patient Position: Sitting   
   
                                                            virtual visit   
   
                                                    2021   
10:                              BSA (Body Surface   
Area)                     2.31 m2                   Mere Cory DO  
Work Phone:   
[phone]                          [facility];   
[facility]   
[facility]  
Work Phone:   
[phone]  
   
                                                    Comment on   
above:                                  virtual visit   
   
                                                    2021   
10:          Height              195.58 cm           Mere Cory DO  
Work Phone:   
[phone]                          [facility];   
[facility]  
Work Phone:   
[phone]  
   
                                                    Comment on   
above:                                  virtual visit   
   
                                                    2021   
08:                              BMI (Body Mass   
Index)              25.5 kg/m2          [facility]    [facility]   
[facility] Medicine;   
[facility]   
[facility] Medicine  
Work Phone:   
[phone]  
   
                                                    Comment on   
above:                                  PT reported bp from home   
   
                                                    2021   
08:      Body weight     97.52 kg        [facility]   
Internal Medicine;   
[facility]   
Internal Medicine  
Work Phone:   
[phone]  
   
                                                    Comment on   
above:                                  PT reported bp from home   
   
                                                    2021   
08:      BP Diastolic    83 mm[Hg]       [facility]   
Internal Medicine;   
[facility]   
Internal Medicine  
Work Phone:   
[phone]  
   
                                                    Comment on   
above:                                  Patient Position: Sitting; Cuff Location  
: Left Arm; Cuff Size:   
Standard   
   
                                                            PT reported bp from   
home   
   
                                                    2021   
08:      BP Systolic     141 mm[Hg]      [facility]   
Internal Medicine;   
[facility]   
Internal Medicine  
Work Phone:   
[phone]  
   
                                                    Comment on   
above:                                  Patient Position: Sitting; Cuff Location  
: Left Arm; Cuff Size:   
Standard   
   
                                                            PT reported bp from   
home   
   
                                                    2021   
08:                              BSA (Body Surface   
Area)               2.31 m2             Anjelica Cross LPN    [facility]   
[facility] Medicine;   
[facility]   
[facility] Medicine  
Work Phone:   
[phone]  
   
                                                    Comment on   
above:                                  PT reported bp from home   
   
                                                    2021   
08:      Height          195.58 cm       [facility]   
Internal Medicine;   
[facility]   
Internal Medicine  
Work Phone:   
[phone]  
   
                                                    Comment on   
above:                                  PT reported bp from home   
   
                                                    2021   
08:                              BMI (Body Mass   
Index)                    25.5 kg/m2                Mere Cory DO  
Work Phone:   
[phone]                          [facility];   
[facility]   
[facility] Medicine  
Work Phone:   
[phone]  
   
                                                    2021   
08:          Body weight         97.52 kg            Mere Cory DO  
Work Phone:   
[phone]                          [facility]   
[facility];   
[facility]   
[facility] Medicine  
Work Phone:   
[phone]  
   
                                                    2021   
08:          BP Diastolic        80 mm[Hg]           Mere Griffithon DO  
Work Phone:   
[phone]                          [facility]   
[facility];   
[facility]   
[facility] Medicine  
Work Phone:   
[phone]  
   
                                                    Comment on   
above:                                  Patient Position: Sitting   
   
                                                    2021   
08:          BP Systolic         140 mm[Hg]          Mere Cory DO  
Work Phone:   
[phone]                          [facility];   
[facility]   
[facility] Medicine  
Work Phone:   
[phone]  
   
                                                    Comment on   
above:                                  Patient Position: Sitting   
   
                                                    2021   
08:                              BSA (Body Surface   
Area)                     2.31 m2                   Mere Griffithon DO  
Work Phone:   
[phone]                          [facility]   
[facility];   
[facility]   
[facility] Medicine  
Work Phone:   
[phone]  
   
                                                    2021   
08:          Height              195.58 cm           Mere Cory DO  
Work Phone:   
[phone]                          [facility]   
[facility];   
[facility]   
[facility] Medicine  
Work Phone:   
[phone]  
   
                                                    2020   
08:                              BMI (Body Mass   
Index)              25.5 kg/m2          [facility]    [facility]   
[facility] Medicine;   
[facility]   
Internal Medicine  
Work Phone:   
[phone]  
   
                                                    2020   
08:      Body weight     97.52 kg        [facility]   
Internal Medicine;   
[facility]   
Internal Medicine  
Work Phone:   
[phone]  
   
                                                    2020   
08:      BP Diastolic    84 mm[Hg]       [facility]   
Internal Medicine;   
[facility]   
Internal Medicine  
Work Phone:   
[phone]  
   
                                                    Comment on   
above:                                  Patient Position: Sitting; Cuff Location  
: Left Arm; Cuff Size:   
Standard   
   
                                                    2020   
08:      BP Systolic     142 mm[Hg]      [facility]   
Internal Medicine;   
[facility]   
Internal Medicine  
Work Phone:   
[phone]  
   
                                                    Comment on   
above:                                  Patient Position: Sitting; Cuff Location  
: Left Arm; Cuff Size:   
Standard   
   
                                                    2020   
08:                              BSA (Body Surface   
Area)               2.31 m2             Anjelica Cross LPN    [facility]   
[facility];   
[facility]   
[facility] Medicine  
Work Phone:   
[phone]  
   
                                                    2020   
08:      Height          195.58 cm       [facility]   
Internal Medicine;   
[facility]   
Internal Medicine  
Work Phone:   
[phone]  
   
                                                    2020   
08:      Pulse (Heart Rate) 75 /min         Anjelica Cross LPN [facility]  
e   
Internal Medicine;   
[facility]   
[facility] Medicine  
Work Phone:   
[phone]  
   
                                                    Comment on   
above:                                  Pattern: Regular   
   
                                                    2020   
08:                              BMI (Body Mass   
Index)                    25.5 kg/m2                Mere Cory DO  
Work Phone:   
[phone]                          [facility]  
Work Phone:   
[phone]  
   
                                                    Comment on   
above:                                  no other vs taken as this is phone encou  
nter due to covid   
   
                                                    2020   
08:          Body weight         97.52 kg            Mere Cory DO  
Work Phone:   
[phone]                          [facility]  
Work Phone:   
[phone]  
   
                                                    Comment on   
above:                                  no other vs taken as this is phone encou  
nter due to covid   
   
                                                    2020   
08:          BP Diastolic        75 mm[Hg]           Mere Cory DO  
Work Phone:   
[phone]                          [facility]   
[facility]  
Work Phone:   
[phone]  
   
                                                    Comment on   
above:                                  Patient Position: Sitting   
   
                                                            no other vs taken as  
 this is phone encounter due to covid   
   
                                                    2020   
08:          BP Systolic         135 mm[Hg]          Mere Cory DO  
Work Phone:   
[phone]                          [facility]  
Work Phone:   
[phone]  
   
                                                    Comment on   
above:                                  Patient Position: Sitting   
   
                                                            no other vs taken as  
 this is phone encounter due to covid   
   
                                                    2020   
08:                              BSA (Body Surface   
Area)                     2.31 m2                   Mere Cory DO  
Work Phone:   
[phone]                          [facility]  
Work Phone:   
[phone]  
   
                                                    Comment on   
above:                                  no other vs taken as this is phone encou  
nter due to covid   
   
                                                    2020   
08:          Height              195.58 cm           Mere Chavez DO  
Work Phone:   
[phone]                          [facility]  
Work Phone:   
[phone]  
   
                                                    Comment on   
above:                                  no other vs taken as this is phone encou  
nter due to covid   
   
                                                    2020   
08:                              BMI (Body Mass   
Index)                    25.5 kg/m2                Mere Griffithon DO  
Work Phone:   
[phone]                          [facility]  
Work Phone:   
[phone]  
   
                                                    Comment on   
above:                                  virtual visit not all- vital taken - one  
s reported pt gave me   
   
                                                    2020   
08:          Body weight         97.52 kg            Mere Chavez DO  
Work Phone:   
[phone]                          [facility]  
Work Phone:   
[phone]  
   
                                                    Comment on   
above:                                  virtual visit not all- vital taken - one  
s reported pt gave me   
   
                                                    2020   
08:          BP Diastolic        72 mm[Hg]           Mere Griffithon DO  
Work Phone:   
[phone]                          [facility]  
Work Phone:   
[phone]  
   
                                                    Comment on   
above:                                  Patient Position: Sitting   
   
                                                            virtual visit not al  
l- vital taken - ones reported pt gave me   
   
                                                    2020   
08:          BP Systolic         138 mm[Hg]          Mere Griffithon DO  
Work Phone:   
[phone]                          [facility]  
Work Phone:   
[phone]  
   
                                                    Comment on   
above:                                  Patient Position: Sitting   
   
                                                            virtual visit not al  
l- vital taken - ones reported pt gave me   
   
                                                    2020   
08:                              BSA (Body Surface   
Area)                     2.31 m2                   Mere Chavez DO  
Work Phone:   
[phone]                          [facility]  
Work Phone:   
[phone]  
   
                                                    Comment on   
above:                                  virtual visit not all- vital taken - one  
s reported pt gave me   
   
                                                    2020   
08:          Height              195.58 cm           Mere Griffithon DO  
Work Phone:   
[phone]                          [facility]  
Work Phone:   
[phone]  
   
                                                    Comment on   
above:                                  virtual visit not all- vital taken - one  
s reported pt gave me   
   
                                                    2020   
08:          Pulse (Heart Rate)  81 /min             Mere Chavez DO  
Work Phone:   
[phone]                          [facility]  
Work Phone:   
[phone]  
   
                                                    Comment on   
above:                                  Pattern: Regular   
   
                                                            virtual visit not al  
l- vital taken - ones reported pt gave me   
   
                                                    2020   
07:                              BMI (Body Mass   
Index)              25.5 kg/m2          Gaby Chavez CMA  [facility]  
Work Phone:   
[phone]  
   
                                                    2020   
07:      Body Temperature 96.8 [degF]     Gaby Chavez CMA [facility]  
[facility]   
[facility]  
Work Phone:   
[phone]  
   
                                                    Comment on   
above:                                  Method: Temporal   
   
                                                    2020   
07:      Body weight     97.52 kg        Gaby Chavez [facility]  
   
[facility]  
Work Phone:   
[phone]  
   
                                                    2020   
07:      BP Diastolic    100 mm[Hg]      Gaby Chavez [facility]  
   
[facility]  
Work Phone:   
[phone]  
   
                                                    Comment on   
above:                                  Patient Position: Sitting; Cuff Location  
: Left Arm; Cuff Size:   
Standard   
   
                                                    2020   
07:      BP Systolic     148 mm[Hg]      Gaby Chavez [facility]  
   
[facility] Medicine  
Work Phone:   
[phone]  
   
                                                    Comment on   
above:                                  Patient Position: Sitting; Cuff Location  
: Left Arm; Cuff Size:   
Standard   
   
                                                    2020   
07:                              BSA (Body Surface   
Area)               2.31 m2             Gaby Chavez [facility]  [facility]  
Work Phone:   
[phone]  
   
                                                    2020   
07:      Height          195.58 cm       Gaby Chavez [facility]  
   
[facility] Medicine  
Work Phone:   
[phone]  
   
                                                    2020   
07:      Pulse (Heart Rate) 88 /min         Gaby Chavez CMA [facility]  
[facility]   
[facility] Medicine  
Work Phone:   
[phone]  
   
                                                    Comment on   
above:                                  Pattern: Regular   
   
                                                    2020   
07:      Pulse Oximetry  97 %            Meretye Chavez [facility]  
Work Phone:   
[phone]  
   
                                                    Comment on   
above:                                  Room air   
   
                                                    2020   
07:      Respiratory Rate 16 /min         Gaby Chavez CMA [facility]  
[facility]   
[facility] Medicine  
Work Phone:   
[phone]  
   
                                                    Comment on   
above:                                  Pattern: Unlabored   
   
                                                    2020   
07:                              SaO2% (BldA) [Mass   
fraction]           97 %                Gaby Chavez [facility]  [facility];   
[facility]  
Work Phone:   
[phone]  
   
                                                    Comment on   
above:                                  Room air   
   
                                                    2020   
08:                              BMI (Body Mass   
Index)              24.31 kg/m2         Lashell Valdez LPN    [facility]  
Work Phone:   
[phone]  
   
                                                    Comment on   
above:                                  pt reported own weight.   
   
                                                    2020   
08:      Body weight     92.99 kg        Lashell Valdez LPN [facility]  
Work Phone:   
[phone]  
   
                                                    Comment on   
above:                                  pt reported own weight.   
   
                                                    2020   
08:110400                              BSA (Body Surface   
Area)               2.26 m2             Lashell Joaquinrb LPN    [facility]  
Work Phone:   
[phone]  
   
                                                    Comment on   
above:                                  pt reported own weight.   
   
                                                    2020   
08:110400      Height          195.58 cm       Lashell Valdez LPN [facility]  
Work Phone:   
[phone]  
   
                                                    Comment on   
above:                                  pt reported own weight.   
   
                                                    2020   
07:                              BMI (Body Mass   
Index)              24.66 kg/m2         Gaby Chavez [facility]  [facility]  
Work Phone:   
[phone]  
   
                                                    2020   
07:      Body Temperature 96.4 [degF]     Gaby Chavez [facility]  
e   
Internal Medicine  
Work Phone:   
[phone]  
   
                                                    Comment on   
above:                                  Method: Temporal   
   
                                                    2020   
07:      Body weight     94.35 kg        Gaby Chavez [facility]  
   
Internal Medicine  
Work Phone:   
[phone]  
   
                                                    2020   
07:      BP Diastolic    76 mm[Hg]       Gaby Chavez [facility]  
   
Internal Medicine  
Work Phone:   
[phone]  
   
                                                    Comment on   
above:                                  Patient Position: Sitting; Cuff Location  
: Left Arm; Cuff Size:   
Standard   
   
                                                    2020   
07:      BP Systolic     121 mm[Hg]      [facility]aby [facility]  
   
Internal Medicine  
Work Phone:   
[phone]  
   
                                                    Comment on   
above:                                  Patient Position: Sitting; Cuff Location  
: Left Arm; Cuff Size:   
Standard   
   
                                                    2020   
07:                              BSA (Body Surface   
Area)               2.27 m2             Gaby Chavez [facility]  [facility]  
Work Phone:   
[phone]  
   
                                                    2020   
07:      Height          195.58 cm       Gaby Chavez CMA [facility]  
   
Internal Medicine  
Work Phone:   
[phone]  
   
                                                    2020   
07:      Pulse (Heart Rate) 71 /min         Gaby Chavez CMA [facility]  
[facility]   
Internal Medicine  
Work Phone:   
[phone]  
   
                                                    Comment on   
above:                                  Pattern: Regular   
   
                                                    2020   
07:      Pulse Oximetry  97 %            Mere Chavez [facility]  
Work Phone:   
[phone]  
   
                                                    Comment on   
above:                                  Room air   
   
                                                    2020   
07:      Respiratory Rate 16 /min         Gaby Chavez CMA [facility]  
e   
Internal Medicine  
Work Phone:   
[phone]  
   
                                                    Comment on   
above:                                  Pattern: Unlabored   
   
                                                    2020   
07:                              SaO2% (BldA) [Mass   
fraction]           97 %                Gaby Chavez [facility]  [facility]   
[facility];   
[facility]  
Work Phone:   
[phone]  
   
                                                    Comment on   
above:                                  Room air   
   
                                                    2019   
07:                              BMI (Body Mass   
Index)              26.21 kg/m2         Verena Tuttle RN     [facility]  
Work Phone:   
[phone]  
   
                                                    2019   
07:      Body Temperature 98.2 [degF]     Verena Tuttle RN [facility]  
Work Phone:   
[phone]  
   
                                                    Comment on   
above:                                  Method: Temporal   
   
                                                    2019   
07:      Body weight     100.25 kg       Verena Tuttle RN [facility]  
Work Phone:   
[phone]  
   
                                                    2019   
07:      BP Diastolic    78 mm[Hg]       Verena Tuttle RN [facility]  
Work Phone:   
[phone]  
   
                                                    Comment on   
above:                                  Patient Position: Sitting; Cuff Location  
: Left Arm; Cuff Size:   
Standard   
   
                                                    2019   
07:      BP Systolic     138 mm[Hg]      Verena Tuttle RN [facility]  
Work Phone:   
[phone]  
   
                                                    Comment on   
above:                                  Patient Position: Sitting; Cuff Location  
: Left Arm; Cuff Size:   
Standard   
   
                                                    2019   
07:                              BSA (Body Surface   
Area)               2.33 m2             Verena Tuttle RN     [facility]  
Work Phone:   
[phone]  
   
                                                    2019   
07:      Height          195.58 cm       Verena Tuttle RN [facility]  
Work Phone:   
[phone]  
   
                                                    2019   
07:      Pulse (Heart Rate) 80 /min         Verena Tuttle RN [facility]  
   
[facility] Medicine  
Work Phone:   
[phone]  
   
                                                    Comment on   
above:                                  Pattern: Regular   
   
                                                    2019   
07:      Pulse Oximetry  95 %            Mere Chavez [facility]  
Work Phone:   
[phone]  
   
                                                    Comment on   
above:                                  Room air   
   
                                                    2019   
07:                              SaO2% (BldA) [Mass   
fraction]           95 %                Verena Tuttle RN     [facility];   
[facility]  
Work Phone:   
[phone]  
   
                                                    Comment on   
above:                                  Room air   
   
                                                    05-   
08:                              BMI (Body Mass   
Index)              26.34 kg/m2         Catina De Jesus RN  [facility]  
Work Phone:   
[phone]  
   
                                                    05-   
08:      Body weight     100.76 kg       Catina De Jesus RN [facility]  
   
[facility] Medicine  
Work Phone:   
[phone]  
   
                                                    05-   
08:      BP Diastolic    82 mm[Hg]       Catina De Jesus RN [facility]  
   
[facility] Medicine  
Work Phone:   
[phone]  
   
                                                    Comment on   
above:                                  Patient Position: Sitting; Cuff Location  
: Left Arm; Cuff Size: Large   
   
                                                    05-   
08:      BP Systolic     118 mm[Hg]      Catina De Jesus RN [facility]  
   
[facility]  
Work Phone:   
[phone]  
   
                                                    Comment on   
above:                                  Patient Position: Sitting; Cuff Location  
: Left Arm; Cuff Size: Large   
   
                                                    05-   
08:                              BSA (Body Surface   
Area)               2.34 m2             Catina De Jesus RN  [facility]  
Work Phone:   
[phone]  
   
                                                    05-   
08:      Height          195.58 cm       Catina De Jesus RN [facility]  
   
[facility] Medicine  
Work Phone:   
[phone]  
   
                                                    05-   
08:      Pulse (Heart Rate) 75 /min         [facility]  
Work Phone:   
[phone]  
   
                                                    Comment on   
above:                                  Pattern: Regular   
   
                                                    05-   
08:      Pulse Oximetry  97 %            Mere Chavez [facility]  
Work Phone:   
[phone]  
   
                                                    Comment on   
above:                                  Room air   
   
                                                    05-   
08:      Respiratory Rate 18 /min         Catina De Jesus RN [facility]  
e   
Internal Medicine  
Work Phone:   
[phone]  
   
                                                    Comment on   
above:                                  Pattern: Unlabored   
   
                                                    05-   
08:                              SaO2% (BldA) [Mass   
fraction]           97 %                Catina De Jesus RN  [facility];   
[facility]  
Work Phone:   
[phone]  
   
                                                    Comment on   
above:                                  Room air   
   
                                                    2018   
09:                              BMI (Body Mass   
Index)              27.39 kg/m2         Verena Tuttle RN     [facility]  
Work Phone:   
[phone]  
   
                                                    2018   
09:      Body Temperature 98.2 [degF]     Verena Tuttle RN [facility]  
Work Phone:   
[phone]  
   
                                                    Comment on   
above:                                  Method: Temporal   
   
                                                    2018   
09:      Body weight     104.78 kg       Verena Tuttle RN [facility]  
Work Phone:   
[phone]  
   
                                                    2018   
09:      BP Diastolic    76 mm[Hg]       Verena L Parag RN [facility]   
[facility]  
Work Phone:   
[phone]  
   
                                                    Comment on   
above:                                  Patient Position: Sitting; Cuff Location  
: Left Arm; Cuff Size:   
Standard   
   
                                                    2018   
09:      BP Systolic     130 mm[Hg]      Verena Tuttle RN [facility]  
Work Phone:   
[phone]  
   
                                                    Comment on   
above:                                  Patient Position: Sitting; Cuff Location  
: Left Arm; Cuff Size:   
Standard   
   
                                                    2018   
09:                              BSA (Body Surface   
Area)               2.38 m2             Verena Tuttle RN     [facility]  
Work Phone:   
[phone]  
   
                                                    2018   
09:      Height          195.58 cm       Verena Tuttle RN [facility]   
[facility]  
Work Phone:   
[phone]  
   
                                                    2018   
09:      Pulse (Heart Rate) 88 /min         Verena Tuttle RN [facility]  
   
Internal Medicine  
Work Phone:   
[phone]  
   
                                                    Comment on   
above:                                  Pattern: Regular   
   
                                                    2018   
09:      Pulse Oximetry  95 %            Mere Chavez [facility]   
[facility]  
Work Phone:   
[phone]  
   
                                                    Comment on   
above:                                  Room air   
   
                                                    2018   
09:      Respiratory Rate 16 /min         Verena Tuttle RN [facility]   
[facility]  
Work Phone:   
[phone]  
   
                                                    Comment on   
above:                                  Pattern: Unlabored   
   
                                                    2018   
09:                              SaO2% (BldA) [Mass   
fraction]           95 %                Verena Tuttle RN     [facility];   
[facility]   
[facility]  
Work Phone:   
[phone]  
   
                                                    Comment on   
above:                                  Room air   
   
                                                    2018   
09:      Weight          104.78 kg       Mere Chavez [facility]  
Work Phone:   
[phone]  
   
                                                    2018   
08:                              BMI (Body Mass   
Index)              28.1 kg/m2          Catina De Jesus RN  [facility]   
[facility]  
Work Phone:   
[phone]  
   
                                                    2018   
08:      Body weight     107.5 kg        Catina De Jesus RN [facility]  
   
Internal Medicine  
Work Phone:   
[phone]  
   
                                                    2018   
08:      BP Diastolic    82 mm[Hg]       Catina De Jesus RN [facility]  
   
Internal Medicine  
Work Phone:   
[phone]  
   
                                                    Comment on   
above:                                  Patient Position: Sitting; Cuff Location  
: Left Arm; Cuff Size: Large   
   
                                                    2018   
08:      BP Systolic     128 mm[Hg]      Catina De Jesus RN [facility]  
   
[facility] Medicine  
Work Phone:   
[phone]  
   
                                                    Comment on   
above:                                  Patient Position: Sitting; Cuff Location  
: Left Arm; Cuff Size: Large   
   
                                                    2018   
08:                              BSA (Body Surface   
Area)               2.4 m2              Catina De Jesus RN  [facility]   
Internal Medicine  
Work Phone:   
[phone]  
   
                                                    2018   
08:      Height          195.58 cm       Catina De Jesus RN [facility]  
   
Internal Medicine  
Work Phone:   
[phone]  
   
                                                    2018   
08:      Pulse (Heart Rate) 80 /min         Catina De Jesus RN [facility]  
[facility]   
Internal Medicine  
Work Phone:   
[phone]  
   
                                                    Comment on   
above:                                  Pattern: Regular   
   
                                                    2018   
08:      Pulse Oximetry  96 %            Mere Griffithon [facility]   
Internal Medicine  
Work Phone:   
[phone]  
   
                                                    Comment on   
above:                                  Room air   
   
                                                    2018   
08:      Respiratory Rate 18 /min         [facility]  
Work Phone:   
[phone]  
   
                                                    Comment on   
above:                                  Pattern: Unlabored   
   
                                                    2018   
08:                              SaO2% (BldA) [Mass   
fraction]           96 %                Catina De Jesus RN  [facility]   
Internal Medicine;   
[facility]   
Internal Medicine  
Work Phone:   
[phone]  
   
                                                    Comment on   
above:                                  Room air   
   
                                                    2018   
08:      Weight          107.5 kg        Mere Cory [facility]   
Internal Medicine  
Work Phone:   
[phone]  
   
                                                    2018   
15:                              BMI (Body Mass   
Index)              28.82 kg/m2         Carline [facility]   
Internal Medicine  
Work Phone:   
[phone]  
   
                                                    2018   
15:      Body weight     110.22 kg       Carline Spencer     [facility]   
Internal Medicine  
Work Phone:   
[phone]  
   
                                                    2018   
15:      BP Diastolic    82 mm[Hg]       [facility]line [facility]   
Internal Medicine  
Work Phone:   
[phone]  
   
                                                    Comment on   
above:                                  Patient Position: Sitting; Cuff Location  
: Left Arm; Cuff Size:   
Standard   
   
                                                    2018   
15:      BP Systolic     134 mm[Hg]      [facility]   
Internal Medicine  
Work Phone:   
[phone]  
   
                                                    Comment on   
above:                                  Patient Position: Sitting; Cuff Location  
: Left Arm; Cuff Size:   
Standard   
   
                                                    2018   
15:                              BSA (Body Surface   
Area)               2.43 m2             [facility]   
Internal Medicine  
Work Phone:   
[phone]  
   
                                                    2018   
15:      Height          195.58 cm       [facility]   
Internal Medicine  
Work Phone:   
[phone]  
   
                                                    2018   
15:      Pulse (Heart Rate) 73 /min         [facility]  
   
[facility]  
Work Phone:   
[phone]  
   
                                                    Comment on   
above:                                  Pattern: Regular   
   
                                                    2018   
15:      Pulse Oximetry  94 %            Mere Cory [facility]   
Internal Medicine  
Work Phone:   
[phone]  
   
                                                    Comment on   
above:                                  Room air   
   
                                                    2018   
15:      Respiratory Rate 18 /min         Carline Spencer     [facility]   
Internal Medicine  
Work Phone:   
[phone]  
   
                                                    Comment on   
above:                                  Pattern: Unlabored   
   
                                                    2018   
15:                              SaO2% (BldA) [Mass   
fraction]           94 %                Carline Spencer         [facility]   
Internal Medicine;   
[facility]   
Internal Medicine  
Work Phone:   
[phone]  
   
                                                    Comment on   
above:                                  Room air   
   
                                                    2018   
15:      Weight          110.22 kg       Mere Chavez [facility]   
Internal Medicine  
Work Phone:   
[phone]  
   
                                                    2017   
07:                              BMI (Body Mass   
Index)              28.86 kg/m2         Catina De Jesus RN  [facility]   
Internal Medicine  
Work Phone:   
[phone]  
   
                                                    2017   
07:      Body weight     110.39 kg       Catina De Jesus RN [facility]  
   
Internal Medicine  
Work Phone:   
[phone]  
   
                                                    2017   
07:      BP Diastolic    82 mm[Hg]       Catina De Jesus RN [facility]  
   
Internal Medicine  
Work Phone:   
[phone]  
   
                                                    Comment on   
above:                                  Patient Position: Sitting; Cuff Location  
: Left Arm; Cuff Size: Large   
   
                                                    2017   
07:      BP Systolic     138 mm[Hg]      Catina De Jesus RN [facility]  
   
Internal Medicine  
Work Phone:   
[phone]  
   
                                                    Comment on   
above:                                  Patient Position: Sitting; Cuff Location  
: Left Arm; Cuff Size: Large   
   
                                                    2017   
07:                              BSA (Body Surface   
Area)               2.43 m2             Catina De Jesus RN  [facility]   
Internal Medicine  
Work Phone:   
[phone]  
   
                                                    2017   
07:      Height          195.58 cm       Catina De Jesus RN [facility]  
   
Internal Medicine  
Work Phone:   
[phone]  
   
                                                    2017   
07:      Pulse (Heart Rate) 87 /min         Catina De Jesus RN [facility]prehens  
bebe   
Internal Medicine  
Work Phone:   
[phone]  
   
                                                    Comment on   
above:                                  Pattern: Regular   
   
                                                    2017   
07:      Pulse Oximetry  97 %            Mere Chavez [facility]   
Internal Medicine  
Work Phone:   
[phone]  
   
                                                    Comment on   
above:                                  Room air   
   
                                                    2017   
07:      Respiratory Rate 18 /min         Catina De Jesus RN Comprehensiv  
e   
Internal Medicine  
Work Phone:   
[phone]  
   
                                                    Comment on   
above:                                  Pattern: Unlabored   
   
                                                    2017   
07:                              SaO2% (BldA) [Mass   
fraction]           97 %                Catina De Jesus RN  [facility]   
Internal Medicine;   
[facility]   
Internal Medicine  
Work Phone:   
[phone]  
   
                                                    Comment on   
above:                                  Room air   
   
                                                    2017   
07:130500      Weight          110.39 kg       Mere Chavez [facility]   
Internal Medicine  
Work Phone:   
[phone]  
   
                                                    2017   
07:                              BMI (Body Mass   
Index)              29.17 kg/m2         Catina De Jesus RN  [facility]   
Internal Medicine  
Work Phone:   
[phone]  
   
                                                    2017   
07:      Body weight     111.59 kg       Catina De Jesus RN [facility]  
   
Internal Medicine  
Work Phone:   
[phone]  
   
                                                    2017   
07:      BP Diastolic    82 mm[Hg]       Catina De Jesus RN [facility]  
   
Internal Medicine  
Work Phone:   
[phone]  
   
                                                    Comment on   
above:                                  Patient Position: Sitting; Cuff Location  
: Left Arm; Cuff Size: Large   
   
                                                    2017   
07:      BP Systolic     142 mm[Hg]      Catina De Jesus RN [facility]  
   
Internal Medicine  
Work Phone:   
[phone]  
   
                                                    Comment on   
above:                                  Patient Position: Sitting; Cuff Location  
: Left Arm; Cuff Size: Large   
   
                                                    2017   
07:                              BSA (Body Surface   
Area)               2.44 m2             Catina De Jesus RN  [facility]   
Internal Medicine  
Work Phone:   
[phone]  
   
                                                    2017   
07:      Height          195.58 cm       Catina De Jesus RN [facility]  
   
Internal Medicine  
Work Phone:   
[phone]  
   
                                                    2017   
07:      Pulse (Heart Rate) 91 /min         Catina De Jesus RN [facility]  
bebe   
Internal Medicine  
Work Phone:   
[phone]  
   
                                                    Comment on   
above:                                  Pattern: Regular   
   
                                                    2017   
07:      Pulse Oximetry  98 %            Mere Griffithon [facility]   
Internal Medicine  
Work Phone:   
[phone]  
   
                                                    Comment on   
above:                                  Room air   
   
                                                    2017   
07:      Respiratory Rate 18 /min         Catina De Jesus RN Comprehensiv  
e   
Internal Medicine  
Work Phone:   
[phone]  
   
                                                    Comment on   
above:                                  Pattern: Unlabored   
   
                                                    2017   
07:                              SaO2% (BldA) [Mass   
fraction]           98 %                Catina De Jesus RN  [facility]   
Internal Medicine;   
[facility]   
Internal Medicine  
Work Phone:   
[phone]  
   
                                                    Comment on   
above:                                  Room air   
   
                                                    2017   
07:      Weight          111.59 kg       Mere Chavez [facility]   
Internal Medicine  
Work Phone:   
[phone]  
   
                                                    2017   
06:                              BMI (Body Mass   
Index)              28.46 kg/m2         Catina De Jesus RN  [facility]   
Internal Medicine  
Work Phone:   
[phone]  
   
                                                    2017   
06:      Body weight     108.86 kg       Catina De Jesus RN [facility]  
   
Internal Medicine  
Work Phone:   
[phone]  
   
                                                    2017   
06:      BP Diastolic    80 mm[Hg]       Catina De Jesus RN [facility]  
   
Internal Medicine  
Work Phone:   
[phone]  
   
                                                    Comment on   
above:                                  Patient Position: Sitting; Cuff Location  
: Left Arm; Cuff Size: Large   
   
                                                    2017   
06:      BP Systolic     122 mm[Hg]      Catina De Jesus RN [facility]  
   
Internal Medicine  
Work Phone:   
[phone]  
   
                                                    Comment on   
above:                                  Patient Position: Sitting; Cuff Location  
: Left Arm; Cuff Size: Large   
   
                                                    2017   
06:                              BSA (Body Surface   
Area)               2.42 m2             Catina De Jesus RN  [facility]   
Internal Medicine  
Work Phone:   
[phone]  
   
                                                    2017   
06:      Height          195.58 cm       Catina De Jesus RN [facility]  
   
Internal Medicine  
Work Phone:   
[phone]  
   
                                                    2017   
06:      Pulse (Heart Rate) 89 /min         Catina De Jesus RN [facility]  
[facility]   
Internal Medicine  
Work Phone:   
[phone]  
   
                                                    Comment on   
above:                                  Pattern: Regular   
   
                                                    2017   
06:      Pulse Oximetry  96 %            Mere Chavez [facility]   
Internal Medicine  
Work Phone:   
[phone]  
   
                                                    Comment on   
above:                                  Room air   
   
                                                    2017   
06:      Respiratory Rate 18 /min         Catina De Jesus RN [facility]prehens[facility]  
e   
Internal Medicine  
Work Phone:   
[phone]  
   
                                                    Comment on   
above:                                  Pattern: Unlabored   
   
                                                    2017   
06:                              SaO2% (BldA) [Mass   
fraction]           96 %                Catina De Jesus RN  [facility]   
Internal Medicine;   
[facility]   
Internal Medicine  
Work Phone:   
[phone]  
   
                                                    Comment on   
above:                                  Room air   
   
                                                    2017   
06:      Weight          108.86 kg       Mere Chavez [facility]   
Internal Medicine  
Work Phone:   
[phone]  
   
                                                    10-   
07:                              BMI (Body Mass   
Index)              28.76 kg/m2         Catina De Jesus RN  [facility]   
Internal Medicine  
Work Phone:   
[phone]  
   
                                                    10-   
07:      Body weight     110 kg          Catina De Jesus RN [facility]  
   
Internal Medicine  
Work Phone:   
[phone]  
   
                                                    10-   
07:      BP Diastolic    82 mm[Hg]       Catina De Jesus RN [facility]  
   
Internal Medicine  
Work Phone:   
[phone]  
   
                                                    Comment on   
above:                                  Patient Position: Sitting; Cuff Location  
: Left Arm; Cuff Size: Large   
   
                                                    10-   
07:      BP Systolic     142 mm[Hg]      Catina De Jesus RN [facility]  
   
Internal Medicine  
Work Phone:   
[phone]  
   
                                                    Comment on   
above:                                  Patient Position: Sitting; Cuff Location  
: Left Arm; Cuff Size: Large   
   
                                                    10-   
07:                              BSA (Body Surface   
Area)               2.43 m2             Catina De Jesus RN  [facility]   
Internal Medicine  
Work Phone:   
[phone]  
   
                                                    10-   
07:      Height          195.58 cm       Catina De Jesus RN [facility]  
   
Internal Medicine  
Work Phone:   
[phone]  
   
                                                    10-   
07:      Pulse (Heart Rate) 78 /min         Catina De Jesus RN Comprehens  
[facility]be   
Internal Medicine  
Work Phone:   
[phone]  
   
                                                    Comment on   
above:                                  Pattern: Regular   
   
                                                    10-   
07:      Pulse Oximetry  95 %            Mere Griffithon [facility]   
Internal Medicine  
Work Phone:   
[phone]  
   
                                                    Comment on   
above:                                  Room air   
   
                                                    10-   
07:      Respiratory Rate 18 /min         Catina De Jesus RN Comprehens[facility]  
e   
Internal Medicine  
Work Phone:   
[phone]  
   
                                                    Comment on   
above:                                  Pattern: Unlabored   
   
                                                    10-   
07:                              SaO2% (BldA) [Mass   
fraction]           95 %                Catina De Jesus RN  [facility]   
Internal Medicine;   
[facility]   
Internal Medicine  
Work Phone:   
[phone]  
   
                                                    Comment on   
above:                                  Room air   
   
                                                    10-   
07:      Weight          110 kg          Mere Griffithon [facility]   
Internal Medicine  
Work Phone:   
[phone]  
   
                                                    2016   
06:                              BMI (Body Mass   
Index)              27.88 kg/m2         Catina De Jesus RN  [facility]   
Internal Medicine  
Work Phone:   
[phone]  
   
                                                    2016   
06:      Body weight     106.65 kg       Catina De Jesus RN [facility]  
   
Internal Medicine  
Work Phone:   
[phone]  
   
                                                    2016   
06:      BP Diastolic    78 mm[Hg]       Catina De Jesus RN [facility]  
   
Internal Medicine  
Work Phone:   
[phone]  
   
                                                    Comment on   
above:                                  Patient Position: Sitting; Cuff Location  
: Left Arm; Cuff Size: Large   
   
                                                    2016   
06:      BP Systolic     120 mm[Hg]      Catina De Jesus RN [facility]  
   
Internal Medicine  
Work Phone:   
[phone]  
   
                                                    Comment on   
above:                                  Patient Position: Sitting; Cuff Location  
: Left Arm; Cuff Size: Large   
   
                                                    2016   
06:                              BSA (Body Surface   
Area)               2.4 m2              Catina De Jesus RN  [facility]   
Internal Medicine  
Work Phone:   
[phone]  
   
                                                    2016   
06:      Height          195.58 cm       Catina De Jesus RN [facility]  
   
Internal Medicine  
Work Phone:   
[phone]  
   
                                                    2016   
06:      Pulse (Heart Rate) 86 /min         Catina De Jesus RN Comprehens  
bebe   
Internal Medicine  
Work Phone:   
[phone]  
   
                                                    Comment on   
above:                                  Pattern: Regular   
   
                                                    2016   
06:      Pulse Oximetry  95 %            Mere Chavez [facility]   
Internal Medicine  
Work Phone:   
[phone]  
   
                                                    Comment on   
above:                                  Room air   
   
                                                    2016   
06:      Respiratory Rate 18 /min         Catina De Jesus RN [facility]  
e   
Internal Medicine  
Work Phone:   
[phone]  
   
                                                    Comment on   
above:                                  Pattern: Unlabored   
   
                                                    2016   
06:                              SaO2% (BldA) [Mass   
fraction]           95 %                Catina De Jesus RN  [facility]   
[facility] Medicine;   
[facility]   
Internal Medicine  
Work Phone:   
[phone]  
   
                                                    Comment on   
above:                                  Room air   
   
                                                    2016   
06:      Weight          106.65 kg       Mere Chavez [facility]   
Internal Medicine  
Work Phone:   
[phone]  
   
                                                    2016   
08:                              BMI (Body Mass   
Index)                    28.53 kg/m2               Rowena Preciado CMA                                     [facility]   
Internal Medicine  
Work Phone:   
[phone]  
   
                                                    2016   
08:          Body weight         109.14 kg           Rowena Manchak   
[facility]   
Internal Medicine  
Work Phone:   
[phone]  
   
                                                    2016   
08:          BP Diastolic        80 mm[Hg]           Rowena Preciado   
[facility]   
Internal Medicine  
Work Phone:   
[phone]  
   
                                                    Comment on   
above:                                  Patient Position: Sitting; Cuff Location  
: Left Arm; Cuff Size:   
Standard   
   
                                                    2016   
08:          BP Systolic         122 mm[Hg]          Rowena Preciado   
[facility]   
Internal Medicine  
Work Phone:   
[phone]  
   
                                                    Comment on   
above:                                  Patient Position: Sitting; Cuff Location  
: Left Arm; Cuff Size:   
Standard   
   
                                                    2016   
08:                              BSA (Body Surface   
Area)                     2.42 m2                   Rowena Preciado   
[facility]   
Internal Medicine  
Work Phone:   
[phone]  
   
                                                    2016   
08:          Height              195.58 cm           Rowena Preciado   
[facility]   
Internal Medicine  
Work Phone:   
[phone]  
   
                                                    2016   
08:          Pulse (Heart Rate)  72 /min             Rowena Preciado   
[facility]   
Internal Medicine  
Work Phone:   
[phone]  
   
                                                    Comment on   
above:                                  Pattern: Regular   
   
                                                    2016   
08:      Pulse Oximetry  98 %            Mere Chavez [facility]   
Internal Medicine  
Work Phone:   
[phone]  
   
                                                    Comment on   
above:                                  Room air   
   
                                                    2016   
08:          Respiratory Rate    16 /min             Rowena Preciado   
[facility]   
Internal Medicine  
Work Phone:   
[phone]  
   
                                                    Comment on   
above:                                  Pattern: Unlabored   
   
                                                    2016   
08:                              SaO2% (BldA) [Mass   
fraction]                 98 %                      Rowena Preciado   
[facility]   
Internal Medicine;   
[facility]   
Internal Medicine  
Work Phone:   
[phone]  
   
                                                    Comment on   
above:                                  Room air   
   
                                                    2016   
08:      Weight          109.14 kg       Mere Chavez [facility]   
Internal Medicine  
Work Phone:   
[phone]  
   
                                                    2015   
08:                              BMI (Body Mass   
Index)              29.01 kg/m2         Verena Tuttle RN     [facility]   
Internal Medicine  
Work Phone:   
[phone]  
   
                                                    2015   
08:      Body Temperature 97.8 [degF]     Verena Tuttle RN [facility]   
Internal Medicine  
Work Phone:   
[phone]  
   
                                                    Comment on   
above:                                  Method: Temporal   
   
                                                    2015   
08:      Body weight     110.95 kg       Verena Tuttle RN [facility]   
Internal Medicine  
Work Phone:   
[phone]  
   
                                                    2015   
08:      BP Diastolic    78 mm[Hg]       Verena Tuttle RN [facility]   
Internal Medicine  
Work Phone:   
[phone]  
   
                                                    Comment on   
above:                                  Patient Position: Sitting; Cuff Location  
: Left Arm; Cuff Size:   
Standard   
   
                                                    2015   
08:      BP Systolic     126 mm[Hg]      Verena Tuttle RN [facility]   
Internal Medicine  
Work Phone:   
[phone]  
   
                                                    Comment on   
above:                                  Patient Position: Sitting; Cuff Location  
: Left Arm; Cuff Size:   
Standard   
   
                                                    2015   
08:                              BSA (Body Surface   
Area)               2.44 m2             Verena Tuttle RN     [facility]   
Internal Medicine  
Work Phone:   
[phone]  
   
                                                    2015   
08:      Height          195.58 cm       Verena Tuttle RN [facility]   
Internal Medicine  
Work Phone:   
[phone]  
   
                                                    2015   
08:      Pulse (Heart Rate) 81 /min         Verena Tuttle RN [facility]  
   
Internal Medicine  
Work Phone:   
[phone]  
   
                                                    Comment on   
above:                                  Pattern: Regular   
   
                                                    2015   
08:      Pulse Oximetry  96 %            Mere Cory [facility]   
Internal Medicine  
Work Phone:   
[phone]  
   
                                                    Comment on   
above:                                  Room air   
   
                                                    2015   
08:      Respiratory Rate 17 /min         Verena Tuttle RN [facility]   
Internal Medicine  
Work Phone:   
[phone]  
   
                                                    Comment on   
above:                                  Pattern: Unlabored   
   
                                                    2015   
08:                              SaO2% (BldA) [Mass   
fraction]           96 %                Verena Tuttle RN     [facility]   
Internal Medicine;   
[facility]   
Internal Medicine  
Work Phone:   
[phone]  
   
                                                    Comment on   
above:                                  Room air   
   
                                                    2015   
08:      Weight          110.95 kg       Mere Cory [facility]   
Internal Medicine  
Work Phone:   
[phone]  
   
                                                    2015   
07:                              BMI (Body Mass   
Index)              28.7 kg/m2          Verena Tuttle RN     [facility]   
Internal Medicine  
Work Phone:   
[phone]  
   
                                                    2015   
07:      Body Temperature 97.3 [degF]     Verena Tuttle RN [facility]   
Internal Medicine  
Work Phone:   
[phone]  
   
                                                    Comment on   
above:                                  Method: Temporal   
   
                                                    2015   
07:      Body weight     109.77 kg       Verena Tuttle RN [facility]   
Internal Medicine  
Work Phone:   
[phone]  
   
                                                    2015   
07:      BP Diastolic    74 mm[Hg]       Verena Tuttle RN [facility]   
Internal Medicine  
Work Phone:   
[phone]  
   
                                                    Comment on   
above:                                  Patient Position: Sitting; Cuff Location  
: Left Arm; Cuff Size:   
Standard   
   
                                                    2015   
07:      BP Systolic     124 mm[Hg]      Verena Tuttle RN [facility]   
Internal Medicine  
Work Phone:   
[phone]  
   
                                                    Comment on   
above:                                  Patient Position: Sitting; Cuff Location  
: Left Arm; Cuff Size:   
Standard   
   
                                                    2015   
07:                              BSA (Body Surface   
Area)               2.43 m2             Verena Tuttle RN     [facility]   
Internal Medicine  
Work Phone:   
[phone]  
   
                                                    2015   
07:      Height          195.58 cm       Verena Tuttle RN [facility]   
Internal Medicine  
Work Phone:   
[phone]  
   
                                                    2015   
07:      Pulse (Heart Rate) 81 /min         Verena Tuttle RN [facility]  
   
Internal Medicine  
Work Phone:   
[phone]  
   
                                                    Comment on   
above:                                  Pattern: Regular   
   
                                                    2015   
07:      Pulse Oximetry  97 %            Mere Chavez [facility]   
Internal Medicine  
Work Phone:   
[phone]  
   
                                                    Comment on   
above:                                  Room air   
   
                                                    2015   
07:      Respiratory Rate 17 /min         Verena Tuttle RN [facility]   
Internal Medicine  
Work Phone:   
[phone]  
   
                                                    Comment on   
above:                                  Pattern: Unlabored   
   
                                                    2015   
07:                              SaO2% (BldA) [Mass   
fraction]           97 %                Verena Tuttle RN     [facility]   
Internal Medicine;   
[facility]   
Internal Medicine  
Work Phone:   
[phone]  
   
                                                    Comment on   
above:                                  Room air   
   
                                                    2015   
07:      Weight          109.77 kg       Mere Chavez [facility]   
Internal Medicine  
Work Phone:   
[phone]  
   
                                                    2015   
08:                              BMI (Body Mass   
Index)              28.4 kg/m2          Michelle Ware       [facility]   
Internal Medicine  
Work Phone:   
[phone]  
   
                                                    2015   
08:      Body Temperature 98.8 [degF]     Michelle Ware   [facility]   
Internal Medicine  
Work Phone:   
[phone]  
   
                                                    Comment on   
above:                                  Method: Tympanic   
   
                                                    2015   
08:      Body weight     108.64 kg       Michelle Ware   [facility]   
Internal Medicine  
Work Phone:   
[phone]  
   
                                                    2015   
08:      BP Diastolic    78 mm[Hg]       Michelle Ware   [facility]   
Internal Medicine  
Work Phone:   
[phone]  
   
                                                    Comment on   
above:                                  Patient Position: Sitting; Cuff Location  
: Left Arm; Cuff Size:   
Standard   
   
                                                    2015   
08:      BP Systolic     138 mm[Hg]      Michelle Ware   [facility]   
Internal Medicine  
Work Phone:   
[phone]  
   
                                                    Comment on   
above:                                  Patient Position: Sitting; Cuff Location  
: Left Arm; Cuff Size:   
Standard   
   
                                                    2015   
08:                              BSA (Body Surface   
Area)               2.41 m2             Michelle Ware       [facility]   
Internal Medicine  
Work Phone:   
[phone]  
   
                                                    2015   
08:      Height          195.58 cm       Michelle Ware   [facility]   
Internal Medicine  
Work Phone:   
[phone]  
   
                                                    2015   
08:      Pulse (Heart Rate) 87 /min         Michelle Ware   [facility]  
   
Internal Medicine  
Work Phone:   
[phone]  
   
                                                    Comment on   
above:                                  Pattern: Regular   
   
                                                    2015   
08:      Pulse Oximetry  98 %            Mere Chavez [facility]   
Internal Medicine  
Work Phone:   
[phone]  
   
                                                    Comment on   
above:                                  Room air   
   
                                                    2015   
08:      Respiratory Rate 18 /min         Michelle Ware   [facility]   
Internal Medicine  
Work Phone:   
[phone]  
   
                                                    Comment on   
above:                                  Pattern: Unlabored   
   
                                                    2015   
08:                              SaO2% (BldA) [Mass   
fraction]           98 %                Michelle Ware       [facility]   
Internal Medicine;   
[facility]   
Internal Medicine  
Work Phone:   
[phone]  
   
                                                    Comment on   
above:                                  Room air   
   
                                                    2015   
08:      Weight          108.64 kg       Mere Chavez [facility]   
Internal Medicine  
Work Phone:   
[phone]  
   
                                                    2014   
14:                              BMI (Body Mass   
Index)              27.93 kg/m2         Catina De Jesus RN  [facility]   
Internal Medicine  
Work Phone:   
[phone]  
   
                                                    2014   
14:      Body weight     106.85 kg       Catina De Jesus RN [facility]  
   
Internal Medicine  
Work Phone:   
[phone]  
   
                                                    2014   
14:      BP Diastolic    82 mm[Hg]       Catina De Jesus RN [facility]  
   
Internal Medicine  
Work Phone:   
[phone]  
   
                                                    Comment on   
above:                                  Patient Position: Sitting; Cuff Location  
: Left Arm; Cuff Size: Large   
   
                                                    2014   
14:      BP Systolic     118 mm[Hg]      Catina De Jesus RN [facility]  
   
Internal Medicine  
Work Phone:   
[phone]  
   
                                                    Comment on   
above:                                  Patient Position: Sitting; Cuff Location  
: Left Arm; Cuff Size: Large   
   
                                                    2014   
14:                              BSA (Body Surface   
Area)               2.4 m2              Catina De Jesus RN  [facility]   
Internal Medicine  
Work Phone:   
[phone]  
   
                                                    2014   
14:      Height          195.58 cm       Catina De Jesus RN [facility]  
   
Internal Medicine  
Work Phone:   
[phone]  
   
                                                    2014   
14:      Pulse (Heart Rate) 60 /min         Catina De Jesus RN [facility]  
bebe   
Internal Medicine  
Work Phone:   
[phone]  
   
                                                    Comment on   
above:                                  Pattern: Regular   
   
                                                    2014   
14:      Pulse Oximetry  98 %            Mere Chavez [facility]   
Internal Medicine  
Work Phone:   
[phone]  
   
                                                    Comment on   
above:                                  Room air   
   
                                                    2014   
14:      Respiratory Rate 18 /min         Catina De Jesus RN [facility]prehens[facility]  
e   
Internal Medicine  
Work Phone:   
[phone]  
   
                                                    Comment on   
above:                                  Pattern: Unlabored   
   
                                                    2014   
14:                              SaO2% (BldA) [Mass   
fraction]           98 %                Catina De Jesus RN  [facility]   
Internal Medicine;   
[facility]   
Internal Medicine  
Work Phone:   
[phone]  
   
                                                    Comment on   
above:                                  Room air   
   
                                                    2014   
14:      Weight          106.85 kg       Mere Chavez [facility]   
Internal Medicine  
Work Phone:   
[phone]  
   
                                                    2014   
07:                              BMI (Body Mass   
Index)              28.04 kg/m2         Catina De Jesus RN  [facility]   
Internal Medicine  
Work Phone:   
[phone]  
   
                                                    2014   
07:      Body Temperature 98.2 [degF]     Catina De Jesus RN [facility]  
e   
Internal Medicine  
Work Phone:   
[phone]  
   
                                                    Comment on   
above:                                  Method: Oral   
   
                                                    2014   
07:      Body weight     107.28 kg       Catina De Jesus RN [facility]  
   
Internal Medicine  
Work Phone:   
[phone]  
   
                                                    2014   
07:      BP Diastolic    80 mm[Hg]       Catina De Jesus RN [facility]  
   
Internal Medicine  
Work Phone:   
[phone]  
   
                                                    Comment on   
above:                                  Patient Position: Sitting; Cuff Location  
: Left Arm; Cuff Size: Large   
   
                                                    2014   
07:      BP Systolic     130 mm[Hg]      Catina De Jesus RN [facility]  
   
Internal Medicine  
Work Phone:   
[phone]  
   
                                                    Comment on   
above:                                  Patient Position: Sitting; Cuff Location  
: Left Arm; Cuff Size: Large   
   
                                                    2014   
07:                              BSA (Body Surface   
Area)               2.4 m2              Catina De Jesus RN  [facility]   
Internal Medicine  
Work Phone:   
[phone]  
   
                                                    2014   
07:      Height          195.58 cm       Catina De Jesus RN [facility]  
   
Internal Medicine  
Work Phone:   
[phone]  
   
                                                    2014   
07:      Pulse (Heart Rate) 88 /min         Catina De Jesus RN Comprehens  
bebe   
Internal Medicine  
Work Phone:   
[phone]  
   
                                                    Comment on   
above:                                  Pattern: Regular   
   
                                                    2014   
07:      Pulse Oximetry  98 %            Mere Chavez [facility]   
Internal Medicine  
Work Phone:   
[phone]  
   
                                                    Comment on   
above:                                  Room air   
   
                                                    2014   
07:      Respiratory Rate 20 /min         Catina De Jesus RN [facility]prehens[facility]  
e   
Internal Medicine  
Work Phone:   
[phone]  
   
                                                    Comment on   
above:                                  Pattern: Unlabored   
   
                                                    2014   
07:                              SaO2% (BldA) [Mass   
fraction]           98 %                Catina De Jesus RN  [facility]   
Internal Medicine;   
[facility]   
Internal Medicine  
Work Phone:   
[phone]  
   
                                                    Comment on   
above:                                  Room air   
   
                                                    2014   
07:      Weight          107.28 kg       Mere Chavez [facility]   
Internal Medicine  
Work Phone:   
[phone]  
   
                                                    2014   
07:                              BMI (Body Mass   
Index)              27.56 kg/m2         Catina De Jesus RN  [facility]   
Internal Medicine  
Work Phone:   
[phone]  
   
                                                    2014   
07:      Body weight     105.41 kg       Catina De Jesus RN [facility]  
   
Internal Medicine  
Work Phone:   
[phone]  
   
                                                    2014   
07:      BP Diastolic    62 mm[Hg]       Catina De Jesus RN [facility]  
   
Internal Medicine  
Work Phone:   
[phone]  
   
                                                    Comment on   
above:                                  Patient Position: Sitting; Cuff Location  
: Left Arm; Cuff Size: Large   
   
                                                    2014   
07:      BP Systolic     124 mm[Hg]      Catina De Jesus RN [facility]  
   
Internal Medicine  
Work Phone:   
[phone]  
   
                                                    Comment on   
above:                                  Patient Position: Sitting; Cuff Location  
: Left Arm; Cuff Size: Large   
   
                                                    2014   
07:                              BSA (Body Surface   
Area)               2.38 m2             Catina De Jesus RN  [facility]   
Internal Medicine  
Work Phone:   
[phone]  
   
                                                    2014   
07:      Height          195.58 cm       Catina De Jesus RN [facility]  
   
Internal Medicine  
Work Phone:   
[phone]  
   
                                                    2014   
07:      Pulse (Heart Rate) 67 /min         Catina De Jesus RN [facility]  
[facility]be   
Internal Medicine  
Work Phone:   
[phone]  
   
                                                    Comment on   
above:                                  Pattern: Regular   
   
                                                    2014   
07:      Pulse Oximetry  97 %            Mere Chavez [facility]   
Internal Medicine  
Work Phone:   
[phone]  
   
                                                    Comment on   
above:                                  Room air   
   
                                                    2014   
07:      Respiratory Rate 20 /min         Catina De Jesus RN [facility]  
e   
Internal Medicine  
Work Phone:   
[phone]  
   
                                                    Comment on   
above:                                  Pattern: Unlabored   
   
                                                    2014   
07:                              SaO2% (BldA) [Mass   
fraction]           97 %                Catina De Jesus RN  [facility]   
Internal Medicine;   
[facility]   
Internal Medicine  
Work Phone:   
[phone]  
   
                                                    Comment on   
above:                                  Room air   
   
                                                    2014   
07:      Weight          105.41 kg       Mere Chavez [facility]   
Internal Medicine  
Work Phone:   
[phone]  
   
                                                    2013   
07:                              BMI (Body Mass   
Index)              27.75 kg/m2         Catina De Jesus RN  [facility]   
Internal Medicine  
Work Phone:   
[phone]  
   
                                                    2013   
07:      Body Temperature 98.3 [degF]     Catina De Jesus RN [facility]  
e   
Internal Medicine  
Work Phone:   
[phone]  
   
                                                    Comment on   
above:                                  Method: Oral   
   
                                                    2013   
07:      Body weight     106.14 kg       Catina De Jesus RN [facility]  
   
Internal Medicine  
Work Phone:   
[phone]  
   
                                                    2013   
07:      BP Diastolic    82 mm[Hg]       Catina De Jesus RN [facility]  
   
Internal Medicine  
Work Phone:   
[phone]  
   
                                                    Comment on   
above:                                  Patient Position: Sitting; Cuff Location  
: Left Arm; Cuff Size: Large   
   
                                                    2013   
07:      BP Systolic     128 mm[Hg]      Catina De Jesus RN [facility]  
   
Internal Medicine  
Work Phone:   
[phone]  
   
                                                    Comment on   
above:                                  Patient Position: Sitting; Cuff Location  
: Left Arm; Cuff Size: Large   
   
                                                    2013   
07:                              BSA (Body Surface   
Area)               2.39 m2             Catina De Jesus RN  [facility]   
Internal Medicine  
Work Phone:   
[phone]  
   
                                                    2013   
07:      Height          195.58 cm       Catina De Jesus RN [facility]  
   
Internal Medicine  
Work Phone:   
[phone]  
   
                                                    2013   
07:      Pulse (Heart Rate) 69 /min         Catina De Jesus RN Comprehens  
bebe   
Internal Medicine  
Work Phone:   
[phone]  
   
                                                    Comment on   
above:                                  Pattern: Regular   
   
                                                    2013   
07:      Pulse Oximetry  98 %            Mere Chavez [facility]   
Internal Medicine  
Work Phone:   
[phone]  
   
                                                    Comment on   
above:                                  Room air   
   
                                                    2013   
07:      Respiratory Rate 18 /min         Catina De Jesus RN Comprehens[facility]  
e   
Internal Medicine  
Work Phone:   
[phone]  
   
                                                    Comment on   
above:                                  Pattern: Unlabored   
   
                                                    2013   
07:                              SaO2% (BldA) [Mass   
fraction]           98 %                Catina De Jesus RN  [facility];   
[facility]  
Work Phone:   
[phone]  
   
                                                    Comment on   
above:                                  Room air   
   
                                                    2013   
07:      Weight          106.14 kg       Mere Chavez [facility]  
Work Phone:   
[phone]  
   
                                                    03-   
07:                              BMI (Body Mass   
Index)              27.31 kg/m2         Catina De Jesus RN  [facility]  
Work Phone:   
[phone]  
   
                                                    03-   
07:      Body Temperature 98 [degF]       Catina De Jesus RN Comprehens[facility]  
e   
Internal Medicine  
Work Phone:   
[phone]  
   
                                                    Comment on   
above:                                  Method: Oral   
   
                                                    03-   
07:      Body weight     104.47 kg       Catina De Jesus RN [facility]  
   
[facility]  
Work Phone:   
[phone]  
   
                                                    03-   
07:      BP Diastolic    78 mm[Hg]       Catina De Jesus RN [facility]  
   
[facility] Medicine  
Work Phone:   
[phone]  
   
                                                    Comment on   
above:                                  Patient Position: Sitting; Cuff Location  
: Left Arm; Cuff Size: Large   
   
                                                    03-   
07:      BP Systolic     122 mm[Hg]      Catina De Jesus RN [facility]  
   
[facility]  
Work Phone:   
[phone]  
   
                                                    Comment on   
above:                                  Patient Position: Sitting; Cuff Location  
: Left Arm; Cuff Size: Large   
   
                                                    03-   
07:                              BSA (Body Surface   
Area)               2.38 m2             Catina De Jesus RN  [facility]  
Work Phone:   
[phone]  
   
                                                    03-   
07:      Height          195.58 cm       Catina De Jesus RN [facility]  
   
[facility] Medicine  
Work Phone:   
[phone]  
   
                                                    03-   
07:      Pulse (Heart Rate) 64 /min         Catina De Jesus RN Comprehens  
[facility]   
[facility] Medicine  
Work Phone:   
[phone]  
   
                                                    Comment on   
above:                                  Pattern: Regular   
   
                                                    03-   
07:      Respiratory Rate 18 /min         Catina De Jesus RN [facility]prehens[facility]  
e   
Internal Medicine  
Work Phone:   
[phone]  
   
                                                    Comment on   
above:                                  Pattern: Unlabored   
   
                                                    03-   
07:      Weight          104.47 kg       Mere Chavez [facility]  
Work Phone:   
[phone]  
   
                                                    2012   
07:                              BMI (Body Mass   
Index)              26.85 kg/m2         Catina De Jesus RN  [facility]  
Work Phone:   
[phone]  
   
                                                    2012   
07:      Body Temperature 98.5 [degF]     Catina De Jesus RN Comprehens[facility]  
e   
Internal Medicine  
Work Phone:   
[phone]  
   
                                                    Comment on   
above:                                  Method: Oral   
   
                                                    2012   
07:      Body weight     102.71 kg       Catina De Jesus RN [facility]  
   
[facility] Medicine  
Work Phone:   
[phone]  
   
                                                    2012   
07:      BP Diastolic    80 mm[Hg]       Catina De Jesus RN [facility]  
   
[facility] Medicine  
Work Phone:   
[phone]  
   
                                                    Comment on   
above:                                  Patient Position: Sitting; Cuff Location  
: Left Arm; Cuff Size: Large   
   
                                                    2012   
07:      BP Systolic     128 mm[Hg]      Catina De Jesus RN [facility]  
   
[facility] Medicine  
Work Phone:   
[phone]  
   
                                                    Comment on   
above:                                  Patient Position: Sitting; Cuff Location  
: Left Arm; Cuff Size: Large   
   
                                                    2012   
07:                              BSA (Body Surface   
Area)               2.36 m2             Catina De Jesus RN  [facility]  
Work Phone:   
[phone]  
   
                                                    2012   
07:      Height          195.58 cm       Catina De Jesus RN [facility]  
   
[facility] Medicine  
Work Phone:   
[phone]  
   
                                                    2012   
07:      Pulse (Heart Rate) 76 /min         Catina De Jesus RN Comprehens  
[facility]   
[facility] Medicine  
Work Phone:   
[phone]  
   
                                                    Comment on   
above:                                  Pattern: Regular   
   
                                                    2012   
07:      Respiratory Rate 20 /min         Catina De Jesus RN [facility]prehens[facility]  
e   
Internal Medicine  
Work Phone:   
[phone]  
   
                                                    Comment on   
above:                                  Pattern: Unlabored   
   
                                                    2012   
07:      Weight          102.71 kg       Mere Chavez [facility]  
Work Phone:   
[phone]  
   
                                                    05-   
06:                              BMI (Body Mass   
Index)              26.57 kg/m2         Verena Tuttle RN     [facility]  
Work Phone:   
[phone]  
   
                                                    05-   
06:      Body Temperature 99 [degF]       Verena Tuttle RN [facility]  
Work Phone:   
[phone]  
   
                                                    Comment on   
above:                                  Method: Oral   
   
                                                    05-   
06:      Body weight     101.64 kg       Verena Tuttle RN [facility]  
Work Phone:   
[phone]  
   
                                                    05-   
06:      BP Diastolic    70 mm[Hg]       Verena Tuttle RN [facility]  
Work Phone:   
[phone]  
   
                                                    Comment on   
above:                                  Patient Position: Sitting; Cuff Location  
: Left Arm; Cuff Size:   
Standard   
   
                                                    05-   
06:      BP Systolic     114 mm[Hg]      Verena Tuttle RN [facility]  
Work Phone:   
[phone]  
   
                                                    Comment on   
above:                                  Patient Position: Sitting; Cuff Location  
: Left Arm; Cuff Size:   
Standard   
   
                                                    05-   
06:                              BSA (Body Surface   
Area)               2.35 m2             Verena Tuttle RN     [facility]  
Work Phone:   
[phone]  
   
                                                    05-   
06:      Height          195.58 cm       Verena Tuttle RN [facility]  
Work Phone:   
[phone]  
   
                                                    05-   
06:      Pulse (Heart Rate) 76 /min         Verena Tuttle RN [facility]  
   
[facility] Medicine  
Work Phone:   
[phone]  
   
                                                    Comment on   
above:                                  Pattern: Regular   
   
                                                    05-   
06:      Respiratory Rate 16 /min         Verena Tuttle RN [facility]  
Work Phone:   
[phone]  
   
                                                    Comment on   
above:                                  Pattern: Unlabored   
   
                                                    05-   
06:      Weight          101.64 kg       Mere Chavez [facility]  
Work Phone:   
[phone]  
   
                                                    2012   
07:                              BMI (Body Mass   
Index)              25.78 kg/m2         Catina De Jesus RN  [facility]  
Work Phone:   
[phone]  
   
                                                    2012   
07:      Body Temperature 98.5 [degF]     Catina De Jesus RN [facility]prehens[facility]  
e   
Internal Medicine  
Work Phone:   
[phone]  
   
                                                    Comment on   
above:                                  Method: Oral   
   
                                                    2012   
07:      Body weight     98.6 kg         Catina De Jesus RN [facility]  
   
[facility]  
Work Phone:   
[phone]  
   
                                                    2012   
07:      BP Diastolic    82 mm[Hg]       Catina De Jesus RN [facility]  
   
[facility] Medicine  
Work Phone:   
[phone]  
   
                                                    Comment on   
above:                                  Patient Position: Sitting; Cuff Location  
: Left Arm; Cuff Size: Large   
   
                                                    2012   
07:      BP Systolic     124 mm[Hg]      Catina De Jesus RN [facility]  
   
[facility]  
Work Phone:   
[phone]  
   
                                                    Comment on   
above:                                  Patient Position: Sitting; Cuff Location  
: Left Arm; Cuff Size: Large   
   
                                                    2012   
07:                              BSA (Body Surface   
Area)               2.32 m2             Catina De Jesus RN  [facility]  
Work Phone:   
[phone]  
   
                                                    2012   
07:      Height          195.58 cm       Catina De Jesus RN [facility]  
   
[facility] Medicine  
Work Phone:   
[phone]  
   
                                                    2012   
07:      Pulse (Heart Rate) 72 /min         Catina De Jesus RN Comprehens  
[facility]   
[facility] Medicine  
Work Phone:   
[phone]  
   
                                                    Comment on   
above:                                  Pattern: Regular   
   
                                                    2012   
07:      Respiratory Rate 20 /min         Catina De Jesus RN Comprehens[facility]  
e   
Internal Medicine  
Work Phone:   
[phone]  
   
                                                    Comment on   
above:                                  Pattern: Unlabored   
   
                                                    2012   
07:      Weight          98.6 kg         Mere Chavez [facility]  
Work Phone:   
[phone]  
   
                                                    2011   
07:                              BMI (Body Mass   
Index)              26 kg/m2            Catina De Jesus RN  [facility]  
Work Phone:   
[phone]  
   
                                                    2011   
07:      Body weight     99.45 kg        Catina De Jesus RN [facility]  
   
[facility]  
Work Phone:   
[phone]  
   
                                                    2011   
07:      BP Diastolic    80 mm[Hg]       Catina De Jesus RN [facility]  
   
[facility] Medicine  
Work Phone:   
[phone]  
   
                                                    Comment on   
above:                                  Patient Position: Sitting; Cuff Location  
: Left Arm; Cuff Size: Large   
   
                                                    2011   
07:      BP Systolic     120 mm[Hg]      Catina De Jesus RN [facility]  
   
[facility]  
Work Phone:   
[phone]  
   
                                                    Comment on   
above:                                  Patient Position: Sitting; Cuff Location  
: Left Arm; Cuff Size: Large   
   
                                                    2011   
07:                              BSA (Body Surface   
Area)               2.33 m2             Catina De Jesus RN  [facility]  
Work Phone:   
[phone]  
   
                                                    2011   
07:      Height          195.58 cm       Catina De Jesus RN [facility]  
   
[facility]  
Work Phone:   
[phone]  
   
                                                    2011   
07:      Pulse (Heart Rate) 68 /min         Catina De Jesus RN Comprehens  
[facility]   
[facility] Medicine  
Work Phone:   
[phone]  
   
                                                    Comment on   
above:                                  Pattern: Regular   
   
                                                    2011   
07:      Respiratory Rate 20 /min         Catina De Jesus RN Comprehens[facility]  
e   
Internal Medicine  
Work Phone:   
[phone]  
   
                                                    Comment on   
above:                                  Pattern: Unlabored   
   
                                                    2011   
07:      Weight          99.45 kg        Mere Chavez [facility]  
Work Phone:   
[phone]  
   
                                                    2011   
09:                              BMI (Body Mass   
Index)              29.56 kg/m2         Catina De Jesus RN  [facility]  
Work Phone:   
[phone]  
   
                                                    2011   
09:      Body weight     113.06 kg       Catina De Jesus RN [facility]  
   
[facility]  
Work Phone:   
[phone]  
   
                                                    2011   
09:      BP Diastolic    82 mm[Hg]       Catina De Jesus RN [facility]  
   
[facility]  
Work Phone:   
[phone]  
   
                                                    Comment on   
above:                                  Patient Position: Sitting; Cuff Location  
: Left Arm; Cuff Size: Large   
   
                                                    2011   
09:      BP Systolic     128 mm[Hg]      Catina De Jesus RN [facility]  
   
[facility]  
Work Phone:   
[phone]  
   
                                                    Comment on   
above:                                  Patient Position: Sitting; Cuff Location  
: Left Arm; Cuff Size: Large   
   
                                                    2011   
09:                              BSA (Body Surface   
Area)               2.46 m2             Catina De Jesus RN  [facility]  
Work Phone:   
[phone]  
   
                                                    2011   
09:      Height          195.58 cm       Catina De Jesus RN [facility]  
   
[facility]  
Work Phone:   
[phone]  
   
                                                    2011   
09:      Pulse (Heart Rate) 60 /min         Catina De Jesus RN Comprehens  
[facility]   
[facility] Medicine  
Work Phone:   
[phone]  
   
                                                    Comment on   
above:                                  Pattern: Regular   
   
                                                    2011   
09:      Respiratory Rate 20 /min         Catina De Jesus RN [facility]prehens[facility]  
e   
Internal Medicine  
Work Phone:   
[phone]  
   
                                                    Comment on   
above:                                  Pattern: Unlabored   
   
                                                    2011   
09:      Weight          113.06 kg       Mere Chavez [facility]  
Work Phone:   
[phone]  
   
                                                    2011   
08:                              BMI (Body Mass   
Index)              29.56 kg/m2         Catina De Jesus RN  [facility]  
Work Phone:   
[phone]  
   
                                                    2011   
08:      Body weight     113.06 kg       Catina De Jesus RN [facility]  
   
[facility]  
Work Phone:   
[phone]  
   
                                                    2011   
08:      BP Diastolic    90 mm[Hg]       Catina De Jesus RN [facility]  
   
[facility]  
Work Phone:   
[phone]  
   
                                                    Comment on   
above:                                  Patient Position: Sitting; Cuff Location  
: Left Arm; Cuff Size: Large   
   
                                                    2011   
08:      BP Systolic     138 mm[Hg]      Catina De Jesus RN [facility]  
   
[facility]  
Work Phone:   
[phone]  
   
                                                    Comment on   
above:                                  Patient Position: Sitting; Cuff Location  
: Left Arm; Cuff Size: Large   
   
                                                    2011   
08:                              BSA (Body Surface   
Area)               2.46 m2             Catina De Jesus RN  [facility]  
Work Phone:   
[phone]  
   
                                                    2011   
08:      Height          195.58 cm       Catina De Jesus RN [facility]  
   
[facility]  
Work Phone:   
[phone]  
   
                                                    2011   
08:      Pulse (Heart Rate) 68 /min         Catina De Jesus RN Comprehens  
[facility]   
[facility] Medicine  
Work Phone:   
[phone]  
   
                                                    Comment on   
above:                                  Pattern: Regular   
   
                                                    2011   
08:      Respiratory Rate 20 /min         Catina De Jesus RN [facility]prehens[facility]  
e   
Internal Medicine  
Work Phone:   
[phone]  
   
                                                    Comment on   
above:                                  Pattern: Unlabored   
   
                                                    2011   
08:      Weight          113.06 kg       Mereleonardo Chavez [facility]  
Work Phone:   
[phone]  
  
  
  
Encounters  
  
  
                          Encounter Date Encounter Type Care Provider Facility  
   
                                                    Start: 2024  
End: 2024     ambulatory          MERE STANLEY [facility]  
   
                                                    Start: 2023  
End: 2023                         Office outpatient   
visit 15 minutes                        Mere Chavez DO  
Work Phone:   
[phone]                          [facility]  
   
                                                    Start: 2023  
End: 2023                         Office outpatient   
visit 25 minutes                        Mere Chavez DO  
Work Phone:   
[phone]                          [facility]  
   
                                                    Start: 2023  
End: 2023                         Office outpatient   
visit 25 minutes                        Mere Chavez DO  
Work Phone:   
[phone]                          [facility]  
   
                                Start: 2023                 Mere Fearo  
n DO  
Work Phone:   
[phone]                          [facility]  
   
                                                    Start: 2022  
End: 2022                                     Mere Cory DO  
Work Phone:   
[phone]                          [facility]  
   
                                                    Start: 10-  
End: 2022                                     Mere Cory DO  
Work Phone:   
[phone]                          [facility]  
   
                                Start: 10-                 Mere Fearo  
n DO  
Work Phone:   
[phone]                          [facility]  
   
                                                    Start: 2022  
End: 2022                         Office outpatient   
visit 40 minutes                        Mere Cory DO  
Work Phone:   
[phone]                          [facility]  
   
                                                    Start: 2022  
End: 2022                                     Mere Cory DO  
Work Phone:   
[phone]                          [facility]  
   
                                                    Start: 2022  
End: 2022                         Office outpatient   
visit 40 minutes                        Mere Cory DO  
Work Phone:   
[phone]                          [facility]  
   
                                                    Start: 2021  
End: 2021           Annotation/Addendum       Mere Cory DO  
Work Phone:   
[phone]                          [facility]  
   
                                                    Start: 2021  
End: 2021                                     Mere Cory DO  
Work Phone:   
[phone]                          [facility]  
   
                                                    Start: 2021  
End: 2021                         Office outpatient   
visit 25 minutes                        Mere Cory DO  
Work Phone:   
[phone]                          [facility]  
   
                                                    Start: 2021  
End: 2021                         Office outpatient   
visit 25 minutes                        Mere Cory DO  
Work Phone:   
[phone]                          [facility]  
   
                                                    Start: 2021  
End: 2021           Phone Encounter           Mere Cory DO  
Work Phone:   
[phone]                          [facility]  
   
                                                    Start: 2021  
End: 2021                                     Mere Cory DO  
Work Phone:   
[phone]                          [facility]  
   
                                                    Start: 2021  
End: 2021     Phone Encounter     Mere Cory     [facility]   
[facility]  
   
                                                    Start: 2021  
End: 2021                                     Mere Chavez DO  
Work Phone:   
[phone]                          [facility]  
   
                          Start: 2021 Review       Mere Cory Compre[facility]  
[facility]   
Medicine  
   
                                                    Start: 2021  
End: 2021                         Office outpatient   
visit 15 minutes          Mere Chavez           [facility]  
   
                                                    Start: 02-  
End: 02-     Lab Order           Mere Cory     [facility] Intern  
al   
Medicine  
   
                                                    Start: 02-  
End: 02-                                     Mere Chavez DO  
Work Phone:   
[phone]                          [facility]  
   
                                                    Start: 2021  
End: 2021                         Office outpatient   
visit 15 minutes          Mere Chavez           [facility] Internal   
Medicine  
   
                                                    Start: 2021  
End: 2021                         Office outpatient   
visit 15 minutes          Mere Chavez           [facility] Internal   
Medicine  
   
                                                    Start: 2020  
End: 2020                         Office outpatient   
visit 10 minutes          Mere Chavez           [facility]  
   
                                                    Start: 2020  
End: 2020                         Office outpatient   
visit 25 minutes          Mere Chavez           [facility]  
   
                          Start: 2020 Review       Mere Cory Compreh  
[facility]   
Medicine  
   
                                                    Start: 2020  
End: 2020                         Office outpatient   
visit 5 minutes           Mere Chavez           [facility]  
   
                                                    Start: 2020  
End: 2020                         Office outpatient   
visit 15 minutes          Mere Chavez           [facility]  
   
                          Start: 2020 Review       Mere Cory Compre[facility]  
[facility] Internal   
Medicine  
   
                                                    Start: 2020  
End: 2020                         Office outpatient   
visit 25 minutes          Mere Chavez           [facility]  
   
                                                    Start: 2020  
End: 2020                         Office outpatient   
visit 10 minutes          Mere Chavez           [facility]  
   
                                                    Start: 2020  
End: 2020                         Office outpatient   
visit 25 minutes          Mere Chavez           [facility]   
Medicine  
   
                                                    Start: 2019  
End: 2019                         Office outpatient   
visit 25 minutes          Mere Chavez           [facility]  
   
                          Start: 2019 Review       Mere Cory Compreh  
[facility] Internal   
Medicine  
   
                                                    Start: 05-  
End: 05-                         Office outpatient   
visit 15 minutes          Mere Chavez           [facility] Internal   
Medicine  
   
                                        Start: 2019   Patient encounter   
procedure                 Mere Chavez           [facility] Internal   
Med  
   
                                                    Start: 2018  
End: 2018                         Office outpatient   
visit 25 minutes          Mere Chavez           [facility] Internal   
Medicine  
   
                                                    Start: 2018  
End: 2018                         Office outpatient   
visit 25 minutes          Mere Chavez           [facility] Internal   
Medicine  
   
                                                    Start: 2018  
End: 2018                         Office outpatient   
visit 5 minutes           Mere Chavez           [facility] Internal   
Medicine  
   
                                                    Start: 2018  
End: 2018                         Office outpatient   
visit 15 minutes          Mere Chavez           [facility] Internal   
Medicine  
   
                                                    Start: 2017  
End: 2017                         Office outpatient   
visit 15 minutes          Mere Chavez           [facility] Internal   
Medicine  
   
                                                    Start: 2017  
End: 2017                         Office outpatient   
visit 25 minutes          Mere Chavez           [facility] Internal   
Medicine  
   
                                                    Start: 2017  
End: 2017                         Office outpatient   
visit 25 minutes          Mere Chavez           [facility]   
Medicine  
   
                                                    Start: 10-  
End: 10-                         Office outpatient   
visit 15 minutes          Mere Chavez           [facility] Internal   
Medicine  
   
                                                    Start: 2016  
End: 2016                         Office outpatient   
visit 25 minutes          Mere Chavez           [facility] Internal   
Medicine  
   
                                                    Start: 2016  
End: 2016                         Office outpatient   
visit 15 minutes          Mere Chavez           [facility] Internal   
Medicine  
   
                                                    Start: 2016  
End: 2016     Phone Encounter     Mere [facility] Intern  
al   
Medicine  
   
                                                    Start: 2016  
End: 2016                                     eMre Chavez DO  
Work Phone:   
[phone]                          [facility]  
   
                                                    Start: 2015  
End: 2015                         Office outpatient   
visit 25 minutes          Mere Chavez           [facility]   
Medicine  
   
                                                    Start: 2015  
End: 2015                         Office outpatient   
visit 25 minutes          Mere Chavez           [facility]   
Medicine  
   
                                                    Start: 2015  
End: 2015     Phone Encounter     Me[facility] Maxwell Intern  
al   
Medicine  
   
                                                    Start: 2015  
End: 2015                                     Mere Chavez DO  
Work Phone:   
[phone]                          [facility]  
   
                                                    Start: 2015  
End: 2015                         Office outpatient   
visit 25 minutes          Mere Chavez           [facility]   
Medicine  
   
                                                    Start: 2014  
End: 2014                         Office outpatient   
visit 25 minutes          Mere Chavez           [facility]  
   
                                                    Start: 2014  
End: 2014                         Patient encounter   
procedure                 Mere Chavez           [facility]  
   
                                                    Start: 2014  
End: 2014                                     Mere Chavez DO  
Work Phone:   
[phone]                          [facility]  
   
                                                    Start: 2014  
End: 2014                         Patient encounter   
procedure                 Mere Chavez           [facility]  
   
                                                    Start: 2014  
End: 2014                                     Mere Chavez DO  
Work Phone:   
[phone]                          [facility]  
   
                                                    Start: 2013  
End: 2013                         Patient encounter   
procedure                 Mere Chavez           [facility]  
   
                                                    Start: 2013  
End: 2013                                     Mere Chavez DO  
Work Phone:   
[phone]                          [facility]  
   
                                                    Start: 03-  
End: 03-                         Patient encounter   
procedure                 Mere Chavez           [facility]   
Medicine  
   
                                                    Start: 03-  
End: 03-                                     Mere Chavez DO  
Work Phone:   
[phone]                          [facility]  
   
                                                    Start: 2012  
End: 2012                         Patient encounter   
procedure                 Mere Chavez           [facility]  
   
                                                    Start: 2012  
End: 2012                                     Mere Chavez DO  
Work Phone:   
[phone]                          [facility]  
   
                                                    Start: 05-  
End: 05-                         Patient encounter   
procedure                 Mere Chavez           [facility]   
Medicine  
   
                                                    Start: 05-  
End: 05-                                     Mere Chavez DO  
Work Phone:   
[phone]                          [facility]  
   
                                                    Start: 2012  
End: 2012                         Patient encounter   
procedure                 eMre Chavez           [facility]  
   
                                                    Start: 2012  
End: 2012                                     Mere Chavez DO  
Work Phone:   
[phone]                          [facility]  
   
                                                    Start: 2011  
End: 2011                         Patient encounter   
procedure                 Mere Chavez           [facility]  
   
                                                    Start: 2011  
End: 2011                                     Mere Chavez DO  
Work Phone:   
[phone]                          [facility]  
   
                                                    Start: 2011  
End: 2011                         Patient encounter   
procedure                 Mere Chavez           [facility]  
   
                                                    Start: 2011  
End: 2011                                     Mere Chavez DO  
Work Phone:   
[phone]                          [facility]  
   
                                                    Start: 2011  
End: 2011                         Patient encounter   
procedure                 Mere Chavez           [facility]  
   
                                                    Start: 2011  
End: 2011                                     Mere Chavez DO  
Work Phone:   
[phone]                          [facility]  
  
  
  
Procedures  
  
  
                          Date         Procedure    Procedure Detail Performing   
Clinician  
   
                                                    Start: 10-  
End: 10-                                     Procedure Note: See Note;  
NOTES:  
[facility]   
[facility] [place] [address] [place] [address] [phone] OFFICE   
VISIT Date of Service:   
10/04/23 MR#: [id number]   
Acct: [id number] Name:   
BETHANIE GALAN Rep #:   
[id number] : 1965   
Provider: Dr. Sarkis Rodriguez MD Age/Sex: 58/M Location:   
[facility] Status: Signed   
Assessment and Plan   
Assessment and Plan (1)   
Abnormal chest CT: Status:   
Acute Plan: Patient's   
pulmonary function test was   
not able to be repeated, but   
high-resolution CT scan does   
not show any fibrotic changes   
or bronchiectasis. Clinical   
suspicion is restriction   
could have been secondary to   
acute inflammatory response   
versus a spurious lung   
volumes on PFT. This could   
only be verified by repeating   
pulmonary function test, but   
after review the risks,   
benefits alternatives,   
patient believes that given   
that he is asymptomatic that   
this does not need to be   
completed. I agree with his   
assessment, but did stress   
that this may be necessary in   
the future if someone tries   
to diagnose him with   
restrictive lung disease. It   
is possible that patient   
could have an element of   
muscular weakness leading to   
restriction on lung volumes,   
but this would be unlikely   
given patient's spirometric   
values were within normal   
limits. Follow clinically.   
Repeat PFTs with concerns.   
Plan Details Follow Up: As   
needed HPI 3 M FU Details:   
Patient is a 58-year-old   
 male, currently in   
care of Dr. Chavez, who   
presents for evaluation   
secondary to test results.   
Since last visit, patient   
denies any ER visits,   
hospitalizations or   
prednisone burst. Patient   
overall feels subjectively   
unchanged compared to   
previous. Patient states that   
since his last visit he did   
travel out to [place] and had   
walked 10 miles  around the   
[place] . Patient described this   
course as  hilly  and states   
that he felt like he did   
appropriate. Patient did not   
have to take more breaks than   
people he was with at that   
time. Patient has not   
reported any chest pain,   
abdominal pain, nausea or   
vomiting. Patient has not had   
any lower extremity edema.   
Patient is not reporting any   
noxious exposures. Patient   
states he did not complete   
his PFT as he was told by his   
insurance that it was  too   
soon  and he was afraid that   
he would have to pay for it   
out-of-pocket. Patient states   
that he feels normal and does   
not believe he has an issue.   
Review of systems otherwise   
negative from a   
constitutional, HEENT,   
respiratory, cardiovascular,   
GI, genitourinary,   
musculoskeletal, skin,   
neurologic, psychiatric and   
hematologic system unless   
stated above. Testing   
personally reviewed with the   
patient CT chest (5/10/2023):   
No bronchiectasis or   
interstitial thickening   
noted. Patient does not have   
any significant mediastinal   
lymphadenopathy. Intake Vital   
Signs 23 12:42 10/04/23   
06:04 Height 6 ft 5 in 6 ft 5   
in Weight: 108.862 kg BMI   
28.4 /90 H Blood   
Pressure Location Rt brachial   
Position Sitting Respiration   
18 Pulse 81 Pulse Source   
Monitor Temp 36.7 C   
Temperature Source Temporal   
Artery Pulse Oximetry (%) 96   
Oxygen Delivery Method room   
air Intake Visit Reasons: 3 M   
FU  Required: No   
DME Vendor: N/A Accompanied   
by: Self Is patient in pain?:   
No Allergies Penicillins   
Allergy (Unknown, Verified   
10/04/23 07:32) unknown   
Medications amlodipine 5 mg   
tablet (Norvasc) 5 mg PO   
DAILY 23 [History   
Confirmed 10/04/23] aspirin   
325 mg tablet 325 mg PO DAILY   
23 [History Confirmed   
10/04/23] cholecalciferol   
(vitamin D3) 50 mcg (2,000   
unit) capsule 150 mcg PO   
DAILY 23 [History   
Confirmed 10/04/23]   
hydrochlorothiazide 25 mg   
tablet 25 mg PO QAM 23   
[History Confirmed 10/04/23]   
lorazepam 0.5 mg tablet 0.5   
mg PO DAILY PRN 23   
[History Confirmed 10/04/23]   
losartan 100 mg tablet 100 mg   
PO DAILY 23 [History   
Confirmed 10/04/23]   
lovastatin 20 mg tablet 20 mg   
PO DAILY 23 [History   
Confirmed 10/04/23] metformin   
500 mg tablet,extended   
release 24 hr 500 mg PO BID   
23 [History Confirmed   
10/04/23] niacin 500 mg   
tablet 500 mg PO DAILY   
23 [History Confirmed   
10/04/23] phytonadione   
(vitamin K1) 100 mcg tablet   
100 mcg PO DAILY 23   
[History Confirmed 10/04/23]   
tramadol 50 mg tablet 50 mg   
PO Q8H PRN 23 [History   
Confirmed 10/04/23] ascorbate   
calcium (vitamin C) 500 mg   
tablet 500 mg PO BID 10/04/23   
[History Confirmed 10/04/23]   
PFSH Medical History   
(Reviewed 10/04/23 @ 07:33 by   
Megha Kitchen) Abnormal chest   
CT Abnormal glucose tolerance   
test DDD (degenerative disc   
disease) Elevated   
norepinephrine level Grieving   
Hypercalcemia   
Hypercholesteremia   
Hypertension Low back pain   
Osteoarthritis Polycythemia   
Smoker Stress reaction   
Vitamin D deficiency Family   
History (Reviewed 10/04/23 @   
07:33 by Megha Kitchen) Father   
Diabetes Brother Cancer   
Social History (Reviewed   
10/04/23 @ 07:33 by Megha Kitchen) Smoking Status:   
Current every day smoker Exam   
Const Constitutional:   
Positive conversant,   
cooperative, in no acute   
respiratory distress, healthy   
appearing, well developed,   
well nourished and good   
hygiene Head Head: Yes   
normocephalic, Yes atraumatic   
and No cyanosis of   
lips/distal nose Eyes Eye:   
Positive clear conjunctiva;   
Negative nystagmus, scleral   
abnormality or cataract   
present Ears Ear: Positive   
hearing normal and external   
ears normal; Negative hard of   
hearing Nose Nose: Yes   
external nose normal, No   
nasal polyp and Yes septum   
normal Mouth Mouth: Positive   
oral mucosae normal, no   
lesions and good dentition;   
Negative oral thrush present   
or post nasal drip Mallampati   
Score: III: Mallampati Score   
Slight retrognathia Neck   
Neck: Positive normal visual   
inspection, full ROM and   
trachea midline; Negative   
lymphadenopathy or JVD Chest   
Wall Chest: Positive normal   
inspection of the chest and   
symmetric chest movement;   
Negative crepitus or   
tenderness Resp lung sounds:   
Positive clear to   
auscultation, good air   
exchange and normal   
expiratory time; Negative   
wheezes, wheeze present on   
forced exhalation, rhonchi,   
rales, dullness or use of   
accessory muscles Cardio   
Cardiac: Positive regular   
rate, regular rhythm, S1   
normal and S2 normal;   
Negative murmur, rub or   
gallop GI GI: Positive normal   
to inspection and normal   
bowel sounds; Negative   
distended, ascites or   
epigastric tenderness    
Genitourinary: Positive   
deferred Musc   
Musculoskeletal: Positive   
steady gait; Negative using   
an assistive device for   
ambulation, kyphosis or   
scoliosis Skin Pulmonary Skin   
Exam: Positive intact;   
Negative lesion, rash, ulcers   
or erythema Pulses Pulse: Yes   
radial pulses present   
Extremities Extremities: Yes   
capillary refill normal, No   
clubbing, No cyanosis and No   
edema Neuro Neurologic: Yes   
no focal neuro deficits, Yes   
conversant, Yes cooperative,   
Yes normal cognition, Yes   
normal coordination, Yes   
normal concentration and Yes   
understands questions Lymph   
Lymphatic: No lymphadenopathy   
Psych Appearance: Positive   
grossly normal Mental Status:   
Positive mental status   
grossly normal Mood: Positive   
congruent mood Affect:   
Positive normal affect Coding   
Level of Care Code Off   
vis,est,level 3 Diagnoses   
Abnormal chest CT R93.89   
10/04/23 0808 <Electronically   
signed by Sarkis Rodriguez MD>   
Date ___________   
_____________________________  
______ Sarkis Rodriguez MD   
Cosigner Signature: Date   
___________   
_____________________________  
______ (if applicable) CC:   
DO Mere Christian DO  
Work Phone:   
[phone]  
   
                                                    Start: 05-  
End: 05-                                     Procedure Note: See Note;  
NOTES:  
[facility]   
Imaging Services [address] [place] [address] Chest   
without Contrast MR#:   
[id number] Acct: [id number]   
Name: BETHANIE GALAN   
Rep #: [id number] :   
1965 M 57 From: Cedric Leonardo DO PCP: Dr. Mere Chavez DO Status: REG CLI   
Study: Chest without Contrast   
Date of Exam: 05/10/23 Exam#   
[id number] Ordering Dr:   
Sarkis Rodriguez MD INDICATION:   
Concern for ILD -- Please   
include HRCT images   
EXAMINATION: CT CHEST WITHOUT   
CONTRAST - CT Chest W/O   
Contrast Injection TECHNIQUE:   
Helically acquired images   
were obtained of the chest. A   
radiation dose optimization   
technique was used for this   
scan. IV Contrast dosage and   
agent: None. RADIATION DOSAGE   
(If Supplied By Facility):   
CTDIvol = ( 19.25 ) mGy, DLP   
= ( 871.14 ) mGycm   
COMPARISON: FINDINGS: LUNGS,   
PLEURA AND LARGE AIRWAYS: No   
masses, consolidation, or   
edema. No pleural effusion or   
thickening. No pneumothorax.   
THYROID: No thyroid lesions.   
HEART AND PERICARDIUM: Heart   
size is normal. No   
pericardial effusion.   
CORONARY ARTERIES: Coronary   
artery calcifications are   
noted VESSELS: Thoracic aorta   
is not dilated. MEDIASTINUM   
AND ADILIA: No mediastinal or   
hilar adenopathy. Esophagus   
is unremarkable. No hiatal   
hernia. UPPER ABDOMEN: No   
acute pathology. BONES: No   
suspicious lytic or blastic   
abnormality. ORDER #:   
[id number] CT/Chest without   
Contrast IMPRESSION: Negative   
CT chest without contrast.   
Electronically Signed: Cedric Leonardo DO 2023/05/10 at 18:06   
EDT Reading Location ID and   
State: [facility] [place] Tel   
[phone], Service support   
[phone], Fax   
[phone] CC: Dr. Sarkis Rodriguez MD; Dr. Mere Chavez DO :   
Signed                                  Sarkis Rodriguez  
Work Phone:   
[phone]  
   
                                                    Start: 2023  
End: 2023                                     Procedure Note: See Note;  
NOTES:  
[facility]   
Medicine of [address] [place] [address] [phone] OFFICE   
VISIT Date of Service:   
23 MR#: [id number]   
Acct: [id number] Name:   
BETHANIE GALAN Rep #:   
[id number] : 1965   
Provider: Dr. Sarkis Rodriguez MD Age/Sex: 57/M Location:   
[facility] Status: Signed   
Assessment and Plan   
Assessment and Plan (1)   
Abnormal chest CT: Status:   
Acute Plan: Unclear etiology   
at this time. Differential   
diagnosis would include CHF,   
connective tissue associated   
interstitial lung disease,   
postviral findings and   
pulmonary fibrosis. Patient   
states that he had COVID   
approximately 3 weeks prior   
to testing. Patient is not   
showing signs of obstructive   
lung disease on pulmonary   
function testing, so no   
inhalers will be prescribed   
at this time. We will repeat   
CT scan with high-resolution   
images prior to the next   
visit along with a pulmonary   
function test. If patient   
remains restricted, may need   
to have an autoimmune   
work-up. Some concern for   
reported polycythemia, but   
recent labs are not available   
for review. Did discuss with   
the patient about possible   
obstructive sleep apnea, but   
patient was very resistant to   
any testing. Patient will   
focus on weight loss as an   
alternative approach.   
Discussed with the patient   
about symptoms and signs that   
would require more rapid   
evaluation. No new   
medications. Obtain CT with   
high-res images and PFT prior   
to next visit. Orders: Orders   
Smoking Cessation 23   
R93.89 - Abnormal findings on   
diagnostic imaging of other   
specified body structures   
Chest without Contrast 3   
Months R93.89 - Abnormal   
findings on diagnostic   
imaging of other specified   
body structures Pulmonary   
Function Test (Comp) 3 Months   
R93.89 - Abnormal findings on   
diagnostic imaging of other   
specified body structures   
Plan Details Follow Up: 3   
Months (BWA) HPI Abnormal PFT   
Details: Patient is a   
57-year-old  male,   
currently under the care of   
Dr. Chavez, who presents for   
evaluation secondary to   
abnormal PFT. Patient   
reportedly had had concerns   
about ongoing smoking and   
concern for cancer. Patient   
was given a screening CT scan   
showing chronic interstitial   
changes. Because of this,   
patient had a subsequent PFT   
showing moderate restriction.   
Given these findings, a   
pulmonary consult was   
obtained for further   
recommendations. In   
discussion with the patient,   
patient did have COVID-19   
approximately 3 weeks prior   
to initial imaging. Patient   
states he is has a long   
smoking history of 40+ pack   
years. Patient states that he   
does not have a lot of cough   
or shortness of breath.   
Patient did allude that he   
tends to have a  smoker's   
cough, but nothing scary.    
Patient states when he was in   
[place] he was able to walk   
approximately 3 miles with   
his fianc???e without any   
chest pain. Patient is not   
reporting any palpitations or   
lower extremity edema.   
Patient does not have any   
rashes or joint pains   
reported. Patient does report   
that he has had some weight   
gain over the winter. Patient   
states he is gained   
approximately 15 pounds.   
Patient does occasionally   
snore, but his fianc???e says   
there was no snoring   
approximately 25 pounds ago.   
Patient has never had a sleep   
apnea work-up. Patient feels   
tired during the day, but   
attributes this to stress as   
he is a  of a local   
company. Patient states he   
has had progressively more   
tobacco intake recently.   
Patient states that he lost   
his son during COVID and this   
has led to an increase in his   
tobacco intake. Patient   
states he does not smoke when   
he is at work, but does tend   
to smoke more on the   
weekends. Patient states this   
typically ranges between a   
half a pack and a pack per   
day. Review of systems   
otherwise negative from a   
constitutional, HEENT,   
respiratory, cardiovascular,   
GI, genitourinary,   
musculoskeletal, skin,   
neurologic, psychiatric and   
hematologic system unless   
stated above. Documentation   
reviewed prior to the office   
visit 7 pages of   
documentation were reviewed   
prior to the office visit.   
Patient reportedly has been   
diagnosed with diabetes, but   
does not check his blood   
pressure and blood sugars at   
home. Patient has a history   
of smoking, but is not   
currently on any inhalers.   
Patient reportedly does have   
polycythemia and an abnormal   
PFT Testing personally   
reviewed with the patient   
Complete PFT (2022):   
Moderate restrictive   
ventilatory defect with   
preserved spirometry and DLCO   
(FVC 83%, FEV1 89%, TLC 67%,   
DLCO 127%) Low-dose CT scan   
(2022): Chronic   
interstitial changes noted in   
the inferior lung fields with   
no superimposed pulmonary   
process, lymphadenopathy or   
nodules. Intake Vital Signs   
23 12:42 Height 6 ft 5   
in Weight: 105.687 kg BMI   
27.6 /88 H Blood   
Pressure Location Lt brachial   
Position Sitting Respiration   
18 Pulse 78 Pulse Source   
Monitor Temp 36.7 C   
Temperature Source Temporal   
Artery Pulse Oximetry (%) 95   
Intake Visit Reasons:   
Abnormal PFT Accompanied by:   
Self Is patient in pain?: No   
Allergies Penicillins Allergy   
(Unknown, Verified 23   
12:43) unknown Medications   
amlodipine 5 mg tablet   
(Norvasc) 5 mg PO DAILY   
23 [History Confirmed   
23] aspirin 325 mg   
tablet 325 mg PO DAILY   
23 [History Confirmed   
23] cholecalciferol   
(vitamin D3) 50 mcg (2,000   
unit) capsule 150 mcg PO   
DAILY 23 [History   
Confirmed 23]   
hydrochlorothiazide 25 mg   
tablet 25 mg PO QAM 23   
[History Confirmed 23]   
lorazepam 0.5 mg tablet 0.5   
mg PO DAILY PRN 23   
[History Confirmed 23]   
losartan 100 mg tablet 100 mg   
PO DAILY 23 [History   
Confirmed 23]   
lovastatin 20 mg tablet 20 mg   
PO DAILY 23 [History   
Confirmed 23] metformin   
500 mg tablet,extended   
release 24 hr 500 mg PO BID   
23 [History Confirmed   
23] niacin 500 mg   
tablet 500 mg PO DAILY   
23 [History Confirmed   
23] phytonadione   
(vitamin K1) 100 mcg tablet   
100 mcg PO DAILY 23   
[History Confirmed 23]   
tramadol 50 mg tablet 50 mg   
PO Q8H PRN 23 [History   
Confirmed 23] PFSH   
Medical History (Reviewed   
23 @ 05:32 by Dr. Sarkis Rodriguez MD) Abnormal chest CT   
Abnormal glucose tolerance   
test DDD (degenerative disc   
disease) Elevated   
norepinephrine level Grieving   
Hypercalcemia   
Hypercholesteremia   
Hypertension Low back pain   
Osteoarthritis Polycythemia   
Smoker Stress reaction   
Vitamin D deficiency Family   
History (Reviewed 23 @   
05:32 by Dr. Sarkis Rodriguez MD) Father Diabetes Brother   
Cancer Social History   
(Reviewed 23 @ 05:32 by   
Dr. Sarkis Rodriguez MD) Smoking   
Status: Current every day   
smoker Exam Const   
Constitutional: Positive   
conversant, cooperative, in   
no acute respiratory   
distress, healthy appearing,   
well developed, well   
nourished and good hygiene   
Head Head: Yes normocephalic,   
Yes atraumatic and No   
cyanosis of lips/distal nose   
Eyes Eye: Positive clear   
conjunctiva; Negative   
nystagmus, scleral   
abnormality or cataract   
present Ears Ear: Positive   
hearing normal and external   
ears normal; Negative hard of   
hearing Nose Nose: Yes   
external nose normal, No   
nasal polyp and Yes septum   
normal Mouth Mouth: Positive   
oral mucosae normal, no   
lesions and good dentition;   
Negative oral thrush present   
or post nasal drip Mallampati   
Score: III: Mallampati Score   
Slight retrognathia Neck   
Neck: Positive normal visual   
inspection, full ROM and   
trachea midline; Negative   
lymphadenopathy or JVD Chest   
Wall Chest: Positive normal   
inspection of the chest and   
symmetric chest movement;   
Negative crepitus or   
tenderness Resp lung sounds:   
Positive clear to   
auscultation, good air   
exchange and normal   
expiratory time; Negative   
wheezes, wheeze present on   
forced exhalation, rhonchi,   
rales, dullness or use of   
accessory muscles Cardio   
Cardiac: Positive regular   
rate, regular rhythm, S1   
normal and S2 normal;   
Negative murmur, rub or   
gallop GI GI: Positive normal   
to inspection and normal   
bowel sounds; Negative   
distended, ascites or   
epigastric tenderness    
Genitourinary: Positive   
deferred Musc   
Musculoskeletal: Positive   
steady gait; Negative using   
an assistive device for   
ambulation, kyphosis or   
scoliosis Skin Pulmonary Skin   
Exam: Positive intact;   
Negative lesion, rash, ulcers   
or erythema Pulses Pulse: Yes   
radial pulses present   
Extremities Extremities: Yes   
capillary refill normal, No   
clubbing, No cyanosis and No   
edema Neuro Neurologic: Yes   
no focal neuro deficits, Yes   
conversant, Yes cooperative,   
Yes normal cognition, Yes   
normal coordination, Yes   
normal concentration and Yes   
understands questions Lymph   
Lymphatic: No lymphadenopathy   
Psych Appearance: Positive   
grossly normal Mental Status:   
Positive mental status   
grossly normal Mood: Positive   
congruent mood Affect:   
Positive normal affect Office   
Procedures Smoking Cessation   
Smoking Cessation Jesus   
discussion with the patient   
for 12 minutes on the   
importance of smoking   
cessation. Patient's   
pulmonary function tests show   
little airway obstruction,   
but there is significant   
concern for other effects of   
tobacco including   
cardiovascular and malignancy   
risks. Did discuss about   
possible behavioral   
substitution technique.   
Clinical suspicion is there   
is a significant mental   
component as patient is able   
to go 8 to 10 hours at work   
without smoking. Patient did   
appear to be contemplative at   
the end of the discussion,   
but readily admits that he   
enjoys smoking. Time Spent   
greater than 10 minutes: Yes   
Coding Level of Care Code Off   
vis,new,level 4 Diagnoses   
Abnormal chest CT R93.89 CPT   
Codes Time Spent - greater   
than 10 minutes: Yes (65442)   
23 0540 <Electronically   
signed by Sarkis Rodriguez MD>   
Date ___________   
_____________________________  
______ Sarkis Rodriguez MD   
Cosigner Signature: Date   
___________   
_____________________________  
______ (if applicable) CC:   
Dr. Mere Chavez, DO                 Mere Chavez DO  
Work Phone:   
[phone]  
   
                                                    Start: 2022  
End: 2022                                     Procedure Note: See Note;  
NOTES:  
[facility] Pulmonary   
Services/Neurology [address] [place] [address] MR#:   
[id number] Acct: [id number]   
Name: BETHANIE GALAN   
Rep #: [id number] :   
1965 57 From: Sarkis Rodriguez MD Referring Dr:   
Mere Chavez DO Status:   
REG CL I Location: hospitals Date:   
22 Sex: M C COMPLETE   
PULMONARY FUNCTION TEST   
INTERPRETATION Brief HPI:   
Patient is a 57-year-old   
 male, currently   
under the care of Dr. Chavez,   
who presents to [facility] for   
complete pulmonary function   
tests secondary to diagnosis   
of abnormal CT. Respiratory   
therapist reports good effort   
and reproducible results.   
Interpretation: Forced   
expiration spirometry shows   
no large airways obstructive   
ventilatory defect with an   
FEV1 of 89% predicted. There   
is no significant   
bronchodilator response by   
strict ATS criteria.   
Spirograms are of good   
quality and plateau normally.   
The respiratory flow volume   
loop shows a normal pattern.   
Lung volumes by body   
plethysmography show a   
decreased total lung capacity   
at 5.56 L, 67% predicted. All   
other lung volumes are   
reduced symmetrically.   
Diffusion capacity by carbon   
monoxide is elevated at 127%   
predicted. The airway   
resistance is normal. No   
previous pulmonary function   
tests were available for   
review. Impression: Moderate   
restrictive ventilatory   
defect with preserved   
spirometry and DLCO 22   
1258 <Electronically signed   
by Sarkis Rodriguez MD> Date   
___________   
_____________________________  
______ Sarkis Rodriguez MD CC:   
Dr. Sarkis Rodriguez MD; Dr. Mere Chavez DO Date   
Dictated: 22 1256 Date   
Transcribed: 22 1256   
: YAIR Signed             Mere Chavez DO  
Work Phone:   
[phone]  
   
                                                    Start: 2022  
End: 2022                                     Procedure Note: See Note;  
NOTES:  
[facility]   
Imaging Services [address] [place] [place] [address] Low   
Dose CT Lung Screening MR#:   
[id number] Acct: [id number]   
Name: BETHANIE GALAN   
Rep #: [id number] :   
1965 M 57 From: Armando Clark MD PCP: Dr. Mere Chavez,  Status: REG CLI   
Study: Low Dose CT Lung   
Screening Date of Exam:  Exam# [id number] Ordering   
Dr: Mere Chavez DO STUDY:   
LOW DOSE CT LUNG CANCER   
SCREENING REASON FOR EXAM:   
Male, 57 years old. One half   
to three-quarter pack per day   
smoker x39 years,   
hypertension RADIATION DOSAGE   
(If Supplied By Facility):   
CTDIvol = ( 3.02 ) mGy, DLP =   
( 109.85 ) mGycm TECHNIQUE:   
No contrast was administered.   
Low dose technique was   
utilized (average mAS-38 and   
kVp 120). 1.25 mm axial   
source images with a slice   
interval of 1.25-mm were   
reconstructed in lung   
windows. 2.5 mm axial source   
images with a slice interval   
of 2.5-mm were reconstructed   
in lung windows. 5.0 mm axial   
source images with a slice   
interval of 5.0-mm were   
reconstructed in soft tissue   
windows. COMPARISON:   
2021   
_____________________________  
______ FINDINGS: Lung windows   
show the lungs to be normally   
expanded. Chronic   
interstitial changes again   
noted in both lung fields   
without a superimposed   
pulmonary process, no   
organized infiltrate, or   
suspicious noncalcified mass   
or nodule. No significant   
interval change since the   
previous study. Soft tissue   
windows show normal-appearing   
thyroid gland. No suspicious   
axillary, mediastinal, or   
perihilar adenopathy. No   
pleural or pericardial   
effusions. There are   
calcified coronary vessels.   
Limited cuts through the   
upper abdomen do not show a   
suspicious abnormality. Bony   
structures show degenerative   
change ORDER #: [id number]   
CT/Low Dose CT Lung Screening   
IMPRESSION: Lung-RADS   
category 2 - Continue annual   
screening with LDCT in 12   
months. IMPORTANT NOTES FOR   
USE: ACR Lung-RADS Version   
1.1 Assessment Categories   
Release Date:  Category:   
Coded 0-4 bases on nodule(s)   
with highest degree of   
suspicion. Negative screen is   
defined as categories 1 and   
2; a positive screen is   
defined as categories 3 and   
4. Category 3 and 4A nodules   
that are unchanged on   
interval CT should be coded   
as category 2, and   
individuals returned to   
screening in 12 months.   
Category 4X: Category 3 or 4   
nodules with additional   
imaging findings that   
increase the suspicion of   
lung cancer, such as   
spiculation, GGN that doubles   
in size in 1 year, enlarged   
lymph notes, etc. Category   
Modifiers: S (significant   
finding unrelated to lung   
cancer) Electronically   
Signed: Narciso Clark MD   
 at 8:38 EDT   
Reading Location ID and   
State: 1566 / [place] Tel   
[phone], Service support   
[phone], Fax   
[phone] CC: Dr. Mere Chavez DO :   
Signed                                  Mere Chavez DO  
Work Phone:   
[phone]  
   
                                                    Start: 2021  
End: 2021                         Low Dose CT Lung   
Screening                               Comments: See Note;  
NOTES:  
[facility]   
Imaging Services [address] [place] [address] Low   
Dose CT Lung Screening MR#:   
[id number] Acct: [id number]   
Name: BETHANIE GALAN   
Rep #: [id number] :   
1965 M 56 From:   
Girish torres MD PCP:   
Dr. Mere Chavez DO   
Status: REG CLI Study: Low   
Dose CT Lung Screening Date   
of Exam:  Exam#   
[id number] Ordering Dr:   
Mere Chavez DO STUDY: LOW   
DOSE CT LUNG CANCER SCREENING   
REASON FOR EXAM: Male, 56   
years old. SMOKER. Patient   
smokes 1 pack per day for 35   
years. RADIATION DOSAGE (If   
Supplied By Facility):   
CTDIvol = ( 4.02 ) mGy, DLP =   
( 138.43 ) mGycm TECHNIQUE:   
No contrast was administered.   
Low dose technique was   
utilized (average mAS-38 and   
kVp 120). 1.25 mm axial   
source images with a slice   
interval of 1.25-mm were   
reconstructed in lung   
windows. 2.5 mm axial source   
images with a slice interval   
of 2.5-mm were reconstructed   
in lung windows. 5.0 mm axial   
source images with a slice   
interval of 5.0-mm were   
reconstructed in soft tissue   
windows. Nodule measured   
using lung windows on PACS   
and/or independent   
workstation with automated   
measurement of minimum and   
maximum diameter. Nodule   
measurement reported as   
average diameter rounded to   
the nearest whole number.   
Growth is defined as an   
increase ins size of greater   
than 1.5 mm. COMPARISON:   
None.   
_____________________________  
______ NODULES: No suspicious   
nodules are seen. Emphysema:   
Minimal degree of   
emphysematous changes.   
Endobronchial lesion: None   
Aorta: Minimal   
atherosclerotic plaques of   
the aortic arch. Coronary   
arteries: Coronary artery   
calcification. Heart:   
Unremarkable Pulmonary   
artery: Unremarkable   
Mediastinal nodes:   
Unremarkable Other chest and   
abdominal findings: ORDER #:   
[id number] CT/Low Dose CT Lung   
Screening IMPRESSION:   
Lung-RADS category 2 -   
Continue annual screening   
with LDCT in 12 months.   
IMPORTANT NOTES FOR USE: ACR   
Lung-RADS Version 1.1   
Assessment Categories Release   
Date:  Category: Coded   
0-4 bases on nodule(s) with   
highest degree of suspicion.   
Negative screen is defined as   
categories 1 and 2; a   
positive screen is defined as   
categories 3 and 4. Category   
3 and 4A nodules that are   
unchanged on interval CT   
should be coded as category   
2, and individuals returned   
to screening in 12 months.   
Category 4X: Category 3 or 4   
nodules with additional   
imaging findings that   
increase the suspicion of   
lung cancer, such as   
spiculation, GGN that doubles   
in size in 1 year, enlarged   
lymph notes, etc. Category   
Modifiers: S (significant   
finding unrelated to lung   
cancer) Electronically   
Signed: Girish Almonte MD  at 12:44 EDT   
Tel [phone], Service   
support [phone], Fax   
[phone] CC: Dr. Mere Chavez DO :   
Signed                                  Mere Chavez DO  
Work Phone:   
[phone]  
   
                                                    Start: 2021  
End: 2021                         CT Abd/Pelvis W/WO   
Contrast                                Comments: See Note; NOTES:   
[facility]   
Imaging Services [address] [place] [address] [address] [address]   
Abd/Pelvis W/WO Contrast MR#:   
[id number] Acct: [id number]   
Name: BETHANIE GALAN   
Rep #: [id number] :   
1965 M 55 From: Huber MIRANDA PCP: Dr. Mere Chavez DO Status: REG CLI   
Study: CT Abd/Pelvis W/WO   
Contrast Date of Exam:  Exam# [id number]   
Ordering Dr: Mere Chavez DO STUDY: CT ABDOMEN AND   
PELVIS WITH AND WITHOUT   
CONTRAST REASON FOR EXAM:   
Male, 55 years old. ELEVATED   
NOREPINEPHRINE LEVEL   
RADIATION DOSAGE (If Supplied   
By Facility): CTDIvol = (   
21.35 ) mGy, DLP = ( 2632.40   
) mGycm TECHNIQUE: Transaxial   
images were obtained from the   
dome of the diaphragm to the   
symphysis pubis without oral   
contrast. IV 100mL Isovue-300   
was administered. Sagittal   
and coronal images were   
reconstructed. Individualized   
dose optimization techniques   
were used for this CT.   
COMPARISON: None.   
_____________________________  
______ FINDINGS: The   
visualized lung bases are   
unremarkable. The visualized   
portions of the heart are   
within normal limits. Normal   
liver. Normal gallbladder and   
extrahepatic biliary system.   
Normal spleen. Normal   
pancreas. Normal bilateral   
adrenal glands. Normal right   
kidney. Normal left kidney.   
Normal visualized stomach.   
Normal small intestine. There   
are multiple colonic   
diverticula consistent with   
diverticulosis. Postsurgical   
changes are noted in the   
sigmoid colon. There are   
surgical clips in the region   
of the appendix consistent   
with a prior appendectomy.   
Normal abdominal aorta.   
Normal inferior vena cava.   
Normal retroperitoneum.   
Normal urinary bladder.   
Normal abdominal wall. Normal   
osseous structures.   
_____________________________  
______ ORDER #: [id number]   
CT/CT Abd/Pelvis W/WO   
Contrast IMPRESSION:   
Descending colon   
diverticulosis.   
Electronically Signed: Huber Rivera MD  at   
11:20 EST Tel [phone],   
Service support   
[phone], Fax   
[phone] CC: Dr. Mere Chavez DO :   
Signed                                  Mere Chavez  
Work Phone:   
[phone]  
   
                                                    Start: 2021  
End: 2021                         Renal Artery Duplex   
Ultrasound                              Comments: See Note; NOTES:   
[facility] Cardiovascular   
Services [address] [address] Renal   
Artery Duplex Ultrasound   
21 0809 MR#: [id number]   
Acct: [id number] Name:   
BETHANIE GALAN Rep #:   
[id number] : 1965 55   
From: Nikko Hanson MD   
Attending Dr: Dr. Mere Chavez DO Status: R EG CLI   
Ordering Dr: Mere Chavez DO Date: 21 Location:   
[facility] Sex: M C Admitted: Reason   
For Study: HTN Right Renal   
Artery Left Renal Artery   
Right renal artery ostium   
Left renal artery ostium   
133.7/37.8 138.9/40.4   
RSV/EDV. PSV/EDV. Right renal   
artery proximal 157/56 Left   
renal artery proximal PSV/EDV   
PSV/EDV. 125.6/44.1 . Right   
renal artery mid 167.4/56   
Left renal artery mid   
134.1/44.1 PSV/EDV. PSV/EDV .   
Right renal artery distal   
Left renal artery distal   
164.1/46.3 188.1/50.8   
PSV/EDV. PSV/EDV. Right Renal   
Parenchyma Left Renal   
Parenchyma Upper Pole Medula   
26.2/7.7 PSV/EDV. Left upper   
pole medulla 30/12.4 Right   
upper pole medulla EDR 0.30 .   
PSV/EDV . Right upper pole   
medulla R.I. Left upper pole   
medulla EDR 0.41 . 0.70 .   
Left upper pole medulla R.I.   
0.59 . Upper Patric Cortx   
19.4/7.7 PSV/EDV. UP Cortex   
16.3/5.8 PSV/EDV. Right upper   
pole cortex EDR 0.40 . Left   
upper pole cortex EDR 0.36 .   
Right upper pole cortex R.I.   
0.60 . Left upper pole cortex   
R.I. 0.64 . Right lower Pole   
medulla 24.3/7.7 Left lower   
Pole medulla 30.6/10.2   
PSV/EDV . PSV/EDV . Right   
lower pole medulla EDR 0.32 .   
Left lower pole medulla EDR   
0.33 . Right lower pole   
medulla R.I. Left lower pole   
medulla R.I. 0.67 . 0.68 .   
Lower Pole Cortx 20.7/6.9   
PSV/EDV. Lower Pole Cortex   
15.1/4.7 PSV/EDV. Left lower   
pole cortex EDR 0.34 . Right   
lower pole cortex EDR 0.31 .   
Left lower pole cortex R.I.   
0.66 . Right lower pole   
cortex R.I. 0.69 . Left Renal   
Hilar Right Renal Hilar LT   
Hilar avg 61.7/18.8 PSV/EDV .   
Right Hilar avg 59.9/19.3   
PSV/EDV. Left hilar   
acceleration time 50 Right   
hilar acceleration time 50   
m/sec. m/sec. Left Renal   
Dimensions Right Renal   
Dimensions Left kidney size   
12.15 cm . Right kidney size   
11.61 cm . Left cortical   
dimension 2.12 cm . Right   
cortical dimension 1.86 cm .   
Aorta Proximal abdominal   
aorta 1.84 x 1.84 cm .   
Proximal abdominal aorta peak   
systolic velocity is 107   
cm/sec . Distal abdominal   
aorta 1.39 x 1.39 cm . Distal   
abdominal aorta peak systolic   
velocity is 108.8 cm/sec .   
Interpretation Summary Less   
than 60% stenosis bilateral   
renal arteries Normal   
proximal aortic diameter 1.84   
x 1.84 cm. Aortic velocities   
minimally elevated above 100   
cm/s flow making renal artery   
to aortic ratios not reliable   
Right renal length maintained   
at 11.61 cm Left renal length   
maintained at 12.15 cm   
_____________________________  
_____________ _ Ordering   
Physician: Mere Chavez   
Referring Physician: Mere Chavez Performed By:   
Ana Mccrary RVT   
Electronically signed by:   
Nikko Hanson MD on   
2021 04:13 PM 21   
1613 Date ___________   
_____________________________  
______ Nikko Hanson MD CC:   
Dr. Mere Chavez DO Date   
Dictated: 02/03/21 0809 Date   
Transcribed: 21 1613   
: Signed                Mere Chavez  
Work Phone:   
[phone]  
   
                                                    Start: 2016  
End: 2016                         Ecg routine ecg   
w/least 12 lds w/i&r                    [MEASUREMENTS ANALYSIS] Date   
of Test: 2016 07:34:56;   
Heart Rate: 75; HI Interval:   
200; QRS: 178; QT Interval:   
444; Corrected QT Interval   
(QTc): 470; P Wave Axis: 50;   
QRS Wave Axis: -15; T Wave   
Axis: 110; Blood Pressure:   
120/78 [ECG DIAGNOSTIC   
STATEMENTS] Date of Test:   
2016 07:34:56; Summary:   
Sinus Rhythm -Left bundle   
branch block. ABNORMAL                  Mere Chavez  
Work Phone:   
[phone]  
   
                                        Comment on above:   LBBB --nsr not new   
  
  
  
Plan of Treatment  
  
  
                          Date         Care Activity Detail       Author  
   
                                                    Start:   
2023          Procedure Education                     [facility];   
[facility]  
Work Phone:   
[phone]  
   
                                                    Start:   
2023                              Provider Instructions for   
Treatment                                           [facility];   
[facility]  
Work Phone:   
[phone]  
   
                                                    Start:   
2023                              Assay of thyroid   
stimulating hormone tsh                             [facility];   
[facility]  
Work Phone:   
[phone]  
   
                                                    Start:   
2023                              25 hydroxy includes   
fractions if performed                              [facility];   
[facility]  
Work Phone:   
[phone]  
   
                                                    Start:   
2023                              Urine albumin   
quantitative                                        [facility];   
[facility]  
Work Phone:   
[phone]  
   
                                                    Start:   
2023                              Comprehensive metabolic   
panel                                               [facility];   
[facility]  
Work Phone:   
[phone]  
   
                                                    Start:   
2023          Lipid panel                             [facility];   
[facility]  
Work Phone:   
[phone]  
   
                                                    Start:   
2023                              Blood count complete   
auto&auto difrntl wbc                               [facility];   
[facility]  
Work Phone:   
[phone]  
   
                                                    Start:   
2023                              Hemoglobin glycosylated   
a1c                                                 [facility];   
[facility]  
Work Phone:   
[phone]  
   
                                                    Start:   
2023          Procedure Education                     [facility];   
[facility]  
Work Phone:   
[phone]  
   
                                                    Start:   
2023                              Provider Instructions for   
Treatment                                           [facility];   
[facility]  
Work Phone:   
[phone]  
   
                                                    Start:   
2023          Procedure Education                     [facility];   
[facility]  
Work Phone:   
[phone]  
   
                                                    Start:   
2023                              Provider Instructions for   
Treatment                                           [facility];   
[facility]  
Work Phone:   
[phone]  
   
                                                    Start:   
2023                              Assay of prostate   
specific antigen total                              [facility];   
[facility]  
Work Phone:   
[phone]  
   
                                                    Start:   
2023                              Assay of thyroid   
stimulating hormone tsh                             [facility];   
[facility]  
Work Phone:   
[phone]  
   
                                                    Start:   
2023                              25 hydroxy includes   
fractions if performed                              [facility];   
[facility]  
Work Phone:   
[phone]  
   
                                                    Start:   
2023                              Urine albumin   
quantitative                                        [facility];   
[facility]  
Work Phone:   
[phone]  
   
                                                    Start:   
2023                              Comprehensive metabolic   
panel                                               [facility];   
[facility]  
Work Phone:   
[phone]  
   
                                                    Start:   
2023          Lipid panel                             [facility];   
[facility]  
Work Phone:   
[phone]  
   
                                                    Start:   
2023                              Blood count complete   
auto&auto difrntl wbc                               [facility];   
[facility]  
Work Phone:   
[phone]  
   
                                                    Start:   
2023                              Hemoglobin glycosylated   
a1c                                                 [facility];   
[facility]  
Work Phone:   
[phone]  
   
                                                    Start:   
2022          Procedure Education                     [facility];   
[facility]  
Work Phone:   
[phone]  
   
                                                    Start:   
2022                              Provider Instructions for   
Treatment                                           [facility];   
[facility]  
Work Phone:   
[phone]  
   
                                                    Start:   
2022          Procedure Education                     [facility];   
[facility]  
Work Phone:   
[phone]  
   
                                                    Start:   
2022                              Provider Instructions for   
Treatment                                           [facility];   
[facility]  
Work Phone:   
[phone]  
   
                                                    Start:   
2021          Procedure Education                     [facility];   
[facility]  
Work Phone:   
[phone]  
   
                                                    Start:   
2021                              Provider Instructions for   
Treatment                                           [facility];   
[facility]  
Work Phone:   
[phone]  
   
                                                    Start:   
2021          Procedure Education                     [facility];   
[facility]  
Work Phone:   
[phone]  
   
                                                    Start:   
2021                              Provider Instructions for   
Treatment                                           [facility];   
[facility]  
Work Phone:   
[phone]  
   
                                                    Start:   
2021                              Hemoglobin glycosylated   
a1c                                                 [facility];   
[facility]  
Work Phone:   
[phone]  
   
                                                    Start:   
2021          Procedure Education                     [facility];   
[facility]  
Work Phone:   
[phone]  
   
                                                    Start:   
2021                              Provider Instructions for   
Treatment                                           [facility];   
[facility]  
Work Phone:   
[phone]  
   
                                                    Start:   
2021          Procedure Education                     [facility];   
[facility]  
Work Phone:   
[phone]  
   
                                                    Start:   
2021                              Provider Instructions for   
Treatment                                           [facility];   
[facility]  
Work Phone:   
[phone]  
   
                                                    Start:   
2021          Metanephrines                           [facility];   
[facility]  
Work Phone:   
[phone]  
   
                                                    Start:   
2021                              Catecholamines total   
urine                                               [facility];   
[facility]  
Work Phone:   
[phone]  
   
                                                    Start:   
2021                              Assay of vanillylmandelic   
acid urine                                          [facility];   
[facility]  
Work Phone:   
[phone]  
   
                                                    Start:   
2021          Procedure Education                     [facility];   
[facility]  
Work Phone:   
[phone]  
   
                                                    Start:   
2021                              Provider Instructions for   
Treatment                                           [facility];   
[facility]  
Work Phone:   
[phone]  
   
                                                    Start:   
2020          Procedure Education                     [facility];   
[facility]  
Work Phone:   
[phone]  
   
                                                    Start:   
2020                              Provider Instructions for   
Treatment                                           [facility];   
[facility]  
Work Phone:   
[phone]  
   
                                                    Start:   
2020          Procedure Education                     [facility]  
Work Phone:   
[phone]  
   
                                                    Start:   
2020                              Provider Instructions for   
Treatment                                           [facility]  
Work Phone:   
[phone]  
   
                                                    Start:   
2020                              HbA1c (Bld) [Mass   
fraction]                 HGB A1C (16569)           [facility]  
Work Phone:   
[phone]  
   
                                                    Start:   
2020                              25 hydroxy includes   
fractions if performed    CALCIFEDIOL (34293)       [facility]  
Work Phone:   
[phone]  
   
                                                    Start:   
2020          TSH Qn              TSH (25619)         [facility]  
Work Phone:   
[phone]  
   
                                                    Start:   
2020                              Urnls dip stick/tablet   
reagent auto microscopy                 URINALYSIS, W/ MICRO   
(77982)                                 [facility]  
Work Phone:   
[phone]  
   
                                                    Start:   
2020                              Urine albumin   
quantitative                            MICROALBUMIN:   
CREATININE RATIO   
(60641) AND (71553)                     [facility]  
Work Phone:   
[phone]  
   
                                                    Start:   
2020                              Comprehensive metabolic   
panel                                   METABOLIC PANEL,   
COMPREHENSIVE (60658)                   [facility]  
Work Phone:   
[phone]  
   
                                                    Start:   
2020                              Blood count complete   
auto&auto difrntl wbc                   CBC W/AUTO DIFF WBC   
(11740)                                 [facility]  
Work Phone:   
[phone]  
   
                                                    Start:   
2020          Lipid panel         LIPID PANEL (04121) [facility]  
Work Phone:   
[phone]  
   
                                                    Start:   
2020                              HbA1c (Bld) [Mass   
fraction]                 HGB A1C (38216)           [facility]  
Work Phone:   
[phone]  
   
                                                    Start:   
2020                              Hemoglobin glycosylated   
a1c                                                 [facility];   
[facility]  
Work Phone:   
[phone]  
   
                                                    Start:   
2020          Procedure Education                     [facility]  
Work Phone:   
[phone]  
   
                                                    Start:   
2020                              Provider Instructions for   
Treatment                                           [facility]  
Work Phone:   
[phone]  
   
                                                    Start:   
2020          Procedure Education                     [facility]  
Work Phone:   
[phone]  
   
                                                    Start:   
2020                              Provider Instructions for   
Treatment                                           [facility]  
Work Phone:   
[phone]  
   
                                                    Start:   
2020                              HbA1c (Bld) [Mass   
fraction]                 HGB A1C (91020)           [facility]  
Work Phone:   
[phone]  
   
                                                    Start:   
05-                              HbA1c (Bld) [Mass   
fraction]                 HGB A1C (82355)           [facility]  
Work Phone:   
[phone]  
   
                                                    Start:   
05-                              Hemoglobin glycosylated   
a1c                                                 [facility];   
[facility]  
Work Phone:   
[phone]  
   
                                                    Start:   
2020          Procedure Education                     [facility]  
Work Phone:   
[phone]  
   
                                                    Start:   
2020          Procedure Education                     [facility]  
Work Phone:   
[phone]  
   
                                                    Start:   
2020                              Provider Instructions for   
Treatment                                           [facility]  
Work Phone:   
[phone]  
   
                                                    Start:   
2020                              25 hydroxy includes   
fractions if performed                              [facility]  
Work Phone:   
[phone]  
   
                                                    Start:   
2020                              Assay of thyroid   
stimulating hormone tsh                             [facility];   
[facility]  
Work Phone:   
[phone]  
   
                                                    Start:   
2020          TSH Qn              TSH (00321)         [facility]  
Work Phone:   
[phone]  
   
                                                    Start:   
2020                              Urnls dip stick/tablet   
reagent auto microscopy                             [facility]  
Work Phone:   
[phone]  
   
                                                    Start:   
2020                              Urine albumin   
quantitative                                        [facility]  
Work Phone:   
[phone]  
   
                                                    Start:   
2020                              Comprehensive metabolic   
panel                                               [facility]  
Work Phone:   
[phone]  
   
                                                    Start:   
2020          Lipid panel                             [facility]  
Work Phone:   
[phone]  
   
                                                    Start:   
2020                              Blood count complete   
auto&auto difrntl wbc                               [facility]  
Work Phone:   
[phone]  
   
                                                    Start:   
2020                              HbA1c (Bld) [Mass   
fraction]                 HGB A1C (62662)           [facility]  
Work Phone:   
[phone]  
   
                                                    Start:   
2020                              Hemoglobin glycosylated   
a1c                                                 [facility];   
[facility]  
Work Phone:   
[phone]  
   
                                                    Start:   
2019          Procedure Education                     [facility]  
Work Phone:   
[phone]  
   
                                                    Start:   
2019                              Provider Instructions for   
Treatment                                           [facility]  
Work Phone:   
[phone]  
   
                                                    Start:   
2019                              Lipoprotein blood alexia   
numbers & subclasses      NMR Profile (71087)       [facility]  
Work Phone:   
[phone]  
   
                                                    Start:   
2019                              HbA1c (Bld) [Mass   
fraction]                 HGB A1C (30610)           [facility]  
Work Phone:   
[phone]  
   
                                                    Start:   
2019                              Hemoglobin glycosylated   
a1c                                                 [facility];   
[facility]  
Work Phone:   
[phone]  
   
                                                    Start:   
05-                              Provider Instructions for   
Treatment                                           [facility]  
Work Phone:   
[phone]  
   
                                                    Start:   
05-                              Assay of prostate   
specific antigen total                  PSA (PROSTATE SPECIFIC   
ANTIGEN) (V76.44)                       [facility]  
Work Phone:   
[phone]  
   
                                                    Start:   
05-                TSH Qn                    TSH (THYROID   
STIMULATING HORMONE)   
(60324)                                 [facility]  
Work Phone:   
[phone]  
   
                                                    Start:   
05-                              Urine albumin   
quantitative                            MICROALBUMIN:   
CREATININE RATIO   
(98412) AND (38208)                     [facility]  
Work Phone:   
[phone]  
   
                                                    Start:   
05-                              Comprehensive metabolic   
panel                                   METABOLIC PANEL,   
COMPREHENSIVE (28350)                   [facility]  
Work Phone:   
[phone]  
   
                                                    Start:   
05-                              Lipoprotein blood alexia   
numbers & subclasses      NMR Profile (11386)       [facility]  
Work Phone:   
[phone]  
   
                                                    Start:   
05-                              Blood count complete   
auto&auto difrntl wbc                   CBC with auto diff   
(09000)                                 [facility]  
Work Phone:   
[phone]  
   
                                                    Start:   
05-                              HbA1c (Bld) [Mass   
fraction]                 HGB A1C (27169)           [facility]  
Work Phone:   
[phone]  
   
                                                    Start:   
05-                              25 hydroxy includes   
fractions if performed    CALCIFEDIOL (62556)       [facility]  
Work Phone:   
[phone]  
   
                                                    Start:   
2018          Procedure Education                     [facility]  
Work Phone:   
[phone]  
   
                                                    Start:   
2018                              Provider Instructions for   
Treatment                                           [facility]  
Work Phone:   
[phone]  
   
                                                    Start:   
2018          Calcium mass conc   CALCIUM SERUM (42175) [facility]  
Work Phone:   
[phone]  
   
                                                    Start:   
2018                              Hemoglobin   
A1c/Hemoglobin.total mass   
fraction (Bld)            HGB A1C (62508)           [facility]  
Work Phone:   
[phone]  
   
                                                    Start:   
2018                              Provider Instructions for   
Treatment                                           [facility]  
Work Phone:   
[phone]  
   
                                                    Start:   
2018          Procedure Education                     [facility]  
Work Phone:   
[phone]  
   
                                                    Start:   
2018                              Provider Instructions for   
Treatment                                           [facility]  
Work Phone:   
[phone]  
   
                                                    Start:   
2017                              Provider Instructions for   
Treatment                                           [facility]  
Work Phone:   
[phone]  
   
                                                    Start:   
2017                              Assay of prostate   
specific antigen total                              [facility]  
Work Phone:   
[phone]  
   
                                                    Start:   
2017                Protein mass conc         PSA (PROSTATE SPECIFIC   
ANTIGEN) (V76.44)                       [facility]  
Work Phone:   
[phone]  
   
                                                    Start:   
2017                              25 hydroxy includes   
fractions if performed                              [facility]  
Work Phone:   
[phone]  
   
                                                    Start:   
2017                              Assay of thyroid   
stimulating hormone tsh                             [facility];   
[facility]  
Work Phone:   
[phone]  
   
                                                    Start:   
2017          Thyrotropin Qn      TSH (75062)         [facility]  
Work Phone:   
[phone]  
   
                                                    Start:   
2017                              Urnls dip stick/tablet   
reagent auto microscopy                             [facility]  
Work Phone:   
[phone]  
   
                                                    Start:   
2017                              Urine albumin   
quantitative                                        [facility]  
Work Phone:   
[phone]  
   
                                                    Start:   
2017                              Comprehensive metabolic   
panel                                               [facility]  
Work Phone:   
[phone]  
   
                                                    Start:   
2017                              Blood count complete   
auto&auto difrntl wbc                               [facility]  
Work Phone:   
[phone]  
   
                                                    Start:   
2017          Lipid panel                             [facility]  
Work Phone:   
[phone]  
   
                                                    Start:   
2017                              Hemoglobin   
A1c/Hemoglobin.total mass   
fraction (Bld)            HGB A1C (12682)           [facility]  
Work Phone:   
[phone]  
   
                                                    Start:   
2017                              Hemoglobin glycosylated   
a1c                                                 [facility];   
[facility]  
Work Phone:   
[phone]  
   
                                                    Start:   
2017          Procedure Education                     [facility]  
Work Phone:   
[phone]  
   
                                                    Start:   
2017                              Provider Instructions for   
Treatment                                           [facility]  
Work Phone:   
[phone]  
   
                                                    Start:   
2017          Lipid panel                             [facility]  
Work Phone:   
[phone]  
   
                                                    Start:   
2017                              25 hydroxy includes   
fractions if performed                              [facility]  
Work Phone:   
[phone]  
   
                                                    Start:   
2017                              Provider Instructions for   
Treatment                                           [facility]  
Work Phone:   
[phone]  
   
                                                    Start:   
2017                              25 hydroxy includes   
fractions if performed                              [facility]  
Work Phone:   
[phone]  
   
                                                    Start:   
2017                              Assay of thyroid   
stimulating hormone tsh                             [facility];   
[facility]  
Work Phone:   
[phone]  
   
                                                    Start:   
2017          Thyrotropin Qn      TSH (70215)         [facility]  
Work Phone:   
[phone]  
   
                                                    Start:   
2017                              Urnls dip stick/tablet   
reagent auto microscopy                             [facility]  
Work Phone:   
[phone]  
   
                                                    Start:   
2017                              Urine albumin   
quantitative                                        [facility]  
Work Phone:   
[phone]  
   
                                                    Start:   
2017                              Comprehensive metabolic   
panel                                               [facility]  
Work Phone:   
[phone]  
   
                                                    Start:   
2017          Lipid panel                             [facility]  
Work Phone:   
[phone]  
   
                                                    Start:   
2017                              Blood count complete   
auto&auto difrntl wbc                               [facility]  
Work Phone:   
[phone]  
   
                                                    Start:   
10-          Procedure Education                     [facility]  
Work Phone:   
[phone]  
   
                                                    Start:   
10-                              Provider Instructions for   
Treatment                                           [facility]  
Work Phone:   
[phone]  
   
                                                    Start:   
2016          Procedure Education                     [facility]  
Work Phone:   
[phone]  
   
                                                    Start:   
2016                              Provider Instructions for   
Treatment                                           [facility]  
Work Phone:   
[phone]  
   
                                                    Start:   
2016          Patient Education                       [facility]  
Work Phone:   
[phone]  
   
                                                    Start:   
2016          Procedure Education                     [facility]  
Work Phone:   
[phone]  
   
                                                    Start:   
2016                              Provider Instructions for   
Treatment                                           [facility]  
Work Phone:   
[phone]  
   
                                                    Start:   
2015          Procedure Education                     [facility]  
Work Phone:   
[phone]  
   
                                                    Start:   
2015                              Provider Instructions for   
Treatment                                           [facility]  
Work Phone:   
[phone]  
   
                                                    Start:   
2015          Procedure Education                     [facility]  
Work Phone:   
[phone]  
   
                                                    Start:   
2015                              Provider Instructions for   
Treatment                                           [facility]  
Work Phone:   
[phone]  
   
                                                    Start:   
2015                              Assay of prostate   
specific antigen total                              [facility]  
Work Phone:   
[phone]  
   
                                                    Start:   
2015                Protein mass conc         PSA (PROSTATE SPECIFIC   
ANTIGEN) (V76.44)                       [facility]  
Work Phone:   
[phone]  
   
                                                    Start:   
2015          Lipid panel                             [facility]  
Work Phone:   
[phone]  
   
                                                    Start:   
2015                              25 hydroxy includes   
fractions if performed                              [facility]  
Work Phone:   
[phone]  
   
                                                    Start:   
2015                              Assay of thyroid   
stimulating hormone tsh                             [facility];   
[facility]  
Work Phone:   
[phone]  
   
                                                    Start:   
2015          Thyrotropin Qn      TSH (39964)         [facility]  
Work Phone:   
[phone]  
   
                                                    Start:   
2015                              Urnls dip stick/tablet   
reagent auto microscopy                             [facility]  
Work Phone:   
[phone]  
   
                                                    Start:   
2015                              Urine albumin   
quantitative                                        [facility]  
Work Phone:   
[phone]  
   
                                                    Start:   
2015                              Comprehensive metabolic   
panel                                               [facility]  
Work Phone:   
[phone]  
   
                                                    Start:   
2015                              Blood count complete   
auto&auto difrntl wbc                               [facility]  
Work Phone:   
[phone]  
   
                                                    Start:   
2015                              Provider Instructions for   
Treatment                                           [facility]  
Work Phone:   
[phone]  
   
                                                    Start:   
2015                              Hemoglobin   
A1c/Hemoglobin.total mass   
fraction (Bld)                          Hemoglobin Glyclated   
(HGB A1C) (11734)                       [facility]  
Work Phone:   
[phone]  
   
                                                    Start:   
2015                              Hemoglobin glycosylated   
a1c                                                 [facility];   
[facility]  
Work Phone:   
[phone]  
   
                                                    Start:   
2015                              25 hydroxy includes   
fractions if performed                              [facility]  
Work Phone:   
[phone]  
   
                                                    Start:   
2015                              Assay of thyroid   
stimulating hormone tsh                             [facility];   
[facility]  
Work Phone:   
[phone]  
   
                                                    Start:   
2015          Thyrotropin Qn      TSH (57065)         [facility]  
Work Phone:   
[phone]  
   
                                                    Start:   
2015                              Urnls dip stick/tablet   
reagent auto microscopy                             [facility]  
Work Phone:   
[phone]  
   
                                                    Start:   
2015                              Urine albumin   
quantitative                                        [facility]  
Work Phone:   
[phone]  
   
                                                    Start:   
2015                              Comprehensive metabolic   
panel                                               [facility]  
Work Phone:   
[phone]  
   
                                                    Start:   
2015          Lipid panel                             [facility]  
Work Phone:   
[phone]  
   
                                                    Start:   
2015                              Blood count manual cell   
count each                                          [facility]  
Work Phone:   
[phone]  
   
                                                    Start:   
2014          Procedure Education                     [facility]  
Work Phone:   
[phone]  
   
                                                    Start:   
2014                              Provider Instructions for   
Treatment                                           [facility]  
Work Phone:   
[phone]  
   
                                                    Start:   
2014                              Provider Instructions for   
Treatment                                           [facility]  
Work Phone:   
[phone]  
   
                                                    Start:   
2014                              Comprehensive metabolic   
panel                                               [facility]  
Work Phone:   
[phone]  
   
                                                    Start:   
2014                              Assay of thyroid   
stimulating hormone tsh                             [facility];   
[facility]  
Work Phone:   
[phone]  
   
                                                    Start:   
2014          Thyrotropin Qn      TSH (12136)         [facility]  
Work Phone:   
[phone]  
   
                                                    Start:   
2014                              Urnls dip stick/tablet   
reagent auto microscopy                             [facility]  
Work Phone:   
[phone]  
   
                                                    Start:   
2014                              Urine albumin   
quantitative                                        [facility]  
Work Phone:   
[phone]  
   
                                                    Start:   
2014          Lipid panel                             [facility]  
Work Phone:   
[phone]  
   
                                                    Start:   
2014                              Blood count manual cell   
count each                                          [facility]  
Work Phone:   
[phone]  
   
                                                    Start:   
2014                              Provider Instructions for   
Treatment                                           [facility]  
Work Phone:   
[phone]  
   
                                                    Start:   
2013          Patient Education                       [facility]  
Work Phone:   
[phone]  
   
                                                    Start:   
2013                              Provider Instructions for   
Treatment                                           [facility]  
Work Phone:   
[phone]  
   
                                                    Start:   
2013                              Assay of prostate   
specific antigen total                              [facility]  
Work Phone:   
[phone]  
   
                                                    Comment on   
above:                                  screening   
   
                                                    Start:   
2013                Protein mass conc         PSA (PROSTATE SPECIFIC   
ANTIGEN) (V76.44)                       [facility]  
Work Phone:   
[phone]  
   
                                                    Comment on   
above:                                  screening   
   
                                                    Start:   
2013          Hepatic function panel                     [facility]  
Work Phone:   
[phone]  
   
                                                    Start:   
03-                              Provider Instructions for   
Treatment                                           [facility]  
Work Phone:   
[phone]  
   
                                                    Start:   
2012          Patient Education                       [facility]  
Work Phone:   
[phone]  
   
                                                    Start:   
2012                              Provider Instructions for   
Treatment                                           [facility]  
Work Phone:   
[phone]  
   
                                                    Start:   
05-                              Provider Instructions for   
Treatment                                           [facility]  
Work Phone:   
[phone]  
   
                                                    Start:   
2012                              Assay of prostate   
specific antigen total                              [facility]  
Work Phone:   
[phone]  
   
                                                    Start:   
2012                Protein mass conc         PSA (PROSTATE SPECIFIC   
ANTIGEN) (V76.44)                       [facility]  
Work Phone:   
[phone]  
   
                                                    Start:   
2012                              Hemoglobin   
A1c/Hemoglobin.total mass   
fraction (Bld)                          Hemoglobin Glyclated   
(HGB A1C) (58762)                       [facility]  
Work Phone:   
[phone]  
   
                                                    Start:   
2012                              Hemoglobin glycosylated   
a1c                                                 [facility];   
[facility]  
Work Phone:   
[phone]  
   
                                                    Start:   
2012                              Urnls dip stick/tablet   
reagent auto microscopy                             [facility]  
Work Phone:   
[phone]  
   
                                                    Start:   
2012                              Assay of thyroid   
stimulating hormone tsh                             [facility];   
[facility]  
Work Phone:   
[phone]  
   
                                                    Start:   
2012          Thyrotropin Qn      TSH (02036)         [facility]  
Work Phone:   
[phone]  
   
                                                    Start:   
2012                              Urine albumin   
quantitative                                        [facility]  
Work Phone:   
[phone]  
   
                                                    Start:   
2012                              Comprehensive metabolic   
panel                                               [facility]  
Work Phone:   
[phone]  
   
                                                    Start:   
2012          Lipid panel                             [facility]  
Work Phone:   
[phone]  
   
                                                    Start:   
2012                              Blood count manual cell   
count each                                          [facility]  
Work Phone:   
[phone]  
   
                                                    Start:   
2012                              Provider Instructions for   
Treatment                                           [facility]  
Work Phone:   
[phone]  
   
                                                    Start:   
2012                              Hemoglobin   
A1c/Hemoglobin.total mass   
fraction (Bld)                          Hemoglobin Glyclated   
(HGB A1C) (08859)                       [facility]  
Work Phone:   
[phone]  
   
                                                    Start:   
2012                              Hemoglobin glycosylated   
a1c                                                 [facility];   
[facility]  
Work Phone:   
[phone]  
   
                                                    Start:   
2011                              Provider Instructions for   
Treatment                                           [facility]  
Work Phone:   
[phone]  
   
                                                    Start:   
2011                              Provider Instructions for   
Treatment                                           [facility]  
Work Phone:   
[phone]  
   
                                                    Start:   
2011                              Assay of thyroid   
stimulating hormone tsh                             [facility];   
[facility]  
Work Phone:   
[phone]  
   
                                                    Start:   
2011          Thyrotropin Qn      TSH (19985)         [facility]  
Work Phone:   
[phone]  
   
                                                    Start:   
2011                              Comprehensive metabolic   
panel                                               [facility]  
Work Phone:   
[phone]  
   
                                                    Start:   
2011                              Blood count manual cell   
count each                                          [facility]  
Work Phone:   
[phone]  
   
                                                    Start:   
2011                              Urine albumin   
quantitative                                        [facility]  
Work Phone:   
[phone]  
   
                                                    Start:   
2011                              Provider Instructions for   
Treatment                                           [facility]  
Work Phone:   
[phone]  
   
                                                    Start:   
2011                              Assay of troponin   
quantitative                                        [facility];   
[facility]  
Work Phone:   
[phone]  
   
                                                    Start:   
2011                              Troponin I.cardiac mass   
conc                      Troponin I (96391)        [facility]  
Work Phone:   
[phone]  
   
                                                    Start:   
2011                              Creatine kinase mb   
fraction only                                       [facility]  
Work Phone:   
[phone]  
   
                                                    Start:   
2011          Creatine kinase total                     [facility]  
Work Phone:   
[phone]  
   
                                                    Start:   
2011          Assay of magnesium                      [facility];   
[facility]  
Work Phone:   
[phone]  
   
                                                    Start:   
2011          Magnesium mass conc Magnesium (03944)   [facility]  
Work Phone:   
[phone]  
   
                                                    Start:   
2011                              Basic metabolic panel   
calcium total                                       [facility]  
Work Phone:   
[phone]  
   
                                                    Start:   
2011                              Assay of prostate   
specific antigen total                              [facility]  
Work Phone:   
[phone]  
   
                                                    Start:   
2011                Protein mass conc         PSA (PROSTATE SPECIFIC   
ANTIGEN) (V76.44)                       [facility]  
Work Phone:   
[phone]  
   
                                                    Start:   
2011                Glucose [Mass/Vol]        Glucose, PP/2 Hour   
(44443)                                 [facility]  
Work Phone:   
[phone]  
   
                                                    Start:   
2011                              Glucose quantitative   
blood xcpt reagent strip                            [facility]  
Work Phone:   
[phone]  
   
                                                                 [facility]  
Work Phone:   
[phone]  
   
                                                                 [facility]  
Work Phone:   
[phone]  
   
                                                                 [facility]  
Work Phone:   
[phone]  
   
                                                                 [facility]  
Work Phone:   
[phone]  
   
                                                                 [facility]  
Work Phone:   
[phone]  
   
                                                                 [facility]  
Work Phone:   
[phone]  
   
                                                                 [facility]  
Work Phone:   
[phone]  
   
                                                                 [facility]  
Work Phone:   
[phone]  
   
                                                                 [facility]  
Work Phone:   
[phone]  
   
                                                                 [facility]  
Work Phone:   
[phone]  
   
                                                                 [facility]  
Work Phone:   
[phone]  
   
                                                                 [facility]  
Work Phone:   
[phone]  
   
                                                                 [facility]  
Work Phone:   
[phone]  
   
                                                                 [facility]  
Work Phone:   
[phone]  
   
                                                                 [facility]  
Work Phone:   
[phone]  
   
                                                                 [facility]  
Work Phone:   
[phone]  
   
                                                                 [facility];   
[facility]  
Work Phone:   
[phone]  
   
                                                                 [facility];   
[facility]  
Work Phone:   
[phone]  
   
                                                                 [facility];   
[facility]  
Work Phone:   
[phone]  
   
                                                                 [facility];   
[facility]  
Work Phone:   
[phone]  
   
                                                                 [facility];   
[facility]  
Work Phone:   
[phone]  
   
                                                                 [facility];   
[facility]  
Work Phone:   
[phone]  
   
                                                                 [facility];   
[facility]  
Work Phone:   
[phone]  
   
                                                                 [facility];   
[facility]  
Work Phone:   
[phone]  
  
  
  
Payers  
  
  
                          Date         Payer Category Payer        Policy ID  
   
                          2019   Unknown                   [id number]  
   
                          2011   Unknown                   [id number]  
   
                          1965   Unknown                   [id number]  
[id number]  
   
                          1963   Unknown                   [id number]  
[id number]  
   
                                       Unknown                     
  
  
  
Social History  
  
  
                          Date         Type         Detail       Facility  
   
                                       Alcohol Use: [facility] use. [facility]  
Work Phone:   
[phone]  
   
                                       Caffeine Use              [facility]  
Work Phone:   
[phone]  
   
                                        Comment on above:   qd   
   
                                       Exercise History: Does not exercise. [facility]   
[facility]  
Work Phone:   
[phone]  
   
                                       Living Situation: Lives with spouse. [facility]   
[facility]  
Work Phone:   
[phone]  
   
                                                            Number of Child (age  
   
0-17) Dependents:         2.                        [facility]  
Work Phone:   
[phone]  
   
                                       Pets/Animals: Fish.        [facility]  
Work Phone:   
[phone]  
   
                                                Tobacco Use:    Smokes < 1 pack   
of   
cigarettes per day.                     [facility]  
Work Phone:   
[phone]  
   
                                        Comment on above:   12same 5.10.12   
   
                                       Alcohol Use: Alcohol Use: [facility]; [facility]  
Work Phone:   
[phone]  
   
                                       Exercise History: Exercise History: [facility]; [facility]  
Work Phone:   
[phone]  
   
                                       Living Situation: Living Situation: [facility]; [facility]  
Work Phone:   
[phone]  
   
                                                            Number of Child (age  
   
0-17) Dependents:                       Number of Child (age   
0-17) Dependents:                       [facility]; [facility]  
Work Phone:   
[phone]  
   
                                       Pets/Animals: Pets/Animals: [facility]; [facility]  
Work Phone:   
[phone]  
   
                                       Tobacco Use: Tobacco Use: [facility]; [facility]  
Work Phone:   
[phone]  
   
                                        Comment on above:   12same 5.10.12   
  
  
  
Functional Status  
  
  
                          Date         Assessment   Result       Facility  
   
                          2020   LP-IR Score  52           [facility]  
Work Phone: [phone]  
   
                                        Comment on above:   INSULIN RESISTANCE M  
ARKER <--Insulin Sensitive Insulin   
Resistant-->   
Percentile in Reference PopulationInsulin Resistance ScoreLP-IR   
Score Low 25th 50th 75th High <27 27 45 63 >63LP-IR Score is   
inaccurate if patient is non-fasting. .The LP-IR score is a   
laboratory developed index that has beenassociated with insulin   
resistance and diabetes risk and should beused as one component of   
a physician's clinical assessment.   
   
                                                            Test(s) 248887-LGS-W  
; 484549-XUX-K; 030604-YCW-K;   
069040-Tbqgiufiofolk; 123477-Cholesterol, Total; 382772-TUH-W   
(Total);598009-Frrnb LDL-P; 362497-QJD Size; 079218-LA-AV Scorewas   
developed and its performance characteristics determinedby [employer].   
It has not been cleared or approved by the Foodand Drug   
Administration.PATIENT WAS FASTINGPERFORMED BY:  [employer] [id number]   
BY:  LabCoRobert [facility] SomersetQfnpbf3807 [facility] [id number]   
   
                          2019   LP-IR Score  70           [facility]  
Work Phone: [phone]  
   
                                        Comment on above:   INSULIN RESISTANCE M  
ARKER <--Insulin Sensitive Insulin   
Resistant-->   
Percentile in Reference PopulationInsulin Resistance ScoreLP-IR   
Score Low 25th 50th 75th High <27 27 45 63 >63LP-IR Score is   
inaccurate if patient is non-fasting. .The LP-IR score is a   
laboratory developed index that has beenassociated with insulin   
resistance and diabetes risk and should beused as one component of   
a physician's clinical assessment. TheLP-IR score listed above has   
not been cleared by the [place] Food andDrug Administration.   
   
                                                            PATIENT WAS FASTINGP  
ERFORMED BY: [employer] Myfdbyiiab0692 [facility] [id number] BY:  ThanxRobert [facility] Somerset6370 [facility] [id number]   
Information: [id number], [id number]   
   
                          2018   LP-IR Score  72           [facility]  
Work Phone: [phone]  
   
                                        Comment on above:   INSULIN RESISTANCE M  
NILDA <--Insulin Sensitive Insulin   
Resistant-->   
Percentile in Reference PopulationInsulin Resistance ScoreLP-IR   
Score Low 25th 50th 75th High <27 27 45 63 >63LP-IR Score is   
inaccurate if patient is non-fasting. .The LP-IR score is a   
laboratory developed index that has beenassociated with insulin   
resistance and diabetes risk and should beused as one component of   
a physician's clinical assessment. TheLP-IR score listed above has   
not been cleared by the [place] Food andDrug Administration.   
   
                                                            PATIENT WAS FASTINGP  
ERFORMED BY: [facility] [facility]1447 [facility] [id number] BY:  ThanxRobert [facility] Somerset6370 [facility] [id number]   
  
  
  
Clinical Notes  
  
  
                                Note Date & Type Note            Facility  
  
  
  
                                                    Instructions   
  
  
  
                                                    Name  
   
                                                    How to Access Health   
Information Online using   
Patient Portal and 3rd Party   
Apps  
Indication:Smoker  
                          Start:1-Mar-2021          Instruction Type:Patient   
Education  
  
   
                                                    Patient Instructions  
Indication:Smoker  
                          Start:1-Mar-2021          Instruction Type:Provider   
Instructions for Treatment  
  
   
                                                    How to Access Health   
Information Online using   
Patient Portal and 3rd Party   
Apps  
Indication:Smoker  
                                        Start:                                       Instruction Type:Patient   
Education  
  
   
                                                    Patient Instructions  
Indication:Smoker  
                                        Start:                                       Instruction Type:Provider   
Instructions for Treatment  
  
   
                                                    How to Access Health   
Information Online using   
Patient Portal and 3rd Party   
Apps  
Indication:Smoker  
                          Start:2021          Instruction Type:Patient   
Education  
  
   
                                                    Patient Instructions  
Indication:Smoker  
                          Start:2021          Instruction Type:Provider   
Instructions for Treatment  
  
   
                                                    How to Access Health   
Information Online using   
Patient Portal and 3rd Party   
Apps  
Indication:Smoker  
                                        Start:16-Dec-202  
0                                       Instruction Type:Patient   
Education  
  
   
                                                    Patient Instructions  
Indication:Smoker  
                                        Start:16-Dec-202  
0                                       Instruction Type:Provider   
Instructions for Treatment  
  
   
                                                    How to access health   
information online  
Indication:Smoker  
                                        Start:  
0                                       Instruction Type:Patient   
Education  
  
   
                                                    How to access health   
information online - Detail  
Indication:Smoker  
                                        Start:  
0                                       Instruction Type:Patient   
Education  
  
   
                                                    Patient Instructions  
Indication:Smoker  
                                        Start:  
0                                       Instruction Type:Provider   
Instructions for Treatment  
  
   
                                                    How to access health   
information online  
Indication:Smoker  
                                        Start:24-Aug-202  
0                                       Instruction Type:Patient   
Education  
  
   
                                                    How to access health   
information online - Detail  
Indication:Smoker  
                                        Start:24-Aug-202  
0                                       Instruction Type:Patient   
Education  
  
   
                                                    Patient Instructions  
Indication:Smoker  
                                        Start:24-Aug-202  
0                                       Instruction Type:Provider   
Instructions for Treatment  
  
   
                                                    How to access health   
information online - Detail  
Indication:BMI   
25.0-25.9,adult  
                                        Start:  
0                                       Instruction Type:Patient   
Education  
  
   
                                                    How to access health   
information online  
Indication:BMI   
25.0-25.9,adult  
                                        Start:  
0                                       Instruction Type:Patient   
Education  
  
   
                                                    Patient Instructions  
Indication:BMI   
25.0-25.9,adult  
                                        Start:  
0                                       Instruction Type:Provider   
Instructions for Treatment  
  
   
                                                    How to access health   
information online  
Indication:Smoker  
                          Start:2020          Instruction Type:Patient   
Education  
  
   
                                                    How to access health   
information online - Detail  
Indication:Smoker  
                          Start:2020          Instruction Type:Patient   
Education  
  
   
                                                    Patient Instructions  
Indication:Smoker  
                          Start:2020          Instruction Type:Provider   
Instructions for Treatment  
  
   
                                                    How to access health   
information online  
Indication:Smoker  
                                        Start:  
0                                       Instruction Type:Patient   
Education  
  
   
                                                    How to access health   
information online - Detail  
Indication:Smoker  
                                        Start:  
0                                       Instruction Type:Patient   
Education  
  
   
                                                    Patient Instructions  
Indication:Smoker  
                                        Start:  
0                                       Instruction Type:Provider   
Instructions for Treatment  
  
   
                                                    How to access health   
information online  
Indication:Smoker  
                                        Start:13-Sep-201  
9                                       Instruction Type:Patient   
Education  
  
   
                                                    How to access health   
information online - Detail  
Indication:Smoker  
                                        Start:13-Sep-201  
9                                       Instruction Type:Patient   
Education  
  
   
                                                    Patient Instructions  
Indication:Smoker  
                                        Start:13-Sep-201  
9                                       Instruction Type:Provider   
Instructions for Treatment  
  
   
                                                    How to access health   
information online  
Indication:BMI   
26.0-26.9,adult  
                                        Start:10-May-201  
9                                       Instruction Type:Patient   
Education  
  
   
                                                    How to access health   
information online - Detail  
Indication:BMI   
26.0-26.9,adult  
                                        Start:10-May-201  
9                                       Instruction Type:Patient   
Education  
  
   
                                                    Patient Instructions  
Indication:BMI   
26.0-26.9,adult  
                                        Start:10-May-201  
9                                       Instruction Type:Provider   
Instructions for Treatment  
  
   
                                                    How to access health   
information online  
Indication:Abnormal glucose   
tolerance test  
                                        Start:28-Dec-201  
8                                       Instruction Type:Patient   
Education  
  
   
                                                    How to access health   
information online - Detail  
Indication:Abnormal glucose   
tolerance test  
                                        Start:28-Dec-201  
8                                       Instruction Type:Patient   
Education  
  
   
                                                    Patient Instructions  
Indication:Abnormal glucose   
tolerance test  
                                        Start:28-Dec-201  
8                                       Instruction Type:Provider   
Instructions for Treatment  
  
   
                                                    How to access health   
information online  
Indication:Smoker  
                                        Start:29-Aug-201  
8                                       Instruction Type:Patient   
Education  
  
   
                                                    How to access health   
information online - Detail  
Indication:Smoker  
                                        Start:29-Aug-201  
8                                       Instruction Type:Patient   
Education  
  
   
                                                    Patient Instructions  
Indication:Smoker  
                                        Start:29-Aug-201  
8                                       Instruction Type:Provider   
Instructions for Treatment  
  
   
                                                    How to access health   
information online  
Indication:Abnormal glucose   
tolerance test  
                                        Start:                                       Instruction Type:Patient   
Education  
  
   
                                                    How to access health   
information online - Detail  
Indication:Abnormal glucose   
tolerance test  
                                        Start:                                       Instruction Type:Patient   
Education  
  
   
                                                    Patient Instructions  
Indication:Abnormal glucose   
tolerance test  
                                        Start:                                       Instruction Type:Provider   
Instructions for Treatment  
  
   
                                                    How to access health   
information online  
Indication:Smoker  
                          Start:8-Dec-2017          Instruction Type:Patient   
Education  
  
   
                                                    How to access health   
information online - Detail  
Indication:Smoker  
                          Start:8-Dec-2017          Instruction Type:Patient   
Education  
  
   
                                                    Patient Instructions  
Indication:Smoker  
                          Start:8-Dec-2017          Instruction Type:Provider   
Instructions for Treatment  
  
   
                                                    How to access health   
information online  
Indication:Smoker  
                          Start:4-Aug-2017          Instruction Type:Patient   
Education  
  
   
                                                    How to access health   
information online - Detail  
Indication:Smoker  
                          Start:4-Aug-2017          Instruction Type:Patient   
Education  
  
   
                                                    Patient Instructions  
Indication:Smoker  
                          Start:4-Aug-2017          Instruction Type:Provider   
Instructions for Treatment  
  
   
                                                    How to access health   
information online  
Indication:Smoker  
                          Start:3-Mar-2017          Instruction Type:Patient   
Education  
  
   
                                                    How to access health   
information online - Detail  
Indication:Smoker  
                          Start:3-Mar-2017          Instruction Type:Patient   
Education  
  
   
                                                    Patient Instructions  
Indication:Smoker  
                          Start:3-Mar-2017          Instruction Type:Provider   
Instructions for Treatment  
  
   
                                                    How to access health   
information online  
Indication:Hypertension,   
essential, benign  
                                        Start:28-Oct-201  
6                                       Instruction Type:Patient   
Education  
  
   
                                                    How to access health   
information online - Detail  
Indication:Hypertension,   
essential, benign  
                                        Start:28-Oct-201  
6                                       Instruction Type:Patient   
Education  
  
   
                                                    Patient Instructions  
Indication:Hypertension,   
essential, benign  
                                        Start:28-Oct-201  
6                                       Instruction Type:Provider   
Instructions for Treatment  
  
   
                                                    How to access health   
information online  
Indication:Hypertension,   
essential, benign  
                                        Start:                                       Instruction Type:Patient   
Education  
  
   
                                                    How to access health   
information online - Detail  
Indication:Hypertension,   
essential, benign  
                                        Start:                                       Instruction Type:Patient   
Education  
  
   
                                                    Patient Instructions  
Indication:Hypertension,   
essential, benign  
                                        Start:  
6                                       Instruction Type:Provider   
Instructions for Treatment  
  
   
                                                    How to access health   
information online  
Indication:BMI   
27.0-27.9,adult  
                                        Start:  
6                                       Instruction Type:Patient   
Education  
  
   
                                                    How to access health   
information online - Detail  
Indication:BMI   
27.0-27.9,adult  
                                        Start:  
6                                       Instruction Type:Patient   
Education  
  
   
                                                    Patient Instructions  
Indication:BMI   
27.0-27.9,adult  
                                        Start:                                       Instruction Type:Provider   
Instructions for Treatment  
  
   
                                                    How to access health   
information online  
Indication:Abnormal glucose   
tolerance test  
                                        Start:  
6                                       Instruction Type:Patient   
Education  
  
   
                                                    How to access health   
information online - Detail  
Indication:Abnormal glucose   
tolerance test  
                                        Start:  
6                                       Instruction Type:Patient   
Education  
  
   
                                                    Patient Instructions  
Indication:Abnormal glucose   
tolerance test  
                                        Start:  
6                                       Instruction Type:Provider   
Instructions for Treatment  
  
   
                                                    How to access health   
information online  
Indication:Abnormal glucose   
tolerance test  
                                        Start:16-Dec-201  
5                                       Instruction Type:Patient   
Education  
  
   
                                                    How to access health   
information online - Detail  
Indication:Abnormal glucose   
tolerance test  
                                        Start:16-Dec-201  
5                                       Instruction Type:Patient   
Education  
  
   
                                                    Patient Instructions  
Indication:Abnormal glucose   
tolerance test  
                                        Start:16-Dec-201  
5                                       Instruction Type:Provider   
Instructions for Treatment  
  
   
                                                    How to access health   
information online  
Indication:Hypercholesteremi  
a  
                                        Start:16-Sep-201  
5                                       Instruction Type:Patient   
Education  
  
   
                                                    How to access health   
information online - Detail  
Indication:Hypercholesteremi  
a  
                                        Start:16-Sep-201  
5                                       Instruction Type:Patient   
Education  
  
   
                                                    Patient Instructions  
Indication:Hypercholesteremi  
a  
                                        Start:16-Sep-201  
5                                       Instruction Type:Provider   
Instructions for Treatment  
  
   
                                                    Patient Instructions  
Indication:Abnormal glucose   
tolerance test  
                          Start:9-May-2014          Instruction Type:Provider   
Instructions for Treatment  
  
   
                                                    Patient Instructions  
Indication:Abnormal glucose   
tolerance test  
                          Start:2014          Instruction Type:Provider   
Instructions for Treatment  
  
   
                                                    Patient Instructions  
Indication:Abnormal glucose   
tolerance test  
                                        Start:12-Sep-201  
3                                       Instruction Type:Provider   
Instructions for Treatment  
  
   
                                                    Patient Instructions  
Indication:Hypercholesteremi  
a  
                                        Start:15-Mar-201  
3                                       Instruction Type:Provider   
Instructions for Treatment  
  
   
                                                    Patient Instructions  
Indication:Hypertension,   
essential, benign  
                                        Start:14-Sep-201  
2                                       Instruction Type:Provider   
Instructions for Treatment  
  
  
  
[facility]  
Work Phone: [phone]*   
  
                                Name            Dates           Details  
   
                                                    Patient Instructions  
Indication:Smoker  
                          Start:2-Sep-2021          Instruction Type:Provider   
Instructions for Treatment  
  
   
                                                    How to Access Health Informa  
tion   
Online using Patient Portal and   
3rd Party Apps  
Indication:Smoker  
                          Start:2-Sep-2021          Instruction Type:Patient   
Education  
  
   
                                                    Patient Instructions  
Indication:BMI 26.0-26.9,adult  
                          Start:4-Aug-2021          Instruction Type:Provider   
Instructions for Treatment  
  
   
                                                    How to Access Health Informa  
tion   
Online using Patient Portal and   
3rd Party Apps  
Indication:BMI 26.0-26.9,adult  
                          Start:4-Aug-2021          Instruction Type:Patient   
Education  
  
   
                                                    How to Access Health Informa  
tion   
Online using Patient Portal and   
3rd Party Apps  
Indication:Smoker  
                          Start:1-Mar-2021          Instruction Type:Patient   
Education  
  
   
                                                    Patient Instructions  
Indication:Smoker  
                          Start:1-Mar-2021          Instruction Type:Provider   
Instructions for Treatment  
  
   
                                                    How to Access Health Informa  
tion   
Online using Patient Portal and   
3rd Party Apps  
Indication:Smoker  
                          Start:2021         Instruction Type:Patient   
Education  
  
   
                                                    Patient Instructions  
Indication:Smoker  
                          Start:2021         Instruction Type:Provider   
Instructions for Treatment  
  
   
                                                    How to Access Health Informa  
tion   
Online using Patient Portal and   
3rd Party Apps  
Indication:Smoker  
                          Start:2021          Instruction Type:Patient   
Education  
  
   
                                                    Patient Instructions  
Indication:Smoker  
                          Start:2021          Instruction Type:Provider   
Instructions for Treatment  
  
   
                                                    How to Access Health Informa  
tion   
Online using Patient Portal and   
3rd Party Apps  
Indication:Smoker  
                          Start:16-Dec-2020         Instruction Type:Patient   
Education  
  
   
                                                    Patient Instructions  
Indication:Smoker  
                          Start:16-Dec-2020         Instruction Type:Provider   
Instructions for Treatment  
  
   
                                                    How to access health informa  
tion   
online  
Indication:Smoker  
                          Start:2020         Instruction Type:Patient   
Education  
  
   
                                                    How to access health informa  
tion   
online - Detail  
Indication:Smoker  
                          Start:2020         Instruction Type:Patient   
Education  
  
   
                                                    Patient Instructions  
Indication:Smoker  
                          Start:2020         Instruction Type:Provider   
Instructions for Treatment  
  
   
                                                    How to access health informa  
tion   
online  
Indication:Smoker  
                          Start:24-Aug-2020         Instruction Type:Patient   
Education  
  
   
                                                    How to access health informa  
tion   
online - Detail  
Indication:Smoker  
                          Start:24-Aug-2020         Instruction Type:Patient   
Education  
  
   
                                                    Patient Instructions  
Indication:Smoker  
                          Start:24-Aug-2020         Instruction Type:Provider   
Instructions for Treatment  
  
   
                                                    How to access health informa  
tion   
online - Detail  
Indication:BMI 25.0-25.9,adult  
                          Start:2020         Instruction Type:Patient   
Education  
  
   
                                                    How to access health informa  
tion   
online  
Indication:BMI 25.0-25.9,adult  
                          Start:2020         Instruction Type:Patient   
Education  
  
   
                                                    Patient Instructions  
Indication:BMI 25.0-25.9,adult  
                          Start:2020         Instruction Type:Provider   
Instructions for Treatment  
  
   
                                                    How to access health informa  
tion   
online  
Indication:Smoker  
                          Start:2020          Instruction Type:Patient   
Education  
  
   
                                                    How to access health informa  
tion   
online - Detail  
Indication:Smoker  
                          Start:2020          Instruction Type:Patient   
Education  
  
   
                                                    Patient Instructions  
Indication:Smoker  
                          Start:2020          Instruction Type:Provider   
Instructions for Treatment  
  
   
                                                    How to access health informa  
tion   
online  
Indication:Smoker  
                          Start:2020         Instruction Type:Patient   
Education  
  
   
                                                    How to access health informa  
tion   
online - Detail  
Indication:Smoker  
                          Start:2020         Instruction Type:Patient   
Education  
  
   
                                                    Patient Instructions  
Indication:Smoker  
                          Start:2020         Instruction Type:Provider   
Instructions for Treatment  
  
   
                                                    How to access health informa  
tion   
online  
Indication:Smoker  
                          Start:13-Sep-2019         Instruction Type:Patient   
Education  
  
   
                                                    How to access health informa  
tion   
online - Detail  
Indication:Smoker  
                          Start:13-Sep-2019         Instruction Type:Patient   
Education  
  
   
                                                    Patient Instructions  
Indication:Smoker  
                          Start:13-Sep-2019         Instruction Type:Provider   
Instructions for Treatment  
  
   
                                                    How to access health informa  
tion   
online  
Indication:BMI 26.0-26.9,adult  
                          Start:10-May-2019         Instruction Type:Patient   
Education  
  
   
                                                    How to access health informa  
tion   
online - Detail  
Indication:BMI 26.0-26.9,adult  
                          Start:10-May-2019         Instruction Type:Patient   
Education  
  
   
                                                    Patient Instructions  
Indication:BMI 26.0-26.9,adult  
                          Start:10-May-2019         Instruction Type:Provider   
Instructions for Treatment  
  
   
                                                    How to access health informa  
tion   
online  
Indication:Abnormal glucose   
tolerance test  
                          Start:28-Dec-2018         Instruction Type:Patient   
Education  
  
   
                                                    How to access health informa  
tion   
online - Detail  
Indication:Abnormal glucose   
tolerance test  
                          Start:28-Dec-2018         Instruction Type:Patient   
Education  
  
   
                                                    Patient Instructions  
Indication:Abnormal glucose   
tolerance test  
                          Start:28-Dec-2018         Instruction Type:Provider   
Instructions for Treatment  
  
   
                                                    How to access health informa  
tion   
online  
Indication:Smoker  
                          Start:29-Aug-2018         Instruction Type:Patient   
Education  
  
   
                                                    How to access health informa  
tion   
online - Detail  
Indication:Smoker  
                          Start:29-Aug-2018         Instruction Type:Patient   
Education  
  
   
                                                    Patient Instructions  
Indication:Smoker  
                          Start:29-Aug-2018         Instruction Type:Provider   
Instructions for Treatment  
  
   
                                                    How to access health informa  
tion   
online  
Indication:Abnormal glucose   
tolerance test  
                          Start:2018         Instruction Type:Patient   
Education  
  
   
                                                    How to access health informa  
tion   
online - Detail  
Indication:Abnormal glucose   
tolerance test  
                          Start:2018         Instruction Type:Patient   
Education  
  
   
                                                    Patient Instructions  
Indication:Abnormal glucose   
tolerance test  
                          Start:2018         Instruction Type:Provider   
Instructions for Treatment  
  
   
                                                    How to access health informa  
tion   
online  
Indication:Smoker  
                          Start:8-Dec-2017          Instruction Type:Patient   
Education  
  
   
                                                    How to access health informa  
tion   
online - Detail  
Indication:Smoker  
                          Start:8-Dec-2017          Instruction Type:Patient   
Education  
  
   
                                                    Patient Instructions  
Indication:Smoker  
                          Start:8-Dec-2017          Instruction Type:Provider   
Instructions for Treatment  
  
   
                                                    How to access health informa  
tion   
online  
Indication:Smoker  
                          Start:4-Aug-2017          Instruction Type:Patient   
Education  
  
   
                                                    How to access health informa  
tion   
online - Detail  
Indication:Smoker  
                          Start:4-Aug-2017          Instruction Type:Patient   
Education  
  
   
                                                    Patient Instructions  
Indication:Smoker  
                          Start:4-Aug-2017          Instruction Type:Provider   
Instructions for Treatment  
  
   
                                                    How to access health informa  
tion   
online  
Indication:Smoker  
                          Start:3-Mar-2017          Instruction Type:Patient   
Education  
  
   
                                                    How to access health informa  
tion   
online - Detail  
Indication:Smoker  
                          Start:3-Mar-2017          Instruction Type:Patient   
Education  
  
   
                                                    Patient Instructions  
Indication:Smoker  
                          Start:3-Mar-2017          Instruction Type:Provider   
Instructions for Treatment  
  
   
                                                    How to access health informa  
tion   
online  
Indication:Hypertension,   
essential, benign  
                          Start:28-Oct-2016         Instruction Type:Patient   
Education  
  
   
                                                    How to access health informa  
tion   
online - Detail  
Indication:Hypertension,   
essential, benign  
                          Start:28-Oct-2016         Instruction Type:Patient   
Education  
  
   
                                                    Patient Instructions  
Indication:Hypertension,   
essential, benign  
                          Start:28-Oct-2016         Instruction Type:Provider   
Instructions for Treatment  
  
   
                                                    How to access health informa  
tion   
online  
Indication:Hypertension,   
essential, benign  
                          Start:2016         Instruction Type:Patient   
Education  
  
   
                                                    How to access health informa  
tion   
online - Detail  
Indication:Hypertension,   
essential, benign  
                          Start:2016         Instruction Type:Patient   
Education  
  
   
                                                    Patient Instructions  
Indication:Hypertension,   
essential, benign  
                          Start:2016         Instruction Type:Provider   
Instructions for Treatment  
  
   
                                                    How to access health informa  
tion   
online  
Indication:BMI 27.0-27.9,adult  
                          Start:2016         Instruction Type:Patient   
Education  
  
   
                                                    How to access health informa  
tion   
online - Detail  
Indication:BMI 27.0-27.9,adult  
                          Start:2016         Instruction Type:Patient   
Education  
  
   
                                                    Patient Instructions  
Indication:BMI 27.0-27.9,adult  
                          Start:2016         Instruction Type:Provider   
Instructions for Treatment  
  
   
                                                    How to access health informa  
tion   
online  
Indication:Abnormal glucose   
tolerance test  
                          Start:2016         Instruction Type:Patient   
Education  
  
   
                                                    How to access health informa  
tion   
online - Detail  
Indication:Abnormal glucose   
tolerance test  
                          Start:2016         Instruction Type:Patient   
Education  
  
   
                                                    Patient Instructions  
Indication:Abnormal glucose   
tolerance test  
                          Start:2016         Instruction Type:Provider   
Instructions for Treatment  
  
   
                                                    How to access health informa  
tion   
online  
Indication:Abnormal glucose   
tolerance test  
                          Start:16-Dec-2015         Instruction Type:Patient   
Education  
  
   
                                                    How to access health informa  
tion   
online - Detail  
Indication:Abnormal glucose   
tolerance test  
                          Start:16-Dec-2015         Instruction Type:Patient   
Education  
  
   
                                                    Patient Instructions  
Indication:Abnormal glucose   
tolerance test  
                          Start:16-Dec-2015         Instruction Type:Provider   
Instructions for Treatment  
  
   
                                                    How to access health informa  
tion   
online  
Indication:Hypercholesteremia  
                          Start:16-Sep-2015         Instruction Type:Patient   
Education  
  
   
                                                    How to access health informa  
tion   
online - Detail  
Indication:Hypercholesteremia  
                          Start:16-Sep-2015         Instruction Type:Patient   
Education  
  
   
                                                    Patient Instructions  
Indication:Hypercholesteremia  
                          Start:16-Sep-2015         Instruction Type:Provider   
Instructions for Treatment  
  
   
                                                    Patient Instructions  
Indication:Abnormal glucose   
tolerance test  
                          Start:9-May-2014          Instruction Type:Provider   
Instructions for Treatment  
  
   
                                                    Patient Instructions  
Indication:Abnormal glucose   
tolerance test  
                          Start:2014          Instruction Type:Provider   
Instructions for Treatment  
  
   
                                                    Patient Instructions  
Indication:Abnormal glucose   
tolerance test  
                          Start:12-Sep-2013         Instruction Type:Provider   
Instructions for Treatment  
  
   
                                                    Patient Instructions  
Indication:Hypercholesteremia  
                          Start:15-Mar-2013         Instruction Type:Provider   
Instructions for Treatment  
  
   
                                                    Patient Instructions  
Indication:Hypertension,   
essential, benign  
                          Start:14-Sep-2012         Instruction Type:Provider   
Instructions for Treatment  
  
  
  
[facility]  
Work Phone: [phone]*   
  
                                Name            Dates           Details  
   
                                                    Patient Instructions  
Indication:Smoker  
                          Start:16-Mar-2022         Instruction Type:Provider   
Instructions for Treatment  
  
   
                                                    How to Access Health Informa  
tion   
Online using Patient Portal and   
3rd Party Apps  
Indication:Smoker  
                          Start:16-Mar-2022         Instruction Type:Patient   
Education  
  
   
                                                    Patient Instructions  
Indication:Smoker  
                          Start:2-Sep-2021          Instruction Type:Provider   
Instructions for Treatment  
  
   
                                                    How to Access Health Informa  
tion   
Online using Patient Portal and   
3rd Party Apps  
Indication:Smoker  
                          Start:2-Sep-2021          Instruction Type:Patient   
Education  
  
   
                                                    Patient Instructions  
Indication:BMI 26.0-26.9,adult  
                          Start:4-Aug-2021          Instruction Type:Provider   
Instructions for Treatment  
  
   
                                                    How to Access Health Informa  
tion   
Online using Patient Portal and   
3rd Party Apps  
Indication:BMI 26.0-26.9,adult  
                          Start:4-Aug-2021          Instruction Type:Patient   
Education  
  
   
                                                    How to Access Health Informa  
tion   
Online using Patient Portal and   
3rd Party Apps  
Indication:Smoker  
                          Start:1-Mar-2021          Instruction Type:Patient   
Education  
  
   
                                                    Patient Instructions  
Indication:Smoker  
                          Start:1-Mar-2021          Instruction Type:Provider   
Instructions for Treatment  
  
   
                                                    How to Access Health Informa  
tion   
Online using Patient Portal and   
3rd Party Apps  
Indication:Smoker  
                          Start:2021         Instruction Type:Patient   
Education  
  
   
                                                    Patient Instructions  
Indication:Smoker  
                          Start:2021         Instruction Type:Provider   
Instructions for Treatment  
  
   
                                                    How to Access Health Informa  
tion   
Online using Patient Portal and   
3rd Party Apps  
Indication:Smoker  
                          Start:2021          Instruction Type:Patient   
Education  
  
   
                                                    Patient Instructions  
Indication:Smoker  
                          Start:2021          Instruction Type:Provider   
Instructions for Treatment  
  
   
                                                    How to Access Health Informa  
tion   
Online using Patient Portal and   
3rd Party Apps  
Indication:Smoker  
                          Start:16-Dec-2020         Instruction Type:Patient   
Education  
  
   
                                                    Patient Instructions  
Indication:Smoker  
                          Start:16-Dec-2020         Instruction Type:Provider   
Instructions for Treatment  
  
   
                                                    How to access health informa  
tion   
online  
Indication:Smoker  
                          Start:2020         Instruction Type:Patient   
Education  
  
   
                                                    How to access health informa  
tion   
online - Detail  
Indication:Smoker  
                          Start:2020         Instruction Type:Patient   
Education  
  
   
                                                    Patient Instructions  
Indication:Smoker  
                          Start:2020         Instruction Type:Provider   
Instructions for Treatment  
  
   
                                                    How to access health informa  
tion   
online  
Indication:Smoker  
                          Start:24-Aug-2020         Instruction Type:Patient   
Education  
  
   
                                                    How to access health informa  
tion   
online - Detail  
Indication:Smoker  
                          Start:24-Aug-2020         Instruction Type:Patient   
Education  
  
   
                                                    Patient Instructions  
Indication:Smoker  
                          Start:24-Aug-2020         Instruction Type:Provider   
Instructions for Treatment  
  
   
                                                    How to access health informa  
tion   
online - Detail  
Indication:BMI 25.0-25.9,adult  
                          Start:2020         Instruction Type:Patient   
Education  
  
   
                                                    How to access health informa  
tion   
online  
Indication:BMI 25.0-25.9,adult  
                          Start:2020         Instruction Type:Patient   
Education  
  
   
                                                    Patient Instructions  
Indication:BMI 25.0-25.9,adult  
                          Start:2020         Instruction Type:Provider   
Instructions for Treatment  
  
   
                                                    How to access health informa  
tion   
online  
Indication:Smoker  
                          Start:2020          Instruction Type:Patient   
Education  
  
   
                                                    How to access health informa  
tion   
online - Detail  
Indication:Smoker  
                          Start:2020          Instruction Type:Patient   
Education  
  
   
                                                    Patient Instructions  
Indication:Smoker  
                          Start:2020          Instruction Type:Provider   
Instructions for Treatment  
  
   
                                                    How to access health informa  
tion   
online  
Indication:Smoker  
                          Start:2020         Instruction Type:Patient   
Education  
  
   
                                                    How to access health informa  
tion   
online - Detail  
Indication:Smoker  
                          Start:2020         Instruction Type:Patient   
Education  
  
   
                                                    Patient Instructions  
Indication:Smoker  
                          Start:2020         Instruction Type:Provider   
Instructions for Treatment  
  
   
                                                    How to access health informa  
tion   
online  
Indication:Smoker  
                          Start:13-Sep-2019         Instruction Type:Patient   
Education  
  
   
                                                    How to access health informa  
tion   
online - Detail  
Indication:Smoker  
                          Start:13-Sep-2019         Instruction Type:Patient   
Education  
  
   
                                                    Patient Instructions  
Indication:Smoker  
                          Start:13-Sep-2019         Instruction Type:Provider   
Instructions for Treatment  
  
   
                                                    How to access health informa  
tion   
online  
Indication:BMI 26.0-26.9,adult  
                          Start:10-May-2019         Instruction Type:Patient   
Education  
  
   
                                                    How to access health informa  
tion   
online - Detail  
Indication:BMI 26.0-26.9,adult  
                          Start:10-May-2019         Instruction Type:Patient   
Education  
  
   
                                                    Patient Instructions  
Indication:BMI 26.0-26.9,adult  
                          Start:10-May-2019         Instruction Type:Provider   
Instructions for Treatment  
  
   
                                                    How to access health informa  
tion   
online  
Indication:Abnormal glucose   
tolerance test  
                          Start:28-Dec-2018         Instruction Type:Patient   
Education  
  
   
                                                    How to access health informa  
tion   
online - Detail  
Indication:Abnormal glucose   
tolerance test  
                          Start:28-Dec-2018         Instruction Type:Patient   
Education  
  
   
                                                    Patient Instructions  
Indication:Abnormal glucose   
tolerance test  
                          Start:28-Dec-2018         Instruction Type:Provider   
Instructions for Treatment  
  
   
                                                    How to access health informa  
tion   
online  
Indication:Smoker  
                          Start:29-Aug-2018         Instruction Type:Patient   
Education  
  
   
                                                    How to access health informa  
tion   
online - Detail  
Indication:Smoker  
                          Start:29-Aug-2018         Instruction Type:Patient   
Education  
  
   
                                                    Patient Instructions  
Indication:Smoker  
                          Start:29-Aug-2018         Instruction Type:Provider   
Instructions for Treatment  
  
   
                                                    How to access health informa  
tion   
online  
Indication:Abnormal glucose   
tolerance test  
                          Start:2018         Instruction Type:Patient   
Education  
  
   
                                                    How to access health informa  
tion   
online - Detail  
Indication:Abnormal glucose   
tolerance test  
                          Start:2018         Instruction Type:Patient   
Education  
  
   
                                                    Patient Instructions  
Indication:Abnormal glucose   
tolerance test  
                          Start:2018         Instruction Type:Provider   
Instructions for Treatment  
  
   
                                                    How to access health informa  
tion   
online  
Indication:Smoker  
                          Start:8-Dec-2017          Instruction Type:Patient   
Education  
  
   
                                                    How to access health informa  
tion   
online - Detail  
Indication:Smoker  
                          Start:8-Dec-2017          Instruction Type:Patient   
Education  
  
   
                                                    Patient Instructions  
Indication:Smoker  
                          Start:8-Dec-2017          Instruction Type:Provider   
Instructions for Treatment  
  
   
                                                    How to access health informa  
tion   
online  
Indication:Smoker  
                          Start:4-Aug-2017          Instruction Type:Patient   
Education  
  
   
                                                    How to access health informa  
tion   
online - Detail  
Indication:Smoker  
                          Start:4-Aug-2017          Instruction Type:Patient   
Education  
  
   
                                                    Patient Instructions  
Indication:Smoker  
                          Start:4-Aug-2017          Instruction Type:Provider   
Instructions for Treatment  
  
   
                                                    How to access health informa  
tion   
online  
Indication:Smoker  
                          Start:3-Mar-2017          Instruction Type:Patient   
Education  
  
   
                                                    How to access health informa  
tion   
online - Detail  
Indication:Smoker  
                          Start:3-Mar-2017          Instruction Type:Patient   
Education  
  
   
                                                    Patient Instructions  
Indication:Smoker  
                          Start:3-Mar-2017          Instruction Type:Provider   
Instructions for Treatment  
  
   
                                                    How to access health informa  
tion   
online  
Indication:Hypertension,   
essential, benign  
                          Start:28-Oct-2016         Instruction Type:Patient   
Education  
  
   
                                                    How to access health informa  
tion   
online - Detail  
Indication:Hypertension,   
essential, benign  
                          Start:28-Oct-2016         Instruction Type:Patient   
Education  
  
   
                                                    Patient Instructions  
Indication:Hypertension,   
essential, benign  
                          Start:28-Oct-2016         Instruction Type:Provider   
Instructions for Treatment  
  
   
                                                    How to access health informa  
tion   
online  
Indication:Hypertension,   
essential, benign  
                          Start:2016         Instruction Type:Patient   
Education  
  
   
                                                    How to access health informa  
tion   
online - Detail  
Indication:Hypertension,   
essential, benign  
                          Start:2016         Instruction Type:Patient   
Education  
  
   
                                                    Patient Instructions  
Indication:Hypertension,   
essential, benign  
                          Start:2016         Instruction Type:Provider   
Instructions for Treatment  
  
   
                                                    How to access health informa  
tion   
online  
Indication:BMI 27.0-27.9,adult  
                          Start:2016         Instruction Type:Patient   
Education  
  
   
                                                    How to access health informa  
tion   
online - Detail  
Indication:BMI 27.0-27.9,adult  
                          Start:2016         Instruction Type:Patient   
Education  
  
   
                                                    Patient Instructions  
Indication:BMI 27.0-27.9,adult  
                          Start:2016         Instruction Type:Provider   
Instructions for Treatment  
  
   
                                                    How to access health informa  
tion   
online  
Indication:Abnormal glucose   
tolerance test  
                          Start:2016         Instruction Type:Patient   
Education  
  
   
                                                    How to access health informa  
tion   
online - Detail  
Indication:Abnormal glucose   
tolerance test  
                          Start:2016         Instruction Type:Patient   
Education  
  
   
                                                    Patient Instructions  
Indication:Abnormal glucose   
tolerance test  
                          Start:2016         Instruction Type:Provider   
Instructions for Treatment  
  
   
                                                    How to access health informa  
tion   
online  
Indication:Abnormal glucose   
tolerance test  
                          Start:16-Dec-2015         Instruction Type:Patient   
Education  
  
   
                                                    How to access health informa  
tion   
online - Detail  
Indication:Abnormal glucose   
tolerance test  
                          Start:16-Dec-2015         Instruction Type:Patient   
Education  
  
   
                                                    Patient Instructions  
Indication:Abnormal glucose   
tolerance test  
                          Start:16-Dec-2015         Instruction Type:Provider   
Instructions for Treatment  
  
   
                                                    How to access health informa  
tion   
online  
Indication:Hypercholesteremia  
                          Start:16-Sep-2015         Instruction Type:Patient   
Education  
  
   
                                                    How to access health informa  
tion   
online - Detail  
Indication:Hypercholesteremia  
                          Start:16-Sep-2015         Instruction Type:Patient   
Education  
  
   
                                                    Patient Instructions  
Indication:Hypercholesteremia  
                          Start:16-Sep-2015         Instruction Type:Provider   
Instructions for Treatment  
  
   
                                                    Patient Instructions  
Indication:Abnormal glucose   
tolerance test  
                          Start:9-May-2014          Instruction Type:Provider   
Instructions for Treatment  
  
   
                                                    Patient Instructions  
Indication:Abnormal glucose   
tolerance test  
                          Start:2014          Instruction Type:Provider   
Instructions for Treatment  
  
   
                                                    Patient Instructions  
Indication:Abnormal glucose   
tolerance test  
                          Start:12-Sep-2013         Instruction Type:Provider   
Instructions for Treatment  
  
   
                                                    Patient Instructions  
Indication:Hypercholesteremia  
                          Start:15-Mar-2013         Instruction Type:Provider   
Instructions for Treatment  
  
   
                                                    Patient Instructions  
Indication:Hypertension,   
essential, benign  
                          Start:14-Sep-2012         Instruction Type:Provider   
Instructions for Treatment  
  
  
  
[facility]  
Work Phone: [phone]*   
  
                                Name            Dates           Details  
   
                                                    Patient Instructions  
Indication:BMI 27.0-27.9,adult  
                          Start:28-Sep-2022         Instruction Type:Provider   
Instructions for Treatment  
  
   
                                                    How to Access Health Informa  
tion   
Online using Patient Portal and   
[employer] Apps  
Indication:BMI 27.0-27.9,adult  
                          Start:28-Sep-2022         Instruction Type:Patient   
Education  
  
   
                                                    Patient Instructions  
Indication:Smoker  
                          Start:16-Mar-2022         Instruction Type:Provider   
Instructions for Treatment  
  
   
                                                    How to Access Health Informa  
tion   
Online using Patient Portal and   
3rd Party Apps  
Indication:Smoker  
                          Start:16-Mar-2022         Instruction Type:Patient   
Education  
  
   
                                                    Patient Instructions  
Indication:Smoker  
                          Start:2-Sep-2021          Instruction Type:Provider   
Instructions for Treatment  
  
   
                                                    How to Access Health Informa  
tion   
Online using Patient Portal and   
3rd Party Apps  
Indication:Smoker  
                          Start:2-Sep-2021          Instruction Type:Patient   
Education  
  
   
                                                    Patient Instructions  
Indication:BMI 26.0-26.9,adult  
                          Start:4-Aug-2021          Instruction Type:Provider   
Instructions for Treatment  
  
   
                                                    How to Access Health Informa  
tion   
Online using Patient Portal and   
3rd Party Apps  
Indication:BMI 26.0-26.9,adult  
                          Start:4-Aug-2021          Instruction Type:Patient   
Education  
  
   
                                                    How to Access Health Informa  
tion   
Online using Patient Portal and   
[employer]rd Party Apps  
Indication:Smoker  
                          Start:1-Mar-2021          Instruction Type:Patient   
Education  
  
   
                                                    Patient Instructions  
Indication:Smoker  
                          Start:1-Mar-2021          Instruction Type:Provider   
Instructions for Treatment  
  
   
                                                    How to Access Health Informa  
tion   
Online using Patient Portal and   
[employer] Party Apps  
Indication:Smoker  
                          Start:2021         Instruction Type:Patient   
Education  
  
   
                                                    Patient Instructions  
Indication:Smoker  
                          Start:2021         Instruction Type:Provider   
Instructions for Treatment  
  
   
                                                    How to Access Health Informa  
tion   
Online using Patient Portal and   
[employer] Party Apps  
Indication:Smoker  
                          Start:2021          Instruction Type:Patient   
Education  
  
   
                                                    Patient Instructions  
Indication:Smoker  
                          Start:2021          Instruction Type:Provider   
Instructions for Treatment  
  
   
                                                    How to Access Health Informa  
tion   
Online using Patient Portal and   
[employer]rd Party Apps  
Indication:Smoker  
                          Start:16-Dec-2020         Instruction Type:Patient   
Education  
  
   
                                                    Patient Instructions  
Indication:Smoker  
                          Start:16-Dec-2020         Instruction Type:Provider   
Instructions for Treatment  
  
   
                                                    How to access health informa  
tion   
online  
Indication:Smoker  
                          Start:2020         Instruction Type:Patient   
Education  
  
   
                                                    How to access health informa  
tion   
online - Detail  
Indication:Smoker  
                          Start:2020         Instruction Type:Patient   
Education  
  
   
                                                    Patient Instructions  
Indication:Smoker  
                          Start:2020         Instruction Type:Provider   
Instructions for Treatment  
  
   
                                                    How to access health informa  
tion   
online  
Indication:Smoker  
                          Start:24-Aug-2020         Instruction Type:Patient   
Education  
  
   
                                                    How to access health informa  
tion   
online - Detail  
Indication:Smoker  
                          Start:24-Aug-2020         Instruction Type:Patient   
Education  
  
   
                                                    Patient Instructions  
Indication:Smoker  
                          Start:24-Aug-2020         Instruction Type:Provider   
Instructions for Treatment  
  
   
                                                    How to access health informa  
tion   
online - Detail  
Indication:BMI 25.0-25.9,adult  
                          Start:2020         Instruction Type:Patient   
Education  
  
   
                                                    How to access health informa  
tion   
online  
Indication:BMI 25.0-25.9,adult  
                          Start:2020         Instruction Type:Patient   
Education  
  
   
                                                    Patient Instructions  
Indication:BMI 25.0-25.9,adult  
                          Start:2020         Instruction Type:Provider   
Instructions for Treatment  
  
   
                                                    How to access health informa  
tion   
online  
Indication:Smoker  
                          Start:2020          Instruction Type:Patient   
Education  
  
   
                                                    How to access health informa  
tion   
online - Detail  
Indication:Smoker  
                          Start:2020          Instruction Type:Patient   
Education  
  
   
                                                    Patient Instructions  
Indication:Smoker  
                          Start:2020          Instruction Type:Provider   
Instructions for Treatment  
  
   
                                                    How to access health informa  
tion   
online  
Indication:Smoker  
                          Start:2020         Instruction Type:Patient   
Education  
  
   
                                                    How to access health informa  
tion   
online - Detail  
Indication:Smoker  
                          Start:2020         Instruction Type:Patient   
Education  
  
   
                                                    Patient Instructions  
Indication:Smoker  
                          Start:2020         Instruction Type:Provider   
Instructions for Treatment  
  
   
                                                    How to access health informa  
tion   
online  
Indication:Smoker  
                          Start:13-Sep-2019         Instruction Type:Patient   
Education  
  
   
                                                    How to access health informa  
tion   
online - Detail  
Indication:Smoker  
                          Start:13-Sep-2019         Instruction Type:Patient   
Education  
  
   
                                                    Patient Instructions  
Indication:Smoker  
                          Start:13-Sep-2019         Instruction Type:Provider   
Instructions for Treatment  
  
   
                                                    How to access health informa  
tion   
online  
Indication:BMI 26.0-26.9,adult  
                          Start:10-May-2019         Instruction Type:Patient   
Education  
  
   
                                                    How to access health informa  
tion   
online - Detail  
Indication:BMI 26.0-26.9,adult  
                          Start:10-May-2019         Instruction Type:Patient   
Education  
  
   
                                                    Patient Instructions  
Indication:BMI 26.0-26.9,adult  
                          Start:10-May-2019         Instruction Type:Provider   
Instructions for Treatment  
  
   
                                                    How to access health informa  
tion   
online  
Indication:Abnormal glucose   
tolerance test  
                          Start:28-Dec-2018         Instruction Type:Patient   
Education  
  
   
                                                    How to access health informa  
tion   
online - Detail  
Indication:Abnormal glucose   
tolerance test  
                          Start:28-Dec-2018         Instruction Type:Patient   
Education  
  
   
                                                    Patient Instructions  
Indication:Abnormal glucose   
tolerance test  
                          Start:28-Dec-2018         Instruction Type:Provider   
Instructions for Treatment  
  
   
                                                    How to access health informa  
tion   
online  
Indication:Smoker  
                          Start:29-Aug-2018         Instruction Type:Patient   
Education  
  
   
                                                    How to access health informa  
tion   
online - Detail  
Indication:Smoker  
                          Start:29-Aug-2018         Instruction Type:Patient   
Education  
  
   
                                                    Patient Instructions  
Indication:Smoker  
                          Start:29-Aug-2018         Instruction Type:Provider   
Instructions for Treatment  
  
   
                                                    How to access health informa  
tion   
online  
Indication:Abnormal glucose   
tolerance test  
                          Start:2018         Instruction Type:Patient   
Education  
  
   
                                                    How to access health informa  
tion   
online - Detail  
Indication:Abnormal glucose   
tolerance test  
                          Start:2018         Instruction Type:Patient   
Education  
  
   
                                                    Patient Instructions  
Indication:Abnormal glucose   
tolerance test  
                          Start:2018         Instruction Type:Provider   
Instructions for Treatment  
  
   
                                                    How to access health informa  
tion   
online  
Indication:Smoker  
                          Start:8-Dec-2017          Instruction Type:Patient   
Education  
  
   
                                                    How to access health informa  
tion   
online - Detail  
Indication:Smoker  
                          Start:8-Dec-2017          Instruction Type:Patient   
Education  
  
   
                                                    Patient Instructions  
Indication:Smoker  
                          Start:8-Dec-2017          Instruction Type:Provider   
Instructions for Treatment  
  
   
                                                    How to access health informa  
tion   
online  
Indication:Smoker  
                          Start:4-Aug-2017          Instruction Type:Patient   
Education  
  
   
                                                    How to access health informa  
tion   
online - Detail  
Indication:Smoker  
                          Start:4-Aug-2017          Instruction Type:Patient   
Education  
  
   
                                                    Patient Instructions  
Indication:Smoker  
                          Start:4-Aug-2017          Instruction Type:Provider   
Instructions for Treatment  
  
   
                                                    How to access health informa  
tion   
online  
Indication:Smoker  
                          Start:3-Mar-2017          Instruction Type:Patient   
Education  
  
   
                                                    How to access health informa  
tion   
online - Detail  
Indication:Smoker  
                          Start:3-Mar-2017          Instruction Type:Patient   
Education  
  
   
                                                    Patient Instructions  
Indication:Smoker  
                          Start:3-Mar-2017          Instruction Type:Provider   
Instructions for Treatment  
  
   
                                                    How to access health informa  
tion   
online  
Indication:Hypertension,   
essential, benign  
                          Start:28-Oct-2016         Instruction Type:Patient   
Education  
  
   
                                                    How to access health informa  
tion   
online - Detail  
Indication:Hypertension,   
essential, benign  
                          Start:28-Oct-2016         Instruction Type:Patient   
Education  
  
   
                                                    Patient Instructions  
Indication:Hypertension,   
essential, benign  
                          Start:28-Oct-2016         Instruction Type:Provider   
Instructions for Treatment  
  
   
                                                    How to access health informa  
tion   
online  
Indication:Hypertension,   
essential, benign  
                          Start:2016         Instruction Type:Patient   
Education  
  
   
                                                    How to access health informa  
tion   
online - Detail  
Indication:Hypertension,   
essential, benign  
                          Start:2016         Instruction Type:Patient   
Education  
  
   
                                                    Patient Instructions  
Indication:Hypertension,   
essential, benign  
                          Start:2016         Instruction Type:Provider   
Instructions for Treatment  
  
   
                                                    How to access health informa  
tion   
online  
Indication:BMI 27.0-27.9,adult  
                          Start:2016         Instruction Type:Patient   
Education  
  
   
                                                    How to access health informa  
tion   
online - Detail  
Indication:BMI 27.0-27.9,adult  
                          Start:2016         Instruction Type:Patient   
Education  
  
   
                                                    Patient Instructions  
Indication:BMI 27.0-27.9,adult  
                          Start:2016         Instruction Type:Provider   
Instructions for Treatment  
  
   
                                                    How to access health informa  
tion   
online  
Indication:Abnormal glucose   
tolerance test  
                          Start:2016         Instruction Type:Patient   
Education  
  
   
                                                    How to access health informa  
tion   
online - Detail  
Indication:Abnormal glucose   
tolerance test  
                          Start:2016         Instruction Type:Patient   
Education  
  
   
                                                    Patient Instructions  
Indication:Abnormal glucose   
tolerance test  
                          Start:2016         Instruction Type:Provider   
Instructions for Treatment  
  
   
                                                    How to access health informa  
tion   
online  
Indication:Abnormal glucose   
tolerance test  
                          Start:16-Dec-2015         Instruction Type:Patient   
Education  
  
   
                                                    How to access health informa  
tion   
online - Detail  
Indication:Abnormal glucose   
tolerance test  
                          Start:16-Dec-2015         Instruction Type:Patient   
Education  
  
   
                                                    Patient Instructions  
Indication:Abnormal glucose   
tolerance test  
                          Start:16-Dec-2015         Instruction Type:Provider   
Instructions for Treatment  
  
   
                                                    How to access health informa  
tion   
online  
Indication:Hypercholesteremia  
                          Start:16-Sep-2015         Instruction Type:Patient   
Education  
  
   
                                                    How to access health informa  
tion   
online - Detail  
Indication:Hypercholesteremia  
                          Start:16-Sep-2015         Instruction Type:Patient   
Education  
  
   
                                                    Patient Instructions  
Indication:Hypercholesteremia  
                          Start:16-Sep-2015         Instruction Type:Provider   
Instructions for Treatment  
  
   
                                                    Patient Instructions  
Indication:Abnormal glucose   
tolerance test  
                          Start:9-May-2014          Instruction Type:Provider   
Instructions for Treatment  
  
   
                                                    Patient Instructions  
Indication:Abnormal glucose   
tolerance test  
                          Start:2014          Instruction Type:Provider   
Instructions for Treatment  
  
   
                                                    Patient Instructions  
Indication:Abnormal glucose   
tolerance test  
                          Start:12-Sep-2013         Instruction Type:Provider   
Instructions for Treatment  
  
   
                                                    Patient Instructions  
Indication:Hypercholesteremia  
                          Start:15-Mar-2013         Instruction Type:Provider   
Instructions for Treatment  
  
   
                                                    Patient Instructions  
Indication:Hypertension,   
essential, benign  
                          Start:14-Sep-2012         Instruction Type:Provider   
Instructions for Treatment  
  
  
  
[facility]  
Work Phone: [phone]*   
  
                                Name            Dates           Details  
   
                                                    Patient Instructions  
Indication:BMI 27.0-27.9,adult  
                          Start:28-Sep-2022         Instruction Type:Provider   
Instructions for Treatment  
  
   
                                                    How to Access Health Informa  
tion   
Online using Patient Portal and   
3rd Party Apps  
Indication:BMI 27.0-27.9,adult  
                          Start:28-Sep-2022         Instruction Type:Patient   
Education  
  
   
                                                    Patient Instructions  
Indication:Smoker  
                          Start:16-Mar-2022         Instruction Type:Provider   
Instructions for Treatment  
  
   
                                                    How to Access Health Informa  
tion   
Online using Patient Portal and   
3rd Party Apps  
Indication:Smoker  
                          Start:16-Mar-2022         Instruction Type:Patient   
Education  
  
   
                                                    Patient Instructions  
Indication:Smoker  
                          Start:2-Sep-2021          Instruction Type:Provider   
Instructions for Treatment  
  
   
                                                    How to Access Health Informa  
tion   
Online using Patient Portal and   
3rd Party Apps  
Indication:Smoker  
                          Start:2-Sep-2021          Instruction Type:Patient   
Education  
  
   
                                                    Patient Instructions  
Indication:BMI 26.0-26.9,adult  
                          Start:4-Aug-2021          Instruction Type:Provider   
Instructions for Treatment  
  
   
                                                    How to Access Health Informa  
tion   
Online using Patient Portal and   
3rd Party Apps  
Indication:BMI 26.0-26.9,adult  
                          Start:4-Aug-2021          Instruction Type:Patient   
Education  
  
   
                                                    How to Access Health Informa  
tion   
Online using Patient Portal and   
3rd Party Apps  
Indication:Smoker  
                          Start:1-Mar-2021          Instruction Type:Patient   
Education  
  
   
                                                    Patient Instructions  
Indication:Smoker  
                          Start:1-Mar-2021          Instruction Type:Provider   
Instructions for Treatment  
  
   
                                                    How to Access Health Informa  
tion   
Online using Patient Portal and   
3rd Party Apps  
Indication:Smoker  
                          Start:2021         Instruction Type:Patient   
Education  
  
   
                                                    Patient Instructions  
Indication:Smoker  
                          Start:2021         Instruction Type:Provider   
Instructions for Treatment  
  
   
                                                    How to Access Health Informa  
tion   
Online using Patient Portal and   
3rd Party Apps  
Indication:Smoker  
                          Start:2021          Instruction Type:Patient   
Education  
  
   
                                                    Patient Instructions  
Indication:Smoker  
                          Start:2021          Instruction Type:Provider   
Instructions for Treatment  
  
   
                                                    How to Access Health Informa  
tion   
Online using Patient Portal and   
3rd Party Apps  
Indication:Smoker  
                          Start:16-Dec-2020         Instruction Type:Patient   
Education  
  
   
                                                    Patient Instructions  
Indication:Smoker  
                          Start:16-Dec-2020         Instruction Type:Provider   
Instructions for Treatment  
  
   
                                                    How to access health informa  
tion   
online  
Indication:Smoker  
                          Start:2020         Instruction Type:Patient   
Education  
  
   
                                                    How to access health informa  
tion   
online - Detail  
Indication:Smoker  
                          Start:2020         Instruction Type:Patient   
Education  
  
   
                                                    Patient Instructions  
Indication:Smoker  
                          Start:2020         Instruction Type:Provider   
Instructions for Treatment  
  
   
                                                    How to access health informa  
tion   
online  
Indication:Smoker  
                          Start:24-Aug-2020         Instruction Type:Patient   
Education  
  
   
                                                    How to access health informa  
tion   
online - Detail  
Indication:Smoker  
                          Start:24-Aug-2020         Instruction Type:Patient   
Education  
  
   
                                                    Patient Instructions  
Indication:Smoker  
                          Start:24-Aug-2020         Instruction Type:Provider   
Instructions for Treatment  
  
   
                                                    How to access health informa  
tion   
online - Detail  
Indication:BMI 25.0-25.9,adult  
                          Start:2020         Instruction Type:Patient   
Education  
  
   
                                                    How to access health informa  
tion   
online  
Indication:BMI 25.0-25.9,adult  
                          Start:2020         Instruction Type:Patient   
Education  
  
   
                                                    Patient Instructions  
Indication:BMI 25.0-25.9,adult  
                          Start:2020         Instruction Type:Provider   
Instructions for Treatment  
  
   
                                                    How to access health informa  
tion   
online  
Indication:Smoker  
                          Start:2020          Instruction Type:Patient   
Education  
  
   
                                                    How to access health informa  
tion   
online - Detail  
Indication:Smoker  
                          Start:2020          Instruction Type:Patient   
Education  
  
   
                                                    Patient Instructions  
Indication:Smoker  
                          Start:2020          Instruction Type:Provider   
Instructions for Treatment  
  
   
                                                    How to access health informa  
tion   
online  
Indication:Smoker  
                          Start:2020         Instruction Type:Patient   
Education  
  
   
                                                    How to access health informa  
tion   
online - Detail  
Indication:Smoker  
                          Start:2020         Instruction Type:Patient   
Education  
  
   
                                                    Patient Instructions  
Indication:Smoker  
                          Start:2020         Instruction Type:Provider   
Instructions for Treatment  
  
   
                                                    How to access health informa  
tion   
online  
Indication:Smoker  
                          Start:13-Sep-2019         Instruction Type:Patient   
Education  
  
   
                                                    How to access health informa  
tion   
online - Detail  
Indication:Smoker  
                          Start:13-Sep-2019         Instruction Type:Patient   
Education  
  
   
                                                    Patient Instructions  
Indication:Smoker  
                          Start:13-Sep-2019         Instruction Type:Provider   
Instructions for Treatment  
  
   
                                                    How to access health informa  
tion   
online  
Indication:BMI 26.0-26.9,adult  
                          Start:10-May-2019         Instruction Type:Patient   
Education  
  
   
                                                    How to access health informa  
tion   
online - Detail  
Indication:BMI 26.0-26.9,adult  
                          Start:10-May-2019         Instruction Type:Patient   
Education  
  
   
                                                    Patient Instructions  
Indication:BMI 26.0-26.9,adult  
                          Start:10-May-2019         Instruction Type:Provider   
Instructions for Treatment  
  
   
                                                    How to access health informa  
tion   
online  
Indication:Abnormal glucose   
tolerance test  
                          Start:28-Dec-2018         Instruction Type:Patient   
Education  
  
   
                                                    How to access health informa  
tion   
online - Detail  
Indication:Abnormal glucose   
tolerance test  
                          Start:28-Dec-2018         Instruction Type:Patient   
Education  
  
   
                                                    Patient Instructions  
Indication:Abnormal glucose   
tolerance test  
                          Start:28-Dec-2018         Instruction Type:Provider   
Instructions for Treatment  
  
   
                                                    How to access health informa  
tion   
online  
Indication:Smoker  
                          Start:29-Aug-2018         Instruction Type:Patient   
Education  
  
   
                                                    How to access health informa  
tion   
online - Detail  
Indication:Smoker  
                          Start:29-Aug-2018         Instruction Type:Patient   
Education  
  
   
                                                    Patient Instructions  
Indication:Smoker  
                          Start:29-Aug-2018         Instruction Type:Provider   
Instructions for Treatment  
  
   
                                                    How to access health informa  
tion   
online  
Indication:Abnormal glucose   
tolerance test  
                          Start:2018         Instruction Type:Patient   
Education  
  
   
                                                    How to access health informa  
tion   
online - Detail  
Indication:Abnormal glucose   
tolerance test  
                          Start:2018         Instruction Type:Patient   
Education  
  
   
                                                    Patient Instructions  
Indication:Abnormal glucose   
tolerance test  
                          Start:2018         Instruction Type:Provider   
Instructions for Treatment  
  
   
                                                    How to access health informa  
tion   
online  
Indication:Smoker  
                          Start:8-Dec-2017          Instruction Type:Patient   
Education  
  
   
                                                    How to access health informa  
tion   
online - Detail  
Indication:Smoker  
                          Start:8-Dec-2017          Instruction Type:Patient   
Education  
  
   
                                                    Patient Instructions  
Indication:Smoker  
                          Start:8-Dec-2017          Instruction Type:Provider   
Instructions for Treatment  
  
   
                                                    How to access health informa  
tion   
online  
Indication:Smoker  
                          Start:4-Aug-2017          Instruction Type:Patient   
Education  
  
   
                                                    How to access health informa  
tion   
online - Detail  
Indication:Smoker  
                          Start:4-Aug-2017          Instruction Type:Patient   
Education  
  
   
                                                    Patient Instructions  
Indication:Smoker  
                          Start:4-Aug-2017          Instruction Type:Provider   
Instructions for Treatment  
  
   
                                                    How to access health informa  
tion   
online  
Indication:Smoker  
                          Start:3-Mar-2017          Instruction Type:Patient   
Education  
  
   
                                                    How to access health informa  
tion   
online - Detail  
Indication:Smoker  
                          Start:3-Mar-2017          Instruction Type:Patient   
Education  
  
   
                                                    Patient Instructions  
Indication:Smoker  
                          Start:3-Mar-2017          Instruction Type:Provider   
Instructions for Treatment  
  
   
                                                    How to access health informa  
tion   
online  
Indication:Hypertension,   
essential, benign  
                          Start:28-Oct-2016         Instruction Type:Patient   
Education  
  
   
                                                    How to access health informa  
tion   
online - Detail  
Indication:Hypertension,   
essential, benign  
                          Start:28-Oct-2016         Instruction Type:Patient   
Education  
  
   
                                                    Patient Instructions  
Indication:Hypertension,   
essential, benign  
                          Start:28-Oct-2016         Instruction Type:Provider   
Instructions for Treatment  
  
   
                                                    How to access health informa  
tion   
online  
Indication:Hypertension,   
essential, benign  
                          Start:2016         Instruction Type:Patient   
Education  
  
   
                                                    How to access health informa  
tion   
online - Detail  
Indication:Hypertension,   
essential, benign  
                          Start:2016         Instruction Type:Patient   
Education  
  
   
                                                    Patient Instructions  
Indication:Hypertension,   
essential, benign  
                          Start:2016         Instruction Type:Provider   
Instructions for Treatment  
  
   
                                                    How to access health informa  
tion   
online  
Indication:BMI 27.0-27.9,adult  
                          Start:2016         Instruction Type:Patient   
Education  
  
   
                                                    How to access health informa  
tion   
online - Detail  
Indication:BMI 27.0-27.9,adult  
                          Start:2016         Instruction Type:Patient   
Education  
  
   
                                                    Patient Instructions  
Indication:BMI 27.0-27.9,adult  
                          Start:2016         Instruction Type:Provider   
Instructions for Treatment  
  
   
                                                    How to access health informa  
tion   
online  
Indication:Abnormal glucose   
tolerance test  
                          Start:2016         Instruction Type:Patient   
Education  
  
   
                                                    How to access health informa  
tion   
online - Detail  
Indication:Abnormal glucose   
tolerance test  
                          Start:2016         Instruction Type:Patient   
Education  
  
   
                                                    Patient Instructions  
Indication:Abnormal glucose   
tolerance test  
                          Start:2016         Instruction Type:Provider   
Instructions for Treatment  
  
   
                                                    How to access health informa  
tion   
online  
Indication:Abnormal glucose   
tolerance test  
                          Start:16-Dec-2015         Instruction Type:Patient   
Education  
  
   
                                                    How to access health informa  
tion   
online - Detail  
Indication:Abnormal glucose   
tolerance test  
                          Start:16-Dec-2015         Instruction Type:Patient   
Education  
  
   
                                                    Patient Instructions  
Indication:Abnormal glucose   
tolerance test  
                          Start:16-Dec-2015         Instruction Type:Provider   
Instructions for Treatment  
  
   
                                                    How to access health informa  
tion   
online  
Indication:Hypercholesteremia  
                          Start:16-Sep-2015         Instruction Type:Patient   
Education  
  
   
                                                    How to access health informa  
tion   
online - Detail  
Indication:Hypercholesteremia  
                          Start:16-Sep-2015         Instruction Type:Patient   
Education  
  
   
                                                    Patient Instructions  
Indication:Hypercholesteremia  
                          Start:16-Sep-2015         Instruction Type:Provider   
Instructions for Treatment  
  
   
                                                    Patient Instructions  
Indication:Abnormal glucose   
tolerance test  
                          Start:9-May-2014          Instruction Type:Provider   
Instructions for Treatment  
  
   
                                                    Patient Instructions  
Indication:Abnormal glucose   
tolerance test  
                          Start:2014          Instruction Type:Provider   
Instructions for Treatment  
  
   
                                                    Patient Instructions  
Indication:Abnormal glucose   
tolerance test  
                          Start:12-Sep-2013         Instruction Type:Provider   
Instructions for Treatment  
  
   
                                                    Patient Instructions  
Indication:Hypercholesteremia  
                          Start:15-Mar-2013         Instruction Type:Provider   
Instructions for Treatment  
  
   
                                                    Patient Instructions  
Indication:Hypertension,   
essential, benign  
                          Start:14-Sep-2012         Instruction Type:Provider   
Instructions for Treatment  
  
  
  
[facility]; [facility]  
Work Phone: [phone]*   
  
                                Name            Dates           Details  
   
                                                    Patient Instructions  
Indication:BMI 27.0-27.9,adult  
                          Start:28-Sep-2022         Instruction Type:Provider   
Instructions for Treatment  
  
   
                                                    How to Access Health Informa  
tion   
Online using Patient Portal and   
3rd Party Apps  
Indication:BMI 27.0-27.9,adult  
                          Start:28-Sep-2022         Instruction Type:Patient   
Education  
  
   
                                                    Patient Instructions  
Indication:Smoker  
                          Start:16-Mar-2022         Instruction Type:Provider   
Instructions for Treatment  
  
   
                                                    How to Access Health Informa  
tion   
Online using Patient Portal and   
3rd Party Apps  
Indication:Smoker  
                          Start:16-Mar-2022         Instruction Type:Patient   
Education  
  
   
                                                    Patient Instructions  
Indication:Smoker  
                          Start:2-Sep-2021          Instruction Type:Provider   
Instructions for Treatment  
  
   
                                                    How to Access Health Informa  
tion   
Online using Patient Portal and   
3rd Party Apps  
Indication:Smoker  
                          Start:2-Sep-2021          Instruction Type:Patient   
Education  
  
   
                                                    Patient Instructions  
Indication:BMI 26.0-26.9,adult  
                          Start:4-Aug-2021          Instruction Type:Provider   
Instructions for Treatment  
  
   
                                                    How to Access Health Informa  
tion   
Online using Patient Portal and   
3rd Party Apps  
Indication:BMI 26.0-26.9,adult  
                          Start:4-Aug-2021          Instruction Type:Patient   
Education  
  
   
                                                    How to Access Health Informa  
tion   
Online using Patient Portal and   
3rd Party Apps  
Indication:Smoker  
                          Start:1-Mar-2021          Instruction Type:Patient   
Education  
  
   
                                                    Patient Instructions  
Indication:Smoker  
                          Start:1-Mar-2021          Instruction Type:Provider   
Instructions for Treatment  
  
   
                                                    How to Access Health Informa  
tion   
Online using Patient Portal and   
3rd Party Apps  
Indication:Smoker  
                          Start:2021         Instruction Type:Patient   
Education  
  
   
                                                    Patient Instructions  
Indication:Smoker  
                          Start:2021         Instruction Type:Provider   
Instructions for Treatment  
  
   
                                                    How to Access Health Informa  
tion   
Online using Patient Portal and   
3rd Party Apps  
Indication:Smoker  
                          Start:2021          Instruction Type:Patient   
Education  
  
   
                                                    Patient Instructions  
Indication:Smoker  
                          Start:2021          Instruction Type:Provider   
Instructions for Treatment  
  
   
                                                    How to Access Health Informa  
tion   
Online using Patient Portal and   
3rd Party Apps  
Indication:Smoker  
                          Start:16-Dec-2020         Instruction Type:Patient   
Education  
  
   
                                                    Patient Instructions  
Indication:Smoker  
                          Start:16-Dec-2020         Instruction Type:Provider   
Instructions for Treatment  
  
   
                                                    How to access health informa  
tion   
online  
Indication:Smoker  
                          Start:2020         Instruction Type:Patient   
Education  
  
   
                                                    How to access health informa  
tion   
online - Detail  
Indication:Smoker  
                          Start:2020         Instruction Type:Patient   
Education  
  
   
                                                    Patient Instructions  
Indication:Smoker  
                          Start:2020         Instruction Type:Provider   
Instructions for Treatment  
  
   
                                                    How to access health informa  
tion   
online  
Indication:Smoker  
                          Start:24-Aug-2020         Instruction Type:Patient   
Education  
  
   
                                                    How to access health informa  
tion   
online - Detail  
Indication:Smoker  
                          Start:24-Aug-2020         Instruction Type:Patient   
Education  
  
   
                                                    Patient Instructions  
Indication:Smoker  
                          Start:24-Aug-2020         Instruction Type:Provider   
Instructions for Treatment  
  
   
                                                    How to access health informa  
tion   
online - Detail  
Indication:BMI 25.0-25.9,adult  
                          Start:2020         Instruction Type:Patient   
Education  
  
   
                                                    How to access health informa  
tion   
online  
Indication:BMI 25.0-25.9,adult  
                          Start:2020         Instruction Type:Patient   
Education  
  
   
                                                    Patient Instructions  
Indication:BMI 25.0-25.9,adult  
                          Start:2020         Instruction Type:Provider   
Instructions for Treatment  
  
   
                                                    How to access health informa  
tion   
online  
Indication:Smoker  
                          Start:2020          Instruction Type:Patient   
Education  
  
   
                                                    How to access health informa  
tion   
online - Detail  
Indication:Smoker  
                          Start:2020          Instruction Type:Patient   
Education  
  
   
                                                    Patient Instructions  
Indication:Smoker  
                          Start:2020          Instruction Type:Provider   
Instructions for Treatment  
  
   
                                                    How to access health informa  
tion   
online  
Indication:Smoker  
                          Start:2020         Instruction Type:Patient   
Education  
  
   
                                                    How to access health informa  
tion   
online - Detail  
Indication:Smoker  
                          Start:2020         Instruction Type:Patient   
Education  
  
   
                                                    Patient Instructions  
Indication:Smoker  
                          Start:2020         Instruction Type:Provider   
Instructions for Treatment  
  
   
                                                    How to access health informa  
tion   
online  
Indication:Smoker  
                          Start:13-Sep-2019         Instruction Type:Patient   
Education  
  
   
                                                    How to access health informa  
tion   
online - Detail  
Indication:Smoker  
                          Start:13-Sep-2019         Instruction Type:Patient   
Education  
  
   
                                                    Patient Instructions  
Indication:Smoker  
                          Start:13-Sep-2019         Instruction Type:Provider   
Instructions for Treatment  
  
   
                                                    How to access health informa  
tion   
online  
Indication:BMI 26.0-26.9,adult  
                          Start:10-May-2019         Instruction Type:Patient   
Education  
  
   
                                                    How to access health informa  
tion   
online - Detail  
Indication:BMI 26.0-26.9,adult  
                          Start:10-May-2019         Instruction Type:Patient   
Education  
  
   
                                                    Patient Instructions  
Indication:BMI 26.0-26.9,adult  
                          Start:10-May-2019         Instruction Type:Provider   
Instructions for Treatment  
  
   
                                                    How to access health informa  
tion   
online  
Indication:Abnormal glucose   
tolerance test  
                          Start:28-Dec-2018         Instruction Type:Patient   
Education  
  
   
                                                    How to access health informa  
tion   
online - Detail  
Indication:Abnormal glucose   
tolerance test  
                          Start:28-Dec-2018         Instruction Type:Patient   
Education  
  
   
                                                    Patient Instructions  
Indication:Abnormal glucose   
tolerance test  
                          Start:28-Dec-2018         Instruction Type:Provider   
Instructions for Treatment  
  
   
                                                    How to access health informa  
tion   
online  
Indication:Smoker  
                          Start:29-Aug-2018         Instruction Type:Patient   
Education  
  
   
                                                    How to access health informa  
tion   
online - Detail  
Indication:Smoker  
                          Start:29-Aug-2018         Instruction Type:Patient   
Education  
  
   
                                                    Patient Instructions  
Indication:Smoker  
                          Start:29-Aug-2018         Instruction Type:Provider   
Instructions for Treatment  
  
   
                                                    How to access health informa  
tion   
online  
Indication:Abnormal glucose   
tolerance test  
                          Start:2018         Instruction Type:Patient   
Education  
  
   
                                                    How to access health informa  
tion   
online - Detail  
Indication:Abnormal glucose   
tolerance test  
                          Start:2018         Instruction Type:Patient   
Education  
  
   
                                                    Patient Instructions  
Indication:Abnormal glucose   
tolerance test  
                          Start:2018         Instruction Type:Provider   
Instructions for Treatment  
  
   
                                                    How to access health informa  
tion   
online  
Indication:Smoker  
                          Start:8-Dec-2017          Instruction Type:Patient   
Education  
  
   
                                                    How to access health informa  
tion   
online - Detail  
Indication:Smoker  
                          Start:8-Dec-2017          Instruction Type:Patient   
Education  
  
   
                                                    Patient Instructions  
Indication:Smoker  
                          Start:8-Dec-2017          Instruction Type:Provider   
Instructions for Treatment  
  
   
                                                    How to access health informa  
tion   
online  
Indication:Smoker  
                          Start:4-Aug-2017          Instruction Type:Patient   
Education  
  
   
                                                    How to access health informa  
tion   
online - Detail  
Indication:Smoker  
                          Start:4-Aug-2017          Instruction Type:Patient   
Education  
  
   
                                                    Patient Instructions  
Indication:Smoker  
                          Start:4-Aug-2017          Instruction Type:Provider   
Instructions for Treatment  
  
   
                                                    How to access health informa  
tion   
online  
Indication:Smoker  
                          Start:3-Mar-2017          Instruction Type:Patient   
Education  
  
   
                                                    How to access health informa  
tion   
online - Detail  
Indication:Smoker  
                          Start:3-Mar-2017          Instruction Type:Patient   
Education  
  
   
                                                    Patient Instructions  
Indication:Smoker  
                          Start:3-Mar-2017          Instruction Type:Provider   
Instructions for Treatment  
  
   
                                                    How to access health informa  
tion   
online  
Indication:Hypertension,   
essential, benign  
                          Start:28-Oct-2016         Instruction Type:Patient   
Education  
  
   
                                                    How to access health informa  
tion   
online - Detail  
Indication:Hypertension,   
essential, benign  
                          Start:28-Oct-2016         Instruction Type:Patient   
Education  
  
   
                                                    Patient Instructions  
Indication:Hypertension,   
essential, benign  
                          Start:28-Oct-2016         Instruction Type:Provider   
Instructions for Treatment  
  
   
                                                    How to access health informa  
tion   
online  
Indication:Hypertension,   
essential, benign  
                          Start:2016         Instruction Type:Patient   
Education  
  
   
                                                    How to access health informa  
tion   
online - Detail  
Indication:Hypertension,   
essential, benign  
                          Start:2016         Instruction Type:Patient   
Education  
  
   
                                                    Patient Instructions  
Indication:Hypertension,   
essential, benign  
                          Start:2016         Instruction Type:Provider   
Instructions for Treatment  
  
   
                                                    How to access health informa  
tion   
online  
Indication:BMI 27.0-27.9,adult  
                          Start:2016         Instruction Type:Patient   
Education  
  
   
                                                    How to access health informa  
tion   
online - Detail  
Indication:BMI 27.0-27.9,adult  
                          Start:2016         Instruction Type:Patient   
Education  
  
   
                                                    Patient Instructions  
Indication:BMI 27.0-27.9,adult  
                          Start:2016         Instruction Type:Provider   
Instructions for Treatment  
  
   
                                                    How to access health informa  
tion   
online  
Indication:Abnormal glucose   
tolerance test  
                          Start:2016         Instruction Type:Patient   
Education  
  
   
                                                    How to access health informa  
tion   
online - Detail  
Indication:Abnormal glucose   
tolerance test  
                          Start:2016         Instruction Type:Patient   
Education  
  
   
                                                    Patient Instructions  
Indication:Abnormal glucose   
tolerance test  
                          Start:2016         Instruction Type:Provider   
Instructions for Treatment  
  
   
                                                    How to access health informa  
tion   
online  
Indication:Abnormal glucose   
tolerance test  
                          Start:16-Dec-2015         Instruction Type:Patient   
Education  
  
   
                                                    How to access health informa  
tion   
online - Detail  
Indication:Abnormal glucose   
tolerance test  
                          Start:16-Dec-2015         Instruction Type:Patient   
Education  
  
   
                                                    Patient Instructions  
Indication:Abnormal glucose   
tolerance test  
                          Start:16-Dec-2015         Instruction Type:Provider   
Instructions for Treatment  
  
   
                                                    How to access health informa  
tion   
online  
Indication:Hypercholesteremia  
                          Start:16-Sep-2015         Instruction Type:Patient   
Education  
  
   
                                                    How to access health informa  
tion   
online - Detail  
Indication:Hypercholesteremia  
                          Start:16-Sep-2015         Instruction Type:Patient   
Education  
  
   
                                                    Patient Instructions  
Indication:Hypercholesteremia  
                          Start:16-Sep-2015         Instruction Type:Provider   
Instructions for Treatment  
  
   
                                                    Patient Instructions  
Indication:Abnormal glucose   
tolerance test  
                          Start:9-May-2014          Instruction Type:Provider   
Instructions for Treatment  
  
   
                                                    Patient Instructions  
Indication:Abnormal glucose   
tolerance test  
                          Start:2014          Instruction Type:Provider   
Instructions for Treatment  
  
   
                                                    Patient Instructions  
Indication:Abnormal glucose   
tolerance test  
                          Start:12-Sep-2013         Instruction Type:Provider   
Instructions for Treatment  
  
   
                                                    Patient Instructions  
Indication:Hypercholesteremia  
                          Start:15-Mar-2013         Instruction Type:Provider   
Instructions for Treatment  
  
   
                                                    Patient Instructions  
Indication:Hypertension,   
essential, benign  
                          Start:14-Sep-2012         Instruction Type:Provider   
Instructions for Treatment  
  
  
  
[facility]  
Work Phone: [phone]*   
  
                                Name            Dates           Details  
   
                                                    Patient Instructions  
Indication:BMI 27.0-27.9,adult  
                          Start:28-Sep-2022         Instruction Type:Provider   
Instructions for Treatment  
  
   
                                                    How to Access Health Informa  
tion   
Online using Patient Portal and   
3rd Party Apps  
Indication:BMI 27.0-27.9,adult  
                          Start:28-Sep-2022         Instruction Type:Patient   
Education  
  
   
                                                    Patient Instructions  
                          Start:16-Mar-2022         Instruction Type:Provider   
Instructions for Treatment  
  
   
                                                    How to Access Health Informa  
tion   
Online using Patient Portal and   
3rd Party Apps  
                          Start:16-Mar-2022         Instruction Type:Patient   
Education  
  
   
                                                    Patient Instructions  
                          Start:2-Sep-2021          Instruction Type:Provider   
Instructions for Treatment  
  
   
                                                    How to Access Health Informa  
tion   
Online using Patient Portal and   
3rd Party Apps  
                          Start:2-Sep-2021          Instruction Type:Patient   
Education  
  
   
                                                    Patient Instructions  
Indication:BMI 26.0-26.9,adult  
                          Start:4-Aug-2021          Instruction Type:Provider   
Instructions for Treatment  
  
   
                                                    How to Access Health Informa  
tion   
Online using Patient Portal and   
3rd Party Apps  
Indication:BMI 26.0-26.9,adult  
                          Start:4-Aug-2021          Instruction Type:Patient   
Education  
  
   
                                                    How to Access Health Informa  
tion   
Online using Patient Portal and   
3rd Party Apps  
                          Start:1-Mar-2021          Instruction Type:Patient   
Education  
  
   
                                                    Patient Instructions  
                          Start:1-Mar-2021          Instruction Type:Provider   
Instructions for Treatment  
  
   
                                                    How to Access Health Informa  
tion   
Online using Patient Portal and   
3rd Party Apps  
                          Start:2021         Instruction Type:Patient   
Education  
  
   
                                                    Patient Instructions  
                          Start:2021         Instruction Type:Provider   
Instructions for Treatment  
  
   
                                                    How to Access Health Informa  
tion   
Online using Patient Portal and   
3rd Party Apps  
                          Start:2021          Instruction Type:Patient   
Education  
  
   
                                                    Patient Instructions  
                          Start:2021          Instruction Type:Provider   
Instructions for Treatment  
  
   
                                                    How to Access Health Informa  
tion   
Online using Patient Portal and   
3rd Party Apps  
                          Start:16-Dec-2020         Instruction Type:Patient   
Education  
  
   
                                                    Patient Instructions  
                          Start:16-Dec-2020         Instruction Type:Provider   
Instructions for Treatment  
  
   
                                                    How to access health informa  
tion   
online  
                          Start:2020         Instruction Type:Patient   
Education  
  
   
                                                    How to access health informa  
tion   
online - Detail  
                          Start:2020         Instruction Type:Patient   
Education  
  
   
                                                    Patient Instructions  
                          Start:2020         Instruction Type:Provider   
Instructions for Treatment  
  
   
                                                    How to access health informa  
tion   
online  
                          Start:24-Aug-2020         Instruction Type:Patient   
Education  
  
   
                                                    How to access health informa  
tion   
online - Detail  
                          Start:24-Aug-2020         Instruction Type:Patient   
Education  
  
   
                                                    Patient Instructions  
                          Start:24-Aug-2020         Instruction Type:Provider   
Instructions for Treatment  
  
   
                                                    How to access health informa  
tion   
online - Detail  
Indication:BMI 25.0-25.9,adult  
                          Start:2020         Instruction Type:Patient   
Education  
  
   
                                                    How to access health informa  
tion   
online  
Indication:BMI 25.0-25.9,adult  
                          Start:2020         Instruction Type:Patient   
Education  
  
   
                                                    Patient Instructions  
Indication:BMI 25.0-25.9,adult  
                          Start:2020         Instruction Type:Provider   
Instructions for Treatment  
  
   
                                                    How to access health informa  
tion   
online  
                          Start:2020          Instruction Type:Patient   
Education  
  
   
                                                    How to access health informa  
tion   
online - Detail  
                          Start:2020          Instruction Type:Patient   
Education  
  
   
                                                    Patient Instructions  
                          Start:2020          Instruction Type:Provider   
Instructions for Treatment  
  
   
                                                    How to access health informa  
tion   
online  
                          Start:2020         Instruction Type:Patient   
Education  
  
   
                                                    How to access health informa  
tion   
online - Detail  
                          Start:2020         Instruction Type:Patient   
Education  
  
   
                                                    Patient Instructions  
                          Start:2020         Instruction Type:Provider   
Instructions for Treatment  
  
   
                                                    How to access health informa  
tion   
online  
                          Start:13-Sep-2019         Instruction Type:Patient   
Education  
  
   
                                                    How to access health informa  
tion   
online - Detail  
                          Start:13-Sep-2019         Instruction Type:Patient   
Education  
  
   
                                                    Patient Instructions  
                          Start:13-Sep-2019         Instruction Type:Provider   
Instructions for Treatment  
  
   
                                                    How to access health informa  
tion   
online  
Indication:BMI 26.0-26.9,adult  
                          Start:10-May-2019         Instruction Type:Patient   
Education  
  
   
                                                    How to access health informa  
tion   
online - Detail  
Indication:BMI 26.0-26.9,adult  
                          Start:10-May-2019         Instruction Type:Patient   
Education  
  
   
                                                    Patient Instructions  
Indication:BMI 26.0-26.9,adult  
                          Start:10-May-2019         Instruction Type:Provider   
Instructions for Treatment  
  
   
                                                    How to access health informa  
tion   
online  
Indication:Abnormal glucose   
tolerance test  
                          Start:28-Dec-2018         Instruction Type:Patient   
Education  
  
   
                                                    How to access health informa  
tion   
online - Detail  
Indication:Abnormal glucose   
tolerance test  
                          Start:28-Dec-2018         Instruction Type:Patient   
Education  
  
   
                                                    Patient Instructions  
Indication:Abnormal glucose   
tolerance test  
                          Start:28-Dec-2018         Instruction Type:Provider   
Instructions for Treatment  
  
   
                                                    How to access health informa  
tion   
online  
                          Start:29-Aug-2018         Instruction Type:Patient   
Education  
  
   
                                                    How to access health informa  
tion   
online - Detail  
                          Start:29-Aug-2018         Instruction Type:Patient   
Education  
  
   
                                                    Patient Instructions  
                          Start:29-Aug-2018         Instruction Type:Provider   
Instructions for Treatment  
  
   
                                                    How to access health informa  
tion   
online  
Indication:Abnormal glucose   
tolerance test  
                          Start:2018         Instruction Type:Patient   
Education  
  
   
                                                    How to access health informa  
tion   
online - Detail  
Indication:Abnormal glucose   
tolerance test  
                          Start:2018         Instruction Type:Patient   
Education  
  
   
                                                    Patient Instructions  
Indication:Abnormal glucose   
tolerance test  
                          Start:2018         Instruction Type:Provider   
Instructions for Treatment  
  
   
                                                    How to access health informa  
tion   
online  
                          Start:8-Dec-2017          Instruction Type:Patient   
Education  
  
   
                                                    How to access health informa  
tion   
online - Detail  
                          Start:8-Dec-2017          Instruction Type:Patient   
Education  
  
   
                                                    Patient Instructions  
                          Start:8-Dec-2017          Instruction Type:Provider   
Instructions for Treatment  
  
   
                                                    How to access health informa  
tion   
online  
                          Start:4-Aug-2017          Instruction Type:Patient   
Education  
  
   
                                                    How to access health informa  
tion   
online - Detail  
                          Start:4-Aug-2017          Instruction Type:Patient   
Education  
  
   
                                                    Patient Instructions  
                          Start:4-Aug-2017          Instruction Type:Provider   
Instructions for Treatment  
  
   
                                                    How to access health informa  
tion   
online  
                          Start:3-Mar-2017          Instruction Type:Patient   
Education  
  
   
                                                    How to access health informa  
tion   
online - Detail  
                          Start:3-Mar-2017          Instruction Type:Patient   
Education  
  
   
                                                    Patient Instructions  
                          Start:3-Mar-2017          Instruction Type:Provider   
Instructions for Treatment  
  
   
                                                    How to access health informa  
tion   
online  
Indication:Hypertension,   
essential, benign  
                          Start:28-Oct-2016         Instruction Type:Patient   
Education  
  
   
                                                    How to access health informa  
tion   
online - Detail  
Indication:Hypertension,   
essential, benign  
                          Start:28-Oct-2016         Instruction Type:Patient   
Education  
  
   
                                                    Patient Instructions  
Indication:Hypertension,   
essential, benign  
                          Start:28-Oct-2016         Instruction Type:Provider   
Instructions for Treatment  
  
   
                                                    How to access health informa  
tion   
online  
Indication:Hypertension,   
essential, benign  
                          Start:2016         Instruction Type:Patient   
Education  
  
   
                                                    How to access health informa  
tion   
online - Detail  
Indication:Hypertension,   
essential, benign  
                          Start:2016         Instruction Type:Patient   
Education  
  
   
                                                    Patient Instructions  
Indication:Hypertension,   
essential, benign  
                          Start:2016         Instruction Type:Provider   
Instructions for Treatment  
  
   
                                                    How to access health informa  
tion   
online  
Indication:BMI 27.0-27.9,adult  
                          Start:2016         Instruction Type:Patient   
Education  
  
   
                                                    How to access health informa  
tion   
online - Detail  
Indication:BMI 27.0-27.9,adult  
                          Start:2016         Instruction Type:Patient   
Education  
  
   
                                                    Patient Instructions  
Indication:BMI 27.0-27.9,adult  
                          Start:2016         Instruction Type:Provider   
Instructions for Treatment  
  
   
                                                    How to access health informa  
tion   
online  
Indication:Abnormal glucose   
tolerance test  
                          Start:2016         Instruction Type:Patient   
Education  
  
   
                                                    How to access health informa  
tion   
online - Detail  
Indication:Abnormal glucose   
tolerance test  
                          Start:2016         Instruction Type:Patient   
Education  
  
   
                                                    Patient Instructions  
Indication:Abnormal glucose   
tolerance test  
                          Start:2016         Instruction Type:Provider   
Instructions for Treatment  
  
   
                                                    How to access health informa  
tion   
online  
Indication:Abnormal glucose   
tolerance test  
                          Start:16-Dec-2015         Instruction Type:Patient   
Education  
  
   
                                                    How to access health informa  
tion   
online - Detail  
Indication:Abnormal glucose   
tolerance test  
                          Start:16-Dec-2015         Instruction Type:Patient   
Education  
  
   
                                                    Patient Instructions  
Indication:Abnormal glucose   
tolerance test  
                          Start:16-Dec-2015         Instruction Type:Provider   
Instructions for Treatment  
  
   
                                                    How to access health informa  
tion   
online  
Indication:Hypercholesteremia  
                          Start:16-Sep-2015         Instruction Type:Patient   
Education  
  
   
                                                    How to access health informa  
tion   
online - Detail  
Indication:Hypercholesteremia  
                          Start:16-Sep-2015         Instruction Type:Patient   
Education  
  
   
                                                    Patient Instructions  
Indication:Hypercholesteremia  
                          Start:16-Sep-2015         Instruction Type:Provider   
Instructions for Treatment  
  
   
                                                    Patient Instructions  
Indication:Abnormal glucose   
tolerance test  
                          Start:9-May-2014          Instruction Type:Provider   
Instructions for Treatment  
  
   
                                                    Patient Instructions  
Indication:Abnormal glucose   
tolerance test  
                          Start:2014          Instruction Type:Provider   
Instructions for Treatment  
  
   
                                                    Patient Instructions  
Indication:Abnormal glucose   
tolerance test  
                          Start:12-Sep-2013         Instruction Type:Provider   
Instructions for Treatment  
  
   
                                                    Patient Instructions  
Indication:Hypercholesteremia  
                          Start:15-Mar-2013         Instruction Type:Provider   
Instructions for Treatment  
  
   
                                                    Patient Instructions  
Indication:Hypertension,   
essential, benign  
                          Start:14-Sep-2012         Instruction Type:Provider   
Instructions for Treatment  
  
  
  
[facility]  
Work Phone: [phone]*   
  
                                Name            Dates           Details  
   
                                                    Patient Instructions  
Indication:BMI 27.0-27.9,adult  
                          Start:2023         Instruction Type:Provider   
Instructions for Treatment  
  
   
                                                    How to Access Health Informa  
tion   
Online using Patient Portal and   
3rd Party Apps  
Indication:BMI 27.0-27.9,adult  
                          Start:2023         Instruction Type:Patient   
Education  
  
   
                                                    Patient Instructions  
Indication:BMI 27.0-27.9,adult  
                          Start:28-Sep-2022         Instruction Type:Provider   
Instructions for Treatment  
  
   
                                                    How to Access Health Informa  
tion   
Online using Patient Portal and   
3rd Party Apps  
Indication:BMI 27.0-27.9,adult  
                          Start:28-Sep-2022         Instruction Type:Patient   
Education  
  
   
                                                    Patient Instructions  
Indication:Smoker  
                          Start:16-Mar-2022         Instruction Type:Provider   
Instructions for Treatment  
  
   
                                                    How to Access Health Informa  
tion   
Online using Patient Portal and   
3rd Party Apps  
Indication:Smoker  
                          Start:16-Mar-2022         Instruction Type:Patient   
Education  
  
   
                                                    Patient Instructions  
Indication:Smoker  
                          Start:2-Sep-2021          Instruction Type:Provider   
Instructions for Treatment  
  
   
                                                    How to Access Health Informa  
tion   
Online using Patient Portal and   
3rd Party Apps  
Indication:Smoker  
                          Start:2-Sep-2021          Instruction Type:Patient   
Education  
  
   
                                                    Patient Instructions  
Indication:BMI 26.0-26.9,adult  
                          Start:4-Aug-2021          Instruction Type:Provider   
Instructions for Treatment  
  
   
                                                    How to Access Health Informa  
tion   
Online using Patient Portal and   
3rd Party Apps  
Indication:BMI 26.0-26.9,adult  
                          Start:4-Aug-2021          Instruction Type:Patient   
Education  
  
   
                                                    How to Access Health Informa  
tion   
Online using Patient Portal and   
3rd Party Apps  
Indication:Smoker  
                          Start:1-Mar-2021          Instruction Type:Patient   
Education  
  
   
                                                    Patient Instructions  
Indication:Smoker  
                          Start:1-Mar-2021          Instruction Type:Provider   
Instructions for Treatment  
  
   
                                                    How to Access Health Informa  
tion   
Online using Patient Portal and   
3rd Party Apps  
Indication:Smoker  
                          Start:2021         Instruction Type:Patient   
Education  
  
   
                                                    Patient Instructions  
Indication:Smoker  
                          Start:2021         Instruction Type:Provider   
Instructions for Treatment  
  
   
                                                    How to Access Health Informa  
tion   
Online using Patient Portal and   
3rd Party Apps  
Indication:Smoker  
                          Start:2021          Instruction Type:Patient   
Education  
  
   
                                                    Patient Instructions  
Indication:Smoker  
                          Start:2021          Instruction Type:Provider   
Instructions for Treatment  
  
   
                                                    How to Access Health Informa  
tion   
Online using Patient Portal and   
3rd Party Apps  
Indication:Smoker  
                          Start:16-Dec-2020         Instruction Type:Patient   
Education  
  
   
                                                    Patient Instructions  
Indication:Smoker  
                          Start:16-Dec-2020         Instruction Type:Provider   
Instructions for Treatment  
  
   
                                                    How to access health informa  
tion   
online  
Indication:Smoker  
                          Start:2020         Instruction Type:Patient   
Education  
  
   
                                                    How to access health informa  
tion   
online - Detail  
Indication:Smoker  
                          Start:2020         Instruction Type:Patient   
Education  
  
   
                                                    Patient Instructions  
Indication:Smoker  
                          Start:2020         Instruction Type:Provider   
Instructions for Treatment  
  
   
                                                    How to access health informa  
tion   
online  
Indication:Smoker  
                          Start:24-Aug-2020         Instruction Type:Patient   
Education  
  
   
                                                    How to access health informa  
tion   
online - Detail  
Indication:Smoker  
                          Start:24-Aug-2020         Instruction Type:Patient   
Education  
  
   
                                                    Patient Instructions  
Indication:Smoker  
                          Start:24-Aug-2020         Instruction Type:Provider   
Instructions for Treatment  
  
   
                                                    How to access health informa  
tion   
online - Detail  
Indication:BMI 25.0-25.9,adult  
                          Start:2020         Instruction Type:Patient   
Education  
  
   
                                                    How to access health informa  
tion   
online  
Indication:BMI 25.0-25.9,adult  
                          Start:2020         Instruction Type:Patient   
Education  
  
   
                                                    Patient Instructions  
Indication:BMI 25.0-25.9,adult  
                          Start:2020         Instruction Type:Provider   
Instructions for Treatment  
  
   
                                                    How to access health informa  
tion   
online  
Indication:Smoker  
                          Start:2020          Instruction Type:Patient   
Education  
  
   
                                                    How to access health informa  
tion   
online - Detail  
Indication:Smoker  
                          Start:2020          Instruction Type:Patient   
Education  
  
   
                                                    Patient Instructions  
Indication:Smoker  
                          Start:2020          Instruction Type:Provider   
Instructions for Treatment  
  
   
                                                    How to access health informa  
tion   
online  
Indication:Smoker  
                          Start:2020         Instruction Type:Patient   
Education  
  
   
                                                    How to access health informa  
tion   
online - Detail  
Indication:Smoker  
                          Start:2020         Instruction Type:Patient   
Education  
  
   
                                                    Patient Instructions  
Indication:Smoker  
                          Start:2020         Instruction Type:Provider   
Instructions for Treatment  
  
   
                                                    How to access health informa  
tion   
online  
Indication:Smoker  
                          Start:13-Sep-2019         Instruction Type:Patient   
Education  
  
   
                                                    How to access health informa  
tion   
online - Detail  
Indication:Smoker  
                          Start:13-Sep-2019         Instruction Type:Patient   
Education  
  
   
                                                    Patient Instructions  
Indication:Smoker  
                          Start:13-Sep-2019         Instruction Type:Provider   
Instructions for Treatment  
  
   
                                                    How to access health informa  
tion   
online  
Indication:BMI 26.0-26.9,adult  
                          Start:10-May-2019         Instruction Type:Patient   
Education  
  
   
                                                    How to access health informa  
tion   
online - Detail  
Indication:BMI 26.0-26.9,adult  
                          Start:10-May-2019         Instruction Type:Patient   
Education  
  
   
                                                    Patient Instructions  
Indication:BMI 26.0-26.9,adult  
                          Start:10-May-2019         Instruction Type:Provider   
Instructions for Treatment  
  
   
                                                    How to access health informa  
tion   
online  
Indication:Abnormal glucose   
tolerance test  
                          Start:28-Dec-2018         Instruction Type:Patient   
Education  
  
   
                                                    How to access health informa  
tion   
online - Detail  
Indication:Abnormal glucose   
tolerance test  
                          Start:28-Dec-2018         Instruction Type:Patient   
Education  
  
   
                                                    Patient Instructions  
Indication:Abnormal glucose   
tolerance test  
                          Start:28-Dec-2018         Instruction Type:Provider   
Instructions for Treatment  
  
   
                                                    How to access health informa  
tion   
online  
Indication:Smoker  
                          Start:29-Aug-2018         Instruction Type:Patient   
Education  
  
   
                                                    How to access health informa  
tion   
online - Detail  
Indication:Smoker  
                          Start:29-Aug-2018         Instruction Type:Patient   
Education  
  
   
                                                    Patient Instructions  
Indication:Smoker  
                          Start:29-Aug-2018         Instruction Type:Provider   
Instructions for Treatment  
  
   
                                                    How to access health informa  
tion   
online  
Indication:Abnormal glucose   
tolerance test  
                          Start:2018         Instruction Type:Patient   
Education  
  
   
                                                    How to access health informa  
tion   
online - Detail  
Indication:Abnormal glucose   
tolerance test  
                          Start:2018         Instruction Type:Patient   
Education  
  
   
                                                    Patient Instructions  
Indication:Abnormal glucose   
tolerance test  
                          Start:2018         Instruction Type:Provider   
Instructions for Treatment  
  
   
                                                    How to access health informa  
tion   
online  
Indication:Smoker  
                          Start:8-Dec-2017          Instruction Type:Patient   
Education  
  
   
                                                    How to access health informa  
tion   
online - Detail  
Indication:Smoker  
                          Start:8-Dec-2017          Instruction Type:Patient   
Education  
  
   
                                                    Patient Instructions  
Indication:Smoker  
                          Start:8-Dec-2017          Instruction Type:Provider   
Instructions for Treatment  
  
   
                                                    How to access health informa  
tion   
online  
Indication:Smoker  
                          Start:4-Aug-2017          Instruction Type:Patient   
Education  
  
   
                                                    How to access health informa  
tion   
online - Detail  
Indication:Smoker  
                          Start:4-Aug-2017          Instruction Type:Patient   
Education  
  
   
                                                    Patient Instructions  
Indication:Smoker  
                          Start:4-Aug-2017          Instruction Type:Provider   
Instructions for Treatment  
  
   
                                                    How to access health informa  
tion   
online  
Indication:Smoker  
                          Start:3-Mar-2017          Instruction Type:Patient   
Education  
  
   
                                                    How to access health informa  
tion   
online - Detail  
Indication:Smoker  
                          Start:3-Mar-2017          Instruction Type:Patient   
Education  
  
   
                                                    Patient Instructions  
Indication:Smoker  
                          Start:3-Mar-2017          Instruction Type:Provider   
Instructions for Treatment  
  
   
                                                    How to access health informa  
tion   
online  
Indication:Hypertension,   
essential, benign  
                          Start:28-Oct-2016         Instruction Type:Patient   
Education  
  
   
                                                    How to access health informa  
tion   
online - Detail  
Indication:Hypertension,   
essential, benign  
                          Start:28-Oct-2016         Instruction Type:Patient   
Education  
  
   
                                                    Patient Instructions  
Indication:Hypertension,   
essential, benign  
                          Start:28-Oct-2016         Instruction Type:Provider   
Instructions for Treatment  
  
   
                                                    How to access health informa  
tion   
online  
Indication:Hypertension,   
essential, benign  
                          Start:2016         Instruction Type:Patient   
Education  
  
   
                                                    How to access health informa  
tion   
online - Detail  
Indication:Hypertension,   
essential, benign  
                          Start:2016         Instruction Type:Patient   
Education  
  
   
                                                    Patient Instructions  
Indication:Hypertension,   
essential, benign  
                          Start:2016         Instruction Type:Provider   
Instructions for Treatment  
  
   
                                                    How to access health informa  
tion   
online  
Indication:BMI 27.0-27.9,adult  
                          Start:2016         Instruction Type:Patient   
Education  
  
   
                                                    How to access health informa  
tion   
online - Detail  
Indication:BMI 27.0-27.9,adult  
                          Start:2016         Instruction Type:Patient   
Education  
  
   
                                                    Patient Instructions  
Indication:BMI 27.0-27.9,adult  
                          Start:2016         Instruction Type:Provider   
Instructions for Treatment  
  
   
                                                    How to access health informa  
tion   
online  
Indication:Abnormal glucose   
tolerance test  
                          Start:2016         Instruction Type:Patient   
Education  
  
   
                                                    How to access health informa  
tion   
online - Detail  
Indication:Abnormal glucose   
tolerance test  
                          Start:2016         Instruction Type:Patient   
Education  
  
   
                                                    Patient Instructions  
Indication:Abnormal glucose   
tolerance test  
                          Start:2016         Instruction Type:Provider   
Instructions for Treatment  
  
   
                                                    How to access health informa  
tion   
online  
Indication:Abnormal glucose   
tolerance test  
                          Start:16-Dec-2015         Instruction Type:Patient   
Education  
  
   
                                                    How to access health informa  
tion   
online - Detail  
Indication:Abnormal glucose   
tolerance test  
                          Start:16-Dec-2015         Instruction Type:Patient   
Education  
  
   
                                                    Patient Instructions  
Indication:Abnormal glucose   
tolerance test  
                          Start:16-Dec-2015         Instruction Type:Provider   
Instructions for Treatment  
  
   
                                                    How to access health informa  
tion   
online  
Indication:Hypercholesteremia  
                          Start:16-Sep-2015         Instruction Type:Patient   
Education  
  
   
                                                    How to access health informa  
tion   
online - Detail  
Indication:Hypercholesteremia  
                          Start:16-Sep-2015         Instruction Type:Patient   
Education  
  
   
                                                    Patient Instructions  
Indication:Hypercholesteremia  
                          Start:16-Sep-2015         Instruction Type:Provider   
Instructions for Treatment  
  
   
                                                    Patient Instructions  
Indication:Abnormal glucose   
tolerance test  
                          Start:9-May-2014          Instruction Type:Provider   
Instructions for Treatment  
  
   
                                                    Patient Instructions  
Indication:Abnormal glucose   
tolerance test  
                          Start:2014          Instruction Type:Provider   
Instructions for Treatment  
  
   
                                                    Patient Instructions  
Indication:Abnormal glucose   
tolerance test  
                          Start:12-Sep-2013         Instruction Type:Provider   
Instructions for Treatment  
  
   
                                                    Patient Instructions  
Indication:Hypercholesteremia  
                          Start:15-Mar-2013         Instruction Type:Provider   
Instructions for Treatment  
  
   
                                                    Patient Instructions  
Indication:Hypertension,   
essential, benign  
                          Start:14-Sep-2012         Instruction Type:Provider   
Instructions for Treatment  
  
  
  
[facility]  
Work Phone: [phone]*   
  
                                Name            Dates           Details  
   
                                                    Patient Instructions  
Indication:BMI 27.0-27.9,adult  
                          Start:2023         Instruction Type:Provider   
Instructions for Treatment  
  
   
                                                    How to Access Health Informa  
tion   
Online using Patient Portal and   
3rd Party Apps  
Indication:BMI 27.0-27.9,adult  
                          Start:2023         Instruction Type:Patient   
Education  
  
   
                                                    Patient Instructions  
Indication:BMI 27.0-27.9,adult  
                          Start:28-Sep-2022         Instruction Type:Provider   
Instructions for Treatment  
  
   
                                                    How to Access Health Informa  
tion   
Online using Patient Portal and   
3rd Party Apps  
Indication:BMI 27.0-27.9,adult  
                          Start:28-Sep-2022         Instruction Type:Patient   
Education  
  
   
                                                    Patient Instructions  
Indication:Smoker  
                          Start:16-Mar-2022         Instruction Type:Provider   
Instructions for Treatment  
  
   
                                                    How to Access Health Informa  
tion   
Online using Patient Portal and   
3rd Party Apps  
Indication:Smoker  
                          Start:16-Mar-2022         Instruction Type:Patient   
Education  
  
   
                                                    Patient Instructions  
Indication:Smoker  
                          Start:2-Sep-2021          Instruction Type:Provider   
Instructions for Treatment  
  
   
                                                    How to Access Health Informa  
tion   
Online using Patient Portal and   
3rd Party Apps  
Indication:Smoker  
                          Start:2-Sep-2021          Instruction Type:Patient   
Education  
  
   
                                                    Patient Instructions  
Indication:BMI 26.0-26.9,adult  
                          Start:4-Aug-2021          Instruction Type:Provider   
Instructions for Treatment  
  
   
                                                    How to Access Health Informa  
tion   
Online using Patient Portal and   
3rd Party Apps  
Indication:BMI 26.0-26.9,adult  
                          Start:4-Aug-2021          Instruction Type:Patient   
Education  
  
   
                                                    How to Access Health Informa  
tion   
Online using Patient Portal and   
3rd Party Apps  
Indication:Smoker  
                          Start:1-Mar-2021          Instruction Type:Patient   
Education  
  
   
                                                    Patient Instructions  
Indication:Smoker  
                          Start:1-Mar-2021          Instruction Type:Provider   
Instructions for Treatment  
  
   
                                                    How to Access Health Informa  
tion   
Online using Patient Portal and   
3rd Party Apps  
Indication:Smoker  
                          Start:2021         Instruction Type:Patient   
Education  
  
   
                                                    Patient Instructions  
Indication:Smoker  
                          Start:2021         Instruction Type:Provider   
Instructions for Treatment  
  
   
                                                    How to Access Health Informa  
tion   
Online using Patient Portal and   
3rd Party Apps  
Indication:Smoker  
                          Start:2021          Instruction Type:Patient   
Education  
  
   
                                                    Patient Instructions  
Indication:Smoker  
                          Start:2021          Instruction Type:Provider   
Instructions for Treatment  
  
   
                                                    How to Access Health Informa  
tion   
Online using Patient Portal and   
3rd Party Apps  
Indication:Smoker  
                          Start:16-Dec-2020         Instruction Type:Patient   
Education  
  
   
                                                    Patient Instructions  
Indication:Smoker  
                          Start:16-Dec-2020         Instruction Type:Provider   
Instructions for Treatment  
  
   
                                                    How to access health informa  
tion   
online  
Indication:Smoker  
                          Start:2020         Instruction Type:Patient   
Education  
  
   
                                                    How to access health informa  
tion   
online - Detail  
Indication:Smoker  
                          Start:2020         Instruction Type:Patient   
Education  
  
   
                                                    Patient Instructions  
Indication:Smoker  
                          Start:2020         Instruction Type:Provider   
Instructions for Treatment  
  
   
                                                    How to access health informa  
tion   
online  
Indication:Smoker  
                          Start:24-Aug-2020         Instruction Type:Patient   
Education  
  
   
                                                    How to access health informa  
tion   
online - Detail  
Indication:Smoker  
                          Start:24-Aug-2020         Instruction Type:Patient   
Education  
  
   
                                                    Patient Instructions  
Indication:Smoker  
                          Start:24-Aug-2020         Instruction Type:Provider   
Instructions for Treatment  
  
   
                                                    How to access health informa  
tion   
online - Detail  
Indication:BMI 25.0-25.9,adult  
                          Start:2020         Instruction Type:Patient   
Education  
  
   
                                                    How to access health informa  
tion   
online  
Indication:BMI 25.0-25.9,adult  
                          Start:2020         Instruction Type:Patient   
Education  
  
   
                                                    Patient Instructions  
Indication:BMI 25.0-25.9,adult  
                          Start:2020         Instruction Type:Provider   
Instructions for Treatment  
  
   
                                                    How to access health informa  
tion   
online  
Indication:Smoker  
                          Start:2020          Instruction Type:Patient   
Education  
  
   
                                                    How to access health informa  
tion   
online - Detail  
Indication:Smoker  
                          Start:2020          Instruction Type:Patient   
Education  
  
   
                                                    Patient Instructions  
Indication:Smoker  
                          Start:2020          Instruction Type:Provider   
Instructions for Treatment  
  
   
                                                    How to access health informa  
tion   
online  
Indication:Smoker  
                          Start:2020         Instruction Type:Patient   
Education  
  
   
                                                    How to access health informa  
tion   
online - Detail  
Indication:Smoker  
                          Start:2020         Instruction Type:Patient   
Education  
  
   
                                                    Patient Instructions  
Indication:Smoker  
                          Start:2020         Instruction Type:Provider   
Instructions for Treatment  
  
   
                                                    How to access health informa  
tion   
online  
Indication:Smoker  
                          Start:13-Sep-2019         Instruction Type:Patient   
Education  
  
   
                                                    How to access health informa  
tion   
online - Detail  
Indication:Smoker  
                          Start:13-Sep-2019         Instruction Type:Patient   
Education  
  
   
                                                    Patient Instructions  
Indication:Smoker  
                          Start:13-Sep-2019         Instruction Type:Provider   
Instructions for Treatment  
  
   
                                                    How to access health informa  
tion   
online  
Indication:BMI 26.0-26.9,adult  
                          Start:10-May-2019         Instruction Type:Patient   
Education  
  
   
                                                    How to access health informa  
tion   
online - Detail  
Indication:BMI 26.0-26.9,adult  
                          Start:10-May-2019         Instruction Type:Patient   
Education  
  
   
                                                    Patient Instructions  
Indication:BMI 26.0-26.9,adult  
                          Start:10-May-2019         Instruction Type:Provider   
Instructions for Treatment  
  
   
                                                    How to access health informa  
tion   
online  
Indication:Abnormal glucose   
tolerance test  
                          Start:28-Dec-2018         Instruction Type:Patient   
Education  
  
   
                                                    How to access health informa  
tion   
online - Detail  
Indication:Abnormal glucose   
tolerance test  
                          Start:28-Dec-2018         Instruction Type:Patient   
Education  
  
   
                                                    Patient Instructions  
Indication:Abnormal glucose   
tolerance test  
                          Start:28-Dec-2018         Instruction Type:Provider   
Instructions for Treatment  
  
   
                                                    How to access health informa  
tion   
online  
Indication:Smoker  
                          Start:29-Aug-2018         Instruction Type:Patient   
Education  
  
   
                                                    How to access health informa  
tion   
online - Detail  
Indication:Smoker  
                          Start:29-Aug-2018         Instruction Type:Patient   
Education  
  
   
                                                    Patient Instructions  
Indication:Smoker  
                          Start:29-Aug-2018         Instruction Type:Provider   
Instructions for Treatment  
  
   
                                                    How to access health informa  
tion   
online  
Indication:Abnormal glucose   
tolerance test  
                          Start:2018         Instruction Type:Patient   
Education  
  
   
                                                    How to access health informa  
tion   
online - Detail  
Indication:Abnormal glucose   
tolerance test  
                          Start:2018         Instruction Type:Patient   
Education  
  
   
                                                    Patient Instructions  
Indication:Abnormal glucose   
tolerance test  
                          Start:2018         Instruction Type:Provider   
Instructions for Treatment  
  
   
                                                    How to access health informa  
tion   
online  
Indication:Smoker  
                          Start:8-Dec-2017          Instruction Type:Patient   
Education  
  
   
                                                    How to access health informa  
tion   
online - Detail  
Indication:Smoker  
                          Start:8-Dec-2017          Instruction Type:Patient   
Education  
  
   
                                                    Patient Instructions  
Indication:Smoker  
                          Start:8-Dec-2017          Instruction Type:Provider   
Instructions for Treatment  
  
   
                                                    How to access health informa  
tion   
online  
Indication:Smoker  
                          Start:4-Aug-2017          Instruction Type:Patient   
Education  
  
   
                                                    How to access health informa  
tion   
online - Detail  
Indication:Smoker  
                          Start:4-Aug-2017          Instruction Type:Patient   
Education  
  
   
                                                    Patient Instructions  
Indication:Smoker  
                          Start:4-Aug-2017          Instruction Type:Provider   
Instructions for Treatment  
  
   
                                                    How to access health informa  
tion   
online  
Indication:Smoker  
                          Start:3-Mar-2017          Instruction Type:Patient   
Education  
  
   
                                                    How to access health informa  
tion   
online - Detail  
Indication:Smoker  
                          Start:3-Mar-2017          Instruction Type:Patient   
Education  
  
   
                                                    Patient Instructions  
Indication:Smoker  
                          Start:3-Mar-2017          Instruction Type:Provider   
Instructions for Treatment  
  
   
                                                    How to access health informa  
tion   
online  
Indication:Hypertension,   
essential, benign  
                          Start:28-Oct-2016         Instruction Type:Patient   
Education  
  
   
                                                    How to access health informa  
tion   
online - Detail  
Indication:Hypertension,   
essential, benign  
                          Start:28-Oct-2016         Instruction Type:Patient   
Education  
  
   
                                                    Patient Instructions  
Indication:Hypertension,   
essential, benign  
                          Start:28-Oct-2016         Instruction Type:Provider   
Instructions for Treatment  
  
   
                                                    How to access health informa  
tion   
online  
Indication:Hypertension,   
essential, benign  
                          Start:2016         Instruction Type:Patient   
Education  
  
   
                                                    How to access health informa  
tion   
online - Detail  
Indication:Hypertension,   
essential, benign  
                          Start:2016         Instruction Type:Patient   
Education  
  
   
                                                    Patient Instructions  
Indication:Hypertension,   
essential, benign  
                          Start:2016         Instruction Type:Provider   
Instructions for Treatment  
  
   
                                                    How to access health informa  
tion   
online  
Indication:BMI 27.0-27.9,adult  
                          Start:2016         Instruction Type:Patient   
Education  
  
   
                                                    How to access health informa  
tion   
online - Detail  
Indication:BMI 27.0-27.9,adult  
                          Start:2016         Instruction Type:Patient   
Education  
  
   
                                                    Patient Instructions  
Indication:BMI 27.0-27.9,adult  
                          Start:2016         Instruction Type:Provider   
Instructions for Treatment  
  
   
                                                    How to access health informa  
tion   
online  
Indication:Abnormal glucose   
tolerance test  
                          Start:2016         Instruction Type:Patient   
Education  
  
   
                                                    How to access health informa  
tion   
online - Detail  
Indication:Abnormal glucose   
tolerance test  
                          Start:2016         Instruction Type:Patient   
Education  
  
   
                                                    Patient Instructions  
Indication:Abnormal glucose   
tolerance test  
                          Start:2016         Instruction Type:Provider   
Instructions for Treatment  
  
   
                                                    How to access health informa  
tion   
online  
Indication:Abnormal glucose   
tolerance test  
                          Start:16-Dec-2015         Instruction Type:Patient   
Education  
  
   
                                                    How to access health informa  
tion   
online - Detail  
Indication:Abnormal glucose   
tolerance test  
                          Start:16-Dec-2015         Instruction Type:Patient   
Education  
  
   
                                                    Patient Instructions  
Indication:Abnormal glucose   
tolerance test  
                          Start:16-Dec-2015         Instruction Type:Provider   
Instructions for Treatment  
  
   
                                                    How to access health informa  
tion   
online  
Indication:Hypercholesteremia  
                          Start:16-Sep-2015         Instruction Type:Patient   
Education  
  
   
                                                    How to access health informa  
tion   
online - Detail  
Indication:Hypercholesteremia  
                          Start:16-Sep-2015         Instruction Type:Patient   
Education  
  
   
                                                    Patient Instructions  
Indication:Hypercholesteremia  
                          Start:16-Sep-2015         Instruction Type:Provider   
Instructions for Treatment  
  
   
                                                    Patient Instructions  
Indication:Abnormal glucose   
tolerance test  
                          Start:9-May-2014          Instruction Type:Provider   
Instructions for Treatment  
  
   
                                                    Patient Instructions  
Indication:Abnormal glucose   
tolerance test  
                          Start:2014          Instruction Type:Provider   
Instructions for Treatment  
  
   
                                                    Patient Instructions  
Indication:Abnormal glucose   
tolerance test  
                          Start:12-Sep-2013         Instruction Type:Provider   
Instructions for Treatment  
  
   
                                                    Patient Instructions  
Indication:Hypercholesteremia  
                          Start:15-Mar-2013         Instruction Type:Provider   
Instructions for Treatment  
  
   
                                                    Patient Instructions  
Indication:Hypertension,   
essential, benign  
                          Start:14-Sep-2012         Instruction Type:Provider   
Instructions for Treatment  
  
  
  
[facility]  
Work Phone: [phone]*   
  
                                Name            Dates           Details  
   
                                                    Patient Instructions  
Indication:BMI 27.0-27.9,adult  
                          Start:2023         Instruction Type:Provider   
Instructions for Treatment  
  
   
                                                    How to Access Health Informa  
tion   
Online using Patient Portal and   
3rd Party Apps  
Indication:BMI 27.0-27.9,adult  
                          Start:2023         Instruction Type:Patient   
Education  
  
   
                                                    Patient Instructions  
Indication:BMI 27.0-27.9,adult  
                          Start:28-Sep-2022         Instruction Type:Provider   
Instructions for Treatment  
  
   
                                                    How to Access Health Informa  
tion   
Online using Patient Portal and   
3rd Party Apps  
Indication:BMI 27.0-27.9,adult  
                          Start:28-Sep-2022         Instruction Type:Patient   
Education  
  
   
                                                    Patient Instructions  
Indication:Smoker  
                          Start:16-Mar-2022         Instruction Type:Provider   
Instructions for Treatment  
  
   
                                                    How to Access Health Informa  
tion   
Online using Patient Portal and   
3rd Party Apps  
Indication:Smoker  
                          Start:16-Mar-2022         Instruction Type:Patient   
Education  
  
   
                                                    Patient Instructions  
Indication:Smoker  
                          Start:2-Sep-2021          Instruction Type:Provider   
Instructions for Treatment  
  
   
                                                    How to Access Health Informa  
tion   
Online using Patient Portal and   
3rd Party Apps  
Indication:Smoker  
                          Start:2-Sep-2021          Instruction Type:Patient   
Education  
  
   
                                                    Patient Instructions  
Indication:BMI 26.0-26.9,adult  
                          Start:4-Aug-2021          Instruction Type:Provider   
Instructions for Treatment  
  
   
                                                    How to Access Health Informa  
tion   
Online using Patient Portal and   
3rd Party Apps  
Indication:BMI 26.0-26.9,adult  
                          Start:4-Aug-2021          Instruction Type:Patient   
Education  
  
   
                                                    How to Access Health Informa  
tion   
Online using Patient Portal and   
3rd Party Apps  
Indication:Smoker  
                          Start:1-Mar-2021          Instruction Type:Patient   
Education  
  
   
                                                    Patient Instructions  
Indication:Smoker  
                          Start:1-Mar-2021          Instruction Type:Provider   
Instructions for Treatment  
  
   
                                                    How to Access Health Informa  
tion   
Online using Patient Portal and   
3rd Party Apps  
Indication:Smoker  
                          Start:2021         Instruction Type:Patient   
Education  
  
   
                                                    Patient Instructions  
Indication:Smoker  
                          Start:2021         Instruction Type:Provider   
Instructions for Treatment  
  
   
                                                    How to Access Health Informa  
tion   
Online using Patient Portal and   
3rd Party Apps  
Indication:Smoker  
                          Start:2021          Instruction Type:Patient   
Education  
  
   
                                                    Patient Instructions  
Indication:Smoker  
                          Start:2021          Instruction Type:Provider   
Instructions for Treatment  
  
   
                                                    How to Access Health Informa  
tion   
Online using Patient Portal and   
3rd Party Apps  
Indication:Smoker  
                          Start:16-Dec-2020         Instruction Type:Patient   
Education  
  
   
                                                    Patient Instructions  
Indication:Smoker  
                          Start:16-Dec-2020         Instruction Type:Provider   
Instructions for Treatment  
  
   
                                                    How to access health informa  
tion   
online  
Indication:Smoker  
                          Start:2020         Instruction Type:Patient   
Education  
  
   
                                                    How to access health informa  
tion   
online - Detail  
Indication:Smoker  
                          Start:2020         Instruction Type:Patient   
Education  
  
   
                                                    Patient Instructions  
Indication:Smoker  
                          Start:2020         Instruction Type:Provider   
Instructions for Treatment  
  
   
                                                    How to access health informa  
tion   
online  
Indication:Smoker  
                          Start:24-Aug-2020         Instruction Type:Patient   
Education  
  
   
                                                    How to access health informa  
tion   
online - Detail  
Indication:Smoker  
                          Start:24-Aug-2020         Instruction Type:Patient   
Education  
  
   
                                                    Patient Instructions  
Indication:Smoker  
                          Start:24-Aug-2020         Instruction Type:Provider   
Instructions for Treatment  
  
   
                                                    How to access health informa  
tion   
online - Detail  
Indication:BMI 25.0-25.9,adult  
                          Start:2020         Instruction Type:Patient   
Education  
  
   
                                                    How to access health informa  
tion   
online  
Indication:BMI 25.0-25.9,adult  
                          Start:2020         Instruction Type:Patient   
Education  
  
   
                                                    Patient Instructions  
Indication:BMI 25.0-25.9,adult  
                          Start:2020         Instruction Type:Provider   
Instructions for Treatment  
  
   
                                                    How to access health informa  
tion   
online  
Indication:Smoker  
                          Start:2020          Instruction Type:Patient   
Education  
  
   
                                                    How to access health informa  
tion   
online - Detail  
Indication:Smoker  
                          Start:2020          Instruction Type:Patient   
Education  
  
   
                                                    Patient Instructions  
Indication:Smoker  
                          Start:2020          Instruction Type:Provider   
Instructions for Treatment  
  
   
                                                    How to access health informa  
tion   
online  
Indication:Smoker  
                          Start:2020         Instruction Type:Patient   
Education  
  
   
                                                    How to access health informa  
tion   
online - Detail  
Indication:Smoker  
                          Start:2020         Instruction Type:Patient   
Education  
  
   
                                                    Patient Instructions  
Indication:Smoker  
                          Start:2020         Instruction Type:Provider   
Instructions for Treatment  
  
   
                                                    How to access health informa  
tion   
online  
Indication:Smoker  
                          Start:13-Sep-2019         Instruction Type:Patient   
Education  
  
   
                                                    How to access health informa  
tion   
online - Detail  
Indication:Smoker  
                          Start:13-Sep-2019         Instruction Type:Patient   
Education  
  
   
                                                    Patient Instructions  
Indication:Smoker  
                          Start:13-Sep-2019         Instruction Type:Provider   
Instructions for Treatment  
  
   
                                                    How to access health informa  
tion   
online  
Indication:BMI 26.0-26.9,adult  
                          Start:10-May-2019         Instruction Type:Patient   
Education  
  
   
                                                    How to access health informa  
tion   
online - Detail  
Indication:BMI 26.0-26.9,adult  
                          Start:10-May-2019         Instruction Type:Patient   
Education  
  
   
                                                    Patient Instructions  
Indication:BMI 26.0-26.9,adult  
                          Start:10-May-2019         Instruction Type:Provider   
Instructions for Treatment  
  
   
                                                    How to access health informa  
tion   
online  
Indication:Abnormal glucose   
tolerance test  
                          Start:28-Dec-2018         Instruction Type:Patient   
Education  
  
   
                                                    How to access health informa  
tion   
online - Detail  
Indication:Abnormal glucose   
tolerance test  
                          Start:28-Dec-2018         Instruction Type:Patient   
Education  
  
   
                                                    Patient Instructions  
Indication:Abnormal glucose   
tolerance test  
                          Start:28-Dec-2018         Instruction Type:Provider   
Instructions for Treatment  
  
   
                                                    How to access health informa  
tion   
online  
Indication:Smoker  
                          Start:29-Aug-2018         Instruction Type:Patient   
Education  
  
   
                                                    How to access health informa  
tion   
online - Detail  
Indication:Smoker  
                          Start:29-Aug-2018         Instruction Type:Patient   
Education  
  
   
                                                    Patient Instructions  
Indication:Smoker  
                          Start:29-Aug-2018         Instruction Type:Provider   
Instructions for Treatment  
  
   
                                                    How to access health informa  
tion   
online  
Indication:Abnormal glucose   
tolerance test  
                          Start:2018         Instruction Type:Patient   
Education  
  
   
                                                    How to access health informa  
tion   
online - Detail  
Indication:Abnormal glucose   
tolerance test  
                          Start:2018         Instruction Type:Patient   
Education  
  
   
                                                    Patient Instructions  
Indication:Abnormal glucose   
tolerance test  
                          Start:2018         Instruction Type:Provider   
Instructions for Treatment  
  
   
                                                    How to access health informa  
tion   
online  
Indication:Smoker  
                          Start:8-Dec-2017          Instruction Type:Patient   
Education  
  
   
                                                    How to access health informa  
tion   
online - Detail  
Indication:Smoker  
                          Start:8-Dec-2017          Instruction Type:Patient   
Education  
  
   
                                                    Patient Instructions  
Indication:Smoker  
                          Start:8-Dec-2017          Instruction Type:Provider   
Instructions for Treatment  
  
   
                                                    How to access health informa  
tion   
online  
Indication:Smoker  
                          Start:4-Aug-2017          Instruction Type:Patient   
Education  
  
   
                                                    How to access health informa  
tion   
online - Detail  
Indication:Smoker  
                          Start:4-Aug-2017          Instruction Type:Patient   
Education  
  
   
                                                    Patient Instructions  
Indication:Smoker  
                          Start:4-Aug-2017          Instruction Type:Provider   
Instructions for Treatment  
  
   
                                                    How to access health informa  
tion   
online  
Indication:Smoker  
                          Start:3-Mar-2017          Instruction Type:Patient   
Education  
  
   
                                                    How to access health informa  
tion   
online - Detail  
Indication:Smoker  
                          Start:3-Mar-2017          Instruction Type:Patient   
Education  
  
   
                                                    Patient Instructions  
Indication:Smoker  
                          Start:3-Mar-2017          Instruction Type:Provider   
Instructions for Treatment  
  
   
                                                    How to access health informa  
tion   
online  
Indication:Hypertension,   
essential, benign  
                          Start:28-Oct-2016         Instruction Type:Patient   
Education  
  
   
                                                    How to access health informa  
tion   
online - Detail  
Indication:Hypertension,   
essential, benign  
                          Start:28-Oct-2016         Instruction Type:Patient   
Education  
  
   
                                                    Patient Instructions  
Indication:Hypertension,   
essential, benign  
                          Start:28-Oct-2016         Instruction Type:Provider   
Instructions for Treatment  
  
   
                                                    How to access health informa  
tion   
online  
Indication:Hypertension,   
essential, benign  
                          Start:2016         Instruction Type:Patient   
Education  
  
   
                                                    How to access health informa  
tion   
online - Detail  
Indication:Hypertension,   
essential, benign  
                          Start:2016         Instruction Type:Patient   
Education  
  
   
                                                    Patient Instructions  
Indication:Hypertension,   
essential, benign  
                          Start:2016         Instruction Type:Provider   
Instructions for Treatment  
  
   
                                                    How to access health informa  
tion   
online  
Indication:BMI 27.0-27.9,adult  
                          Start:2016         Instruction Type:Patient   
Education  
  
   
                                                    How to access health informa  
tion   
online - Detail  
Indication:BMI 27.0-27.9,adult  
                          Start:2016         Instruction Type:Patient   
Education  
  
   
                                                    Patient Instructions  
Indication:BMI 27.0-27.9,adult  
                          Start:2016         Instruction Type:Provider   
Instructions for Treatment  
  
   
                                                    How to access health informa  
tion   
online  
Indication:Abnormal glucose   
tolerance test  
                          Start:2016         Instruction Type:Patient   
Education  
  
   
                                                    How to access health informa  
tion   
online - Detail  
Indication:Abnormal glucose   
tolerance test  
                          Start:2016         Instruction Type:Patient   
Education  
  
   
                                                    Patient Instructions  
Indication:Abnormal glucose   
tolerance test  
                          Start:2016         Instruction Type:Provider   
Instructions for Treatment  
  
   
                                                    How to access health informa  
tion   
online  
Indication:Abnormal glucose   
tolerance test  
                          Start:16-Dec-2015         Instruction Type:Patient   
Education  
  
   
                                                    How to access health informa  
tion   
online - Detail  
Indication:Abnormal glucose   
tolerance test  
                          Start:16-Dec-2015         Instruction Type:Patient   
Education  
  
   
                                                    Patient Instructions  
Indication:Abnormal glucose   
tolerance test  
                          Start:16-Dec-2015         Instruction Type:Provider   
Instructions for Treatment  
  
   
                                                    How to access health informa  
tion   
online  
Indication:Hypercholesteremia  
                          Start:16-Sep-2015         Instruction Type:Patient   
Education  
  
   
                                                    How to access health informa  
tion   
online - Detail  
Indication:Hypercholesteremia  
                          Start:16-Sep-2015         Instruction Type:Patient   
Education  
  
   
                                                    Patient Instructions  
Indication:Hypercholesteremia  
                          Start:16-Sep-2015         Instruction Type:Provider   
Instructions for Treatment  
  
   
                                                    Patient Instructions  
Indication:Abnormal glucose   
tolerance test  
                          Start:9-May-2014          Instruction Type:Provider   
Instructions for Treatment  
  
   
                                                    Patient Instructions  
Indication:Abnormal glucose   
tolerance test  
                          Start:2014          Instruction Type:Provider   
Instructions for Treatment  
  
   
                                                    Patient Instructions  
Indication:Abnormal glucose   
tolerance test  
                          Start:12-Sep-2013         Instruction Type:Provider   
Instructions for Treatment  
  
   
                                                    Patient Instructions  
Indication:Hypercholesteremia  
                          Start:15-Mar-2013         Instruction Type:Provider   
Instructions for Treatment  
  
   
                                                    Patient Instructions  
Indication:Hypertension,   
essential, benign  
                          Start:14-Sep-2012         Instruction Type:Provider   
Instructions for Treatment  
  
  
  
[facility]  
Work Phone: [phone]*   
  
                                Name            Dates           Details  
   
                                                    Patient Instructions  
Indication:BMI 27.0-27.9,adult  
                          Start:2023         Instruction Type:Provider   
Instructions for Treatment  
  
   
                                                    How to Access Health Informa  
tion   
Online using Patient Portal and   
3rd Party Apps  
Indication:BMI 27.0-27.9,adult  
                          Start:2023         Instruction Type:Patient   
Education  
  
   
                                                    Patient Instructions  
Indication:BMI 27.0-27.9,adult  
                          Start:28-Sep-2022         Instruction Type:Provider   
Instructions for Treatment  
  
   
                                                    How to Access Health Informa  
tion   
Online using Patient Portal and   
3rd Party Apps  
Indication:BMI 27.0-27.9,adult  
                          Start:28-Sep-2022         Instruction Type:Patient   
Education  
  
   
                                                    Patient Instructions  
Indication:Smoker  
                          Start:16-Mar-2022         Instruction Type:Provider   
Instructions for Treatment  
  
   
                                                    How to Access Health Informa  
tion   
Online using Patient Portal and   
3rd Party Apps  
Indication:Smoker  
                          Start:16-Mar-2022         Instruction Type:Patient   
Education  
  
   
                                                    Patient Instructions  
Indication:Smoker  
                          Start:2-Sep-2021          Instruction Type:Provider   
Instructions for Treatment  
  
   
                                                    How to Access Health Informa  
tion   
Online using Patient Portal and   
3rd Party Apps  
Indication:Smoker  
                          Start:2-Sep-2021          Instruction Type:Patient   
Education  
  
   
                                                    Patient Instructions  
Indication:BMI 26.0-26.9,adult  
                          Start:4-Aug-2021          Instruction Type:Provider   
Instructions for Treatment  
  
   
                                                    How to Access Health Informa  
tion   
Online using Patient Portal and   
3rd Party Apps  
Indication:BMI 26.0-26.9,adult  
                          Start:4-Aug-2021          Instruction Type:Patient   
Education  
  
   
                                                    How to Access Health Informa  
tion   
Online using Patient Portal and   
3rd Party Apps  
Indication:Smoker  
                          Start:1-Mar-2021          Instruction Type:Patient   
Education  
  
   
                                                    Patient Instructions  
Indication:Smoker  
                          Start:1-Mar-2021          Instruction Type:Provider   
Instructions for Treatment  
  
   
                                                    How to Access Health Informa  
tion   
Online using Patient Portal and   
3rd Party Apps  
Indication:Smoker  
                          Start:2021         Instruction Type:Patient   
Education  
  
   
                                                    Patient Instructions  
Indication:Smoker  
                          Start:2021         Instruction Type:Provider   
Instructions for Treatment  
  
   
                                                    How to Access Health Informa  
tion   
Online using Patient Portal and   
3rd Party Apps  
Indication:Smoker  
                          Start:2021          Instruction Type:Patient   
Education  
  
   
                                                    Patient Instructions  
Indication:Smoker  
                          Start:2021          Instruction Type:Provider   
Instructions for Treatment  
  
   
                                                    How to Access Health Informa  
tion   
Online using Patient Portal and   
3rd Party Apps  
Indication:Smoker  
                          Start:16-Dec-2020         Instruction Type:Patient   
Education  
  
   
                                                    Patient Instructions  
Indication:Smoker  
                          Start:16-Dec-2020         Instruction Type:Provider   
Instructions for Treatment  
  
   
                                                    How to access health informa  
tion   
online  
Indication:Smoker  
                          Start:2020         Instruction Type:Patient   
Education  
  
   
                                                    How to access health informa  
tion   
online - Detail  
Indication:Smoker  
                          Start:2020         Instruction Type:Patient   
Education  
  
   
                                                    Patient Instructions  
Indication:Smoker  
                          Start:2020         Instruction Type:Provider   
Instructions for Treatment  
  
   
                                                    How to access health informa  
tion   
online  
Indication:Smoker  
                          Start:24-Aug-2020         Instruction Type:Patient   
Education  
  
   
                                                    How to access health informa  
tion   
online - Detail  
Indication:Smoker  
                          Start:24-Aug-2020         Instruction Type:Patient   
Education  
  
   
                                                    Patient Instructions  
Indication:Smoker  
                          Start:24-Aug-2020         Instruction Type:Provider   
Instructions for Treatment  
  
   
                                                    How to access health informa  
tion   
online - Detail  
Indication:BMI 25.0-25.9,adult  
                          Start:2020         Instruction Type:Patient   
Education  
  
   
                                                    How to access health informa  
tion   
online  
Indication:BMI 25.0-25.9,adult  
                          Start:2020         Instruction Type:Patient   
Education  
  
   
                                                    Patient Instructions  
Indication:BMI 25.0-25.9,adult  
                          Start:2020         Instruction Type:Provider   
Instructions for Treatment  
  
   
                                                    How to access health informa  
tion   
online  
Indication:Smoker  
                          Start:2020          Instruction Type:Patient   
Education  
  
   
                                                    How to access health informa  
tion   
online - Detail  
Indication:Smoker  
                          Start:2020          Instruction Type:Patient   
Education  
  
   
                                                    Patient Instructions  
Indication:Smoker  
                          Start:2020          Instruction Type:Provider   
Instructions for Treatment  
  
   
                                                    How to access health informa  
tion   
online  
Indication:Smoker  
                          Start:2020         Instruction Type:Patient   
Education  
  
   
                                                    How to access health informa  
tion   
online - Detail  
Indication:Smoker  
                          Start:2020         Instruction Type:Patient   
Education  
  
   
                                                    Patient Instructions  
Indication:Smoker  
                          Start:2020         Instruction Type:Provider   
Instructions for Treatment  
  
   
                                                    How to access health informa  
tion   
online  
Indication:Smoker  
                          Start:13-Sep-2019         Instruction Type:Patient   
Education  
  
   
                                                    How to access health informa  
tion   
online - Detail  
Indication:Smoker  
                          Start:13-Sep-2019         Instruction Type:Patient   
Education  
  
   
                                                    Patient Instructions  
Indication:Smoker  
                          Start:13-Sep-2019         Instruction Type:Provider   
Instructions for Treatment  
  
   
                                                    How to access health informa  
tion   
online  
Indication:BMI 26.0-26.9,adult  
                          Start:10-May-2019         Instruction Type:Patient   
Education  
  
   
                                                    How to access health informa  
tion   
online - Detail  
Indication:BMI 26.0-26.9,adult  
                          Start:10-May-2019         Instruction Type:Patient   
Education  
  
   
                                                    Patient Instructions  
Indication:BMI 26.0-26.9,adult  
                          Start:10-May-2019         Instruction Type:Provider   
Instructions for Treatment  
  
   
                                                    How to access health informa  
tion   
online  
Indication:Abnormal glucose   
tolerance test  
                          Start:28-Dec-2018         Instruction Type:Patient   
Education  
  
   
                                                    How to access health informa  
tion   
online - Detail  
Indication:Abnormal glucose   
tolerance test  
                          Start:28-Dec-2018         Instruction Type:Patient   
Education  
  
   
                                                    Patient Instructions  
Indication:Abnormal glucose   
tolerance test  
                          Start:28-Dec-2018         Instruction Type:Provider   
Instructions for Treatment  
  
   
                                                    How to access health informa  
tion   
online  
Indication:Smoker  
                          Start:29-Aug-2018         Instruction Type:Patient   
Education  
  
   
                                                    How to access health informa  
tion   
online - Detail  
Indication:Smoker  
                          Start:29-Aug-2018         Instruction Type:Patient   
Education  
  
   
                                                    Patient Instructions  
Indication:Smoker  
                          Start:29-Aug-2018         Instruction Type:Provider   
Instructions for Treatment  
  
   
                                                    How to access health informa  
tion   
online  
Indication:Abnormal glucose   
tolerance test  
                          Start:2018         Instruction Type:Patient   
Education  
  
   
                                                    How to access health informa  
tion   
online - Detail  
Indication:Abnormal glucose   
tolerance test  
                          Start:2018         Instruction Type:Patient   
Education  
  
   
                                                    Patient Instructions  
Indication:Abnormal glucose   
tolerance test  
                          Start:2018         Instruction Type:Provider   
Instructions for Treatment  
  
   
                                                    How to access health informa  
tion   
online  
Indication:Smoker  
                          Start:8-Dec-2017          Instruction Type:Patient   
Education  
  
   
                                                    How to access health informa  
tion   
online - Detail  
Indication:Smoker  
                          Start:8-Dec-2017          Instruction Type:Patient   
Education  
  
   
                                                    Patient Instructions  
Indication:Smoker  
                          Start:8-Dec-2017          Instruction Type:Provider   
Instructions for Treatment  
  
   
                                                    How to access health informa  
tion   
online  
Indication:Smoker  
                          Start:4-Aug-2017          Instruction Type:Patient   
Education  
  
   
                                                    How to access health informa  
tion   
online - Detail  
Indication:Smoker  
                          Start:4-Aug-2017          Instruction Type:Patient   
Education  
  
   
                                                    Patient Instructions  
Indication:Smoker  
                          Start:4-Aug-2017          Instruction Type:Provider   
Instructions for Treatment  
  
   
                                                    How to access health informa  
tion   
online  
Indication:Smoker  
                          Start:3-Mar-2017          Instruction Type:Patient   
Education  
  
   
                                                    How to access health informa  
tion   
online - Detail  
Indication:Smoker  
                          Start:3-Mar-2017          Instruction Type:Patient   
Education  
  
   
                                                    Patient Instructions  
Indication:Smoker  
                          Start:3-Mar-2017          Instruction Type:Provider   
Instructions for Treatment  
  
   
                                                    How to access health informa  
tion   
online  
Indication:Hypertension,   
essential, benign  
                          Start:28-Oct-2016         Instruction Type:Patient   
Education  
  
   
                                                    How to access health informa  
tion   
online - Detail  
Indication:Hypertension,   
essential, benign  
                          Start:28-Oct-2016         Instruction Type:Patient   
Education  
  
   
                                                    Patient Instructions  
Indication:Hypertension,   
essential, benign  
                          Start:28-Oct-2016         Instruction Type:Provider   
Instructions for Treatment  
  
   
                                                    How to access health informa  
tion   
online  
Indication:Hypertension,   
essential, benign  
                          Start:2016         Instruction Type:Patient   
Education  
  
   
                                                    How to access health informa  
tion   
online - Detail  
Indication:Hypertension,   
essential, benign  
                          Start:2016         Instruction Type:Patient   
Education  
  
   
                                                    Patient Instructions  
Indication:Hypertension,   
essential, benign  
                          Start:2016         Instruction Type:Provider   
Instructions for Treatment  
  
   
                                                    How to access health informa  
tion   
online  
Indication:BMI 27.0-27.9,adult  
                          Start:2016         Instruction Type:Patient   
Education  
  
   
                                                    How to access health informa  
tion   
online - Detail  
Indication:BMI 27.0-27.9,adult  
                          Start:2016         Instruction Type:Patient   
Education  
  
   
                                                    Patient Instructions  
Indication:BMI 27.0-27.9,adult  
                          Start:2016         Instruction Type:Provider   
Instructions for Treatment  
  
   
                                                    How to access health informa  
tion   
online  
Indication:Abnormal glucose   
tolerance test  
                          Start:2016         Instruction Type:Patient   
Education  
  
   
                                                    How to access health informa  
tion   
online - Detail  
Indication:Abnormal glucose   
tolerance test  
                          Start:2016         Instruction Type:Patient   
Education  
  
   
                                                    Patient Instructions  
Indication:Abnormal glucose   
tolerance test  
                          Start:2016         Instruction Type:Provider   
Instructions for Treatment  
  
   
                                                    How to access health informa  
tion   
online  
Indication:Abnormal glucose   
tolerance test  
                          Start:16-Dec-2015         Instruction Type:Patient   
Education  
  
   
                                                    How to access health informa  
tion   
online - Detail  
Indication:Abnormal glucose   
tolerance test  
                          Start:16-Dec-2015         Instruction Type:Patient   
Education  
  
   
                                                    Patient Instructions  
Indication:Abnormal glucose   
tolerance test  
                          Start:16-Dec-2015         Instruction Type:Provider   
Instructions for Treatment  
  
   
                                                    How to access health informa  
tion   
online  
Indication:Hypercholesteremia  
                          Start:16-Sep-2015         Instruction Type:Patient   
Education  
  
   
                                                    How to access health informa  
tion   
online - Detail  
Indication:Hypercholesteremia  
                          Start:16-Sep-2015         Instruction Type:Patient   
Education  
  
   
                                                    Patient Instructions  
Indication:Hypercholesteremia  
                          Start:16-Sep-2015         Instruction Type:Provider   
Instructions for Treatment  
  
   
                                                    Patient Instructions  
Indication:Abnormal glucose   
tolerance test  
                          Start:9-May-2014          Instruction Type:Provider   
Instructions for Treatment  
  
   
                                                    Patient Instructions  
Indication:Abnormal glucose   
tolerance test  
                          Start:2014          Instruction Type:Provider   
Instructions for Treatment  
  
   
                                                    Patient Instructions  
Indication:Abnormal glucose   
tolerance test  
                          Start:12-Sep-2013         Instruction Type:Provider   
Instructions for Treatment  
  
   
                                                    Patient Instructions  
Indication:Hypercholesteremia  
                          Start:15-Mar-2013         Instruction Type:Provider   
Instructions for Treatment  
  
   
                                                    Patient Instructions  
Indication:Hypertension,   
essential, benign  
                          Start:14-Sep-2012         Instruction Type:Provider   
Instructions for Treatment  
  
  
  
[facility]  
Work Phone: [phone]*   
  
                                Name            Dates           Details  
   
                                                    Patient Instructions  
Indication:BMI 27.0-27.9,adult  
                          Start:2023         Instruction Type:Provider   
Instructions for Treatment  
  
   
                                                    How to Access Health Informa  
tion   
Online using Patient Portal and   
3rd Party Apps  
Indication:BMI 27.0-27.9,adult  
                          Start:2023         Instruction Type:Patient   
Education  
  
   
                                                    Patient Instructions  
Indication:BMI 27.0-27.9,adult  
                          Start:28-Sep-2022         Instruction Type:Provider   
Instructions for Treatment  
  
   
                                                    How to Access Health Informa  
tion   
Online using Patient Portal and   
3rd Party Apps  
Indication:BMI 27.0-27.9,adult  
                          Start:28-Sep-2022         Instruction Type:Patient   
Education  
  
   
                                                    Patient Instructions  
Indication:Smoker  
                          Start:16-Mar-2022         Instruction Type:Provider   
Instructions for Treatment  
  
   
                                                    How to Access Health Informa  
tion   
Online using Patient Portal and   
3rd Party Apps  
Indication:Smoker  
                          Start:16-Mar-2022         Instruction Type:Patient   
Education  
  
   
                                                    Patient Instructions  
Indication:Smoker  
                          Start:2-Sep-2021          Instruction Type:Provider   
Instructions for Treatment  
  
   
                                                    How to Access Health Informa  
tion   
Online using Patient Portal and   
3rd Party Apps  
Indication:Smoker  
                          Start:2-Sep-2021          Instruction Type:Patient   
Education  
  
   
                                                    Patient Instructions  
Indication:BMI 26.0-26.9,adult  
                          Start:4-Aug-2021          Instruction Type:Provider   
Instructions for Treatment  
  
   
                                                    How to Access Health Informa  
tion   
Online using Patient Portal and   
3rd Party Apps  
Indication:BMI 26.0-26.9,adult  
                          Start:4-Aug-2021          Instruction Type:Patient   
Education  
  
   
                                                    How to Access Health Informa  
tion   
Online using Patient Portal and   
3rd Party Apps  
Indication:Smoker  
                          Start:1-Mar-2021          Instruction Type:Patient   
Education  
  
   
                                                    Patient Instructions  
Indication:Smoker  
                          Start:1-Mar-2021          Instruction Type:Provider   
Instructions for Treatment  
  
   
                                                    How to Access Health Informa  
tion   
Online using Patient Portal and   
3rd Party Apps  
Indication:Smoker  
                          Start:2021         Instruction Type:Patient   
Education  
  
   
                                                    Patient Instructions  
Indication:Smoker  
                          Start:2021         Instruction Type:Provider   
Instructions for Treatment  
  
   
                                                    How to Access Health Informa  
tion   
Online using Patient Portal and   
3rd Party Apps  
Indication:Smoker  
                          Start:2021          Instruction Type:Patient   
Education  
  
   
                                                    Patient Instructions  
Indication:Smoker  
                          Start:2021          Instruction Type:Provider   
Instructions for Treatment  
  
   
                                                    How to Access Health Informa  
tion   
Online using Patient Portal and   
3rd Party Apps  
Indication:Smoker  
                          Start:16-Dec-2020         Instruction Type:Patient   
Education  
  
   
                                                    Patient Instructions  
Indication:Smoker  
                          Start:16-Dec-2020         Instruction Type:Provider   
Instructions for Treatment  
  
   
                                                    How to access health informa  
tion   
online  
Indication:Smoker  
                          Start:2020         Instruction Type:Patient   
Education  
  
   
                                                    How to access health informa  
tion   
online - Detail  
Indication:Smoker  
                          Start:2020         Instruction Type:Patient   
Education  
  
   
                                                    Patient Instructions  
Indication:Smoker  
                          Start:2020         Instruction Type:Provider   
Instructions for Treatment  
  
   
                                                    How to access health informa  
tion   
online  
Indication:Smoker  
                          Start:24-Aug-2020         Instruction Type:Patient   
Education  
  
   
                                                    How to access health informa  
tion   
online - Detail  
Indication:Smoker  
                          Start:24-Aug-2020         Instruction Type:Patient   
Education  
  
   
                                                    Patient Instructions  
Indication:Smoker  
                          Start:24-Aug-2020         Instruction Type:Provider   
Instructions for Treatment  
  
   
                                                    How to access health informa  
tion   
online - Detail  
Indication:BMI 25.0-25.9,adult  
                          Start:2020         Instruction Type:Patient   
Education  
  
   
                                                    How to access health informa  
tion   
online  
Indication:BMI 25.0-25.9,adult  
                          Start:2020         Instruction Type:Patient   
Education  
  
   
                                                    Patient Instructions  
Indication:BMI 25.0-25.9,adult  
                          Start:2020         Instruction Type:Provider   
Instructions for Treatment  
  
   
                                                    How to access health informa  
tion   
online  
Indication:Smoker  
                          Start:2020          Instruction Type:Patient   
Education  
  
   
                                                    How to access health informa  
tion   
online - Detail  
Indication:Smoker  
                          Start:2020          Instruction Type:Patient   
Education  
  
   
                                                    Patient Instructions  
Indication:Smoker  
                          Start:2020          Instruction Type:Provider   
Instructions for Treatment  
  
   
                                                    How to access health informa  
tion   
online  
Indication:Smoker  
                          Start:2020         Instruction Type:Patient   
Education  
  
   
                                                    How to access health informa  
tion   
online - Detail  
Indication:Smoker  
                          Start:2020         Instruction Type:Patient   
Education  
  
   
                                                    Patient Instructions  
Indication:Smoker  
                          Start:2020         Instruction Type:Provider   
Instructions for Treatment  
  
   
                                                    How to access health informa  
tion   
online  
Indication:Smoker  
                          Start:13-Sep-2019         Instruction Type:Patient   
Education  
  
   
                                                    How to access health informa  
tion   
online - Detail  
Indication:Smoker  
                          Start:13-Sep-2019         Instruction Type:Patient   
Education  
  
   
                                                    Patient Instructions  
Indication:Smoker  
                          Start:13-Sep-2019         Instruction Type:Provider   
Instructions for Treatment  
  
   
                                                    How to access health informa  
tion   
online  
Indication:BMI 26.0-26.9,adult  
                          Start:10-May-2019         Instruction Type:Patient   
Education  
  
   
                                                    How to access health informa  
tion   
online - Detail  
Indication:BMI 26.0-26.9,adult  
                          Start:10-May-2019         Instruction Type:Patient   
Education  
  
   
                                                    Patient Instructions  
Indication:BMI 26.0-26.9,adult  
                          Start:10-May-2019         Instruction Type:Provider   
Instructions for Treatment  
  
   
                                                    How to access health informa  
tion   
online  
Indication:Abnormal glucose   
tolerance test  
                          Start:28-Dec-2018         Instruction Type:Patient   
Education  
  
   
                                                    How to access health informa  
tion   
online - Detail  
Indication:Abnormal glucose   
tolerance test  
                          Start:28-Dec-2018         Instruction Type:Patient   
Education  
  
   
                                                    Patient Instructions  
Indication:Abnormal glucose   
tolerance test  
                          Start:28-Dec-2018         Instruction Type:Provider   
Instructions for Treatment  
  
   
                                                    How to access health informa  
tion   
online  
Indication:Smoker  
                          Start:29-Aug-2018         Instruction Type:Patient   
Education  
  
   
                                                    How to access health informa  
tion   
online - Detail  
Indication:Smoker  
                          Start:29-Aug-2018         Instruction Type:Patient   
Education  
  
   
                                                    Patient Instructions  
Indication:Smoker  
                          Start:29-Aug-2018         Instruction Type:Provider   
Instructions for Treatment  
  
   
                                                    How to access health informa  
tion   
online  
Indication:Abnormal glucose   
tolerance test  
                          Start:2018         Instruction Type:Patient   
Education  
  
   
                                                    How to access health informa  
tion   
online - Detail  
Indication:Abnormal glucose   
tolerance test  
                          Start:2018         Instruction Type:Patient   
Education  
  
   
                                                    Patient Instructions  
Indication:Abnormal glucose   
tolerance test  
                          Start:2018         Instruction Type:Provider   
Instructions for Treatment  
  
   
                                                    How to access health informa  
tion   
online  
Indication:Smoker  
                          Start:8-Dec-2017          Instruction Type:Patient   
Education  
  
   
                                                    How to access health informa  
tion   
online - Detail  
Indication:Smoker  
                          Start:8-Dec-2017          Instruction Type:Patient   
Education  
  
   
                                                    Patient Instructions  
Indication:Smoker  
                          Start:8-Dec-2017          Instruction Type:Provider   
Instructions for Treatment  
  
   
                                                    How to access health informa  
tion   
online  
Indication:Smoker  
                          Start:4-Aug-2017          Instruction Type:Patient   
Education  
  
   
                                                    How to access health informa  
tion   
online - Detail  
Indication:Smoker  
                          Start:4-Aug-2017          Instruction Type:Patient   
Education  
  
   
                                                    Patient Instructions  
Indication:Smoker  
                          Start:4-Aug-2017          Instruction Type:Provider   
Instructions for Treatment  
  
   
                                                    How to access health informa  
tion   
online  
Indication:Smoker  
                          Start:3-Mar-2017          Instruction Type:Patient   
Education  
  
   
                                                    How to access health informa  
tion   
online - Detail  
Indication:Smoker  
                          Start:3-Mar-2017          Instruction Type:Patient   
Education  
  
   
                                                    Patient Instructions  
Indication:Smoker  
                          Start:3-Mar-2017          Instruction Type:Provider   
Instructions for Treatment  
  
   
                                                    How to access health informa  
tion   
online  
Indication:Hypertension,   
essential, benign  
                          Start:28-Oct-2016         Instruction Type:Patient   
Education  
  
   
                                                    How to access health informa  
tion   
online - Detail  
Indication:Hypertension,   
essential, benign  
                          Start:28-Oct-2016         Instruction Type:Patient   
Education  
  
   
                                                    Patient Instructions  
Indication:Hypertension,   
essential, benign  
                          Start:28-Oct-2016         Instruction Type:Provider   
Instructions for Treatment  
  
   
                                                    How to access health informa  
tion   
online  
Indication:Hypertension,   
essential, benign  
                          Start:2016         Instruction Type:Patient   
Education  
  
   
                                                    How to access health informa  
tion   
online - Detail  
Indication:Hypertension,   
essential, benign  
                          Start:2016         Instruction Type:Patient   
Education  
  
   
                                                    Patient Instructions  
Indication:Hypertension,   
essential, benign  
                          Start:2016         Instruction Type:Provider   
Instructions for Treatment  
  
   
                                                    How to access health informa  
tion   
online  
Indication:BMI 27.0-27.9,adult  
                          Start:2016         Instruction Type:Patient   
Education  
  
   
                                                    How to access health informa  
tion   
online - Detail  
Indication:BMI 27.0-27.9,adult  
                          Start:2016         Instruction Type:Patient   
Education  
  
   
                                                    Patient Instructions  
Indication:BMI 27.0-27.9,adult  
                          Start:2016         Instruction Type:Provider   
Instructions for Treatment  
  
   
                                                    How to access health informa  
tion   
online  
Indication:Abnormal glucose   
tolerance test  
                          Start:2016         Instruction Type:Patient   
Education  
  
   
                                                    How to access health informa  
tion   
online - Detail  
Indication:Abnormal glucose   
tolerance test  
                          Start:2016         Instruction Type:Patient   
Education  
  
   
                                                    Patient Instructions  
Indication:Abnormal glucose   
tolerance test  
                          Start:2016         Instruction Type:Provider   
Instructions for Treatment  
  
   
                                                    How to access health informa  
tion   
online  
Indication:Abnormal glucose   
tolerance test  
                          Start:16-Dec-2015         Instruction Type:Patient   
Education  
  
   
                                                    How to access health informa  
tion   
online - Detail  
Indication:Abnormal glucose   
tolerance test  
                          Start:16-Dec-2015         Instruction Type:Patient   
Education  
  
   
                                                    Patient Instructions  
Indication:Abnormal glucose   
tolerance test  
                          Start:16-Dec-2015         Instruction Type:Provider   
Instructions for Treatment  
  
   
                                                    How to access health informa  
tion   
online  
Indication:Hypercholesteremia  
                          Start:16-Sep-2015         Instruction Type:Patient   
Education  
  
   
                                                    How to access health informa  
tion   
online - Detail  
Indication:Hypercholesteremia  
                          Start:16-Sep-2015         Instruction Type:Patient   
Education  
  
   
                                                    Patient Instructions  
Indication:Hypercholesteremia  
                          Start:16-Sep-2015         Instruction Type:Provider   
Instructions for Treatment  
  
   
                                                    Patient Instructions  
Indication:Abnormal glucose   
tolerance test  
                          Start:9-May-2014          Instruction Type:Provider   
Instructions for Treatment  
  
   
                                                    Patient Instructions  
Indication:Abnormal glucose   
tolerance test  
                          Start:2014          Instruction Type:Provider   
Instructions for Treatment  
  
   
                                                    Patient Instructions  
Indication:Abnormal glucose   
tolerance test  
                          Start:12-Sep-2013         Instruction Type:Provider   
Instructions for Treatment  
  
   
                                                    Patient Instructions  
Indication:Hypercholesteremia  
                          Start:15-Mar-2013         Instruction Type:Provider   
Instructions for Treatment  
  
   
                                                    Patient Instructions  
Indication:Hypertension,   
essential, benign  
                          Start:14-Sep-2012         Instruction Type:Provider   
Instructions for Treatment  
  
  
  
[facility]  
Work Phone: [phone]*   
  
                                Name            Dates           Details  
   
                                                    Patient Instructions  
Indication:Smoker  
                          Start:2023          Instruction Type:Provider   
Instructions for Treatment  
  
   
                                                    How to Access Health Informa  
tion   
Online using Patient Portal and   
3rd Party Apps  
Indication:Smoker  
                          Start:2023          Instruction Type:Patient   
Education  
  
   
                                                    Patient Instructions  
Indication:BMI 27.0-27.9,adult  
                          Start:2023         Instruction Type:Provider   
Instructions for Treatment  
  
   
                                                    How to Access Health Informa  
tion   
Online using Patient Portal and   
3rd Party Apps  
Indication:BMI 27.0-27.9,adult  
                          Start:2023         Instruction Type:Patient   
Education  
  
   
                                                    Patient Instructions  
Indication:BMI 27.0-27.9,adult  
                          Start:28-Sep-2022         Instruction Type:Provider   
Instructions for Treatment  
  
   
                                                    How to Access Health Informa  
tion   
Online using Patient Portal and   
3rd Party Apps  
Indication:BMI 27.0-27.9,adult  
                          Start:28-Sep-2022         Instruction Type:Patient   
Education  
  
   
                                                    Patient Instructions  
Indication:Smoker  
                          Start:16-Mar-2022         Instruction Type:Provider   
Instructions for Treatment  
  
   
                                                    How to Access Health Informa  
tion   
Online using Patient Portal and   
3rd Party Apps  
Indication:Smoker  
                          Start:16-Mar-2022         Instruction Type:Patient   
Education  
  
   
                                                    Patient Instructions  
Indication:Smoker  
                          Start:2-Sep-2021          Instruction Type:Provider   
Instructions for Treatment  
  
   
                                                    How to Access Health Informa  
tion   
Online using Patient Portal and   
3rd Party Apps  
Indication:Smoker  
                          Start:2-Sep-2021          Instruction Type:Patient   
Education  
  
   
                                                    Patient Instructions  
Indication:BMI 26.0-26.9,adult  
                          Start:4-Aug-2021          Instruction Type:Provider   
Instructions for Treatment  
  
   
                                                    How to Access Health Informa  
tion   
Online using Patient Portal and   
3rd Party Apps  
Indication:BMI 26.0-26.9,adult  
                          Start:4-Aug-2021          Instruction Type:Patient   
Education  
  
   
                                                    How to Access Health Informa  
tion   
Online using Patient Portal and   
3rd Party Apps  
Indication:Smoker  
                          Start:1-Mar-2021          Instruction Type:Patient   
Education  
  
   
                                                    Patient Instructions  
Indication:Smoker  
                          Start:1-Mar-2021          Instruction Type:Provider   
Instructions for Treatment  
  
   
                                                    How to Access Health Informa  
tion   
Online using Patient Portal and   
3rd Party Apps  
Indication:Smoker  
                          Start:2021         Instruction Type:Patient   
Education  
  
   
                                                    Patient Instructions  
Indication:Smoker  
                          Start:2021         Instruction Type:Provider   
Instructions for Treatment  
  
   
                                                    How to Access Health Informa  
tion   
Online using Patient Portal and   
3rd Party Apps  
Indication:Smoker  
                          Start:2021          Instruction Type:Patient   
Education  
  
   
                                                    Patient Instructions  
Indication:Smoker  
                          Start:2021          Instruction Type:Provider   
Instructions for Treatment  
  
   
                                                    How to Access Health Informa  
tion   
Online using Patient Portal and   
3rd Party Apps  
Indication:Smoker  
                          Start:16-Dec-2020         Instruction Type:Patient   
Education  
  
   
                                                    Patient Instructions  
Indication:Smoker  
                          Start:16-Dec-2020         Instruction Type:Provider   
Instructions for Treatment  
  
   
                                                    How to access health informa  
tion   
online  
Indication:Smoker  
                          Start:2020         Instruction Type:Patient   
Education  
  
   
                                                    How to access health informa  
tion   
online - Detail  
Indication:Smoker  
                          Start:2020         Instruction Type:Patient   
Education  
  
   
                                                    Patient Instructions  
Indication:Smoker  
                          Start:2020         Instruction Type:Provider   
Instructions for Treatment  
  
   
                                                    How to access health informa  
tion   
online  
Indication:Smoker  
                          Start:24-Aug-2020         Instruction Type:Patient   
Education  
  
   
                                                    How to access health informa  
tion   
online - Detail  
Indication:Smoker  
                          Start:24-Aug-2020         Instruction Type:Patient   
Education  
  
   
                                                    Patient Instructions  
Indication:Smoker  
                          Start:24-Aug-2020         Instruction Type:Provider   
Instructions for Treatment  
  
   
                                                    How to access health informa  
tion   
online - Detail  
Indication:BMI 25.0-25.9,adult  
                          Start:2020         Instruction Type:Patient   
Education  
  
   
                                                    How to access health informa  
tion   
online  
Indication:BMI 25.0-25.9,adult  
                          Start:2020         Instruction Type:Patient   
Education  
  
   
                                                    Patient Instructions  
Indication:BMI 25.0-25.9,adult  
                          Start:2020         Instruction Type:Provider   
Instructions for Treatment  
  
   
                                                    How to access health informa  
tion   
online  
Indication:Smoker  
                          Start:2020          Instruction Type:Patient   
Education  
  
   
                                                    How to access health informa  
tion   
online - Detail  
Indication:Smoker  
                          Start:2020          Instruction Type:Patient   
Education  
  
   
                                                    Patient Instructions  
Indication:Smoker  
                          Start:2020          Instruction Type:Provider   
Instructions for Treatment  
  
   
                                                    How to access health informa  
tion   
online  
Indication:Smoker  
                          Start:2020         Instruction Type:Patient   
Education  
  
   
                                                    How to access health informa  
tion   
online - Detail  
Indication:Smoker  
                          Start:2020         Instruction Type:Patient   
Education  
  
   
                                                    Patient Instructions  
Indication:Smoker  
                          Start:2020         Instruction Type:Provider   
Instructions for Treatment  
  
   
                                                    How to access health informa  
tion   
online  
Indication:Smoker  
                          Start:13-Sep-2019         Instruction Type:Patient   
Education  
  
   
                                                    How to access health informa  
tion   
online - Detail  
Indication:Smoker  
                          Start:13-Sep-2019         Instruction Type:Patient   
Education  
  
   
                                                    Patient Instructions  
Indication:Smoker  
                          Start:13-Sep-2019         Instruction Type:Provider   
Instructions for Treatment  
  
   
                                                    How to access health informa  
tion   
online  
Indication:BMI 26.0-26.9,adult  
                          Start:10-May-2019         Instruction Type:Patient   
Education  
  
   
                                                    How to access health informa  
tion   
online - Detail  
Indication:BMI 26.0-26.9,adult  
                          Start:10-May-2019         Instruction Type:Patient   
Education  
  
   
                                                    Patient Instructions  
Indication:BMI 26.0-26.9,adult  
                          Start:10-May-2019         Instruction Type:Provider   
Instructions for Treatment  
  
   
                                                    How to access health informa  
tion   
online  
Indication:Abnormal glucose   
tolerance test  
                          Start:28-Dec-2018         Instruction Type:Patient   
Education  
  
   
                                                    How to access health informa  
tion   
online - Detail  
Indication:Abnormal glucose   
tolerance test  
                          Start:28-Dec-2018         Instruction Type:Patient   
Education  
  
   
                                                    Patient Instructions  
Indication:Abnormal glucose   
tolerance test  
                          Start:28-Dec-2018         Instruction Type:Provider   
Instructions for Treatment  
  
   
                                                    How to access health informa  
tion   
online  
Indication:Smoker  
                          Start:29-Aug-2018         Instruction Type:Patient   
Education  
  
   
                                                    How to access health informa  
tion   
online - Detail  
Indication:Smoker  
                          Start:29-Aug-2018         Instruction Type:Patient   
Education  
  
   
                                                    Patient Instructions  
Indication:Smoker  
                          Start:29-Aug-2018         Instruction Type:Provider   
Instructions for Treatment  
  
   
                                                    How to access health informa  
tion   
online  
Indication:Abnormal glucose   
tolerance test  
                          Start:2018         Instruction Type:Patient   
Education  
  
   
                                                    How to access health informa  
tion   
online - Detail  
Indication:Abnormal glucose   
tolerance test  
                          Start:2018         Instruction Type:Patient   
Education  
  
   
                                                    Patient Instructions  
Indication:Abnormal glucose   
tolerance test  
                          Start:2018         Instruction Type:Provider   
Instructions for Treatment  
  
   
                                                    How to access health informa  
tion   
online  
Indication:Smoker  
                          Start:8-Dec-2017          Instruction Type:Patient   
Education  
  
   
                                                    How to access health informa  
tion   
online - Detail  
Indication:Smoker  
                          Start:8-Dec-2017          Instruction Type:Patient   
Education  
  
   
                                                    Patient Instructions  
Indication:Smoker  
                          Start:8-Dec-2017          Instruction Type:Provider   
Instructions for Treatment  
  
   
                                                    How to access health informa  
tion   
online  
Indication:Smoker  
                          Start:4-Aug-2017          Instruction Type:Patient   
Education  
  
   
                                                    How to access health informa  
tion   
online - Detail  
Indication:Smoker  
                          Start:4-Aug-2017          Instruction Type:Patient   
Education  
  
   
                                                    Patient Instructions  
Indication:Smoker  
                          Start:4-Aug-2017          Instruction Type:Provider   
Instructions for Treatment  
  
   
                                                    How to access health informa  
tion   
online  
Indication:Smoker  
                          Start:3-Mar-2017          Instruction Type:Patient   
Education  
  
   
                                                    How to access health informa  
tion   
online - Detail  
Indication:Smoker  
                          Start:3-Mar-2017          Instruction Type:Patient   
Education  
  
   
                                                    Patient Instructions  
Indication:Smoker  
                          Start:3-Mar-2017          Instruction Type:Provider   
Instructions for Treatment  
  
   
                                                    How to access health informa  
tion   
online  
Indication:Hypertension,   
essential, benign  
                          Start:28-Oct-2016         Instruction Type:Patient   
Education  
  
   
                                                    How to access health informa  
tion   
online - Detail  
Indication:Hypertension,   
essential, benign  
                          Start:28-Oct-2016         Instruction Type:Patient   
Education  
  
   
                                                    Patient Instructions  
Indication:Hypertension,   
essential, benign  
                          Start:28-Oct-2016         Instruction Type:Provider   
Instructions for Treatment  
  
   
                                                    How to access health informa  
tion   
online  
Indication:Hypertension,   
essential, benign  
                          Start:2016         Instruction Type:Patient   
Education  
  
   
                                                    How to access health informa  
tion   
online - Detail  
Indication:Hypertension,   
essential, benign  
                          Start:2016         Instruction Type:Patient   
Education  
  
   
                                                    Patient Instructions  
Indication:Hypertension,   
essential, benign  
                          Start:2016         Instruction Type:Provider   
Instructions for Treatment  
  
   
                                                    How to access health informa  
tion   
online  
Indication:BMI 27.0-27.9,adult  
                          Start:2016         Instruction Type:Patient   
Education  
  
   
                                                    How to access health informa  
tion   
online - Detail  
Indication:BMI 27.0-27.9,adult  
                          Start:2016         Instruction Type:Patient   
Education  
  
   
                                                    Patient Instructions  
Indication:BMI 27.0-27.9,adult  
                          Start:2016         Instruction Type:Provider   
Instructions for Treatment  
  
   
                                                    How to access health informa  
tion   
online  
Indication:Abnormal glucose   
tolerance test  
                          Start:2016         Instruction Type:Patient   
Education  
  
   
                                                    How to access health informa  
tion   
online - Detail  
Indication:Abnormal glucose   
tolerance test  
                          Start:2016         Instruction Type:Patient   
Education  
  
   
                                                    Patient Instructions  
Indication:Abnormal glucose   
tolerance test  
                          Start:2016         Instruction Type:Provider   
Instructions for Treatment  
  
   
                                                    How to access health informa  
tion   
online  
Indication:Abnormal glucose   
tolerance test  
                          Start:16-Dec-2015         Instruction Type:Patient   
Education  
  
   
                                                    How to access health informa  
tion   
online - Detail  
Indication:Abnormal glucose   
tolerance test  
                          Start:16-Dec-2015         Instruction Type:Patient   
Education  
  
   
                                                    Patient Instructions  
Indication:Abnormal glucose   
tolerance test  
                          Start:16-Dec-2015         Instruction Type:Provider   
Instructions for Treatment  
  
   
                                                    How to access health informa  
tion   
online  
Indication:Hypercholesteremia  
                          Start:16-Sep-2015         Instruction Type:Patient   
Education  
  
   
                                                    How to access health informa  
tion   
online - Detail  
Indication:Hypercholesteremia  
                          Start:16-Sep-2015         Instruction Type:Patient   
Education  
  
   
                                                    Patient Instructions  
Indication:Hypercholesteremia  
                          Start:16-Sep-2015         Instruction Type:Provider   
Instructions for Treatment  
  
   
                                                    Patient Instructions  
Indication:Abnormal glucose   
tolerance test  
                          Start:9-May-2014          Instruction Type:Provider   
Instructions for Treatment  
  
   
                                                    Patient Instructions  
Indication:Abnormal glucose   
tolerance test  
                          Start:2014          Instruction Type:Provider   
Instructions for Treatment  
  
   
                                                    Patient Instructions  
Indication:Abnormal glucose   
tolerance test  
                          Start:12-Sep-2013         Instruction Type:Provider   
Instructions for Treatment  
  
   
                                                    Patient Instructions  
Indication:Hypercholesteremia  
                          Start:15-Mar-2013         Instruction Type:Provider   
Instructions for Treatment  
  
   
                                                    Patient Instructions  
Indication:Hypertension,   
essential, benign  
                          Start:14-Sep-2012         Instruction Type:Provider   
Instructions for Treatment  
  
  
  
[facility]  
Work Phone: [phone]*   
  
                                Name            Dates           Details  
   
                                                    Patient Instructions  
Indication:Smoker  
                          Start:2023          Instruction Type:Provider   
Instructions for Treatment  
  
   
                                                    How to Access Health Informa  
tion   
Online using Patient Portal and   
3rd Party Apps  
Indication:Smoker  
                          Start:2023          Instruction Type:Patient   
Education  
  
   
                                                    Patient Instructions  
Indication:BMI 27.0-27.9,adult  
                          Start:2023         Instruction Type:Provider   
Instructions for Treatment  
  
   
                                                    How to Access Health Informa  
tion   
Online using Patient Portal and   
3rd Party Apps  
Indication:BMI 27.0-27.9,adult  
                          Start:2023         Instruction Type:Patient   
Education  
  
   
                                                    Patient Instructions  
Indication:BMI 27.0-27.9,adult  
                          Start:28-Sep-2022         Instruction Type:Provider   
Instructions for Treatment  
  
   
                                                    How to Access Health Informa  
tion   
Online using Patient Portal and   
3rd Party Apps  
Indication:BMI 27.0-27.9,adult  
                          Start:28-Sep-2022         Instruction Type:Patient   
Education  
  
   
                                                    Patient Instructions  
Indication:Smoker  
                          Start:16-Mar-2022         Instruction Type:Provider   
Instructions for Treatment  
  
   
                                                    How to Access Health Informa  
tion   
Online using Patient Portal and   
3rd Party Apps  
Indication:Smoker  
                          Start:16-Mar-2022         Instruction Type:Patient   
Education  
  
   
                                                    Patient Instructions  
Indication:Smoker  
                          Start:2-Sep-2021          Instruction Type:Provider   
Instructions for Treatment  
  
   
                                                    How to Access Health Informa  
tion   
Online using Patient Portal and   
3rd Party Apps  
Indication:Smoker  
                          Start:2-Sep-2021          Instruction Type:Patient   
Education  
  
   
                                                    Patient Instructions  
Indication:BMI 26.0-26.9,adult  
                          Start:4-Aug-2021          Instruction Type:Provider   
Instructions for Treatment  
  
   
                                                    How to Access Health Informa  
tion   
Online using Patient Portal and   
3rd Party Apps  
Indication:BMI 26.0-26.9,adult  
                          Start:4-Aug-2021          Instruction Type:Patient   
Education  
  
   
                                                    How to Access Health Informa  
tion   
Online using Patient Portal and   
3rd Party Apps  
Indication:Smoker  
                          Start:1-Mar-2021          Instruction Type:Patient   
Education  
  
   
                                                    Patient Instructions  
Indication:Smoker  
                          Start:1-Mar-2021          Instruction Type:Provider   
Instructions for Treatment  
  
   
                                                    How to Access Health Informa  
tion   
Online using Patient Portal and   
3rd Party Apps  
Indication:Smoker  
                          Start:2021         Instruction Type:Patient   
Education  
  
   
                                                    Patient Instructions  
Indication:Smoker  
                          Start:2021         Instruction Type:Provider   
Instructions for Treatment  
  
   
                                                    How to Access Health Informa  
tion   
Online using Patient Portal and   
3rd Party Apps  
Indication:Smoker  
                          Start:2021          Instruction Type:Patient   
Education  
  
   
                                                    Patient Instructions  
Indication:Smoker  
                          Start:2021          Instruction Type:Provider   
Instructions for Treatment  
  
   
                                                    How to Access Health Informa  
tion   
Online using Patient Portal and   
3rd Party Apps  
Indication:Smoker  
                          Start:16-Dec-2020         Instruction Type:Patient   
Education  
  
   
                                                    Patient Instructions  
Indication:Smoker  
                          Start:16-Dec-2020         Instruction Type:Provider   
Instructions for Treatment  
  
   
                                                    How to access health informa  
tion   
online  
Indication:Smoker  
                          Start:2020         Instruction Type:Patient   
Education  
  
   
                                                    How to access health informa  
tion   
online - Detail  
Indication:Smoker  
                          Start:2020         Instruction Type:Patient   
Education  
  
   
                                                    Patient Instructions  
Indication:Smoker  
                          Start:2020         Instruction Type:Provider   
Instructions for Treatment  
  
   
                                                    How to access health informa  
tion   
online  
Indication:Smoker  
                          Start:24-Aug-2020         Instruction Type:Patient   
Education  
  
   
                                                    How to access health informa  
tion   
online - Detail  
Indication:Smoker  
                          Start:24-Aug-2020         Instruction Type:Patient   
Education  
  
   
                                                    Patient Instructions  
Indication:Smoker  
                          Start:24-Aug-2020         Instruction Type:Provider   
Instructions for Treatment  
  
   
                                                    How to access health informa  
tion   
online - Detail  
Indication:BMI 25.0-25.9,adult  
                          Start:2020         Instruction Type:Patient   
Education  
  
   
                                                    How to access health informa  
tion   
online  
Indication:BMI 25.0-25.9,adult  
                          Start:2020         Instruction Type:Patient   
Education  
  
   
                                                    Patient Instructions  
Indication:BMI 25.0-25.9,adult  
                          Start:2020         Instruction Type:Provider   
Instructions for Treatment  
  
   
                                                    How to access health informa  
tion   
online  
Indication:Smoker  
                          Start:2020          Instruction Type:Patient   
Education  
  
   
                                                    How to access health informa  
tion   
online - Detail  
Indication:Smoker  
                          Start:2020          Instruction Type:Patient   
Education  
  
   
                                                    Patient Instructions  
Indication:Smoker  
                          Start:2020          Instruction Type:Provider   
Instructions for Treatment  
  
   
                                                    How to access health informa  
tion   
online  
Indication:Smoker  
                          Start:2020         Instruction Type:Patient   
Education  
  
   
                                                    How to access health informa  
tion   
online - Detail  
Indication:Smoker  
                          Start:2020         Instruction Type:Patient   
Education  
  
   
                                                    Patient Instructions  
Indication:Smoker  
                          Start:2020         Instruction Type:Provider   
Instructions for Treatment  
  
   
                                                    How to access health informa  
tion   
online  
Indication:Smoker  
                          Start:13-Sep-2019         Instruction Type:Patient   
Education  
  
   
                                                    How to access health informa  
tion   
online - Detail  
Indication:Smoker  
                          Start:13-Sep-2019         Instruction Type:Patient   
Education  
  
   
                                                    Patient Instructions  
Indication:Smoker  
                          Start:13-Sep-2019         Instruction Type:Provider   
Instructions for Treatment  
  
   
                                                    How to access health informa  
tion   
online  
Indication:BMI 26.0-26.9,adult  
                          Start:10-May-2019         Instruction Type:Patient   
Education  
  
   
                                                    How to access health informa  
tion   
online - Detail  
Indication:BMI 26.0-26.9,adult  
                          Start:10-May-2019         Instruction Type:Patient   
Education  
  
   
                                                    Patient Instructions  
Indication:BMI 26.0-26.9,adult  
                          Start:10-May-2019         Instruction Type:Provider   
Instructions for Treatment  
  
   
                                                    How to access health informa  
tion   
online  
Indication:Abnormal glucose   
tolerance test  
                          Start:28-Dec-2018         Instruction Type:Patient   
Education  
  
   
                                                    How to access health informa  
tion   
online - Detail  
Indication:Abnormal glucose   
tolerance test  
                          Start:28-Dec-2018         Instruction Type:Patient   
Education  
  
   
                                                    Patient Instructions  
Indication:Abnormal glucose   
tolerance test  
                          Start:28-Dec-2018         Instruction Type:Provider   
Instructions for Treatment  
  
   
                                                    How to access health informa  
tion   
online  
Indication:Smoker  
                          Start:29-Aug-2018         Instruction Type:Patient   
Education  
  
   
                                                    How to access health informa  
tion   
online - Detail  
Indication:Smoker  
                          Start:29-Aug-2018         Instruction Type:Patient   
Education  
  
   
                                                    Patient Instructions  
Indication:Smoker  
                          Start:29-Aug-2018         Instruction Type:Provider   
Instructions for Treatment  
  
   
                                                    How to access health informa  
tion   
online  
Indication:Abnormal glucose   
tolerance test  
                          Start:2018         Instruction Type:Patient   
Education  
  
   
                                                    How to access health informa  
tion   
online - Detail  
Indication:Abnormal glucose   
tolerance test  
                          Start:2018         Instruction Type:Patient   
Education  
  
   
                                                    Patient Instructions  
Indication:Abnormal glucose   
tolerance test  
                          Start:2018         Instruction Type:Provider   
Instructions for Treatment  
  
   
                                                    How to access health informa  
tion   
online  
Indication:Smoker  
                          Start:8-Dec-2017          Instruction Type:Patient   
Education  
  
   
                                                    How to access health informa  
tion   
online - Detail  
Indication:Smoker  
                          Start:8-Dec-2017          Instruction Type:Patient   
Education  
  
   
                                                    Patient Instructions  
Indication:Smoker  
                          Start:8-Dec-2017          Instruction Type:Provider   
Instructions for Treatment  
  
   
                                                    How to access health informa  
tion   
online  
Indication:Smoker  
                          Start:4-Aug-2017          Instruction Type:Patient   
Education  
  
   
                                                    How to access health informa  
tion   
online - Detail  
Indication:Smoker  
                          Start:4-Aug-2017          Instruction Type:Patient   
Education  
  
   
                                                    Patient Instructions  
Indication:Smoker  
                          Start:4-Aug-2017          Instruction Type:Provider   
Instructions for Treatment  
  
   
                                                    How to access health informa  
tion   
online  
Indication:Smoker  
                          Start:3-Mar-2017          Instruction Type:Patient   
Education  
  
   
                                                    How to access health informa  
tion   
online - Detail  
Indication:Smoker  
                          Start:3-Mar-2017          Instruction Type:Patient   
Education  
  
   
                                                    Patient Instructions  
Indication:Smoker  
                          Start:3-Mar-2017          Instruction Type:Provider   
Instructions for Treatment  
  
   
                                                    How to access health informa  
tion   
online  
Indication:Hypertension,   
essential, benign  
                          Start:28-Oct-2016         Instruction Type:Patient   
Education  
  
   
                                                    How to access health informa  
tion   
online - Detail  
Indication:Hypertension,   
essential, benign  
                          Start:28-Oct-2016         Instruction Type:Patient   
Education  
  
   
                                                    Patient Instructions  
Indication:Hypertension,   
essential, benign  
                          Start:28-Oct-2016         Instruction Type:Provider   
Instructions for Treatment  
  
   
                                                    How to access health informa  
tion   
online  
Indication:Hypertension,   
essential, benign  
                          Start:2016         Instruction Type:Patient   
Education  
  
   
                                                    How to access health informa  
tion   
online - Detail  
Indication:Hypertension,   
essential, benign  
                          Start:2016         Instruction Type:Patient   
Education  
  
   
                                                    Patient Instructions  
Indication:Hypertension,   
essential, benign  
                          Start:2016         Instruction Type:Provider   
Instructions for Treatment  
  
   
                                                    How to access health informa  
tion   
online  
Indication:BMI 27.0-27.9,adult  
                          Start:2016         Instruction Type:Patient   
Education  
  
   
                                                    How to access health informa  
tion   
online - Detail  
Indication:BMI 27.0-27.9,adult  
                          Start:2016         Instruction Type:Patient   
Education  
  
   
                                                    Patient Instructions  
Indication:BMI 27.0-27.9,adult  
                          Start:2016         Instruction Type:Provider   
Instructions for Treatment  
  
   
                                                    How to access health informa  
tion   
online  
Indication:Abnormal glucose   
tolerance test  
                          Start:2016         Instruction Type:Patient   
Education  
  
   
                                                    How to access health informa  
tion   
online - Detail  
Indication:Abnormal glucose   
tolerance test  
                          Start:2016         Instruction Type:Patient   
Education  
  
   
                                                    Patient Instructions  
Indication:Abnormal glucose   
tolerance test  
                          Start:2016         Instruction Type:Provider   
Instructions for Treatment  
  
   
                                                    How to access health informa  
tion   
online  
Indication:Abnormal glucose   
tolerance test  
                          Start:16-Dec-2015         Instruction Type:Patient   
Education  
  
   
                                                    How to access health informa  
tion   
online - Detail  
Indication:Abnormal glucose   
tolerance test  
                          Start:16-Dec-2015         Instruction Type:Patient   
Education  
  
   
                                                    Patient Instructions  
Indication:Abnormal glucose   
tolerance test  
                          Start:16-Dec-2015         Instruction Type:Provider   
Instructions for Treatment  
  
   
                                                    How to access health informa  
tion   
online  
Indication:Hypercholesteremia  
                          Start:16-Sep-2015         Instruction Type:Patient   
Education  
  
   
                                                    How to access health informa  
tion   
online - Detail  
Indication:Hypercholesteremia  
                          Start:16-Sep-2015         Instruction Type:Patient   
Education  
  
   
                                                    Patient Instructions  
Indication:Hypercholesteremia  
                          Start:16-Sep-2015         Instruction Type:Provider   
Instructions for Treatment  
  
   
                                                    Patient Instructions  
Indication:Abnormal glucose   
tolerance test  
                          Start:9-May-2014          Instruction Type:Provider   
Instructions for Treatment  
  
   
                                                    Patient Instructions  
Indication:Abnormal glucose   
tolerance test  
                          Start:2014          Instruction Type:Provider   
Instructions for Treatment  
  
   
                                                    Patient Instructions  
Indication:Abnormal glucose   
tolerance test  
                          Start:12-Sep-2013         Instruction Type:Provider   
Instructions for Treatment  
  
   
                                                    Patient Instructions  
Indication:Hypercholesteremia  
                          Start:15-Mar-2013         Instruction Type:Provider   
Instructions for Treatment  
  
   
                                                    Patient Instructions  
Indication:Hypertension,   
essential, benign  
                          Start:14-Sep-2012         Instruction Type:Provider   
Instructions for Treatment  
  
  
  
[facility]  
Work Phone: [phone]*   
  
                                Name            Dates           Details  
   
                                                    How to Access Health Informa  
tion   
Online using Patient Portal and   
3rd Party Apps  
Indication:Smoker  
                          Start:20-Sep-2023         Instruction Type:Patient   
Education  
  
   
                                                    Patient Instructions  
Indication:Smoker  
                          Start:20-Sep-2023         Instruction Type:Provider   
Instructions for Treatment  
  
   
                                                    Patient Instructions  
Indication:Smoker  
                          Start:2023          Instruction Type:Provider   
Instructions for Treatment  
  
   
                                                    How to Access Health Informa  
tion   
Online using Patient Portal and   
3rd Party Apps  
Indication:Smoker  
                          Start:2023          Instruction Type:Patient   
Education  
  
   
                                                    Patient Instructions  
Indication:BMI 27.0-27.9,adult  
                          Start:2023         Instruction Type:Provider   
Instructions for Treatment  
  
   
                                                    How to Access Health Informa  
tion   
Online using Patient Portal and   
3rd Party Apps  
Indication:BMI 27.0-27.9,adult  
                          Start:2023         Instruction Type:Patient   
Education  
  
   
                                                    Patient Instructions  
Indication:BMI 27.0-27.9,adult  
                          Start:28-Sep-2022         Instruction Type:Provider   
Instructions for Treatment  
  
   
                                                    How to Access Health Informa  
tion   
Online using Patient Portal and   
3rd Party Apps  
Indication:BMI 27.0-27.9,adult  
                          Start:28-Sep-2022         Instruction Type:Patient   
Education  
  
   
                                                    Patient Instructions  
Indication:Smoker  
                          Start:16-Mar-2022         Instruction Type:Provider   
Instructions for Treatment  
  
   
                                                    How to Access Health Informa  
tion   
Online using Patient Portal and   
3rd Party Apps  
Indication:Smoker  
                          Start:16-Mar-2022         Instruction Type:Patient   
Education  
  
   
                                                    Patient Instructions  
Indication:Smoker  
                          Start:2-Sep-2021          Instruction Type:Provider   
Instructions for Treatment  
  
   
                                                    How to Access Health Informa  
tion   
Online using Patient Portal and   
3rd Party Apps  
Indication:Smoker  
                          Start:2-Sep-2021          Instruction Type:Patient   
Education  
  
   
                                                    Patient Instructions  
Indication:BMI 26.0-26.9,adult  
                          Start:4-Aug-2021          Instruction Type:Provider   
Instructions for Treatment  
  
   
                                                    How to Access Health Informa  
tion   
Online using Patient Portal and   
3rd Party Apps  
Indication:BMI 26.0-26.9,adult  
                          Start:4-Aug-2021          Instruction Type:Patient   
Education  
  
   
                                                    How to Access Health Informa  
tion   
Online using Patient Portal and   
3rd Party Apps  
Indication:Smoker  
                          Start:1-Mar-2021          Instruction Type:Patient   
Education  
  
   
                                                    Patient Instructions  
Indication:Smoker  
                          Start:1-Mar-2021          Instruction Type:Provider   
Instructions for Treatment  
  
   
                                                    How to Access Health Informa  
tion   
Online using Patient Portal and   
3rd Party Apps  
Indication:Smoker  
                          Start:2021         Instruction Type:Patient   
Education  
  
   
                                                    Patient Instructions  
Indication:Smoker  
                          Start:2021         Instruction Type:Provider   
Instructions for Treatment  
  
   
                                                    How to Access Health Informa  
tion   
Online using Patient Portal and   
3rd Party Apps  
Indication:Smoker  
                          Start:2021          Instruction Type:Patient   
Education  
  
   
                                                    Patient Instructions  
Indication:Smoker  
                          Start:2021          Instruction Type:Provider   
Instructions for Treatment  
  
   
                                                    How to Access Health Informa  
tion   
Online using Patient Portal and   
3rd Party Apps  
Indication:Smoker  
                          Start:16-Dec-2020         Instruction Type:Patient   
Education  
  
   
                                                    Patient Instructions  
Indication:Smoker  
                          Start:16-Dec-2020         Instruction Type:Provider   
Instructions for Treatment  
  
   
                                                    How to access health informa  
tion   
online  
Indication:Smoker  
                          Start:2020         Instruction Type:Patient   
Education  
  
   
                                                    How to access health informa  
tion   
online - Detail  
Indication:Smoker  
                          Start:2020         Instruction Type:Patient   
Education  
  
   
                                                    Patient Instructions  
Indication:Smoker  
                          Start:2020         Instruction Type:Provider   
Instructions for Treatment  
  
   
                                                    How to access health informa  
tion   
online  
Indication:Smoker  
                          Start:24-Aug-2020         Instruction Type:Patient   
Education  
  
   
                                                    How to access health informa  
tion   
online - Detail  
Indication:Smoker  
                          Start:24-Aug-2020         Instruction Type:Patient   
Education  
  
   
                                                    Patient Instructions  
Indication:Smoker  
                          Start:24-Aug-2020         Instruction Type:Provider   
Instructions for Treatment  
  
   
                                                    How to access health informa  
tion   
online - Detail  
Indication:BMI 25.0-25.9,adult  
                          Start:2020         Instruction Type:Patient   
Education  
  
   
                                                    How to access health informa  
tion   
online  
Indication:BMI 25.0-25.9,adult  
                          Start:2020         Instruction Type:Patient   
Education  
  
   
                                                    Patient Instructions  
Indication:BMI 25.0-25.9,adult  
                          Start:2020         Instruction Type:Provider   
Instructions for Treatment  
  
   
                                                    How to access health informa  
tion   
online  
Indication:Smoker  
                          Start:2020          Instruction Type:Patient   
Education  
  
   
                                                    How to access health informa  
tion   
online - Detail  
Indication:Smoker  
                          Start:2020          Instruction Type:Patient   
Education  
  
   
                                                    Patient Instructions  
Indication:Smoker  
                          Start:2020          Instruction Type:Provider   
Instructions for Treatment  
  
   
                                                    How to access health informa  
tion   
online  
Indication:Smoker  
                          Start:2020         Instruction Type:Patient   
Education  
  
   
                                                    How to access health informa  
tion   
online - Detail  
Indication:Smoker  
                          Start:2020         Instruction Type:Patient   
Education  
  
   
                                                    Patient Instructions  
Indication:Smoker  
                          Start:2020         Instruction Type:Provider   
Instructions for Treatment  
  
   
                                                    How to access health informa  
tion   
online  
Indication:Smoker  
                          Start:13-Sep-2019         Instruction Type:Patient   
Education  
  
   
                                                    How to access health informa  
tion   
online - Detail  
Indication:Smoker  
                          Start:13-Sep-2019         Instruction Type:Patient   
Education  
  
   
                                                    Patient Instructions  
Indication:Smoker  
                          Start:13-Sep-2019         Instruction Type:Provider   
Instructions for Treatment  
  
   
                                                    How to access health informa  
tion   
online  
Indication:BMI 26.0-26.9,adult  
                          Start:10-May-2019         Instruction Type:Patient   
Education  
  
   
                                                    How to access health informa  
tion   
online - Detail  
Indication:BMI 26.0-26.9,adult  
                          Start:10-May-2019         Instruction Type:Patient   
Education  
  
   
                                                    Patient Instructions  
Indication:BMI 26.0-26.9,adult  
                          Start:10-May-2019         Instruction Type:Provider   
Instructions for Treatment  
  
   
                                                    How to access health informa  
tion   
online  
Indication:Abnormal glucose   
tolerance test  
                          Start:28-Dec-2018         Instruction Type:Patient   
Education  
  
   
                                                    How to access health informa  
tion   
online - Detail  
Indication:Abnormal glucose   
tolerance test  
                          Start:28-Dec-2018         Instruction Type:Patient   
Education  
  
   
                                                    Patient Instructions  
Indication:Abnormal glucose   
tolerance test  
                          Start:28-Dec-2018         Instruction Type:Provider   
Instructions for Treatment  
  
   
                                                    How to access health informa  
tion   
online  
Indication:Smoker  
                          Start:29-Aug-2018         Instruction Type:Patient   
Education  
  
   
                                                    How to access health informa  
tion   
online - Detail  
Indication:Smoker  
                          Start:29-Aug-2018         Instruction Type:Patient   
Education  
  
   
                                                    Patient Instructions  
Indication:Smoker  
                          Start:29-Aug-2018         Instruction Type:Provider   
Instructions for Treatment  
  
   
                                                    How to access health informa  
tion   
online  
Indication:Abnormal glucose   
tolerance test  
                          Start:2018         Instruction Type:Patient   
Education  
  
   
                                                    How to access health informa  
tion   
online - Detail  
Indication:Abnormal glucose   
tolerance test  
                          Start:2018         Instruction Type:Patient   
Education  
  
   
                                                    Patient Instructions  
Indication:Abnormal glucose   
tolerance test  
                          Start:2018         Instruction Type:Provider   
Instructions for Treatment  
  
   
                                                    How to access health informa  
tion   
online  
Indication:Smoker  
                          Start:8-Dec-2017          Instruction Type:Patient   
Education  
  
   
                                                    How to access health informa  
tion   
online - Detail  
Indication:Smoker  
                          Start:8-Dec-2017          Instruction Type:Patient   
Education  
  
   
                                                    Patient Instructions  
Indication:Smoker  
                          Start:8-Dec-2017          Instruction Type:Provider   
Instructions for Treatment  
  
   
                                                    How to access health informa  
tion   
online  
Indication:Smoker  
                          Start:4-Aug-2017          Instruction Type:Patient   
Education  
  
   
                                                    How to access health informa  
tion   
online - Detail  
Indication:Smoker  
                          Start:4-Aug-2017          Instruction Type:Patient   
Education  
  
   
                                                    Patient Instructions  
Indication:Smoker  
                          Start:4-Aug-2017          Instruction Type:Provider   
Instructions for Treatment  
  
   
                                                    How to access health informa  
tion   
online  
Indication:Smoker  
                          Start:3-Mar-2017          Instruction Type:Patient   
Education  
  
   
                                                    How to access health informa  
tion   
online - Detail  
Indication:Smoker  
                          Start:3-Mar-2017          Instruction Type:Patient   
Education  
  
   
                                                    Patient Instructions  
Indication:Smoker  
                          Start:3-Mar-2017          Instruction Type:Provider   
Instructions for Treatment  
  
   
                                                    How to access health informa  
tion   
online  
Indication:Hypertension,   
essential, benign  
                          Start:28-Oct-2016         Instruction Type:Patient   
Education  
  
   
                                                    How to access health informa  
tion   
online - Detail  
Indication:Hypertension,   
essential, benign  
                          Start:28-Oct-2016         Instruction Type:Patient   
Education  
  
   
                                                    Patient Instructions  
Indication:Hypertension,   
essential, benign  
                          Start:28-Oct-2016         Instruction Type:Provider   
Instructions for Treatment  
  
   
                                                    How to access health informa  
tion   
online  
Indication:Hypertension,   
essential, benign  
                          Start:2016         Instruction Type:Patient   
Education  
  
   
                                                    How to access health informa  
tion   
online - Detail  
Indication:Hypertension,   
essential, benign  
                          Start:2016         Instruction Type:Patient   
Education  
  
   
                                                    Patient Instructions  
Indication:Hypertension,   
essential, benign  
                          Start:2016         Instruction Type:Provider   
Instructions for Treatment  
  
   
                                                    How to access health informa  
tion   
online  
Indication:BMI 27.0-27.9,adult  
                          Start:2016         Instruction Type:Patient   
Education  
  
   
                                                    How to access health informa  
tion   
online - Detail  
Indication:BMI 27.0-27.9,adult  
                          Start:2016         Instruction Type:Patient   
Education  
  
   
                                                    Patient Instructions  
Indication:BMI 27.0-27.9,adult  
                          Start:2016         Instruction Type:Provider   
Instructions for Treatment  
  
   
                                                    How to access health informa  
tion   
online  
Indication:Abnormal glucose   
tolerance test  
                          Start:2016         Instruction Type:Patient   
Education  
  
   
                                                    How to access health informa  
tion   
online - Detail  
Indication:Abnormal glucose   
tolerance test  
                          Start:2016         Instruction Type:Patient   
Education  
  
   
                                                    Patient Instructions  
Indication:Abnormal glucose   
tolerance test  
                          Start:2016         Instruction Type:Provider   
Instructions for Treatment  
  
   
                                                    How to access health informa  
tion   
online  
Indication:Abnormal glucose   
tolerance test  
                          Start:16-Dec-2015         Instruction Type:Patient   
Education  
  
   
                                                    How to access health informa  
tion   
online - Detail  
Indication:Abnormal glucose   
tolerance test  
                          Start:16-Dec-2015         Instruction Type:Patient   
Education  
  
   
                                                    Patient Instructions  
Indication:Abnormal glucose   
tolerance test  
                          Start:16-Dec-2015         Instruction Type:Provider   
Instructions for Treatment  
  
   
                                                    How to access health informa  
tion   
online  
Indication:Hypercholesteremia  
                          Start:16-Sep-2015         Instruction Type:Patient   
Education  
  
   
                                                    How to access health informa  
tion   
online - Detail  
Indication:Hypercholesteremia  
                          Start:16-Sep-2015         Instruction Type:Patient   
Education  
  
   
                                                    Patient Instructions  
Indication:Hypercholesteremia  
                          Start:16-Sep-2015         Instruction Type:Provider   
Instructions for Treatment  
  
   
                                                    Patient Instructions  
Indication:Abnormal glucose   
tolerance test  
                          Start:9-May-2014          Instruction Type:Provider   
Instructions for Treatment  
  
   
                                                    Patient Instructions  
Indication:Abnormal glucose   
tolerance test  
                          Start:2014          Instruction Type:Provider   
Instructions for Treatment  
  
   
                                                    Patient Instructions  
Indication:Abnormal glucose   
tolerance test  
                          Start:12-Sep-2013         Instruction Type:Provider   
Instructions for Treatment  
  
   
                                                    Patient Instructions  
Indication:Hypercholesteremia  
                          Start:15-Mar-2013         Instruction Type:Provider   
Instructions for Treatment  
  
   
                                                    Patient Instructions  
Indication:Hypertension,   
essential, benign  
                          Start:14-Sep-2012         Instruction Type:Provider   
Instructions for Treatment  
  
  
  
[facility]  
Work Phone: [phone]*   
  
                                Name            Dates           Details  
   
                                                    How to Access Health Informa  
tion   
Online using Patient Portal and   
3rd Party Apps  
Indication:Smoker  
                          Start:20-Sep-2023         Instruction Type:Patient   
Education  
  
   
                                                    Patient Instructions  
Indication:Smoker  
                          Start:20-Sep-2023         Instruction Type:Provider   
Instructions for Treatment  
  
   
                                                    Patient Instructions  
Indication:Smoker  
                          Start:2023          Instruction Type:Provider   
Instructions for Treatment  
  
   
                                                    How to Access Health Informa  
tion   
Online using Patient Portal and   
3rd Party Apps  
Indication:Smoker  
                          Start:2023          Instruction Type:Patient   
Education  
  
   
                                                    Patient Instructions  
Indication:BMI 27.0-27.9,adult  
                          Start:2023         Instruction Type:Provider   
Instructions for Treatment  
  
   
                                                    How to Access Health Informa  
tion   
Online using Patient Portal and   
3rd Party Apps  
Indication:BMI 27.0-27.9,adult  
                          Start:2023         Instruction Type:Patient   
Education  
  
   
                                                    Patient Instructions  
Indication:BMI 27.0-27.9,adult  
                          Start:28-Sep-2022         Instruction Type:Provider   
Instructions for Treatment  
  
   
                                                    How to Access Health Informa  
tion   
Online using Patient Portal and   
3rd Party Apps  
Indication:BMI 27.0-27.9,adult  
                          Start:28-Sep-2022         Instruction Type:Patient   
Education  
  
   
                                                    Patient Instructions  
Indication:Smoker  
                          Start:16-Mar-2022         Instruction Type:Provider   
Instructions for Treatment  
  
   
                                                    How to Access Health Informa  
tion   
Online using Patient Portal and   
3rd Party Apps  
Indication:Smoker  
                          Start:16-Mar-2022         Instruction Type:Patient   
Education  
  
   
                                                    Patient Instructions  
Indication:Smoker  
                          Start:2-Sep-2021          Instruction Type:Provider   
Instructions for Treatment  
  
   
                                                    How to Access Health Informa  
tion   
Online using Patient Portal and   
3rd Party Apps  
Indication:Smoker  
                          Start:2-Sep-2021          Instruction Type:Patient   
Education  
  
   
                                                    Patient Instructions  
Indication:BMI 26.0-26.9,adult  
                          Start:4-Aug-2021          Instruction Type:Provider   
Instructions for Treatment  
  
   
                                                    How to Access Health Informa  
tion   
Online using Patient Portal and   
3rd Party Apps  
Indication:BMI 26.0-26.9,adult  
                          Start:4-Aug-2021          Instruction Type:Patient   
Education  
  
   
                                                    How to Access Health Informa  
tion   
Online using Patient Portal and   
3rd Party Apps  
Indication:Smoker  
                          Start:1-Mar-2021          Instruction Type:Patient   
Education  
  
   
                                                    Patient Instructions  
Indication:Smoker  
                          Start:1-Mar-2021          Instruction Type:Provider   
Instructions for Treatment  
  
   
                                                    How to Access Health Informa  
tion   
Online using Patient Portal and   
3rd Party Apps  
Indication:Smoker  
                          Start:2021         Instruction Type:Patient   
Education  
  
   
                                                    Patient Instructions  
Indication:Smoker  
                          Start:2021         Instruction Type:Provider   
Instructions for Treatment  
  
   
                                                    How to Access Health Informa  
tion   
Online using Patient Portal and   
3rd Party Apps  
Indication:Smoker  
                          Start:2021          Instruction Type:Patient   
Education  
  
   
                                                    Patient Instructions  
Indication:Smoker  
                          Start:2021          Instruction Type:Provider   
Instructions for Treatment  
  
   
                                                    How to Access Health Informa  
tion   
Online using Patient Portal and   
3rd Party Apps  
Indication:Smoker  
                          Start:16-Dec-2020         Instruction Type:Patient   
Education  
  
   
                                                    Patient Instructions  
Indication:Smoker  
                          Start:16-Dec-2020         Instruction Type:Provider   
Instructions for Treatment  
  
   
                                                    How to access health informa  
tion   
online  
Indication:Smoker  
                          Start:2020         Instruction Type:Patient   
Education  
  
   
                                                    How to access health informa  
tion   
online - Detail  
Indication:Smoker  
                          Start:2020         Instruction Type:Patient   
Education  
  
   
                                                    Patient Instructions  
Indication:Smoker  
                          Start:2020         Instruction Type:Provider   
Instructions for Treatment  
  
   
                                                    How to access health informa  
tion   
online  
Indication:Smoker  
                          Start:24-Aug-2020         Instruction Type:Patient   
Education  
  
   
                                                    How to access health informa  
tion   
online - Detail  
Indication:Smoker  
                          Start:24-Aug-2020         Instruction Type:Patient   
Education  
  
   
                                                    Patient Instructions  
Indication:Smoker  
                          Start:24-Aug-2020         Instruction Type:Provider   
Instructions for Treatment  
  
   
                                                    How to access health informa  
tion   
online - Detail  
Indication:BMI 25.0-25.9,adult  
                          Start:2020         Instruction Type:Patient   
Education  
  
   
                                                    How to access health informa  
tion   
online  
Indication:BMI 25.0-25.9,adult  
                          Start:2020         Instruction Type:Patient   
Education  
  
   
                                                    Patient Instructions  
Indication:BMI 25.0-25.9,adult  
                          Start:2020         Instruction Type:Provider   
Instructions for Treatment  
  
   
                                                    How to access health informa  
tion   
online  
Indication:Smoker  
                          Start:2020          Instruction Type:Patient   
Education  
  
   
                                                    How to access health informa  
tion   
online - Detail  
Indication:Smoker  
                          Start:2020          Instruction Type:Patient   
Education  
  
   
                                                    Patient Instructions  
Indication:Smoker  
                          Start:2020          Instruction Type:Provider   
Instructions for Treatment  
  
   
                                                    How to access health informa  
tion   
online  
Indication:Smoker  
                          Start:2020         Instruction Type:Patient   
Education  
  
   
                                                    How to access health informa  
tion   
online - Detail  
Indication:Smoker  
                          Start:2020         Instruction Type:Patient   
Education  
  
   
                                                    Patient Instructions  
Indication:Smoker  
                          Start:2020         Instruction Type:Provider   
Instructions for Treatment  
  
   
                                                    How to access health informa  
tion   
online  
Indication:Smoker  
                          Start:13-Sep-2019         Instruction Type:Patient   
Education  
  
   
                                                    How to access health informa  
tion   
online - Detail  
Indication:Smoker  
                          Start:13-Sep-2019         Instruction Type:Patient   
Education  
  
   
                                                    Patient Instructions  
Indication:Smoker  
                          Start:13-Sep-2019         Instruction Type:Provider   
Instructions for Treatment  
  
   
                                                    How to access health informa  
tion   
online  
Indication:BMI 26.0-26.9,adult  
                          Start:10-May-2019         Instruction Type:Patient   
Education  
  
   
                                                    How to access health informa  
tion   
online - Detail  
Indication:BMI 26.0-26.9,adult  
                          Start:10-May-2019         Instruction Type:Patient   
Education  
  
   
                                                    Patient Instructions  
Indication:BMI 26.0-26.9,adult  
                          Start:10-May-2019         Instruction Type:Provider   
Instructions for Treatment  
  
   
                                                    How to access health informa  
tion   
online  
Indication:Abnormal glucose   
tolerance test  
                          Start:28-Dec-2018         Instruction Type:Patient   
Education  
  
   
                                                    How to access health informa  
tion   
online - Detail  
Indication:Abnormal glucose   
tolerance test  
                          Start:28-Dec-2018         Instruction Type:Patient   
Education  
  
   
                                                    Patient Instructions  
Indication:Abnormal glucose   
tolerance test  
                          Start:28-Dec-2018         Instruction Type:Provider   
Instructions for Treatment  
  
   
                                                    How to access health informa  
tion   
online  
Indication:Smoker  
                          Start:29-Aug-2018         Instruction Type:Patient   
Education  
  
   
                                                    How to access health informa  
tion   
online - Detail  
Indication:Smoker  
                          Start:29-Aug-2018         Instruction Type:Patient   
Education  
  
   
                                                    Patient Instructions  
Indication:Smoker  
                          Start:29-Aug-2018         Instruction Type:Provider   
Instructions for Treatment  
  
   
                                                    How to access health informa  
tion   
online  
Indication:Abnormal glucose   
tolerance test  
                          Start:2018         Instruction Type:Patient   
Education  
  
   
                                                    How to access health informa  
tion   
online - Detail  
Indication:Abnormal glucose   
tolerance test  
                          Start:2018         Instruction Type:Patient   
Education  
  
   
                                                    Patient Instructions  
Indication:Abnormal glucose   
tolerance test  
                          Start:2018         Instruction Type:Provider   
Instructions for Treatment  
  
   
                                                    How to access health informa  
tion   
online  
Indication:Smoker  
                          Start:8-Dec-2017          Instruction Type:Patient   
Education  
  
   
                                                    How to access health informa  
tion   
online - Detail  
Indication:Smoker  
                          Start:8-Dec-2017          Instruction Type:Patient   
Education  
  
   
                                                    Patient Instructions  
Indication:Smoker  
                          Start:8-Dec-2017          Instruction Type:Provider   
Instructions for Treatment  
  
   
                                                    How to access health informa  
tion   
online  
Indication:Smoker  
                          Start:4-Aug-2017          Instruction Type:Patient   
Education  
  
   
                                                    How to access health informa  
tion   
online - Detail  
Indication:Smoker  
                          Start:4-Aug-2017          Instruction Type:Patient   
Education  
  
   
                                                    Patient Instructions  
Indication:Smoker  
                          Start:4-Aug-2017          Instruction Type:Provider   
Instructions for Treatment  
  
   
                                                    How to access health informa  
tion   
online  
Indication:Smoker  
                          Start:3-Mar-2017          Instruction Type:Patient   
Education  
  
   
                                                    How to access health informa  
tion   
online - Detail  
Indication:Smoker  
                          Start:3-Mar-2017          Instruction Type:Patient   
Education  
  
   
                                                    Patient Instructions  
Indication:Smoker  
                          Start:3-Mar-2017          Instruction Type:Provider   
Instructions for Treatment  
  
   
                                                    How to access health informa  
tion   
online  
Indication:Hypertension,   
essential, benign  
                          Start:28-Oct-2016         Instruction Type:Patient   
Education  
  
   
                                                    How to access health informa  
tion   
online - Detail  
Indication:Hypertension,   
essential, benign  
                          Start:28-Oct-2016         Instruction Type:Patient   
Education  
  
   
                                                    Patient Instructions  
Indication:Hypertension,   
essential, benign  
                          Start:28-Oct-2016         Instruction Type:Provider   
Instructions for Treatment  
  
   
                                                    How to access health informa  
tion   
online  
Indication:Hypertension,   
essential, benign  
                          Start:2016         Instruction Type:Patient   
Education  
  
   
                                                    How to access health informa  
tion   
online - Detail  
Indication:Hypertension,   
essential, benign  
                          Start:2016         Instruction Type:Patient   
Education  
  
   
                                                    Patient Instructions  
Indication:Hypertension,   
essential, benign  
                          Start:2016         Instruction Type:Provider   
Instructions for Treatment  
  
   
                                                    How to access health informa  
tion   
online  
Indication:BMI 27.0-27.9,adult  
                          Start:2016         Instruction Type:Patient   
Education  
  
   
                                                    How to access health informa  
tion   
online - Detail  
Indication:BMI 27.0-27.9,adult  
                          Start:2016         Instruction Type:Patient   
Education  
  
   
                                                    Patient Instructions  
Indication:BMI 27.0-27.9,adult  
                          Start:2016         Instruction Type:Provider   
Instructions for Treatment  
  
   
                                                    How to access health informa  
tion   
online  
Indication:Abnormal glucose   
tolerance test  
                          Start:2016         Instruction Type:Patient   
Education  
  
   
                                                    How to access health informa  
tion   
online - Detail  
Indication:Abnormal glucose   
tolerance test  
                          Start:2016         Instruction Type:Patient   
Education  
  
   
                                                    Patient Instructions  
Indication:Abnormal glucose   
tolerance test  
                          Start:2016         Instruction Type:Provider   
Instructions for Treatment  
  
   
                                                    How to access health informa  
tion   
online  
Indication:Abnormal glucose   
tolerance test  
                          Start:16-Dec-2015         Instruction Type:Patient   
Education  
  
   
                                                    How to access health informa  
tion   
online - Detail  
Indication:Abnormal glucose   
tolerance test  
                          Start:16-Dec-2015         Instruction Type:Patient   
Education  
  
   
                                                    Patient Instructions  
Indication:Abnormal glucose   
tolerance test  
                          Start:16-Dec-2015         Instruction Type:Provider   
Instructions for Treatment  
  
   
                                                    How to access health informa  
tion   
online  
Indication:Hypercholesteremia  
                          Start:16-Sep-2015         Instruction Type:Patient   
Education  
  
   
                                                    How to access health informa  
tion   
online - Detail  
Indication:Hypercholesteremia  
                          Start:16-Sep-2015         Instruction Type:Patient   
Education  
  
   
                                                    Patient Instructions  
Indication:Hypercholesteremia  
                          Start:16-Sep-2015         Instruction Type:Provider   
Instructions for Treatment  
  
   
                                                    Patient Instructions  
Indication:Abnormal glucose   
tolerance test  
                          Start:9-May-2014          Instruction Type:Provider   
Instructions for Treatment  
  
   
                                                    Patient Instructions  
Indication:Abnormal glucose   
tolerance test  
                          Start:2014          Instruction Type:Provider   
Instructions for Treatment  
  
   
                                                    Patient Instructions  
Indication:Abnormal glucose   
tolerance test  
                          Start:12-Sep-2013         Instruction Type:Provider   
Instructions for Treatment  
  
   
                                                    Patient Instructions  
Indication:Hypercholesteremia  
                          Start:15-Mar-2013         Instruction Type:Provider   
Instructions for Treatment  
  
   
                                                    Patient Instructions  
Indication:Hypertension,   
essential, benign  
                          Start:14-Sep-2012         Instruction Type:Provider   
Instructions for Treatment  
  
  
  
[facility]  
Work Phone: [phone]  
  
Family History  
No Family History Records FoundUnknown Family Member  
  
                                Name            Dates           Details  
   
                                                    Cancer  
                                                    Comments:Brother.  
Status:Active  
   
                                                    Diabetes Mellitus  
                                                    Comments:Father.  
Status:Active  
  
                              Unknown Family Member  
  
                                Name            Dates           Details  
   
                                                    Cancer  
                                                    Comments:Brother.  
Status:Active  
   
                                                    Diabetes Mellitus  
                                                    Comments:Father.  
Status:Active  
  
                              Unknown Family Member  
  
                                Name            Dates           Details  
   
                                                    Cancer  
                                                    Comments:Brother.  
Status:Active  
   
                                                    Diabetes Mellitus  
                                                    Comments:Father.  
Status:Active  
  
                              Unknown Family Member  
  
                                Name            Dates           Details  
   
                                                    Cancer  
                                                    Comments:Brother.  
Status:Active  
   
                                                    Diabetes Mellitus  
                                                    Comments:Father.  
Status:Active  
  
                              Unknown Family Member  
  
                                Name            Dates           Details  
   
                                                    Cancer  
                                                    Comments:Brother.  
Status:Active  
   
                                                    Diabetes Mellitus  
                                                    Comments:Father.  
Status:Active  
  
                              Unknown Family Member  
  
                                Name            Dates           Details  
   
                                                    Cancer  
                                                    Comments:Brother.  
Status:Active  
   
                                                    Diabetes Mellitus  
                                                    Comments:Father.  
Status:Active  
  
                              Unknown Family Member  
  
                                Name            Dates           Details  
   
                                                    Cancer  
                                                    Comments:Brother.  
Status:Active  
   
                                                    Diabetes Mellitus  
                                                    Comments:Father.  
Status:Active  
  
                              Unknown Family Member  
  
                                Name            Dates           Details  
   
                                                    Cancer  
                                                    Comments:Brother.  
Status:Active  
   
                                                    Diabetes Mellitus  
                                                    Comments:Father.  
Status:Active  
  
                              Unknown Family Member  
  
                                Name            Dates           Details  
   
                                                    Cancer  
                                                    Comments:Brother.  
Status:Active  
   
                                                    Diabetes Mellitus  
                                                    Comments:Father.  
Status:Active  
  
                              Unknown Family Member  
  
                                Name            Dates           Details  
   
                                                    Cancer  
                                                    Comments:Brother.  
Status:Active  
   
                                                    Diabetes Mellitus  
                                                    Comments:Father.  
Status:Active  
  
                              Unknown Family Member  
  
                                Name            Dates           Details  
   
                                                    Cancer  
                                                    Comments:Brother.  
Status:Active  
   
                                                    Diabetes Mellitus  
                                                    Comments:Father.  
Status:Active  
  
                              Unknown Family Member  
  
                                Name            Dates           Details  
   
                                                    Cancer  
                                                    Comments:Brother.  
Status:Active  
   
                                                    Diabetes Mellitus  
                                                    Comments:Father.  
Status:Active  
  
                              Unknown Family Member  
  
                                Name            Dates           Details  
   
                                                    Cancer  
                                                    Comments:Brother.  
Status:Active  
   
                                                    Diabetes Mellitus  
                                                    Comments:Father.  
Status:Active  
  
                              Unknown Family Member  
  
                                Name            Dates           Details  
   
                                                    Cancer  
                                                    Comments:Brother.  
Status:Active  
   
                                                    Diabetes Mellitus  
                                                    Comments:Father.  
Status:Active  
  
                              Unknown Family Member  
  
                                Name            Dates           Details  
   
                                                    Cancer  
                                                    Comments:Brother.  
Status:Active  
   
                                                    Diabetes Mellitus  
                                                    Comments:Father.  
Status:Active  
  
                              Unknown Family Member  
  
                                Name            Dates           Details  
   
                                                    Cancer  
                                                    Comments:Brother.  
Status:Active  
   
                                                    Diabetes Mellitus  
                                                    Comments:Father.  
Status:Active  
  
                              Unknown Family Member  
  
                                Name            Dates           Details  
   
                                                    Cancer  
                                                    Comments:Brother.  
Status:Active  
   
                                                    Diabetes Mellitus  
                                                    Comments:Father.  
Status:Active  
  
                              Unknown Family Member  
  
                                Name            Dates           Details  
   
                                                    Cancer  
                                                    Comments:Brother.  
Status:Active  
   
                                                    Diabetes Mellitus  
                                                    Comments:Father.  
Status:Active  
  
                              Unknown Family Member  
  
                                Name            Dates           Details  
   
                                                    Cancer  
                                                    Comments:Brother.  
Status:Active  
   
                                                    Diabetes Mellitus  
                                                    Comments:Father.  
Status:Active  
  
                              Unknown Family Member  
  
                                Name            Dates           Details  
   
                                                    Cancer  
                                                    Comments:Brother.  
Status:Active  
   
                                                    Diabetes Mellitus  
                                                    Comments:Father.  
Status:Active  
  
                              Unknown Family Member  
  
                                Name            Dates           Details  
   
                                                    Cancer  
                                                    Comments:Brother.  
Status:Active  
   
                                                    Diabetes Mellitus  
                                                    Comments:Father.  
Status:Active  
  
                              Unknown Family Member  
  
                                Name            Dates           Details  
   
                                                    Cancer  
                                                    Comments:Brother.  
Status:Active  
   
                                                    Diabetes Mellitus  
                                                    Comments:Father.  
Status:Active  
  
                              Unknown Family Member  
  
                                Name            Dates           Details  
   
                                                    Cancer  
                                                    Comments:Brother.  
Status:Active  
   
                                                    Diabetes Mellitus  
                                                    Comments:Father.  
Status:Active  
  
                              Unknown Family Member  
  
                                Name            Dates           Details  
   
                                                    Cancer  
                                                    Comments:Brother.  
Status:Active  
   
                                                    Diabetes Mellitus  
                                                    Comments:Father.  
Status:Active  
  
                              Unknown Family Member  
  
                                Name            Dates           Details  
   
                                                    Cancer  
                                                    Comments:Brother.  
Status:Active  
   
                                                    Diabetes Mellitus  
                                                    Comments:Father.  
Status:Active  
  
                              Unknown Family Member  
  
                                Name            Dates           Details  
   
                                                    Cancer  
                                                    Comments:Brother.  
Status:Active  
   
                                                    Diabetes Mellitus  
                                                    Comments:Father.  
Status:Active  
  
                              Unknown Family Member  
  
                                Name            Dates           Details  
   
                                                    Cancer  
                                                    Comments:Brother.  
Status:Active  
   
                                                    Diabetes Mellitus  
                                                    Comments:Father.  
Status:Active  
  
                              Unknown Family Member  
  
                                Name            Dates           Details  
   
                                                    Cancer  
                                                    Comments:Brother.  
Status:Active  
   
                                                    Diabetes Mellitus  
                                                    Comments:Father.  
Status:Active  
  
                              Unknown Family Member  
  
                                Name            Dates           Details  
   
                                                    Cancer  
                                                    Comments:Brother.  
Status:Active  
   
                                                    Diabetes Mellitus  
                                                    Comments:Father.  
Status:Active  
  
                              Unknown Family Member  
  
                                Name            Dates           Details  
   
                                                    Cancer  
                                                    Comments:Brother.  
Status:Active  
   
                                                    Diabetes Mellitus  
                                                    Comments:Father.  
Status:Active  
  
                              Unknown Family Member  
  
                                Name            Dates           Details  
   
                                                    Cancer  
                                                    Comments:Brother.  
Status:Active  
   
                                                    Diabetes Mellitus  
                                                    Comments:Father.  
Status:Active  
  
                              Unknown Family Member  
  
                                Name            Dates           Details  
   
                                                    Cancer  
                                                    Comments:Brother.  
Status:Active  
   
                                                    Diabetes Mellitus  
                                                    Comments:Father.  
Status:Active  
  
                              Unknown Family Member  
  
                                Name            Dates           Details  
   
                                                    Cancer  
                                                    Comments:Brother.  
Status:Active  
   
                                                    Diabetes Mellitus  
                                                    Comments:Father.  
Status:Active  
  
                              Unknown Family Member  
  
                                Name            Dates           Details  
   
                                                    Cancer  
                                                    Comments:Brother.  
Status:Active  
   
                                                    Diabetes Mellitus  
                                                    Comments:Father.  
Status:Active  
  
  
  
Instructions  
  
  
                                Name            Dates           Details  
   
                                                    Abnormal glucose tolerance t  
est : How to access health information   
online  
Indication:Abnormal glucose tolerance test  
                                                      
   
                                                    Abnormal glucose tolerance t  
est : How to access health information   
online - Detail  
Indication:Abnormal glucose tolerance test  
                                                      
   
                                                    Abnormal glucose tolerance t  
est : Patient Instructions  
Indication:Abnormal glucose tolerance test  
                                                      
   
                                                    Smoker : How to access healt  
h information online  
Indication:Smoker  
                                                      
   
                                                    Smoker : How to access healt  
h information online - Detail  
Indication:Smoker  
                                                      
   
                                                    Smoker : Patient Instruction  
s  
Indication:Smoker  
                                                      
   
                                                    Hypertension, essential, bear  
ign : How to access health information   
online  
Indication:Hypertension, essential, benign  
                                                      
   
                                                    Hypertension, essential, bear  
ign : How to access health information   
online - Detail  
Indication:Hypertension, essential, benign  
                                                      
   
                                                    Hypertension, essential, bear  
ign : Patient Instructions  
Indication:Hypertension, essential, benign  
                                                      
   
                                                    BMI 27.0-27.9,adult : How to  
 access health information online  
Indication:BMI 27.0-27.9,adult  
                                                      
   
                                                    BMI 27.0-27.9,adult : How to  
 access health information online - Detail  
Indication:BMI 27.0-27.9,adult  
                                                      
   
                                                    BMI 27.0-27.9,adult : Patien  
t Instructions  
Indication:BMI 27.0-27.9,adult  
                                                      
   
                                                    Hypercholesteremia : How to   
access health information online  
Indication:Hypercholesteremia  
                                                      
   
                                                    Hypercholesteremia : How to   
access health information online - Detail  
Indication:Hypercholesteremia  
                                                      
   
                                                    Hypercholesteremia : Patient  
 Instructions  
Indication:Hypercholesteremia  
                                                      
  
  
  
                                Name            Dates           Details  
   
                                                    How to access health informa  
tion   
online  
Indication:Smoker  
                          Start:13-Sep-2019         Instruction Type:Patient   
Education  
  
   
                                                    How to access health informa  
tion   
online - Detail  
Indication:Smoker  
                          Start:13-Sep-2019         Instruction Type:Patient   
Education  
  
   
                                                    Patient Instructions  
Indication:Smoker  
                          Start:13-Sep-2019         Instruction Type:Provider   
Instructions for Treatment  
  
   
                                                    How to access health informa  
tion   
online  
Indication:BMI 26.0-26.9,adult  
                          Start:10-May-2019         Instruction Type:Patient   
Education  
  
   
                                                    How to access health informa  
tion   
online - Detail  
Indication:BMI 26.0-26.9,adult  
                          Start:10-May-2019         Instruction Type:Patient   
Education  
  
   
                                                    Patient Instructions  
Indication:BMI 26.0-26.9,adult  
                          Start:10-May-2019         Instruction Type:Provider   
Instructions for Treatment  
  
   
                                                    How to access health informa  
tion   
online  
Indication:Abnormal glucose   
tolerance test  
                          Start:28-Dec-2018         Instruction Type:Patient   
Education  
  
   
                                                    How to access health informa  
tion   
online - Detail  
Indication:Abnormal glucose   
tolerance test  
                          Start:28-Dec-2018         Instruction Type:Patient   
Education  
  
   
                                                    Patient Instructions  
Indication:Abnormal glucose   
tolerance test  
                          Start:28-Dec-2018         Instruction Type:Provider   
Instructions for Treatment  
  
   
                                                    How to access health informa  
tion   
online  
Indication:Smoker  
                          Start:29-Aug-2018         Instruction Type:Patient   
Education  
  
   
                                                    How to access health informa  
tion   
online - Detail  
Indication:Smoker  
                          Start:29-Aug-2018         Instruction Type:Patient   
Education  
  
   
                                                    Patient Instructions  
Indication:Smoker  
                          Start:29-Aug-2018         Instruction Type:Provider   
Instructions for Treatment  
  
   
                                                    How to access health informa  
tion   
online  
Indication:Abnormal glucose   
tolerance test  
                          Start:2018         Instruction Type:Patient   
Education  
  
   
                                                    How to access health informa  
tion   
online - Detail  
Indication:Abnormal glucose   
tolerance test  
                          Start:2018         Instruction Type:Patient   
Education  
  
   
                                                    Patient Instructions  
Indication:Abnormal glucose   
tolerance test  
                          Start:2018         Instruction Type:Provider   
Instructions for Treatment  
  
   
                                                    How to access health informa  
tion   
online  
Indication:Smoker  
                          Start:8-Dec-2017          Instruction Type:Patient   
Education  
  
   
                                                    How to access health informa  
tion   
online - Detail  
Indication:Smoker  
                          Start:8-Dec-2017          Instruction Type:Patient   
Education  
  
   
                                                    Patient Instructions  
Indication:Smoker  
                          Start:8-Dec-2017          Instruction Type:Provider   
Instructions for Treatment  
  
   
                                                    How to access health informa  
tion   
online  
Indication:Smoker  
                          Start:4-Aug-2017          Instruction Type:Patient   
Education  
  
   
                                                    How to access health informa  
tion   
online - Detail  
Indication:Smoker  
                          Start:4-Aug-2017          Instruction Type:Patient   
Education  
  
   
                                                    Patient Instructions  
Indication:Smoker  
                          Start:4-Aug-2017          Instruction Type:Provider   
Instructions for Treatment  
  
   
                                                    How to access health informa  
tion   
online  
Indication:Smoker  
                          Start:3-Mar-2017          Instruction Type:Patient   
Education  
  
   
                                                    How to access health informa  
tion   
online - Detail  
Indication:Smoker  
                          Start:3-Mar-2017          Instruction Type:Patient   
Education  
  
   
                                                    Patient Instructions  
Indication:Smoker  
                          Start:3-Mar-2017          Instruction Type:Provider   
Instructions for Treatment  
  
   
                                                    How to access health informa  
tion   
online  
Indication:Hypertension,   
essential, benign  
                          Start:28-Oct-2016         Instruction Type:Patient   
Education  
  
   
                                                    How to access health informa  
tion   
online - Detail  
Indication:Hypertension,   
essential, benign  
                          Start:28-Oct-2016         Instruction Type:Patient   
Education  
  
   
                                                    Patient Instructions  
Indication:Hypertension,   
essential, benign  
                          Start:28-Oct-2016         Instruction Type:Provider   
Instructions for Treatment  
  
   
                                                    How to access health informa  
tion   
online  
Indication:Hypertension,   
essential, benign  
                          Start:2016         Instruction Type:Patient   
Education  
  
   
                                                    How to access health informa  
tion   
online - Detail  
Indication:Hypertension,   
essential, benign  
                          Start:2016         Instruction Type:Patient   
Education  
  
   
                                                    Patient Instructions  
Indication:Hypertension,   
essential, benign  
                          Start:2016         Instruction Type:Provider   
Instructions for Treatment  
  
   
                                                    How to access health informa  
tion   
online  
Indication:BMI 27.0-27.9,adult  
                          Start:2016         Instruction Type:Patient   
Education  
  
   
                                                    How to access health informa  
tion   
online - Detail  
Indication:BMI 27.0-27.9,adult  
                          Start:2016         Instruction Type:Patient   
Education  
  
   
                                                    Patient Instructions  
Indication:BMI 27.0-27.9,adult  
                          Start:2016         Instruction Type:Provider   
Instructions for Treatment  
  
   
                                                    How to access health informa  
tion   
online  
Indication:Abnormal glucose   
tolerance test  
                          Start:2016         Instruction Type:Patient   
Education  
  
   
                                                    How to access health informa  
tion   
online - Detail  
Indication:Abnormal glucose   
tolerance test  
                          Start:2016         Instruction Type:Patient   
Education  
  
   
                                                    Patient Instructions  
Indication:Abnormal glucose   
tolerance test  
                          Start:2016         Instruction Type:Provider   
Instructions for Treatment  
  
   
                                                    How to access health informa  
tion   
online  
Indication:Abnormal glucose   
tolerance test  
                          Start:16-Dec-2015         Instruction Type:Patient   
Education  
  
   
                                                    How to access health informa  
tion   
online - Detail  
Indication:Abnormal glucose   
tolerance test  
                          Start:16-Dec-2015         Instruction Type:Patient   
Education  
  
   
                                                    Patient Instructions  
Indication:Abnormal glucose   
tolerance test  
                          Start:16-Dec-2015         Instruction Type:Provider   
Instructions for Treatment  
  
   
                                                    How to access health informa  
tion   
online  
Indication:Hypercholesteremia  
                          Start:16-Sep-2015         Instruction Type:Patient   
Education  
  
   
                                                    How to access health informa  
tion   
online - Detail  
Indication:Hypercholesteremia  
                          Start:16-Sep-2015         Instruction Type:Patient   
Education  
  
   
                                                    Patient Instructions  
Indication:Hypercholesteremia  
                          Start:16-Sep-2015         Instruction Type:Provider   
Instructions for Treatment  
  
   
                                                    Patient Instructions  
Indication:Abnormal glucose   
tolerance test  
                          Start:9-May-2014          Instruction Type:Provider   
Instructions for Treatment  
  
   
                                                    Patient Instructions  
Indication:Abnormal glucose   
tolerance test  
                          Start:2014          Instruction Type:Provider   
Instructions for Treatment  
  
   
                                                    Patient Instructions  
Indication:Abnormal glucose   
tolerance test  
                          Start:12-Sep-2013         Instruction Type:Provider   
Instructions for Treatment  
  
   
                                                    Patient Instructions  
Indication:Hypercholesteremia  
                          Start:15-Mar-2013         Instruction Type:Provider   
Instructions for Treatment  
  
   
                                                    Patient Instructions  
Indication:Hypertension,   
essential, benign  
                          Start:14-Sep-2012         Instruction Type:Provider   
Instructions for Treatment  
  
  
  
  
                                Name            Dates           Details  
   
                                                    How to access health informa  
tion   
online  
Indication:Smoker  
                          Start:2020          Instruction Type:Patient   
Education  
  
   
                                                    How to access health informa  
tion   
online - Detail  
Indication:Smoker  
                          Start:2020          Instruction Type:Patient   
Education  
  
   
                                                    Patient Instructions  
Indication:Smoker  
                          Start:2020          Instruction Type:Provider   
Instructions for Treatment  
  
   
                                                    How to access health informa  
tion   
online  
Indication:Smoker  
                          Start:2020         Instruction Type:Patient   
Education  
  
   
                                                    How to access health informa  
tion   
online - Detail  
Indication:Smoker  
                          Start:2020         Instruction Type:Patient   
Education  
  
   
                                                    Patient Instructions  
Indication:Smoker  
                          Start:2020         Instruction Type:Provider   
Instructions for Treatment  
  
   
                                                    How to access health informa  
tion   
online  
Indication:Smoker  
                          Start:13-Sep-2019         Instruction Type:Patient   
Education  
  
   
                                                    How to access health informa  
tion   
online - Detail  
Indication:Smoker  
                          Start:13-Sep-2019         Instruction Type:Patient   
Education  
  
   
                                                    Patient Instructions  
Indication:Smoker  
                          Start:13-Sep-2019         Instruction Type:Provider   
Instructions for Treatment  
  
   
                                                    How to access health informa  
tion   
online  
Indication:BMI 26.0-26.9,adult  
                          Start:10-May-2019         Instruction Type:Patient   
Education  
  
   
                                                    How to access health informa  
tion   
online - Detail  
Indication:BMI 26.0-26.9,adult  
                          Start:10-May-2019         Instruction Type:Patient   
Education  
  
   
                                                    Patient Instructions  
Indication:BMI 26.0-26.9,adult  
                          Start:10-May-2019         Instruction Type:Provider   
Instructions for Treatment  
  
   
                                                    How to access health informa  
tion   
online  
Indication:Abnormal glucose   
tolerance test  
                          Start:28-Dec-2018         Instruction Type:Patient   
Education  
  
   
                                                    How to access health informa  
tion   
online - Detail  
Indication:Abnormal glucose   
tolerance test  
                          Start:28-Dec-2018         Instruction Type:Patient   
Education  
  
   
                                                    Patient Instructions  
Indication:Abnormal glucose   
tolerance test  
                          Start:28-Dec-2018         Instruction Type:Provider   
Instructions for Treatment  
  
   
                                                    How to access health informa  
tion   
online  
Indication:Smoker  
                          Start:29-Aug-2018         Instruction Type:Patient   
Education  
  
   
                                                    How to access health informa  
tion   
online - Detail  
Indication:Smoker  
                          Start:29-Aug-2018         Instruction Type:Patient   
Education  
  
   
                                                    Patient Instructions  
Indication:Smoker  
                          Start:29-Aug-2018         Instruction Type:Provider   
Instructions for Treatment  
  
   
                                                    How to access health informa  
tion   
online  
Indication:Abnormal glucose   
tolerance test  
                          Start:2018         Instruction Type:Patient   
Education  
  
   
                                                    How to access health informa  
tion   
online - Detail  
Indication:Abnormal glucose   
tolerance test  
                          Start:2018         Instruction Type:Patient   
Education  
  
   
                                                    Patient Instructions  
Indication:Abnormal glucose   
tolerance test  
                          Start:2018         Instruction Type:Provider   
Instructions for Treatment  
  
   
                                                    How to access health informa  
tion   
online  
Indication:Smoker  
                          Start:8-Dec-2017          Instruction Type:Patient   
Education  
  
   
                                                    How to access health informa  
tion   
online - Detail  
Indication:Smoker  
                          Start:8-Dec-2017          Instruction Type:Patient   
Education  
  
   
                                                    Patient Instructions  
Indication:Smoker  
                          Start:8-Dec-2017          Instruction Type:Provider   
Instructions for Treatment  
  
   
                                                    How to access health informa  
tion   
online  
Indication:Smoker  
                          Start:4-Aug-2017          Instruction Type:Patient   
Education  
  
   
                                                    How to access health informa  
tion   
online - Detail  
Indication:Smoker  
                          Start:4-Aug-2017          Instruction Type:Patient   
Education  
  
   
                                                    Patient Instructions  
Indication:Smoker  
                          Start:4-Aug-2017          Instruction Type:Provider   
Instructions for Treatment  
  
   
                                                    How to access health informa  
tion   
online  
Indication:Smoker  
                          Start:3-Mar-2017          Instruction Type:Patient   
Education  
  
   
                                                    How to access health informa  
tion   
online - Detail  
Indication:Smoker  
                          Start:3-Mar-2017          Instruction Type:Patient   
Education  
  
   
                                                    Patient Instructions  
Indication:Smoker  
                          Start:3-Mar-2017          Instruction Type:Provider   
Instructions for Treatment  
  
   
                                                    How to access health informa  
tion   
online  
Indication:Hypertension,   
essential, benign  
                          Start:28-Oct-2016         Instruction Type:Patient   
Education  
  
   
                                                    How to access health informa  
tion   
online - Detail  
Indication:Hypertension,   
essential, benign  
                          Start:28-Oct-2016         Instruction Type:Patient   
Education  
  
   
                                                    Patient Instructions  
Indication:Hypertension,   
essential, benign  
                          Start:28-Oct-2016         Instruction Type:Provider   
Instructions for Treatment  
  
   
                                                    How to access health informa  
tion   
online  
Indication:Hypertension,   
essential, benign  
                          Start:2016         Instruction Type:Patient   
Education  
  
   
                                                    How to access health informa  
tion   
online - Detail  
Indication:Hypertension,   
essential, benign  
                          Start:2016         Instruction Type:Patient   
Education  
  
   
                                                    Patient Instructions  
Indication:Hypertension,   
essential, benign  
                          Start:2016         Instruction Type:Provider   
Instructions for Treatment  
  
   
                                                    How to access health informa  
tion   
online  
Indication:BMI 27.0-27.9,adult  
                          Start:2016         Instruction Type:Patient   
Education  
  
   
                                                    How to access health informa  
tion   
online - Detail  
Indication:BMI 27.0-27.9,adult  
                          Start:2016         Instruction Type:Patient   
Education  
  
   
                                                    Patient Instructions  
Indication:BMI 27.0-27.9,adult  
                          Start:2016         Instruction Type:Provider   
Instructions for Treatment  
  
   
                                                    How to access health informa  
tion   
online  
Indication:Abnormal glucose   
tolerance test  
                          Start:2016         Instruction Type:Patient   
Education  
  
   
                                                    How to access health informa  
tion   
online - Detail  
Indication:Abnormal glucose   
tolerance test  
                          Start:2016         Instruction Type:Patient   
Education  
  
   
                                                    Patient Instructions  
Indication:Abnormal glucose   
tolerance test  
                          Start:2016         Instruction Type:Provider   
Instructions for Treatment  
  
   
                                                    How to access health informa  
tion   
online  
Indication:Abnormal glucose   
tolerance test  
                          Start:16-Dec-2015         Instruction Type:Patient   
Education  
  
   
                                                    How to access health informa  
tion   
online - Detail  
Indication:Abnormal glucose   
tolerance test  
                          Start:16-Dec-2015         Instruction Type:Patient   
Education  
  
   
                                                    Patient Instructions  
Indication:Abnormal glucose   
tolerance test  
                          Start:16-Dec-2015         Instruction Type:Provider   
Instructions for Treatment  
  
   
                                                    How to access health informa  
tion   
online  
Indication:Hypercholesteremia  
                          Start:16-Sep-2015         Instruction Type:Patient   
Education  
  
   
                                                    How to access health informa  
tion   
online - Detail  
Indication:Hypercholesteremia  
                          Start:16-Sep-2015         Instruction Type:Patient   
Education  
  
   
                                                    Patient Instructions  
Indication:Hypercholesteremia  
                          Start:16-Sep-2015         Instruction Type:Provider   
Instructions for Treatment  
  
   
                                                    Patient Instructions  
Indication:Abnormal glucose   
tolerance test  
                          Start:9-May-2014          Instruction Type:Provider   
Instructions for Treatment  
  
   
                                                    Patient Instructions  
Indication:Abnormal glucose   
tolerance test  
                          Start:2014          Instruction Type:Provider   
Instructions for Treatment  
  
   
                                                    Patient Instructions  
Indication:Abnormal glucose   
tolerance test  
                          Start:12-Sep-2013         Instruction Type:Provider   
Instructions for Treatment  
  
   
                                                    Patient Instructions  
Indication:Hypercholesteremia  
                          Start:15-Mar-2013         Instruction Type:Provider   
Instructions for Treatment  
  
   
                                                    Patient Instructions  
Indication:Hypertension,   
essential, benign  
                          Start:14-Sep-2012         Instruction Type:Provider   
Instructions for Treatment  
  
  
  
  
                                Name            Dates           Details  
   
                                                    How to access health informa  
tion   
online  
Indication:Smoker  
                          Start:2020          Instruction Type:Patient   
Education  
  
   
                                                    How to access health informa  
tion   
online - Detail  
Indication:Smoker  
                          Start:2020          Instruction Type:Patient   
Education  
  
   
                                                    Patient Instructions  
Indication:Smoker  
                          Start:2020          Instruction Type:Provider   
Instructions for Treatment  
  
   
                                                    How to access health informa  
tion   
online  
Indication:Smoker  
                          Start:2020         Instruction Type:Patient   
Education  
  
   
                                                    How to access health informa  
tion   
online - Detail  
Indication:Smoker  
                          Start:2020         Instruction Type:Patient   
Education  
  
   
                                                    Patient Instructions  
Indication:Smoker  
                          Start:2020         Instruction Type:Provider   
Instructions for Treatment  
  
   
                                                    How to access health informa  
tion   
online  
Indication:Smoker  
                          Start:13-Sep-2019         Instruction Type:Patient   
Education  
  
   
                                                    How to access health informa  
tion   
online - Detail  
Indication:Smoker  
                          Start:13-Sep-2019         Instruction Type:Patient   
Education  
  
   
                                                    Patient Instructions  
Indication:Smoker  
                          Start:13-Sep-2019         Instruction Type:Provider   
Instructions for Treatment  
  
   
                                                    How to access health informa  
tion   
online  
Indication:BMI 26.0-26.9,adult  
                          Start:10-May-2019         Instruction Type:Patient   
Education  
  
   
                                                    How to access health informa  
tion   
online - Detail  
Indication:BMI 26.0-26.9,adult  
                          Start:10-May-2019         Instruction Type:Patient   
Education  
  
   
                                                    Patient Instructions  
Indication:BMI 26.0-26.9,adult  
                          Start:10-May-2019         Instruction Type:Provider   
Instructions for Treatment  
  
   
                                                    How to access health informa  
tion   
online  
Indication:Abnormal glucose   
tolerance test  
                          Start:28-Dec-2018         Instruction Type:Patient   
Education  
  
   
                                                    How to access health informa  
tion   
online - Detail  
Indication:Abnormal glucose   
tolerance test  
                          Start:28-Dec-2018         Instruction Type:Patient   
Education  
  
   
                                                    Patient Instructions  
Indication:Abnormal glucose   
tolerance test  
                          Start:28-Dec-2018         Instruction Type:Provider   
Instructions for Treatment  
  
   
                                                    How to access health informa  
tion   
online  
Indication:Smoker  
                          Start:29-Aug-2018         Instruction Type:Patient   
Education  
  
   
                                                    How to access health informa  
tion   
online - Detail  
Indication:Smoker  
                          Start:29-Aug-2018         Instruction Type:Patient   
Education  
  
   
                                                    Patient Instructions  
Indication:Smoker  
                          Start:29-Aug-2018         Instruction Type:Provider   
Instructions for Treatment  
  
   
                                                    How to access health informa  
tion   
online  
Indication:Abnormal glucose   
tolerance test  
                          Start:2018         Instruction Type:Patient   
Education  
  
   
                                                    How to access health informa  
tion   
online - Detail  
Indication:Abnormal glucose   
tolerance test  
                          Start:2018         Instruction Type:Patient   
Education  
  
   
                                                    Patient Instructions  
Indication:Abnormal glucose   
tolerance test  
                          Start:2018         Instruction Type:Provider   
Instructions for Treatment  
  
   
                                                    How to access health informa  
tion   
online  
Indication:Smoker  
                          Start:8-Dec-2017          Instruction Type:Patient   
Education  
  
   
                                                    How to access health informa  
tion   
online - Detail  
Indication:Smoker  
                          Start:8-Dec-2017          Instruction Type:Patient   
Education  
  
   
                                                    Patient Instructions  
Indication:Smoker  
                          Start:8-Dec-2017          Instruction Type:Provider   
Instructions for Treatment  
  
   
                                                    How to access health informa  
tion   
online  
Indication:Smoker  
                          Start:4-Aug-2017          Instruction Type:Patient   
Education  
  
   
                                                    How to access health informa  
tion   
online - Detail  
Indication:Smoker  
                          Start:4-Aug-2017          Instruction Type:Patient   
Education  
  
   
                                                    Patient Instructions  
Indication:Smoker  
                          Start:4-Aug-2017          Instruction Type:Provider   
Instructions for Treatment  
  
   
                                                    How to access health informa  
tion   
online  
Indication:Smoker  
                          Start:3-Mar-2017          Instruction Type:Patient   
Education  
  
   
                                                    How to access health informa  
tion   
online - Detail  
Indication:Smoker  
                          Start:3-Mar-2017          Instruction Type:Patient   
Education  
  
   
                                                    Patient Instructions  
Indication:Smoker  
                          Start:3-Mar-2017          Instruction Type:Provider   
Instructions for Treatment  
  
   
                                                    How to access health informa  
tion   
online  
Indication:Hypertension,   
essential, benign  
                          Start:28-Oct-2016         Instruction Type:Patient   
Education  
  
   
                                                    How to access health informa  
tion   
online - Detail  
Indication:Hypertension,   
essential, benign  
                          Start:28-Oct-2016         Instruction Type:Patient   
Education  
  
   
                                                    Patient Instructions  
Indication:Hypertension,   
essential, benign  
                          Start:28-Oct-2016         Instruction Type:Provider   
Instructions for Treatment  
  
   
                                                    How to access health informa  
tion   
online  
Indication:Hypertension,   
essential, benign  
                          Start:2016         Instruction Type:Patient   
Education  
  
   
                                                    How to access health informa  
tion   
online - Detail  
Indication:Hypertension,   
essential, benign  
                          Start:2016         Instruction Type:Patient   
Education  
  
   
                                                    Patient Instructions  
Indication:Hypertension,   
essential, benign  
                          Start:2016         Instruction Type:Provider   
Instructions for Treatment  
  
   
                                                    How to access health informa  
tion   
online  
Indication:BMI 27.0-27.9,adult  
                          Start:2016         Instruction Type:Patient   
Education  
  
   
                                                    How to access health informa  
tion   
online - Detail  
Indication:BMI 27.0-27.9,adult  
                          Start:2016         Instruction Type:Patient   
Education  
  
   
                                                    Patient Instructions  
Indication:BMI 27.0-27.9,adult  
                          Start:2016         Instruction Type:Provider   
Instructions for Treatment  
  
   
                                                    How to access health informa  
tion   
online  
Indication:Abnormal glucose   
tolerance test  
                          Start:2016         Instruction Type:Patient   
Education  
  
   
                                                    How to access health informa  
tion   
online - Detail  
Indication:Abnormal glucose   
tolerance test  
                          Start:2016         Instruction Type:Patient   
Education  
  
   
                                                    Patient Instructions  
Indication:Abnormal glucose   
tolerance test  
                          Start:2016         Instruction Type:Provider   
Instructions for Treatment  
  
   
                                                    How to access health informa  
tion   
online  
Indication:Abnormal glucose   
tolerance test  
                          Start:16-Dec-2015         Instruction Type:Patient   
Education  
  
   
                                                    How to access health informa  
tion   
online - Detail  
Indication:Abnormal glucose   
tolerance test  
                          Start:16-Dec-2015         Instruction Type:Patient   
Education  
  
   
                                                    Patient Instructions  
Indication:Abnormal glucose   
tolerance test  
                          Start:16-Dec-2015         Instruction Type:Provider   
Instructions for Treatment  
  
   
                                                    How to access health informa  
tion   
online  
Indication:Hypercholesteremia  
                          Start:16-Sep-2015         Instruction Type:Patient   
Education  
  
   
                                                    How to access health informa  
tion   
online - Detail  
Indication:Hypercholesteremia  
                          Start:16-Sep-2015         Instruction Type:Patient   
Education  
  
   
                                                    Patient Instructions  
Indication:Hypercholesteremia  
                          Start:16-Sep-2015         Instruction Type:Provider   
Instructions for Treatment  
  
   
                                                    Patient Instructions  
Indication:Abnormal glucose   
tolerance test  
                          Start:9-May-2014          Instruction Type:Provider   
Instructions for Treatment  
  
   
                                                    Patient Instructions  
Indication:Abnormal glucose   
tolerance test  
                          Start:2014          Instruction Type:Provider   
Instructions for Treatment  
  
   
                                                    Patient Instructions  
Indication:Abnormal glucose   
tolerance test  
                          Start:12-Sep-2013         Instruction Type:Provider   
Instructions for Treatment  
  
   
                                                    Patient Instructions  
Indication:Hypercholesteremia  
                          Start:15-Mar-2013         Instruction Type:Provider   
Instructions for Treatment  
  
   
                                                    Patient Instructions  
Indication:Hypertension,   
essential, benign  
                          Start:14-Sep-2012         Instruction Type:Provider   
Instructions for Treatment  
  
  
  
  
                                Name            Dates           Details  
   
                                                    How to access health informa  
tion   
online - Detail  
Indication:BMI 25.0-25.9,adult  
                          Start:2020         Instruction Type:Patient   
Education  
  
   
                                                    How to access health informa  
tion   
online  
Indication:BMI 25.0-25.9,adult  
                          Start:2020         Instruction Type:Patient   
Education  
  
   
                                                    Patient Instructions  
Indication:BMI 25.0-25.9,adult  
                          Start:2020         Instruction Type:Provider   
Instructions for Treatment  
  
   
                                                    How to access health informa  
tion   
online  
Indication:Smoker  
                          Start:2020          Instruction Type:Patient   
Education  
  
   
                                                    How to access health informa  
tion   
online - Detail  
Indication:Smoker  
                          Start:2020          Instruction Type:Patient   
Education  
  
   
                                                    Patient Instructions  
Indication:Smoker  
                          Start:2020          Instruction Type:Provider   
Instructions for Treatment  
  
   
                                                    How to access health informa  
tion   
online  
Indication:Smoker  
                          Start:2020         Instruction Type:Patient   
Education  
  
   
                                                    How to access health informa  
tion   
online - Detail  
Indication:Smoker  
                          Start:2020         Instruction Type:Patient   
Education  
  
   
                                                    Patient Instructions  
Indication:Smoker  
                          Start:2020         Instruction Type:Provider   
Instructions for Treatment  
  
   
                                                    How to access health informa  
tion   
online  
Indication:Smoker  
                          Start:13-Sep-2019         Instruction Type:Patient   
Education  
  
   
                                                    How to access health informa  
tion   
online - Detail  
Indication:Smoker  
                          Start:13-Sep-2019         Instruction Type:Patient   
Education  
  
   
                                                    Patient Instructions  
Indication:Smoker  
                          Start:13-Sep-2019         Instruction Type:Provider   
Instructions for Treatment  
  
   
                                                    How to access health informa  
tion   
online  
Indication:BMI 26.0-26.9,adult  
                          Start:10-May-2019         Instruction Type:Patient   
Education  
  
   
                                                    How to access health informa  
tion   
online - Detail  
Indication:BMI 26.0-26.9,adult  
                          Start:10-May-2019         Instruction Type:Patient   
Education  
  
   
                                                    Patient Instructions  
Indication:BMI 26.0-26.9,adult  
                          Start:10-May-2019         Instruction Type:Provider   
Instructions for Treatment  
  
   
                                                    How to access health informa  
tion   
online  
Indication:Abnormal glucose   
tolerance test  
                          Start:28-Dec-2018         Instruction Type:Patient   
Education  
  
   
                                                    How to access health informa  
tion   
online - Detail  
Indication:Abnormal glucose   
tolerance test  
                          Start:28-Dec-2018         Instruction Type:Patient   
Education  
  
   
                                                    Patient Instructions  
Indication:Abnormal glucose   
tolerance test  
                          Start:28-Dec-2018         Instruction Type:Provider   
Instructions for Treatment  
  
   
                                                    How to access health informa  
tion   
online  
Indication:Smoker  
                          Start:29-Aug-2018         Instruction Type:Patient   
Education  
  
   
                                                    How to access health informa  
tion   
online - Detail  
Indication:Smoker  
                          Start:29-Aug-2018         Instruction Type:Patient   
Education  
  
   
                                                    Patient Instructions  
Indication:Smoker  
                          Start:29-Aug-2018         Instruction Type:Provider   
Instructions for Treatment  
  
   
                                                    How to access health informa  
tion   
online  
Indication:Abnormal glucose   
tolerance test  
                          Start:2018         Instruction Type:Patient   
Education  
  
   
                                                    How to access health informa  
tion   
online - Detail  
Indication:Abnormal glucose   
tolerance test  
                          Start:2018         Instruction Type:Patient   
Education  
  
   
                                                    Patient Instructions  
Indication:Abnormal glucose   
tolerance test  
                          Start:2018         Instruction Type:Provider   
Instructions for Treatment  
  
   
                                                    How to access health informa  
tion   
online  
Indication:Smoker  
                          Start:8-Dec-2017          Instruction Type:Patient   
Education  
  
   
                                                    How to access health informa  
tion   
online - Detail  
Indication:Smoker  
                          Start:8-Dec-2017          Instruction Type:Patient   
Education  
  
   
                                                    Patient Instructions  
Indication:Smoker  
                          Start:8-Dec-2017          Instruction Type:Provider   
Instructions for Treatment  
  
   
                                                    How to access health informa  
tion   
online  
Indication:Smoker  
                          Start:4-Aug-2017          Instruction Type:Patient   
Education  
  
   
                                                    How to access health informa  
tion   
online - Detail  
Indication:Smoker  
                          Start:4-Aug-2017          Instruction Type:Patient   
Education  
  
   
                                                    Patient Instructions  
Indication:Smoker  
                          Start:4-Aug-2017          Instruction Type:Provider   
Instructions for Treatment  
  
   
                                                    How to access health informa  
tion   
online  
Indication:Smoker  
                          Start:3-Mar-2017          Instruction Type:Patient   
Education  
  
   
                                                    How to access health informa  
tion   
online - Detail  
Indication:Smoker  
                          Start:3-Mar-2017          Instruction Type:Patient   
Education  
  
   
                                                    Patient Instructions  
Indication:Smoker  
                          Start:3-Mar-2017          Instruction Type:Provider   
Instructions for Treatment  
  
   
                                                    How to access health informa  
tion   
online  
Indication:Hypertension,   
essential, benign  
                          Start:28-Oct-2016         Instruction Type:Patient   
Education  
  
   
                                                    How to access health informa  
tion   
online - Detail  
Indication:Hypertension,   
essential, benign  
                          Start:28-Oct-2016         Instruction Type:Patient   
Education  
  
   
                                                    Patient Instructions  
Indication:Hypertension,   
essential, benign  
                          Start:28-Oct-2016         Instruction Type:Provider   
Instructions for Treatment  
  
   
                                                    How to access health informa  
tion   
online  
Indication:Hypertension,   
essential, benign  
                          Start:2016         Instruction Type:Patient   
Education  
  
   
                                                    How to access health informa  
tion   
online - Detail  
Indication:Hypertension,   
essential, benign  
                          Start:2016         Instruction Type:Patient   
Education  
  
   
                                                    Patient Instructions  
Indication:Hypertension,   
essential, benign  
                          Start:2016         Instruction Type:Provider   
Instructions for Treatment  
  
   
                                                    How to access health informa  
tion   
online  
Indication:BMI 27.0-27.9,adult  
                          Start:2016         Instruction Type:Patient   
Education  
  
   
                                                    How to access health informa  
tion   
online - Detail  
Indication:BMI 27.0-27.9,adult  
                          Start:2016         Instruction Type:Patient   
Education  
  
   
                                                    Patient Instructions  
Indication:BMI 27.0-27.9,adult  
                          Start:2016         Instruction Type:Provider   
Instructions for Treatment  
  
   
                                                    How to access health informa  
tion   
online  
Indication:Abnormal glucose   
tolerance test  
                          Start:2016         Instruction Type:Patient   
Education  
  
   
                                                    How to access health informa  
tion   
online - Detail  
Indication:Abnormal glucose   
tolerance test  
                          Start:2016         Instruction Type:Patient   
Education  
  
   
                                                    Patient Instructions  
Indication:Abnormal glucose   
tolerance test  
                          Start:2016         Instruction Type:Provider   
Instructions for Treatment  
  
   
                                                    How to access health informa  
tion   
online  
Indication:Abnormal glucose   
tolerance test  
                          Start:16-Dec-2015         Instruction Type:Patient   
Education  
  
   
                                                    How to access health informa  
tion   
online - Detail  
Indication:Abnormal glucose   
tolerance test  
                          Start:16-Dec-2015         Instruction Type:Patient   
Education  
  
   
                                                    Patient Instructions  
Indication:Abnormal glucose   
tolerance test  
                          Start:16-Dec-2015         Instruction Type:Provider   
Instructions for Treatment  
  
   
                                                    How to access health informa  
tion   
online  
Indication:Hypercholesteremia  
                          Start:16-Sep-2015         Instruction Type:Patient   
Education  
  
   
                                                    How to access health informa  
tion   
online - Detail  
Indication:Hypercholesteremia  
                          Start:16-Sep-2015         Instruction Type:Patient   
Education  
  
   
                                                    Patient Instructions  
Indication:Hypercholesteremia  
                          Start:16-Sep-2015         Instruction Type:Provider   
Instructions for Treatment  
  
   
                                                    Patient Instructions  
Indication:Abnormal glucose   
tolerance test  
                          Start:9-May-2014          Instruction Type:Provider   
Instructions for Treatment  
  
   
                                                    Patient Instructions  
Indication:Abnormal glucose   
tolerance test  
                          Start:2014          Instruction Type:Provider   
Instructions for Treatment  
  
   
                                                    Patient Instructions  
Indication:Abnormal glucose   
tolerance test  
                          Start:12-Sep-2013         Instruction Type:Provider   
Instructions for Treatment  
  
   
                                                    Patient Instructions  
Indication:Hypercholesteremia  
                          Start:15-Mar-2013         Instruction Type:Provider   
Instructions for Treatment  
  
   
                                                    Patient Instructions  
Indication:Hypertension,   
essential, benign  
                          Start:14-Sep-2012         Instruction Type:Provider   
Instructions for Treatment  
  
  
  
  
                                Name            Dates           Details  
   
                                                    How to access health informa  
tion   
online  
Indication:Smoker  
                          Start:24-Aug-2020         Instruction Type:Patient   
Education  
  
   
                                                    How to access health informa  
tion   
online - Detail  
Indication:Smoker  
                          Start:24-Aug-2020         Instruction Type:Patient   
Education  
  
   
                                                    Patient Instructions  
Indication:Smoker  
                          Start:24-Aug-2020         Instruction Type:Provider   
Instructions for Treatment  
  
   
                                                    How to access health informa  
tion   
online - Detail  
Indication:BMI 25.0-25.9,adult  
                          Start:2020         Instruction Type:Patient   
Education  
  
   
                                                    How to access health informa  
tion   
online  
Indication:BMI 25.0-25.9,adult  
                          Start:2020         Instruction Type:Patient   
Education  
  
   
                                                    Patient Instructions  
Indication:BMI 25.0-25.9,adult  
                          Start:2020         Instruction Type:Provider   
Instructions for Treatment  
  
   
                                                    How to access health informa  
tion   
online  
Indication:Smoker  
                          Start:2020          Instruction Type:Patient   
Education  
  
   
                                                    How to access health informa  
tion   
online - Detail  
Indication:Smoker  
                          Start:2020          Instruction Type:Patient   
Education  
  
   
                                                    Patient Instructions  
Indication:Smoker  
                          Start:2020          Instruction Type:Provider   
Instructions for Treatment  
  
   
                                                    How to access health informa  
tion   
online  
Indication:Smoker  
                          Start:2020         Instruction Type:Patient   
Education  
  
   
                                                    How to access health informa  
tion   
online - Detail  
Indication:Smoker  
                          Start:2020         Instruction Type:Patient   
Education  
  
   
                                                    Patient Instructions  
Indication:Smoker  
                          Start:2020         Instruction Type:Provider   
Instructions for Treatment  
  
   
                                                    How to access health informa  
tion   
online  
Indication:Smoker  
                          Start:13-Sep-2019         Instruction Type:Patient   
Education  
  
   
                                                    How to access health informa  
tion   
online - Detail  
Indication:Smoker  
                          Start:13-Sep-2019         Instruction Type:Patient   
Education  
  
   
                                                    Patient Instructions  
Indication:Smoker  
                          Start:13-Sep-2019         Instruction Type:Provider   
Instructions for Treatment  
  
   
                                                    How to access health informa  
tion   
online  
Indication:BMI 26.0-26.9,adult  
                          Start:10-May-2019         Instruction Type:Patient   
Education  
  
   
                                                    How to access health informa  
tion   
online - Detail  
Indication:BMI 26.0-26.9,adult  
                          Start:10-May-2019         Instruction Type:Patient   
Education  
  
   
                                                    Patient Instructions  
Indication:BMI 26.0-26.9,adult  
                          Start:10-May-2019         Instruction Type:Provider   
Instructions for Treatment  
  
   
                                                    How to access health informa  
tion   
online  
Indication:Abnormal glucose   
tolerance test  
                          Start:28-Dec-2018         Instruction Type:Patient   
Education  
  
   
                                                    How to access health informa  
tion   
online - Detail  
Indication:Abnormal glucose   
tolerance test  
                          Start:28-Dec-2018         Instruction Type:Patient   
Education  
  
   
                                                    Patient Instructions  
Indication:Abnormal glucose   
tolerance test  
                          Start:28-Dec-2018         Instruction Type:Provider   
Instructions for Treatment  
  
   
                                                    How to access health informa  
tion   
online  
Indication:Smoker  
                          Start:29-Aug-2018         Instruction Type:Patient   
Education  
  
   
                                                    How to access health informa  
tion   
online - Detail  
Indication:Smoker  
                          Start:29-Aug-2018         Instruction Type:Patient   
Education  
  
   
                                                    Patient Instructions  
Indication:Smoker  
                          Start:29-Aug-2018         Instruction Type:Provider   
Instructions for Treatment  
  
   
                                                    How to access health informa  
tion   
online  
Indication:Abnormal glucose   
tolerance test  
                          Start:2018         Instruction Type:Patient   
Education  
  
   
                                                    How to access health informa  
tion   
online - Detail  
Indication:Abnormal glucose   
tolerance test  
                          Start:2018         Instruction Type:Patient   
Education  
  
   
                                                    Patient Instructions  
Indication:Abnormal glucose   
tolerance test  
                          Start:2018         Instruction Type:Provider   
Instructions for Treatment  
  
   
                                                    How to access health informa  
tion   
online  
Indication:Smoker  
                          Start:8-Dec-2017          Instruction Type:Patient   
Education  
  
   
                                                    How to access health informa  
tion   
online - Detail  
Indication:Smoker  
                          Start:8-Dec-2017          Instruction Type:Patient   
Education  
  
   
                                                    Patient Instructions  
Indication:Smoker  
                          Start:8-Dec-2017          Instruction Type:Provider   
Instructions for Treatment  
  
   
                                                    How to access health informa  
tion   
online  
Indication:Smoker  
                          Start:4-Aug-2017          Instruction Type:Patient   
Education  
  
   
                                                    How to access health informa  
tion   
online - Detail  
Indication:Smoker  
                          Start:4-Aug-2017          Instruction Type:Patient   
Education  
  
   
                                                    Patient Instructions  
Indication:Smoker  
                          Start:4-Aug-2017          Instruction Type:Provider   
Instructions for Treatment  
  
   
                                                    How to access health informa  
tion   
online  
Indication:Smoker  
                          Start:3-Mar-2017          Instruction Type:Patient   
Education  
  
   
                                                    How to access health informa  
tion   
online - Detail  
Indication:Smoker  
                          Start:3-Mar-2017          Instruction Type:Patient   
Education  
  
   
                                                    Patient Instructions  
Indication:Smoker  
                          Start:3-Mar-2017          Instruction Type:Provider   
Instructions for Treatment  
  
   
                                                    How to access health informa  
tion   
online  
Indication:Hypertension,   
essential, benign  
                          Start:28-Oct-2016         Instruction Type:Patient   
Education  
  
   
                                                    How to access health informa  
tion   
online - Detail  
Indication:Hypertension,   
essential, benign  
                          Start:28-Oct-2016         Instruction Type:Patient   
Education  
  
   
                                                    Patient Instructions  
Indication:Hypertension,   
essential, benign  
                          Start:28-Oct-2016         Instruction Type:Provider   
Instructions for Treatment  
  
   
                                                    How to access health informa  
tion   
online  
Indication:Hypertension,   
essential, benign  
                          Start:2016         Instruction Type:Patient   
Education  
  
   
                                                    How to access health informa  
tion   
online - Detail  
Indication:Hypertension,   
essential, benign  
                          Start:2016         Instruction Type:Patient   
Education  
  
   
                                                    Patient Instructions  
Indication:Hypertension,   
essential, benign  
                          Start:2016         Instruction Type:Provider   
Instructions for Treatment  
  
   
                                                    How to access health informa  
tion   
online  
Indication:BMI 27.0-27.9,adult  
                          Start:2016         Instruction Type:Patient   
Education  
  
   
                                                    How to access health informa  
tion   
online - Detail  
Indication:BMI 27.0-27.9,adult  
                          Start:2016         Instruction Type:Patient   
Education  
  
   
                                                    Patient Instructions  
Indication:BMI 27.0-27.9,adult  
                          Start:2016         Instruction Type:Provider   
Instructions for Treatment  
  
   
                                                    How to access health informa  
tion   
online  
Indication:Abnormal glucose   
tolerance test  
                          Start:2016         Instruction Type:Patient   
Education  
  
   
                                                    How to access health informa  
tion   
online - Detail  
Indication:Abnormal glucose   
tolerance test  
                          Start:2016         Instruction Type:Patient   
Education  
  
   
                                                    Patient Instructions  
Indication:Abnormal glucose   
tolerance test  
                          Start:2016         Instruction Type:Provider   
Instructions for Treatment  
  
   
                                                    How to access health informa  
tion   
online  
Indication:Abnormal glucose   
tolerance test  
                          Start:16-Dec-2015         Instruction Type:Patient   
Education  
  
   
                                                    How to access health informa  
tion   
online - Detail  
Indication:Abnormal glucose   
tolerance test  
                          Start:16-Dec-2015         Instruction Type:Patient   
Education  
  
   
                                                    Patient Instructions  
Indication:Abnormal glucose   
tolerance test  
                          Start:16-Dec-2015         Instruction Type:Provider   
Instructions for Treatment  
  
   
                                                    How to access health informa  
tion   
online  
Indication:Hypercholesteremia  
                          Start:16-Sep-2015         Instruction Type:Patient   
Education  
  
   
                                                    How to access health informa  
tion   
online - Detail  
Indication:Hypercholesteremia  
                          Start:16-Sep-2015         Instruction Type:Patient   
Education  
  
   
                                                    Patient Instructions  
Indication:Hypercholesteremia  
                          Start:16-Sep-2015         Instruction Type:Provider   
Instructions for Treatment  
  
   
                                                    Patient Instructions  
Indication:Abnormal glucose   
tolerance test  
                          Start:9-May-2014          Instruction Type:Provider   
Instructions for Treatment  
  
   
                                                    Patient Instructions  
Indication:Abnormal glucose   
tolerance test  
                          Start:2014          Instruction Type:Provider   
Instructions for Treatment  
  
   
                                                    Patient Instructions  
Indication:Abnormal glucose   
tolerance test  
                          Start:12-Sep-2013         Instruction Type:Provider   
Instructions for Treatment  
  
   
                                                    Patient Instructions  
Indication:Hypercholesteremia  
                          Start:15-Mar-2013         Instruction Type:Provider   
Instructions for Treatment  
  
   
                                                    Patient Instructions  
Indication:Hypertension,   
essential, benign  
                          Start:14-Sep-2012         Instruction Type:Provider   
Instructions for Treatment  
  
  
  
  
                                Name            Dates           Details  
   
                                                    How to access health informa  
tion   
online  
Indication:Smoker  
                          Start:24-Aug-2020         Instruction Type:Patient   
Education  
  
   
                                                    How to access health informa  
tion   
online - Detail  
Indication:Smoker  
                          Start:24-Aug-2020         Instruction Type:Patient   
Education  
  
   
                                                    Patient Instructions  
Indication:Smoker  
                          Start:24-Aug-2020         Instruction Type:Provider   
Instructions for Treatment  
  
   
                                                    How to access health informa  
tion   
online - Detail  
Indication:BMI 25.0-25.9,adult  
                          Start:2020         Instruction Type:Patient   
Education  
  
   
                                                    How to access health informa  
tion   
online  
Indication:BMI 25.0-25.9,adult  
                          Start:2020         Instruction Type:Patient   
Education  
  
   
                                                    Patient Instructions  
Indication:BMI 25.0-25.9,adult  
                          Start:2020         Instruction Type:Provider   
Instructions for Treatment  
  
   
                                                    How to access health informa  
tion   
online  
Indication:Smoker  
                          Start:2020          Instruction Type:Patient   
Education  
  
   
                                                    How to access health informa  
tion   
online - Detail  
Indication:Smoker  
                          Start:2020          Instruction Type:Patient   
Education  
  
   
                                                    Patient Instructions  
Indication:Smoker  
                          Start:2020          Instruction Type:Provider   
Instructions for Treatment  
  
   
                                                    How to access health informa  
tion   
online  
Indication:Smoker  
                          Start:2020         Instruction Type:Patient   
Education  
  
   
                                                    How to access health informa  
tion   
online - Detail  
Indication:Smoker  
                          Start:2020         Instruction Type:Patient   
Education  
  
   
                                                    Patient Instructions  
Indication:Smoker  
                          Start:2020         Instruction Type:Provider   
Instructions for Treatment  
  
   
                                                    How to access health informa  
tion   
online  
Indication:Smoker  
                          Start:13-Sep-2019         Instruction Type:Patient   
Education  
  
   
                                                    How to access health informa  
tion   
online - Detail  
Indication:Smoker  
                          Start:13-Sep-2019         Instruction Type:Patient   
Education  
  
   
                                                    Patient Instructions  
Indication:Smoker  
                          Start:13-Sep-2019         Instruction Type:Provider   
Instructions for Treatment  
  
   
                                                    How to access health informa  
tion   
online  
Indication:BMI 26.0-26.9,adult  
                          Start:10-May-2019         Instruction Type:Patient   
Education  
  
   
                                                    How to access health informa  
tion   
online - Detail  
Indication:BMI 26.0-26.9,adult  
                          Start:10-May-2019         Instruction Type:Patient   
Education  
  
   
                                                    Patient Instructions  
Indication:BMI 26.0-26.9,adult  
                          Start:10-May-2019         Instruction Type:Provider   
Instructions for Treatment  
  
   
                                                    How to access health informa  
tion   
online  
Indication:Abnormal glucose   
tolerance test  
                          Start:28-Dec-2018         Instruction Type:Patient   
Education  
  
   
                                                    How to access health informa  
tion   
online - Detail  
Indication:Abnormal glucose   
tolerance test  
                          Start:28-Dec-2018         Instruction Type:Patient   
Education  
  
   
                                                    Patient Instructions  
Indication:Abnormal glucose   
tolerance test  
                          Start:28-Dec-2018         Instruction Type:Provider   
Instructions for Treatment  
  
   
                                                    How to access health informa  
tion   
online  
Indication:Smoker  
                          Start:29-Aug-2018         Instruction Type:Patient   
Education  
  
   
                                                    How to access health informa  
tion   
online - Detail  
Indication:Smoker  
                          Start:29-Aug-2018         Instruction Type:Patient   
Education  
  
   
                                                    Patient Instructions  
Indication:Smoker  
                          Start:29-Aug-2018         Instruction Type:Provider   
Instructions for Treatment  
  
   
                                                    How to access health informa  
tion   
online  
Indication:Abnormal glucose   
tolerance test  
                          Start:2018         Instruction Type:Patient   
Education  
  
   
                                                    How to access health informa  
tion   
online - Detail  
Indication:Abnormal glucose   
tolerance test  
                          Start:2018         Instruction Type:Patient   
Education  
  
   
                                                    Patient Instructions  
Indication:Abnormal glucose   
tolerance test  
                          Start:2018         Instruction Type:Provider   
Instructions for Treatment  
  
   
                                                    How to access health informa  
tion   
online  
Indication:Smoker  
                          Start:8-Dec-2017          Instruction Type:Patient   
Education  
  
   
                                                    How to access health informa  
tion   
online - Detail  
Indication:Smoker  
                          Start:8-Dec-2017          Instruction Type:Patient   
Education  
  
   
                                                    Patient Instructions  
Indication:Smoker  
                          Start:8-Dec-2017          Instruction Type:Provider   
Instructions for Treatment  
  
   
                                                    How to access health informa  
tion   
online  
Indication:Smoker  
                          Start:4-Aug-2017          Instruction Type:Patient   
Education  
  
   
                                                    How to access health informa  
tion   
online - Detail  
Indication:Smoker  
                          Start:4-Aug-2017          Instruction Type:Patient   
Education  
  
   
                                                    Patient Instructions  
Indication:Smoker  
                          Start:4-Aug-2017          Instruction Type:Provider   
Instructions for Treatment  
  
   
                                                    How to access health informa  
tion   
online  
Indication:Smoker  
                          Start:3-Mar-2017          Instruction Type:Patient   
Education  
  
   
                                                    How to access health informa  
tion   
online - Detail  
Indication:Smoker  
                          Start:3-Mar-2017          Instruction Type:Patient   
Education  
  
   
                                                    Patient Instructions  
Indication:Smoker  
                          Start:3-Mar-2017          Instruction Type:Provider   
Instructions for Treatment  
  
   
                                                    How to access health informa  
tion   
online  
Indication:Hypertension,   
essential, benign  
                          Start:28-Oct-2016         Instruction Type:Patient   
Education  
  
   
                                                    How to access health informa  
tion   
online - Detail  
Indication:Hypertension,   
essential, benign  
                          Start:28-Oct-2016         Instruction Type:Patient   
Education  
  
   
                                                    Patient Instructions  
Indication:Hypertension,   
essential, benign  
                          Start:28-Oct-2016         Instruction Type:Provider   
Instructions for Treatment  
  
   
                                                    How to access health informa  
tion   
online  
Indication:Hypertension,   
essential, benign  
                          Start:2016         Instruction Type:Patient   
Education  
  
   
                                                    How to access health informa  
tion   
online - Detail  
Indication:Hypertension,   
essential, benign  
                          Start:2016         Instruction Type:Patient   
Education  
  
   
                                                    Patient Instructions  
Indication:Hypertension,   
essential, benign  
                          Start:2016         Instruction Type:Provider   
Instructions for Treatment  
  
   
                                                    How to access health informa  
tion   
online  
Indication:BMI 27.0-27.9,adult  
                          Start:2016         Instruction Type:Patient   
Education  
  
   
                                                    How to access health informa  
tion   
online - Detail  
Indication:BMI 27.0-27.9,adult  
                          Start:2016         Instruction Type:Patient   
Education  
  
   
                                                    Patient Instructions  
Indication:BMI 27.0-27.9,adult  
                          Start:2016         Instruction Type:Provider   
Instructions for Treatment  
  
   
                                                    How to access health informa  
tion   
online  
Indication:Abnormal glucose   
tolerance test  
                          Start:2016         Instruction Type:Patient   
Education  
  
   
                                                    How to access health informa  
tion   
online - Detail  
Indication:Abnormal glucose   
tolerance test  
                          Start:2016         Instruction Type:Patient   
Education  
  
   
                                                    Patient Instructions  
Indication:Abnormal glucose   
tolerance test  
                          Start:2016         Instruction Type:Provider   
Instructions for Treatment  
  
   
                                                    How to access health informa  
tion   
online  
Indication:Abnormal glucose   
tolerance test  
                          Start:16-Dec-2015         Instruction Type:Patient   
Education  
  
   
                                                    How to access health informa  
tion   
online - Detail  
Indication:Abnormal glucose   
tolerance test  
                          Start:16-Dec-2015         Instruction Type:Patient   
Education  
  
   
                                                    Patient Instructions  
Indication:Abnormal glucose   
tolerance test  
                          Start:16-Dec-2015         Instruction Type:Provider   
Instructions for Treatment  
  
   
                                                    How to access health informa  
tion   
online  
Indication:Hypercholesteremia  
                          Start:16-Sep-2015         Instruction Type:Patient   
Education  
  
   
                                                    How to access health informa  
tion   
online - Detail  
Indication:Hypercholesteremia  
                          Start:16-Sep-2015         Instruction Type:Patient   
Education  
  
   
                                                    Patient Instructions  
Indication:Hypercholesteremia  
                          Start:16-Sep-2015         Instruction Type:Provider   
Instructions for Treatment  
  
   
                                                    Patient Instructions  
Indication:Abnormal glucose   
tolerance test  
                          Start:9-May-2014          Instruction Type:Provider   
Instructions for Treatment  
  
   
                                                    Patient Instructions  
Indication:Abnormal glucose   
tolerance test  
                          Start:2014          Instruction Type:Provider   
Instructions for Treatment  
  
   
                                                    Patient Instructions  
Indication:Abnormal glucose   
tolerance test  
                          Start:12-Sep-2013         Instruction Type:Provider   
Instructions for Treatment  
  
   
                                                    Patient Instructions  
Indication:Hypercholesteremia  
                          Start:15-Mar-2013         Instruction Type:Provider   
Instructions for Treatment  
  
   
                                                    Patient Instructions  
Indication:Hypertension,   
essential, benign  
                          Start:14-Sep-2012         Instruction Type:Provider   
Instructions for Treatment  
  
  
  
  
                                Name            Dates           Details  
   
                                                    How to access health informa  
tion   
online  
Indication:Smoker  
                          Start:2020         Instruction Type:Patient   
Education  
  
   
                                                    How to access health informa  
tion   
online - Detail  
Indication:Smoker  
                          Start:2020         Instruction Type:Patient   
Education  
  
   
                                                    Patient Instructions  
Indication:Smoker  
                          Start:2020         Instruction Type:Provider   
Instructions for Treatment  
  
   
                                                    How to access health informa  
tion   
online  
Indication:Smoker  
                          Start:24-Aug-2020         Instruction Type:Patient   
Education  
  
   
                                                    How to access health informa  
tion   
online - Detail  
Indication:Smoker  
                          Start:24-Aug-2020         Instruction Type:Patient   
Education  
  
   
                                                    Patient Instructions  
Indication:Smoker  
                          Start:24-Aug-2020         Instruction Type:Provider   
Instructions for Treatment  
  
   
                                                    How to access health informa  
tion   
online - Detail  
Indication:BMI 25.0-25.9,adult  
                          Start:2020         Instruction Type:Patient   
Education  
  
   
                                                    How to access health informa  
tion   
online  
Indication:BMI 25.0-25.9,adult  
                          Start:2020         Instruction Type:Patient   
Education  
  
   
                                                    Patient Instructions  
Indication:BMI 25.0-25.9,adult  
                          Start:2020         Instruction Type:Provider   
Instructions for Treatment  
  
   
                                                    How to access health informa  
tion   
online  
Indication:Smoker  
                          Start:2020          Instruction Type:Patient   
Education  
  
   
                                                    How to access health informa  
tion   
online - Detail  
Indication:Smoker  
                          Start:2020          Instruction Type:Patient   
Education  
  
   
                                                    Patient Instructions  
Indication:Smoker  
                          Start:2020          Instruction Type:Provider   
Instructions for Treatment  
  
   
                                                    How to access health informa  
tion   
online  
Indication:Smoker  
                          Start:2020         Instruction Type:Patient   
Education  
  
   
                                                    How to access health informa  
tion   
online - Detail  
Indication:Smoker  
                          Start:2020         Instruction Type:Patient   
Education  
  
   
                                                    Patient Instructions  
Indication:Smoker  
                          Start:2020         Instruction Type:Provider   
Instructions for Treatment  
  
   
                                                    How to access health informa  
tion   
online  
Indication:Smoker  
                          Start:13-Sep-2019         Instruction Type:Patient   
Education  
  
   
                                                    How to access health informa  
tion   
online - Detail  
Indication:Smoker  
                          Start:13-Sep-2019         Instruction Type:Patient   
Education  
  
   
                                                    Patient Instructions  
Indication:Smoker  
                          Start:13-Sep-2019         Instruction Type:Provider   
Instructions for Treatment  
  
   
                                                    How to access health informa  
tion   
online  
Indication:BMI 26.0-26.9,adult  
                          Start:10-May-2019         Instruction Type:Patient   
Education  
  
   
                                                    How to access health informa  
tion   
online - Detail  
Indication:BMI 26.0-26.9,adult  
                          Start:10-May-2019         Instruction Type:Patient   
Education  
  
   
                                                    Patient Instructions  
Indication:BMI 26.0-26.9,adult  
                          Start:10-May-2019         Instruction Type:Provider   
Instructions for Treatment  
  
   
                                                    How to access health informa  
tion   
online  
Indication:Abnormal glucose   
tolerance test  
                          Start:28-Dec-2018         Instruction Type:Patient   
Education  
  
   
                                                    How to access health informa  
tion   
online - Detail  
Indication:Abnormal glucose   
tolerance test  
                          Start:28-Dec-2018         Instruction Type:Patient   
Education  
  
   
                                                    Patient Instructions  
Indication:Abnormal glucose   
tolerance test  
                          Start:28-Dec-2018         Instruction Type:Provider   
Instructions for Treatment  
  
   
                                                    How to access health informa  
tion   
online  
Indication:Smoker  
                          Start:29-Aug-2018         Instruction Type:Patient   
Education  
  
   
                                                    How to access health informa  
tion   
online - Detail  
Indication:Smoker  
                          Start:29-Aug-2018         Instruction Type:Patient   
Education  
  
   
                                                    Patient Instructions  
Indication:Smoker  
                          Start:29-Aug-2018         Instruction Type:Provider   
Instructions for Treatment  
  
   
                                                    How to access health informa  
tion   
online  
Indication:Abnormal glucose   
tolerance test  
                          Start:2018         Instruction Type:Patient   
Education  
  
   
                                                    How to access health informa  
tion   
online - Detail  
Indication:Abnormal glucose   
tolerance test  
                          Start:2018         Instruction Type:Patient   
Education  
  
   
                                                    Patient Instructions  
Indication:Abnormal glucose   
tolerance test  
                          Start:2018         Instruction Type:Provider   
Instructions for Treatment  
  
   
                                                    How to access health informa  
tion   
online  
Indication:Smoker  
                          Start:8-Dec-2017          Instruction Type:Patient   
Education  
  
   
                                                    How to access health informa  
tion   
online - Detail  
Indication:Smoker  
                          Start:8-Dec-2017          Instruction Type:Patient   
Education  
  
   
                                                    Patient Instructions  
Indication:Smoker  
                          Start:8-Dec-2017          Instruction Type:Provider   
Instructions for Treatment  
  
   
                                                    How to access health informa  
tion   
online  
Indication:Smoker  
                          Start:4-Aug-2017          Instruction Type:Patient   
Education  
  
   
                                                    How to access health informa  
tion   
online - Detail  
Indication:Smoker  
                          Start:4-Aug-2017          Instruction Type:Patient   
Education  
  
   
                                                    Patient Instructions  
Indication:Smoker  
                          Start:4-Aug-2017          Instruction Type:Provider   
Instructions for Treatment  
  
   
                                                    How to access health informa  
tion   
online  
Indication:Smoker  
                          Start:3-Mar-2017          Instruction Type:Patient   
Education  
  
   
                                                    How to access health informa  
tion   
online - Detail  
Indication:Smoker  
                          Start:3-Mar-2017          Instruction Type:Patient   
Education  
  
   
                                                    Patient Instructions  
Indication:Smoker  
                          Start:3-Mar-2017          Instruction Type:Provider   
Instructions for Treatment  
  
   
                                                    How to access health informa  
tion   
online  
Indication:Hypertension,   
essential, benign  
                          Start:28-Oct-2016         Instruction Type:Patient   
Education  
  
   
                                                    How to access health informa  
tion   
online - Detail  
Indication:Hypertension,   
essential, benign  
                          Start:28-Oct-2016         Instruction Type:Patient   
Education  
  
   
                                                    Patient Instructions  
Indication:Hypertension,   
essential, benign  
                          Start:28-Oct-2016         Instruction Type:Provider   
Instructions for Treatment  
  
   
                                                    How to access health informa  
tion   
online  
Indication:Hypertension,   
essential, benign  
                          Start:2016         Instruction Type:Patient   
Education  
  
   
                                                    How to access health informa  
tion   
online - Detail  
Indication:Hypertension,   
essential, benign  
                          Start:2016         Instruction Type:Patient   
Education  
  
   
                                                    Patient Instructions  
Indication:Hypertension,   
essential, benign  
                          Start:2016         Instruction Type:Provider   
Instructions for Treatment  
  
   
                                                    How to access health informa  
tion   
online  
Indication:BMI 27.0-27.9,adult  
                          Start:2016         Instruction Type:Patient   
Education  
  
   
                                                    How to access health informa  
tion   
online - Detail  
Indication:BMI 27.0-27.9,adult  
                          Start:2016         Instruction Type:Patient   
Education  
  
   
                                                    Patient Instructions  
Indication:BMI 27.0-27.9,adult  
                          Start:2016         Instruction Type:Provider   
Instructions for Treatment  
  
   
                                                    How to access health informa  
tion   
online  
Indication:Abnormal glucose   
tolerance test  
                          Start:2016         Instruction Type:Patient   
Education  
  
   
                                                    How to access health informa  
tion   
online - Detail  
Indication:Abnormal glucose   
tolerance test  
                          Start:2016         Instruction Type:Patient   
Education  
  
   
                                                    Patient Instructions  
Indication:Abnormal glucose   
tolerance test  
                          Start:2016         Instruction Type:Provider   
Instructions for Treatment  
  
   
                                                    How to access health informa  
tion   
online  
Indication:Abnormal glucose   
tolerance test  
                          Start:16-Dec-2015         Instruction Type:Patient   
Education  
  
   
                                                    How to access health informa  
tion   
online - Detail  
Indication:Abnormal glucose   
tolerance test  
                          Start:16-Dec-2015         Instruction Type:Patient   
Education  
  
   
                                                    Patient Instructions  
Indication:Abnormal glucose   
tolerance test  
                          Start:16-Dec-2015         Instruction Type:Provider   
Instructions for Treatment  
  
   
                                                    How to access health informa  
tion   
online  
Indication:Hypercholesteremia  
                          Start:16-Sep-2015         Instruction Type:Patient   
Education  
  
   
                                                    How to access health informa  
tion   
online - Detail  
Indication:Hypercholesteremia  
                          Start:16-Sep-2015         Instruction Type:Patient   
Education  
  
   
                                                    Patient Instructions  
Indication:Hypercholesteremia  
                          Start:16-Sep-2015         Instruction Type:Provider   
Instructions for Treatment  
  
   
                                                    Patient Instructions  
Indication:Abnormal glucose   
tolerance test  
                          Start:9-May-2014          Instruction Type:Provider   
Instructions for Treatment  
  
   
                                                    Patient Instructions  
Indication:Abnormal glucose   
tolerance test  
                          Start:2014          Instruction Type:Provider   
Instructions for Treatment  
  
   
                                                    Patient Instructions  
Indication:Abnormal glucose   
tolerance test  
                          Start:12-Sep-2013         Instruction Type:Provider   
Instructions for Treatment  
  
   
                                                    Patient Instructions  
Indication:Hypercholesteremia  
                          Start:15-Mar-2013         Instruction Type:Provider   
Instructions for Treatment  
  
   
                                                    Patient Instructions  
Indication:Hypertension,   
essential, benign  
                          Start:14-Sep-2012         Instruction Type:Provider   
Instructions for Treatment  
  
  
  
  
                                Name            Dates           Details  
   
                                                    How to access health informa  
tion   
online  
Indication:Smoker  
                          Start:2020         Instruction Type:Patient   
Education  
  
   
                                                    How to access health informa  
tion   
online - Detail  
Indication:Smoker  
                          Start:2020         Instruction Type:Patient   
Education  
  
   
                                                    Patient Instructions  
Indication:Smoker  
                          Start:2020         Instruction Type:Provider   
Instructions for Treatment  
  
   
                                                    How to access health informa  
tion   
online  
Indication:Smoker  
                          Start:24-Aug-2020         Instruction Type:Patient   
Education  
  
   
                                                    How to access health informa  
tion   
online - Detail  
Indication:Smoker  
                          Start:24-Aug-2020         Instruction Type:Patient   
Education  
  
   
                                                    Patient Instructions  
Indication:Smoker  
                          Start:24-Aug-2020         Instruction Type:Provider   
Instructions for Treatment  
  
   
                                                    How to access health informa  
tion   
online - Detail  
Indication:BMI 25.0-25.9,adult  
                          Start:2020         Instruction Type:Patient   
Education  
  
   
                                                    How to access health informa  
tion   
online  
Indication:BMI 25.0-25.9,adult  
                          Start:2020         Instruction Type:Patient   
Education  
  
   
                                                    Patient Instructions  
Indication:BMI 25.0-25.9,adult  
                          Start:2020         Instruction Type:Provider   
Instructions for Treatment  
  
   
                                                    How to access health informa  
tion   
online  
Indication:Smoker  
                          Start:2020          Instruction Type:Patient   
Education  
  
   
                                                    How to access health informa  
tion   
online - Detail  
Indication:Smoker  
                          Start:2020          Instruction Type:Patient   
Education  
  
   
                                                    Patient Instructions  
Indication:Smoker  
                          Start:2020          Instruction Type:Provider   
Instructions for Treatment  
  
   
                                                    How to access health informa  
tion   
online  
Indication:Smoker  
                          Start:2020         Instruction Type:Patient   
Education  
  
   
                                                    How to access health informa  
tion   
online - Detail  
Indication:Smoker  
                          Start:2020         Instruction Type:Patient   
Education  
  
   
                                                    Patient Instructions  
Indication:Smoker  
                          Start:2020         Instruction Type:Provider   
Instructions for Treatment  
  
   
                                                    How to access health informa  
tion   
online  
Indication:Smoker  
                          Start:13-Sep-2019         Instruction Type:Patient   
Education  
  
   
                                                    How to access health informa  
tion   
online - Detail  
Indication:Smoker  
                          Start:13-Sep-2019         Instruction Type:Patient   
Education  
  
   
                                                    Patient Instructions  
Indication:Smoker  
                          Start:13-Sep-2019         Instruction Type:Provider   
Instructions for Treatment  
  
   
                                                    How to access health informa  
tion   
online  
Indication:BMI 26.0-26.9,adult  
                          Start:10-May-2019         Instruction Type:Patient   
Education  
  
   
                                                    How to access health informa  
tion   
online - Detail  
Indication:BMI 26.0-26.9,adult  
                          Start:10-May-2019         Instruction Type:Patient   
Education  
  
   
                                                    Patient Instructions  
Indication:BMI 26.0-26.9,adult  
                          Start:10-May-2019         Instruction Type:Provider   
Instructions for Treatment  
  
   
                                                    How to access health informa  
tion   
online  
Indication:Abnormal glucose   
tolerance test  
                          Start:28-Dec-2018         Instruction Type:Patient   
Education  
  
   
                                                    How to access health informa  
tion   
online - Detail  
Indication:Abnormal glucose   
tolerance test  
                          Start:28-Dec-2018         Instruction Type:Patient   
Education  
  
   
                                                    Patient Instructions  
Indication:Abnormal glucose   
tolerance test  
                          Start:28-Dec-2018         Instruction Type:Provider   
Instructions for Treatment  
  
   
                                                    How to access health informa  
tion   
online  
Indication:Smoker  
                          Start:29-Aug-2018         Instruction Type:Patient   
Education  
  
   
                                                    How to access health informa  
tion   
online - Detail  
Indication:Smoker  
                          Start:29-Aug-2018         Instruction Type:Patient   
Education  
  
   
                                                    Patient Instructions  
Indication:Smoker  
                          Start:29-Aug-2018         Instruction Type:Provider   
Instructions for Treatment  
  
   
                                                    How to access health informa  
tion   
online  
Indication:Abnormal glucose   
tolerance test  
                          Start:2018         Instruction Type:Patient   
Education  
  
   
                                                    How to access health informa  
tion   
online - Detail  
Indication:Abnormal glucose   
tolerance test  
                          Start:2018         Instruction Type:Patient   
Education  
  
   
                                                    Patient Instructions  
Indication:Abnormal glucose   
tolerance test  
                          Start:2018         Instruction Type:Provider   
Instructions for Treatment  
  
   
                                                    How to access health informa  
tion   
online  
Indication:Smoker  
                          Start:8-Dec-2017          Instruction Type:Patient   
Education  
  
   
                                                    How to access health informa  
tion   
online - Detail  
Indication:Smoker  
                          Start:8-Dec-2017          Instruction Type:Patient   
Education  
  
   
                                                    Patient Instructions  
Indication:Smoker  
                          Start:8-Dec-2017          Instruction Type:Provider   
Instructions for Treatment  
  
   
                                                    How to access health informa  
tion   
online  
Indication:Smoker  
                          Start:4-Aug-2017          Instruction Type:Patient   
Education  
  
   
                                                    How to access health informa  
tion   
online - Detail  
Indication:Smoker  
                          Start:4-Aug-2017          Instruction Type:Patient   
Education  
  
   
                                                    Patient Instructions  
Indication:Smoker  
                          Start:4-Aug-2017          Instruction Type:Provider   
Instructions for Treatment  
  
   
                                                    How to access health informa  
tion   
online  
Indication:Smoker  
                          Start:3-Mar-2017          Instruction Type:Patient   
Education  
  
   
                                                    How to access health informa  
tion   
online - Detail  
Indication:Smoker  
                          Start:3-Mar-2017          Instruction Type:Patient   
Education  
  
   
                                                    Patient Instructions  
Indication:Smoker  
                          Start:3-Mar-2017          Instruction Type:Provider   
Instructions for Treatment  
  
   
                                                    How to access health informa  
tion   
online  
Indication:Hypertension,   
essential, benign  
                          Start:28-Oct-2016         Instruction Type:Patient   
Education  
  
   
                                                    How to access health informa  
tion   
online - Detail  
Indication:Hypertension,   
essential, benign  
                          Start:28-Oct-2016         Instruction Type:Patient   
Education  
  
   
                                                    Patient Instructions  
Indication:Hypertension,   
essential, benign  
                          Start:28-Oct-2016         Instruction Type:Provider   
Instructions for Treatment  
  
   
                                                    How to access health informa  
tion   
online  
Indication:Hypertension,   
essential, benign  
                          Start:2016         Instruction Type:Patient   
Education  
  
   
                                                    How to access health informa  
tion   
online - Detail  
Indication:Hypertension,   
essential, benign  
                          Start:2016         Instruction Type:Patient   
Education  
  
   
                                                    Patient Instructions  
Indication:Hypertension,   
essential, benign  
                          Start:2016         Instruction Type:Provider   
Instructions for Treatment  
  
   
                                                    How to access health informa  
tion   
online  
Indication:BMI 27.0-27.9,adult  
                          Start:2016         Instruction Type:Patient   
Education  
  
   
                                                    How to access health informa  
tion   
online - Detail  
Indication:BMI 27.0-27.9,adult  
                          Start:2016         Instruction Type:Patient   
Education  
  
   
                                                    Patient Instructions  
Indication:BMI 27.0-27.9,adult  
                          Start:2016         Instruction Type:Provider   
Instructions for Treatment  
  
   
                                                    How to access health informa  
tion   
online  
Indication:Abnormal glucose   
tolerance test  
                          Start:2016         Instruction Type:Patient   
Education  
  
   
                                                    How to access health informa  
tion   
online - Detail  
Indication:Abnormal glucose   
tolerance test  
                          Start:2016         Instruction Type:Patient   
Education  
  
   
                                                    Patient Instructions  
Indication:Abnormal glucose   
tolerance test  
                          Start:2016         Instruction Type:Provider   
Instructions for Treatment  
  
   
                                                    How to access health informa  
tion   
online  
Indication:Abnormal glucose   
tolerance test  
                          Start:16-Dec-2015         Instruction Type:Patient   
Education  
  
   
                                                    How to access health informa  
tion   
online - Detail  
Indication:Abnormal glucose   
tolerance test  
                          Start:16-Dec-2015         Instruction Type:Patient   
Education  
  
   
                                                    Patient Instructions  
Indication:Abnormal glucose   
tolerance test  
                          Start:16-Dec-2015         Instruction Type:Provider   
Instructions for Treatment  
  
   
                                                    How to access health informa  
tion   
online  
Indication:Hypercholesteremia  
                          Start:16-Sep-2015         Instruction Type:Patient   
Education  
  
   
                                                    How to access health informa  
tion   
online - Detail  
Indication:Hypercholesteremia  
                          Start:16-Sep-2015         Instruction Type:Patient   
Education  
  
   
                                                    Patient Instructions  
Indication:Hypercholesteremia  
                          Start:16-Sep-2015         Instruction Type:Provider   
Instructions for Treatment  
  
   
                                                    Patient Instructions  
Indication:Abnormal glucose   
tolerance test  
                          Start:9-May-2014          Instruction Type:Provider   
Instructions for Treatment  
  
   
                                                    Patient Instructions  
Indication:Abnormal glucose   
tolerance test  
                          Start:2014          Instruction Type:Provider   
Instructions for Treatment  
  
   
                                                    Patient Instructions  
Indication:Abnormal glucose   
tolerance test  
                          Start:12-Sep-2013         Instruction Type:Provider   
Instructions for Treatment  
  
   
                                                    Patient Instructions  
Indication:Hypercholesteremia  
                          Start:15-Mar-2013         Instruction Type:Provider   
Instructions for Treatment  
  
   
                                                    Patient Instructions  
Indication:Hypertension,   
essential, benign  
                          Start:14-Sep-2012         Instruction Type:Provider   
Instructions for Treatment  
  
  
  
  
                                Name            Dates           Details  
   
                                                    How to Access Health Informa  
tion   
Online using Patient Portal and   
[employer]  
Indication:Smoker  
                          Start:16-Dec-2020         Instruction Type:Patient   
Education  
  
   
                                                    Patient Instructions  
Indication:Smoker  
                          Start:16-Dec-2020         Instruction Type:Provider   
Instructions for Treatment  
  
   
                                                    How to access health informa  
tion   
online  
Indication:Smoker  
                          Start:2020         Instruction Type:Patient   
Education  
  
   
                                                    How to access health informa  
tion   
online - Detail  
Indication:Smoker  
                          Start:2020         Instruction Type:Patient   
Education  
  
   
                                                    Patient Instructions  
Indication:Smoker  
                          Start:2020         Instruction Type:Provider   
Instructions for Treatment  
  
   
                                                    How to access health informa  
tion   
online  
Indication:Smoker  
                          Start:24-Aug-2020         Instruction Type:Patient   
Education  
  
   
                                                    How to access health informa  
tion   
online - Detail  
Indication:Smoker  
                          Start:24-Aug-2020         Instruction Type:Patient   
Education  
  
   
                                                    Patient Instructions  
Indication:Smoker  
                          Start:24-Aug-2020         Instruction Type:Provider   
Instructions for Treatment  
  
   
                                                    How to access health informa  
tion   
online - Detail  
Indication:BMI 25.0-25.9,adult  
                          Start:2020         Instruction Type:Patient   
Education  
  
   
                                                    How to access health informa  
tion   
online  
Indication:BMI 25.0-25.9,adult  
                          Start:2020         Instruction Type:Patient   
Education  
  
   
                                                    Patient Instructions  
Indication:BMI 25.0-25.9,adult  
                          Start:2020         Instruction Type:Provider   
Instructions for Treatment  
  
   
                                                    How to access health informa  
tion   
online  
Indication:Smoker  
                          Start:2020          Instruction Type:Patient   
Education  
  
   
                                                    How to access health informa  
tion   
online - Detail  
Indication:Smoker  
                          Start:2020          Instruction Type:Patient   
Education  
  
   
                                                    Patient Instructions  
Indication:Smoker  
                          Start:2020          Instruction Type:Provider   
Instructions for Treatment  
  
   
                                                    How to access health informa  
tion   
online  
Indication:Smoker  
                          Start:2020         Instruction Type:Patient   
Education  
  
   
                                                    How to access health informa  
tion   
online - Detail  
Indication:Smoker  
                          Start:2020         Instruction Type:Patient   
Education  
  
   
                                                    Patient Instructions  
Indication:Smoker  
                          Start:2020         Instruction Type:Provider   
Instructions for Treatment  
  
   
                                                    How to access health informa  
tion   
online  
Indication:Smoker  
                          Start:13-Sep-2019         Instruction Type:Patient   
Education  
  
   
                                                    How to access health informa  
tion   
online - Detail  
Indication:Smoker  
                          Start:13-Sep-2019         Instruction Type:Patient   
Education  
  
   
                                                    Patient Instructions  
Indication:Smoker  
                          Start:13-Sep-2019         Instruction Type:Provider   
Instructions for Treatment  
  
   
                                                    How to access health informa  
tion   
online  
Indication:BMI 26.0-26.9,adult  
                          Start:10-May-2019         Instruction Type:Patient   
Education  
  
   
                                                    How to access health informa  
tion   
online - Detail  
Indication:BMI 26.0-26.9,adult  
                          Start:10-May-2019         Instruction Type:Patient   
Education  
  
   
                                                    Patient Instructions  
Indication:BMI 26.0-26.9,adult  
                          Start:10-May-2019         Instruction Type:Provider   
Instructions for Treatment  
  
   
                                                    How to access health informa  
tion   
online  
Indication:Abnormal glucose   
tolerance test  
                          Start:28-Dec-2018         Instruction Type:Patient   
Education  
  
   
                                                    How to access health informa  
tion   
online - Detail  
Indication:Abnormal glucose   
tolerance test  
                          Start:28-Dec-2018         Instruction Type:Patient   
Education  
  
   
                                                    Patient Instructions  
Indication:Abnormal glucose   
tolerance test  
                          Start:28-Dec-2018         Instruction Type:Provider   
Instructions for Treatment  
  
   
                                                    How to access health informa  
tion   
online  
Indication:Smoker  
                          Start:29-Aug-2018         Instruction Type:Patient   
Education  
  
   
                                                    How to access health informa  
tion   
online - Detail  
Indication:Smoker  
                          Start:29-Aug-2018         Instruction Type:Patient   
Education  
  
   
                                                    Patient Instructions  
Indication:Smoker  
                          Start:29-Aug-2018         Instruction Type:Provider   
Instructions for Treatment  
  
   
                                                    How to access health informa  
tion   
online  
Indication:Abnormal glucose   
tolerance test  
                          Start:2018         Instruction Type:Patient   
Education  
  
   
                                                    How to access health informa  
tion   
online - Detail  
Indication:Abnormal glucose   
tolerance test  
                          Start:2018         Instruction Type:Patient   
Education  
  
   
                                                    Patient Instructions  
Indication:Abnormal glucose   
tolerance test  
                          Start:2018         Instruction Type:Provider   
Instructions for Treatment  
  
   
                                                    How to access health informa  
tion   
online  
Indication:Smoker  
                          Start:8-Dec-2017          Instruction Type:Patient   
Education  
  
   
                                                    How to access health informa  
tion   
online - Detail  
Indication:Smoker  
                          Start:8-Dec-2017          Instruction Type:Patient   
Education  
  
   
                                                    Patient Instructions  
Indication:Smoker  
                          Start:8-Dec-2017          Instruction Type:Provider   
Instructions for Treatment  
  
   
                                                    How to access health informa  
tion   
online  
Indication:Smoker  
                          Start:4-Aug-2017          Instruction Type:Patient   
Education  
  
   
                                                    How to access health informa  
tion   
online - Detail  
Indication:Smoker  
                          Start:4-Aug-2017          Instruction Type:Patient   
Education  
  
   
                                                    Patient Instructions  
Indication:Smoker  
                          Start:4-Aug-2017          Instruction Type:Provider   
Instructions for Treatment  
  
   
                                                    How to access health informa  
tion   
online  
Indication:Smoker  
                          Start:3-Mar-2017          Instruction Type:Patient   
Education  
  
   
                                                    How to access health informa  
tion   
online - Detail  
Indication:Smoker  
                          Start:3-Mar-2017          Instruction Type:Patient   
Education  
  
   
                                                    Patient Instructions  
Indication:Smoker  
                          Start:3-Mar-2017          Instruction Type:Provider   
Instructions for Treatment  
  
   
                                                    How to access health informa  
tion   
online  
Indication:Hypertension,   
essential, benign  
                          Start:28-Oct-2016         Instruction Type:Patient   
Education  
  
   
                                                    How to access health informa  
tion   
online - Detail  
Indication:Hypertension,   
essential, benign  
                          Start:28-Oct-2016         Instruction Type:Patient   
Education  
  
   
                                                    Patient Instructions  
Indication:Hypertension,   
essential, benign  
                          Start:28-Oct-2016         Instruction Type:Provider   
Instructions for Treatment  
  
   
                                                    How to access health informa  
tion   
online  
Indication:Hypertension,   
essential, benign  
                          Start:2016         Instruction Type:Patient   
Education  
  
   
                                                    How to access health informa  
tion   
online - Detail  
Indication:Hypertension,   
essential, benign  
                          Start:2016         Instruction Type:Patient   
Education  
  
   
                                                    Patient Instructions  
Indication:Hypertension,   
essential, benign  
                          Start:2016         Instruction Type:Provider   
Instructions for Treatment  
  
   
                                                    How to access health informa  
tion   
online  
Indication:BMI 27.0-27.9,adult  
                          Start:2016         Instruction Type:Patient   
Education  
  
   
                                                    How to access health informa  
tion   
online - Detail  
Indication:BMI 27.0-27.9,adult  
                          Start:2016         Instruction Type:Patient   
Education  
  
   
                                                    Patient Instructions  
Indication:BMI 27.0-27.9,adult  
                          Start:2016         Instruction Type:Provider   
Instructions for Treatment  
  
   
                                                    How to access health informa  
tion   
online  
Indication:Abnormal glucose   
tolerance test  
                          Start:2016         Instruction Type:Patient   
Education  
  
   
                                                    How to access health informa  
tion   
online - Detail  
Indication:Abnormal glucose   
tolerance test  
                          Start:2016         Instruction Type:Patient   
Education  
  
   
                                                    Patient Instructions  
Indication:Abnormal glucose   
tolerance test  
                          Start:2016         Instruction Type:Provider   
Instructions for Treatment  
  
   
                                                    How to access health informa  
tion   
online  
Indication:Abnormal glucose   
tolerance test  
                          Start:16-Dec-2015         Instruction Type:Patient   
Education  
  
   
                                                    How to access health informa  
tion   
online - Detail  
Indication:Abnormal glucose   
tolerance test  
                          Start:16-Dec-2015         Instruction Type:Patient   
Education  
  
   
                                                    Patient Instructions  
Indication:Abnormal glucose   
tolerance test  
                          Start:16-Dec-2015         Instruction Type:Provider   
Instructions for Treatment  
  
   
                                                    How to access health informa  
tion   
online  
Indication:Hypercholesteremia  
                          Start:16-Sep-2015         Instruction Type:Patient   
Education  
  
   
                                                    How to access health informa  
tion   
online - Detail  
Indication:Hypercholesteremia  
                          Start:16-Sep-2015         Instruction Type:Patient   
Education  
  
   
                                                    Patient Instructions  
Indication:Hypercholesteremia  
                          Start:16-Sep-2015         Instruction Type:Provider   
Instructions for Treatment  
  
   
                                                    Patient Instructions  
Indication:Abnormal glucose   
tolerance test  
                          Start:9-May-2014          Instruction Type:Provider   
Instructions for Treatment  
  
   
                                                    Patient Instructions  
Indication:Abnormal glucose   
tolerance test  
                          Start:2014          Instruction Type:Provider   
Instructions for Treatment  
  
   
                                                    Patient Instructions  
Indication:Abnormal glucose   
tolerance test  
                          Start:12-Sep-2013         Instruction Type:Provider   
Instructions for Treatment  
  
   
                                                    Patient Instructions  
Indication:Hypercholesteremia  
                          Start:15-Mar-2013         Instruction Type:Provider   
Instructions for Treatment  
  
   
                                                    Patient Instructions  
Indication:Hypertension,   
essential, benign  
                          Start:14-Sep-2012         Instruction Type:Provider   
Instructions for Treatment  
  
  
  
  
                                Name            Dates           Details  
   
                                                    How to Access Health Informa  
tion   
Online using Patient Portal and   
[employer] Apps  
Indication:Smoker  
                          Start:2021          Instruction Type:Patient   
Education  
  
   
                                                    Patient Instructions  
Indication:Smoker  
                          Start:2021          Instruction Type:Provider   
Instructions for Treatment  
  
   
                                                    How to Access Health Informa  
tion   
Online using Patient Portal and   
[employer] Apps  
Indication:Smoker  
                          Start:16-Dec-2020         Instruction Type:Patient   
Education  
  
   
                                                    Patient Instructions  
Indication:Smoker  
                          Start:16-Dec-2020         Instruction Type:Provider   
Instructions for Treatment  
  
   
                                                    How to access health informa  
tion   
online  
Indication:Smoker  
                          Start:2020         Instruction Type:Patient   
Education  
  
   
                                                    How to access health informa  
tion   
online - Detail  
Indication:Smoker  
                          Start:2020         Instruction Type:Patient   
Education  
  
   
                                                    Patient Instructions  
Indication:Smoker  
                          Start:2020         Instruction Type:Provider   
Instructions for Treatment  
  
   
                                                    How to access health informa  
tion   
online  
Indication:Smoker  
                          Start:24-Aug-2020         Instruction Type:Patient   
Education  
  
   
                                                    How to access health informa  
tion   
online - Detail  
Indication:Smoker  
                          Start:24-Aug-2020         Instruction Type:Patient   
Education  
  
   
                                                    Patient Instructions  
Indication:Smoker  
                          Start:24-Aug-2020         Instruction Type:Provider   
Instructions for Treatment  
  
   
                                                    How to access health informa  
tion   
online - Detail  
Indication:BMI 25.0-25.9,adult  
                          Start:2020         Instruction Type:Patient   
Education  
  
   
                                                    How to access health informa  
tion   
online  
Indication:BMI 25.0-25.9,adult  
                          Start:2020         Instruction Type:Patient   
Education  
  
   
                                                    Patient Instructions  
Indication:BMI 25.0-25.9,adult  
                          Start:2020         Instruction Type:Provider   
Instructions for Treatment  
  
   
                                                    How to access health informa  
tion   
online  
Indication:Smoker  
                          Start:2020          Instruction Type:Patient   
Education  
  
   
                                                    How to access health informa  
tion   
online - Detail  
Indication:Smoker  
                          Start:2020          Instruction Type:Patient   
Education  
  
   
                                                    Patient Instructions  
Indication:Smoker  
                          Start:2020          Instruction Type:Provider   
Instructions for Treatment  
  
   
                                                    How to access health informa  
tion   
online  
Indication:Smoker  
                          Start:2020         Instruction Type:Patient   
Education  
  
   
                                                    How to access health informa  
tion   
online - Detail  
Indication:Smoker  
                          Start:2020         Instruction Type:Patient   
Education  
  
   
                                                    Patient Instructions  
Indication:Smoker  
                          Start:2020         Instruction Type:Provider   
Instructions for Treatment  
  
   
                                                    How to access health informa  
tion   
online  
Indication:Smoker  
                          Start:13-Sep-2019         Instruction Type:Patient   
Education  
  
   
                                                    How to access health informa  
tion   
online - Detail  
Indication:Smoker  
                          Start:13-Sep-2019         Instruction Type:Patient   
Education  
  
   
                                                    Patient Instructions  
Indication:Smoker  
                          Start:13-Sep-2019         Instruction Type:Provider   
Instructions for Treatment  
  
   
                                                    How to access health informa  
tion   
online  
Indication:BMI 26.0-26.9,adult  
                          Start:10-May-2019         Instruction Type:Patient   
Education  
  
   
                                                    How to access health informa  
tion   
online - Detail  
Indication:BMI 26.0-26.9,adult  
                          Start:10-May-2019         Instruction Type:Patient   
Education  
  
   
                                                    Patient Instructions  
Indication:BMI 26.0-26.9,adult  
                          Start:10-May-2019         Instruction Type:Provider   
Instructions for Treatment  
  
   
                                                    How to access health informa  
tion   
online  
Indication:Abnormal glucose   
tolerance test  
                          Start:28-Dec-2018         Instruction Type:Patient   
Education  
  
   
                                                    How to access health informa  
tion   
online - Detail  
Indication:Abnormal glucose   
tolerance test  
                          Start:28-Dec-2018         Instruction Type:Patient   
Education  
  
   
                                                    Patient Instructions  
Indication:Abnormal glucose   
tolerance test  
                          Start:28-Dec-2018         Instruction Type:Provider   
Instructions for Treatment  
  
   
                                                    How to access health informa  
tion   
online  
Indication:Smoker  
                          Start:29-Aug-2018         Instruction Type:Patient   
Education  
  
   
                                                    How to access health informa  
tion   
online - Detail  
Indication:Smoker  
                          Start:29-Aug-2018         Instruction Type:Patient   
Education  
  
   
                                                    Patient Instructions  
Indication:Smoker  
                          Start:29-Aug-2018         Instruction Type:Provider   
Instructions for Treatment  
  
   
                                                    How to access health informa  
tion   
online  
Indication:Abnormal glucose   
tolerance test  
                          Start:2018         Instruction Type:Patient   
Education  
  
   
                                                    How to access health informa  
tion   
online - Detail  
Indication:Abnormal glucose   
tolerance test  
                          Start:2018         Instruction Type:Patient   
Education  
  
   
                                                    Patient Instructions  
Indication:Abnormal glucose   
tolerance test  
                          Start:2018         Instruction Type:Provider   
Instructions for Treatment  
  
   
                                                    How to access health informa  
tion   
online  
Indication:Smoker  
                          Start:8-Dec-2017          Instruction Type:Patient   
Education  
  
   
                                                    How to access health informa  
tion   
online - Detail  
Indication:Smoker  
                          Start:8-Dec-2017          Instruction Type:Patient   
Education  
  
   
                                                    Patient Instructions  
Indication:Smoker  
                          Start:8-Dec-2017          Instruction Type:Provider   
Instructions for Treatment  
  
   
                                                    How to access health informa  
tion   
online  
Indication:Smoker  
                          Start:4-Aug-2017          Instruction Type:Patient   
Education  
  
   
                                                    How to access health informa  
tion   
online - Detail  
Indication:Smoker  
                          Start:4-Aug-2017          Instruction Type:Patient   
Education  
  
   
                                                    Patient Instructions  
Indication:Smoker  
                          Start:4-Aug-2017          Instruction Type:Provider   
Instructions for Treatment  
  
   
                                                    How to access health informa  
tion   
online  
Indication:Smoker  
                          Start:3-Mar-2017          Instruction Type:Patient   
Education  
  
   
                                                    How to access health informa  
tion   
online - Detail  
Indication:Smoker  
                          Start:3-Mar-2017          Instruction Type:Patient   
Education  
  
   
                                                    Patient Instructions  
Indication:Smoker  
                          Start:3-Mar-2017          Instruction Type:Provider   
Instructions for Treatment  
  
   
                                                    How to access health informa  
tion   
online  
Indication:Hypertension,   
essential, benign  
                          Start:28-Oct-2016         Instruction Type:Patient   
Education  
  
   
                                                    How to access health informa  
tion   
online - Detail  
Indication:Hypertension,   
essential, benign  
                          Start:28-Oct-2016         Instruction Type:Patient   
Education  
  
   
                                                    Patient Instructions  
Indication:Hypertension,   
essential, benign  
                          Start:28-Oct-2016         Instruction Type:Provider   
Instructions for Treatment  
  
   
                                                    How to access health informa  
tion   
online  
Indication:Hypertension,   
essential, benign  
                          Start:2016         Instruction Type:Patient   
Education  
  
   
                                                    How to access health informa  
tion   
online - Detail  
Indication:Hypertension,   
essential, benign  
                          Start:2016         Instruction Type:Patient   
Education  
  
   
                                                    Patient Instructions  
Indication:Hypertension,   
essential, benign  
                          Start:2016         Instruction Type:Provider   
Instructions for Treatment  
  
   
                                                    How to access health informa  
tion   
online  
Indication:BMI 27.0-27.9,adult  
                          Start:2016         Instruction Type:Patient   
Education  
  
   
                                                    How to access health informa  
tion   
online - Detail  
Indication:BMI 27.0-27.9,adult  
                          Start:2016         Instruction Type:Patient   
Education  
  
   
                                                    Patient Instructions  
Indication:BMI 27.0-27.9,adult  
                          Start:2016         Instruction Type:Provider   
Instructions for Treatment  
  
   
                                                    How to access health informa  
tion   
online  
Indication:Abnormal glucose   
tolerance test  
                          Start:2016         Instruction Type:Patient   
Education  
  
   
                                                    How to access health informa  
tion   
online - Detail  
Indication:Abnormal glucose   
tolerance test  
                          Start:2016         Instruction Type:Patient   
Education  
  
   
                                                    Patient Instructions  
Indication:Abnormal glucose   
tolerance test  
                          Start:2016         Instruction Type:Provider   
Instructions for Treatment  
  
   
                                                    How to access health informa  
tion   
online  
Indication:Abnormal glucose   
tolerance test  
                          Start:16-Dec-2015         Instruction Type:Patient   
Education  
  
   
                                                    How to access health informa  
tion   
online - Detail  
Indication:Abnormal glucose   
tolerance test  
                          Start:16-Dec-2015         Instruction Type:Patient   
Education  
  
   
                                                    Patient Instructions  
Indication:Abnormal glucose   
tolerance test  
                          Start:16-Dec-2015         Instruction Type:Provider   
Instructions for Treatment  
  
   
                                                    How to access health informa  
tion   
online  
Indication:Hypercholesteremia  
                          Start:16-Sep-2015         Instruction Type:Patient   
Education  
  
   
                                                    How to access health informa  
tion   
online - Detail  
Indication:Hypercholesteremia  
                          Start:16-Sep-2015         Instruction Type:Patient   
Education  
  
   
                                                    Patient Instructions  
Indication:Hypercholesteremia  
                          Start:16-Sep-2015         Instruction Type:Provider   
Instructions for Treatment  
  
   
                                                    Patient Instructions  
Indication:Abnormal glucose   
tolerance test  
                          Start:9-May-2014          Instruction Type:Provider   
Instructions for Treatment  
  
   
                                                    Patient Instructions  
Indication:Abnormal glucose   
tolerance test  
                          Start:2014          Instruction Type:Provider   
Instructions for Treatment  
  
   
                                                    Patient Instructions  
Indication:Abnormal glucose   
tolerance test  
                          Start:12-Sep-2013         Instruction Type:Provider   
Instructions for Treatment  
  
   
                                                    Patient Instructions  
Indication:Hypercholesteremia  
                          Start:15-Mar-2013         Instruction Type:Provider   
Instructions for Treatment  
  
   
                                                    Patient Instructions  
Indication:Hypertension,   
essential, benign  
                          Start:14-Sep-2012         Instruction Type:Provider   
Instructions for Treatment  
  
  
  
  
                                Name            Dates           Details  
   
                                                    How to access health informa  
tion   
online  
Indication:Smoker  
                          Start:13-Sep-2019         Instruction Type:Patient   
Education  
  
   
                                                    How to access health informa  
tion   
online - Detail  
Indication:Smoker  
                          Start:13-Sep-2019         Instruction Type:Patient   
Education  
  
   
                                                    Patient Instructions  
Indication:Smoker  
                          Start:13-Sep-2019         Instruction Type:Provider   
Instructions for Treatment  
  
   
                                                    How to access health informa  
tion   
online  
Indication:BMI 26.0-26.9,adult  
                          Start:10-May-2019         Instruction Type:Patient   
Education  
  
   
                                                    How to access health informa  
tion   
online - Detail  
Indication:BMI 26.0-26.9,adult  
                          Start:10-May-2019         Instruction Type:Patient   
Education  
  
   
                                                    Patient Instructions  
Indication:BMI 26.0-26.9,adult  
                          Start:10-May-2019         Instruction Type:Provider   
Instructions for Treatment  
  
   
                                                    How to access health informa  
tion   
online  
Indication:Abnormal glucose   
tolerance test  
                          Start:28-Dec-2018         Instruction Type:Patient   
Education  
  
   
                                                    How to access health informa  
tion   
online - Detail  
Indication:Abnormal glucose   
tolerance test  
                          Start:28-Dec-2018         Instruction Type:Patient   
Education  
  
   
                                                    Patient Instructions  
Indication:Abnormal glucose   
tolerance test  
                          Start:28-Dec-2018         Instruction Type:Provider   
Instructions for Treatment  
  
   
                                                    How to access health informa  
tion   
online  
Indication:Smoker  
                          Start:29-Aug-2018         Instruction Type:Patient   
Education  
  
   
                                                    How to access health informa  
tion   
online - Detail  
Indication:Smoker  
                          Start:29-Aug-2018         Instruction Type:Patient   
Education  
  
   
                                                    Patient Instructions  
Indication:Smoker  
                          Start:29-Aug-2018         Instruction Type:Provider   
Instructions for Treatment  
  
   
                                                    How to access health informa  
tion   
online  
Indication:Abnormal glucose   
tolerance test  
                          Start:2018         Instruction Type:Patient   
Education  
  
   
                                                    How to access health informa  
tion   
online - Detail  
Indication:Abnormal glucose   
tolerance test  
                          Start:2018         Instruction Type:Patient   
Education  
  
   
                                                    Patient Instructions  
Indication:Abnormal glucose   
tolerance test  
                          Start:2018         Instruction Type:Provider   
Instructions for Treatment  
  
   
                                                    How to access health informa  
tion   
online  
Indication:Smoker  
                          Start:8-Dec-2017          Instruction Type:Patient   
Education  
  
   
                                                    How to access health informa  
tion   
online - Detail  
Indication:Smoker  
                          Start:8-Dec-2017          Instruction Type:Patient   
Education  
  
   
                                                    Patient Instructions  
Indication:Smoker  
                          Start:8-Dec-2017          Instruction Type:Provider   
Instructions for Treatment  
  
   
                                                    How to access health informa  
tion   
online  
Indication:Smoker  
                          Start:4-Aug-2017          Instruction Type:Patient   
Education  
  
   
                                                    How to access health informa  
tion   
online - Detail  
Indication:Smoker  
                          Start:4-Aug-2017          Instruction Type:Patient   
Education  
  
   
                                                    Patient Instructions  
Indication:Smoker  
                          Start:4-Aug-2017          Instruction Type:Provider   
Instructions for Treatment  
  
   
                                                    How to access health informa  
tion   
online  
Indication:Smoker  
                          Start:3-Mar-2017          Instruction Type:Patient   
Education  
  
   
                                                    How to access health informa  
tion   
online - Detail  
Indication:Smoker  
                          Start:3-Mar-2017          Instruction Type:Patient   
Education  
  
   
                                                    Patient Instructions  
Indication:Smoker  
                          Start:3-Mar-2017          Instruction Type:Provider   
Instructions for Treatment  
  
   
                                                    How to access health informa  
tion   
online  
Indication:Hypertension,   
essential, benign  
                          Start:28-Oct-2016         Instruction Type:Patient   
Education  
  
   
                                                    How to access health informa  
tion   
online - Detail  
Indication:Hypertension,   
essential, benign  
                          Start:28-Oct-2016         Instruction Type:Patient   
Education  
  
   
                                                    Patient Instructions  
Indication:Hypertension,   
essential, benign  
                          Start:28-Oct-2016         Instruction Type:Provider   
Instructions for Treatment  
  
   
                                                    How to access health informa  
tion   
online  
Indication:Hypertension,   
essential, benign  
                          Start:2016         Instruction Type:Patient   
Education  
  
   
                                                    How to access health informa  
tion   
online - Detail  
Indication:Hypertension,   
essential, benign  
                          Start:2016         Instruction Type:Patient   
Education  
  
   
                                                    Patient Instructions  
Indication:Hypertension,   
essential, benign  
                          Start:2016         Instruction Type:Provider   
Instructions for Treatment  
  
   
                                                    How to access health informa  
tion   
online  
Indication:BMI 27.0-27.9,adult  
                          Start:2016         Instruction Type:Patient   
Education  
  
   
                                                    How to access health informa  
tion   
online - Detail  
Indication:BMI 27.0-27.9,adult  
                          Start:2016         Instruction Type:Patient   
Education  
  
   
                                                    Patient Instructions  
Indication:BMI 27.0-27.9,adult  
                          Start:2016         Instruction Type:Provider   
Instructions for Treatment  
  
   
                                                    How to access health informa  
tion   
online  
Indication:Abnormal glucose   
tolerance test  
                          Start:2016         Instruction Type:Patient   
Education  
  
   
                                                    How to access health informa  
tion   
online - Detail  
Indication:Abnormal glucose   
tolerance test  
                          Start:2016         Instruction Type:Patient   
Education  
  
   
                                                    Patient Instructions  
Indication:Abnormal glucose   
tolerance test  
                          Start:2016         Instruction Type:Provider   
Instructions for Treatment  
  
   
                                                    How to access health informa  
tion   
online  
Indication:Abnormal glucose   
tolerance test  
                          Start:16-Dec-2015         Instruction Type:Patient   
Education  
  
   
                                                    How to access health informa  
tion   
online - Detail  
Indication:Abnormal glucose   
tolerance test  
                          Start:16-Dec-2015         Instruction Type:Patient   
Education  
  
   
                                                    Patient Instructions  
Indication:Abnormal glucose   
tolerance test  
                          Start:16-Dec-2015         Instruction Type:Provider   
Instructions for Treatment  
  
   
                                                    How to access health informa  
tion   
online  
Indication:Hypercholesteremia  
                          Start:16-Sep-2015         Instruction Type:Patient   
Education  
  
   
                                                    How to access health informa  
tion   
online - Detail  
Indication:Hypercholesteremia  
                          Start:16-Sep-2015         Instruction Type:Patient   
Education  
  
   
                                                    Patient Instructions  
Indication:Hypercholesteremia  
                          Start:16-Sep-2015         Instruction Type:Provider   
Instructions for Treatment  
  
   
                                                    Patient Instructions  
Indication:Abnormal glucose   
tolerance test  
                          Start:9-May-2014          Instruction Type:Provider   
Instructions for Treatment  
  
   
                                                    Patient Instructions  
Indication:Abnormal glucose   
tolerance test  
                          Start:2014          Instruction Type:Provider   
Instructions for Treatment  
  
   
                                                    Patient Instructions  
Indication:Abnormal glucose   
tolerance test  
                          Start:12-Sep-2013         Instruction Type:Provider   
Instructions for Treatment  
  
   
                                                    Patient Instructions  
Indication:Hypercholesteremia  
                          Start:15-Mar-2013         Instruction Type:Provider   
Instructions for Treatment  
  
   
                                                    Patient Instructions  
Indication:Hypertension,   
essential, benign  
                          Start:14-Sep-2012         Instruction Type:Provider   
Instructions for Treatment  
  
  
  
  
                                Name            Dates           Details  
   
                                                    How to Access Health Informa  
tion   
Online using Patient Portal and   
3rd Party Apps  
Indication:Smoker  
                          Start:2021         Instruction Type:Patient   
Education  
  
   
                                                    Patient Instructions  
Indication:Smoker  
                          Start:2021         Instruction Type:Provider   
Instructions for Treatment  
  
   
                                                    How to Access Health Informa  
tion   
Online using Patient Portal and   
3rd Party Apps  
Indication:Smoker  
                          Start:2021          Instruction Type:Patient   
Education  
  
   
                                                    Patient Instructions  
Indication:Smoker  
                          Start:2021          Instruction Type:Provider   
Instructions for Treatment  
  
   
                                                    How to Access Health Informa  
tion   
Online using Patient Portal and   
3rd Party Apps  
Indication:Smoker  
                          Start:16-Dec-2020         Instruction Type:Patient   
Education  
  
   
                                                    Patient Instructions  
Indication:Smoker  
                          Start:16-Dec-2020         Instruction Type:Provider   
Instructions for Treatment  
  
   
                                                    How to access health informa  
tion   
online  
Indication:Smoker  
                          Start:2020         Instruction Type:Patient   
Education  
  
   
                                                    How to access health informa  
tion   
online - Detail  
Indication:Smoker  
                          Start:2020         Instruction Type:Patient   
Education  
  
   
                                                    Patient Instructions  
Indication:Smoker  
                          Start:2020         Instruction Type:Provider   
Instructions for Treatment  
  
   
                                                    How to access health informa  
tion   
online  
Indication:Smoker  
                          Start:24-Aug-2020         Instruction Type:Patient   
Education  
  
   
                                                    How to access health informa  
tion   
online - Detail  
Indication:Smoker  
                          Start:24-Aug-2020         Instruction Type:Patient   
Education  
  
   
                                                    Patient Instructions  
Indication:Smoker  
                          Start:24-Aug-2020         Instruction Type:Provider   
Instructions for Treatment  
  
   
                                                    How to access health informa  
tion   
online - Detail  
Indication:BMI 25.0-25.9,adult  
                          Start:2020         Instruction Type:Patient   
Education  
  
   
                                                    How to access health informa  
tion   
online  
Indication:BMI 25.0-25.9,adult  
                          Start:2020         Instruction Type:Patient   
Education  
  
   
                                                    Patient Instructions  
Indication:BMI 25.0-25.9,adult  
                          Start:2020         Instruction Type:Provider   
Instructions for Treatment  
  
   
                                                    How to access health informa  
tion   
online  
Indication:Smoker  
                          Start:2020          Instruction Type:Patient   
Education  
  
   
                                                    How to access health informa  
tion   
online - Detail  
Indication:Smoker  
                          Start:2020          Instruction Type:Patient   
Education  
  
   
                                                    Patient Instructions  
Indication:Smoker  
                          Start:2020          Instruction Type:Provider   
Instructions for Treatment  
  
   
                                                    How to access health informa  
tion   
online  
Indication:Smoker  
                          Start:2020         Instruction Type:Patient   
Education  
  
   
                                                    How to access health informa  
tion   
online - Detail  
Indication:Smoker  
                          Start:2020         Instruction Type:Patient   
Education  
  
   
                                                    Patient Instructions  
Indication:Smoker  
                          Start:2020         Instruction Type:Provider   
Instructions for Treatment  
  
   
                                                    How to access health informa  
tion   
online  
Indication:Smoker  
                          Start:13-Sep-2019         Instruction Type:Patient   
Education  
  
   
                                                    How to access health informa  
tion   
online - Detail  
Indication:Smoker  
                          Start:13-Sep-2019         Instruction Type:Patient   
Education  
  
   
                                                    Patient Instructions  
Indication:Smoker  
                          Start:13-Sep-2019         Instruction Type:Provider   
Instructions for Treatment  
  
   
                                                    How to access health informa  
tion   
online  
Indication:BMI 26.0-26.9,adult  
                          Start:10-May-2019         Instruction Type:Patient   
Education  
  
   
                                                    How to access health informa  
tion   
online - Detail  
Indication:BMI 26.0-26.9,adult  
                          Start:10-May-2019         Instruction Type:Patient   
Education  
  
   
                                                    Patient Instructions  
Indication:BMI 26.0-26.9,adult  
                          Start:10-May-2019         Instruction Type:Provider   
Instructions for Treatment  
  
   
                                                    How to access health informa  
tion   
online  
Indication:Abnormal glucose   
tolerance test  
                          Start:28-Dec-2018         Instruction Type:Patient   
Education  
  
   
                                                    How to access health informa  
tion   
online - Detail  
Indication:Abnormal glucose   
tolerance test  
                          Start:28-Dec-2018         Instruction Type:Patient   
Education  
  
   
                                                    Patient Instructions  
Indication:Abnormal glucose   
tolerance test  
                          Start:28-Dec-2018         Instruction Type:Provider   
Instructions for Treatment  
  
   
                                                    How to access health informa  
tion   
online  
Indication:Smoker  
                          Start:29-Aug-2018         Instruction Type:Patient   
Education  
  
   
                                                    How to access health informa  
tion   
online - Detail  
Indication:Smoker  
                          Start:29-Aug-2018         Instruction Type:Patient   
Education  
  
   
                                                    Patient Instructions  
Indication:Smoker  
                          Start:29-Aug-2018         Instruction Type:Provider   
Instructions for Treatment  
  
   
                                                    How to access health informa  
tion   
online  
Indication:Abnormal glucose   
tolerance test  
                          Start:2018         Instruction Type:Patient   
Education  
  
   
                                                    How to access health informa  
tion   
online - Detail  
Indication:Abnormal glucose   
tolerance test  
                          Start:2018         Instruction Type:Patient   
Education  
  
   
                                                    Patient Instructions  
Indication:Abnormal glucose   
tolerance test  
                          Start:2018         Instruction Type:Provider   
Instructions for Treatment  
  
   
                                                    How to access health informa  
tion   
online  
Indication:Smoker  
                          Start:8-Dec-2017          Instruction Type:Patient   
Education  
  
   
                                                    How to access health informa  
tion   
online - Detail  
Indication:Smoker  
                          Start:8-Dec-2017          Instruction Type:Patient   
Education  
  
   
                                                    Patient Instructions  
Indication:Smoker  
                          Start:8-Dec-2017          Instruction Type:Provider   
Instructions for Treatment  
  
   
                                                    How to access health informa  
tion   
online  
Indication:Smoker  
                          Start:4-Aug-2017          Instruction Type:Patient   
Education  
  
   
                                                    How to access health informa  
tion   
online - Detail  
Indication:Smoker  
                          Start:4-Aug-2017          Instruction Type:Patient   
Education  
  
   
                                                    Patient Instructions  
Indication:Smoker  
                          Start:4-Aug-2017          Instruction Type:Provider   
Instructions for Treatment  
  
   
                                                    How to access health informa  
tion   
online  
Indication:Smoker  
                          Start:3-Mar-2017          Instruction Type:Patient   
Education  
  
   
                                                    How to access health informa  
tion   
online - Detail  
Indication:Smoker  
                          Start:3-Mar-2017          Instruction Type:Patient   
Education  
  
   
                                                    Patient Instructions  
Indication:Smoker  
                          Start:3-Mar-2017          Instruction Type:Provider   
Instructions for Treatment  
  
   
                                                    How to access health informa  
tion   
online  
Indication:Hypertension,   
essential, benign  
                          Start:28-Oct-2016         Instruction Type:Patient   
Education  
  
   
                                                    How to access health informa  
tion   
online - Detail  
Indication:Hypertension,   
essential, benign  
                          Start:28-Oct-2016         Instruction Type:Patient   
Education  
  
   
                                                    Patient Instructions  
Indication:Hypertension,   
essential, benign  
                          Start:28-Oct-2016         Instruction Type:Provider   
Instructions for Treatment  
  
   
                                                    How to access health informa  
tion   
online  
Indication:Hypertension,   
essential, benign  
                          Start:2016         Instruction Type:Patient   
Education  
  
   
                                                    How to access health informa  
tion   
online - Detail  
Indication:Hypertension,   
essential, benign  
                          Start:2016         Instruction Type:Patient   
Education  
  
   
                                                    Patient Instructions  
Indication:Hypertension,   
essential, benign  
                          Start:2016         Instruction Type:Provider   
Instructions for Treatment  
  
   
                                                    How to access health informa  
tion   
online  
Indication:BMI 27.0-27.9,adult  
                          Start:2016         Instruction Type:Patient   
Education  
  
   
                                                    How to access health informa  
tion   
online - Detail  
Indication:BMI 27.0-27.9,adult  
                          Start:2016         Instruction Type:Patient   
Education  
  
   
                                                    Patient Instructions  
Indication:BMI 27.0-27.9,adult  
                          Start:2016         Instruction Type:Provider   
Instructions for Treatment  
  
   
                                                    How to access health informa  
tion   
online  
Indication:Abnormal glucose   
tolerance test  
                          Start:2016         Instruction Type:Patient   
Education  
  
   
                                                    How to access health informa  
tion   
online - Detail  
Indication:Abnormal glucose   
tolerance test  
                          Start:2016         Instruction Type:Patient   
Education  
  
   
                                                    Patient Instructions  
Indication:Abnormal glucose   
tolerance test  
                          Start:2016         Instruction Type:Provider   
Instructions for Treatment  
  
   
                                                    How to access health informa  
tion   
online  
Indication:Abnormal glucose   
tolerance test  
                          Start:16-Dec-2015         Instruction Type:Patient   
Education  
  
   
                                                    How to access health informa  
tion   
online - Detail  
Indication:Abnormal glucose   
tolerance test  
                          Start:16-Dec-2015         Instruction Type:Patient   
Education  
  
   
                                                    Patient Instructions  
Indication:Abnormal glucose   
tolerance test  
                          Start:16-Dec-2015         Instruction Type:Provider   
Instructions for Treatment  
  
   
                                                    How to access health informa  
tion   
online  
Indication:Hypercholesteremia  
                          Start:16-Sep-2015         Instruction Type:Patient   
Education  
  
   
                                                    How to access health informa  
tion   
online - Detail  
Indication:Hypercholesteremia  
                          Start:16-Sep-2015         Instruction Type:Patient   
Education  
  
   
                                                    Patient Instructions  
Indication:Hypercholesteremia  
                          Start:16-Sep-2015         Instruction Type:Provider   
Instructions for Treatment  
  
   
                                                    Patient Instructions  
Indication:Abnormal glucose   
tolerance test  
                          Start:9-May-2014          Instruction Type:Provider   
Instructions for Treatment  
  
   
                                                    Patient Instructions  
Indication:Abnormal glucose   
tolerance test  
                          Start:2014          Instruction Type:Provider   
Instructions for Treatment  
  
   
                                                    Patient Instructions  
Indication:Abnormal glucose   
tolerance test  
                          Start:12-Sep-2013         Instruction Type:Provider   
Instructions for Treatment  
  
   
                                                    Patient Instructions  
Indication:Hypercholesteremia  
                          Start:15-Mar-2013         Instruction Type:Provider   
Instructions for Treatment  
  
   
                                                    Patient Instructions  
Indication:Hypertension,   
essential, benign  
                          Start:14-Sep-2012         Instruction Type:Provider   
Instructions for Treatment  
  
  
  
  
                                Name            Dates           Details  
   
                                                    How to access health informa  
tion   
online  
Indication:BMI 26.0-26.9,adult  
                          Start:10-May-2019         Instruction Type:Patient   
Education  
  
   
                                                    How to access health informa  
tion   
online - Detail  
Indication:BMI 26.0-26.9,adult  
                          Start:10-May-2019         Instruction Type:Patient   
Education  
  
   
                                                    Patient Instructions  
Indication:BMI 26.0-26.9,adult  
                          Start:10-May-2019         Instruction Type:Provider   
Instructions for Treatment  
  
   
                                                    How to access health informa  
tion   
online  
Indication:Abnormal glucose   
tolerance test  
                          Start:28-Dec-2018         Instruction Type:Patient   
Education  
  
   
                                                    How to access health informa  
tion   
online - Detail  
Indication:Abnormal glucose   
tolerance test  
                          Start:28-Dec-2018         Instruction Type:Patient   
Education  
  
   
                                                    Patient Instructions  
Indication:Abnormal glucose   
tolerance test  
                          Start:28-Dec-2018         Instruction Type:Provider   
Instructions for Treatment  
  
   
                                                    How to access health informa  
tion   
online  
Indication:Smoker  
                          Start:29-Aug-2018         Instruction Type:Patient   
Education  
  
   
                                                    How to access health informa  
tion   
online - Detail  
Indication:Smoker  
                          Start:29-Aug-2018         Instruction Type:Patient   
Education  
  
   
                                                    Patient Instructions  
Indication:Smoker  
                          Start:29-Aug-2018         Instruction Type:Provider   
Instructions for Treatment  
  
   
                                                    How to access health informa  
tion   
online  
Indication:Abnormal glucose   
tolerance test  
                          Start:2018         Instruction Type:Patient   
Education  
  
   
                                                    How to access health informa  
tion   
online - Detail  
Indication:Abnormal glucose   
tolerance test  
                          Start:2018         Instruction Type:Patient   
Education  
  
   
                                                    Patient Instructions  
Indication:Abnormal glucose   
tolerance test  
                          Start:2018         Instruction Type:Provider   
Instructions for Treatment  
  
   
                                                    How to access health informa  
tion   
online  
Indication:Smoker  
                          Start:8-Dec-2017          Instruction Type:Patient   
Education  
  
   
                                                    How to access health informa  
tion   
online - Detail  
Indication:Smoker  
                          Start:8-Dec-2017          Instruction Type:Patient   
Education  
  
   
                                                    Patient Instructions  
Indication:Smoker  
                          Start:8-Dec-2017          Instruction Type:Provider   
Instructions for Treatment  
  
   
                                                    How to access health informa  
tion   
online  
Indication:Smoker  
                          Start:4-Aug-2017          Instruction Type:Patient   
Education  
  
   
                                                    How to access health informa  
tion   
online - Detail  
Indication:Smoker  
                          Start:4-Aug-2017          Instruction Type:Patient   
Education  
  
   
                                                    Patient Instructions  
Indication:Smoker  
                          Start:4-Aug-2017          Instruction Type:Provider   
Instructions for Treatment  
  
   
                                                    How to access health informa  
tion   
online  
Indication:Smoker  
                          Start:3-Mar-2017          Instruction Type:Patient   
Education  
  
   
                                                    How to access health informa  
tion   
online - Detail  
Indication:Smoker  
                          Start:3-Mar-2017          Instruction Type:Patient   
Education  
  
   
                                                    Patient Instructions  
Indication:Smoker  
                          Start:3-Mar-2017          Instruction Type:Provider   
Instructions for Treatment  
  
   
                                                    How to access health informa  
tion   
online  
Indication:Hypertension,   
essential, benign  
                          Start:28-Oct-2016         Instruction Type:Patient   
Education  
  
   
                                                    How to access health informa  
tion   
online - Detail  
Indication:Hypertension,   
essential, benign  
                          Start:28-Oct-2016         Instruction Type:Patient   
Education  
  
   
                                                    Patient Instructions  
Indication:Hypertension,   
essential, benign  
                          Start:28-Oct-2016         Instruction Type:Provider   
Instructions for Treatment  
  
   
                                                    How to access health informa  
tion   
online  
Indication:Hypertension,   
essential, benign  
                          Start:2016         Instruction Type:Patient   
Education  
  
   
                                                    How to access health informa  
tion   
online - Detail  
Indication:Hypertension,   
essential, benign  
                          Start:2016         Instruction Type:Patient   
Education  
  
   
                                                    Patient Instructions  
Indication:Hypertension,   
essential, benign  
                          Start:2016         Instruction Type:Provider   
Instructions for Treatment  
  
   
                                                    How to access health informa  
tion   
online  
Indication:BMI 27.0-27.9,adult  
                          Start:2016         Instruction Type:Patient   
Education  
  
   
                                                    How to access health informa  
tion   
online - Detail  
Indication:BMI 27.0-27.9,adult  
                          Start:2016         Instruction Type:Patient   
Education  
  
   
                                                    Patient Instructions  
Indication:BMI 27.0-27.9,adult  
                          Start:2016         Instruction Type:Provider   
Instructions for Treatment  
  
   
                                                    How to access health informa  
tion   
online  
Indication:Abnormal glucose   
tolerance test  
                          Start:2016         Instruction Type:Patient   
Education  
  
   
                                                    How to access health informa  
tion   
online - Detail  
Indication:Abnormal glucose   
tolerance test  
                          Start:2016         Instruction Type:Patient   
Education  
  
   
                                                    Patient Instructions  
Indication:Abnormal glucose   
tolerance test  
                          Start:2016         Instruction Type:Provider   
Instructions for Treatment  
  
   
                                                    How to access health informa  
tion   
online  
Indication:Abnormal glucose   
tolerance test  
                          Start:16-Dec-2015         Instruction Type:Patient   
Education  
  
   
                                                    How to access health informa  
tion   
online - Detail  
Indication:Abnormal glucose   
tolerance test  
                          Start:16-Dec-2015         Instruction Type:Patient   
Education  
  
   
                                                    Patient Instructions  
Indication:Abnormal glucose   
tolerance test  
                          Start:16-Dec-2015         Instruction Type:Provider   
Instructions for Treatment  
  
   
                                                    How to access health informa  
tion   
online  
Indication:Hypercholesteremia  
                          Start:16-Sep-2015         Instruction Type:Patient   
Education  
  
   
                                                    How to access health informa  
tion   
online - Detail  
Indication:Hypercholesteremia  
                          Start:16-Sep-2015         Instruction Type:Patient   
Education  
  
   
                                                    Patient Instructions  
Indication:Hypercholesteremia  
                          Start:16-Sep-2015         Instruction Type:Provider   
Instructions for Treatment  
  
   
                                                    Patient Instructions  
Indication:Abnormal glucose   
tolerance test  
                          Start:9-May-2014          Instruction Type:Provider   
Instructions for Treatment  
  
   
                                                    Patient Instructions  
Indication:Abnormal glucose   
tolerance test  
                          Start:2014          Instruction Type:Provider   
Instructions for Treatment  
  
   
                                                    Patient Instructions  
Indication:Abnormal glucose   
tolerance test  
                          Start:12-Sep-2013         Instruction Type:Provider   
Instructions for Treatment  
  
   
                                                    Patient Instructions  
Indication:Hypercholesteremia  
                          Start:15-Mar-2013         Instruction Type:Provider   
Instructions for Treatment  
  
   
                                                    Patient Instructions  
Indication:Hypertension,   
essential, benign  
                          Start:14-Sep-2012         Instruction Type:Provider   
Instructions for Treatment  
  
  
  
  
                                Name            Dates           Details  
   
                                                    How to Access Health Informa  
tion   
Online using Patient Portal and   
3rd Party Apps  
Indication:Smoker  
                          Start:1-Mar-2021          Instruction Type:Patient   
Education  
  
   
                                                    Patient Instructions  
Indication:Smoker  
                          Start:1-Mar-2021          Instruction Type:Provider   
Instructions for Treatment  
  
   
                                                    How to Access Health Informa  
tion   
Online using Patient Portal and   
3rd Party Apps  
Indication:Smoker  
                          Start:2021         Instruction Type:Patient   
Education  
  
   
                                                    Patient Instructions  
Indication:Smoker  
                          Start:2021         Instruction Type:Provider   
Instructions for Treatment  
  
   
                                                    How to Access Health Informa  
tion   
Online using Patient Portal and   
3rd Party Apps  
Indication:Smoker  
                          Start:2021          Instruction Type:Patient   
Education  
  
   
                                                    Patient Instructions  
Indication:Smoker  
                          Start:2021          Instruction Type:Provider   
Instructions for Treatment  
  
   
                                                    How to Access Health Informa  
tion   
Online using Patient Portal and   
3rd Party Apps  
Indication:Smoker  
                          Start:16-Dec-2020         Instruction Type:Patient   
Education  
  
   
                                                    Patient Instructions  
Indication:Smoker  
                          Start:16-Dec-2020         Instruction Type:Provider   
Instructions for Treatment  
  
   
                                                    How to access health informa  
tion   
online  
Indication:Smoker  
                          Start:2020         Instruction Type:Patient   
Education  
  
   
                                                    How to access health informa  
tion   
online - Detail  
Indication:Smoker  
                          Start:2020         Instruction Type:Patient   
Education  
  
   
                                                    Patient Instructions  
Indication:Smoker  
                          Start:2020         Instruction Type:Provider   
Instructions for Treatment  
  
   
                                                    How to access health informa  
tion   
online  
Indication:Smoker  
                          Start:24-Aug-2020         Instruction Type:Patient   
Education  
  
   
                                                    How to access health informa  
tion   
online - Detail  
Indication:Smoker  
                          Start:24-Aug-2020         Instruction Type:Patient   
Education  
  
   
                                                    Patient Instructions  
Indication:Smoker  
                          Start:24-Aug-2020         Instruction Type:Provider   
Instructions for Treatment  
  
   
                                                    How to access health informa  
tion   
online - Detail  
Indication:BMI 25.0-25.9,adult  
                          Start:2020         Instruction Type:Patient   
Education  
  
   
                                                    How to access health informa  
tion   
online  
Indication:BMI 25.0-25.9,adult  
                          Start:2020         Instruction Type:Patient   
Education  
  
   
                                                    Patient Instructions  
Indication:BMI 25.0-25.9,adult  
                          Start:2020         Instruction Type:Provider   
Instructions for Treatment  
  
   
                                                    How to access health informa  
tion   
online  
Indication:Smoker  
                          Start:2020          Instruction Type:Patient   
Education  
  
   
                                                    How to access health informa  
tion   
online - Detail  
Indication:Smoker  
                          Start:2020          Instruction Type:Patient   
Education  
  
   
                                                    Patient Instructions  
Indication:Smoker  
                          Start:2020          Instruction Type:Provider   
Instructions for Treatment  
  
   
                                                    How to access health informa  
tion   
online  
Indication:Smoker  
                          Start:2020         Instruction Type:Patient   
Education  
  
   
                                                    How to access health informa  
tion   
online - Detail  
Indication:Smoker  
                          Start:2020         Instruction Type:Patient   
Education  
  
   
                                                    Patient Instructions  
Indication:Smoker  
                          Start:2020         Instruction Type:Provider   
Instructions for Treatment  
  
   
                                                    How to access health informa  
tion   
online  
Indication:Smoker  
                          Start:13-Sep-2019         Instruction Type:Patient   
Education  
  
   
                                                    How to access health informa  
tion   
online - Detail  
Indication:Smoker  
                          Start:13-Sep-2019         Instruction Type:Patient   
Education  
  
   
                                                    Patient Instructions  
Indication:Smoker  
                          Start:13-Sep-2019         Instruction Type:Provider   
Instructions for Treatment  
  
   
                                                    How to access health informa  
tion   
online  
Indication:BMI 26.0-26.9,adult  
                          Start:10-May-2019         Instruction Type:Patient   
Education  
  
   
                                                    How to access health informa  
tion   
online - Detail  
Indication:BMI 26.0-26.9,adult  
                          Start:10-May-2019         Instruction Type:Patient   
Education  
  
   
                                                    Patient Instructions  
Indication:BMI 26.0-26.9,adult  
                          Start:10-May-2019         Instruction Type:Provider   
Instructions for Treatment  
  
   
                                                    How to access health informa  
tion   
online  
Indication:Abnormal glucose   
tolerance test  
                          Start:28-Dec-2018         Instruction Type:Patient   
Education  
  
   
                                                    How to access health informa  
tion   
online - Detail  
Indication:Abnormal glucose   
tolerance test  
                          Start:28-Dec-2018         Instruction Type:Patient   
Education  
  
   
                                                    Patient Instructions  
Indication:Abnormal glucose   
tolerance test  
                          Start:28-Dec-2018         Instruction Type:Provider   
Instructions for Treatment  
  
   
                                                    How to access health informa  
tion   
online  
Indication:Smoker  
                          Start:29-Aug-2018         Instruction Type:Patient   
Education  
  
   
                                                    How to access health informa  
tion   
online - Detail  
Indication:Smoker  
                          Start:29-Aug-2018         Instruction Type:Patient   
Education  
  
   
                                                    Patient Instructions  
Indication:Smoker  
                          Start:29-Aug-2018         Instruction Type:Provider   
Instructions for Treatment  
  
   
                                                    How to access health informa  
tion   
online  
Indication:Abnormal glucose   
tolerance test  
                          Start:2018         Instruction Type:Patient   
Education  
  
   
                                                    How to access health informa  
tion   
online - Detail  
Indication:Abnormal glucose   
tolerance test  
                          Start:2018         Instruction Type:Patient   
Education  
  
   
                                                    Patient Instructions  
Indication:Abnormal glucose   
tolerance test  
                          Start:2018         Instruction Type:Provider   
Instructions for Treatment  
  
   
                                                    How to access health informa  
tion   
online  
Indication:Smoker  
                          Start:8-Dec-2017          Instruction Type:Patient   
Education  
  
   
                                                    How to access health informa  
tion   
online - Detail  
Indication:Smoker  
                          Start:8-Dec-2017          Instruction Type:Patient   
Education  
  
   
                                                    Patient Instructions  
Indication:Smoker  
                          Start:8-Dec-2017          Instruction Type:Provider   
Instructions for Treatment  
  
   
                                                    How to access health informa  
tion   
online  
Indication:Smoker  
                          Start:4-Aug-2017          Instruction Type:Patient   
Education  
  
   
                                                    How to access health informa  
tion   
online - Detail  
Indication:Smoker  
                          Start:4-Aug-2017          Instruction Type:Patient   
Education  
  
   
                                                    Patient Instructions  
Indication:Smoker  
                          Start:4-Aug-2017          Instruction Type:Provider   
Instructions for Treatment  
  
   
                                                    How to access health informa  
tion   
online  
Indication:Smoker  
                          Start:3-Mar-2017          Instruction Type:Patient   
Education  
  
   
                                                    How to access health informa  
tion   
online - Detail  
Indication:Smoker  
                          Start:3-Mar-2017          Instruction Type:Patient   
Education  
  
   
                                                    Patient Instructions  
Indication:Smoker  
                          Start:3-Mar-2017          Instruction Type:Provider   
Instructions for Treatment  
  
   
                                                    How to access health informa  
tion   
online  
Indication:Hypertension,   
essential, benign  
                          Start:28-Oct-2016         Instruction Type:Patient   
Education  
  
   
                                                    How to access health informa  
tion   
online - Detail  
Indication:Hypertension,   
essential, benign  
                          Start:28-Oct-2016         Instruction Type:Patient   
Education  
  
   
                                                    Patient Instructions  
Indication:Hypertension,   
essential, benign  
                          Start:28-Oct-2016         Instruction Type:Provider   
Instructions for Treatment  
  
   
                                                    How to access health informa  
tion   
online  
Indication:Hypertension,   
essential, benign  
                          Start:2016         Instruction Type:Patient   
Education  
  
   
                                                    How to access health informa  
tion   
online - Detail  
Indication:Hypertension,   
essential, benign  
                          Start:2016         Instruction Type:Patient   
Education  
  
   
                                                    Patient Instructions  
Indication:Hypertension,   
essential, benign  
                          Start:2016         Instruction Type:Provider   
Instructions for Treatment  
  
   
                                                    How to access health informa  
tion   
online  
Indication:BMI 27.0-27.9,adult  
                          Start:2016         Instruction Type:Patient   
Education  
  
   
                                                    How to access health informa  
tion   
online - Detail  
Indication:BMI 27.0-27.9,adult  
                          Start:2016         Instruction Type:Patient   
Education  
  
   
                                                    Patient Instructions  
Indication:BMI 27.0-27.9,adult  
                          Start:2016         Instruction Type:Provider   
Instructions for Treatment  
  
   
                                                    How to access health informa  
tion   
online  
Indication:Abnormal glucose   
tolerance test  
                          Start:2016         Instruction Type:Patient   
Education  
  
   
                                                    How to access health informa  
tion   
online - Detail  
Indication:Abnormal glucose   
tolerance test  
                          Start:2016         Instruction Type:Patient   
Education  
  
   
                                                    Patient Instructions  
Indication:Abnormal glucose   
tolerance test  
                          Start:2016         Instruction Type:Provider   
Instructions for Treatment  
  
   
                                                    How to access health informa  
tion   
online  
Indication:Abnormal glucose   
tolerance test  
                          Start:16-Dec-2015         Instruction Type:Patient   
Education  
  
   
                                                    How to access health informa  
tion   
online - Detail  
Indication:Abnormal glucose   
tolerance test  
                          Start:16-Dec-2015         Instruction Type:Patient   
Education  
  
   
                                                    Patient Instructions  
Indication:Abnormal glucose   
tolerance test  
                          Start:16-Dec-2015         Instruction Type:Provider   
Instructions for Treatment  
  
   
                                                    How to access health informa  
tion   
online  
Indication:Hypercholesteremia  
                          Start:16-Sep-2015         Instruction Type:Patient   
Education  
  
   
                                                    How to access health informa  
tion   
online - Detail  
Indication:Hypercholesteremia  
                          Start:16-Sep-2015         Instruction Type:Patient   
Education  
  
   
                                                    Patient Instructions  
Indication:Hypercholesteremia  
                          Start:16-Sep-2015         Instruction Type:Provider   
Instructions for Treatment  
  
   
                                                    Patient Instructions  
Indication:Abnormal glucose   
tolerance test  
                          Start:9-May-2014          Instruction Type:Provider   
Instructions for Treatment  
  
   
                                                    Patient Instructions  
Indication:Abnormal glucose   
tolerance test  
                          Start:2014          Instruction Type:Provider   
Instructions for Treatment  
  
   
                                                    Patient Instructions  
Indication:Abnormal glucose   
tolerance test  
                          Start:12-Sep-2013         Instruction Type:Provider   
Instructions for Treatment  
  
   
                                                    Patient Instructions  
Indication:Hypercholesteremia  
                          Start:15-Mar-2013         Instruction Type:Provider   
Instructions for Treatment  
  
   
                                                    Patient Instructions  
Indication:Hypertension,   
essential, benign  
                          Start:14-Sep-2012         Instruction Type:Provider   
Instructions for Treatment  
  
  
  
  
                                Name            Dates           Details  
   
                                                    How to Access Health Informa  
tion   
Online using Patient Portal and   
3rd Party Apps  
Indication:Smoker  
                          Start:1-Mar-2021          Instruction Type:Patient   
Education  
  
   
                                                    Patient Instructions  
Indication:Smoker  
                          Start:1-Mar-2021          Instruction Type:Provider   
Instructions for Treatment  
  
   
                                                    How to Access Health Informa  
tion   
Online using Patient Portal and   
[employer] Apps  
Indication:Smoker  
                          Start:2021         Instruction Type:Patient   
Education  
  
   
                                                    Patient Instructions  
Indication:Smoker  
                          Start:2021         Instruction Type:Provider   
Instructions for Treatment  
  
   
                                                    How to Access Health Informa  
tion   
Online using Patient Portal and   
[employer] Apps  
Indication:Smoker  
                          Start:2021          Instruction Type:Patient   
Education  
  
   
                                                    Patient Instructions  
Indication:Smoker  
                          Start:2021          Instruction Type:Provider   
Instructions for Treatment  
  
   
                                                    How to Access Health Informa  
tion   
Online using Patient Portal and   
3rd Party Apps  
Indication:Smoker  
                          Start:16-Dec-2020         Instruction Type:Patient   
Education  
  
   
                                                    Patient Instructions  
Indication:Smoker  
                          Start:16-Dec-2020         Instruction Type:Provider   
Instructions for Treatment  
  
   
                                                    How to access health informa  
tion   
online  
Indication:Smoker  
                          Start:2020         Instruction Type:Patient   
Education  
  
   
                                                    How to access health informa  
tion   
online - Detail  
Indication:Smoker  
                          Start:2020         Instruction Type:Patient   
Education  
  
   
                                                    Patient Instructions  
Indication:Smoker  
                          Start:2020         Instruction Type:Provider   
Instructions for Treatment  
  
   
                                                    How to access health informa  
tion   
online  
Indication:Smoker  
                          Start:24-Aug-2020         Instruction Type:Patient   
Education  
  
   
                                                    How to access health informa  
tion   
online - Detail  
Indication:Smoker  
                          Start:24-Aug-2020         Instruction Type:Patient   
Education  
  
   
                                                    Patient Instructions  
Indication:Smoker  
                          Start:24-Aug-2020         Instruction Type:Provider   
Instructions for Treatment  
  
   
                                                    How to access health informa  
tion   
online - Detail  
Indication:BMI 25.0-25.9,adult  
                          Start:2020         Instruction Type:Patient   
Education  
  
   
                                                    How to access health informa  
tion   
online  
Indication:BMI 25.0-25.9,adult  
                          Start:2020         Instruction Type:Patient   
Education  
  
   
                                                    Patient Instructions  
Indication:BMI 25.0-25.9,adult  
                          Start:2020         Instruction Type:Provider   
Instructions for Treatment  
  
   
                                                    How to access health informa  
tion   
online  
Indication:Smoker  
                          Start:2020          Instruction Type:Patient   
Education  
  
   
                                                    How to access health informa  
tion   
online - Detail  
Indication:Smoker  
                          Start:2020          Instruction Type:Patient   
Education  
  
   
                                                    Patient Instructions  
Indication:Smoker  
                          Start:2020          Instruction Type:Provider   
Instructions for Treatment  
  
   
                                                    How to access health informa  
tion   
online  
Indication:Smoker  
                          Start:2020         Instruction Type:Patient   
Education  
  
   
                                                    How to access health informa  
tion   
online - Detail  
Indication:Smoker  
                          Start:2020         Instruction Type:Patient   
Education  
  
   
                                                    Patient Instructions  
Indication:Smoker  
                          Start:2020         Instruction Type:Provider   
Instructions for Treatment  
  
   
                                                    How to access health informa  
tion   
online  
Indication:Smoker  
                          Start:13-Sep-2019         Instruction Type:Patient   
Education  
  
   
                                                    How to access health informa  
tion   
online - Detail  
Indication:Smoker  
                          Start:13-Sep-2019         Instruction Type:Patient   
Education  
  
   
                                                    Patient Instructions  
Indication:Smoker  
                          Start:13-Sep-2019         Instruction Type:Provider   
Instructions for Treatment  
  
   
                                                    How to access health informa  
tion   
online  
Indication:BMI 26.0-26.9,adult  
                          Start:10-May-2019         Instruction Type:Patient   
Education  
  
   
                                                    How to access health informa  
tion   
online - Detail  
Indication:BMI 26.0-26.9,adult  
                          Start:10-May-2019         Instruction Type:Patient   
Education  
  
   
                                                    Patient Instructions  
Indication:BMI 26.0-26.9,adult  
                          Start:10-May-2019         Instruction Type:Provider   
Instructions for Treatment  
  
   
                                                    How to access health informa  
tion   
online  
Indication:Abnormal glucose   
tolerance test  
                          Start:28-Dec-2018         Instruction Type:Patient   
Education  
  
   
                                                    How to access health informa  
tion   
online - Detail  
Indication:Abnormal glucose   
tolerance test  
                          Start:28-Dec-2018         Instruction Type:Patient   
Education  
  
   
                                                    Patient Instructions  
Indication:Abnormal glucose   
tolerance test  
                          Start:28-Dec-2018         Instruction Type:Provider   
Instructions for Treatment  
  
   
                                                    How to access health informa  
tion   
online  
Indication:Smoker  
                          Start:29-Aug-2018         Instruction Type:Patient   
Education  
  
   
                                                    How to access health informa  
tion   
online - Detail  
Indication:Smoker  
                          Start:29-Aug-2018         Instruction Type:Patient   
Education  
  
   
                                                    Patient Instructions  
Indication:Smoker  
                          Start:29-Aug-2018         Instruction Type:Provider   
Instructions for Treatment  
  
   
                                                    How to access health informa  
tion   
online  
Indication:Abnormal glucose   
tolerance test  
                          Start:2018         Instruction Type:Patient   
Education  
  
   
                                                    How to access health informa  
tion   
online - Detail  
Indication:Abnormal glucose   
tolerance test  
                          Start:2018         Instruction Type:Patient   
Education  
  
   
                                                    Patient Instructions  
Indication:Abnormal glucose   
tolerance test  
                          Start:2018         Instruction Type:Provider   
Instructions for Treatment  
  
   
                                                    How to access health informa  
tion   
online  
Indication:Smoker  
                          Start:8-Dec-2017          Instruction Type:Patient   
Education  
  
   
                                                    How to access health informa  
tion   
online - Detail  
Indication:Smoker  
                          Start:8-Dec-2017          Instruction Type:Patient   
Education  
  
   
                                                    Patient Instructions  
Indication:Smoker  
                          Start:8-Dec-2017          Instruction Type:Provider   
Instructions for Treatment  
  
   
                                                    How to access health informa  
tion   
online  
Indication:Smoker  
                          Start:4-Aug-2017          Instruction Type:Patient   
Education  
  
   
                                                    How to access health informa  
tion   
online - Detail  
Indication:Smoker  
                          Start:4-Aug-2017          Instruction Type:Patient   
Education  
  
   
                                                    Patient Instructions  
Indication:Smoker  
                          Start:4-Aug-2017          Instruction Type:Provider   
Instructions for Treatment  
  
   
                                                    How to access health informa  
tion   
online  
Indication:Smoker  
                          Start:3-Mar-2017          Instruction Type:Patient   
Education  
  
   
                                                    How to access health informa  
tion   
online - Detail  
Indication:Smoker  
                          Start:3-Mar-2017          Instruction Type:Patient   
Education  
  
   
                                                    Patient Instructions  
Indication:Smoker  
                          Start:3-Mar-2017          Instruction Type:Provider   
Instructions for Treatment  
  
   
                                                    How to access health informa  
tion   
online  
Indication:Hypertension,   
essential, benign  
                          Start:28-Oct-2016         Instruction Type:Patient   
Education  
  
   
                                                    How to access health informa  
tion   
online - Detail  
Indication:Hypertension,   
essential, benign  
                          Start:28-Oct-2016         Instruction Type:Patient   
Education  
  
   
                                                    Patient Instructions  
Indication:Hypertension,   
essential, benign  
                          Start:28-Oct-2016         Instruction Type:Provider   
Instructions for Treatment  
  
   
                                                    How to access health informa  
tion   
online  
Indication:Hypertension,   
essential, benign  
                          Start:2016         Instruction Type:Patient   
Education  
  
   
                                                    How to access health informa  
tion   
online - Detail  
Indication:Hypertension,   
essential, benign  
                          Start:2016         Instruction Type:Patient   
Education  
  
   
                                                    Patient Instructions  
Indication:Hypertension,   
essential, benign  
                          Start:2016         Instruction Type:Provider   
Instructions for Treatment  
  
   
                                                    How to access health informa  
tion   
online  
Indication:BMI 27.0-27.9,adult  
                          Start:2016         Instruction Type:Patient   
Education  
  
   
                                                    How to access health informa  
tion   
online - Detail  
Indication:BMI 27.0-27.9,adult  
                          Start:2016         Instruction Type:Patient   
Education  
  
   
                                                    Patient Instructions  
Indication:BMI 27.0-27.9,adult  
                          Start:2016         Instruction Type:Provider   
Instructions for Treatment  
  
   
                                                    How to access health informa  
tion   
online  
Indication:Abnormal glucose   
tolerance test  
                          Start:2016         Instruction Type:Patient   
Education  
  
   
                                                    How to access health informa  
tion   
online - Detail  
Indication:Abnormal glucose   
tolerance test  
                          Start:2016         Instruction Type:Patient   
Education  
  
   
                                                    Patient Instructions  
Indication:Abnormal glucose   
tolerance test  
                          Start:2016         Instruction Type:Provider   
Instructions for Treatment  
  
   
                                                    How to access health informa  
tion   
online  
Indication:Abnormal glucose   
tolerance test  
                          Start:16-Dec-2015         Instruction Type:Patient   
Education  
  
   
                                                    How to access health informa  
tion   
online - Detail  
Indication:Abnormal glucose   
tolerance test  
                          Start:16-Dec-2015         Instruction Type:Patient   
Education  
  
   
                                                    Patient Instructions  
Indication:Abnormal glucose   
tolerance test  
                          Start:16-Dec-2015         Instruction Type:Provider   
Instructions for Treatment  
  
   
                                                    How to access health informa  
tion   
online  
Indication:Hypercholesteremia  
                          Start:16-Sep-2015         Instruction Type:Patient   
Education  
  
   
                                                    How to access health informa  
tion   
online - Detail  
Indication:Hypercholesteremia  
                          Start:16-Sep-2015         Instruction Type:Patient   
Education  
  
   
                                                    Patient Instructions  
Indication:Hypercholesteremia  
                          Start:16-Sep-2015         Instruction Type:Provider   
Instructions for Treatment  
  
   
                                                    Patient Instructions  
Indication:Abnormal glucose   
tolerance test  
                          Start:9-May-2014          Instruction Type:Provider   
Instructions for Treatment  
  
   
                                                    Patient Instructions  
Indication:Abnormal glucose   
tolerance test  
                          Start:2014          Instruction Type:Provider   
Instructions for Treatment  
  
   
                                                    Patient Instructions  
Indication:Abnormal glucose   
tolerance test  
                          Start:12-Sep-2013         Instruction Type:Provider   
Instructions for Treatment  
  
   
                                                    Patient Instructions  
Indication:Hypercholesteremia  
                          Start:15-Mar-2013         Instruction Type:Provider   
Instructions for Treatment  
  
   
                                                    Patient Instructions  
Indication:Hypertension,   
essential, benign  
                          Start:14-Sep-2012         Instruction Type:Provider   
Instructions for Treatment  
  
  
  
  
                                Name            Dates           Details  
   
                                                    How to access health informa  
tion   
online  
Indication:BMI 26.0-26.9,adult  
                          Start:10-May-2019         Instruction Type:Patient   
Education  
  
   
                                                    How to access health informa  
tion   
online - Detail  
Indication:BMI 26.0-26.9,adult  
                          Start:10-May-2019         Instruction Type:Patient   
Education  
  
   
                                                    Patient Instructions  
Indication:BMI 26.0-26.9,adult  
                          Start:10-May-2019         Instruction Type:Provider   
Instructions for Treatment  
  
   
                                                    How to access health informa  
tion   
online  
Indication:Abnormal glucose   
tolerance test  
                          Start:28-Dec-2018         Instruction Type:Patient   
Education  
  
   
                                                    How to access health informa  
tion   
online - Detail  
Indication:Abnormal glucose   
tolerance test  
                          Start:28-Dec-2018         Instruction Type:Patient   
Education  
  
   
                                                    Patient Instructions  
Indication:Abnormal glucose   
tolerance test  
                          Start:28-Dec-2018         Instruction Type:Provider   
Instructions for Treatment  
  
   
                                                    How to access health informa  
tion   
online  
Indication:Smoker  
                          Start:29-Aug-2018         Instruction Type:Patient   
Education  
  
   
                                                    How to access health informa  
tion   
online - Detail  
Indication:Smoker  
                          Start:29-Aug-2018         Instruction Type:Patient   
Education  
  
   
                                                    Patient Instructions  
Indication:Smoker  
                          Start:29-Aug-2018         Instruction Type:Provider   
Instructions for Treatment  
  
   
                                                    How to access health informa  
tion   
online  
Indication:Abnormal glucose   
tolerance test  
                          Start:2018         Instruction Type:Patient   
Education  
  
   
                                                    How to access health informa  
tion   
online - Detail  
Indication:Abnormal glucose   
tolerance test  
                          Start:2018         Instruction Type:Patient   
Education  
  
   
                                                    Patient Instructions  
Indication:Abnormal glucose   
tolerance test  
                          Start:2018         Instruction Type:Provider   
Instructions for Treatment  
  
   
                                                    How to access health informa  
tion   
online  
Indication:Smoker  
                          Start:8-Dec-2017          Instruction Type:Patient   
Education  
  
   
                                                    How to access health informa  
tion   
online - Detail  
Indication:Smoker  
                          Start:8-Dec-2017          Instruction Type:Patient   
Education  
  
   
                                                    Patient Instructions  
Indication:Smoker  
                          Start:8-Dec-2017          Instruction Type:Provider   
Instructions for Treatment  
  
   
                                                    How to access health informa  
tion   
online  
Indication:Smoker  
                          Start:4-Aug-2017          Instruction Type:Patient   
Education  
  
   
                                                    How to access health informa  
tion   
online - Detail  
Indication:Smoker  
                          Start:4-Aug-2017          Instruction Type:Patient   
Education  
  
   
                                                    Patient Instructions  
Indication:Smoker  
                          Start:4-Aug-2017          Instruction Type:Provider   
Instructions for Treatment  
  
   
                                                    How to access health informa  
tion   
online  
Indication:Smoker  
                          Start:3-Mar-2017          Instruction Type:Patient   
Education  
  
   
                                                    How to access health informa  
tion   
online - Detail  
Indication:Smoker  
                          Start:3-Mar-2017          Instruction Type:Patient   
Education  
  
   
                                                    Patient Instructions  
Indication:Smoker  
                          Start:3-Mar-2017          Instruction Type:Provider   
Instructions for Treatment  
  
   
                                                    How to access health informa  
tion   
online  
Indication:Hypertension,   
essential, benign  
                          Start:28-Oct-2016         Instruction Type:Patient   
Education  
  
   
                                                    How to access health informa  
tion   
online - Detail  
Indication:Hypertension,   
essential, benign  
                          Start:28-Oct-2016         Instruction Type:Patient   
Education  
  
   
                                                    Patient Instructions  
Indication:Hypertension,   
essential, benign  
                          Start:28-Oct-2016         Instruction Type:Provider   
Instructions for Treatment  
  
   
                                                    How to access health informa  
tion   
online  
Indication:Hypertension,   
essential, benign  
                          Start:2016         Instruction Type:Patient   
Education  
  
   
                                                    How to access health informa  
tion   
online - Detail  
Indication:Hypertension,   
essential, benign  
                          Start:2016         Instruction Type:Patient   
Education  
  
   
                                                    Patient Instructions  
Indication:Hypertension,   
essential, benign  
                          Start:2016         Instruction Type:Provider   
Instructions for Treatment  
  
   
                                                    How to access health informa  
tion   
online  
Indication:BMI 27.0-27.9,adult  
                          Start:2016         Instruction Type:Patient   
Education  
  
   
                                                    How to access health informa  
tion   
online - Detail  
Indication:BMI 27.0-27.9,adult  
                          Start:2016         Instruction Type:Patient   
Education  
  
   
                                                    Patient Instructions  
Indication:BMI 27.0-27.9,adult  
                          Start:2016         Instruction Type:Provider   
Instructions for Treatment  
  
   
                                                    How to access health informa  
tion   
online  
Indication:Abnormal glucose   
tolerance test  
                          Start:2016         Instruction Type:Patient   
Education  
  
   
                                                    How to access health informa  
tion   
online - Detail  
Indication:Abnormal glucose   
tolerance test  
                          Start:2016         Instruction Type:Patient   
Education  
  
   
                                                    Patient Instructions  
Indication:Abnormal glucose   
tolerance test  
                          Start:2016         Instruction Type:Provider   
Instructions for Treatment  
  
   
                                                    How to access health informa  
tion   
online  
Indication:Abnormal glucose   
tolerance test  
                          Start:16-Dec-2015         Instruction Type:Patient   
Education  
  
   
                                                    How to access health informa  
tion   
online - Detail  
Indication:Abnormal glucose   
tolerance test  
                          Start:16-Dec-2015         Instruction Type:Patient   
Education  
  
   
                                                    Patient Instructions  
Indication:Abnormal glucose   
tolerance test  
                          Start:16-Dec-2015         Instruction Type:Provider   
Instructions for Treatment  
  
   
                                                    How to access health informa  
tion   
online  
Indication:Hypercholesteremia  
                          Start:16-Sep-2015         Instruction Type:Patient   
Education  
  
   
                                                    How to access health informa  
tion   
online - Detail  
Indication:Hypercholesteremia  
                          Start:16-Sep-2015         Instruction Type:Patient   
Education  
  
   
                                                    Patient Instructions  
Indication:Hypercholesteremia  
                          Start:16-Sep-2015         Instruction Type:Provider   
Instructions for Treatment  
  
   
                                                    Patient Instructions  
Indication:Abnormal glucose   
tolerance test  
                          Start:9-May-2014          Instruction Type:Provider   
Instructions for Treatment  
  
   
                                                    Patient Instructions  
Indication:Abnormal glucose   
tolerance test  
                          Start:2014          Instruction Type:Provider   
Instructions for Treatment  
  
   
                                                    Patient Instructions  
Indication:Abnormal glucose   
tolerance test  
                          Start:12-Sep-2013         Instruction Type:Provider   
Instructions for Treatment  
  
   
                                                    Patient Instructions  
Indication:Hypercholesteremia  
                          Start:15-Mar-2013         Instruction Type:Provider   
Instructions for Treatment  
  
   
                                                    Patient Instructions  
Indication:Hypertension,   
essential, benign  
                          Start:14-Sep-2012         Instruction Type:Provider   
Instructions for Treatment  
  
  
  
  
                                Name            Dates           Details  
   
                                                    How to Access Health Informa  
tion   
Online using Patient Portal and   
3rd Party Apps  
Indication:Smoker  
                          Start:1-Mar-2021          Instruction Type:Patient   
Education  
  
   
                                                    Patient Instructions  
Indication:Smoker  
                          Start:1-Mar-2021          Instruction Type:Provider   
Instructions for Treatment  
  
   
                                                    How to Access Health Informa  
tion   
Online using Patient Portal and   
3rd Party Apps  
Indication:Smoker  
                          Start:2021         Instruction Type:Patient   
Education  
  
   
                                                    Patient Instructions  
Indication:Smoker  
                          Start:2021         Instruction Type:Provider   
Instructions for Treatment  
  
   
                                                    How to Access Health Informa  
tion   
Online using Patient Portal and   
3rd Party Apps  
Indication:Smoker  
                          Start:2021          Instruction Type:Patient   
Education  
  
   
                                                    Patient Instructions  
Indication:Smoker  
                          Start:2021          Instruction Type:Provider   
Instructions for Treatment  
  
   
                                                    How to Access Health Informa  
tion   
Online using Patient Portal and   
3rd Party Apps  
Indication:Smoker  
                          Start:16-Dec-2020         Instruction Type:Patient   
Education  
  
   
                                                    Patient Instructions  
Indication:Smoker  
                          Start:16-Dec-2020         Instruction Type:Provider   
Instructions for Treatment  
  
   
                                                    How to access health informa  
tion   
online  
Indication:Smoker  
                          Start:2020         Instruction Type:Patient   
Education  
  
   
                                                    How to access health informa  
tion   
online - Detail  
Indication:Smoker  
                          Start:2020         Instruction Type:Patient   
Education  
  
   
                                                    Patient Instructions  
Indication:Smoker  
                          Start:2020         Instruction Type:Provider   
Instructions for Treatment  
  
   
                                                    How to access health informa  
tion   
online  
Indication:Smoker  
                          Start:24-Aug-2020         Instruction Type:Patient   
Education  
  
   
                                                    How to access health informa  
tion   
online - Detail  
Indication:Smoker  
                          Start:24-Aug-2020         Instruction Type:Patient   
Education  
  
   
                                                    Patient Instructions  
Indication:Smoker  
                          Start:24-Aug-2020         Instruction Type:Provider   
Instructions for Treatment  
  
   
                                                    How to access health informa  
tion   
online - Detail  
Indication:BMI 25.0-25.9,adult  
                          Start:2020         Instruction Type:Patient   
Education  
  
   
                                                    How to access health informa  
tion   
online  
Indication:BMI 25.0-25.9,adult  
                          Start:2020         Instruction Type:Patient   
Education  
  
   
                                                    Patient Instructions  
Indication:BMI 25.0-25.9,adult  
                          Start:2020         Instruction Type:Provider   
Instructions for Treatment  
  
   
                                                    How to access health informa  
tion   
online  
Indication:Smoker  
                          Start:2020          Instruction Type:Patient   
Education  
  
   
                                                    How to access health informa  
tion   
online - Detail  
Indication:Smoker  
                          Start:2020          Instruction Type:Patient   
Education  
  
   
                                                    Patient Instructions  
Indication:Smoker  
                          Start:2020          Instruction Type:Provider   
Instructions for Treatment  
  
   
                                                    How to access health informa  
tion   
online  
Indication:Smoker  
                          Start:2020         Instruction Type:Patient   
Education  
  
   
                                                    How to access health informa  
tion   
online - Detail  
Indication:Smoker  
                          Start:2020         Instruction Type:Patient   
Education  
  
   
                                                    Patient Instructions  
Indication:Smoker  
                          Start:2020         Instruction Type:Provider   
Instructions for Treatment  
  
   
                                                    How to access health informa  
tion   
online  
Indication:Smoker  
                          Start:13-Sep-2019         Instruction Type:Patient   
Education  
  
   
                                                    How to access health informa  
tion   
online - Detail  
Indication:Smoker  
                          Start:13-Sep-2019         Instruction Type:Patient   
Education  
  
   
                                                    Patient Instructions  
Indication:Smoker  
                          Start:13-Sep-2019         Instruction Type:Provider   
Instructions for Treatment  
  
   
                                                    How to access health informa  
tion   
online  
Indication:BMI 26.0-26.9,adult  
                          Start:10-May-2019         Instruction Type:Patient   
Education  
  
   
                                                    How to access health informa  
tion   
online - Detail  
Indication:BMI 26.0-26.9,adult  
                          Start:10-May-2019         Instruction Type:Patient   
Education  
  
   
                                                    Patient Instructions  
Indication:BMI 26.0-26.9,adult  
                          Start:10-May-2019         Instruction Type:Provider   
Instructions for Treatment  
  
   
                                                    How to access health informa  
tion   
online  
Indication:Abnormal glucose   
tolerance test  
                          Start:28-Dec-2018         Instruction Type:Patient   
Education  
  
   
                                                    How to access health informa  
tion   
online - Detail  
Indication:Abnormal glucose   
tolerance test  
                          Start:28-Dec-2018         Instruction Type:Patient   
Education  
  
   
                                                    Patient Instructions  
Indication:Abnormal glucose   
tolerance test  
                          Start:28-Dec-2018         Instruction Type:Provider   
Instructions for Treatment  
  
   
                                                    How to access health informa  
tion   
online  
Indication:Smoker  
                          Start:29-Aug-2018         Instruction Type:Patient   
Education  
  
   
                                                    How to access health informa  
tion   
online - Detail  
Indication:Smoker  
                          Start:29-Aug-2018         Instruction Type:Patient   
Education  
  
   
                                                    Patient Instructions  
Indication:Smoker  
                          Start:29-Aug-2018         Instruction Type:Provider   
Instructions for Treatment  
  
   
                                                    How to access health informa  
tion   
online  
Indication:Abnormal glucose   
tolerance test  
                          Start:2018         Instruction Type:Patient   
Education  
  
   
                                                    How to access health informa  
tion   
online - Detail  
Indication:Abnormal glucose   
tolerance test  
                          Start:2018         Instruction Type:Patient   
Education  
  
   
                                                    Patient Instructions  
Indication:Abnormal glucose   
tolerance test  
                          Start:2018         Instruction Type:Provider   
Instructions for Treatment  
  
   
                                                    How to access health informa  
tion   
online  
Indication:Smoker  
                          Start:8-Dec-2017          Instruction Type:Patient   
Education  
  
   
                                                    How to access health informa  
tion   
online - Detail  
Indication:Smoker  
                          Start:8-Dec-2017          Instruction Type:Patient   
Education  
  
   
                                                    Patient Instructions  
Indication:Smoker  
                          Start:8-Dec-2017          Instruction Type:Provider   
Instructions for Treatment  
  
   
                                                    How to access health informa  
tion   
online  
Indication:Smoker  
                          Start:4-Aug-2017          Instruction Type:Patient   
Education  
  
   
                                                    How to access health informa  
tion   
online - Detail  
Indication:Smoker  
                          Start:4-Aug-2017          Instruction Type:Patient   
Education  
  
   
                                                    Patient Instructions  
Indication:Smoker  
                          Start:4-Aug-2017          Instruction Type:Provider   
Instructions for Treatment  
  
   
                                                    How to access health informa  
tion   
online  
Indication:Smoker  
                          Start:3-Mar-2017          Instruction Type:Patient   
Education  
  
   
                                                    How to access health informa  
tion   
online - Detail  
Indication:Smoker  
                          Start:3-Mar-2017          Instruction Type:Patient   
Education  
  
   
                                                    Patient Instructions  
Indication:Smoker  
                          Start:3-Mar-2017          Instruction Type:Provider   
Instructions for Treatment  
  
   
                                                    How to access health informa  
tion   
online  
Indication:Hypertension,   
essential, benign  
                          Start:28-Oct-2016         Instruction Type:Patient   
Education  
  
   
                                                    How to access health informa  
tion   
online - Detail  
Indication:Hypertension,   
essential, benign  
                          Start:28-Oct-2016         Instruction Type:Patient   
Education  
  
   
                                                    Patient Instructions  
Indication:Hypertension,   
essential, benign  
                          Start:28-Oct-2016         Instruction Type:Provider   
Instructions for Treatment  
  
   
                                                    How to access health informa  
tion   
online  
Indication:Hypertension,   
essential, benign  
                          Start:2016         Instruction Type:Patient   
Education  
  
   
                                                    How to access health informa  
tion   
online - Detail  
Indication:Hypertension,   
essential, benign  
                          Start:2016         Instruction Type:Patient   
Education  
  
   
                                                    Patient Instructions  
Indication:Hypertension,   
essential, benign  
                          Start:2016         Instruction Type:Provider   
Instructions for Treatment  
  
   
                                                    How to access health informa  
tion   
online  
Indication:BMI 27.0-27.9,adult  
                          Start:2016         Instruction Type:Patient   
Education  
  
   
                                                    How to access health informa  
tion   
online - Detail  
Indication:BMI 27.0-27.9,adult  
                          Start:2016         Instruction Type:Patient   
Education  
  
   
                                                    Patient Instructions  
Indication:BMI 27.0-27.9,adult  
                          Start:2016         Instruction Type:Provider   
Instructions for Treatment  
  
   
                                                    How to access health informa  
tion   
online  
Indication:Abnormal glucose   
tolerance test  
                          Start:2016         Instruction Type:Patient   
Education  
  
   
                                                    How to access health informa  
tion   
online - Detail  
Indication:Abnormal glucose   
tolerance test  
                          Start:2016         Instruction Type:Patient   
Education  
  
   
                                                    Patient Instructions  
Indication:Abnormal glucose   
tolerance test  
                          Start:2016         Instruction Type:Provider   
Instructions for Treatment  
  
   
                                                    How to access health informa  
tion   
online  
Indication:Abnormal glucose   
tolerance test  
                          Start:16-Dec-2015         Instruction Type:Patient   
Education  
  
   
                                                    How to access health informa  
tion   
online - Detail  
Indication:Abnormal glucose   
tolerance test  
                          Start:16-Dec-2015         Instruction Type:Patient   
Education  
  
   
                                                    Patient Instructions  
Indication:Abnormal glucose   
tolerance test  
                          Start:16-Dec-2015         Instruction Type:Provider   
Instructions for Treatment  
  
   
                                                    How to access health informa  
tion   
online  
Indication:Hypercholesteremia  
                          Start:16-Sep-2015         Instruction Type:Patient   
Education  
  
   
                                                    How to access health informa  
tion   
online - Detail  
Indication:Hypercholesteremia  
                          Start:16-Sep-2015         Instruction Type:Patient   
Education  
  
   
                                                    Patient Instructions  
Indication:Hypercholesteremia  
                          Start:16-Sep-2015         Instruction Type:Provider   
Instructions for Treatment  
  
   
                                                    Patient Instructions  
Indication:Abnormal glucose   
tolerance test  
                          Start:9-May-2014          Instruction Type:Provider   
Instructions for Treatment  
  
   
                                                    Patient Instructions  
Indication:Abnormal glucose   
tolerance test  
                          Start:2014          Instruction Type:Provider   
Instructions for Treatment  
  
   
                                                    Patient Instructions  
Indication:Abnormal glucose   
tolerance test  
                          Start:12-Sep-2013         Instruction Type:Provider   
Instructions for Treatment  
  
   
                                                    Patient Instructions  
Indication:Hypercholesteremia  
                          Start:15-Mar-2013         Instruction Type:Provider   
Instructions for Treatment  
  
   
                                                    Patient Instructions  
Indication:Hypertension,   
essential, benign  
                          Start:14-Sep-2012         Instruction Type:Provider   
Instructions for Treatment  
  
  
  
  
Summary Purpose  
  
  
                                                      
  
  
  
Advance Directives  
No Advanced Directives Records FoundNo Advanced Directives Records Found  
  
Additional Source Comments  
  
  
  
                                                    PREGNANCY (unrecognized sect  
ion and content)  
   
                                                    No Pregnancy Status Records   
FoundNo Pregnancy Status Records Found  
  
  
  
  
                                                    INFORMATION SOURCE (unrecogn  
ized section and content)  
   
                                          
  
  
  
                                        DATE CREATED        AUTHOR  
   
                                2019                      [facility]  
  
  
  
                                DATE CREATED    AUTHOR          AUTHOR'S ALFONSO  
ATION  
   
                                2024                      [facility]  
  
  
FOR RECORDS PERTAINING TO PATIENTS WHO ARE OR HAVE BEEN ENROLLED IN A CHEMICAL 
DEPENDENCY/SUBSTANCEABUSE PROGRAM, SOME INFORMATION MAY BE OMITTED. This 
clinical summary was aggregated from multiple sources. Caution should be 
exercised in using it in the provision of clinical care. This summary normalizes
 information from multiple sources, and as a consequence, information in this 
document may materially change the coding, format and clinical context of 
patient data. In addition, data may be omitted in some cases. CLINICAL DECISIONS
 SHOULD BE BASED ON THE PRIMARY CLINICAL RECORDS. [facility] provides
 no warranty or guarantee of the accuracy or completeness of information in this
 document.

## 2024-10-03 ENCOUNTER — HOSPITAL ENCOUNTER (OUTPATIENT)
Dept: HOSPITAL 100 - LAB | Age: 59
Discharge: HOME | End: 2024-10-03
Payer: COMMERCIAL

## 2024-10-03 DIAGNOSIS — I25.10: ICD-10-CM

## 2024-10-03 DIAGNOSIS — R94.31: Primary | ICD-10-CM

## 2024-10-03 LAB
CHOLEST SERPL-MCNC: 154 MG/DL
TRIGLYCERIDES: 118 MG/DL
VLDLC SERPL-MCNC: 24 MG/DL (ref 5–40)

## 2024-10-03 PROCEDURE — 93306 TTE W/DOPPLER COMPLETE: CPT

## 2024-10-03 PROCEDURE — C8929 TTE W OR WO FOL WCON,DOPPLER: HCPCS

## 2024-10-03 PROCEDURE — 36415 COLL VENOUS BLD VENIPUNCTURE: CPT

## 2024-10-03 PROCEDURE — A4216 STERILE WATER/SALINE, 10 ML: HCPCS

## 2024-10-03 PROCEDURE — 80061 LIPID PANEL: CPT

## 2024-10-05 LAB
ANION GAP: 5 (ref 5–15)
BUN SERPL-MCNC: 13 MG/DL (ref 7–18)
BUN/CREAT RATIO: 16 RATIO (ref 10–20)
CALCIUM SERPL-MCNC: 9.9 MG/DL (ref 8.5–10.1)
CARBON DIOXIDE: 27 MMOL/L (ref 21–32)
CHLORIDE: 108 MMOL/L (ref 98–107)
DEPRECATED RDW RBC: 38.5 FL (ref 35.1–43.9)
ERYTHROCYTE [DISTWIDTH] IN BLOOD: 11.4 % (ref 11.6–14.6)
EST GLOM FILT RATE - AFR AMER: 124 ML/MIN (ref 60–?)
ESTIMATED CREATININE CLEARANCE: 129.07 ML/MIN
GLUCOSE: 128 MG/DL (ref 74–106)
HCT VFR BLD AUTO: 44.1 % (ref 40–54)
HEMOGLOBIN: 15.4 G/DL (ref 13–16.5)
HGB BLD-MCNC: 15.4 G/DL (ref 13–16.5)
MCV RBC: 91.7 FL (ref 80–94)
MEAN CORP HGB CONC: 34.9 G/DL (ref 32–36)
MEAN PLATELET VOL.: 9.8 FL (ref 6.2–12)
PLATELET # BLD: 227 K/MM3 (ref 150–450)
PLATELET COUNT: 227 K/MM3 (ref 150–450)
POTASSIUM: 4.1 MMOL/L (ref 3.5–5.1)
PROTHROMBIN TIME (PROTIME)PT.: 12.5 SECONDS (ref 11.7–14.9)
RBC # BLD AUTO: 4.81 M/MM3 (ref 4.6–6.2)
RBC DISTRIBUTION WIDTH CV: 11.4 % (ref 11.6–14.6)
RBC DISTRIBUTION WIDTH SD: 38.5 FL (ref 35.1–43.9)
WBC # BLD AUTO: 8.4 K/MM3 (ref 4.4–11)
WHITE BLOOD COUNT: 8.4 K/MM3 (ref 4.4–11)

## 2024-10-05 NOTE — RAD_ITS
REPORT-ID:[id number] 
 
EXAM:  XR CHEST, 2 VIEWS 
 
CLINICAL INDICATION:  [facility] 
 
TECHNIQUE:  Frontal and lateral views of the chest. 
 
COMPARISON:  No relevant prior studies available. 
 
FINDINGS: 
 
LUNGS AND PLEURAL SPACES:  Unremarkable.  No consolidation or edema.  No 
pneumothorax.  No effusion. 
 
HEART:  Unremarkable.  Cardiac silhouette not enlarged. 
 
MEDIASTINUM:  Central airways and mediastinal contour are unremarkable. 
 
BONES/JOINTS:  Unremarkable.  No acute fracture. 
 
SOFT TISSUES:  Unremarkable. 
 
ORDER #: [id number] RAD/Chest PA and Lateral  
IMPRESSION:  
No radiographic evidence of acute cardiopulmonary disease.  
 
  
Electronically Signed:  
Nathen Stone MD  
2024/10/06 at 15:47 EDT  
Reading Location ID and State: 4030 / [place]  
Tel [phone], Service support  [phone], Fax [phone]

## 2024-10-07 ENCOUNTER — HOSPITAL ENCOUNTER (OUTPATIENT)
Age: 59
Discharge: HOME | End: 2024-10-07
Payer: COMMERCIAL

## 2024-10-07 VITALS — BODY MASS INDEX: 28 KG/M2

## 2024-10-07 DIAGNOSIS — E78.5: ICD-10-CM

## 2024-10-07 DIAGNOSIS — E11.9: ICD-10-CM

## 2024-10-07 DIAGNOSIS — R94.31: ICD-10-CM

## 2024-10-07 DIAGNOSIS — Z79.899: ICD-10-CM

## 2024-10-07 DIAGNOSIS — Z79.82: ICD-10-CM

## 2024-10-07 DIAGNOSIS — Z87.891: ICD-10-CM

## 2024-10-07 DIAGNOSIS — R94.39: ICD-10-CM

## 2024-10-07 DIAGNOSIS — Z79.84: ICD-10-CM

## 2024-10-07 DIAGNOSIS — I10: ICD-10-CM

## 2024-10-07 DIAGNOSIS — I25.10: Primary | ICD-10-CM

## 2024-10-07 PROCEDURE — 80048 BASIC METABOLIC PNL TOTAL CA: CPT

## 2024-10-07 PROCEDURE — 71046 X-RAY EXAM CHEST 2 VIEWS: CPT

## 2024-10-07 PROCEDURE — 36415 COLL VENOUS BLD VENIPUNCTURE: CPT

## 2024-10-07 PROCEDURE — 85610 PROTHROMBIN TIME: CPT

## 2024-10-07 PROCEDURE — 99153 MOD SED SAME PHYS/QHP EA: CPT

## 2024-10-07 PROCEDURE — 93458 L HRT ARTERY/VENTRICLE ANGIO: CPT

## 2024-10-07 PROCEDURE — C1894 INTRO/SHEATH, NON-LASER: HCPCS

## 2024-10-07 PROCEDURE — C1769 GUIDE WIRE: HCPCS

## 2024-10-07 PROCEDURE — 85027 COMPLETE CBC AUTOMATED: CPT

## 2024-10-07 PROCEDURE — 99152 MOD SED SAME PHYS/QHP 5/>YRS: CPT

## 2024-10-07 NOTE — XMS RPT_ITS
Comprehensive CCD (C-CDA v2.1)  
  
                           Created on: 2024  
  
  
BETHANIE GALAN  
External Reference #: CDR,PersonID:[id number]  
: [id number]  
Sex: Male  
  
Demographics  
  
  
                                        Address             [address] [place]  [address]  
   
                                        Home Phone          [phone]  
   
                                        Work Phone          [phone]  
   
                                        Home Phone          [phone]  
   
                                        Work Phone          [phone]  
   
                                        Preferred Language  en  
   
                                        Marital Status        
   
                                        L[employer] day Affiliation Unknown  
   
                                        Race                White  
   
                                        Ethnic Group        Not  or Lati  
no  
  
  
Author  
  
  
                                        Organization        [facility]  
  
  
Care Team Providers  
  
  
                                Care Team Member Name Role            Phone  
   
                                Mere Ray Unavailable     [phone]  
   
                                Nikko Hanson Unavailable     [phone]  
   
                                Verena Tutlte   Unavailable     Unavailable  
   
                                Unavailable     Unavailable     [phone]  
   
                                Mere Ray Attending       Unavailable  
   
                                Mere Ray Referring       Unavailable  
   
                                Mere Ray Consulting      Unavailable  
   
                                Mere Ray Unavailable     [phone]  
   
                                Nikko Hanson Unavailable     [phone]  
   
                                Verena Tuttle   Unavailable     Unavailable  
   
                                Unavailable     Unavailable     [phone]  
   
                                Slarb, Lashell   Unavailable     Unavailable  
   
                                Gravius, Gaby Unavailable     Unavailable  
   
                                Anjelica Gonzalez   Unavailable     Unavailable  
   
                                Neal, Catina Unavailable     Unavailable  
   
                                Marcos Spencer   Unavailable     Unavailable  
   
                                Tanphaichitr - Wilkins, Donald Unavailable     [phone]  
[phone]  
   
                                Cory COSTELLO Mere Unavailable     [phone]  
34  
   
                                Nikko Hanson MD Unavailable     [phone]  
5  
   
                                Tanphaichitr - Wilkins, Donald Unavailable     [phone]  
[phone]  
   
                                Anjelica Gonzalez LPN Unavailable     Unavailable  
   
                                Gravius CAROLINE, Gaby Unavailable     Unavailable  
   
                                Unavailable     Unavailable     [phone]  
   
                                Sierra Low LPN Unavailable     Unavailable  
   
                                Cory COSTELLO Mere Unavailable     [phone]  
34  
   
                                Nikko Hanson MD Unavailable     [phone]  
5  
   
                                Tanphaichitr - Wilkins, Donald Unavailable     [phone]  
[phone]  
   
                                Marcos Spencer LPN Unavailable     Unavailable  
   
                                Manchak CAROLINE, Rowena Unavailable     Unavailable  
   
                                Yolie Orona MA Unavailable     Unavailable  
[id number]|2024-10-07 11:15:00|2024-10-07 09:55:00|CL.D_ITS||Imaging Services|[id number]|

## 2024-10-11 ENCOUNTER — HOSPITAL ENCOUNTER (OUTPATIENT)
Age: 59
Discharge: HOME | End: 2024-10-11
Payer: COMMERCIAL

## 2024-10-11 DIAGNOSIS — I25.10: ICD-10-CM

## 2024-10-11 DIAGNOSIS — D75.1: ICD-10-CM

## 2024-10-11 DIAGNOSIS — I10: ICD-10-CM

## 2024-10-11 DIAGNOSIS — R94.31: Primary | ICD-10-CM

## 2024-10-11 DIAGNOSIS — F17.200: ICD-10-CM

## 2024-10-11 DIAGNOSIS — E11.9: ICD-10-CM

## 2024-10-11 LAB
ANION GAP: 8 (ref 5–15)
BUN SERPL-MCNC: 15 MG/DL (ref 7–18)
BUN/CREAT RATIO: 15.2 RATIO (ref 10–20)
CALCIUM SERPL-MCNC: 9.4 MG/DL (ref 8.5–10.1)
CARBON DIOXIDE: 22 MMOL/L (ref 21–32)
CHLORIDE: 106 MMOL/L (ref 98–107)
EST GLOM FILT RATE - AFR AMER: 99 ML/MIN (ref 60–?)
GLUCOSE: 130 MG/DL (ref 74–106)
POTASSIUM: 4.1 MMOL/L (ref 3.5–5.1)

## 2024-10-11 PROCEDURE — 80048 BASIC METABOLIC PNL TOTAL CA: CPT

## 2024-10-11 PROCEDURE — 36415 COLL VENOUS BLD VENIPUNCTURE: CPT

## 2024-10-17 NOTE — ECHOCS_ITS
" 
 
Reason For Study: Abn stress test 
 
Procedure 
This was a 2D Doppler, Color Flow transthoracic echocardiogram. Exam performed in department. 
 
Left Ventricle 
Severely dilated left ventricular cavity. Severe LV systolic dysfunction. Severe hypokinesis of the 
anterior wall, septum and inferior wall. Estimated LVEF 20 to 25%. Grade 1 diastolic dysfunction. 
 
Right Ventricle 
Normal right ventricle. 
 
Atria 
The left and right atria are normal. 
 
Mitral Valve 
Trivial mitral valve insufficiency. 
 
Tricuspid Valve 
Trivial tricuspid valve insufficiency. Normal pulmonary artery pressure. 
 
Aortic Valve 
Trisinus/trileaflet aortic valve. 
 
Pulmonic Valve 
The pulmonic valve is not well visualized. 
 
Great Vessels 
Normal sized aortic root. 
 
Pericardium/Pleural 
No pericardial effusion. 
 
Medication 
22 gauge I.V. with prn adaptor inserted into right arm. Performed a rapid injection of agitated mix 
of 9 cc saline and 1cc air to assess for atrial septal defect. Diluted definity 1.5ml given slow IV 
push to enhance endocardial definition. 
 
MMode/2D Measurements & Calculations 
LVIDd: 6.8 cm                IVSd: 1.2 cm                            LVOT diam: 2.3 cm 
LVIDs: 5.8 cm                LVPWd: 0.94 cm 
RVDd: 3.6 cm                 FS: 15.5 %                              LVOT area: 4.2 cm2 
 
          _________________________________________________________________________________ 
asc Aorta Diam: 3.5 cm       LAV(MOD-bp): 63.2 ml                    LVAd ap4: 54.8 cm2 
                             LAV(MOD-bp) Indexed: 26.3 ml/m2         LVLd ap4: 11.0 cm 
                             LAV(MOD-sp2): 66.0 ml                   EDV(MOD-sp4): 228.1 ml 
                             LAV(MOD-sp4): 52.4 ml                   EDV(sp4-el): 232.3 ml 
 
                                                                     LVAs ap4: 47.4 cm2 
                                                                     LVLs ap4: 10.9 cm 
                                                                     ESV(MOD-sp4): 177.6 ml 
                                                                     ESV(sp4-el): 175.2 ml 
                                                                     EF(MOD-sp4): 22.1 % 
                                                                     EF(sp4-el): 24.6 % 
          _________________________________________________________________________________ 
LVAd ap2: 55.0 cm2           SV(MOD-sp4): 50.5 ml                    SV(MOD-sp2): 68.7 ml 
LVLd ap2: 11.4 cm 
EDV(MOD-sp2): 238.5 ml 
EDV(sp2-el): 226.1 ml 
 
LVAs ap2: 45.7 cm2 
LVLs ap2: 10.7 cm 
ESV(MOD-sp2): 169.8 ml 
ESV(sp2-el): 165.3 ml 
EF(MOD-sp2): 28.8 % 
          _________________________________________________________________________________ 
SV(sp4-el): 57.1 ml                                                  LA dimension(2D): 2.9 cm 
                             LA A4 area: 17.7 cm2 
          _________________________________________________________________________________ 
RA A4 area: 13.3 cm2 
 
Doppler Measurements & Calculations 
MV E max alexey: 42.9 cm/sec       Lat Peak E' Alexey: 10.6 cm/sec       Med Peak E' Alexey: 5.2 cm/sec 
MV A max alexey: 93.0 cm/sec       E/E' lat: 4.0                      E/E' med: 8.3 
MV E/A: 0.46 
 
          _________________________________________________________________________________ 
Ao V2 max: 126.2 cm/sec         LV V1 max: 112.9 cm/sec            SV(LVOT): 98.2 ml 
Ao max P.4 mmHg             LV V1 max P.1 mmHg 
Ao V2 mean: 87.9 cm/sec         LV V1 mean P.9 mmHg 
Ao mean PG: 3.6 mmHg            LV V1 mean: 80.7 cm/sec 
Ao V2 VTI: 27.8 cm              LV V1 VTI: 23.3 cm 
AV (velocity ratio): 0.84 
 
JOANIE(I,D): 3.5 cm2 
JOANIE(V,D): 3.8 cm2 
          _________________________________________________________________________________ 
PA V2 max: 134.8 cm/sec         TR max alexey: 218.8 cm/sec 
PA max PG (full)
[id number]|2024-10-03 12:36:00|2024-10-03 12:50:00|RAD_ITS||Imaging Services|[id number]|"REPORT-ID:[id number] [Initial Evaluation] : an initial evaluation

## 2024-11-19 ENCOUNTER — HOSPITAL ENCOUNTER (OUTPATIENT)
Dept: HOSPITAL 100 - LAB | Age: 59
Discharge: HOME | End: 2024-11-19
Payer: COMMERCIAL

## 2024-11-19 DIAGNOSIS — I10: Primary | ICD-10-CM

## 2024-11-19 DIAGNOSIS — R94.31: ICD-10-CM

## 2024-11-19 DIAGNOSIS — E11.9: ICD-10-CM

## 2024-11-19 DIAGNOSIS — Z98.890: ICD-10-CM

## 2024-11-19 LAB
ANION GAP: 4 (ref 5–15)
BUN SERPL-MCNC: 15 MG/DL (ref 7–18)
BUN/CREAT RATIO: 15.2 RATIO (ref 10–20)
CALCIUM SERPL-MCNC: 9.7 MG/DL (ref 8.5–10.1)
CARBON DIOXIDE: 27 MMOL/L (ref 21–32)
CHLORIDE: 107 MMOL/L (ref 98–107)
EST GLOM FILT RATE - AFR AMER: 99 ML/MIN (ref 60–?)
GLUCOSE: 116 MG/DL (ref 74–106)
POTASSIUM: 4.4 MMOL/L (ref 3.5–5.1)

## 2024-11-19 PROCEDURE — 84443 ASSAY THYROID STIM HORMONE: CPT

## 2024-11-19 PROCEDURE — 80048 BASIC METABOLIC PNL TOTAL CA: CPT

## 2024-11-19 PROCEDURE — 36415 COLL VENOUS BLD VENIPUNCTURE: CPT

## 2024-12-30 ENCOUNTER — HOSPITAL ENCOUNTER (OUTPATIENT)
Dept: HOSPITAL 100 - CVS | Age: 59
Discharge: HOME | End: 2024-12-30
Payer: COMMERCIAL

## 2024-12-30 DIAGNOSIS — I42.0: Primary | ICD-10-CM

## 2024-12-30 DIAGNOSIS — R94.39: ICD-10-CM

## 2024-12-30 PROCEDURE — 93306 TTE W/DOPPLER COMPLETE: CPT

## 2024-12-30 PROCEDURE — A4216 STERILE WATER/SALINE, 10 ML: HCPCS

## 2024-12-30 PROCEDURE — C8929 TTE W OR WO FOL WCON,DOPPLER: HCPCS

## 2024-12-30 NOTE — ECHOCS_ITS
Reason For Study: Dilated CM 
 
Procedure 
This was a 2D Doppler, Color Flow transthoracic echocardiogram. Contrast injection was performed. 
Exam performed in department. 
 
Left Ventricle 
Mild LV concentric hypertrophy. 
Severely dilated left ventricular cavity with severe LV systolic dysfunction. Estimated LVEF 15 to 
20%. Dyssynchronous wall motion with underlying bundle branch block. Stage 1 diastolic dysfunction. 
 
Right Ventricle 
Normal right ventricle. 
 
Atria 
The left and right atria are normal. 
 
Mitral Valve 
Trivial mitral valve insufficiency. 
 
Tricuspid Valve 
Normal tricuspid valve. 
 
Aortic Valve 
Trisinus/trileaflet aortic valve. 
 
Pulmonic Valve 
The pulmonic valve is not well visualized. 
 
Great Vessels 
Normal sized aortic root. 
 
Pericardium/Pleural 
No pericardial effusion. 
 
Medication 
Diluted definity 3ml given slow IV push to enhance endocardial definition. 
 
MMode/2D Measurements & Calculations 
LVIDd: 5.3 cm                           IVSd: 1.4 cm                   asc Aorta Diam: 3.5 cm 
LVIDs: 4.9 cm                           LVPWd: 1.3 cm 
RVDd: 3.8 cm                            FS: 9.1 % 
 
          _________________________________________________________________________________ 
LAV(MOD-bp): 39.2 ml                    LVAd ap4: 58.6 cm2             LVAd ap2: 45.6 cm2 
LAV(MOD-bp) Indexed: 18.4 ml/m2         LVLd ap4: 10.6 cm              LVLd ap2: 9.7 cm 
LAV(MOD-sp2): 64.1 ml                   EDV(MOD-sp4): 261.9 ml         EDV(MOD-sp2): 179.0 ml 
LAV(MOD-sp4): 23.9 ml                   EDV(sp4-el): 274.4 ml          EDV(sp2-el): 181.6 ml 
 
                                        LVAs ap4: 46.0 cm2             LVAs ap2: 37.8 cm2 
                                        LVLs ap4: 9.8 cm               LVLs ap2: 9.2 cm 
                                        ESV(MOD-sp4): 180.8 ml         ESV(MOD-sp2): 133.8 ml 
                                        ESV(sp4-el): 183.1 ml          ESV(sp2-el): 131.9 ml 
                                        EF(MOD-sp4): 31.0 %            EF(MOD-sp2): 25.3 % 
                                        EF(sp4-el): 33.3 % 
          _________________________________________________________________________________ 
SV(MOD-sp4): 81.1 ml                    SV(MOD-sp2): 45.2 ml           SV(sp4-el): 91.3 ml 
SI(MOD-sp4): 38.1 ml/m2                 SI(MOD-sp2): 21.2 ml/m2 
 
          _________________________________________________________________________________ 
LA A4 area: 12.1 cm2                    LA dimension(2D): 3.7 cm       RA A4 area: 12.1 cm2 
 
          _________________________________________________________________________________ 
TAPSE: 2.2 cm 
 
Time Measurements 
MV dec time: 0.19 sec 
 
Doppler Measurements & Calculations 
MV E max alexey: 46.0 cm/sec        Lat Peak E' Alexey: 6.7 cm/sec       Med Peak E' Alexey: 7.5 cm/sec 
MV A max alexey: 82.6 cm/sec        E/E' lat: 6.9                     E/E' med: 6.1 
MV E/A: 0.56 
          _________________________________________________________________________________ 
MV dec slope: 239.7 cm/sec2      Ao V2 max: 138.1 cm/sec           LV V1 max: 126.4 cm/sec 
                                 Ao max P.7 mmHg               LV V1 max P.4 mmHg 
                                 Ao V2 mean: 91.1 cm/sec 
                                 Ao mean P.0 mmHg 
                                 Ao V2 VTI: 23.6 cm 
          _________________________________________________________________________________ 
PA V2 max: 125.8 cm/sec 
 
ORDER #: [id number] ECHO/Echo Complete W/ Contrast  
Interpretation Summary  
Mild LV concentric hypertrophy.  
Severely dilated left ventricular cavity with severe LV systolic dysfunction. E
stimated LVEF 15 to  
20%. Dyssynchronous wall motion with underlying bundle branch block.  
Stage 1 diastolic dysfunction.  
 
  
 
  
______________________________________________________________________________  
Ordering Physician: Mari Vargas  
Referring Physician: Mere Ray  
Performed By: Crys Gray, RDCS, RVT  
Electronically signed by: Mari Vargas MD on 2024 09:21 AM

## 2025-06-05 LAB
AMORPH SED URNS QL MICRO: (no result)
ANION GAP SERPL CALC-SCNC: 12 MMOL/L (ref 5–15)
BACTERIA: (no result) /HPF
BILIRUB UR QL STRIP: NEGATIVE MG/DL
BLOOD UR QL: (no result) /HPF (ref 0–5)
BLOOD UR QL: NEGATIVE /UL
BUN SERPL-MCNC: 13 MG/DL (ref 4–19)
BUN/CREAT SERPL: 12.6 RATIO (ref 10–20)
CALCIUM SERPL-MCNC: 10 MG/DL (ref 7.6–11)
CAOX CRY URNS QL MICRO: (no result) /HPF
CHLORIDE: 106 MMOL/L (ref 98–108)
CLARITY UR: CLEAR
CO2 BLDCV-SCNC: 23.7 MMOL/L (ref 21–32)
COARSE GRAN CASTS URNS QL MICRO: (no result) /LPF
COLOR UR: (no result)
CREAT SERPL-MCNC: 0.99 MG/DL (ref 0.7–1.2)
DEPRECATED RDW RBC: 41.1 FL (ref 35.1–43.9)
ERYTHROCYTE [DISTWIDTH] IN BLOOD: 11.9 % (ref 11.6–14.6)
GLUCOSE SERPL-MCNC: 112 MG/DL (ref 70–99)
GLUCOSE, DIPSTICK: 1000 MG/DL
HCT VFR BLD AUTO: 47.8 % (ref 40–54)
HGB BLD-MCNC: 16.8 G/DL (ref 13–16.5)
HYALINE CASTS URNS QL MICRO: (no result) /LPF (ref 0–5)
KETONE-DIPSTICK: NEGATIVE MG/DL
LEUKOCYTE ESTERASE UR QL STRIP: NEGATIVE /UL
MCH RBC QN AUTO: 33.3 PG (ref 27–32)
MCV RBC: 94.8 FL (ref 80–94)
MEAN CORP HGB CONC: 35.1 G/DL (ref 32–36)
MEAN PLATELET VOL.: 9.6 FL (ref 6.2–12)
MUCOUS THREADS URNS QL MICRO: (no result) /HPF
NITRITE UR QL STRIP: NEGATIVE
PH UR: 6 [PH] (ref 5–8)
PLATELET # BLD: 236 K/MM3 (ref 150–450)
POTASSIUM SPEC-MCNC: 4.1 MMOL/L (ref 3.3–5.1)
PROT UR QL STRIP.AUTO: 15 MG/DL
RBC # BLD AUTO: 5.04 M/MM3 (ref 4.6–6.2)
SODIUM LEVEL: 141 MMOL/L (ref 133–145)
SP GR UR: 1.02 (ref 1–1.03)
SQUAMOUS URNS QL MICRO: (no result) /HPF (ref 0–5)
URINE PRESERVATIVE: (no result)
UROBILINOGEN UR QL STRIP.AUTO: NORMAL MG/DL
WBC # BLD AUTO: 8.5 K/MM3 (ref 4.4–11)
WBC #/AREA URNS HPF: (no result) /HPF (ref 0–5)
WBC NRBC COR # BLD: (no result) K/MM3 (ref 4.4–11)

## 2025-06-19 ENCOUNTER — HOSPITAL ENCOUNTER (OUTPATIENT)
Dept: HOSPITAL 100 - SDC | Age: 60
Setting detail: OBSERVATION
LOS: 1 days | Discharge: HOME | End: 2025-06-20
Payer: COMMERCIAL

## 2025-06-19 VITALS
DIASTOLIC BLOOD PRESSURE: 75 MMHG | OXYGEN SATURATION: 96 % | SYSTOLIC BLOOD PRESSURE: 144 MMHG | TEMPERATURE: 97.88 F | RESPIRATION RATE: 14 BRPM | HEART RATE: 62 BPM

## 2025-06-19 VITALS
TEMPERATURE: 97 F | RESPIRATION RATE: 16 BRPM | DIASTOLIC BLOOD PRESSURE: 78 MMHG | SYSTOLIC BLOOD PRESSURE: 145 MMHG | OXYGEN SATURATION: 96 % | HEART RATE: 64 BPM

## 2025-06-19 VITALS
OXYGEN SATURATION: 97 % | SYSTOLIC BLOOD PRESSURE: 132 MMHG | RESPIRATION RATE: 16 BRPM | HEART RATE: 62 BPM | DIASTOLIC BLOOD PRESSURE: 86 MMHG

## 2025-06-19 VITALS
HEART RATE: 60 BPM | TEMPERATURE: 97.88 F | OXYGEN SATURATION: 97 % | SYSTOLIC BLOOD PRESSURE: 145 MMHG | RESPIRATION RATE: 14 BRPM | DIASTOLIC BLOOD PRESSURE: 80 MMHG

## 2025-06-19 VITALS
RESPIRATION RATE: 14 BRPM | TEMPERATURE: 97.88 F | OXYGEN SATURATION: 98 % | DIASTOLIC BLOOD PRESSURE: 72 MMHG | HEART RATE: 65 BPM | SYSTOLIC BLOOD PRESSURE: 159 MMHG

## 2025-06-19 VITALS
RESPIRATION RATE: 14 BRPM | DIASTOLIC BLOOD PRESSURE: 92 MMHG | HEART RATE: 59 BPM | OXYGEN SATURATION: 97 % | SYSTOLIC BLOOD PRESSURE: 104 MMHG | TEMPERATURE: 97.8 F

## 2025-06-19 VITALS
RESPIRATION RATE: 17 BRPM | DIASTOLIC BLOOD PRESSURE: 73 MMHG | TEMPERATURE: 98.06 F | SYSTOLIC BLOOD PRESSURE: 165 MMHG | OXYGEN SATURATION: 95 % | HEART RATE: 72 BPM

## 2025-06-19 VITALS — DIASTOLIC BLOOD PRESSURE: 94 MMHG | SYSTOLIC BLOOD PRESSURE: 160 MMHG | HEART RATE: 62 BPM

## 2025-06-19 VITALS
HEART RATE: 60 BPM | OXYGEN SATURATION: 97 % | TEMPERATURE: 96.98 F | RESPIRATION RATE: 16 BRPM | SYSTOLIC BLOOD PRESSURE: 145 MMHG | DIASTOLIC BLOOD PRESSURE: 83 MMHG

## 2025-06-19 VITALS
RESPIRATION RATE: 16 BRPM | HEART RATE: 64 BPM | OXYGEN SATURATION: 96 % | SYSTOLIC BLOOD PRESSURE: 128 MMHG | DIASTOLIC BLOOD PRESSURE: 86 MMHG

## 2025-06-19 VITALS
TEMPERATURE: 97.52 F | DIASTOLIC BLOOD PRESSURE: 76 MMHG | SYSTOLIC BLOOD PRESSURE: 158 MMHG | OXYGEN SATURATION: 96 % | HEART RATE: 65 BPM | RESPIRATION RATE: 14 BRPM

## 2025-06-19 VITALS
SYSTOLIC BLOOD PRESSURE: 145 MMHG | HEART RATE: 69 BPM | RESPIRATION RATE: 18 BRPM | OXYGEN SATURATION: 98 % | TEMPERATURE: 97.2 F | DIASTOLIC BLOOD PRESSURE: 86 MMHG

## 2025-06-19 VITALS
TEMPERATURE: 97.88 F | HEART RATE: 60 BPM | OXYGEN SATURATION: 97 % | DIASTOLIC BLOOD PRESSURE: 84 MMHG | RESPIRATION RATE: 14 BRPM | SYSTOLIC BLOOD PRESSURE: 150 MMHG

## 2025-06-19 VITALS
TEMPERATURE: 96.98 F | SYSTOLIC BLOOD PRESSURE: 119 MMHG | DIASTOLIC BLOOD PRESSURE: 78 MMHG | RESPIRATION RATE: 16 BRPM | HEART RATE: 63 BPM | OXYGEN SATURATION: 96 %

## 2025-06-19 VITALS
DIASTOLIC BLOOD PRESSURE: 86 MMHG | RESPIRATION RATE: 18 BRPM | SYSTOLIC BLOOD PRESSURE: 145 MMHG | HEART RATE: 69 BPM | TEMPERATURE: 97.2 F | OXYGEN SATURATION: 98 %

## 2025-06-19 VITALS
HEART RATE: 60 BPM | DIASTOLIC BLOOD PRESSURE: 83 MMHG | RESPIRATION RATE: 16 BRPM | SYSTOLIC BLOOD PRESSURE: 145 MMHG | OXYGEN SATURATION: 97 %

## 2025-06-19 VITALS — BODY MASS INDEX: 27.6 KG/M2

## 2025-06-19 VITALS — HEART RATE: 66 BPM

## 2025-06-19 DIAGNOSIS — Z87.891: ICD-10-CM

## 2025-06-19 DIAGNOSIS — Z79.899: ICD-10-CM

## 2025-06-19 DIAGNOSIS — I42.8: ICD-10-CM

## 2025-06-19 DIAGNOSIS — Z82.49: ICD-10-CM

## 2025-06-19 DIAGNOSIS — Z45.02: Primary | ICD-10-CM

## 2025-06-19 DIAGNOSIS — I11.0: ICD-10-CM

## 2025-06-19 DIAGNOSIS — I50.9: ICD-10-CM

## 2025-06-19 DIAGNOSIS — I44.7: ICD-10-CM

## 2025-06-19 DIAGNOSIS — E11.9: ICD-10-CM

## 2025-06-19 DIAGNOSIS — E78.00: ICD-10-CM

## 2025-06-19 DIAGNOSIS — Z79.82: ICD-10-CM

## 2025-06-19 LAB — GLUCOSE BLDC GLUCOMTR-MCNC: 131 MG/DL (ref 74–106)

## 2025-06-19 PROCEDURE — 81001 URINALYSIS AUTO W/SCOPE: CPT

## 2025-06-19 PROCEDURE — C1751 CATH, INF, PER/CENT/MIDLINE: HCPCS

## 2025-06-19 PROCEDURE — 85027 COMPLETE CBC AUTOMATED: CPT

## 2025-06-19 PROCEDURE — 33249 INSJ/RPLCMT DEFIB W/LEAD(S): CPT

## 2025-06-19 PROCEDURE — 71047 X-RAY EXAM CHEST 3 VIEWS: CPT

## 2025-06-19 PROCEDURE — 82962 GLUCOSE BLOOD TEST: CPT

## 2025-06-19 PROCEDURE — 71046 X-RAY EXAM CHEST 2 VIEWS: CPT

## 2025-06-19 PROCEDURE — 36415 COLL VENOUS BLD VENIPUNCTURE: CPT

## 2025-06-19 PROCEDURE — C1769 GUIDE WIRE: HCPCS

## 2025-06-19 PROCEDURE — G0378 HOSPITAL OBSERVATION PER HR: HCPCS

## 2025-06-19 PROCEDURE — 33225 L VENTRIC PACING LEAD ADD-ON: CPT

## 2025-06-19 PROCEDURE — 99221 1ST HOSP IP/OBS SF/LOW 40: CPT

## 2025-06-19 PROCEDURE — 80048 BASIC METABOLIC PNL TOTAL CA: CPT

## 2025-06-19 RX ADMIN — CARVEDILOL 6.25 MG: 6.25 TABLET, FILM COATED ORAL at 18:17

## 2025-06-19 RX ADMIN — SODIUM CHLORIDE, POTASSIUM CHLORIDE, SODIUM LACTATE AND CALCIUM CHLORIDE 15 ML: 600; 310; 30; 20 INJECTION, SOLUTION INTRAVENOUS at 09:02

## 2025-06-20 VITALS
SYSTOLIC BLOOD PRESSURE: 156 MMHG | DIASTOLIC BLOOD PRESSURE: 90 MMHG | RESPIRATION RATE: 18 BRPM | TEMPERATURE: 98.06 F | OXYGEN SATURATION: 96 % | HEART RATE: 66 BPM

## 2025-06-20 VITALS — HEART RATE: 61 BPM

## 2025-06-20 VITALS
DIASTOLIC BLOOD PRESSURE: 90 MMHG | SYSTOLIC BLOOD PRESSURE: 156 MMHG | HEART RATE: 66 BPM | OXYGEN SATURATION: 96 % | TEMPERATURE: 98.06 F | RESPIRATION RATE: 18 BRPM

## 2025-06-20 VITALS
OXYGEN SATURATION: 95 % | HEART RATE: 64 BPM | SYSTOLIC BLOOD PRESSURE: 163 MMHG | RESPIRATION RATE: 17 BRPM | TEMPERATURE: 98 F | DIASTOLIC BLOOD PRESSURE: 85 MMHG

## 2025-06-20 RX ADMIN — CARVEDILOL 6.25 MG: 6.25 TABLET, FILM COATED ORAL at 09:25

## 2025-06-20 RX ADMIN — FUROSEMIDE 20 MG: 20 TABLET ORAL at 09:26

## 2025-06-20 RX ADMIN — EMPAGLIFLOZIN 25 MG: 25 TABLET, FILM COATED ORAL at 09:26

## 2025-06-20 RX ADMIN — POTASSIUM CHLORIDE 10 MEQ: 750 TABLET, EXTENDED RELEASE ORAL at 09:26

## 2025-06-20 RX ADMIN — ASPIRIN 81 MG: 81 TABLET, COATED ORAL at 09:25

## 2025-06-20 RX ADMIN — LOSARTAN POTASSIUM 50 MG: 50 TABLET, FILM COATED ORAL at 09:25

## 2025-06-20 RX ADMIN — METFORMIN HYDROCHLORIDE 500 MG: 500 TABLET, EXTENDED RELEASE ORAL at 09:25

## 2025-06-20 RX ADMIN — ATORVASTATIN CALCIUM 10 MG: 10 TABLET, FILM COATED ORAL at 09:26
